# Patient Record
Sex: FEMALE | Race: WHITE | NOT HISPANIC OR LATINO | Employment: OTHER | URBAN - METROPOLITAN AREA
[De-identification: names, ages, dates, MRNs, and addresses within clinical notes are randomized per-mention and may not be internally consistent; named-entity substitution may affect disease eponyms.]

---

## 2017-04-04 ENCOUNTER — ALLSCRIPTS OFFICE VISIT (OUTPATIENT)
Dept: OTHER | Facility: OTHER | Age: 82
End: 2017-04-04

## 2017-04-04 ENCOUNTER — GENERIC CONVERSION - ENCOUNTER (OUTPATIENT)
Dept: FAMILY MEDICINE CLINIC | Facility: CLINIC | Age: 82
End: 2017-04-04

## 2017-05-09 ENCOUNTER — GENERIC CONVERSION - ENCOUNTER (OUTPATIENT)
Dept: OTHER | Facility: OTHER | Age: 82
End: 2017-05-09

## 2017-07-13 ENCOUNTER — ALLSCRIPTS OFFICE VISIT (OUTPATIENT)
Dept: OTHER | Facility: OTHER | Age: 82
End: 2017-07-13

## 2017-07-13 DIAGNOSIS — D69.6 THROMBOCYTOPENIA (HCC): ICD-10-CM

## 2017-07-13 DIAGNOSIS — E78.5 HYPERLIPIDEMIA: ICD-10-CM

## 2017-07-13 DIAGNOSIS — E11.65 TYPE 2 DIABETES MELLITUS WITH HYPERGLYCEMIA (HCC): ICD-10-CM

## 2017-08-07 ENCOUNTER — GENERIC CONVERSION - ENCOUNTER (OUTPATIENT)
Dept: OTHER | Facility: OTHER | Age: 82
End: 2017-08-07

## 2017-10-08 ENCOUNTER — GENERIC CONVERSION - ENCOUNTER (OUTPATIENT)
Dept: OTHER | Facility: OTHER | Age: 82
End: 2017-10-08

## 2017-10-10 ENCOUNTER — ALLSCRIPTS OFFICE VISIT (OUTPATIENT)
Dept: OTHER | Facility: OTHER | Age: 82
End: 2017-10-10

## 2017-10-13 ENCOUNTER — DOCTOR'S OFFICE (OUTPATIENT)
Dept: URBAN - NONMETROPOLITAN AREA CLINIC 1 | Facility: CLINIC | Age: 82
Setting detail: OPHTHALMOLOGY
End: 2017-10-13
Payer: COMMERCIAL

## 2017-10-13 ENCOUNTER — RX ONLY (RX ONLY)
Age: 82
End: 2017-10-13

## 2017-10-13 DIAGNOSIS — H04.123: ICD-10-CM

## 2017-10-13 DIAGNOSIS — H01.005: ICD-10-CM

## 2017-10-13 DIAGNOSIS — H01.002: ICD-10-CM

## 2017-10-13 DIAGNOSIS — H01.004: ICD-10-CM

## 2017-10-13 DIAGNOSIS — H01.001: ICD-10-CM

## 2017-10-13 PROCEDURE — 92012 INTRM OPH EXAM EST PATIENT: CPT | Performed by: OPTOMETRIST

## 2017-10-13 ASSESSMENT — REFRACTION_MANIFEST
OS_VA3: 20/
OS_VA1: 20/
OU_VA: 20/
OS_VA2: 20/60-1
OD_VA2: 20/
OS_VA1: 20/60-1
OS_AXIS: 177
OD_SPHERE: +1.00
OS_ADD: +2.75
OS_VA1: 20/
OS_SPHERE: +0.75
OD_VA1: 20/
OS_VA2: 20/
OU_VA: 20/
OD_VA3: 20/
OS_VA3: 20/
OD_VA1: 20/
OS_CYLINDER: -2.75
OD_VA1: 20/50-2
OD_VA3: 20/
OD_AXIS: 180
OS_VA3: 20/
OD_VA2: 20/
OD_ADD: +2.75
OU_VA: 20/
OD_VA2: 20/50-2
OS_VA2: 20/
OD_CYLINDER: -3.00
OD_VA3: 20/

## 2017-10-13 ASSESSMENT — REFRACTION_CURRENTRX
OD_OVR_VA: 20/
OD_SPHERE: +1.00
OS_SPHERE: +2.00
OS_CYLINDER: -2.75
OS_OVR_VA: 20/
OD_ADD: +2.75
OD_CYLINDER: -3.00
OS_OVR_VA: 20/
OS_OVR_VA: 20/
OD_VPRISM_DIRECTION: BF
OD_OVR_VA: 20/
OD_OVR_VA: 20/
OD_AXIS: 176
OS_AXIS: 30
OS_ADD: +2.75
OS_VPRISM_DIRECTION: BF

## 2017-10-13 ASSESSMENT — LID EXAM ASSESSMENTS
OS_TRICHIASIS: ABSENT
OD_BLEPHARITIS: RLL RUL 1+
OD_TRICHIASIS: ABSENT
OS_BLEPHARITIS: LLL LUL 2+

## 2017-10-13 ASSESSMENT — SPHEQUIV_DERIVED
OS_SPHEQUIV: -0.75
OD_SPHEQUIV: -0.5
OS_SPHEQUIV: -0.625
OD_SPHEQUIV: -0.375

## 2017-10-13 ASSESSMENT — REFRACTION_AUTOREFRACTION
OS_AXIS: 173
OS_CYLINDER: -1.50
OD_SPHERE: +1.00
OD_AXIS: 175
OS_SPHERE: 0.00
OD_CYLINDER: -2.75

## 2017-10-13 ASSESSMENT — VISUAL ACUITY
OD_BCVA: 20/40-2
OS_BCVA: 20/60-2

## 2017-10-13 ASSESSMENT — SUPERFICIAL PUNCTATE KERATITIS (SPK)
OS_SPK: ABSENT
OD_SPK: ABSENT

## 2017-10-28 NOTE — PROGRESS NOTES
Assessment    1  Hyperlipidemia (272 4) (E78 5)   2  Type 2 diabetes mellitus with hyperglycemia (250 00) (E11 65)   3  Benign essential hypertension (401 1) (I10)   4  Low back pain (724 2) (M54 5)    Plan  Benign essential hypertension    · We encourage you to begin to make lifestyle changes to help control your bloodpressure  These may include losing weight, increasing your activity level, limiting salt inyour diet, decreasing alcohol intake, and eating a diet low in fat and rich in fruitsand vegetables ; Status:Complete;   Done: 59ZHG3171 11:16AM   · We recommend you modify your diet to achieve and maintain a healthy weight  Tre [place] may increase your risk for developing health problems such as diabetes,heart disease, and cancer  Avoid high fat foods and eat a balanced diet richin fruits and vegetables  The combination of a reduced-calorie diet and increasedphysical activity is recommended  Please let us know if you would like tolearn more about your nutrition and calorie needs, and additional options includingweight loss programs that can help you achieve your goals ; Status:Complete;   Done:16Ooy8407 11:16AM  Hyperlipidemia    · Avoid alcoholic beverages ; Status:Complete;   Done: 60UMO6340 11:15AM   · Begin a limited exercise program ; Status:Complete;   Done: 58RJP5892 11:15AM   · Begin or continue regular aerobic exercise   Gradually work up to at least 3 sessions of 30minutes of exercise a week ; Status:Complete;   Done: 18OUK1451 11:15AM   · Continue with our present treatment plan ; Status:Complete;   Done: 95QLR1984 11:15AM   · Eat a low fat and low cholesterol diet ; Status:Complete;   Done: 84OFD3784 11:15AM   · Eat no more than 30 grams of fat per day ; Status:Complete;   Done: 97CKI0676 11:15AM   · Keep a diary of when and what you eat ; Status:Complete;   Done: 83RQV2043 11:15AM   · Some eating tips that can help you lose weight ; Status:Complete;   Done: 51Lkd072998:15AM   · There are many exercise options for seniors ; Status:Complete;   Done: 51Vhs756828:15AM   · We recommend that you bring your body mass index down to 26 ; Status:Complete;  Done: 22WIV9186 11:15AM   · We recommend that you follow the Mediterranean diet ; Status:Complete;   Done:10Oct2017 11:15AM   · Call [phone] if: You have muscle cramps ; Status:Complete;   Done: 10Oct201711:15AM   · Call [phone] if: You have pain in the stomach area ; Status:Complete;   Done:10Oct2017 11:15AM   · Call [phone] if: You start vomiting ; Status:Complete;   Done: 28QVP8496 11:15AM   · Call 911 if: You experience a new kind of chest pain (angina) or pressure  ;Status:Complete;   Done: 89NMI9610 11:15AM   · Call 911 if: You have any symptoms of a stroke ; Status:Complete;   Done: 39Zqb898116:15AM  Low back pain    · Acetaminophen-Codeine #3 300-30 MG Oral Tablet; TAKE 1 TABLET 3 TIMESDAILY AS NEEDED FOR PAIN  Type 2 diabetes mellitus with hyperglycemia    · Begin a limited exercise program ; Status:Complete;   Done: 58FRE4982 11:16AM   · Begin or continue regular aerobic exercise   Gradually work up to at least 3 sessions of 30minutes of exercise a week ; Status:Complete;   Done: 54CLG4180 11:16AM   · Eat a normal well-balanced diet ; Status:Complete;   Done: 87DMR4251 11:16AM   · Inspect your feet daily ; Status:Complete;   Done: 56RQJ8065 11:16AM   · Restrict your sodium (salt) intake to 2 grams per day ; Status:Complete;   Done:10Oct2017 11:16AM   · Some eating tips that can help you lose weight ; Status:Complete;   Done: 46Lkl342930:16AM   · There are many exercise options for seniors ; Status:Complete;   Done: 40Bdx339430:16AM   · Call [phone] if: You are having trouble following our instructions or treatment forany reason ; Status:Complete;   Done: 64KBC0632 11:16AM   · Call [phone] if: Your blood sugar is higher than 250 ; Status:Complete;   Done:10Oct2017 11:16AM   · Call [phone] if: Your blood sugar is steadily becoming higher ; Status:Complete;  Done: 18ANZ6014 11:16AM   · Call [phone] if: Your blood sugar is still over 300 after taking insulin  ;Status:Complete;   Done: 39YVW0532 11:16AM    Discussion/Summary  Possible side effects of new medications were reviewed with the patient/guardian today  The treatment plan was reviewed with the patient/guardian  The patient/guardian understands and agrees with the treatment plan      Chief Complaint  PT C/O RIGHT SIDED HIP AND LEG PAIN  STATES SHE WAS IN THE ER SUNDAY FOR UTI SYMPTOMS  SHE WAS STARTED ON CIPRO AND TYLENOL 3      History of Present Illness  HPI: She has a h/o sciatica  She was in the ER over the weekend for similar complaints  The pain goes down the right leg  She denies trauma  She no numbness or weakness  She has no bowel or bladder dysfunction, other than being diagnosed with a UTI in the ER  She has PVD  It hurts more when she walks  She saw interventional radiology in May and had a normal arterial Doppler  She is taking Tylenol #3 for back pain  It is helpful  She is taking Cipro for UTI and is doing well  She has no F/C  DM is well controlled on her current regimen  Her last A1c was <6%  She has no hypoglycemic events  LDL is at goal on her current regimen  She has no myalgia or weakness other than that discussed above  BP is at goal  She has no HA or vision changes  She has no recent CP or SOB  Review of Systems   Constitutional: No fever, no chills, feels well, no tiredness, no recent weight gain or loss  ENT: no ear ache, no loss of hearing, no nosebleeds or nasal discharge, no sore throat or hoarseness  Cardiovascular: no complaints of slow or fast heart rate, no chest pain, no palpitations, no leg claudication or lower extremity edema  Respiratory: no complaints of shortness of breath, no wheezing, no dyspnea on exertion, no orthopnea or PND  Breasts: no complaints of breast pain, breast lump or nipple discharge  Gastrointestinal: no complaints of abdominal pain, no constipation, no nausea or diarrhea, no vomiting, no bloody stools  Genitourinary: no complaints of dysuria, no incontinence, no pelvic pain, no dysmenorrhea, no vaginal discharge or abnormal vaginal bleeding  Musculoskeletal: no complaints of arthralgia, no myalgia, no joint swelling or stiffness, no limb pain or swelling  Integumentary: no complaints of skin rash or lesion, no itching or dry skin, no skin wounds  Neurological: no complaints of headache, no confusion, no numbness or tingling, no dizziness or fainting  Active Problems    1  2-vessel coronary artery disease (414 00) (I25 10)   2  Allergic rhinitis (477 9) (J30 9)   3  Anemia (285 9) (D64 9)   4  Anxiety (300 00) (F41 9)   5  Benign essential hypertension (401 1) (I10)   6  Benign paroxysmal vertigo, unspecified laterality (386 11) (H81 10)   7  Biceps tendinitis of right shoulder (726 12) (M75 21)   8  Carotid bruit (785 9) (R09 89)   9  Chronic obstructive pulmonary disease (496) (J44 9)   10  Congestive heart failure (428 0) (I50 9)   11  Constipation (564 00) (K59 00)   12  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)   13  CRD (chronic renal disease) (585 9) (N18 9)   14  Depression (311) (F32 9)   15  Diarrhea (787 91) (R19 7)   16  Esophageal reflux (530 81) (K21 9)   17  Gastric ulcer (531 90) (K25 9)   18  Hiatal hernia (553 3) (K44 9)   19  Hyperlipidemia (272 4) (E78 5)   20  Intermittent claudication (443 9) (I73 9)   21  Iron deficiency anemia due to chronic blood loss (280 0) (D50 0)   22  Low back pain (724 2) (M54 5)   23  Lower back pain (724 2) (M54 5)   24  Thrombocytopenia (287 5) (D69 6)   25  Trigger finger (727 03) (M65 30)   26  Type 2 diabetes mellitus with hyperglycemia (250 00) (E11 65)   27  Urge incontinence of urine (788 31) (N39 41)   28  Welcome to Medicare preventive visit (V70 0) (Z00 00)    Past Medical History    1  History of Acute lower UTI (599 0) (N39 0)   2  History of Encounter for screening mammogram for malignant neoplasm of breast (V76 12) (Z12 31)   3  History of acute sinusitis (V12 69) (Z87 09)   4  History of Myalgia And Myositis (729 1)   5  History of Skin rash (782 1) (R21)   6  History of Trigger Finger Of The Thumb (727 03)    Family History  Mother    1  Family history of Black lung disease   2  Family history of malignant neoplasm of breast (V16 3) (Z80 3)   3  Family history unobtainable (V49 89) (Z78 9)   4  Family history of Family history unobtainable due to orphan status (V49 89) (Z78 9)  Father    5  Family history of Black lung disease   6  Family history unobtainable (V49 89) (Z78 9)   7  Family history of Family history unobtainable due to orphan status (V49 89) (Z78 9)  Son    6  Family history of Living and Healthy  Maternal Grandmother    5  Family history unobtainable (V49 89) (Z78 9)   10  Family history of Family history unobtainable due to orphan status (V49 89) (Z78 9)  Paternal Grandmother    6  Family history unobtainable (V49 89) (Z78 9)   12  Family history of Family history unobtainable due to orphan status (V49 89) (Z78 9)  Maternal Grandfather    15  Family history unobtainable (V49 89) (Z78 9)   14  Family history of Family history unobtainable due to orphan status (V49 89) (Z78 9)  Paternal Grandfather    13  Family history of Family history unobtainable due to orphan status (V49 89) (Z78 9)    Social History   · Former smoker (V15 82) (I39 054)   · Never Drank Alcohol   · Tobacco use (305 1) (Z72 0)    Surgical History    1  History of Appendectomy   2  History of CABG   3  History of Cholecystectomy   4  History of Complete Colonoscopy   5  History of Diagnostic Esophagogastroduodenoscopy   6  History of Hernia Repair   7  History of Pacemaker Permanent Placement   8  History of Total Abdominal Hysterectomy With Removal Of Both Ovaries    Current Meds   1   Acetaminophen-Codeine #3 300-30 MG Oral Tablet; TAKE 1 TABLET 3 TIMES DAILY AS NEEDED FOR PAIN; Therapy: 01QXR2797 to (Evaluate:97Icj4979) Recorded   2  Advair Diskus 250-50 MCG/DOSE Inhalation Aerosol Powder Breath Activated; USE 1 INHALATION EVERY 12 HOURS; Therapy: 29WFN9259 to (Last Rx:13Apr2017)  Requested for: 13Apr2017 Ordered   3  Aspirin EC 81 MG Oral Tablet Delayed Release; Therapy: (AHSHQEEL:88FGT2702) to Recorded   4  BD Insulin Syr Ultrafine II 31G X 5/16 0 5 ML Miscellaneous; use as directed; Therapy: 59UIR8625 to (Evaluate:31Oct2017)  Requested for: 00LJM4005; Last LF:65NQZ0681 Ordered   5  BD Pen Needle Heide U/F 32G X 4 MM Miscellaneous; use daily as directed; Therapy: 80EQX0864 to (Last AX:41DGS3993)  Requested for: 07DFW8414 Ordered   6  Carvedilol 25 MG Oral Tablet; TAKE 1 TABLET THREE TIMES A DAY WITH MORNING AND EVENING MEAL; Therapy: 40OWO3080 to (Last Rx:09Jun2017)  Requested for: 05OHI6861 Ordered   7  Clarinex 5 MG Oral Tablet; Therapy: (SXWMZVSH:13RWP0338) to Recorded   8  Clopidogrel Bisulfate 75 MG Oral Tablet; TAKE 1 TABLET DAILY; Therapy: 11DTV1792 to (Larry Corona)  Requested for: 13RRA3557; Last Rx:26Fjd0129 Ordered   9  Ferrous Fumarate 324 MG TABS; TAKE 1 TABLET DAILY AS DIRECTED; Therapy: 71WST7481 to (Evaluate:27Mar2017)  Requested for: 01Apr2016; Last Rx:01Apr2016 Ordered   10  Fluticasone Propionate 50 MCG/ACT Nasal Suspension; USE 2 SPRAYS IN EACH  NOSTRIL ONCE DAILY; Therapy: 41YAI3580 to (Last Rx:01Apr2016)  Requested for: 01Apr2016 Ordered   11  Furosemide 20 MG Oral Tablet; TAKE 1 TABLET DAILY AS DIRECTED; Therapy: 90ODQ5381 to (Damon Bullock)  Requested for: 89XMR2579; Last  Rx:05Jun2017 Ordered   12  Furosemide 40 MG Oral Tablet; TAKE 1 TABLET DAILY; Therapy: 49PRB6077 to (Evaluate:09Jun2016)  Requested for: 88VIY2467 Recorded   13  Ipratropium-Albuterol 0 5-2 5 (3) MG/3ML Inhalation Solution; USE 1 UNIT DOSE IN  NEBULIZER EVERY 4 HOURS AS NEEDED;   Therapy: 15Apr2013 to (Last Rx:05Jan2015)  Requested for: 27IUY9682 Ordered 14  Lantus SoloStar 100 UNIT/ML Subcutaneous Solution Pen-injector; INJECT UNDER  THE SKIN AS DIRECTED; Therapy: 52MXZ9729 to (Last Mukund Free)  Requested for: 59CBE0092 Ordered   15  Lisinopril 10 MG Oral Tablet; TAKE 1 TABLET DAILY AS DIRECTED; Therapy: 81NLB8076 to (Chet Rinne)  Requested for: 01Apr2016; Last  Rx:44Oaq2466 Ordered   16  Metoclopramide HCl - 10 MG Oral Tablet; TAKE 1 TABLET 3 times a day; Therapy: 66UKT5988 to (Evaluate:14Oct2017)  Requested for: 49TBK7328; Last  Rx:20Khf2540 Ordered   17  Minitran 0 2 MG/HR Transdermal Patch 24 Hour; APPLY PATCH FOR 12 TO 14 HOURS  DAILY, THEN REMOVE;  Therapy: 60KXG3162 to (Yadira Murphy)  Requested for: 64IAX2188; Last  Rx:13Mar2017 Ordered   18  Nitroglycerin 0 2 MG/HR Transdermal Patch 24 Hour; APPLY PATCH DAILY FOR 12 TO  14 HOURS THEN REMOVE;  Therapy: 28DMJ5356 to (Last Rx:13Mar2017)  Requested for: 17BIY1449 Ordered   19  Nitrostat 0 4 MG Sublingual Tablet Sublingual; PLACE 1 TABLET UNDER THE TONGUE  EVERY 5 MINUTES FOR UP TO 3 DOSES AS NEEDED FOR CHEST PAIN  CALL  911 IF PAIN PERSISTS; Therapy: 90IDN4693 to (Yadira Murphy)  Requested for: 04CBP5121; Last  Rx:52Usx1909 Ordered   20  NovoLIN R 100 UNIT/ML Injection Solution; FOLLOW SLIDING SCALE:  BLOOD SUGAR 200-250, USE 2 UNITS  BLOOD SUGAR 251-300, USE 4 UNITS  BLOOD SUGAR 301-350, USE 6 UNITS; Therapy: 72Nbq8675 to (Last Rx:08Iye4010)  Requested for: 35Arb8970 Ordered   21  Pantoprazole Sodium 40 MG Oral Tablet Delayed Release; TAKE 1 TABLET DAILY; Therapy: 07ZGY7813 to (Concepción Riddle)  Requested for: 94DQX5508; Last  Rx:54Zav0716 Ordered   22  Polyethylene Glycol 3350 Oral Powder; MIX 1 CAPFUL (17GM) IN 8 OUNCES OF  WATER, JUICE, OR TEA AND DRINK DAILY; Therapy: 72OKC6069 to (Evaluate:67Ggg5223)  Requested for: 80GVA6955; Last  Rx:14Mar2016 Ordered   23  Rosuvastatin Calcium 10 MG Oral Tablet; TAKE 1 TABLET AT BEDTIME;   Therapy: 07SBJ9542 to (Evaluate:32Gmr0322) Requested for: 36TQR9126; Last  Rx:81Utx9669 Ordered   24  Sertraline HCl - 25 MG Oral Tablet; TAKE 1 TABLET DAILY AS DIRECTED; Therapy: 18QKV4283 to (Evaluate:73Bsq4819)  Requested for: 31MXY5812; Last  Rx:48Xsg7419 Ordered   25  Triamcinolone Acetonide 0 025 % External Cream; APPLY 2-3 TIMES DAILY TO  AFFECTED AREA(S); Therapy: 42UQQ9215 to (Last Rx:93Upb7463)  Requested for: 04OBF9560 Ordered   26  Valium 5 MG Oral Tablet; Therapy: (XSSLNEYW:00UNN2080) to Recorded    Allergies  1  No Known Drug Allergies    Vitals   Recorded: 80QES9926 10:52AM   Heart Rate 54   Respiration 14   Systolic 083   Diastolic 60   Height 5 ft    Weight 148 lb    BMI Calculated 28 9   BSA Calculated 1 64   O2 Saturation 92       Physical Exam   Constitutional  General appearance: No acute distress, well appearing and well nourished  Pulmonary  Respiratory effort: No increased work of breathing or signs of respiratory distress  Auscultation of lungs: Clear to auscultation  Cardiovascular  Auscultation of heart: Normal rate and rhythm, normal S1 and S2, without murmurs  Examination of extremities for edema and/or varicosities: Normal    Abdomen  Abdomen: Non-tender, no masses  Liver and spleen: No hepatomegaly or splenomegaly           Future Appointments    Date/Time Provider Specialty Site   12/18/2017 08:30 AM Thaddeus Osler, MD [facility] 70[address]       Signatures   Electronically signed by : Hussain Cruz MD; Oct 10 2017 11:17AM EST                       (Author)

## 2017-10-30 ENCOUNTER — DOCTOR'S OFFICE (OUTPATIENT)
Dept: URBAN - NONMETROPOLITAN AREA CLINIC 1 | Facility: CLINIC | Age: 82
Setting detail: OPHTHALMOLOGY
End: 2017-10-30
Payer: COMMERCIAL

## 2017-10-30 DIAGNOSIS — H01.004: ICD-10-CM

## 2017-10-30 DIAGNOSIS — H01.002: ICD-10-CM

## 2017-10-30 DIAGNOSIS — H02.052: ICD-10-CM

## 2017-10-30 DIAGNOSIS — D21.0: ICD-10-CM

## 2017-10-30 DIAGNOSIS — H01.001: ICD-10-CM

## 2017-10-30 DIAGNOSIS — H04.123: ICD-10-CM

## 2017-10-30 PROCEDURE — 83861 MICROFLUID ANALY TEARS: CPT | Performed by: OPHTHALMOLOGY

## 2017-10-30 PROCEDURE — 67820 REVISE EYELASHES: CPT | Performed by: OPHTHALMOLOGY

## 2017-10-30 PROCEDURE — 92285 EXTERNAL OCULAR PHOTOGRAPHY: CPT | Performed by: OPHTHALMOLOGY

## 2017-10-30 PROCEDURE — 92012 INTRM OPH EXAM EST PATIENT: CPT | Performed by: OPHTHALMOLOGY

## 2017-10-30 ASSESSMENT — REFRACTION_MANIFEST
OS_VA2: 20/
OS_VA2: 20/
OD_VA1: 20/50-2
OD_AXIS: 180
OU_VA: 20/
OD_VA1: 20/
OD_VA2: 20/50-2
OS_AXIS: 177
OS_VA3: 20/
OD_VA1: 20/
OS_ADD: +2.75
OD_VA2: 20/
OD_VA2: 20/
OU_VA: 20/
OD_CYLINDER: -3.00
OD_VA3: 20/
OS_VA2: 20/60-1
OS_CYLINDER: -2.75
OU_VA: 20/
OD_VA3: 20/
OD_ADD: +2.75
OD_SPHERE: +1.00
OS_VA3: 20/
OD_VA3: 20/
OS_VA3: 20/
OS_VA1: 20/
OS_VA1: 20/
OS_SPHERE: +0.75
OS_VA1: 20/60-1

## 2017-10-30 ASSESSMENT — SUPERFICIAL PUNCTATE KERATITIS (SPK)
OS_SPK: ABSENT
OD_SPK: ABSENT

## 2017-10-30 ASSESSMENT — REFRACTION_CURRENTRX
OS_OVR_VA: 20/
OD_CYLINDER: -3.00
OS_ADD: +2.75
OD_AXIS: 176
OS_SPHERE: +2.00
OD_OVR_VA: 20/
OD_VPRISM_DIRECTION: BF
OD_SPHERE: +1.00
OS_OVR_VA: 20/
OS_AXIS: 30
OS_OVR_VA: 20/
OS_VPRISM_DIRECTION: BF
OD_OVR_VA: 20/
OS_CYLINDER: -2.75
OD_OVR_VA: 20/
OD_ADD: +2.75

## 2017-10-30 ASSESSMENT — VISUAL ACUITY
OD_BCVA: 20/30-2
OS_BCVA: 20/70-2

## 2017-10-30 ASSESSMENT — LID EXAM ASSESSMENTS
OS_BLEPHARITIS: LLL LUL 2+
OD_BLEPHARITIS: RLL RUL 1+
OD_TRICHIASIS: RLL 1+
OS_TRICHIASIS: ABSENT

## 2017-10-30 ASSESSMENT — REFRACTION_AUTOREFRACTION
OS_SPHERE: 0.00
OS_CYLINDER: -1.50
OS_AXIS: 173
OD_AXIS: 175
OD_CYLINDER: -2.75
OD_SPHERE: +1.00

## 2017-10-30 ASSESSMENT — CONFRONTATIONAL VISUAL FIELD TEST (CVF)
OD_FINDINGS: FULL
OS_FINDINGS: FULL

## 2017-10-30 ASSESSMENT — SPHEQUIV_DERIVED
OS_SPHEQUIV: -0.75
OS_SPHEQUIV: -0.625
OD_SPHEQUIV: -0.375
OD_SPHEQUIV: -0.5

## 2017-11-01 ENCOUNTER — DOCTOR'S OFFICE (OUTPATIENT)
Dept: URBAN - NONMETROPOLITAN AREA CLINIC 1 | Facility: CLINIC | Age: 82
Setting detail: OPHTHALMOLOGY
End: 2017-11-01
Payer: COMMERCIAL

## 2017-11-01 DIAGNOSIS — H35.373: ICD-10-CM

## 2017-11-01 DIAGNOSIS — H01.002: ICD-10-CM

## 2017-11-01 DIAGNOSIS — H40.003: ICD-10-CM

## 2017-11-01 DIAGNOSIS — H01.005: ICD-10-CM

## 2017-11-01 DIAGNOSIS — H35.3132: ICD-10-CM

## 2017-11-01 DIAGNOSIS — H43.813: ICD-10-CM

## 2017-11-01 DIAGNOSIS — E11.3293: ICD-10-CM

## 2017-11-01 DIAGNOSIS — H26.493: ICD-10-CM

## 2017-11-01 DIAGNOSIS — H02.052: ICD-10-CM

## 2017-11-01 DIAGNOSIS — H01.004: ICD-10-CM

## 2017-11-01 DIAGNOSIS — H04.123: ICD-10-CM

## 2017-11-01 DIAGNOSIS — H01.001: ICD-10-CM

## 2017-11-01 LAB
LEFT EYE DIABETIC RETINOPATHY: NORMAL
RIGHT EYE DIABETIC RETINOPATHY: NORMAL

## 2017-11-01 PROCEDURE — 92133 CPTRZD OPH DX IMG PST SGM ON: CPT | Performed by: OPHTHALMOLOGY

## 2017-11-01 PROCEDURE — 92014 COMPRE OPH EXAM EST PT 1/>: CPT | Performed by: OPHTHALMOLOGY

## 2017-11-01 ASSESSMENT — SPHEQUIV_DERIVED
OD_SPHEQUIV: -0.5
OD_SPHEQUIV: -0.375
OS_SPHEQUIV: -0.625
OS_SPHEQUIV: -0.75

## 2017-11-01 ASSESSMENT — REFRACTION_MANIFEST
OS_VA1: 20/60-1
OD_VA3: 20/
OS_VA3: 20/
OD_VA2: 20/
OD_VA2: 20/
OU_VA: 20/
OU_VA: 20/
OS_VA2: 20/
OS_AXIS: 177
OD_CYLINDER: -3.00
OS_VA3: 20/
OD_AXIS: 180
OS_VA2: 20/
OD_SPHERE: +1.00
OS_VA3: 20/
OD_VA1: 20/50-2
OD_ADD: +2.75
OD_VA3: 20/
OU_VA: 20/
OS_ADD: +2.75
OD_VA2: 20/50-2
OS_CYLINDER: -2.75
OS_VA2: 20/60-1
OD_VA3: 20/
OS_VA1: 20/
OD_VA1: 20/
OS_VA1: 20/
OS_SPHERE: +0.75
OD_VA1: 20/

## 2017-11-01 ASSESSMENT — LID EXAM ASSESSMENTS
OD_BLEPHARITIS: RLL RUL 1+
OD_TRICHIASIS: ABSENT
OS_BLEPHARITIS: LLL LUL 2+
OS_TRICHIASIS: ABSENT

## 2017-11-01 ASSESSMENT — REFRACTION_CURRENTRX
OS_ADD: +2.75
OD_AXIS: 176
OS_SPHERE: +2.00
OS_OVR_VA: 20/
OD_OVR_VA: 20/
OD_SPHERE: +1.00
OS_CYLINDER: -2.75
OD_CYLINDER: -3.00
OD_ADD: +2.75
OS_OVR_VA: 20/
OS_AXIS: 30
OS_OVR_VA: 20/
OD_OVR_VA: 20/
OS_VPRISM_DIRECTION: BF
OD_VPRISM_DIRECTION: BF
OD_OVR_VA: 20/

## 2017-11-01 ASSESSMENT — SUPERFICIAL PUNCTATE KERATITIS (SPK)
OS_SPK: ABSENT
OD_SPK: ABSENT

## 2017-11-01 ASSESSMENT — REFRACTION_AUTOREFRACTION
OS_CYLINDER: -1.50
OD_SPHERE: +1.00
OS_SPHERE: 0.00
OD_CYLINDER: -2.75
OD_AXIS: 175
OS_AXIS: 173

## 2017-11-01 ASSESSMENT — LID POSITION - DERMATOCHALASIS
OD_DERMATOCHALASIS: RUL
OS_DERMATOCHALASIS: LUL

## 2017-11-01 ASSESSMENT — CONFRONTATIONAL VISUAL FIELD TEST (CVF)
OS_FINDINGS: FULL
OD_FINDINGS: FULL

## 2017-11-01 ASSESSMENT — VISUAL ACUITY
OD_BCVA: 20/40
OS_BCVA: 20/80

## 2017-11-20 ENCOUNTER — ALLSCRIPTS OFFICE VISIT (OUTPATIENT)
Dept: OTHER | Facility: OTHER | Age: 82
End: 2017-11-20

## 2017-11-21 ENCOUNTER — AMBUL SURGICAL CARE (OUTPATIENT)
Dept: URBAN - NONMETROPOLITAN AREA SURGERY 1 | Facility: SURGERY | Age: 82
Setting detail: OPHTHALMOLOGY
End: 2017-11-21
Payer: COMMERCIAL

## 2017-11-21 DIAGNOSIS — H26.491: ICD-10-CM

## 2017-11-21 PROCEDURE — G8918 PT W/O PREOP ORDER IV AB PRO: HCPCS | Performed by: OPHTHALMOLOGY

## 2017-11-21 PROCEDURE — G8907 PT DOC NO EVENTS ON DISCHARG: HCPCS | Performed by: OPHTHALMOLOGY

## 2017-11-21 PROCEDURE — 66821 AFTER CATARACT LASER SURGERY: CPT | Performed by: OPHTHALMOLOGY

## 2017-11-21 NOTE — PROGRESS NOTES
Assessment    1  Visit for pre-operative examination (V72 44) ([id number] [phone])    Discussion/Summary  Surgical Clearance: She is at a LOW risk from a cardiovascular standpoint at this time without any additional cardiac testing  Reevaluation needed, if she should present with symptoms prior to surgery/procedure  Patient will take her BP medications on the day of surgery  She will stop ASA and Plavix 5 days prior to surgery  She was take 1/2 of her usual dose of Lantus the night prior to surgery  Chief Complaint  PT HERE FOR SURGICAL CLEARANCE  HAVING EYE SURGERY DONE TUESDAY 11/28/17 WITH DR Brendan Helm  PAPER TO BE FILLED OUT      History of Present Illness  Pre-Op Visit (Brief): The patient is being seen for a preoperative visit  The procedure is a(n) excision of lesion scheduled for 11/28/17 with Dr Alessandro Sykes  The indication for surgery is cyst of left lower lid  Surgical Risk Assessment:  Prior Anesthesia: She had prior anesthesia,-- no prior adverse reaction to edidural anesthesia,-- no prior adverse reaction to spinal anesthesia-- and-- no prior adverse reaction to general anesthesia  Pertinent Past Medical History: angina,-- CAD with prior MI,-- diabetes-- and-- insulin use, but-- no arrhythmia,-- no CAD,-- CAD without recent PCI,-- no CHF,-- no chronic liver disease,-- no acute hepatitis,-- no coagulation delay,-- no primary hypercoagulable state,-- no secondary hypercoagulable state,-- no pulmonary embolism,-- no DVT,-- does not use anticoagulants,-- no thyroid disease,-- no neck osteoarthrosis,-- no TMJ osteoarthrosis,-- does not wear dentures,-- no seizure disorder,-- no CVA,-- no asthma,-- no COPD,-- not JIMBO,-- no renal disease,-- no low serum albumin-- and-- no obesity  Exercise Capacity: able to walk four blocks without symptoms-- and-- able to walk two flights of stairs without symptoms  Lifestyle Factors: denies alcohol use, denies tobacco use and denies illegal drug use  Symptoms: no symptoms  Pertinent Family History: no pertinent family history  Living Situation: home is secure and supportive  HPI: She has a symptomatic cyst on her left lower lid  has an extensive cardiac history  She has not had any chest pain or SOB in months  She is doing well on her current regimen  DM is under good control on her current regimen  Review of Systems   Constitutional: No fever, no chills, feels well, no tiredness, no recent weight gain or weight loss  Eyes: No complaints of eye pain, no red eyes, no eyesight problems, no discharge, no dry eyes, no itching of eyes  ENT: no complaints of earache, no loss of hearing, no nose bleeds, no nasal discharge, no sore throat, no hoarseness  Cardiovascular: No complaints of slow heart rate, no fast heart rate, no chest pain, no palpitations, no leg claudication, no lower extremity edema  Respiratory: No complaints of shortness of breath, no wheezing, no cough, no SOB on exertion, no orthopnea, no PND  Gastrointestinal: No complaints of abdominal pain, no constipation, no nausea or vomiting, no diarrhea, no bloody stools  Genitourinary: No complaints of dysuria, no incontinence, no pelvic pain, no dysmenorrhea, no vaginal discharge or bleeding  Musculoskeletal: No complaints of arthralgias, no myalgias, no joint swelling or stiffness, no limb pain or swelling  Integumentary: No complaints of skin rash or lesions, no itching, no skin wounds, no breast pain or lump  Neurological: No complaints of headache, no confusion, no convulsions, no numbness, no dizziness or fainting, no tingling, no limb weakness, no difficulty walking  Psychiatric: Not suicidal, no sleep disturbance, no anxiety or depression, no change in personality, no emotional problems  Endocrine: No complaints of proptosis, no hot flashes, no muscle weakness, no deepening of the voice, no feelings of weakness    Hematologic/Lymphatic: No complaints of swollen glands, no swollen glands in the neck, does not bleed easily, does not bruise easily  Active Problems    1  2-vessel coronary artery disease (414 00) (I25 10)   2  Allergic rhinitis (477 9) (J30 9)   3  Anemia (285 9) (D64 9)   4  Anxiety (300 00) (F41 9)   5  Benign essential hypertension (401 1) (I10)   6  Benign paroxysmal vertigo, unspecified laterality (386 11) (H81 10)   7  Biceps tendinitis of right shoulder (726 12) (M75 21)   8  Carotid bruit (785 9) (R09 89)   9  Chronic obstructive pulmonary disease (496) (J44 9)   10  Congestive heart failure (428 0) (I50 9)   11  Constipation (564 00) (K59 00)   12  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)   13  CRD (chronic renal disease) (585 9) (N18 9)   14  Depression (311) (F32 9)   15  Diarrhea (787 91) (R19 7)   16  Esophageal reflux (530 81) (K21 9)   17  Gastric ulcer (531 90) (K25 9)   18  Hiatal hernia (553 3) (K44 9)   19  Hyperlipidemia (272 4) (E78 5)   20  Intermittent claudication (443 9) (I73 9)   21  Iron deficiency anemia due to chronic blood loss (280 0) (D50 0)   22  Low back pain (724 2) (M54 5)   23  Lower back pain (724 2) (M54 5)   24  Thrombocytopenia (287 5) (D69 6)   25  Trigger finger (727 03) (M65 30)   26  Type 2 diabetes mellitus with hyperglycemia (250 00) (E11 65)   27  Urge incontinence of urine (788 31) (N39 41)   28  Welcome to Medicare preventive visit (V70 0) (Z00 00)    Past Medical History     · History of Acute lower UTI (599 0) (N39 0)   · History of Encounter for screening mammogram for malignant neoplasm of breast(V76 12) (Z12 31)   · History of acute sinusitis (V12 69) (Z87 09)   · History of Myalgia And Myositis (729 1)   · History of Skin rash (782 1) (R21)   · History of Trigger Finger Of The Thumb (727 03)    The active problems and past medical history were reviewed and updated today        Surgical History   · History of Appendectomy   · History of CABG   · History of Cholecystectomy   · History of Complete Colonoscopy   · History of Diagnostic Esophagogastroduodenoscopy   · History of Hernia Repair   · History of Pacemaker Permanent Placement   · History of Total Abdominal Hysterectomy With Removal Of Both Ovaries    The surgical history was reviewed and updated today  Family History  Mother    · Family history of Black lung disease   · Family history of malignant neoplasm of breast (V16 3) (Z80 3)   · Family history unobtainable (V49 89) (Z78 9)   · Family history of Family history unobtainable due to orphan status (V49 89) (Z78 9)  Father    · Family history of Black lung disease   · Family history unobtainable (V49 89) (Z78 9)   · Family history of Family history unobtainable due to orphan status (V49 89) (Z73 8)  Son    · Family history of Living and Healthy  Maternal Grandmother    · Family history unobtainable (V49 89) (Z78 9)   · Family history of Family history unobtainable due to orphan status (V49 89) (Z78 9)  Paternal Grandmother    · Family history unobtainable (V49 89) (Z78 9)   · Family history of Family history unobtainable due to orphan status (V49 89) (Z78 9)  Maternal Grandfather    · Family history unobtainable (V49 89) (Z78 9)   · Family history of Family history unobtainable due to orphan status (V49 89) (Z78 9)  Paternal Grandfather    · Family history of Family history unobtainable due to orphan status (V49 89) (Z78 9)    The family history was reviewed and updated today  Social History     · Former smoker (P05 65) (N38 312)   · Never Drank Alcohol   · Tobacco use (305 1) (Z72 0)  The social history was reviewed and updated today  The social history was reviewed and is unchanged  Current Meds   1  Acetaminophen-Codeine #3 300-30 MG Oral Tablet; TAKE 1 TABLET 3 TIMES DAILY AS NEEDED FOR PAIN; Therapy: 42XEL7552 to (Evaluate:08Apr2018); Last Rx:10Oct2017 Ordered   2  Advair Diskus 250-50 MCG/DOSE Inhalation Aerosol Powder Breath Activated; USE 1 INHALATION EVERY 12 HOURS;  Therapy: 15JUN3919 to (Last Rx:13Apr2017) Requested for: 13Apr2017 Ordered   3  Aspirin EC 81 MG Oral Tablet Delayed Release; Therapy: (KVCGSQZY:80UYY1480) to Recorded   4  BD Insulin Syr Ultrafine II 31G X 5/16 0 5 ML Miscellaneous; use as directed; Therapy: 87GNO0817 to (Evaluate:31Oct2017)  Requested for: 97ZWX1950; Last [place]:84MCF8961 Ordered   5  BD Pen Needle Heide U/F 32G X 4 MM Miscellaneous; use daily as directed; Therapy: 23IOL5131 to (Last IN:96WAU7828)  Requested for: 71ZBH6511 Ordered   6  Carvedilol 25 MG Oral Tablet; TAKE 1 TABLET THREE TIMES A DAY WITH MORNING AND EVENING MEAL; Therapy: 16TTS8533 to (Last Rx:09Jun2017)  Requested for: 51DXQ9138 Ordered   7  Clarinex 5 MG Oral Tablet; Therapy: (VCIGWEMR:08LRZ4507) to Recorded   8  Clopidogrel Bisulfate 75 MG Oral Tablet; TAKE 1 TABLET DAILY; Therapy: 78WEJ6415 to ([phone])  Requested for: 23FZV8358; Last Rx:27Fxh2229 Ordered   9  Ferrous Fumarate 324 MG TABS; TAKE 1 TABLET DAILY AS DIRECTED; Therapy: 93PQN4931 to (Evaluate:27Mar2017)  Requested for: 01Apr2016; Last Rx:01Apr2016 Ordered   10  Fluticasone Propionate 50 MCG/ACT Nasal Suspension; USE 2 SPRAYS IN EACH  NOSTRIL ONCE DAILY; Therapy: 82EUA4815 to (Last Rx:01Apr2016)  Requested for: 01Apr2016 Ordered   11  Furosemide 20 MG Oral Tablet; TAKE 1 TABLET DAILY AS DIRECTED; Therapy: 11WDU8552 to (Maggie Loyola)  Requested for: 10OFH4397; Last  Rx:05Jun2017 Ordered   12  Furosemide 40 MG Oral Tablet; TAKE 1 TABLET DAILY; Therapy: 60GVE1198 to (Evaluate:09Jun2016)  Requested for: 97KJJ2263 Recorded   13  Ipratropium-Albuterol 0 5-2 5 (3) MG/3ML Inhalation Solution; USE 1 UNIT DOSE IN  NEBULIZER EVERY 4 HOURS AS NEEDED; Therapy: 76Dxt6450 to (Last Rx:05Jan2015)  Requested for: 14BNX1679 Ordered   14  Lantus SoloStar 100 UNIT/ML Subcutaneous Solution Pen-injector; INJECT UNDER  THE SKIN AS DIRECTED; Therapy: 95BBX5450 to (Last Martha Dad)  Requested for: 66XZF4833 Ordered   15   Lisinopril 10 MG Oral Tablet; TAKE 1 TABLET DAILY AS DIRECTED; Therapy: 42AEP1043 to [phone])  Requested for: 88Evm8235; Last  Rx:01Apr2016 Ordered   16  Metoclopramide HCl - 10 MG Oral Tablet; TAKE 1 TABLET 3 times a day; Therapy: 05UOK0702 to (Evaluate:14Oct2017)  Requested for: 70WFE5689; Last  Rx:97Nml7700 Ordered   17  Minitran 0 2 MG/HR Transdermal Patch 24 Hour; APPLY PATCH FOR 12 TO 14 HOURS  DAILY, THEN REMOVE;  Therapy: 47JGB3825 to (Cassie Dia)  Requested for: 14NKZ5599; Last  Rx:13Mar2017 Ordered   18  Nitroglycerin 0 2 MG/HR Transdermal Patch 24 Hour; APPLY PATCH DAILY FOR 12 TO  14 HOURS THEN REMOVE;  Therapy: 29CWJ6314 to (Last Rx:13Mar2017)  Requested for: 42RHZ4555 Ordered   19  Nitrostat 0 4 MG Sublingual Tablet Sublingual; PLACE 1 TABLET UNDER THE TONGUE  EVERY 5 MINUTES FOR UP TO 3 DOSES AS NEEDED FOR CHEST PAIN  CALL  911 IF PAIN PERSISTS; Therapy: 93VPJ9511 to (Cassie Dia)  Requested for: 67BPF4478; Last  Rx:37Sia8171 Ordered   20  NovoLIN R 100 UNIT/ML Injection Solution; FOLLOW SLIDING SCALE:  BLOOD SUGAR 200-250, USE 2 UNITS  BLOOD SUGAR 251-300, USE 4 UNITS  BLOOD SUGAR 301-350, USE 6 UNITS; Therapy: 85Xet4952 to (Last Rx:14Abd4234)  Requested for: 00Brt7956 Ordered   21  Pantoprazole Sodium 40 MG Oral Tablet Delayed Release; TAKE 1 TABLET DAILY; Therapy: 27EQU3242 to ([facility])  Requested for: 68JZL6354; Last  Rx:57Vwh1008 Ordered   22  Polyethylene Glycol 3350 Oral Powder; MIX 1 CAPFUL (17GM) IN 8 OUNCES OF  WATER, JUICE, OR TEA AND DRINK DAILY; Therapy: 71UWW0961 to (Evaluate:57Srg2251)  Requested for: 99XAI0535; Last  Rx:14Mar2016 Ordered   23  Rosuvastatin Calcium 10 MG Oral Tablet; TAKE 1 TABLET AT BEDTIME; Therapy: 13HPY8421 to ([phone])  Requested for: 62BFC8573; Last  Rx:05Jun2017 Ordered   24  Sertraline HCl - 25 MG Oral Tablet; TAKE 1 TABLET DAILY AS DIRECTED; Therapy: 97RHC3785 to (Evaluate:61Syv1575)  Requested for: 19BAW9302; Last  Rx:66Ray5257 Ordered   25  Triamcinolone Acetonide 0 025 % External Cream; APPLY 2-3 TIMES DAILY TO  AFFECTED AREA(S); Therapy: 79LFV3983 to (Last Rx:94Bzg7405)  Requested for: 20HOU9391 Ordered   26  Valium 5 MG Oral Tablet; Therapy: (LHNLFECV:33KJV1894) to Recorded    The medication list was reviewed and updated today  Allergies  1  No Known Drug Allergies    Vitals   Recorded: 35DPH5008 10:05AM   Heart Rate 85   Respiration 15   Systolic 558   Diastolic 68   Height 5 ft    Weight 147 lb 8 oz   BMI Calculated 28 81   BSA Calculated 1 64       Physical Exam   Constitutional  General appearance: No acute distress, well appearing and well nourished  Neck  Neck: Supple, symmetric, trachea midline, no masses  Thyroid: Normal, no thyromegaly  Pulmonary  Percussion of chest: Normal    Palpation of chest: Normal    Cardiovascular  Auscultation of heart: Normal rate and rhythm, normal S1 and S2, no murmurs  Carotid pulses: 2+ bilaterally  Abdomen  Abdomen: Non-tender, no masses  Liver and spleen: No hepatomegaly or splenomegaly  End of Encounter Meds    1  Carvedilol 25 MG Oral Tablet (Coreg); TAKE 1 TABLET THREE TIMES A DAY WITH MORNING AND EVENING MEAL; Therapy: 54URI7833 to (Last Rx:09Jun2017)  Requested for: 59UVA5615 Ordered   2  Clopidogrel Bisulfate 75 MG Oral Tablet (Plavix); TAKE 1 TABLET DAILY; Therapy: 39JQL9861 to (Reida Nissen)  Requested for: 57ZPD0242; Last Rx:29Kzz7209 Ordered   3  Furosemide 40 MG Oral Tablet; TAKE 1 TABLET DAILY; Therapy: 95TEB3252 to (Evaluate:09Jun2016)  Requested for: 80LXN0654 Recorded   4  Minitran 0 2 MG/HR Transdermal Patch 24 Hour; APPLY PATCH FOR 12 TO 14 HOURS DAILY, THEN REMOVE; Therapy: 34LEJ8691 to ([place])  Requested for: 97ZRH2046; Last Rx:13Mar2017 Ordered   5  Rosuvastatin Calcium 10 MG Oral Tablet (Crestor); TAKE 1 TABLET AT BEDTIME; Therapy: 43AFM9504 to (Catherine Anne)  Requested for: 18EOA6980; Last Rx:05Jun2017 Ordered    6   Fluticasone Propionate 50 MCG/ACT Nasal Suspension; USE 2 SPRAYS IN EACH NOSTRIL ONCE DAILY; Therapy: 35ZFH4983 to (Last Rx:01Apr2016)  Requested for: 01Apr2016 Ordered    7  Ferrous Fumarate 324 MG TABS; TAKE 1 TABLET DAILY AS DIRECTED; Therapy: 86XEX6550 to (Evaluate:27Mar2017)  Requested for: 01Apr2016; Last Rx:01Apr2016 Ordered    8  Advair Diskus 250-50 MCG/DOSE Inhalation Aerosol Powder Breath Activated; USE 1 INHALATION EVERY 12 HOURS; Therapy: 65RHB1519 to (Last Rx:13Apr2017)  Requested for: 13Apr2017 Ordered    9  Furosemide 20 MG Oral Tablet; TAKE 1 TABLET DAILY AS DIRECTED; Therapy: 45OXE5344 to (Eloy Socks)  Requested for: 88ORU8811; Last Rx:05Jun2017 Ordered   10  Nitroglycerin 0 2 MG/HR Transdermal Patch 24 Hour; APPLY PATCH DAILY FOR 12 TO  14 HOURS THEN REMOVE;  Therapy: 52VCV6012 to (Last Rx:13Mar2017)  Requested for: 96RUH6175 Ordered   11  Nitrostat 0 4 MG Sublingual Tablet Sublingual (Nitroglycerin); PLACE 1 TABLET UNDER  THE TONGUE EVERY 5 MINUTES FOR UP TO 3 DOSES AS NEEDED  FOR CHEST PAIN  CALL 911 IF PAIN PERSISTS; Therapy: 36UCR4912 to (August Mis)  Requested for: 88DQR8321; Last  Rx:11Wmk5520 Ordered    12  Lisinopril 10 MG Oral Tablet; TAKE 1 TABLET DAILY AS DIRECTED; Therapy: 88WKP1017 to (Evaluate:27Mar2017)  Requested for: 01Apr2016; Last  Rx:01Apr2016 Ordered    13  Polyethylene Glycol 3350 Oral Powder; MIX 1 CAPFUL (17GM) IN 8 OUNCES OF  WATER, JUICE, OR TEA AND DRINK DAILY; Therapy: 77UFG3486 to (Evaluate:17Qzs2680)  Requested for: 94NGS3929; Last  Rx:14Mar2016 Ordered    14  Ipratropium-Albuterol 0 5-2 5 (3) MG/3ML Inhalation Solution; USE 1 UNIT DOSE IN  NEBULIZER EVERY 4 HOURS AS NEEDED; Therapy: 20Tbs1826 to (Last Rx:05Jan2015)  Requested for: 96QVG4217 Ordered    15  Sertraline HCl - 25 MG Oral Tablet; TAKE 1 TABLET DAILY AS DIRECTED; Therapy: 56YAE9424 to (Evaluate:36Qaj9437)  Requested for: 16OGK5500; Last  Rx:09Lrx7413 Ordered    16   Metoclopramide HCl - 10 MG Oral Tablet (Reglan); TAKE 1 TABLET 3 times a day; Therapy: 58XNW7570 to (Evaluate:2017)  Requested for: 59RQH5117; Last  Rx:04Dir7625 Ordered   17  Pantoprazole Sodium 40 MG Oral Tablet Delayed Release (Protonix); TAKE 1 TABLET  DAILY; Therapy: 07CVQ5962 to ([phone])  Requested for: 44JPZ1363; Last  Rx:80Yww0095 Ordered    18  Acetaminophen-Codeine #3 300-30 MG Oral Tablet; TAKE 1 TABLET 3 TIMES DAILY AS  NEEDED FOR PAIN;  Therapy: 67TZS1834 to (Evaluate:70Hzm4147); Last Rx:41Czs8979 Ordered    19  Triamcinolone Acetonide 0 025 % External Cream; APPLY 2-3 TIMES DAILY TO  AFFECTED AREA(S); Therapy: 24MEJ1666 to (Last Rx:63Ohh6057)  Requested for: 04ZEV3941 Ordered    20  BD Insulin Syr Ultrafine II 31G X 5/16 0 5 ML Miscellaneous; use as directed; Therapy: 84JWH1283 to (Evaluate:2017)  Requested for: 47KUF9634; Last  JU:74CYX9795 Ordered   21  BD Pen Needle Heide U/F 32G X 4 MM Miscellaneous; use daily as directed; Therapy: 08YIV0555 to (Last IV:89XEP3287)  Requested for: 81RTP8015 Ordered   22  Lantus SoloStar 100 UNIT/ML Subcutaneous Solution Pen-injector; INJECT UNDER  THE SKIN AS DIRECTED; Therapy: 50QVG4396 to (Last Marilyn Melissa)  Requested for: 84UOJ2429 Ordered   23  NovoLIN R 100 UNIT/ML Injection Solution; FOLLOW SLIDING SCALE:  BLOOD SUGAR 200-250, USE 2 UNITS  BLOOD SUGAR 251-300, USE 4 UNITS  BLOOD SUGAR 301-350, USE 6 UNITS; Therapy: 51Xvf2350 to (Last Rx:55Gva7671)  Requested for: 88Mmm6210 Ordered    24  Aspirin EC 81 MG Oral Tablet Delayed Release; Therapy: (QWIQVRZ50ICX4794) to Recorded   25  Clarinex 5 MG Oral Tablet (Desloratadine); Therapy: (VHTVVAPR:05MMT7446) to Recorded   26  Valium 5 MG Oral Tablet (DiazePAM);   Therapy: (CFXXGHNN:93FVA3437) to Recorded    Signatures   Electronically signed by : Anuj Ayala MD; 2017 10:45AM EST                       (Author)

## 2017-11-28 ENCOUNTER — AMBUL SURGICAL CARE (OUTPATIENT)
Dept: URBAN - NONMETROPOLITAN AREA SURGERY 1 | Facility: SURGERY | Age: 82
Setting detail: OPHTHALMOLOGY
End: 2017-11-28
Payer: COMMERCIAL

## 2017-11-28 DIAGNOSIS — D21.0: ICD-10-CM

## 2017-11-28 DIAGNOSIS — D23.12: ICD-10-CM

## 2017-11-28 PROCEDURE — 67810 INCAL BX EYELID SKN LID MRGN: CPT | Performed by: OPHTHALMOLOGY

## 2017-11-28 PROCEDURE — G8907 PT DOC NO EVENTS ON DISCHARG: HCPCS | Performed by: OPHTHALMOLOGY

## 2017-11-28 PROCEDURE — G8918 PT W/O PREOP ORDER IV AB PRO: HCPCS | Performed by: OPHTHALMOLOGY

## 2017-11-28 PROCEDURE — 67961 REVISION OF EYELID: CPT | Performed by: OPHTHALMOLOGY

## 2017-12-18 ENCOUNTER — GENERIC CONVERSION - ENCOUNTER (OUTPATIENT)
Dept: FAMILY MEDICINE CLINIC | Facility: CLINIC | Age: 82
End: 2017-12-18

## 2017-12-18 ENCOUNTER — RX ONLY (RX ONLY)
Age: 82
End: 2017-12-18

## 2017-12-18 ENCOUNTER — DOCTOR'S OFFICE (OUTPATIENT)
Dept: URBAN - NONMETROPOLITAN AREA CLINIC 1 | Facility: CLINIC | Age: 82
Setting detail: OPHTHALMOLOGY
End: 2017-12-18
Payer: COMMERCIAL

## 2017-12-18 DIAGNOSIS — D21.0: ICD-10-CM

## 2017-12-18 DIAGNOSIS — H04.123: ICD-10-CM

## 2017-12-18 DIAGNOSIS — H02.052: ICD-10-CM

## 2017-12-18 DIAGNOSIS — H26.493: ICD-10-CM

## 2017-12-18 PROCEDURE — 99024 POSTOP FOLLOW-UP VISIT: CPT | Performed by: OPHTHALMOLOGY

## 2017-12-18 ASSESSMENT — REFRACTION_CURRENTRX
OS_ADD: +2.75
OS_VPRISM_DIRECTION: BF
OD_CYLINDER: -3.00
OD_SPHERE: +1.00
OS_OVR_VA: 20/
OS_AXIS: 30
OD_OVR_VA: 20/
OS_OVR_VA: 20/
OS_OVR_VA: 20/
OS_CYLINDER: -2.75
OD_OVR_VA: 20/
OD_AXIS: 176
OD_VPRISM_DIRECTION: BF
OD_ADD: +2.75
OD_OVR_VA: 20/
OS_SPHERE: +2.00

## 2017-12-18 ASSESSMENT — REFRACTION_MANIFEST
OD_VA2: 20/50-2
OS_SPHERE: +0.75
OD_AXIS: 180
OS_VA2: 20/
OS_VA2: 20/60-1
OS_VA1: 20/
OD_VA3: 20/
OS_AXIS: 177
OU_VA: 20/
OD_VA3: 20/
OS_VA3: 20/
OS_VA3: 20/
OS_VA2: 20/
OS_VA3: 20/
OU_VA: 20/
OD_VA2: 20/
OS_ADD: +2.75
OS_VA1: 20/
OD_VA1: 20/
OD_ADD: +2.75
OD_VA1: 20/
OD_VA1: 20/50-2
OD_VA2: 20/
OD_CYLINDER: -3.00
OU_VA: 20/
OS_CYLINDER: -2.75
OS_VA1: 20/60-1
OD_VA3: 20/
OD_SPHERE: +1.00

## 2017-12-18 ASSESSMENT — LID POSITION - DERMATOCHALASIS
OD_DERMATOCHALASIS: RUL
OS_DERMATOCHALASIS: LUL

## 2017-12-18 ASSESSMENT — SUPERFICIAL PUNCTATE KERATITIS (SPK)
OS_SPK: ABSENT
OD_SPK: ABSENT

## 2017-12-18 ASSESSMENT — REFRACTION_AUTOREFRACTION
OD_SPHERE: +1.25
OS_SPHERE: +2.25
OD_CYLINDER: -3.00
OD_AXIS: 002
OS_AXIS: 006
OS_CYLINDER: -2.25

## 2017-12-18 ASSESSMENT — VISUAL ACUITY
OD_BCVA: 20/50+2
OS_BCVA: 20/60

## 2017-12-18 ASSESSMENT — LID EXAM ASSESSMENTS
OD_TRICHIASIS: ABSENT
OS_TRICHIASIS: ABSENT
OD_BLEPHARITIS: RLL RUL 1+
OS_BLEPHARITIS: LLL LUL 2+

## 2017-12-18 ASSESSMENT — SPHEQUIV_DERIVED
OD_SPHEQUIV: -0.25
OS_SPHEQUIV: 1.125
OS_SPHEQUIV: -0.625
OD_SPHEQUIV: -0.5

## 2017-12-18 ASSESSMENT — CONFRONTATIONAL VISUAL FIELD TEST (CVF)
OS_FINDINGS: FULL
OD_FINDINGS: FULL

## 2018-01-09 NOTE — MISCELLANEOUS
Assessment    1  Type 2 diabetes mellitus with hyperglycemia (250 00) (E11 65)   2  Chronic obstructive pulmonary disease (496) (J44 9)   3  2-vessel coronary artery disease (414 00) (I25 10)   4  Hyperlipidemia (272 4) (E78 5)   5  Benign essential hypertension (401 1) (I10)    Plan  2-vessel coronary artery disease    · Begin a limited exercise program ; Status:Complete;   Done: 40CMU8837   Ordered; For:2-vessel coronary artery disease; Ordered By:Bob Hawkins;   · Brush your teeth 2 times a day and floss at least once a day ; Status:Complete;   Done:  42DHO9425   Ordered; For:2-vessel coronary artery disease; Ordered By:Ming Hawkins;   · Continue with our present treatment plan ; Status:Complete;   Done: 15FXM5902   Ordered; For:2-vessel coronary artery disease; Ordered By:Ming Hawkins;   · Eat a low fat and low cholesterol diet ; Status:Complete;   Done: 18OVN8421   Ordered; For:2-vessel coronary artery disease; Ordered By:Ming Hawkins;   · There are many exercise options for seniors ; Status:Complete;   Done: 26HFD6314   Ordered; For:2-vessel coronary artery disease; Ordered By:Ming Hawkins;   · We recommend routine visits to a dentist ; Status:Complete;   Done: 19RNC1663   Ordered; For:2-vessel coronary artery disease; Ordered By:Ming Hawkins;   · We recommend that you bring your body mass index down to 25 ; Status:Complete;    Done: 85CIH4197   Ordered; For:2-vessel coronary artery disease; Ordered By:Ming Hawkins;   · You need to quit smoking ; Status:Complete;   Done: 30MBW5264   Ordered; For:2-vessel coronary artery disease; Ordered By:Ming Hawkins;   · Call [phone] if: You are having chest pain with exercise ; Status:Complete;    Done: 98TXQ1215   Ordered;   For:2-vessel coronary artery disease; Ordered By:Ming Hawkins;   · Call [phone] if: You are having more frequent or more severe chest pain with  exercise ; Status:Complete;   Done: 88KFV0148   Ordered; For:2-vessel coronary artery disease; Ordered By:Cece Hawkins Rm;   · Call [phone] if: You are still having difficulty breathing while lying down in 2 days ;  Status:Complete;   Done: 63OQX0016   Ordered; For:2-vessel coronary artery disease; Ordered By:Viktoriyash Alvbenoit Rm;   · Call [phone] if: You become dizzy or lightheaded, especially when you stand up  after sitting for awhile ; Status:Complete;   Done: 10XEF3230   Ordered; For:2-vessel coronary artery disease; Ordered By:Cece Hawkins Rm;   · Call [phone] if: You feel your heart is beating very fast or skipping beats ;  Status:Complete;   Done: 09MFJ6223   Ordered; For:2-vessel coronary artery disease; Ordered By:ViktoriyashCece Rm;   · Call [phone] if: You have difficulty breathing while lying down and you are  comfortable only when sitting up ; Status:Complete;   Done: 33HNC0857   Ordered; For:2-vessel coronary artery disease; Ordered By:Cece Hawkins Rm;   · Call [phone] if: You start getting short of breath with your normal exercise or  physical labor ; Status:Complete;   Done: 22OFC1387   Ordered; For:2-vessel coronary artery disease; Ordered By:Cece Hawkins Rm;   · Call [phone] if: Your chest pain is happening more often ; Status:Complete;    Done: 09RFM2582   Ordered; For:2-vessel coronary artery disease; Ordered By:Cece Hawkins Rm;   · Call [phone] if: Your chest pain with exercise is not better in 3 days ;  Status:Complete;   Done: 58NEH6930   Ordered; For:2-vessel coronary artery disease; Ordered By:Viktoriyash Alvbenoit Rm;   · Call [phone] if: Your dizziness is getting worse ; Status:Complete;   Done:  73IAF5674   Ordered; For:2-vessel coronary artery disease; Ordered By:ViktoriyashCece Rm;   · Call [phone] if: Your dizziness is not getting better in 2 days ; Status:Complete;    Done: 07FIZ6686   Ordered;   For:2-vessel coronary artery disease; Ordered By:Cece Hawkins;   · Call [phone] if: Your palpitations are not getting better after treatment at home ;  Status:Complete;   Done: 40JXD2794   Ordered; For:2-vessel coronary artery disease; Ordered By:Vamshi Hawkins;   · Call 911 if: You are too short of breath to talk, you can speak only one or two words  between breaths, or your lips or nails look blue ; Status:Complete;   Done: 20OKD8037   Ordered; For:2-vessel coronary artery disease; Ordered By:Vamshi Hawkins;   · Call 911 if: You experience a new kind of chest pain (angina) or pressure ;  Status:Complete;   Done: 37ZPY5419   Ordered; For:2-vessel coronary artery disease; Ordered By:Bob Hawkins;   · Call 911 if: You have any symptoms of a stroke ; Status:Complete;   Done: 30VGG7524   Ordered; For:2-vessel coronary artery disease; Ordered By:Bob Hawkins;   · Call 911 if: You have fainted or passed out ; Status:Complete;   Done: 72GLS8109   Ordered; For:2-vessel coronary artery disease; Ordered By:Bob Hawkins;   · Call 911 if: Your chest pain (angina) does not go away after 10 minutes of your usual  treatment ; Status:Complete;   Done: 60WGT3210   Ordered; For:2-vessel coronary artery disease; Ordered By:Vamshi Hawkins;   · Seek Immediate Medical Attention if: You have difficulty breathing, or you are short of  breath more often ; Status:Complete;   Done: 36XKL1818   Ordered; For:2-vessel coronary artery disease; Ordered By:Vamshi Hawkins;   · Seek Immediate Medical Attention if: Your chest pain (angina) happens at rest ;  Status:Complete;   Done: 45IBT7629   Ordered; For:2-vessel coronary artery disease; Ordered By:Vamshi Hawkins;   · Seek Immediate Medical Attention if: Your chest pain feels different to you ;  Status:Complete;   Done: 09TAE5610   Ordered; For:2-vessel coronary artery disease; Ordered By:Vamshi Hawkins; Acute lower UTI    · Home Health Referral Other Physician Referral  Consult  Status: Active  Requested for:  47HWA1961   Ordered;  For: Acute lower UTI; Ordered By: Yousif Sauceda Performed:  Due: 81AGG0840; Last Updated By: Lisa Bray; 1/22/2016 11:11:07 AM  are Referring to a non- Preferred Provider : Established Patient  Care Summary provided  : Yes  Benign essential hypertension    · Begin a limited exercise program ; Status:Complete;   Done: 13ARI8118   Ordered; For:Benign essential hypertension; Ordered By:Raad Hawkins;   · Continue with our present treatment plan ; Status:Complete;   Done: 26HEG4534   Ordered; For:Benign essential hypertension; Ordered By:Raad Hawkins;   · Eat a low fat and low cholesterol diet ; Status:Complete;   Done: 74UHC6973   Ordered; For:Benign essential hypertension; Ordered By:Raad Hawkins;   · Eat no more than 30 grams of fat per day ; Status:Complete;   Done: 81GSE4317   Ordered; For:Benign essential hypertension; Ordered By:Raad Hawkins;   · Keep a diary of when and what you eat ; Status:Complete;   Done: 91ZJU8414   Ordered; For:Benign essential hypertension; Ordered By:Raad Hawkins;   · Some eating tips that can help you lose weight ; Status:Complete;   Done: 31RVY3980   Ordered; For:Benign essential hypertension; Ordered By:Raad Hawkins;   · Take your blood pressure twice a day, varying the time of day you check it  Record the  numbers, and bring them with you to your appointment ; Status:Complete;   Done:  54WOV4539   Ordered; For:Benign essential hypertension; Ordered By:Raad Hawkins;   · There are many exercise options for seniors ; Status:Complete;   Done: 86QBF9649   Ordered; For:Benign essential hypertension; Ordered By:Raad Hawkins;   · We encourage you to begin to make lifestyle changes to help control your blood  pressure  These may include losing weight, increasing your activity level, limiting salt in  your diet, decreasing alcohol intake, and eating a diet low in fat and rich in fruits  and vegetables ; Status:Complete;   Done: 08OSI7887   Ordered;   For:Benign essential hypertension; Ordered By:Tiffani Hawkins;   · We recommend that you bring your body mass index down to 26 ; Status:Complete;    Done: 28ZPX2949   Ordered; For:Benign essential hypertension; Ordered By:Tiffani Hawkins;   · We recommend you modify your diet to achieve and maintain a healthy weight  Being  overweight may increase your risk for developing health problems such as diabetes,  heart disease, and cancer  Avoid high fat foods and eat a balanced diet rich  in fruits and vegetables  The combination of a reduced-calorie diet and increased  physical activity is recommended  Please let us know if you would like to  learn more about your nutrition and calorie needs, and additional options including  weight loss programs that can help you achieve your goals ; Status:Complete;   Done:  24VSF0630   Ordered; For:Benign essential hypertension; Ordered By:Tiffani Hawkins;   · Call [phone] if: You become dizzy or lightheaded, especially when you stand up  after sitting for a while ; Status:Complete;   Done: 87JYW3380   Ordered; For:Benign essential hypertension; Ordered By:Tiffani Hawkins;   · Call [phone] if: You develop double vision (see two of everything) ;  Status:Complete;   Done: 11OPZ7111   Ordered; For:Benign essential hypertension; Ordered By:Tiffani Hawkins;   · Call [phone] if: Your blood pressure is frequently higher than 140/90 ;  Status:Complete;   Done: 22CYS2790   Ordered; For:Benign essential hypertension; Ordered By:Tiffani Hawkins;   · Call 911 if: You experience a new kind of chest pain (angina) or pressure ;  Status:Complete;   Done: 79CSU9471   Ordered; For:Benign essential hypertension; Ordered By:Bob Hawkins;   · Call 911 if: You have any symptoms of a stroke ; Status:Complete;   Done: 20UDH9228   Ordered;   For:Benign essential hypertension; Ordered By:Tiffani Hawkins;   · Seek Immediate Medical Attention if: You have a severe headache that will not go away ;  Status:Complete;   Done: 43XZP7510   Ordered; For:Benign essential hypertension; Ordered By:Leona Hawkins;   · Seek Immediate Medical Attention if: Your blood pressure is greater than 250/120 for 2  consecutive readings ; Status:Complete;   Done: 83GHB1956   Ordered; For:Benign essential hypertension; Ordered By:Leona Hawkins; Chronic obstructive pulmonary disease    · 3 times a day practice pursed lip and abdominal breathing ; Status:Complete;   Done:  17ALE2466   Ordered; For:Chronic obstructive pulmonary disease; Ordered By:Leona Hawkins;   · Avoid  exposure to cigarette smoke ; Status:Complete;   Done: 72FLS0611   Ordered; For:Chronic obstructive pulmonary disease; Ordered By:Leona Hawkins;   · Avoid exposure to household dust, animal dander, and molds ; Status:Complete;   Done:  90OVC5066   Ordered; For:Chronic obstructive pulmonary disease; Ordered By:Leona Hawkins;   · Avoid exposure to infections ; Status:Complete;   Done: 27ZBU0880   Ordered; For:Chronic obstructive pulmonary disease; Ordered By:Leona Hawkins;   · Avoid exposure to things that make your problem worse ; Status:Complete;   Done:  63MTM4201   Ordered; For:Chronic obstructive pulmonary disease; Ordered By:Bob Hawkins;   · Decreasing the stress in your life may help your condition improve ; Status:Complete;    Done: 55DPS9614   Ordered; For:Chronic obstructive pulmonary disease; Ordered By:Bob Hawkins;   · Eat a normal well-balanced diet  Follow the food pyramid for healthy eating ;  Status:Complete;   Done: 72ZTU3187   Ordered; For:Chronic obstructive pulmonary disease; Ordered By:Leona Hawkins;   · To use an inhaler:; Status:Complete;   Done: 46QDC3502   Ordered; For:Chronic obstructive pulmonary disease; Ordered By:Bob Hawkins;   · Use your oxygen at 2 liters per minute 12 hours a day ; Status:Complete;   Done:  28GUI8048   Ordered;   For:Chronic obstructive pulmonary disease; Ordered By:Ross Pradip Hussein;   · Call [phone] if: Peak flow is still less than 200 after 2 days ; Status:Complete;    Done: 50BQY2425   Ordered; For:Chronic obstructive pulmonary disease; Ordered By:Pradip Hawkins;   · Call [phone] if: The cough is worse or secretions become darker or colored ;  Status:Complete;   Done: 80EJE1337   Ordered; For:Chronic obstructive pulmonary disease; Ordered By:Pradip Hawkins;   · Call [phone] if: You have difficulty breathing while lying down and you are  comfortable only when sitting up ; Status:Complete;   Done: 55IJF4928   Ordered; For:Chronic obstructive pulmonary disease; Ordered By:Pradip Hawkins;   · Call [phone] if: Your cough is not better in 1 week ; Status:Complete;   Done:  92VQF2162   Ordered; For:Chronic obstructive pulmonary disease; Ordered By:Pradip Hawkins;   · Call [phone] if: Your temperature is higher than 102F ; Status:Complete;   Done:  81IIM3396   Ordered; For:Chronic obstructive pulmonary disease; Ordered By:Pradip Hawkins;   · Call 731 if: You are too short of breath to talk, you can speak only one or two words  between breaths, or your lips or nails look blue ; Status:Complete;   Done: 94GNY8421   Ordered; For:Chronic obstructive pulmonary disease; Ordered By:Pradip Hawkins;   · Seek Immediate Medical Attention if: You are having trouble staying awake ;  Status:Complete;   Done: 61CWY6138   Ordered; For:Chronic obstructive pulmonary disease; Ordered By:Pradip Hawkins;   · Seek Immediate Medical Attention if: You feel short of breath even while resting ;  Status:Complete;   Done: 04QMU8020   Ordered; For:Chronic obstructive pulmonary disease; Ordered By:Pradip Hawkins;   · Seek Immediate Medical Attention if: You get a headache that does not go away with your  usual treatment ; Status:Complete;   Done: 24AAM0346   Ordered;   For:Chronic obstructive pulmonary disease; Ordered By:Pradip Hawkins;   · Seek Immediate Medical Attention if: You have pain in the chest that gets worse with  deep breathing or coughing ; Status:Complete;   Done: 35GMG2130   Ordered; For:Chronic obstructive pulmonary disease; Ordered By:Lynn Hawkins;   · Seek Immediate Medical Attention if: You or your family members notice any confusion or  difficulty with memory ; Status:Complete;   Done: 75WEZ4738   Ordered; For:Chronic obstructive pulmonary disease; Ordered By:Lynn Hawkins;   · Seek Immediate Medical Attention if: Your shortness of breath is getting worse ;  Status:Complete;   Done: 42MDZ6319   Ordered; For:Chronic obstructive pulmonary disease; Ordered By:Lynn Hawkins; Hyperlipidemia    · Begin a limited exercise program ; Status:Complete;   Done: 78RPC4015   Ordered;  For:Hyperlipidemia; Ordered By:Lynn Hawkins;   · Continue with our present treatment plan ; Status:Complete;   Done: 56TTB4585   Ordered;  Kimo Arizmendi; Ordered By:Lynn Hawkins;   · Eat a low fat and low cholesterol diet ; Status:Complete;   Done: 30HIW1292   Ordered;  For:Hyperlipidemia; Ordered By:Lynn Hawkins;   · Eat no more than 30 grams of fat per day ; Status:Complete;   Done: 70FSX4466   Ordered;  For:Hyperlipidemia; Ordered By:Lynn Hawkins;   · Keep a diary of when and what you eat ; Status:Complete;   Done: 91UOH4689   Ordered;  For:Hyperlipidemia; Ordered By:Lynn Hawkins;   · Some eating tips that can help you lose weight ; Status:Complete;   Done: 28HQB4220   Ordered;  For:Hyperlipidemia; Ordered By:Lynn Hawkins;   · There are many exercise options for seniors ; Status:Complete;   Done: 51YJL4883   Ordered;  For:Hyperlipidemia; Ordered By:Lynn Hawkins;   · We recommend that you bring your body mass index down to 26 ; Status:Complete;    Done: 82LQB7244   Ordered;  For:Hyperlipidemia; Ordered By:Lynn Hawkins;   · Call [phone] if: You have muscle cramps ; Status:Complete;   Done: 03GCB3096   Ordered;  For:Hyperlipidemia;  Ordered Yuko Wolff;   · Call [phone] if: You have pain in the stomach area ; Status:Complete;   Done:  28JBI6925   Ordered;  Junie Danielle; Ordered By:Juan Carlos Hawkins;   · Call [phone] if: You start vomiting ; Status:Complete;   Done: 28QPK2405   Ordered;  For:Hyperlipidemia; Ordered By:Juan Carlos Hawkins;   · Call 911 if: You experience a new kind of chest pain (angina) or pressure ;  Status:Complete;   Done: 79ITJ0908   Ordered;  For:Hyperlipidemia; Ordered By:Juan Carlos Hawkins;   · Call 911 if: You have any symptoms of a stroke ; Status:Complete;   Done: 04CXT5019   Ordered;  For:Hyperlipidemia; Ordered By:Juan Carlos Hawkins;  Type 2 diabetes mellitus with hyperglycemia    · BD Insulin Syr Ultrafine II 31G X 5/16" 0 5 ML Miscellaneous; use as directed   Rx By: Ted Fair; Dispense: 30 Days ; #:1 X 100 Miscellaneous Box; Refill: 5; For: Type 2 diabetes mellitus with hyperglycemia; ANDRES = N; Verified Transmission to [address]; Last Updated By: [employer]; 2016 10:54:19 AM   · Hemoglobin A1c- POC; Status:Active; Requested MMD:[id number];    Perform: In Office; [id number];FAZBMPC; For:Type 2 diabetes mellitus with hyperglycemia; Ordered By:Juan Carlos Hawkins; Chief Complaint  Chief Complaint Free Text Note Form: DARA  PT WAS ADMITTED TO [facility] ON 16 AND D/C 16 FOR SOB      History of Present Illness  TCM Communication [facility]: The patient is being contacted for follow-up after hospitalization and PT CONTACTED OFFICE  Hospital course was discussed with the inpatient physician and records were reviewed  She was hospitalized at [facility]  The date of admission: 16, date of discharge: 16  Diagnosis: SOB  She was discharged to home  Communication performed and completed by   [facility]: She has coronary artery disease  She underwent recent cardiac catheterization that showed occlusion of her previous bypass grafts   Her interventional cardiologist did not feel that he could safely stent these grafts  She is a poor candidate for repeat surgery  She develops shortness of breath without chest pain  She was recently started on spironolactone  She has significant ischemic cardiomyopathy with an ejection fraction of only 25%  Her blood sugars are well-controlled on her current regimen  She has no hypoglycemic events  Her A1c is at goal     She has a history of COPD  She quit smoking about a year ago  She has no cough  She has no sputum production  She's no fevers or chills  Her blood pressure is well-controlled on her current regimen  She has no headache or vision changes  She has no peripheral edema  Review of Systems  Complete-Female:   Constitutional: No fever, no chills, feels well, no tiredness, no recent weight gain or weight loss  Eyes: No complaints of eye pain, no red eyes, no eyesight problems, no discharge, no dry eyes, no itching of eyes  ENT: no complaints of earache, no loss of hearing, no nose bleeds, no nasal discharge, no sore throat, no hoarseness  Cardiovascular: as noted in HPI  Respiratory: No complaints of shortness of breath, no wheezing, no cough, no SOB on exertion, no orthopnea, no PND  Gastrointestinal: No complaints of abdominal pain, no constipation, no nausea or vomiting, no diarrhea, no bloody stools  Genitourinary: No complaints of dysuria, no incontinence, no pelvic pain, no dysmenorrhea, no vaginal discharge or bleeding  Musculoskeletal: No complaints of arthralgias, no myalgias, no joint swelling or stiffness, no limb pain or swelling  Integumentary: No complaints of skin rash or lesions, no itching, no skin wounds, no breast pain or lump  Neurological: No complaints of headache, no confusion, no convulsions, no numbness, no dizziness or fainting, no tingling, no limb weakness, no difficulty walking     Psychiatric: Not suicidal, no sleep disturbance, no anxiety or depression, no change in personality, no emotional problems  Endocrine: No complaints of proptosis, no hot flashes, no muscle weakness, no deepening of the voice, no feelings of weakness  Hematologic/Lymphatic: No complaints of swollen glands, no swollen glands in the neck, does not bleed easily, does not bruise easily  Active Problems    1  2-vessel coronary artery disease (414 00) (I25 10)   2  Acute lower UTI (599 0) (N39 0)   3  Allergic rhinitis (477 9) (J30 9)   4  Anemia (285 9) (D64 9)   5  Anxiety (300 00) (F41 9)   6  Benign essential hypertension (401 1) (I10)   7  Benign paroxysmal vertigo, unspecified laterality (386 11) (H81 10)   8  Carotid bruit (785 9) (R09 89)   9  Chronic obstructive pulmonary disease (496) (J44 9)   10  Congestive heart failure (428 0) (I50 9)   11  Constipation (564 00) (K59 00)   12  CRD (chronic renal disease) (585 9) (N18 9)   13  Depression (311) (F32 9)   14  Diarrhea (787 91) (R19 7)   15  Emphysema (492 8) (J43 9)   16  Encounter for screening for other nervous system disorder (V80 09) (Z13 858)   17  Encounter for screening mammogram for malignant neoplasm of breast (V76 12)    (Z12 31)   18  Encounter for special screening examination for genitourinary disorder (V81 6) (Z13 89)   19  Esophageal reflux (530 81) (K21 9)   20  Gastric ulcer (531 90) (K25 9)   21  Hiatal hernia (553 3) (K44 9)   22  Hyperlipidemia (272 4) (E78 5)   23  Iron deficiency anemia due to chronic blood loss (280 0) (D50 0)   24  Low back pain (724 2) (M54 5)   25  Lower back pain (724 2) (M54 5)   26  Screening for malignant neoplasm of cervix (V76 2) (Z12 4)   27  Screening for osteoporosis (V82 81) (Z13 820)   28  Skin rash (782 1) (R21)   29  Trigger finger (727 03) (M65 30)   30  Type 2 diabetes mellitus with hyperglycemia (250 00) (E11 65)   31  Visit for pre-operative examination (V72 84) (U05 099)    Past Medical History    1  History of Encounter for screening mammogram for malignant neoplasm of breast   (V76 12) (Z12 31)   2  History of acute sinusitis (V12 69) (Z87 09)   3  History of Myalgia And Myositis (729 1)   4  History of Trigger Finger Of The Thumb (727 03)    Surgical History    1  History of Appendectomy   2  History of CABG   3  History of Cholecystectomy   4  History of Complete Colonoscopy   5  History of Diagnostic Esophagogastroduodenoscopy   6  History of Hernia Repair   7  History of Pacemaker Permanent Placement   8  History of Total Abdominal Hysterectomy With Removal Of Both Ovaries    Family History    1  Family history of Black lung disease   2  Family history of malignant neoplasm of breast (V16 3) (Z80 3)   3  Family history unobtainable (V49 89) (Z78 9)   4  Family history of Family history unobtainable due to orphan status (V49 89) (Z78 9)    5  Family history of Black lung disease   6  Family history unobtainable (V49 89) (Z78 9)   7  Family history of Family history unobtainable due to orphan status (V49 89) (Z78 9)    8  Family history of Living and Healthy    9  Family history unobtainable (V49 89) (Z78 9)   10  Family history of Family history unobtainable due to orphan status (V49 89) (Z78 9)    11  Family history unobtainable (V49 89) (Z78 9)   12  Family history of Family history unobtainable due to orphan status (V49 89) (Z78 9)    13  Family history unobtainable (V49 89) (Z78 9)   14  Family history of Family history unobtainable due to orphan status (V49 89) (Z78 9)    15  Family history of Family history unobtainable due to orphan status (V49 89) (Z78 9)    Social History    · Former smoker (V15 82) (U07 135)   · Never Drank Alcohol   · Tobacco use (305 1) (Z72 0)    Current Meds   1  Acetaminophen-Codeine #3 300-30 MG Oral Tablet; TAKE 1 TABLET 3 TIMES DAILY AS   NEEDED FOR PAIN;   Therapy: 66RMY9608 to (Evaluate:17Urk3365) Recorded   2  Advair Diskus 250-50 MCG/DOSE Inhalation Aerosol Powder Breath Activated; INHALE 1   PUFF EVERY 12 HOURS;    Therapy: 04FJI9414 to (Last Rx:89Tpo1642)  Requested for: 96HRU9529 Ordered   3  Aspirin EC 81 MG Oral Tablet Delayed Release; Therapy: (XKQRHYGT:25OKS7149) to Recorded   4  BD Insulin Syr Ultrafine II 31G X 5/16" 0 5 ML Miscellaneous; use as directed; Therapy: 00YSZ8844 to (Evaluate:15May2015)  Requested for: 89DYR8777; Last   Rx:15Jan2015 Ordered   5  BD Pen Needle Heide U/F 32G X 4 MM Miscellaneous; INJECT DAILY AS DIRECTED; Therapy: 02YOJ2417 to (Evaluate:11Aug2015)  Requested for: 24ZQU5951; Last   Rx:12Feb2015 Ordered   6  Carvedilol 25 MG Oral Tablet; take 1 tablet by mouth twice a day; Therapy: 08RTV3292 to (Shanika Ruth)  Requested for: 84QGB2628; Last   Rx:30Oct2015 Ordered   7  Clarinex 5 MG Oral Tablet; Therapy: (JCECFDUF:76FUG4664) to Recorded   8  Clopidogrel Bisulfate 75 MG Oral Tablet; TAKE 1 TABLET DAILY; Therapy: 96OUC7167 to ([place] [facility])  Requested for: 05LWI7440; Last   Rx:30Nov2015 Ordered   9  Crestor 10 MG Oral Tablet; TAKE 1 TABLET AT BEDTIME; Therapy: 04MFW8092 to ([facility])  Requested for: 69LCS1255; Last   Rx:30Oct2015 Ordered   10  Ferrous Fumarate 324 MG Oral Tablet; TAKE 1 TABLET DAILY AS DIRECTED; Therapy: 25JJY3856 to (Evaluate:11Aug2015)  Requested for: 51OFT5428; Last    Rx:77Wji7330 Ordered   11  Fexofenadine HCl - 180 MG Oral Tablet; TAKE 1 TABLET DAILY; Therapy: 05GBA7313 to ([phone])  Requested for: 79LVO3629; Last    Rx:05Jan2015 Ordered   12  Fluticasone Propionate 50 MCG/ACT Nasal Suspension; USE 2 SPRAYS IN EACH    NOSTRIL ONCE DAILY; Therapy: 07UXD4698 to (Last Rx:26May2015)  Requested for: 72MKU2299 Ordered   13  Furosemide 20 MG Oral Tablet; TAKE 1 TABLET DAILY AS DIRECTED; Therapy: 64FVS3018 to ([facility])  Requested for: 51JGR4777; Last    Rx:30Nov2015 Ordered   14  Ipratropium-Albuterol 0 5-2 5 (3) MG/3ML Inhalation Solution; USE 1 UNIT DOSE IN    NEBULIZER EVERY 4 HOURS AS NEEDED;     Therapy: 15Apr2013 to (Last Rx:05Jan2015)  Requested for: 08YFQ7900 Ordered   15  Lantus SoloStar 100 UNIT/ML Subcutaneous Solution Pen-injector; INJECT    SUBCUTANEOUSLY AS DIRECTED; Therapy: 78LYU0758 to (Evaluate:00Vep0961)  Requested for: 66SJG7523; Last    Rx:60Yfo4916 Ordered   16  Lisinopril 10 MG Oral Tablet; TAKE 1 TABLET DAILY AS DIRECTED; Therapy: 76JFG9529 to (Jessieeris Cardona)  Requested for: 34AZA7239; Last    Rx:34Nny6800 Ordered   17  Metoclopramide HCl - 10 MG Oral Tablet; TAKE 1 TABLET 3 times a day; Therapy: 52LBP2190 to (Vanessa Moncada)  Requested for: 23Mfw2940; Last    Rx:90Olm5310 Ordered   18  Pantoprazole Sodium 40 MG Oral Tablet Delayed Release; TAKE 1 TABLET DAILY; Therapy: 27ZFH6059 to (Vanessa Moncada)  Requested for: 20Efz4236; Last    Rx:36Jsg6200 Ordered   19  Sertraline HCl - 25 MG Oral Tablet; TAKE 1 TABLET DAILY AS DIRECTED; Therapy: 83IRG2019 to (Evaluate:96Fkf0036)  Requested for: 68XNJ3662; Last    Rx:30Zea4794 Ordered   20  Triamcinolone Acetonide 0 025 % External Cream; APPLY 2-3 TIMES DAILY TO    AFFECTED AREA(S); Therapy: 22CCR5137 to (Last Rx:33Has0105)  Requested for: 08WXF0653 Ordered   21  Valium 5 MG Oral Tablet; Therapy: (IFUEAKAI:82CSW2832) to Recorded    Allergies    1  No Known Drug Allergies    Vitals  Signs [Data Includes: Current Encounter]   Recorded: 94MUX4552 10:28AM   Heart Rate: 78  Systolic: 426  Diastolic: 76  Weight: 316 lb   BMI Calculated: 26 95  BSA Calculated: 1 59  O2 Saturation: 93    Physical Exam    Constitutional   General appearance: No acute distress, well appearing and well nourished  Pulmonary   Respiratory effort: No increased work of breathing or signs of respiratory distress  Auscultation of lungs: Clear to auscultation  Cardiovascular   Auscultation of heart: Normal rate and rhythm, normal S1 and S2, without murmurs  Examination of extremities for edema and/or varicosities: Normal     Abdomen   Abdomen: Non-tender, no masses      Liver and spleen: No hepatomegaly or splenomegaly           Future Appointments    Date/Time Provider Specialty Site   02/19/2016 10:30 AM Nicky Carter MD [facility] [address] [place] [address]     Signatures   Electronically signed by : Liliya Leon MD; Jan 24 2016  9:13PM EST                       (Author)

## 2018-01-10 NOTE — PROGRESS NOTES
History of Present Illness  Care Coordination Encounter Information:   Type of Encounter: Telephonic    Spoke to Patient  Care Coordination  Nurse Herminia Lion:   The reason for call is to discuss outreach for follow up/needed services  Care Coordination Chronic Condition HPI:       Patient is experiencing the following symptoms: shortness of breath with usual activity, but no fatigue, no cough, no chest pain and no edema   Oxygent therapy at 2 LPM PRN shortness of breath  Knowledge Assessment/Teachback Questions: is following diet, foods to limit/avoid, is obtaining daily weights, knows the name of her medications/water pill, understands the activity/exercise plan, knows when to report symptoms and has a plan in place for who to call for worsening symptoms   weight today was 142 lbs and yesterday 143lbs  Counseling was provided to the patient  Topics counseled included diet, need for daily weights and medications  Care Coordinator Additional Notes: Patient feeling good today  Has appointment with cardiologist on Tuesday  Her blood glucose this morning was 98mg/dl  Patient agreeable to outreach again in two weeks  Active Problems    1  2-vessel coronary artery disease (414 00) (I25 10)   2  Acute lower UTI (599 0) (N39 0)   3  Allergic rhinitis (477 9) (J30 9)   4  Anemia (285 9) (D64 9)   5  Anxiety (300 00) (F41 9)   6  Benign essential hypertension (401 1) (I10)   7  Benign paroxysmal vertigo, unspecified laterality (386 11) (H81 10)   8  Carotid bruit (785 9) (R09 89)   9  Chronic obstructive pulmonary disease (496) (J44 9)   10  Congestive heart failure (428 0) (I50 9)   11  Constipation (564 00) (K59 00)   12  CRD (chronic renal disease) (585 9) (N18 9)   13  Depression (311) (F32 9)   14  Diarrhea (787 91) (R19 7)   15  Emphysema (492 8) (J43 9)   16  Encounter for screening for other nervous system disorder (V80 09) (Z13 858)   17   Encounter for screening mammogram for malignant neoplasm of breast (V76 12)    (Z12 31)   18  Encounter for special screening examination for genitourinary disorder (V81 6) (Z13 89)   19  Esophageal reflux (530 81) (K21 9)   20  Gastric ulcer (531 90) (K25 9)   21  Hiatal hernia (553 3) (K44 9)   22  Hyperlipidemia (272 4) (E78 5)   23  Iron deficiency anemia due to chronic blood loss (280 0) (D50 0)   24  Low back pain (724 2) (M54 5)   25  Lower back pain (724 2) (M54 5)   26  Screening for malignant neoplasm of cervix (V76 2) (Z12 4)   27  Screening for osteoporosis (V82 81) (Z13 820)   28  Skin rash (782 1) (R21)   29  Trigger finger (727 03) (M65 30)   30  Type 2 diabetes mellitus with hyperglycemia (250 00) (E11 65)   31  Visit for pre-operative examination (V72 84) (V24 715)    Past Medical History    1  History of Encounter for screening mammogram for malignant neoplasm of breast   (V76 12) (Z12 31)   2  History of acute sinusitis (V12 69) (Z87 09)   3  History of Myalgia And Myositis (729 1)   4  History of Trigger Finger Of The Thumb (727 03)    Surgical History    1  History of Appendectomy   2  History of CABG   3  History of Cholecystectomy   4  History of Complete Colonoscopy   5  History of Diagnostic Esophagogastroduodenoscopy   6  History of Hernia Repair   7  History of Pacemaker Permanent Placement   8  History of Total Abdominal Hysterectomy With Removal Of Both Ovaries    Family History  Mother    1  Family history of Black lung disease   2  Family history of malignant neoplasm of breast (V16 3) (Z80 3)   3  Family history unobtainable (V49 89) (Z78 9)   4  Family history of Family history unobtainable due to orphan status (V49 89) (Z78 9)  Father    5  Family history of Black lung disease   6  Family history unobtainable (V49 89) (Z78 9)   7  Family history of Family history unobtainable due to orphan status (V49 89) (Z78 9)  Son    6  Family history of Living and Healthy  Maternal Grandmother    5   Family history unobtainable (V49 89) (Z78 9)   10  Family history of Family history unobtainable due to orphan status (V49 89) (Z78 9)  Paternal Grandmother    6  Family history unobtainable (V49 89) (Z78 9)   12  Family history of Family history unobtainable due to orphan status (V49 89) (Z78 9)  Maternal Grandfather    15  Family history unobtainable (V49 89) (Z78 9)   14  Family history of Family history unobtainable due to orphan status (V49 89) (Z78 9)  Paternal Grandfather    13  Family history of Family history unobtainable due to orphan status (V49 89) (Z78 9)    Social History    · Former smoker (V15 82) (I93 543)   · Never Drank Alcohol   · Tobacco use (305 1) (Z72 0)    Current Meds    1  Carvedilol 25 MG Oral Tablet (Coreg); TAKE 1 TABLET THREE TIMES A DAY WITH   MORNING AND EVENING MEAL; Therapy: 59GHJ7296 to (Evaluate:07Sep2016)  Requested for: 51BBV2080; Last   Rx:10May2016 Ordered   2  Clopidogrel Bisulfate 75 MG Oral Tablet (Plavix); TAKE 1 TABLET DAILY; Therapy: 18JER1045 to (Shahrzad Sneddon)  Requested for: 01Apr2016; Last   Rx:01Apr2016 Ordered   3  Crestor 10 MG Oral Tablet (Rosuvastatin Calcium); TAKE 1 TABLET AT BEDTIME; Therapy: 27KQR2180 to (Evaluate:27Mar2017)  Requested for: 01Apr2016; Last   Rx:01Apr2016 Ordered   4  Furosemide 40 MG Oral Tablet; TAKE 1 TABLET DAILY; Therapy: 65BZX4888 to (Evaluate:09Jun2016)  Requested for: 72FPK3544 Recorded   5  Minitran 0 2 MG/HR Transdermal Patch 24 Hour; APPLY PATCH FOR 12 TO 14 HOURS   DAILY, THEN REMOVE;   Therapy: 83YQL2066 to (Evaluate:27Mar2017)  Requested for: 01Apr2016; Last   Rx:01Apr2016 Ordered    6  Triamcinolone Acetonide 0 025 % External Cream; APPLY 2-3 TIMES DAILY TO   AFFECTED AREA(S); Therapy: 71AQD1346 to (Last Rx:26May2015)  Requested for: 53RES9380 Ordered    7  Fluticasone Propionate 50 MCG/ACT Nasal Suspension; USE 2 SPRAYS IN EACH   NOSTRIL ONCE DAILY; Therapy: 13EUF1647 to (Last Rx:01Apr2016)  Requested for: 01Apr2016 Ordered    8   Ferrous Fumarate 324 MG TABS; TAKE 1 TABLET DAILY AS DIRECTED; Therapy: 69EXN7888 to (Evaluate:27Mar2017)  Requested for: 01Apr2016; Last   Rx:01Apr2016 Ordered    9  Advair Diskus 250-50 MCG/DOSE Inhalation Aerosol Powder Breath Activated; INHALE 1   PUFF EVERY 12 HOURS; Therapy: 37CDT9495 to (Last Rx:01Apr2016)  Requested for: 01Apr2016 Ordered    10  Furosemide 20 MG Oral Tablet; TAKE 1 TABLET DAILY AS DIRECTED; Therapy: 07YNV7236 to ([phone])  Requested for: 01Apr2016; Last    Rx:01Apr2016 Ordered   11  Nitrostat 0 4 MG Sublingual Tablet Sublingual; PLACE 1 TABLET UNDER THE TONGUE    EVERY 5 MINUTES FOR UP TO 3 DOSES AS NEEDED FOR CHEST PAIN  CALL    911 IF PAIN PERSISTS; Therapy: 05DTC3303 to (Marly Palmer)  Requested for: 62SRU4262; Last    Rx:62Ecs2056 Ordered    12  Lisinopril 10 MG Oral Tablet; TAKE 1 TABLET DAILY AS DIRECTED; Therapy: 25DTE6510 to (Evaluate:27Mar2017)  Requested for: 01Apr2016; Last    Rx:01Apr2016 Ordered    13  Polyethylene Glycol 3350 Oral Powder; MIX 1 CAPFUL (17GM) IN 8 OUNCES OF WATER,    JUICE, OR TEA AND DRINK DAILY; Therapy: 55OGM6610 to (Evaluate:72Jmi5092)  Requested for: 51VWU2401; Last    Rx:14Mar2016 Ordered    14  Sertraline HCl - 25 MG Oral Tablet; TAKE 1 TABLET DAILY AS DIRECTED; Therapy: 38NWG3245 to (Evaluate:27Mar2017)  Requested for: 01Apr2016; Last    Rx:01Apr2016 Ordered    15  Ipratropium-Albuterol 0 5-2 5 (3) MG/3ML Inhalation Solution; USE 1 UNIT DOSE IN    NEBULIZER EVERY 4 HOURS AS NEEDED; Therapy: 72Fjj7703 to (Last Rx:05Jan2015)  Requested for: 76TEK5917 Ordered    16  Metoclopramide HCl - 10 MG Oral Tablet (Reglan); TAKE 1 TABLET 3 times a day; Therapy: 85NXR1024 to (Evaluate:28Knn2615)  Requested for: 01Apr2016; Last    Rx:01Apr2016 Ordered   17  Pantoprazole Sodium 40 MG Oral Tablet Delayed Release (Protonix); TAKE 1 TABLET    DAILY;     Therapy: 01IDE0143 to ([phone])  Requested for: 01Apr2016; Last Rx:01Apr2016 Ordered    18  Acetaminophen-Codeine #3 300-30 MG Oral Tablet; TAKE 1 TABLET 3 TIMES DAILY AS    NEEDED FOR PAIN;    Therapy: 60PDS3197 to (Evaluate:19Sep2015) Recorded    19  BD Insulin Syr Ultrafine II 31G X 5/16" 0 5 ML Miscellaneous; use as directed; Therapy: 35MZF5878 to (Evaluate:35Xij8664)  Requested for: 85WOK1715; Last    Rx:22Jan2016 Ordered   20  BD Pen Needle Heide U/F 32G X 4 MM Miscellaneous; INJECT DAILY AS DIRECTED; Therapy: 12WME5793 to (Evaluate:59Rka9996)  Requested for: 77JXF5526; Last    Rx:11Mar2016 Ordered   21  Lantus SoloStar 100 UNIT/ML Subcutaneous Solution Pen-injector; INJECT    SUBCUTANEOUSLY AS DIRECTED; Therapy: 42FTJ7202 to (Evaluate:97Hqx7935)  Requested for: 01Apr2016; Last    Rx:01Apr2016 Ordered    22  Aspirin EC 81 MG Oral Tablet Delayed Release; Therapy: (YKRUNEJD:27HNY0220) to Recorded   23  Clarinex 5 MG Oral Tablet (Desloratadine); Therapy: (AYWHBYVK:17ZZM7539) to Recorded   24  Valium 5 MG Oral Tablet (Diazepam); Therapy: (KROLNTAP:40DJH3130) to Recorded    Allergies    1  No Known Drug Allergies    End of Encounter Meds    1  Carvedilol 25 MG Oral Tablet (Coreg); TAKE 1 TABLET THREE TIMES A DAY WITH   MORNING AND EVENING MEAL; Therapy: 74JGY3164 to (Evaluate:07Sep2016)  Requested for: 37QLH0672; Last   Rx:10May2016 Ordered   2  Clopidogrel Bisulfate 75 MG Oral Tablet (Plavix); TAKE 1 TABLET DAILY; Therapy: 09QIJ7925 to (Miki Leonel)  Requested for: 01Apr2016; Last   Rx:01Apr2016 Ordered   3  Crestor 10 MG Oral Tablet (Rosuvastatin Calcium); TAKE 1 TABLET AT BEDTIME; Therapy: 95TRG4226 to (Evaluate:27Mar2017)  Requested for: 01Apr2016; Last   Rx:01Apr2016 Ordered   4  Furosemide 40 MG Oral Tablet; TAKE 1 TABLET DAILY; Therapy: 31KYL1643 to (Evaluate:09Jun2016)  Requested for: 57TFX8073 Recorded   5   Minitran 0 2 MG/HR Transdermal Patch 24 Hour; APPLY PATCH FOR 12 TO 14 HOURS   DAILY, THEN REMOVE;   Therapy: 26PDW1529 to (PKGDPIQU:51ZVL0042)  Requested for: 01Apr2016; Last   Rx:01Apr2016 Ordered    6  Triamcinolone Acetonide 0 025 % External Cream; APPLY 2-3 TIMES DAILY TO   AFFECTED AREA(S); Therapy: 74BIL2992 to (Last Rx:26May2015)  Requested for: 44WHB4458 Ordered    7  Fluticasone Propionate 50 MCG/ACT Nasal Suspension; USE 2 SPRAYS IN EACH   NOSTRIL ONCE DAILY; Therapy: 29VUI3429 to (Last Rx:01Apr2016)  Requested for: 01Apr2016 Ordered    8  Ferrous Fumarate 324 MG TABS; TAKE 1 TABLET DAILY AS DIRECTED; Therapy: 21DKS1088 to (Evaluate:27Mar2017)  Requested for: 01Apr2016; Last   Rx:01Apr2016 Ordered    9  Advair Diskus 250-50 MCG/DOSE Inhalation Aerosol Powder Breath Activated; INHALE 1   PUFF EVERY 12 HOURS; Therapy: 36NCM3302 to (Last Rx:01Apr2016)  Requested for: 01Apr2016 Ordered    10  Furosemide 20 MG Oral Tablet; TAKE 1 TABLET DAILY AS DIRECTED; Therapy: 89LLE5780 to [phone])  Requested for: 01Apr2016; Last    Rx:01Apr2016 Ordered   11  Nitrostat 0 4 MG Sublingual Tablet Sublingual; PLACE 1 TABLET UNDER THE TONGUE    EVERY 5 MINUTES FOR UP TO 3 DOSES AS NEEDED FOR CHEST PAIN  CALL    911 IF PAIN PERSISTS; Therapy: 18XID4700 to (Octavio Espana)  Requested for: 82KQX2707; Last    Rx:10May2016 Ordered    12  Lisinopril 10 MG Oral Tablet; TAKE 1 TABLET DAILY AS DIRECTED; Therapy: 91NWH8926 to (Evaluate:27Mar2017)  Requested for: 01Apr2016; Last    Rx:01Apr2016 Ordered    13  Polyethylene Glycol 3350 Oral Powder; MIX 1 CAPFUL (17GM) IN 8 OUNCES OF WATER,    JUICE, OR TEA AND DRINK DAILY; Therapy: 55ZVZ5235 to (Evaluate:10Sep2016)  Requested for: 40IBE2145; Last    Rx:14Mar2016 Ordered    14  Sertraline HCl - 25 MG Oral Tablet; TAKE 1 TABLET DAILY AS DIRECTED;     Therapy: 41ATD7252 to (Evaluate:27Mar2017)  Requested for: 01Apr2016; Last    Rx:01Apr2016 Ordered    15  Ipratropium-Albuterol 0 5-2 5 (3) MG/3ML Inhalation Solution; USE 1 UNIT DOSE IN    NEBULIZER EVERY 4 HOURS AS NEEDED; Therapy: 99Pwl7845 to (Last Rx:05Jan2015)  Requested for: 48NKG4314 Ordered    16  Metoclopramide HCl - 10 MG Oral Tablet (Reglan); TAKE 1 TABLET 3 times a day; Therapy: 98MFQ8319 to (Evaluate:97Lrj0775)  Requested for: 01Apr2016; Last    Rx:01Apr2016 Ordered   17  Pantoprazole Sodium 40 MG Oral Tablet Delayed Release (Protonix); TAKE 1 TABLET    DAILY; Therapy: 66KHZ9097 to (Evaluate:27Mar2017)  Requested for: 01Apr2016; Last    Rx:01Apr2016 Ordered    18  Acetaminophen-Codeine #3 300-30 MG Oral Tablet; TAKE 1 TABLET 3 TIMES DAILY AS    NEEDED FOR PAIN;    Therapy: 40QYM7300 to (Evaluate:01Dqp6353) Recorded    19  BD Insulin Syr Ultrafine II 31G X 5/16" 0 5 ML Miscellaneous; use as directed; Therapy: 13HBX8256 to (Evaluate:93Com0074)  Requested for: 40NJW1405; Last    Rx:22Jan2016 Ordered   20  BD Pen Needle Heide U/F 32G X 4 MM Miscellaneous; INJECT DAILY AS DIRECTED; Therapy: 55BZZ9758 to (Evaluate:80Kja9779)  Requested for: 45ZRS9140; Last    Rx:11Mar2016 Ordered   21  Lantus SoloStar 100 UNIT/ML Subcutaneous Solution Pen-injector; INJECT    SUBCUTANEOUSLY AS DIRECTED; Therapy: 21YZP8078 to (Evaluate:67Zwx1162)  Requested for: 01Apr2016; Last    Rx:01Apr2016 Ordered    22  Aspirin EC 81 MG Oral Tablet Delayed Release; Therapy: (LBPTRQME:49FPZ9052) to Recorded   23  Clarinex 5 MG Oral Tablet (Desloratadine); Therapy: (KHOYDGMZ:30DWF9699) to Recorded   24  Valium 5 MG Oral Tablet (Diazepam);     Therapy: (SMRWNANI:72KOT2222) to Recorded    Future Appointments    Date/Time Provider Specialty Site   08/09/2016 10:00 AM Moises De La Rosa MD [facility]     Patient Care Team    Care Team Member Role Specialty Office Number   [id number] [facility], [address] [place] [address]        Signatures   Electronically signed by : Brendan Youngblood RN; May 26 2016  2:44PM EST                       (Author)

## 2018-01-11 NOTE — MISCELLANEOUS
Assessment    1  2-vessel coronary artery disease (414 00) (I25 10)   2  Hyperlipidemia (272 4) (E78 5)   3  Type 2 diabetes mellitus with hyperglycemia (250 00) (E11 65)    Plan  2-vessel coronary artery disease    · Begin a limited exercise program ; Status:Complete;   Done: 94MZG5027   Ordered; For:2-vessel coronary artery disease; Ordered By:Florinda Hawkins;   · Brush your teeth 2 times a day and floss at least once a day ; Status:Complete;   Done:  19OXY8418   Ordered; For:2-vessel coronary artery disease; Ordered By:Florinda Hawkins;   · Continue with our present treatment plan ; Status:Complete;   Done: 60GUA3122   Ordered; For:2-vessel coronary artery disease; Ordered By:Florinda Hawkins;   · Eat a low fat and low cholesterol diet ; Status:Complete;   Done: 05LLJ7726   Ordered; For:2-vessel coronary artery disease; Ordered By:Florinda Hawkins;   · There are many exercise options for seniors ; Status:Complete;   Done: 26RCB2758   Ordered; For:2-vessel coronary artery disease; Ordered By:Florinda Hawkins;   · We recommend routine visits to a dentist ; Status:Complete;   Done: 98ILV3013   Ordered; For:2-vessel coronary artery disease; Ordered By:Florinda Hawkins;   · We recommend that you bring your body mass index down to 25 ; Status:Complete;    Done: 70NAO6575   Ordered; For:2-vessel coronary artery disease; Ordered By:Florinda Hawkins;   · Call [phone] if: You are having chest pain with exercise ; Status:Complete;    Done: 17WGM9929   Ordered; For:2-vessel coronary artery disease; Ordered By:Florinda Hawkins;   · Call [phone] if: You are having more frequent or more severe chest pain with  exercise ; Status:Complete;   Done: 05LXA4259   Ordered; For:2-vessel coronary artery disease; Ordered By:Florinda Hawkins;   · Call [phone] if: You are still having difficulty breathing while lying down in 2 days ;  Status:Complete;   Done: 49AYO5178   Ordered;   For:2-vessel coronary artery disease; Ordered By:Zander Hawkins;   · Call [phone] if: You become dizzy or lightheaded, especially when you stand up  after sitting for awhile ; Status:Complete;   Done: 16YNQ4254   Ordered; For:2-vessel coronary artery disease; Ordered By:Zander Hawkins;   · Call [phone] if: You feel your heart is beating very fast or skipping beats ;  Status:Complete;   Done: 79AZZ4966   Ordered; For:2-vessel coronary artery disease; Ordered By:Zander Hawkins;   · Call [phone] if: You have difficulty breathing while lying down and you are  comfortable only when sitting up ; Status:Complete;   Done: 76STV6298   Ordered; For:2-vessel coronary artery disease; Ordered By:Zander Hawkins;   · Call [phone] if: You start getting short of breath with your normal exercise or  physical labor ; Status:Complete;   Done: 55NND5498   Ordered; For:2-vessel coronary artery disease; Ordered By:Zander Hawkins;   · Call [phone] if: Your chest pain is happening more often ; Status:Complete;    Done: 59FED6978   Ordered; For:2-vessel coronary artery disease; Ordered By:Zander Hawkins;   · Call [phone] if: Your chest pain with exercise is not better in 3 days ;  Status:Complete;   Done: 31HYV6235   Ordered; For:2-vessel coronary artery disease; Ordered By:Zander Hawkins;   · Call [phone] if: Your dizziness is getting worse ; Status:Complete;   Done:  90YDE0015   Ordered; For:2-vessel coronary artery disease; Ordered By:Zander Hawkins;   · Call [phone] if: Your dizziness is not getting better in 2 days ; Status:Complete;    Done: 05FKL8827   Ordered; For:2-vessel coronary artery disease; Ordered By:Zander Hawkins;   · Call [phone] if: Your palpitations are not getting better after treatment at home ;  Status:Complete;   Done: 62LWV7836   Ordered;   For:2-vessel coronary artery disease; Ordered By:Bob Hawkins;   · Call 911 if: You are too short of breath to talk, you can speak only one or two words  between breaths, or your lips or nails look blue ; Status:Complete;   Done: 82ZMQ3397   Ordered; For:2-vessel coronary artery disease; Ordered By:Bret Hawkins;   · Call 911 if: You experience a new kind of chest pain (angina) or pressure ;  Status:Complete;   Done: 86LDU2431   Ordered; For:2-vessel coronary artery disease; Ordered By:Bob Hawkins;   · Call 911 if: You have any symptoms of a stroke ; Status:Complete;   Done: 03KAL6344   Ordered; For:2-vessel coronary artery disease; Ordered By:Bob Hawkins;   · Call 911 if: You have fainted or passed out ; Status:Complete;   Done: 15NYU2569   Ordered; For:2-vessel coronary artery disease; Ordered By:Bob Hawkins;   · Call 911 if: Your chest pain (angina) does not go away after 10 minutes of your usual  treatment ; Status:Complete;   Done: 65WPS6411   Ordered; For:2-vessel coronary artery disease; Ordered By:Bret Hawkins;   · Seek Immediate Medical Attention if: You have difficulty breathing, or you are short of  breath more often ; Status:Complete;   Done: 34EQT8345   Ordered; For:2-vessel coronary artery disease; Ordered By:Bret Hawkins;   · Seek Immediate Medical Attention if: Your chest pain (angina) happens at rest ;  Status:Complete;   Done: 94WKX8041   Ordered; For:2-vessel coronary artery disease; Ordered By:Bret Hawkins;   · Seek Immediate Medical Attention if: Your chest pain feels different to you ;  Status:Complete;   Done: 64HVC8992   Ordered; For:2-vessel coronary artery disease; Ordered By:Bret Hawkins; Constipation    · Polyethylene Glycol 3350 Oral Powder; MIX 1 CAPFUL (17GM) IN 8 OUNCES OF  WATER, JUICE, OR TEA AND DRINK DAILY   Rx By: Joshua Vela; Dispense: 30 Days ; #:3 X 238 GM Bottle;  Refill: 5; For: Constipation; ANDRES = N; Verified Transmission to [facility]; Last Updated By: System, SureScript[employer]; 3/14/2016 1:09:57 PM  Hyperlipidemia    · Begin a limited exercise program ; Status:Complete;   Done: 05GFI7839   Ordered;  For:Hyperlipidemia; Ordered By:Florinda Hawkins;   · Continue with our present treatment plan ; Status:Complete;   Done: 06NBI4593   Ordered;  Amarilis Constantino; Ordered By:Florinda Hawkins;   · Eat a low fat and low cholesterol diet ; Status:Complete;   Done: 69SIM9686   Ordered;  For:Hyperlipidemia; Ordered By:Florinda Hawkins;   · Eat no more than 30 grams of fat per day ; Status:Complete;   Done: 52VCZ7986   Ordered;  For:Hyperlipidemia; Ordered By:Florinda Hawkins;   · Keep a diary of when and what you eat ; Status:Complete;   Done: 56RFE0182   Ordered;  For:Hyperlipidemia; Ordered By:Florinda Hawkins;   · Some eating tips that can help you lose weight ; Status:Complete;   Done: 95WJY0859   Ordered;  For:Hyperlipidemia; Ordered By:Florinda Hawkins;   · There are many exercise options for seniors ; Status:Complete;   Done: 72RQK3918   Ordered;  For:Hyperlipidemia; Ordered By:Florinda Hawkins;   · We recommend that you bring your body mass index down to 26 ; Status:Complete;    Done: 53FKG6908   Ordered;  For:Hyperlipidemia; Ordered By:Florinda Hawkins;   · Call [phone] if: You have muscle cramps ; Status:Complete;   Done: 60XIW8208   Ordered;  For:Hyperlipidemia; Ordered By:Florinda Hawkins;   · Call [phone] if: You have pain in the stomach area ; Status:Complete;   Done:  38SCM5881   Ordered;  [place]; Ordered By:Florinda Hawkins;   · Call [phone] if: You start vomiting ; Status:Complete;   Done: 36EGC9449   Ordered;  For:Hyperlipidemia; Ordered By:Florinda Hawkins;   · Call 911 if: You experience a new kind of chest pain (angina) or pressure ;  Status:Complete;   Done: 57QWA0223   Ordered;  For:Hyperlipidemia; Ordered By:Folrinda Hawkins;   · Call 911 if: You have any symptoms of a stroke ; Status:Complete;   Done: 27AAZ8432   Ordered;  Amarilis Richt;  Ordered By:Florinda Hawkins;  Type 2 diabetes mellitus with hyperglycemia    · BD Pen Needle Heide U/F 32G X 4 MM Miscellaneous; INJECT DAILY AS  DIRECTED   Rx By: Reji Burger; Dispense: 30 Days ; #:30 Miscellaneous; Refill: 5; For: Type 2 diabetes mellitus with hyperglycemia; ANDRES = N; Verified Transmission to [address]; Last Updated By: System, SureScripts; 3/11/2016 10:39:48 AM   · *VB-Foot Exam; Status:Complete;   Done: 75FIP1667 10:42AM   Performed: In Office; [id number];TCNZTIG; For:Type 2 diabetes mellitus with hyperglycemia; Ordered By:Jose David Hawkins;   · Begin a limited exercise program ; Status:Complete;   Done: 51VPX4672   Ordered; For:Type 2 diabetes mellitus with hyperglycemia; Ordered By:Jose David Hawkins;   · Begin or continue regular aerobic exercise  Gradually work up to at least 3 sessions of  30 minutes of exercise a week ; Status:Complete;   Done: 30SEB5856   Ordered; For:Type 2 diabetes mellitus with hyperglycemia; Ordered By:Jose David Hawkins;   · Brush your teeth freq1 and floss at least once a day ; Status:Complete;   Done:  70ZWF7344   Ordered; For:Type 2 diabetes mellitus with hyperglycemia; Ordered By:Jose David Hawkins;   · Continue with our present treatment plan ; Status:Complete;   Done: 80EMQ1282   Ordered; For:Type 2 diabetes mellitus with hyperglycemia; Ordered By:Jose David Hawkins;   · Cut your nails straight across ; Status:Complete;   Done: 10KVL0251   Ordered; For:Type 2 diabetes mellitus with hyperglycemia; Ordered By:Jose David Hawkins;   · Have your eyes examined by an eye doctor every year ; Status:Complete;   Done:  94DLZ3276   Ordered; For:Type 2 diabetes mellitus with hyperglycemia; Ordered By:Jose David Hawkins;   · If you have symptoms of being hypoglycemic or your blood sugar is less than 60, you  need to eat or drink a source of sugar ; Status:Complete;   Done: 48QSG8067   Ordered; For:Type 2 diabetes mellitus with hyperglycemia; Ordered By:Jose David Hawkins;   · Inspect your feet and legs daily if you have vascular disease  ; Status:Complete;   Done:  24RUN9646   Ordered; For:Type 2 diabetes mellitus with hyperglycemia; Ordered By:Dobaheidi, Mabeline Buttner;   · Inspect your feet daily ; Status:Complete;   Done: 47RVR5040   Ordered; For:Type 2 diabetes mellitus with hyperglycemia; Ordered By:Dobaheidi, Mabeline Buttner;   · It is important to take good care of your feet if you have diabetes ; Status:Complete;    Done: 85EAJ3608   Ordered; For:Type 2 diabetes mellitus with hyperglycemia; Ordered By:Dobaheidi, Mabeline Buttner;   · It is important to take good care of your feet ; Status:Complete;   Done: 35TSL3752   Ordered; For:Type 2 diabetes mellitus with hyperglycemia; Ordered By:Kory Hawkins Buttner;   · Some eating tips that can help you lose weight ; Status:Complete;   Done: 41CJA0934   Ordered; For:Type 2 diabetes mellitus with hyperglycemia; Ordered By:Ross, Mabeline Buttner;   · There are many exercise options for seniors ; Status:Complete;   Done: 21EKB2978   Ordered; For:Type 2 diabetes mellitus with hyperglycemia; Ordered By:Domarco antonio, Mabeline Buttner;   · We recommend that you bring your body mass index down to 26 ; Status:Complete;    Done: 30LUH6191   Ordered; For:Type 2 diabetes mellitus with hyperglycemia; Ordered By:Ross Mablizette Buttner;   · Wear a medical alert bracelet or tag ; Status:Complete;   Done: 68UYA7211   Ordered; For:Type 2 diabetes mellitus with hyperglycemia; Ordered By:Ross Mablizette Buttner;   · Wear shoes that give your toes plenty of room ; Status:Complete;   Done: 71FCG1826   Ordered; For:Type 2 diabetes mellitus with hyperglycemia; Ordered By:Domarco antonio, Mablizette Buttner;   · Call [phone] if: You are having trouble following our instructions or treatment for  any reason ; Status:Complete;   Done: 15HXV2360   Ordered; For:Type 2 diabetes mellitus with hyperglycemia; Ordered By:Domarco antonio Mablizette Buttner;   · Call [phone] if: You start vomiting ; Status:Complete;   Done: 60JVY2969   Ordered; For:Type 2 diabetes mellitus with hyperglycemia;  Ordered By:Kory Hawkins;   · Call [phone] if: Your blood sugar is higher than 250 ; Status:Complete;   Done:  44PFY3610   Ordered; For:Type 2 diabetes mellitus with hyperglycemia; Ordered By:Dobaheidi Maxyady Osgood;   · Call [phone] if: Your blood sugar is steadily becoming higher ; Status:Complete;    Done: 09CVU3441   Ordered; For:Type 2 diabetes mellitus with hyperglycemia; Ordered By:Dobaheidi Maxcine Osgood;   · Call [phone] if: Your blood sugar is still over 300 after taking insulin ;  Status:Complete;   Done: 65PQW5077   Ordered; For:Type 2 diabetes mellitus with hyperglycemia; Ordered By:Dobaheidi Maxcine Osgood;   · Call 911 if: There are symptoms of ketoacidosis  ; Status:Complete;   Done: 73QIH2801   Ordered; For:Type 2 diabetes mellitus with hyperglycemia; Ordered By:Dobaheidi Maxcine Osgood;   · Call 911 if: You have a seizure ; Status:Complete;   Done: 37RAY6191   Ordered; For:Type 2 diabetes mellitus with hyperglycemia; Ordered By:Dobash, Maxcine Osgood;   · Call 911 if: You have any symptoms of a stroke ; Status:Complete;   Done: 33MQA1563   Ordered; For:Type 2 diabetes mellitus with hyperglycemia; Ordered By:DoYanet bernard Osgood;   · Call 911 if: You have fainted or passed out ; Status:Complete;   Done: 39FXP4556   Ordered; For:Type 2 diabetes mellitus with hyperglycemia; Ordered By:Dobash, Maxcine Osgood;   · Seek Immediate Medical Attention if: There are signs that the blood sugar is too high  (hyperglycemia) ; Status:Complete;   Done: 88UFO3286   Ordered; For:Type 2 diabetes mellitus with hyperglycemia; Ordered By:Dobash, Maxcine Osgood;   · Seek Immediate Medical Attention if: There are signs that the blood sugar is too low  (hypoglycemia) ; Status:Complete;   Done: 42DCD7203   Ordered; For:Type 2 diabetes mellitus with hyperglycemia; Ordered By:Dobash, Maxcine Osgood;   · Seek Immediate Medical Attention if: You become dehydrated ; Status:Complete;   Done:  74SQE0572   Ordered; For:Type 2 diabetes mellitus with hyperglycemia;  Ordered By:Dobash, Maxcine Osgood;   · Seek Immediate Medical Attention if: You experience a new kind of chest pain (angina) or  pressure ; Status:Complete;   Done: 09WCW6732   Ordered; For:Type 2 diabetes mellitus with hyperglycemia; Ordered By:Ko Hawkins;   · Seek Immediate Medical Attention if: You notice that breathing is rapid, more than 40  times a minute ; Status:Complete;   Done: 63WGW2139   Ordered; For:Type 2 diabetes mellitus with hyperglycemia; Ordered By:Ko Hawkins;   · Seek Immediate Medical Attention if: Your blood sugar is higher than 400 ;  Status:Complete;   Done: 78LQP3389   Ordered; For:Type 2 diabetes mellitus with hyperglycemia; Ordered By:Ko Hawkins;   · Seek Immediate Medical Attention if: Your eyesight becomes blurry or you have difficulty  seeing ; Status:Complete;   Done: 53CRC6291   Ordered; For:Type 2 diabetes mellitus with hyperglycemia; Ordered By:Ko Hawkins; Chief Complaint  Chief Complaint Free Text Note Form: DARA  PT WAS ADMITTED TO [address] [place] [address] ON 2/25/16 D/C 2/26/16      History of Present Illness  TCM Communication [facility]: The patient is being contacted for follow-up after hospitalization and PT CONTACTED OFFICE FOR APPT  She was hospitalized at [address] [place] [address]  The date of admission: 2/25/16, date of discharge: 2/26/16  Diagnosis: CHEST PAIN  She was discharged to home  Medications reviewed and updated today  The patient is currently asymptomatic  Communication performed and completed by   HPI: She has known heart disease  She recently underwent PCI/stent  She's had recurrent CP and was recently admitted to [facility] for CP  Her SOB is improving  Her BS are under good control  Her A1c is at goal  She has no hypoglycemia  Her lipids are at goal on her current regimen  She has no myalgia      Review of Systems  Complete-Female:   Constitutional: No fever, no chills, feels well, no tiredness, no recent weight gain or weight loss     Eyes: No complaints of eye pain, no red eyes, no eyesight problems, no discharge, no dry eyes, no itching of eyes  ENT: no complaints of earache, no loss of hearing, no nose bleeds, no nasal discharge, no sore throat, no hoarseness  Cardiovascular: as noted in HPI  Respiratory: as noted in HPI  Gastrointestinal: No complaints of abdominal pain, no constipation, no nausea or vomiting, no diarrhea, no bloody stools  Genitourinary: No complaints of dysuria, no incontinence, no pelvic pain, no dysmenorrhea, no vaginal discharge or bleeding  Musculoskeletal: No complaints of arthralgias, no myalgias, no joint swelling or stiffness, no limb pain or swelling  Integumentary: No complaints of skin rash or lesions, no itching, no skin wounds, no breast pain or lump  Neurological: No complaints of headache, no confusion, no convulsions, no numbness, no dizziness or fainting, no tingling, no limb weakness, no difficulty walking  Psychiatric: Not suicidal, no sleep disturbance, no anxiety or depression, no change in personality, no emotional problems  Endocrine: No complaints of proptosis, no hot flashes, no muscle weakness, no deepening of the voice, no feelings of weakness  Hematologic/Lymphatic: No complaints of swollen glands, no swollen glands in the neck, does not bleed easily, does not bruise easily  Active Problems    1  2-vessel coronary artery disease (414 00) (I25 10)   2  Acute lower UTI (599 0) (N39 0)   3  Allergic rhinitis (477 9) (J30 9)   4  Anemia (285 9) (D64 9)   5  Anxiety (300 00) (F41 9)   6  Benign essential hypertension (401 1) (I10)   7  Benign paroxysmal vertigo, unspecified laterality (386 11) (H81 10)   8  Carotid bruit (785 9) (R09 89)   9  Chronic obstructive pulmonary disease (496) (J44 9)   10  Congestive heart failure (428 0) (I50 9)   11  Constipation (564 00) (K59 00)   12  CRD (chronic renal disease) (585 9) (N18 9)   13  Depression (311) (F32 9)   14  Diarrhea (787 91) (R19 7)   15  Emphysema (492 8) (J43 9)   16   Encounter for screening for other nervous system disorder (V80 09) (Z13 858)   17  Encounter for screening mammogram for malignant neoplasm of breast (V76 12)    (Z12 31)   18  Encounter for special screening examination for genitourinary disorder (V81 6) (Z13 89)   19  Esophageal reflux (530 81) (K21 9)   20  Gastric ulcer (531 90) (K25 9)   21  Hiatal hernia (553 3) (K44 9)   22  Hyperlipidemia (272 4) (E78 5)   23  Iron deficiency anemia due to chronic blood loss (280 0) (D50 0)   24  Low back pain (724 2) (M54 5)   25  Lower back pain (724 2) (M54 5)   26  Screening for malignant neoplasm of cervix (V76 2) (Z12 4)   27  Screening for osteoporosis (V82 81) (Z13 820)   28  Skin rash (782 1) (R21)   29  Trigger finger (727 03) (M65 30)   30  Type 2 diabetes mellitus with hyperglycemia (250 00) (E11 65)   31  Visit for pre-operative examination (V72 84) (Z34 293)    Past Medical History    1  History of Encounter for screening mammogram for malignant neoplasm of breast   (V76 12) (Z12 31)   2  History of acute sinusitis (V12 69) (Z87 09)   3  History of Myalgia And Myositis (729 1)   4  History of Trigger Finger Of The Thumb (727 03)    Surgical History    1  History of Appendectomy   2  History of CABG   3  History of Cholecystectomy   4  History of Complete Colonoscopy   5  History of Diagnostic Esophagogastroduodenoscopy   6  History of Hernia Repair   7  History of Pacemaker Permanent Placement   8  History of Total Abdominal Hysterectomy With Removal Of Both Ovaries    Family History    1  Family history of Black lung disease   2  Family history of malignant neoplasm of breast (V16 3) (Z80 3)   3  Family history unobtainable (V49 89) (Z78 9)   4  Family history of Family history unobtainable due to orphan status (V49 89) (Z78 9)    5  Family history of Black lung disease   6  Family history unobtainable (V49 89) (Z78 9)   7  Family history of Family history unobtainable due to orphan status (V49 89) (Z78 9)    8   Family history of Living and Healthy    9  Family history unobtainable (V49 89) (Z78 9)   10  Family history of Family history unobtainable due to orphan status (V49 89) (Z78 9)    11  Family history unobtainable (V49 89) (Z78 9)   12  Family history of Family history unobtainable due to orphan status (V49 89) (Z78 9)    13  Family history unobtainable (V49 89) (Z78 9)   14  Family history of Family history unobtainable due to orphan status (V49 89) (Z78 9)    15  Family history of Family history unobtainable due to orphan status (V49 89) (Z78 9)    Social History    · Former smoker (V15 82) (Z08 074)   · Never Drank Alcohol   · Tobacco use (305 1) (Z72 0)    Current Meds   1  Acetaminophen-Codeine #3 300-30 MG Oral Tablet; TAKE 1 TABLET 3 TIMES DAILY AS   NEEDED FOR PAIN;   Therapy: 31KYU7867 to (Evaluate:67Tbi5367) Recorded   2  Advair Diskus 250-50 MCG/DOSE Inhalation Aerosol Powder Breath Activated; INHALE 1   PUFF EVERY 12 HOURS; Therapy: 55YYE4711 to (Last Rx:28Ozc1180)  Requested for: 58LVM2487 Ordered   3  Aspirin EC 81 MG Oral Tablet Delayed Release; Therapy: (GADMQHPN:70LBH1128) to Recorded   4  BD Insulin Syr Ultrafine II 31G X 5/16" 0 5 ML Miscellaneous; use as directed; Therapy: 76FDJ6392 to (Evaluate:75Lqe5072)  Requested for: 08XDG5597; Last   Rx:22Jan2016 Ordered   5  BD Pen Needle Heide U/F 32G X 4 MM Miscellaneous; INJECT DAILY AS DIRECTED; Therapy: 45HOJ4256 to (Evaluate:11Aug2015)  Requested for: 92PLO1826; Last   Rx:97Lyt3454 Ordered   6  Carvedilol 25 MG Oral Tablet; take 1 tablet by mouth twice a day; Therapy: 70XHA9076 to ([phone])  Requested for: 39EEI4832; Last   Rx:81Nzq1009 Ordered   7  Clarinex 5 MG Oral Tablet; Therapy: (GRJTKLJE:84UBY5596) to Recorded   8  Clopidogrel Bisulfate 75 MG Oral Tablet; TAKE 1 TABLET DAILY; Therapy: 63TFJ0542 to (Julianne [place])  Requested for: 07LHR9941; Last   Rx:30Nov2015 Ordered   9   Crestor 10 MG Oral Tablet; TAKE 1 TABLET AT BEDTIME; Therapy: 36XPW6174 to (Ginny Jackson)  Requested for: 40YEY4417; Last   Rx:30Oct2015 Ordered   10  Ferrous Fumarate 324 MG Oral Tablet; TAKE 1 TABLET DAILY AS DIRECTED; Therapy: 16POP3677 to (Evaluate:11Aug2015)  Requested for: 76WGX9753; Last    Rx:43Nao0143 Ordered   11  Fexofenadine HCl - 180 MG Oral Tablet; TAKE 1 TABLET DAILY; Therapy: 48ICA1872 to ([phone])  Requested for: 47FED4132; Last    Rx:05Jan2015 Ordered   12  Fluticasone Propionate 50 MCG/ACT Nasal Suspension; USE 2 SPRAYS IN EACH    NOSTRIL ONCE DAILY; Therapy: 11KIE6554 to (Last Rx:26May2015)  Requested for: 92LKH3986 Ordered   13  Furosemide 20 MG Oral Tablet; TAKE 1 TABLET DAILY AS DIRECTED; Therapy: 26FQV5242 to (Margie Beal)  Requested for: 31JVK0231; Last    Rx:30Nov2015 Ordered   14  Ipratropium-Albuterol 0 5-2 5 (3) MG/3ML Inhalation Solution; USE 1 UNIT DOSE IN    NEBULIZER EVERY 4 HOURS AS NEEDED; Therapy: 58Bju5894 to (Last Rx:05Jan2015)  Requested for: 40KTW8723 Ordered   15  Lantus SoloStar 100 UNIT/ML Subcutaneous Solution Pen-injector; INJECT    SUBCUTANEOUSLY AS DIRECTED; Therapy: 40GJZ3503 to (Evaluate:90Pyt4007)  Requested for: 80JMK8117; Last    Rx:23Nov2015 Ordered   16  Lisinopril 10 MG Oral Tablet; TAKE 1 TABLET DAILY AS DIRECTED; Therapy: 82BCT4248 to (Ginny Jackson)  Requested for: 50WYA8531; Last    Rx:30Oct2015 Ordered   17  Metoclopramide HCl - 10 MG Oral Tablet; TAKE 1 TABLET 3 times a day; Therapy: 28MWZ0372 to (Meg Daniels)  Requested for: 69Sld0757; Last    Rx:10Sep2015 Ordered   18  Minitran 0 2 MG/HR Transdermal Patch 24 Hour; APPLY PATCH FOR 12 TO 14 HOURS    DAILY, THEN REMOVE;    Therapy: 82LFT5895 to Recorded   19  Pantoprazole Sodium 40 MG Oral Tablet Delayed Release; TAKE 1 TABLET DAILY; Therapy: 40KPY0838 to (Meg Daniels)  Requested for: 12Cuq3485; Last    Rx:26Mnu2724 Ordered   20   Ranexa 500 MG Oral Tablet Extended Release 12 Hour; Take 1 tablet twice daily; Therapy: 60XWU3008 to (Evaluate:07Oct2016) Recorded   21  Sertraline HCl - 25 MG Oral Tablet; TAKE 1 TABLET DAILY AS DIRECTED; Therapy: 32HYR5002 to (Evaluate:82Wgk7949)  Requested for: 37BYO3091; Last    Rx:07Mar2016 Ordered   22  Triamcinolone Acetonide 0 025 % External Cream; APPLY 2-3 TIMES DAILY TO    AFFECTED AREA(S); Therapy: 93ZJK1911 to (Last Rx:44Kty8473)  Requested for: 48XVU3884 Ordered   23  Valium 5 MG Oral Tablet; Therapy: (GRUKAXZY:[id number]) to Recorded    Allergies    1  No Known Drug Allergies    Vitals  Signs [Data Includes: Current Encounter]   Recorded: 19TEM8471 10:30AM   Heart Rate: 81  Systolic: 560  Diastolic: 74  O2 Saturation: 95    Physical Exam    Constitutional   General appearance: No acute distress, well appearing and well nourished  Pulmonary   Respiratory effort: No increased work of breathing or signs of respiratory distress  Auscultation of lungs: Clear to auscultation  Cardiovascular   Auscultation of heart: Normal rate and rhythm, normal S1 and S2, without murmurs  Examination of extremities for edema and/or varicosities: Normal     Abdomen   Abdomen: Non-tender, no masses  Liver and spleen: No hepatomegaly or splenomegaly  Socks and shoes removed, the Right Foot: the foot was normal, no swelling, no erythema  The toes on the right were normal    The sensory exam showed  normal vibratory sensation at the level of the toes on the right  Normal tactile sensation with monofilament testing throughout the right foot  Socks and shoes removed, Left Foot: the foot was normal, no swelling, no erythema  The toes on the left were normal    The sensory exam showed  normal vibratory sensation at the level of the toes on the left  Normal tactile sensation with monofilament testing throughout the left foot  Capillary refills findings on the right were normal in the toes     Pulses:   1+ in the posterior tibialis on the right 1+ in the dorsalis pedis on the right  Capillary refills findings on the left were normal in the toes  Pulses:   1+ in the posterior tibialis on the left   1+ in the dorsalis pedis on the left  Assign Risk Category: 0: No loss of protective sensation, no deformity  No present risk        Results/Data  Encounter Results   *VB-Foot Exam [id number] 10:42AM Taylor Tipton     Test Name Result Flag Reference   FOOT [place] [place] [id number]         Future Appointments    Date/Time Provider Specialty Site   04/01/2016 10:30 AM Taylor Tipton MD Family Medicine [address]     Signatures   Electronically signed by : Seng Lopez MD; Mar 22 2016  9:34PM EST                       (Author)

## 2018-01-12 VITALS
HEIGHT: 60 IN | SYSTOLIC BLOOD PRESSURE: 118 MMHG | WEIGHT: 148 LBS | BODY MASS INDEX: 29.06 KG/M2 | HEART RATE: 54 BPM | DIASTOLIC BLOOD PRESSURE: 60 MMHG | OXYGEN SATURATION: 92 % | RESPIRATION RATE: 14 BRPM

## 2018-01-12 NOTE — MISCELLANEOUS
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)   2  Type 2 diabetes mellitus with hyperglycemia (250 00) (E11 65)   3  CRD (chronic renal disease) (585 9) (N18 9)   4  Thrombocytopenia (287 5) (D69 6)    Plan  COPD with emphysema    · Doxycycline Hyclate 100 MG Oral Tablet; TAKE 1 TABLET TWICE DAILY   Rx By: Momo Lucio; Dispense: 10 Days ; #:20 Tablet; Refill: 0; For: COPD with emphysema; ANDRES = N; Sent To: [facility]   · PredniSONE 10 MG Oral Tablet; 3 tablets daily for 3 days, then  2 tablets daily for 3 days, then  1 tablet daily for 3 days   Rx By: Momo Lucio; Dispense: 0 Days ; #:20 Tablet; Refill: 0; For: COPD with emphysema; ANDRES = N; Sent To: [facility]  Thrombocytopenia    · (1) CBC/PLT/DIFF; Status:Active; Requested for:35Por3866;    Perform:[facility] [place], [place]; DAY:84SIJ9581;CKQMLXN; For:Thrombocytopenia; Ordered By:Rishabh Hawkins;  Type 2 diabetes mellitus with hyperglycemia    · Hemoglobin A1c- POC; Status:Complete;   Done: 52TGR9767 09:50AM   Performed: In Office; LDV:22OZJ1287;LLPTUPO; For:Type 2 diabetes mellitus with hyperglycemia; Ordered By:Rishabh Hawkins; Discussion/Summary  Discussion Summary:   She was recently admitted with a COPD exacerbation, but she was not discharged on steroids or antibiotics  She is using Advair regularly  She has a nebulizer and medication  She is going to use her nebulizer 4 x daily for the next several days  She will start a steroid taper and doxycycline today  She has Novulin R that she will use for sliding scale  She has visiting nursing  Chief Complaint  Chief Complaint Free Text Note Form: DARA  WAS ADMITTED TO [facility] 7/7/16 AND D/C 7/8/16 FOR RESPIRATORY ISSUES      History of Present Illness  TCM Communication [facility]: The patient is being contacted for follow-up after hospitalization and contacted for dara  She was hospitalized at [facility]  The date of admission: 7/7/2016, date of discharge: 07/08/2016  Diagnosis: respiratory issues  She was discharged to home  Medications reviewed and updated today  The patient is currently experiencing symptoms  Communication performed and completed by   HPI: She's had two hospitalizations  She was admitted to [address] [place], [place] [address] to have her pacemaker battery changed  After being discharged from the hospital, she had sudden onset of SOB  She had some N/V  She had no F/C  She was given a high dose of IV steroid  She had hyperglycemia over the weekend  She has no new medications  Her blood sugar is normalizing  Her breathing is better  She was diagnosed with "bronchitis" and a COPD exacerbation  She quit smoking 5 years ago  She was not discharged with antibiotics or steroids  She is concerned about increased bruising  She is on ASA and Plavix  She has a mild thrombocytopenia  She denies bleeding  Review of Systems  Complete-Female:   Constitutional: No fever, no chills, feels well, no tiredness, no recent weight gain or weight loss  Eyes: No complaints of eye pain, no red eyes, no eyesight problems, no discharge, no dry eyes, no itching of eyes  ENT: no complaints of earache, no loss of hearing, no nose bleeds, no nasal discharge, no sore throat, no hoarseness  Cardiovascular: No complaints of slow heart rate, no fast heart rate, no chest pain, no palpitations, no leg claudication, no lower extremity edema  Respiratory: as noted in HPI  Gastrointestinal: No complaints of abdominal pain, no constipation, no nausea or vomiting, no diarrhea, no bloody stools  Genitourinary: No complaints of dysuria, no incontinence, no pelvic pain, no dysmenorrhea, no vaginal discharge or bleeding  Musculoskeletal: No complaints of arthralgias, no myalgias, no joint swelling or stiffness, no limb pain or swelling  Integumentary: No complaints of skin rash or lesions, no itching, no skin wounds, no breast pain or lump     Neurological: No complaints of headache, no confusion, no convulsions, no numbness, no dizziness or fainting, no tingling, no limb weakness, no difficulty walking  Psychiatric: Not suicidal, no sleep disturbance, no anxiety or depression, no change in personality, no emotional problems  Endocrine: No complaints of proptosis, no hot flashes, no muscle weakness, no deepening of the voice, no feelings of weakness  Hematologic/Lymphatic: No complaints of swollen glands, no swollen glands in the neck, does not bleed easily, does not bruise easily  Active Problems    1  2-vessel coronary artery disease (414 00) (I25 10)   2  Acute lower UTI (599 0) (N39 0)   3  Allergic rhinitis (477 9) (J30 9)   4  Anemia (285 9) (D64 9)   5  Anxiety (300 00) (F41 9)   6  Benign essential hypertension (401 1) (I10)   7  Benign paroxysmal vertigo, unspecified laterality (386 11) (H81 10)   8  Carotid bruit (785 9) (R09 89)   9  Chronic obstructive pulmonary disease (496) (J44 9)   10  Congestive heart failure (428 0) (I50 9)   11  Constipation (564 00) (K59 00)   12  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)   13  CRD (chronic renal disease) (585 9) (N18 9)   14  Depression (311) (F32 9)   15  Diarrhea (787 91) (R19 7)   16  Encounter for screening for other nervous system disorder (V80 09) (Z13 858)   17  Encounter for screening mammogram for malignant neoplasm of breast (V76 12)    (Z12 31)   18  Encounter for special screening examination for genitourinary disorder (V81 6) (Z13 89)   19  Esophageal reflux (530 81) (K21 9)   20  Gastric ulcer (531 90) (K25 9)   21  Hiatal hernia (553 3) (K44 9)   22  Hyperlipidemia (272 4) (E78 5)   23  Iron deficiency anemia due to chronic blood loss (280 0) (D50 0)   24  Low back pain (724 2) (M54 5)   25  Lower back pain (724 2) (M54 5)   26  Screening for malignant neoplasm of cervix (V76 2) (Z12 4)   27  Screening for osteoporosis (V82 81) (Z13 820)   28  Skin rash (782 1) (R21)   29  Trigger finger (727 03) (M65 30)   30   Type 2 diabetes mellitus with hyperglycemia (250 00) (E11 65)   31  Visit for pre-operative examination (V72 84) (A04 423)    Past Medical History    1  History of Encounter for screening mammogram for malignant neoplasm of breast   (V76 12) (Z12 31)   2  History of acute sinusitis (V12 69) (Z87 09)   3  History of Myalgia And Myositis (729 1)   4  History of Trigger Finger Of The Thumb (727 03)    Surgical History    1  History of Appendectomy   2  History of CABG   3  History of Cholecystectomy   4  History of Complete Colonoscopy   5  History of Diagnostic Esophagogastroduodenoscopy   6  History of Hernia Repair   7  History of Pacemaker Permanent Placement   8  History of Total Abdominal Hysterectomy With Removal Of Both Ovaries    Family History  Mother    1  Family history of Black lung disease   2  Family history of malignant neoplasm of breast (V16 3) (Z80 3)   3  Family history unobtainable (V49 89) (Z78 9)   4  Family history of Family history unobtainable due to orphan status (V49 89) (Z78 9)  Father    5  Family history of Black lung disease   6  Family history unobtainable (V49 89) (Z78 9)   7  Family history of Family history unobtainable due to orphan status (V49 89) (Z78 9)  Son    6  Family history of Living and Healthy  Maternal Grandmother    5  Family history unobtainable (V49 89) (Z78 9)   10  Family history of Family history unobtainable due to orphan status (V49 89) (Z78 9)  Paternal Grandmother    6  Family history unobtainable (V49 89) (Z78 9)   12  Family history of Family history unobtainable due to orphan status (V49 89) (Z78 9)  Maternal Grandfather    15  Family history unobtainable (V49 89) (Z78 9)   14  Family history of Family history unobtainable due to orphan status (V49 89) (Z78 9)  Paternal Grandfather    13   Family history of Family history unobtainable due to orphan status (V49 89) (Z78 9)    Social History    · Former smoker (B40 49) (C56 044)   · Never Drank Alcohol   · Tobacco use (305 1) (Z72 0)    Current Meds   1  Acetaminophen-Codeine #3 300-30 MG Oral Tablet; TAKE 1 TABLET 3 TIMES DAILY AS   NEEDED FOR PAIN;   Therapy: 45QCF6954 to (Evaluate:19Sep2015) Recorded   2  Advair Diskus 250-50 MCG/DOSE Inhalation Aerosol Powder Breath Activated; INHALE 1   PUFF EVERY 12 HOURS; Therapy: 04GNG1700 to (Last Rx:01Apr2016)  Requested for: 01Apr2016 Ordered   3  Aspirin EC 81 MG Oral Tablet Delayed Release; Therapy: (XIWAYNAP:31VKZ7628) to Recorded   4  BD Insulin Syr Ultrafine II 31G X 5/16" 0 5 ML Miscellaneous; use as directed; Therapy: 98ECV0950 to (Evaluate:09Xcp1956)  Requested for: 31RMV7114; Last   Rx:22Jan2016 Ordered   5  BD Pen Needle Heide U/F 32G X 4 MM Miscellaneous; INJECT DAILY AS DIRECTED; Therapy: 27CVE5913 to (Evaluate:07Sep2016)  Requested for: 11ZGH0062; Last   Rx:11Mar2016 Ordered   6  Carvedilol 25 MG Oral Tablet; TAKE 1 TABLET THREE TIMES A DAY WITH MORNING AND   EVENING MEAL; Therapy: 66AAQ2910 to (Evaluate:07Sep2016)  Requested for: 94JIB1064; Last   Rx:94Bty5428 Ordered   7  Clarinex 5 MG Oral Tablet; Therapy: ([facility]:38BCH6415) to Recorded   8  Clopidogrel Bisulfate 75 MG Oral Tablet; TAKE 1 TABLET DAILY; Therapy: 80SND3293 to (Evaluate:27Mar2017)  Requested for: 01Apr2016; Last   Rx:01Apr2016 Ordered   9  Crestor 10 MG Oral Tablet; TAKE 1 TABLET AT BEDTIME; Therapy: 15MJC8191 to ([phone])  Requested for: 01Apr2016; Last   Rx:01Apr2016 Ordered   10  Ferrous Fumarate 324 MG TABS; TAKE 1 TABLET DAILY AS DIRECTED; Therapy: 59PMG0764 to (Evaluate:27Mar2017)  Requested for: 01Apr2016; Last    Rx:01Apr2016 Ordered   11  Fluticasone Propionate 50 MCG/ACT Nasal Suspension; USE 2 SPRAYS IN EACH    NOSTRIL ONCE DAILY; Therapy: 40NBA4734 to (Last Rx:01Apr2016)  Requested for: 01Apr2016 Ordered   12  Furosemide 20 MG Oral Tablet; TAKE 1 TABLET DAILY AS DIRECTED;     Therapy: 93AEN8126 to [phone])  Requested for: 01Apr2016; Last    Rx:01Apr2016 Ordered   13  Furosemide 40 MG Oral Tablet; TAKE 1 TABLET DAILY; Therapy: 26DML5095 to (Evaluate:09Jun2016)  Requested for: 12JEA6379 Recorded   14  Ipratropium-Albuterol 0 5-2 5 (3) MG/3ML Inhalation Solution; USE 1 UNIT DOSE IN    NEBULIZER EVERY 4 HOURS AS NEEDED; Therapy: 10Bde5320 to (Last Rx:05Jan2015)  Requested for: 94LKM5011 Ordered   15  Lantus SoloStar 100 UNIT/ML Subcutaneous Solution Pen-injector; INJECT    SUBCUTANEOUSLY AS DIRECTED; Therapy: 80BZQ7567 to (Evaluate:49Oqv9826)  Requested for: 01Apr2016; Last    Rx:01Apr2016 Ordered   16  Lisinopril 10 MG Oral Tablet; TAKE 1 TABLET DAILY AS DIRECTED; Therapy: 42QTG3208 to (Anny Walker)  Requested for: 01Apr2016; Last    Rx:01Apr2016 Ordered   17  Metoclopramide HCl - 10 MG Oral Tablet; TAKE 1 TABLET 3 times a day; Therapy: 30KEE4911 to (Evaluate:56Agt1056)  Requested for: 01Apr2016; Last    Rx:01Apr2016 Ordered   18  Minitran 0 2 MG/HR Transdermal Patch 24 Hour; APPLY PATCH FOR 12 TO 14 HOURS    DAILY, THEN REMOVE;    Therapy: 42OZB9664 to (Evaluate:27Mar2017)  Requested for: 01Apr2016; Last    Rx:01Apr2016 Ordered   19  Nitrostat 0 4 MG Sublingual Tablet Sublingual; PLACE 1 TABLET UNDER THE TONGUE    EVERY 5 MINUTES FOR UP TO 3 DOSES AS NEEDED FOR CHEST PAIN  CALL    911 IF PAIN PERSISTS; Therapy: 30NYL5694 to ([phone])  Requested for: 74ZSO0700; Last    Rx:99Vzj3673 Ordered   20  Pantoprazole Sodium 40 MG Oral Tablet Delayed Release; TAKE 1 TABLET DAILY; Therapy: 25EJA3272 to (Anny Walker)  Requested for: 01Apr2016; Last    Rx:01Apr2016 Ordered   21  Polyethylene Glycol 3350 Oral Powder; MIX 1 CAPFUL (17GM) IN 8 OUNCES OF WATER,    JUICE, OR TEA AND DRINK DAILY; Therapy: 68NNX6228 to (Evaluate:32Wqc6533)  Requested for: 85VBY9622; Last    Rx:14Mar2016 Ordered   22  Sertraline HCl - 25 MG Oral Tablet; TAKE 1 TABLET DAILY AS DIRECTED;     Therapy: 57UOW5363 to (Anny Walker)  Requested for: 01Apr2016; Last    Rx:15Piq2599 Ordered   23  Triamcinolone Acetonide 0 025 % External Cream; APPLY 2-3 TIMES DAILY TO    AFFECTED AREA(S); Therapy: 61NLU7759 to (Last Rx:96Jlq5665)  Requested for: 45HSS7385 Ordered   24  Valium 5 MG Oral Tablet; Therapy: (YJAVJKGU:83JYJ9626) to Recorded    Allergies    1  No Known Drug Allergies    Vitals  Signs [Data Includes: Current Encounter]   Recorded: 65Bon1596 09:24AM   Heart Rate: 80  Respiration: 12  Systolic: 180  Diastolic: 72  O2 Saturation: 93    Physical Exam    Constitutional   General appearance: No acute distress, well appearing and well nourished  Pulmonary   Respiratory effort: No increased work of breathing or signs of respiratory distress  Auscultation of lungs: Clear to auscultation  Cardiovascular   Auscultation of heart: Normal rate and rhythm, normal S1 and S2, without murmurs  Examination of extremities for edema and/or varicosities: Normal     Abdomen   Abdomen: Non-tender, no masses  Liver and spleen: No hepatomegaly or splenomegaly           Future Appointments    Date/Time Provider Specialty Site   08/09/2016 10:00 AM Tony Dunbar MD Family Medicine [address] [place] [address]     Signatures   Electronically signed by : Rex Campbell MD; Jul 11 2016  9:57AM EST                       (Author)

## 2018-01-13 VITALS
WEIGHT: 151.13 LBS | DIASTOLIC BLOOD PRESSURE: 62 MMHG | BODY MASS INDEX: 29.52 KG/M2 | HEART RATE: 88 BPM | SYSTOLIC BLOOD PRESSURE: 118 MMHG | RESPIRATION RATE: 15 BRPM | OXYGEN SATURATION: 92 %

## 2018-01-13 VITALS
RESPIRATION RATE: 15 BRPM | DIASTOLIC BLOOD PRESSURE: 60 MMHG | OXYGEN SATURATION: 92 % | SYSTOLIC BLOOD PRESSURE: 118 MMHG | HEART RATE: 68 BPM | HEIGHT: 60 IN

## 2018-01-13 NOTE — CONSULTS
Chief Complaint  PT HERE FOR SURGICAL CLEARANCE  HAVING EYE SURGERY DONE TUESDAY 11/28/17 WITH DR Frank Salt  PAPER TO BE FILLED OUT      History of Present Illness  Pre-Op Visit (Brief): The patient is being seen for a preoperative visit  The procedure is a(n) excision of lesion scheduled for 11/28/17 with Dr Adrian Rojas  The indication for surgery is cyst of left lower lid  Surgical Risk Assessment:   Prior Anesthesia: She had prior anesthesia, no prior adverse reaction to edidural anesthesia, no prior adverse reaction to spinal anesthesia and no prior adverse reaction to general anesthesia  Pertinent Past Medical History: angina, CAD with prior MI, diabetes and insulin use, but no arrhythmia, no CAD, CAD without recent PCI, no CHF, no chronic liver disease, no acute hepatitis, no coagulation delay, no primary hypercoagulable state, no secondary hypercoagulable state, no pulmonary embolism, no DVT, does not use anticoagulants, no thyroid disease, no neck osteoarthrosis, no TMJ osteoarthrosis, does not wear dentures, no seizure disorder, no CVA, no asthma, no COPD, not JIMBO, no renal disease, no low serum albumin and no obesity  Exercise Capacity: able to walk four blocks without symptoms and able to walk two flights of stairs without symptoms  Lifestyle Factors: denies alcohol use, denies tobacco use and denies illegal drug use  Symptoms: no symptoms  Pertinent Family History: no pertinent family history  Living Situation: home is secure and supportive  HPI: She has a symptomatic cyst on her left lower lid  She has an extensive cardiac history  She has not had any chest pain or SOB in months  She is doing well on her current regimen  Her DM is under good control on her current regimen  Review of Systems    Constitutional: No fever, no chills, feels well, no tiredness, no recent weight gain or weight loss     Eyes: No complaints of eye pain, no red eyes, no eyesight problems, no discharge, no dry eyes, no itching of eyes  ENT: no complaints of earache, no loss of hearing, no nose bleeds, no nasal discharge, no sore throat, no hoarseness  Cardiovascular: No complaints of slow heart rate, no fast heart rate, no chest pain, no palpitations, no leg claudication, no lower extremity edema  Respiratory: No complaints of shortness of breath, no wheezing, no cough, no SOB on exertion, no orthopnea, no PND  Gastrointestinal: No complaints of abdominal pain, no constipation, no nausea or vomiting, no diarrhea, no bloody stools  Genitourinary: No complaints of dysuria, no incontinence, no pelvic pain, no dysmenorrhea, no vaginal discharge or bleeding  Musculoskeletal: No complaints of arthralgias, no myalgias, no joint swelling or stiffness, no limb pain or swelling  Integumentary: No complaints of skin rash or lesions, no itching, no skin wounds, no breast pain or lump  Neurological: No complaints of headache, no confusion, no convulsions, no numbness, no dizziness or fainting, no tingling, no limb weakness, no difficulty walking  Psychiatric: Not suicidal, no sleep disturbance, no anxiety or depression, no change in personality, no emotional problems  Endocrine: No complaints of proptosis, no hot flashes, no muscle weakness, no deepening of the voice, no feelings of weakness  Hematologic/Lymphatic: No complaints of swollen glands, no swollen glands in the neck, does not bleed easily, does not bruise easily  Active Problems    1  2-vessel coronary artery disease (414 00) (I25 10)   2  Allergic rhinitis (477 9) (J30 9)   3  Anemia (285 9) (D64 9)   4  Anxiety (300 00) (F41 9)   5  Benign essential hypertension (401 1) (I10)   6  Benign paroxysmal vertigo, unspecified laterality (386 11) (H81 10)   7  Biceps tendinitis of right shoulder (726 12) (M75 21)   8  Carotid bruit (785 9) (R09 89)   9  Chronic obstructive pulmonary disease (496) (J44 9)   10  Congestive heart failure (428 0) (I50 9)   11  Constipation (564 00) (K59 00)   12  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)   13  CRD (chronic renal disease) (585 9) (N18 9)   14  Depression (311) (F32 9)   15  Diarrhea (787 91) (R19 7)   16  Esophageal reflux (530 81) (K21 9)   17  Gastric ulcer (531 90) (K25 9)   18  Hiatal hernia (553 3) (K44 9)   19  Hyperlipidemia (272 4) (E78 5)   20  Intermittent claudication (443 9) (I73 9)   21  Iron deficiency anemia due to chronic blood loss (280 0) (D50 0)   22  Low back pain (724 2) (M54 5)   23  Lower back pain (724 2) (M54 5)   24  Thrombocytopenia (287 5) (D69 6)   25  Trigger finger (727 03) (M65 30)   26  Type 2 diabetes mellitus with hyperglycemia (250 00) (E11 65)   27  Urge incontinence of urine (788 31) (N39 41)   28  Welcome to Medicare preventive visit (V70 0) (Z00 00)    Past Medical History    · History of Acute lower UTI (599 0) (N39 0)   · History of Encounter for screening mammogram for malignant neoplasm of breast  (V76 12) (Z12 31)   · History of acute sinusitis (V12 69) (Z87 09)   · History of Myalgia And Myositis (729 1)   · History of Skin rash (782 1) (R21)   · History of Trigger Finger Of The Thumb (727 03)    The active problems and past medical history were reviewed and updated today  Surgical History    · History of Appendectomy   · History of CABG   · History of Cholecystectomy   · History of Complete Colonoscopy   · History of Diagnostic Esophagogastroduodenoscopy   · History of Hernia Repair   · History of Pacemaker Permanent Placement   · History of Total Abdominal Hysterectomy With Removal Of Both Ovaries    The surgical history was reviewed and updated today         Family History    · Family history of Black lung disease   · Family history of malignant neoplasm of breast (V16 3) (Z80 3)   · Family history unobtainable (V49 89) (Z78 9)   · Family history of Family history unobtainable due to orphan status (V49 89) (Z78 9)    · Family history of Black lung disease   · Family history unobtainable (V49 89) (Z78 9)   · Family history of Family history unobtainable due to orphan status (V49 89) (Z78 9)    · Family history of Living and Healthy    · Family history unobtainable (V49 89) (Z78 9)   · Family history of Family history unobtainable due to orphan status (V49 89) (Z78 9)    · Family history unobtainable (V49 89) (Z78 9)   · Family history of Family history unobtainable due to orphan status (V49 89) (Z78 9)    · Family history unobtainable (V49 89) (Z78 9)   · Family history of Family history unobtainable due to orphan status (V49 89) (Z78 9)    · Family history of Family history unobtainable due to orphan status (V49 89) (Z78 9)    The family history was reviewed and updated today  Social History    · Former smoker (N24 74) (C71 614)   · Never Drank Alcohol   · Tobacco use (305 1) (Z72 0)  The social history was reviewed and updated today  The social history was reviewed and is unchanged  Current Meds   1  Acetaminophen-Codeine #3 300-30 MG Oral Tablet; TAKE 1 TABLET 3 TIMES DAILY AS   NEEDED FOR PAIN;   Therapy: 35BPR0265 to (Evaluate:08Apr2018); Last Rx:10Oct2017 Ordered   2  Advair Diskus 250-50 MCG/DOSE Inhalation Aerosol Powder Breath Activated; USE 1   INHALATION EVERY 12 HOURS; Therapy: 08QME5900 to (Last Rx:13Apr2017)  Requested for: 13Apr2017 Ordered   3  Aspirin EC 81 MG Oral Tablet Delayed Release; Therapy: (JLAHNKUN:64FXJ3619) to Recorded   4  BD Insulin Syr Ultrafine II 31G X 5/16" 0 5 ML Miscellaneous; use as directed; Therapy: 68DAD4878 to (Evaluate:31Oct2017)  Requested for: 95CRW1234; Last   GW:28KWP7542 Ordered   5  BD Pen Needle Heide U/F 32G X 4 MM Miscellaneous; use daily as directed; Therapy: 78CWX5461 to (Last HE:79ZUV3829)  Requested for: 45CCJ6844 Ordered   6  Carvedilol 25 MG Oral Tablet; TAKE 1 TABLET THREE TIMES A DAY WITH MORNING AND   EVENING MEAL; Therapy: 63IYL2235 to (Last Rx:09Jun2017)  Requested for: 12JCL8401 Ordered   7   Clarinex 5 MG Oral Tablet; Therapy: (XENSRXVR:03WON4517) to Recorded   8  Clopidogrel Bisulfate 75 MG Oral Tablet; TAKE 1 TABLET DAILY; Therapy: 35EJU3047 to ([id number])  Requested for: 60VII7156; Last   Rx:40Rdp1960 Ordered   9  Ferrous Fumarate 324 MG TABS; TAKE 1 TABLET DAILY AS DIRECTED; Therapy: 85KFI3227 to (Evaluate:27Mar2017)  Requested for: 01Apr2016; Last   Rx:01Apr2016 Ordered   10  Fluticasone Propionate 50 MCG/ACT Nasal Suspension; USE 2 SPRAYS IN EACH    NOSTRIL ONCE DAILY; Therapy: 11RGP8782 to (Last Rx:01Apr2016)  Requested for: 01Apr2016 Ordered   11  Furosemide 20 MG Oral Tablet; TAKE 1 TABLET DAILY AS DIRECTED; Therapy: 43LCN0287 to (Rafia Steve)  Requested for: 43WIU2118; Last    Rx:05Jun2017 Ordered   12  Furosemide 40 MG Oral Tablet; TAKE 1 TABLET DAILY; Therapy: 58JFT9359 to (Evaluate:09Jun2016)  Requested for: 94ERL5070 Recorded   13  Ipratropium-Albuterol 0 5-2 5 (3) MG/3ML Inhalation Solution; USE 1 UNIT DOSE IN    NEBULIZER EVERY 4 HOURS AS NEEDED; Therapy: 03Bqs9913 to (Last Rx:05Jan2015)  Requested for: 09XHL9397 Ordered   14  Lantus SoloStar 100 UNIT/ML Subcutaneous Solution Pen-injector; INJECT UNDER    THE SKIN AS DIRECTED; Therapy: 86RVZ0560 to (Last Fredirick Moritz)  Requested for: 11VNL1928 Ordered   15  Lisinopril 10 MG Oral Tablet; TAKE 1 TABLET DAILY AS DIRECTED; Therapy: 86HNZ7725 to (Misha Lomax)  Requested for: 01Apr2016; Last    Rx:01Apr2016 Ordered   16  Metoclopramide HCl - 10 MG Oral Tablet; TAKE 1 TABLET 3 times a day; Therapy: 85XNL8328 to (Evaluate:14Oct2017)  Requested for: 45SMB5785; Last    Rx:16Jun2017 Ordered   17  Minitran 0 2 MG/HR Transdermal Patch 24 Hour; APPLY PATCH FOR 12 TO 14 HOURS    DAILY, THEN REMOVE;    Therapy: 45JME9854 to (Rafia Steve)  Requested for: 56LSY1492; Last    Rx:13Mar2017 Ordered   18   Nitroglycerin 0 2 MG/HR Transdermal Patch 24 Hour; APPLY PATCH DAILY FOR 12 TO 14    HOURS THEN REMOVE; Therapy: 81JSA6620 to (Last Rx:13Mar2017)  Requested for: 99LPR2502 Ordered   19  Nitrostat 0 4 MG Sublingual Tablet Sublingual; PLACE 1 TABLET UNDER THE TONGUE    EVERY 5 MINUTES FOR UP TO 3 DOSES AS NEEDED FOR CHEST PAIN  CALL    911 IF PAIN PERSISTS; Therapy: 09MNG0865 to (Cy Half)  Requested for: 00JMC8492; Last    Rx:02Com1578 Ordered   20  NovoLIN R 100 UNIT/ML Injection Solution; FOLLOW SLIDING SCALE:    BLOOD SUGAR 200-250, USE 2 UNITS    BLOOD SUGAR 251-300, USE 4 UNITS    BLOOD SUGAR 301-350, USE 6 UNITS; Therapy: 97Tkh2618 to (Last Rx:76Mse8003)  Requested for: 76Flp4542 Ordered   21  Pantoprazole Sodium 40 MG Oral Tablet Delayed Release; TAKE 1 TABLET DAILY; Therapy: 67HJC2748 to (Carlos Fatima)  Requested for: 46ACZ5182; Last    Rx:04Lfa1978 Ordered   22  Polyethylene Glycol 3350 Oral Powder; MIX 1 CAPFUL (17GM) IN 8 OUNCES OF WATER,    JUICE, OR TEA AND DRINK DAILY; Therapy: 98ARE9265 to (Evaluate:74Gak0435)  Requested for: 49CVP7488; Last    Rx:14Mar2016 Ordered   23  Rosuvastatin Calcium 10 MG Oral Tablet; TAKE 1 TABLET AT BEDTIME; Therapy: 31LPA8474 to (Bk Jo)  Requested for: 10OND4806; Last    Rx:05Jun2017 Ordered   24  Sertraline HCl - 25 MG Oral Tablet; TAKE 1 TABLET DAILY AS DIRECTED; Therapy: 12MNL8131 to (Evaluate:37Gwm1108)  Requested for: 64ECS5859; Last    Rx:62Xci3686 Ordered   25  Triamcinolone Acetonide 0 025 % External Cream; APPLY 2-3 TIMES DAILY TO    AFFECTED AREA(S); Therapy: 32BUJ3924 to (Last Rx:76Kig0167)  Requested for: 97RNY2737 Ordered   26  Valium 5 MG Oral Tablet; Therapy: (GBOAQXSY:39RNO3310) to Recorded    The medication list was reviewed and updated today  Allergies    1   No Known Drug Allergies    Vitals  Signs    Heart Rate: 85  Respiration: 15  Systolic: 725  Diastolic: 68  Height: 5 ft   Weight: 147 lb 8 oz  BMI Calculated: 28 81  BSA Calculated: 1 64    Physical Exam    Constitutional   General appearance: No acute distress, well appearing and well nourished  Neck   Neck: Supple, symmetric, trachea midline, no masses  Thyroid: Normal, no thyromegaly  Pulmonary   Percussion of chest: Normal     Palpation of chest: Normal     Cardiovascular   Auscultation of heart: Normal rate and rhythm, normal S1 and S2, no murmurs  Carotid pulses: 2+ bilaterally  Abdomen   Abdomen: Non-tender, no masses  Liver and spleen: No hepatomegaly or splenomegaly  Assessment    1  Visit for pre-operative examination (V72 84) (C18 650)    Discussion/Summary  Surgical Clearance: She is at a LOW risk from a cardiovascular standpoint at this time without any additional cardiac testing  Reevaluation needed, if she should present with symptoms prior to surgery/procedure  Patient will take her BP medications on the day of surgery  She will stop ASA and Plavix 5 days prior to surgery  She was take 1/2 of her usual dose of Lantus the night prior to surgery  End of Encounter Meds    1  Carvedilol 25 MG Oral Tablet (Coreg); TAKE 1 TABLET THREE TIMES A DAY WITH   MORNING AND EVENING MEAL; Therapy: 63BGF7456 to (Last Rx:09Jun2017)  Requested for: 57KHZ9960 Ordered   2  Clopidogrel Bisulfate 75 MG Oral Tablet (Plavix); TAKE 1 TABLET DAILY; Therapy: 84PQV3700 to ([place] Mention)  Requested for: 26CON3308; Last   Rx:73Erl3957 Ordered   3  Furosemide 40 MG Oral Tablet; TAKE 1 TABLET DAILY; Therapy: 51QMA7804 to (Evaluate:09Jun2016)  Requested for: 76ZDC1233 Recorded   4  Minitran 0 2 MG/HR Transdermal Patch 24 Hour; APPLY PATCH FOR 12 TO 14 HOURS   DAILY, THEN REMOVE;   Therapy: 53IAT6541 to (Clint Derrick)  Requested for: 81RHT0687; Last   Rx:13Mar2017 Ordered   5  Rosuvastatin Calcium 10 MG Oral Tablet (Crestor); TAKE 1 TABLET AT BEDTIME; Therapy: 04ARD1984 to (Josi Aakash)  Requested for: 50HRL8327; Last   Rx:05Jun2017 Ordered    6   Fluticasone Propionate 50 MCG/ACT Nasal Suspension; USE 2 SPRAYS IN EACH   NOSTRIL ONCE DAILY; Therapy: 67IJM1332 to (Last Rx:01Apr2016)  Requested for: 01Apr2016 Ordered    7  Ferrous Fumarate 324 MG TABS; TAKE 1 TABLET DAILY AS DIRECTED; Therapy: 18SAD0333 to (Evaluate:27Mar2017)  Requested for: 01Apr2016; Last   Rx:01Apr2016 Ordered    8  Advair Diskus 250-50 MCG/DOSE Inhalation Aerosol Powder Breath Activated; USE 1   INHALATION EVERY 12 HOURS; Therapy: 27WOP6845 to (Last Rx:13Apr2017)  Requested for: 13Apr2017 Ordered    9  Furosemide 20 MG Oral Tablet; TAKE 1 TABLET DAILY AS DIRECTED; Therapy: 48XSK9346 to (Jairo Yeung)  Requested for: 91FQN0446; Last   Rx:05Jun2017 Ordered   10  Nitroglycerin 0 2 MG/HR Transdermal Patch 24 Hour; APPLY PATCH DAILY FOR 12 TO 14    HOURS THEN REMOVE;    Therapy: 53QLZ4394 to (Last Rx:13Mar2017)  Requested for: 35LJJ3514 Ordered   11  Nitrostat 0 4 MG Sublingual Tablet Sublingual (Nitroglycerin); PLACE 1 TABLET UNDER    THE TONGUE EVERY 5 MINUTES FOR UP TO 3 DOSES AS NEEDED    FOR CHEST PAIN  CALL 911 IF PAIN PERSISTS; Therapy: 49DAG2427 to (Walker Pier)  Requested for: 32EZP0199; Last    Rx:76Jdw3753 Ordered    12  Lisinopril 10 MG Oral Tablet; TAKE 1 TABLET DAILY AS DIRECTED; Therapy: 61VOB6124 to (Evaluate:27Mar2017)  Requested for: 01Apr2016; Last    Rx:01Apr2016 Ordered    13  Polyethylene Glycol 3350 Oral Powder; MIX 1 CAPFUL (17GM) IN 8 OUNCES OF WATER,    JUICE, OR TEA AND DRINK DAILY; Therapy: 15CUS7519 to (Evaluate:05Kad7710)  Requested for: 16LKS7570; Last    Rx:14Mar2016 Ordered    14  Ipratropium-Albuterol 0 5-2 5 (3) MG/3ML Inhalation Solution; USE 1 UNIT DOSE IN    NEBULIZER EVERY 4 HOURS AS NEEDED; Therapy: 22How6443 to (Last Rx:05Jan2015)  Requested for: 51LUB3748 Ordered    15  Sertraline HCl - 25 MG Oral Tablet; TAKE 1 TABLET DAILY AS DIRECTED; Therapy: 09IXK8927 to (Evaluate:04Pij6691)  Requested for: 12QUO3494; Last    Rx:35Wjz7676 Ordered    16   Metoclopramide HCl - 10 MG Oral Tablet (Reglan); TAKE 1 TABLET 3 times a day; Therapy: 43LTB5846 to (Evaluate:2017)  Requested for: 58HOW0092; Last    Rx:67Qcd2668 Ordered   17  Pantoprazole Sodium 40 MG Oral Tablet Delayed Release (Protonix); TAKE 1 TABLET    DAILY; Therapy: 68DQP6998 to ([id number])  Requested for: 43RRP5467; Last    Rx:39Byb1181 Ordered    18  Acetaminophen-Codeine #3 300-30 MG Oral Tablet; TAKE 1 TABLET 3 TIMES DAILY AS    NEEDED FOR PAIN;    Therapy: 45UIE0702 to (Evaluate:2018); Last Rx:31Hrn2525 Ordered    19  Triamcinolone Acetonide 0 025 % External Cream; APPLY 2-3 TIMES DAILY TO    AFFECTED AREA(S); Therapy: 19FPX7532 to (Last Rx:95Lxe6052)  Requested for: 79KLY8689 Ordered    20  BD Insulin Syr Ultrafine II 31G X 5/16" 0 5 ML Miscellaneous; use as directed; Therapy: 79NJQ1642 to (Evaluate:2017)  Requested for: 63KBG8399; Last    PW:70SWX2655 Ordered   21  BD Pen Needle Heide U/F 32G X 4 MM Miscellaneous; use daily as directed; Therapy: 78HNW5250 to (Last V03FUJ3411)  Requested for: 57XFP3521 Ordered   22  Lantus SoloStar 100 UNIT/ML Subcutaneous Solution Pen-injector; INJECT UNDER    THE SKIN AS DIRECTED; Therapy: 10VSD7216 to ([facility])  Requested for: 17GGI5106 Ordered   23  NovoLIN R 100 UNIT/ML Injection Solution; FOLLOW SLIDING SCALE:    BLOOD SUGAR 200-250, USE 2 UNITS    BLOOD SUGAR 251-300, USE 4 UNITS    BLOOD SUGAR 301-350, USE 6 UNITS; Therapy: 02Wob5997 to (Last Rx:99Ela4365)  Requested for: 21Lih3962 Ordered    24  Aspirin EC 81 MG Oral Tablet Delayed Release; Therapy: (OZVIYCQX:29SIF4604) to Recorded   25  Clarinex 5 MG Oral Tablet (Desloratadine); Therapy: (WIHAJUQR:81RBH8964) to Recorded   26  Valium 5 MG Oral Tablet (DiazePAM);     Therapy: (JAZHYSDF:42MEQ6149) to Recorded    Signatures   Electronically signed by : Lidia Perez MD; 2017 10:46AM EST                       (Author)

## 2018-01-14 VITALS
HEIGHT: 60 IN | DIASTOLIC BLOOD PRESSURE: 68 MMHG | HEART RATE: 85 BPM | RESPIRATION RATE: 15 BRPM | WEIGHT: 147.5 LBS | SYSTOLIC BLOOD PRESSURE: 118 MMHG | BODY MASS INDEX: 28.96 KG/M2

## 2018-01-14 NOTE — MISCELLANEOUS
Assessment    1  Chronic obstructive pulmonary disease (496) (J44 9)   2  Congestive heart failure (428 0) (I50 9)   3  Type 2 diabetes mellitus with hyperglycemia (250 00) (E11 65)    Plan  Chronic obstructive pulmonary disease    · 3 times a day practice pursed lip and abdominal breathing ; Status:Complete;   Done:  70YPI2400   Ordered; For:Chronic obstructive pulmonary disease; Ordered By:Karo Hawkins;   · Avoid exposure to infections ; Status:Complete;   Done: 54BMO1822   Ordered; For:Chronic obstructive pulmonary disease; Ordered By:Karo Hawkins;   · Avoid exposure to things that make your problem worse ; Status:Complete;   Done:  57VEV9954   Ordered; For:Chronic obstructive pulmonary disease; Ordered By:Bob Hawkins;   · Decreasing the stress in your life may help your condition improve ; Status:Complete;    Done: 16QKY4747   Ordered; For:Chronic obstructive pulmonary disease; Ordered By:Bob Hawkins;   · Eat a normal well-balanced diet  Follow the food pyramid for healthy eating ;  Status:Complete;   Done: 89ZUE8920   Ordered; For:Chronic obstructive pulmonary disease; Ordered By:Karo Hawkins;   · To use an inhaler:; Status:Complete;   Done: 38WDG7340   Ordered; For:Chronic obstructive pulmonary disease; Ordered By:Karo Hawkins;   · Call [phone] if: The cough is worse or secretions become darker or colored ;  Status:Complete;   Done: 34ADC7484   Ordered; For:Chronic obstructive pulmonary disease; Ordered By:Karo Hawkins;   · Call [phone] if: You have difficulty breathing while lying down and you are  comfortable only when sitting up ; Status:Complete;   Done: 96YYG9731   Ordered; For:Chronic obstructive pulmonary disease; Ordered By:Karo Hawkins;   · Call [phone] if: Your cough is not better in 1 week ; Status:Complete;   Done:  27SKI5477   Ordered;   For:Chronic obstructive pulmonary disease; Ordered By:Karo Hawkins;   · Call [phone] if: Your temperature is higher than 102F ; Status:Complete;   Done:  62COO2801   Ordered; For:Chronic obstructive pulmonary disease; Ordered By:Bob Hawkins;   · Call 911 if: You are too short of breath to talk, you can speak only one or two words  between breaths, or your lips or nails look blue ; Status:Complete;   Done: 44XWO9696   Ordered; For:Chronic obstructive pulmonary disease; Ordered By:Ewelina Hawkins;   · Seek Immediate Medical Attention if: You are having trouble staying awake ;  Status:Complete;   Done: 81QAT9310   Ordered; For:Chronic obstructive pulmonary disease; Ordered By:Ewelina Hawkins;   · Seek Immediate Medical Attention if: You feel short of breath even while resting ;  Status:Complete;   Done: 78UYD2188   Ordered; For:Chronic obstructive pulmonary disease; Ordered By:Ewelina Hawkins;   · Seek Immediate Medical Attention if: You get a headache that does not go away with your  usual treatment ; Status:Complete;   Done: 57NBZ2205   Ordered; For:Chronic obstructive pulmonary disease; Ordered By:Ewelina Hawkins;   · Seek Immediate Medical Attention if: You have pain in the chest that gets worse with  deep breathing or coughing ; Status:Complete;   Done: 60YCD8680   Ordered; For:Chronic obstructive pulmonary disease; Ordered By:Ewelina Hawkins;   · Seek Immediate Medical Attention if: You or your family members notice any confusion or  difficulty with memory ; Status:Complete;   Done: 00OHP8436   Ordered; For:Chronic obstructive pulmonary disease; Ordered By:Ewelina Hawkins;   · Seek Immediate Medical Attention if: Your shortness of breath is getting worse ;  Status:Complete;   Done: 89IFV8887   Ordered; For:Chronic obstructive pulmonary disease; Ordered By:Ewelina Hawkins; Congestive heart failure    · Avoid temperature extremes ; Status:Complete;   Done: 18LRU1595   Ordered;   For:Congestive heart failure; Ordered By:Ewelina Hawkins;   · Begin a limited exercise program ; Status:Complete; Done: 11EJR1262   Ordered; For:Congestive heart failure; Ordered By:Leona Hawkins;   · Continue with our present treatment plan ; Status:Complete;   Done: 39QGD5678   Ordered; For:Congestive heart failure; Ordered By:Leona Hawkins;   · Eat a low fat and low cholesterol diet ; Status:Complete;   Done: 43SAU8199   Ordered; For:Congestive heart failure; Ordered By:Leona Hawkins;   · Keep a diary of when and what you eat ; Status:Complete;   Done: 94URU9271   Ordered; For:Congestive heart failure; Ordered By:Leona Hawkins;   · NSAIDs, avoid; Status:Complete;   Done: 18QHQ1028   Ordered; For:Congestive heart failure; Ordered By:Leona Hawkins;   · There are many exercise options for seniors ; Status:Complete;   Done: 97TZW3516   Ordered; For:Congestive heart failure; Ordered By:Leona Hawkins;   · We recommend that you bring your body mass index down to 26 ; Status:Complete;    Done: 37EMH8237   Ordered; For:Congestive heart failure; Ordered By:Leona Hawkins;   · Call [phone] if: The swelling and puffiness in your ankles and feet is not better in  1 week ; Status:Complete;   Done: 99ZHB5326   Ordered; For:Congestive heart failure; Ordered By:Leona Hawkins Pen;   · Call [phone] if: The swelling and puffiness is getting worse ; Status:Complete;    Done: 60AMI8020   Ordered; For:Congestive heart failure; Ordered By:Leona Hawkins Pen;   · Call [phone] if: You are still having difficulty breathing while lying down in 2 days ;  Status:Complete;   Done: 77OCK9537   Ordered; For:Congestive heart failure; Ordered By:Leona Hawkins Pen;   · Call [phone] if: You become dizzy or lightheaded, especially when you stand up  after sitting for a while ; Status:Complete;   Done: 38DWO3817   Ordered; For:Congestive heart failure; Ordered By:Leona Hawkins Pen;   · Call [phone] if: You have a dry, hacking cough ; Status:Complete;   Done:  07VPJ4032   Ordered;   For:Congestive heart failure; Ordered By:Kali Hawkins;   · Call [phone] if: You have swelling and puffiness of your lower leg or ankles ;  Status:Complete;   Done: 77LVP1841   Ordered; For:Congestive heart failure; Ordered By:Kali Hawkins;   · Call [phone] if: Your breathing is not better in 2 days ; Status:Complete;   Done:  78CWD1578   Ordered; For:Congestive heart failure; Ordered By:Bob Hawkins;   · Call 911 if: You are coughing up blood or pink colored phlegm ; Status:Complete;   Done:  77CEZ6331   Ordered; For:Congestive heart failure; Ordered By:Kali Hawkins;   · Call 911 if: You experience a new kind of chest pain (angina) or pressure ;  Status:Complete;   Done: 29DWF3001   Ordered; For:Congestive heart failure; Ordered By:Kali Hawkins;   · Call 911 if: You faint or lose consciousness ; Status:Complete;   Done: 39TTV7722   Ordered; For:Congestive heart failure; Ordered By:Kali Hawkins;   · Call 911 if: You feel short of breath even while resting ; Status:Complete;   Done:  31KRC0630   Ordered; For:Congestive heart failure; Ordered By:Bob Hawkins;   · Call 911 if: You have any symptoms of a stroke ; Status:Complete;   Done: 94UMC0664   Ordered; For:Congestive heart failure; Ordered By:Bob Hawkins;   · Call 911 if: You have sudden or severe chest pain with shortness of breath, rapid  breathing, or cough ; Status:Complete;   Done: 37OUU7536   Ordered; For:Congestive heart failure; Ordered By:Kali Hawkins;   · Seek Immediate Medical Attention if: You gain more than 2 pounds in 1 day ;  Status:Complete;   Done: 94ZXQ8836   Ordered; For:Congestive heart failure; Ordered By:Kali Hawkins;   · Seek Immediate Medical Attention if: You have difficulty breathing while lying down and  you are comfortable only when sitting up ; Status:Complete;   Done: 42ITZ7510   Ordered;   For:Congestive heart failure; Ordered By:Kali Hawkins;   · Seek Immediate Medical Attention if: You or your family members notice any confusion or  difficulty with memory ; Status:Complete;   Done: 73ENJ9714   Ordered; For:Congestive heart failure; Ordered By:Fabien Hawkins;   · Seek Immediate Medical Attention if: Your shortness of breath is getting worse ;  Status:Complete;   Done: 67ZMY2607   Ordered; For:Congestive heart failure; Ordered By:Bob Hawkins;  Type 2 diabetes mellitus with hyperglycemia    · Begin a limited exercise program ; Status:Complete;   Done: 65QSD9044   Ordered; For:Type 2 diabetes mellitus with hyperglycemia; Ordered By:Fabien Hawkins;   · Brush your teeth freq1 and floss at least once a day ; Status:Complete;   Done:  87PBV8133   Ordered; For:Type 2 diabetes mellitus with hyperglycemia; Ordered By:Fabien Hawkins;   · Continue with our present treatment plan ; Status:Complete;   Done: 80MVT5602   Ordered; For:Type 2 diabetes mellitus with hyperglycemia; Ordered By:Fabien Hawkins;   · Cut your nails straight across ; Status:Complete;   Done: 16IDD7817   Ordered; For:Type 2 diabetes mellitus with hyperglycemia; Ordered By:Fabien Hawkins;   · Have your eyes examined by an eye doctor every year ; Status:Complete;   Done:  95QIZ2318   Ordered; For:Type 2 diabetes mellitus with hyperglycemia; Ordered By:Fabien Hawkins;   · If you have symptoms of being hypoglycemic or your blood sugar is less than 60, you  need to eat or drink a source of sugar ; Status:Complete;   Done: 38CBC3249   Ordered; For:Type 2 diabetes mellitus with hyperglycemia; Ordered By:Fabien Hawkins;   · Inspect your feet and legs daily if you have vascular disease ; Status:Complete;   Done:  03PAK2223   Ordered; For:Type 2 diabetes mellitus with hyperglycemia; Ordered By:Fabien Hawkins;   · Inspect your feet daily ; Status:Complete;   Done: 22NFU7128   Ordered; For:Type 2 diabetes mellitus with hyperglycemia; Ordered By:Fabien Hawkins;   · It is important to take good care of your feet if you have diabetes  ; Status:Complete;    Done: 97LRO2902   Ordered; For:Type 2 diabetes mellitus with hyperglycemia; Ordered By:Pete Hawkins;   · It is important to take good care of your feet ; Status:Complete;   Done: 30HAA2483   Ordered; For:Type 2 diabetes mellitus with hyperglycemia; Ordered By:Pete Hawkins;   · Some eating tips that can help you lose weight ; Status:Complete;   Done: 35HTV5534   Ordered; For:Type 2 diabetes mellitus with hyperglycemia; Ordered By:Pete Hawkins;   · There are many exercise options for seniors ; Status:Complete;   Done: 51MSM5834   Ordered; For:Type 2 diabetes mellitus with hyperglycemia; Ordered By:Pete Hawkins;   · We recommend that you bring your body mass index down to 26 ; Status:Complete;    Done: 27JKL0878   Ordered; For:Type 2 diabetes mellitus with hyperglycemia; Ordered By:Pete Hawkins;   · Wear shoes that give your toes plenty of room ; Status:Complete;   Done: 10TVX7112   Ordered; For:Type 2 diabetes mellitus with hyperglycemia; Ordered By:Pete Hawkins;   · Call [phone] if: Ketones are present in the urine ; Status:Complete;   Done:  96OCT6988   Ordered; For:Type 2 diabetes mellitus with hyperglycemia; Ordered By:Pete Hawkins;   · Call [phone] if: The ketones in the urine persist despite treatment ;  Status:Complete;   Done: 77ICH6157   Ordered; For:Type 2 diabetes mellitus with hyperglycemia; Ordered By:Pete Hawkins;   · Call [phone] if: You are having trouble following our instructions or treatment for  any reason ; Status:Complete;   Done: 00IGK7129   Ordered; For:Type 2 diabetes mellitus with hyperglycemia; Ordered By:Pete Hawkins;   · Call [phone] if: You start vomiting ; Status:Complete;   Done: 51CBA5269   Ordered; For:Type 2 diabetes mellitus with hyperglycemia; Ordered By:Pete Hawkins;   · Call [phone] if: Your blood sugar is higher than 250 ; Status:Complete;   Done:  96OEC4418   Ordered;   For:Type 2 diabetes mellitus with hyperglycemia; Ordered By:Pete Hawkins;   · Call [phone] if: Your blood sugar is steadily becoming higher ; Status:Complete;    Done: 61PCQ5363   Ordered; For:Type 2 diabetes mellitus with hyperglycemia; Ordered By:Pete Hawkins;   · Call [phone] if: Your blood sugar is still over 300 after taking insulin ;  Status:Complete;   Done: 40UVZ2129   Ordered; For:Type 2 diabetes mellitus with hyperglycemia; Ordered By:Pete Hawkins;   · Call 911 if: There are symptoms of ketoacidosis  ; Status:Complete;   Done: 92VVX5529   Ordered; For:Type 2 diabetes mellitus with hyperglycemia; Ordered By:Pete Hawkins;   · Call 911 if: You have a seizure ; Status:Complete;   Done: 76XJA4560   Ordered; For:Type 2 diabetes mellitus with hyperglycemia; Ordered By:Pete Hawkins;   · Call 911 if: You have any symptoms of a stroke ; Status:Complete;   Done: 04OUA8262   Ordered; For:Type 2 diabetes mellitus with hyperglycemia; Ordered By:Pete Hawkins;   · Call 911 if: You have fainted or passed out ; Status:Complete;   Done: 89WZF0149   Ordered; For:Type 2 diabetes mellitus with hyperglycemia; Ordered By:Pete Hawkins;   · Seek Immediate Medical Attention if: There are signs that the blood sugar is too high  (hyperglycemia) ; Status:Complete;   Done: 41KBF6164   Ordered; For:Type 2 diabetes mellitus with hyperglycemia; Ordered By:Pete Hawkins;   · Seek Immediate Medical Attention if: There are signs that the blood sugar is too low  (hypoglycemia) ; Status:Complete;   Done: 54OXI4052   Ordered; For:Type 2 diabetes mellitus with hyperglycemia; Ordered By:Pete Hawkins;   · Seek Immediate Medical Attention if: You become dehydrated ; Status:Complete;   Done:  56PRL6100   Ordered; For:Type 2 diabetes mellitus with hyperglycemia;  Ordered By:Pete Hawkins;   · Seek Immediate Medical Attention if: You experience a new kind of chest pain (angina) or  pressure ; Status:Complete; Done: 50MZN6062   Ordered; For:Type 2 diabetes mellitus with hyperglycemia; Ordered By:Laureano Hawkins;   · Seek Immediate Medical Attention if: You notice that breathing is rapid, more than 40  times a minute ; Status:Complete;   Done: 68RCZ2370   Ordered; For:Type 2 diabetes mellitus with hyperglycemia; Ordered By:Laureano Hawkins;   · Seek Immediate Medical Attention if: Your blood sugar is higher than 400 ;  Status:Complete;   Done: 14VLO0455   Ordered; For:Type 2 diabetes mellitus with hyperglycemia; Ordered By:Laureano Hawkins;   · Seek Immediate Medical Attention if: Your eyesight becomes blurry or you have difficulty  seeing ; Status:Complete;   Done: 87EXY2023   Ordered; For:Type 2 diabetes mellitus with hyperglycemia; Ordered By:Laureano Hawkins; Chief Complaint  Chief Complaint Free Text Note Form: DARA  PT WAS ADMITTED TO [facility] 7/15/16 AND D/C 7/18/16 FOR UNCONTROLLED DM      History of Present Illness  [facility] Communication [facility]: The patient is being contacted for follow-up after hospitalization and PT CONTACTED FOR DARA  She was hospitalized at [facility]  The date of admission: 7/15/16, date of discharge: 7/18/16  Diagnosis: UNCONTROLLED DM  She was discharged to home  Communication performed and completed by   HPI: Please see previous note  She was recently admitted to the hospital with a COPD exacerbation  She was started on a Prednisone taper  Her BS went into the 700's, despite being on insulin  She was thirsty but otherwise asymptomatic  She was sent to the ER for blood sugar control  Her COPD is quiet  She has no cough, SOB, or wheezing  Her lipids are at goal on her current regimen  She has no RUQ pain and no nausea  She denies myalgia or weakness  She has CHF  Her weight is stable and she has no PND or orthopnia  She denies MONTILLA  Review of Systems  Complete-Female:   Constitutional: No fever, no chills, feels well, no tiredness, no recent weight gain or weight loss     Eyes: No complaints of eye pain, no red eyes, no eyesight problems, no discharge, no dry eyes, no itching of eyes  ENT: no complaints of earache, no loss of hearing, no nose bleeds, no nasal discharge, no sore throat, no hoarseness  Cardiovascular: No complaints of slow heart rate, no fast heart rate, no chest pain, no palpitations, no leg claudication, no lower extremity edema  Respiratory: No complaints of shortness of breath, no wheezing, no cough, no SOB on exertion, no orthopnea, no PND  Gastrointestinal: No complaints of abdominal pain, no constipation, no nausea or vomiting, no diarrhea, no bloody stools  Genitourinary: No complaints of dysuria, no incontinence, no pelvic pain, no dysmenorrhea, no vaginal discharge or bleeding  Musculoskeletal: No complaints of arthralgias, no myalgias, no joint swelling or stiffness, no limb pain or swelling  Integumentary: No complaints of skin rash or lesions, no itching, no skin wounds, no breast pain or lump  Neurological: No complaints of headache, no confusion, no convulsions, no numbness, no dizziness or fainting, no tingling, no limb weakness, no difficulty walking  Psychiatric: Not suicidal, no sleep disturbance, no anxiety or depression, no change in personality, no emotional problems  Endocrine: No complaints of proptosis, no hot flashes, no muscle weakness, no deepening of the voice, no feelings of weakness  Hematologic/Lymphatic: No complaints of swollen glands, no swollen glands in the neck, does not bleed easily, does not bruise easily  Active Problems    1  2-vessel coronary artery disease (414 00) (I25 10)   2  Acute lower UTI (599 0) (N39 0)   3  Allergic rhinitis (477 9) (J30 9)   4  Anemia (285 9) (D64 9)   5  Anxiety (300 00) (F41 9)   6  Benign essential hypertension (401 1) (I10)   7  Benign paroxysmal vertigo, unspecified laterality (386 11) (H81 10)   8  Carotid bruit (785 9) (R09 89)   9   Chronic obstructive pulmonary disease (496) (J44 9)   10  Congestive heart failure (428 0) (I50 9)   11  Constipation (564 00) (K59 00)   12  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)   13  CRD (chronic renal disease) (585 9) (N18 9)   14  Depression (311) (F32 9)   15  Diarrhea (787 91) (R19 7)   16  Encounter for screening for other nervous system disorder (V80 09) (Z13 858)   17  Encounter for screening mammogram for malignant neoplasm of breast (V76 12)    (Z12 31)   18  Encounter for special screening examination for genitourinary disorder (V81 6) (Z13 89)   19  Esophageal reflux (530 81) (K21 9)   20  Gastric ulcer (531 90) (K25 9)   21  Hiatal hernia (553 3) (K44 9)   22  Hyperlipidemia (272 4) (E78 5)   23  Iron deficiency anemia due to chronic blood loss (280 0) (D50 0)   24  Low back pain (724 2) (M54 5)   25  Lower back pain (724 2) (M54 5)   26  Screening for malignant neoplasm of cervix (V76 2) (Z12 4)   27  Screening for osteoporosis (V82 81) (Z13 820)   28  Skin rash (782 1) (R21)   29  Thrombocytopenia (287 5) (D69 6)   30  Trigger finger (727 03) (M65 30)   31  Type 2 diabetes mellitus with hyperglycemia (250 00) (E11 65)   32  Visit for pre-operative examination (V72 84) (O21 533)    Past Medical History    1  History of Encounter for screening mammogram for malignant neoplasm of breast   (V76 12) (Z12 31)   2  History of acute sinusitis (V12 69) (Z87 09)   3  History of Myalgia And Myositis (729 1)   4  History of Trigger Finger Of The Thumb (727 03)    Surgical History    1  History of Appendectomy   2  History of CABG   3  History of Cholecystectomy   4  History of Complete Colonoscopy   5  History of Diagnostic Esophagogastroduodenoscopy   6  History of Hernia Repair   7  History of Pacemaker Permanent Placement   8  History of Total Abdominal Hysterectomy With Removal Of Both Ovaries    Family History  Mother    1  Family history of Black lung disease   2  Family history of malignant neoplasm of breast (V16 3) (Z80 3)   3   Family history unobtainable (V49 89) (Z78 9)   4  Family history of Family history unobtainable due to orphan status (V49 89) (Z78 9)  Father    5  Family history of Black lung disease   6  Family history unobtainable (V49 89) (Z78 9)   7  Family history of Family history unobtainable due to orphan status (V49 89) (Z78 9)  Son    6  Family history of Living and Healthy  Maternal Grandmother    5  Family history unobtainable (V49 89) (Z78 9)   10  Family history of Family history unobtainable due to orphan status (V49 89) (Z78 9)  Paternal Grandmother    6  Family history unobtainable (V49 89) (Z78 9)   12  Family history of Family history unobtainable due to orphan status (V49 89) (Z78 9)  Maternal Grandfather    15  Family history unobtainable (V49 89) (Z78 9)   14  Family history of Family history unobtainable due to orphan status (V49 89) (Z78 9)  Paternal Grandfather    13  Family history of Family history unobtainable due to orphan status (V49 89) (Z78 9)    Social History    · Former smoker (V15 82) (H05 362)   · Never Drank Alcohol   · Tobacco use (305 1) (Z72 0)    Current Meds   1  Acetaminophen-Codeine #3 300-30 MG Oral Tablet; TAKE 1 TABLET 3 TIMES DAILY AS   NEEDED FOR PAIN;   Therapy: 67BDS2221 to (Evaluate:54Mqm8250) Recorded   2  Advair Diskus 250-50 MCG/DOSE Inhalation Aerosol Powder Breath Activated; INHALE 1   PUFF EVERY 12 HOURS; Therapy: 12SIB8588 to (Last Rx:01Apr2016)  Requested for: 01Apr2016 Ordered   3  Aspirin EC 81 MG Oral Tablet Delayed Release; Therapy: (IIRAJEBB:12DTJ8668) to Recorded   4  BD Insulin Syr Ultrafine II 31G X 5/16" 0 5 ML Miscellaneous; use as directed; Therapy: 41PNQ2509 to (Evaluate:15Nzs0731)  Requested for: 77FJN5022; Last   Rx:22Jan2016 Ordered   5  BD Pen Needle Heide U/F 32G X 4 MM Miscellaneous; INJECT DAILY AS DIRECTED; Therapy: 74CSR2431 to (Evaluate:20Rvv0687)  Requested for: 57EIB1519; Last   Rx:11Mar2016 Ordered   6   Carvedilol 25 MG Oral Tablet; TAKE 1 TABLET THREE TIMES A DAY WITH MORNING AND   EVENING MEAL; Therapy: 38FAE2746 to (Evaluate:07Sep2016)  Requested for: 86JWM8386; Last   Rx:39Chk1804 Ordered   7  Clarinex 5 MG Oral Tablet; Therapy: (LVCXGVWR:39HVC0122) to Recorded   8  Clopidogrel Bisulfate 75 MG Oral Tablet; TAKE 1 TABLET DAILY; Therapy: 33NPI8211 to (Evaluate:27Mar2017)  Requested for: 01Apr2016; Last   Rx:01Apr2016 Ordered   9  Crestor 10 MG Oral Tablet; TAKE 1 TABLET AT BEDTIME; Therapy: 31IZA3622 to ([facility])  Requested for: 01Apr2016; Last   Rx:01Apr2016 Ordered   10  Doxycycline Hyclate 100 MG Oral Tablet; TAKE 1 TABLET TWICE DAILY; Therapy: 41CXQ5346 to (Evaluate:35Vbk6688)  Requested for: 97RLD5290; Last    Rx:29Yeg2113 Ordered   11  Ferrous Fumarate 324 MG TABS; TAKE 1 TABLET DAILY AS DIRECTED; Therapy: 12XIO6658 to (Evaluate:27Mar2017)  Requested for: 01Apr2016; Last    Rx:01Apr2016 Ordered   12  Fluticasone Propionate 50 MCG/ACT Nasal Suspension; USE 2 SPRAYS IN EACH    NOSTRIL ONCE DAILY; Therapy: 08TCR6926 to (Last Rx:01Apr2016)  Requested for: 01Apr2016 Ordered   13  Furosemide 20 MG Oral Tablet; TAKE 1 TABLET DAILY AS DIRECTED; Therapy: 18NVW8953 to (Bishop Manzano)  Requested for: 01Apr2016; Last    Rx:01Apr2016 Ordered   14  Furosemide 40 MG Oral Tablet; TAKE 1 TABLET DAILY; Therapy: 69GXM1605 to (Evaluate:09Jun2016)  Requested for: 81ZOL5643 Recorded   15  Ipratropium-Albuterol 0 5-2 5 (3) MG/3ML Inhalation Solution; USE 1 UNIT DOSE IN    NEBULIZER EVERY 4 HOURS AS NEEDED; Therapy: 56Mmc9001 to (Last Rx:05Jan2015)  Requested for: 65QTK4961 Ordered   16  Lantus SoloStar 100 UNIT/ML Subcutaneous Solution Pen-injector; INJECT    SUBCUTANEOUSLY AS DIRECTED; Therapy: 10XSM3454 to (Evaluate:39Boo4786)  Requested for: 01Apr2016; Last    Rx:01Apr2016 Ordered   17  Lisinopril 10 MG Oral Tablet; TAKE 1 TABLET DAILY AS DIRECTED;     Therapy: 68GOP1647 to (B[facility] Manzano)  Requested for: 01Apr2016; Last Rx:01Apr2016 Ordered   18  Metoclopramide HCl - 10 MG Oral Tablet; TAKE 1 TABLET 3 times a day; Therapy: 93ZED0400 to (Evaluate:56Jlo0148)  Requested for: 01Apr2016; Last    Rx:01Apr2016 Ordered   19  Minitran 0 2 MG/HR Transdermal Patch 24 Hour; APPLY PATCH FOR 12 TO 14 HOURS    DAILY, THEN REMOVE;    Therapy: 47YES5495 to (Evaluate:27Mar2017)  Requested for: 01Apr2016; Last    Rx:01Apr2016 Ordered   20  Nitrostat 0 4 MG Sublingual Tablet Sublingual; PLACE 1 TABLET UNDER THE TONGUE    EVERY 5 MINUTES FOR UP TO 3 DOSES AS NEEDED FOR CHEST PAIN  CALL    911 IF PAIN PERSISTS; Therapy: 68QNU0666 to ([place])  Requested for: 42TYB6898; Last    Rx:10May2016 Ordered   21  NovoLIN R 100 UNIT/ML Injection Solution; FOLLOW SLIDING SCALE:    BLOOD SUGAR 200-250, USE 2 UNITS    BLOOD SUGAR 251-300, USE 4 UNITS    BLOOD SUGAR 301-350, USE 6 UNITS; Therapy: 29Yuz2102 to (Last Rx:22Jul2016)  Requested for: 25Dou7076 Ordered   22  Pantoprazole Sodium 40 MG Oral Tablet Delayed Release; TAKE 1 TABLET DAILY; Therapy: 75PPS5820 to (Cheri King)  Requested for: 01Apr2016; Last    Rx:01Apr2016 Ordered   23  Polyethylene Glycol 3350 Oral Powder; MIX 1 CAPFUL (17GM) IN 8 OUNCES OF WATER,    JUICE, OR TEA AND DRINK DAILY; Therapy: 11EYX5128 to (Evaluate:10Sep2016)  Requested for: 08OGJ9433; Last    Rx:14Mar2016 Ordered   24  Sertraline HCl - 25 MG Oral Tablet; TAKE 1 TABLET DAILY AS DIRECTED; Therapy: 77OSV8020 to (Evaluate:27Mar2017)  Requested for: 01Apr2016; Last    Rx:01Apr2016 Ordered   25  Triamcinolone Acetonide 0 025 % External Cream; APPLY 2-3 TIMES DAILY TO    AFFECTED AREA(S); Therapy: 91NNU4653 to (Last Rx:44Zvg5207)  Requested for: 83BZL3286 Ordered   26  Valium 5 MG Oral Tablet; Therapy: (QEFHIVZB:23QGO5096) to Recorded    Allergies    1   No Known Drug Allergies    Vitals  Signs   Recorded: 17WHM4797 84:76IQ   Systolic: 544  Diastolic: 72  Heart Rate: 81  Respiration: 12  O2 Saturation: 93  Weight Unobtainable: Yes    Physical Exam    Constitutional   General appearance: No acute distress, well appearing and well nourished  Pulmonary   Respiratory effort: No increased work of breathing or signs of respiratory distress  Auscultation of lungs: Clear to auscultation  Cardiovascular   Auscultation of heart: Normal rate and rhythm, normal S1 and S2, without murmurs  Examination of extremities for edema and/or varicosities: Normal     Abdomen   Abdomen: Non-tender, no masses  Liver and spleen: No hepatomegaly or splenomegaly           Future Appointments    Date/Time Provider Specialty Site   08/09/2016 10:00 AM Sherlyn Flores MD [address]   Electronically signed by : Silvano Melgar MD; Jul 31 2016 10:02PM EST                       (Author)

## 2018-01-14 NOTE — MISCELLANEOUS
History of Present Illness  TCM Communication [facility]: The patient is being contacted for follow-up after hospitalization and PATIENT WAS HOSPITALIZED AT [address] [place] [address] ON FEB 2, 2016  NELLY, THE CARE COORDINATOR CALLED TO SET UP A FOLLOW UP APPOINTMENT  She was hospitalized [facility]  The dates of hospitalization: 2-2-16, date of discharge: 2-4-16  Diagnosis: UNSTABLE ANGINA  Communication performed and completed by      Active Problems    1  2-vessel coronary artery disease (414 00) (I25 10)   2  Acute lower UTI (599 0) (N39 0)   3  Allergic rhinitis (477 9) (J30 9)   4  Anemia (285 9) (D64 9)   5  Anxiety (300 00) (F41 9)   6  Benign essential hypertension (401 1) (I10)   7  Benign paroxysmal vertigo, unspecified laterality (386 11) (H81 10)   8  Carotid bruit (785 9) (R09 89)   9  Chronic obstructive pulmonary disease (496) (J44 9)   10  Congestive heart failure (428 0) (I50 9)   11  Constipation (564 00) (K59 00)   12  CRD (chronic renal disease) (585 9) (N18 9)   13  Depression (311) (F32 9)   14  Diarrhea (787 91) (R19 7)   15  Emphysema (492 8) (J43 9)   16  Encounter for screening for other nervous system disorder (V80 09) (Z13 858)   17  Encounter for screening mammogram for malignant neoplasm of breast (V76 12)    (Z12 31)   18  Encounter for special screening examination for genitourinary disorder (V81 6) (Z13 89)   19  Esophageal reflux (530 81) (K21 9)   20  Gastric ulcer (531 90) (K25 9)   21  Hiatal hernia (553 3) (K44 9)   22  Hyperlipidemia (272 4) (E78 5)   23  Iron deficiency anemia due to chronic blood loss (280 0) (D50 0)   24  Low back pain (724 2) (M54 5)   25  Lower back pain (724 2) (M54 5)   26  Screening for malignant neoplasm of cervix (V76 2) (Z12 4)   27  Screening for osteoporosis (V82 81) (Z13 820)   28  Skin rash (782 1) (R21)   29  Trigger finger (727 03) (M65 30)   30  Type 2 diabetes mellitus with hyperglycemia (250 00) (E11 65)   31   Visit for pre-operative examination (V72 84) (J59 976)    Past Medical History    1  History of Encounter for screening mammogram for malignant neoplasm of breast   (V76 12) (Z12 31)   2  History of acute sinusitis (V12 69) (Z87 09)   3  History of Myalgia And Myositis (729 1)   4  History of Trigger Finger Of The Thumb (727 03)    Surgical History    1  History of Appendectomy   2  History of CABG   3  History of Cholecystectomy   4  History of Complete Colonoscopy   5  History of Diagnostic Esophagogastroduodenoscopy   6  History of Hernia Repair   7  History of Pacemaker Permanent Placement   8  History of Total Abdominal Hysterectomy With Removal Of Both Ovaries    Family History    1  Family history of Black lung disease   2  Family history of malignant neoplasm of breast (V16 3) (Z80 3)   3  Family history unobtainable (V49 89) (Z78 9)   4  Family history of Family history unobtainable due to orphan status (V49 89) (Z78 9)    5  Family history of Black lung disease   6  Family history unobtainable (V49 89) (Z78 9)   7  Family history of Family history unobtainable due to orphan status (V49 89) (Z78 9)    8  Family history of Living and Healthy    9  Family history unobtainable (V49 89) (Z78 9)   10  Family history of Family history unobtainable due to orphan status (V49 89) (Z78 9)    11  Family history unobtainable (V49 89) (Z78 9)   12  Family history of Family history unobtainable due to orphan status (V49 89) (Z78 9)    13  Family history unobtainable (V49 89) (Z78 9)   14  Family history of Family history unobtainable due to orphan status (V49 89) (Z78 9)    15  Family history of Family history unobtainable due to orphan status (V49 89) (Z78 9)    Social History    · Former smoker (V15 82) (U75 283)   · Never Drank Alcohol   · Tobacco use (305 1) (Z72 0)    Current Meds   1  Acetaminophen-Codeine #3 300-30 MG Oral Tablet; TAKE 1 TABLET 3 TIMES DAILY AS   NEEDED FOR PAIN;   Therapy: 29OBT3215 to (Evaluate:98Zxc9687) Recorded   2  Advair Diskus 250-50 MCG/DOSE Inhalation Aerosol Powder Breath Activated; INHALE 1   PUFF EVERY 12 HOURS; Therapy: 25ODC4259 to (Last Rx:17Nei6724)  Requested for: 18ZQQ3094 Ordered   3  Aspirin EC 81 MG Oral Tablet Delayed Release; Therapy: (XSNMZAXL:51OIX1859) to Recorded   4  BD Insulin Syr Ultrafine II 31G X 5/16" 0 5 ML Miscellaneous; use as directed; Therapy: 74PSN5256 to (Evaluate:76Hmk9636)  Requested for: 71HNV7195; Last   Rx:22Jan2016 Ordered   5  BD Pen Needle Heide U/F 32G X 4 MM Miscellaneous; INJECT DAILY AS DIRECTED; Therapy: 74QNU8503 to (Evaluate:11Aug2015)  Requested for: 94SQB2108; Last   Rx:12Feb2015 Ordered   6  Carvedilol 25 MG Oral Tablet; take 1 tablet by mouth twice a day; Therapy: 60NDA9044 to (0481 38 27 75)  Requested for: 93MCH8774; Last   Rx:30Oct2015 Ordered   7  Clarinex 5 MG Oral Tablet; Therapy: (PMVKUHLA:33NFT6491) to Recorded   8  Clopidogrel Bisulfate 75 MG Oral Tablet; TAKE 1 TABLET DAILY; Therapy: 17CUY0027 to (Jimbo Stephens)  Requested for: 14ANX1044; Last   Rx:30Nov2015 Ordered   9  Crestor 10 MG Oral Tablet; TAKE 1 TABLET AT BEDTIME; Therapy: 52BRZ5473 to (0481 38 27 75)  Requested for: 05CQC9640; Last   Rx:30Oct2015 Ordered   10  Ferrous Fumarate 324 MG Oral Tablet; TAKE 1 TABLET DAILY AS DIRECTED; Therapy: 44GWU7813 to (Evaluate:11Aug2015)  Requested for: 77YZT2801; Last    Rx:17Mdc7300 Ordered   11  Fexofenadine HCl - 180 MG Oral Tablet; TAKE 1 TABLET DAILY; Therapy: 37PAM7556 to (Elena Arias)  Requested for: 76WCF5402; Last    Rx:05Jan2015 Ordered   12  Fluticasone Propionate 50 MCG/ACT Nasal Suspension; USE 2 SPRAYS IN EACH    NOSTRIL ONCE DAILY; Therapy: 24AMT2076 to (Last Rx:26May2015)  Requested for: 62EEP6449 Ordered   13  Furosemide 20 MG Oral Tablet; TAKE 1 TABLET DAILY AS DIRECTED; Therapy: 00RCS6541 to (Jimbo Stephens)  Requested for: 56TFQ3281;  Last    Rx:30Nov2015 Ordered   14  Ipratropium-Albuterol 0 5-2 5 (3) MG/3ML Inhalation Solution; USE 1 UNIT DOSE IN    NEBULIZER EVERY 4 HOURS AS NEEDED; Therapy: 02Xzs2399 to (Last Rx:05Jan2015)  Requested for: 90CXD8543 Ordered   15  Lantus SoloStar 100 UNIT/ML Subcutaneous Solution Pen-injector; INJECT    SUBCUTANEOUSLY AS DIRECTED; Therapy: 27GCZ3907 to (Evaluate:10Qtg8544)  Requested for: 68YVC5964; Last    Rx:98Lkj6094 Ordered   16  Lisinopril 10 MG Oral Tablet; TAKE 1 TABLET DAILY AS DIRECTED; Therapy: 62NZB7109 to (Eliodoro Osler)  Requested for: 12IMJ2821; Last    Rx:58Mnk7486 Ordered   17  Metoclopramide HCl - 10 MG Oral Tablet; TAKE 1 TABLET 3 times a day; Therapy: 50MSZ5894 to (Keisha Garcia)  Requested for: 55Rtz5129; Last    Rx:12Nho9731 Ordered   18  Pantoprazole Sodium 40 MG Oral Tablet Delayed Release; TAKE 1 TABLET DAILY; Therapy: 71REE9934 to (Keisha Garcia)  Requested for: 24Ciu8896; Last    Rx:26Dui7718 Ordered   19  Sertraline HCl - 25 MG Oral Tablet; TAKE 1 TABLET DAILY AS DIRECTED; Therapy: 25IJQ7948 to (Evaluate:45Gaa1069)  Requested for: 71LJS5635; Last    Rx:06Pcg9223 Ordered   20  Triamcinolone Acetonide 0 025 % External Cream; APPLY 2-3 TIMES DAILY TO    AFFECTED AREA(S); Therapy: 43MPS0444 to (Last Rx:28Epf4520)  Requested for: 83DBL3897 Ordered   21  Valium 5 MG Oral Tablet; Therapy: (XBGPIAIZ:62ARP1809) to Recorded    Allergies    1   No Known Drug Allergies    Future Appointments    Date/Time Provider Specialty Site   02/11/2016 10:00 AM Jamila Ballesteros MD [facility]   02/19/2016 10:30 AM Jamila Ballesteros MD [address]     Signatures   Electronically signed by : Orlando Hudson MD; Feb 7 2016 10:11PM EST                       (Author)

## 2018-01-16 ENCOUNTER — AMBUL SURGICAL CARE (OUTPATIENT)
Dept: URBAN - NONMETROPOLITAN AREA SURGERY 1 | Facility: SURGERY | Age: 83
Setting detail: OPHTHALMOLOGY
End: 2018-01-16
Payer: COMMERCIAL

## 2018-01-16 DIAGNOSIS — H26.492: ICD-10-CM

## 2018-01-16 PROCEDURE — G8918 PT W/O PREOP ORDER IV AB PRO: HCPCS | Performed by: OPHTHALMOLOGY

## 2018-01-16 PROCEDURE — G8907 PT DOC NO EVENTS ON DISCHARG: HCPCS | Performed by: OPHTHALMOLOGY

## 2018-01-16 PROCEDURE — 66821 AFTER CATARACT LASER SURGERY: CPT | Performed by: OPHTHALMOLOGY

## 2018-01-16 NOTE — PROGRESS NOTES
History of Present Illness  Care Coordination Encounter Information:   Type of Encounter: Telephonic    Spoke to Patient  Care Coordination  Nurse [address] [place] [address] [address]   The reason for call is to discuss outreach for follow up/needed services  Care Coordination Chronic Condition HPI:       The patient is currently asymptomatic  Knowledge Assessment/Teachback Questions: is following diet, foods to limit/avoid, is obtaining daily weights, knows the name of her medications/water pill, understands the activity/exercise plan, knows when to report symptoms and has a plan in place for who to call for worsening symptoms  Counseling was provided to the patient  Topics counseled included diet, activity/exercise and symptoms to report  Care Coordinator Additional Notes: Patient denies pain, sob, or edema  Weigh today is 144 pounds  Blood glucose was 120 fasting  Patient receives Meals on Wheels, Monday - Friday  Active Problems    1  2-vessel coronary artery disease (414 00) (I25 10)   2  Acute lower UTI (599 0) (N39 0)   3  Allergic rhinitis (477 9) (J30 9)   4  Anemia (285 9) (D64 9)   5  Anxiety (300 00) (F41 9)   6  Benign essential hypertension (401 1) (I10)   7  Benign paroxysmal vertigo, unspecified laterality (386 11) (H81 10)   8  Carotid bruit (785 9) (R09 89)   9  Chronic obstructive pulmonary disease (496) (J44 9)   10  Congestive heart failure (428 0) (I50 9)   11  Constipation (564 00) (K59 00)   12  CRD (chronic renal disease) (585 9) (N18 9)   13  Depression (311) (F32 9)   14  Diarrhea (787 91) (R19 7)   15  Emphysema (492 8) (J43 9)   16  Encounter for screening for other nervous system disorder (V80 09) (Z13 858)   17  Encounter for screening mammogram for malignant neoplasm of breast (V76 12)    (Z12 31)   18  Encounter for special screening examination for genitourinary disorder (V81 6) (Z13 89)   19  Esophageal reflux (530 81) (K21 9)   20  Gastric ulcer (531 90) (K25 9)   21   Hiatal hernia (553  3) (K44 9)   22  Hyperlipidemia (272 4) (E78 5)   23  Iron deficiency anemia due to chronic blood loss (280 0) (D50 0)   24  Low back pain (724 2) (M54 5)   25  Lower back pain (724 2) (M54 5)   26  Screening for malignant neoplasm of cervix (V76 2) (Z12 4)   27  Screening for osteoporosis (V82 81) (Z13 820)   28  Skin rash (782 1) (R21)   29  Trigger finger (727 03) (M65 30)   30  Type 2 diabetes mellitus with hyperglycemia (250 00) (E11 65)   31  Visit for pre-operative examination (V72 84) (C71 272)    Past Medical History    1  History of Encounter for screening mammogram for malignant neoplasm of breast   (V76 12) (Z12 31)   2  History of acute sinusitis (V12 69) (Z87 09)   3  History of Myalgia And Myositis (729 1)   4  History of Trigger Finger Of The Thumb (727 03)    Surgical History    1  History of Appendectomy   2  History of CABG   3  History of Cholecystectomy   4  History of Complete Colonoscopy   5  History of Diagnostic Esophagogastroduodenoscopy   6  History of Hernia Repair   7  History of Pacemaker Permanent Placement   8  History of Total Abdominal Hysterectomy With Removal Of Both Ovaries    Family History  Mother    1  Family history of Black lung disease   2  Family history of malignant neoplasm of breast (V16 3) (Z80 3)   3  Family history unobtainable (V49 89) (Z78 9)   4  Family history of Family history unobtainable due to orphan status (V49 89) (Z78 9)  Father    5  Family history of Black lung disease   6  Family history unobtainable (V49 89) (Z78 9)   7  Family history of Family history unobtainable due to orphan status (V49 89) (Z78 9)  Son    6  Family history of Living and Healthy  Maternal Grandmother    5  Family history unobtainable (V49 89) (Z78 9)   10  Family history of Family history unobtainable due to orphan status (V49 89) (Z78 9)  Paternal Grandmother    6  Family history unobtainable (V49 89) (Z78 9)   12   Family history of Family history unobtainable due to orphan status (V49 89) (Z78 9)  Maternal Grandfather    13  Family history unobtainable (V49 89) (Z78 9)   14  Family history of Family history unobtainable due to orphan status (V49 89) (Z78 9)  Paternal Grandfather    13  Family history of Family history unobtainable due to orphan status (V49 89) (Z78 9)    Social History    · Former smoker (V15 82) (N72 827)   · Never Drank Alcohol   · Tobacco use (305 1) (Z72 0)    Current Meds    1  Carvedilol 25 MG Oral Tablet (Coreg); take 1 tablet by mouth twice a day; Therapy: 99HJP0664 to (Evaluate:49Ust6374)  Requested for: 01Apr2016; Last   Rx:01Apr2016 Ordered   2  Clopidogrel Bisulfate 75 MG Oral Tablet (Plavix); TAKE 1 TABLET DAILY; Therapy: 92IJE3004 to (Cydne Sabins)  Requested for: 01Apr2016; Last   Rx:01Apr2016 Ordered   3  Crestor 10 MG Oral Tablet; TAKE 1 TABLET AT BEDTIME; Therapy: 01NFQ2423 to (Evaluate:27Mar2017)  Requested for: 01Apr2016; Last   Rx:01Apr2016 Ordered   4  Furosemide 20 MG Oral Tablet; TAKE 1 TABLET DAILY AS DIRECTED; Therapy: 80AWQ2572 to (Evaluate:17Mar2017)  Requested for: 63LIA3220; Last   Rx:22Mar2016 Ordered   5  Minitran 0 2 MG/HR Transdermal Patch 24 Hour; APPLY PATCH FOR 12 TO 14 HOURS   DAILY, THEN REMOVE;   Therapy: 88VFI2399 to (Evaluate:27Mar2017)  Requested for: 01Apr2016; Last   Rx:01Apr2016 Ordered    6  Triamcinolone Acetonide 0 025 % External Cream; APPLY 2-3 TIMES DAILY TO   AFFECTED AREA(S); Therapy: 23EFZ6301 to (Last Rx:78Uzf2278)  Requested for: 61OTJ2139 Ordered    7  Fluticasone Propionate 50 MCG/ACT Nasal Suspension; USE 2 SPRAYS IN EACH   NOSTRIL ONCE DAILY; Therapy: 07TGN2122 to (Last Rx:01Apr2016)  Requested for: 01Apr2016 Ordered    8  Ferrous Fumarate 324 MG TABS; TAKE 1 TABLET DAILY AS DIRECTED; Therapy: 38RTV8581 to (Evaluate:27Mar2017)  Requested for: 01Apr2016; Last   Rx:01Apr2016 Ordered    9   Advair Diskus 250-50 MCG/DOSE Inhalation Aerosol Powder Breath Activated; INHALE 1   PUFF EVERY 12 HOURS; Therapy: 49WSY7962 to (Last Rx:01Apr2016)  Requested for: 01Apr2016 Ordered    10  Furosemide 20 MG Oral Tablet; TAKE 1 TABLET DAILY AS DIRECTED; Therapy: 50IUJ8119 to (Altamease Lion)  Requested for: 01Apr2016; Last    Rx:01Apr2016 Ordered    11  Lisinopril 10 MG Oral Tablet; TAKE 1 TABLET DAILY AS DIRECTED; Therapy: 83JTE1730 to (Altamease Lion)  Requested for: 01Apr2016; Last    Rx:01Apr2016 Ordered    12  Polyethylene Glycol 3350 Oral Powder; MIX 1 CAPFUL (17GM) IN 8 OUNCES OF WATER,    JUICE, OR TEA AND DRINK DAILY; Therapy: 84HOS9451 to (Evaluate:41Ubs7708)  Requested for: 13PBM2767; Last    Rx:14Mar2016 Ordered    13  Sertraline HCl - 25 MG Oral Tablet; TAKE 1 TABLET DAILY AS DIRECTED; Therapy: 83QML6606 to (Evaluate:27Mar2017)  Requested for: 01Apr2016; Last    Rx:01Apr2016 Ordered    14  Ipratropium-Albuterol 0 5-2 5 (3) MG/3ML Inhalation Solution; USE 1 UNIT DOSE IN    NEBULIZER EVERY 4 HOURS AS NEEDED; Therapy: 31Keg6253 to (Last Rx:05Jan2015)  Requested for: 59NXJ2289 Ordered    15  Metoclopramide HCl - 10 MG Oral Tablet (Reglan); TAKE 1 TABLET 3 times a day; Therapy: 60TKS5474 to (Evaluate:61Rve6915)  Requested for: 01Apr2016; Last    Rx:01Apr2016 Ordered   16  Pantoprazole Sodium 40 MG Oral Tablet Delayed Release (Protonix); TAKE 1 TABLET    DAILY; Therapy: 22PHW4950 to (Altamease Lion)  Requested for: 01Apr2016; Last    Rx:01Apr2016 Ordered    17  Acetaminophen-Codeine #3 300-30 MG Oral Tablet; TAKE 1 TABLET 3 TIMES DAILY AS    NEEDED FOR PAIN;    Therapy: 66JRQ1532 to (Evaluate:42Rcp4654) Recorded    18  BD Insulin Syr Ultrafine II 31G X 5/16" 0 5 ML Miscellaneous; use as directed; Therapy: 14TPW1066 to (Evaluate:45Zpx1204)  Requested for: 59ZXD4888; Last    Rx:22Jan2016 Ordered   19  BD Pen Needle Heide U/F 32G X 4 MM Miscellaneous; INJECT DAILY AS DIRECTED;     Therapy: 04ZXY9028 to (Evaluate:02Pfn5625)  Requested for: 85AQN1398; Last    Rx:11Mar2016 Ordered   20  Lantus SoloStar 100 UNIT/ML Subcutaneous Solution Pen-injector; INJECT    SUBCUTANEOUSLY AS DIRECTED; Therapy: 91ISB5460 to (Evaluate:95Xlf8405)  Requested for: 01Apr2016; Last    Rx:01Apr2016 Ordered    21  Aspirin EC 81 MG Oral Tablet Delayed Release; Therapy: (KWZXKBMV:51ALL6528) to Recorded   22  Clarinex 5 MG Oral Tablet (Desloratadine); Therapy: (WWPYZECJ:78WUY9715) to Recorded   23  Valium 5 MG Oral Tablet (Diazepam); Therapy: (FZRJGAPT:55EQV1485) to Recorded    Allergies    1  No Known Drug Allergies    End of Encounter Meds    1  Carvedilol 25 MG Oral Tablet (Coreg); take 1 tablet by mouth twice a day; Therapy: 30EAO7173 to (Evaluate:96Bqb5475)  Requested for: 01Apr2016; Last   Rx:01Apr2016 Ordered   2  Clopidogrel Bisulfate 75 MG Oral Tablet (Plavix); TAKE 1 TABLET DAILY; Therapy: 19MMJ1037 to (Blanquita Lozano)  Requested for: 01Apr2016; Last   Rx:01Apr2016 Ordered   3  Crestor 10 MG Oral Tablet; TAKE 1 TABLET AT BEDTIME; Therapy: 01JML0807 to (Evaluate:27Mar2017)  Requested for: 01Apr2016; Last   Rx:01Apr2016 Ordered   4  Furosemide 20 MG Oral Tablet; TAKE 1 TABLET DAILY AS DIRECTED; Therapy: 94SAS2591 to (Evaluate:17Mar2017)  Requested for: 41GQB3438; Last   Rx:22Mar2016 Ordered   5  Minitran 0 2 MG/HR Transdermal Patch 24 Hour; APPLY PATCH FOR 12 TO 14 HOURS   DAILY, THEN REMOVE;   Therapy: 22MTF1181 to (Evaluate:27Mar2017)  Requested for: 01Apr2016; Last   Rx:01Apr2016 Ordered    6  Triamcinolone Acetonide 0 025 % External Cream; APPLY 2-3 TIMES DAILY TO   AFFECTED AREA(S); Therapy: 04SVB6007 to (Last Rx:42Oze1242)  Requested for: 63GMQ0140 Ordered    7  Fluticasone Propionate 50 MCG/ACT Nasal Suspension; USE 2 SPRAYS IN EACH   NOSTRIL ONCE DAILY; Therapy: 55IVZ7189 to (Last Rx:01Apr2016)  Requested for: 01Apr2016 Ordered    8  Ferrous Fumarate 324 MG TABS; TAKE 1 TABLET DAILY AS DIRECTED;    Therapy: 97QGK3235 to (Blanquita Lozano)  Requested for: 01Apr2016; Last   Rx:01Apr2016 Ordered    9  Advair Diskus 250-50 MCG/DOSE Inhalation Aerosol Powder Breath Activated; INHALE 1   PUFF EVERY 12 HOURS; Therapy: 25FDF1414 to (Last Rx:01Apr2016)  Requested for: 01Apr2016 Ordered    10  Furosemide 20 MG Oral Tablet; TAKE 1 TABLET DAILY AS DIRECTED; Therapy: 71RKY1323 to (Kortney Sep)  Requested for: 01Apr2016; Last    Rx:01Apr2016 Ordered    11  Lisinopril 10 MG Oral Tablet; TAKE 1 TABLET DAILY AS DIRECTED; Therapy: 66NAB9198 to (Kortney Sep)  Requested for: 01Apr2016; Last    Rx:01Apr2016 Ordered    12  Polyethylene Glycol 3350 Oral Powder; MIX 1 CAPFUL (17GM) IN 8 OUNCES OF WATER,    JUICE, OR TEA AND DRINK DAILY; Therapy: 34FWJ8194 to (Evaluate:87Tmk9771)  Requested for: 40PLA9424; Last    Rx:14Mar2016 Ordered    13  Sertraline HCl - 25 MG Oral Tablet; TAKE 1 TABLET DAILY AS DIRECTED; Therapy: 17RPG4235 to (Evaluate:27Mar2017)  Requested for: 01Apr2016; Last    Rx:01Apr2016 Ordered    14  Ipratropium-Albuterol 0 5-2 5 (3) MG/3ML Inhalation Solution; USE 1 UNIT DOSE IN    NEBULIZER EVERY 4 HOURS AS NEEDED; Therapy: 73Csr2531 to (Last Rx:05Jan2015)  Requested for: 08ZLK0319 Ordered    15  Metoclopramide HCl - 10 MG Oral Tablet (Reglan); TAKE 1 TABLET 3 times a day; Therapy: 88VUE6415 to (Evaluate:83Puo7466)  Requested for: 01Apr2016; Last    Rx:01Apr2016 Ordered   16  Pantoprazole Sodium 40 MG Oral Tablet Delayed Release (Protonix); TAKE 1 TABLET    DAILY; Therapy: 65IQY7874 to (Kortney Sep)  Requested for: 01Apr2016; Last    Rx:01Apr2016 Ordered    17  Acetaminophen-Codeine #3 300-30 MG Oral Tablet; TAKE 1 TABLET 3 TIMES DAILY AS    NEEDED FOR PAIN;    Therapy: 51CGK4568 to (Evaluate:17Fpy2738) Recorded    18  BD Insulin Syr Ultrafine II 31G X 5/16" 0 5 ML Miscellaneous; use as directed; Therapy: 55JYD5040 to (Evaluate:70Yiw3406)  Requested for: 76MGE8165; Last    Rx:22Jan2016 Ordered   19   BD Pen Needle Heide U/F 32G X 4 MM Miscellaneous; INJECT DAILY AS DIRECTED; Therapy: 88BSS9723 to (Evaluate:83Cir7773)  Requested for: 07FAT1706; Last    Rx:11Mar2016 Ordered   20  Lantus SoloStar 100 UNIT/ML Subcutaneous Solution Pen-injector; INJECT    SUBCUTANEOUSLY AS DIRECTED; Therapy: 67VXG7498 to (Evaluate:60Jas2494)  Requested for: 01Apr2016; Last    Rx:01Apr2016 Ordered    21  Aspirin EC 81 MG Oral Tablet Delayed Release; Therapy: (IIYMWUKQ:10GPM7114) to Recorded   22  Clarinex 5 MG Oral Tablet (Desloratadine); Therapy: (MREXLPZA:77UOI5671) to Recorded   23  Valium 5 MG Oral Tablet (Diazepam);     Therapy: (HAYTPTOK:99MYE6174) to Recorded    Future Appointments    Date/Time Provider Specialty Site   [facility]2016 [facility]     Patient Care Team    Care Team Member Role Specialty Office Number   [id number] [facility], [address] [place] [address]        Signatures   Electronically signed by : Robin Flanagan RN; Apr 28 2016  1:19PM EST                       (Author)

## 2018-01-16 NOTE — PROGRESS NOTES
History of Present Illness  Care Coordination Encounter Information:   Type of Encounter: Telephonic    Spoke to Patient  Care Coordination [facility] Nurse Yoana Logan: Patient called me to say she was doing well  I had met her at her pcp visit on 4/1/16, to discuss CCM  She declined at that time but I gave her my contact information  She is weighing herself daily Today she weight 145lbs  Denies any chest pain, sob, or edema  Fasting glucose was 82 mg/dl today  Denies any s/s of hypoglycemia  Educated her to call me anytime but I would follow-up with her next week  She expressed understanding  No changes to medications  Active Problems    1  2-vessel coronary artery disease (414 00) (I25 10)   2  Acute lower UTI (599 0) (N39 0)   3  Allergic rhinitis (477 9) (J30 9)   4  Anemia (285 9) (D64 9)   5  Anxiety (300 00) (F41 9)   6  Benign essential hypertension (401 1) (I10)   7  Benign paroxysmal vertigo, unspecified laterality (386 11) (H81 10)   8  Carotid bruit (785 9) (R09 89)   9  Chronic obstructive pulmonary disease (496) (J44 9)   10  Congestive heart failure (428 0) (I50 9)   11  Constipation (564 00) (K59 00)   12  CRD (chronic renal disease) (585 9) (N18 9)   13  Depression (311) (F32 9)   14  Diarrhea (787 91) (R19 7)   15  Emphysema (492 8) (J43 9)   16  Encounter for screening for other nervous system disorder (V80 09) (Z13 858)   17  Encounter for screening mammogram for malignant neoplasm of breast (V76 12)    (Z12 31)   18  Encounter for special screening examination for genitourinary disorder (V81 6) (Z13 89)   19  Esophageal reflux (530 81) (K21 9)   20  Gastric ulcer (531 90) (K25 9)   21  Hiatal hernia (553 3) (K44 9)   22  Hyperlipidemia (272 4) (E78 5)   23  Iron deficiency anemia due to chronic blood loss (280 0) (D50 0)   24  Low back pain (724 2) (M54 5)   25  Lower back pain (724 2) (M54 5)   26  Screening for malignant neoplasm of cervix (V76 2) (Z12 4)   27   Screening for osteoporosis (V82 81) (Z13 820)   28  Skin rash (782 1) (R21)   29  Trigger finger (727 03) (M65 30)   30  Type 2 diabetes mellitus with hyperglycemia (250 00) (E11 65)   31  Visit for pre-operative examination (V72 84) (F11 679)    Past Medical History    1  History of Encounter for screening mammogram for malignant neoplasm of breast   (V76 12) (Z12 31)   2  History of acute sinusitis (V12 69) (Z87 09)   3  History of Myalgia And Myositis (729 1)   4  History of Trigger Finger Of The Thumb (727 03)    Surgical History    1  History of Appendectomy   2  History of CABG   3  History of Cholecystectomy   4  History of Complete Colonoscopy   5  History of Diagnostic Esophagogastroduodenoscopy   6  History of Hernia Repair   7  History of Pacemaker Permanent Placement   8  History of Total Abdominal Hysterectomy With Removal Of Both Ovaries    Family History  Mother    1  Family history of Black lung disease   2  Family history of malignant neoplasm of breast (V16 3) (Z80 3)   3  Family history unobtainable (V49 89) (Z78 9)   4  Family history of Family history unobtainable due to orphan status (V49 89) (Z78 9)  Father    5  Family history of Black lung disease   6  Family history unobtainable (V49 89) (Z78 9)   7  Family history of Family history unobtainable due to orphan status (V49 89) (Z78 9)  Son    6  Family history of Living and Healthy  Maternal Grandmother    5  Family history unobtainable (V49 89) (Z78 9)   10  Family history of Family history unobtainable due to orphan status (V49 89) (Z78 9)  Paternal Grandmother    6  Family history unobtainable (V49 89) (Z78 9)   12  Family history of Family history unobtainable due to orphan status (V49 89) (Z78 9)  Maternal Grandfather    15  Family history unobtainable (V49 89) (Z78 9)   14  Family history of Family history unobtainable due to orphan status (V49 89) (Z78 9)  Paternal Grandfather    13   Family history of Family history unobtainable due to orphan status (V49 89) (Z78 9)    Social History    · Former smoker (V15 82) (A85 678)   · Never Drank Alcohol   · Tobacco use (305 1) (Z72 0)    Current Meds    1  Carvedilol 25 MG Oral Tablet (Coreg); take 1 tablet by mouth twice a day; Therapy: 37RNX4523 to (Evaluate:21Xqw0634)  Requested for: 01Apr2016; Last   Rx:01Apr2016 Ordered   2  Clopidogrel Bisulfate 75 MG Oral Tablet (Plavix); TAKE 1 TABLET DAILY; Therapy: 58WJV5263 to (Ana [place])  Requested for: 01Apr2016; Last   Rx:01Apr2016 Ordered   3  Crestor 10 MG Oral Tablet; TAKE 1 TABLET AT BEDTIME; Therapy: 58YDC7711 to (Evaluate:27Mar2017)  Requested for: 01Apr2016; Last   Rx:01Apr2016 Ordered   4  Furosemide 20 MG Oral Tablet; TAKE 1 TABLET DAILY AS DIRECTED; Therapy: 46MCY3866 to (Evaluate:17Mar2017)  Requested for: 70YNY8976; Last   Rx:22Mar2016 Ordered   5  Minitran 0 2 MG/HR Transdermal Patch 24 Hour; APPLY PATCH FOR 12 TO 14 HOURS   DAILY, THEN REMOVE;   Therapy: 18HHL6815 to (Evaluate:27Mar2017)  Requested for: 01Apr2016; Last   Rx:01Apr2016 Ordered    6  Triamcinolone Acetonide 0 025 % External Cream; APPLY 2-3 TIMES DAILY TO   AFFECTED AREA(S); Therapy: 73IRP7389 to (Last Rx:39Ynn1928)  Requested for: 84CLE7961 Ordered    7  Fluticasone Propionate 50 MCG/ACT Nasal Suspension; USE 2 SPRAYS IN EACH   NOSTRIL ONCE DAILY; Therapy: 96OYE9344 to (Last Rx:01Apr2016)  Requested for: 01Apr2016 Ordered    8  Ferrous Fumarate 324 MG TABS; TAKE 1 TABLET DAILY AS DIRECTED; Therapy: 03BIZ4656 to (Evaluate:27Mar2017)  Requested for: 01Apr2016; Last   Rx:01Apr2016 Ordered    9  Advair Diskus 250-50 MCG/DOSE Inhalation Aerosol Powder Breath Activated; INHALE 1   PUFF EVERY 12 HOURS; Therapy: 78QYX6246 to (Last Rx:01Apr2016)  Requested for: 01Apr2016 Ordered    10  Furosemide 20 MG Oral Tablet; TAKE 1 TABLET DAILY AS DIRECTED; Therapy: 14TOL5685 to (Ana Guerra)  Requested for: 01Apr2016; Last    Rx:01Apr2016 Ordered    11   Lisinopril 10 MG Oral Tablet; TAKE 1 TABLET DAILY AS DIRECTED; Therapy: 03EYW7722 to (Cydne Sabins)  Requested for: 01Apr2016; Last    Rx:01Apr2016 Ordered    12  Polyethylene Glycol 3350 Oral Powder; MIX 1 CAPFUL (17GM) IN 8 OUNCES OF WATER,    JUICE, OR TEA AND DRINK DAILY; Therapy: 25MHN4480 to (Evaluate:90Qeh0597)  Requested for: 72RCJ7934; Last    Rx:14Mar2016 Ordered    13  Sertraline HCl - 25 MG Oral Tablet; TAKE 1 TABLET DAILY AS DIRECTED; Therapy: 50PVM2526 to (Evaluate:27Mar2017)  Requested for: 01Apr2016; Last    Rx:01Apr2016 Ordered    14  Ipratropium-Albuterol 0 5-2 5 (3) MG/3ML Inhalation Solution; USE 1 UNIT DOSE IN    NEBULIZER EVERY 4 HOURS AS NEEDED; Therapy: 16Wua1343 to (Last Rx:05Jan2015)  Requested for: 42RTY5484 Ordered    15  Metoclopramide HCl - 10 MG Oral Tablet (Reglan); TAKE 1 TABLET 3 times a day; Therapy: 62KAZ5445 to (Evaluate:98Uoz9074)  Requested for: 01Apr2016; Last    Rx:01Apr2016 Ordered   16  Pantoprazole Sodium 40 MG Oral Tablet Delayed Release (Protonix); TAKE 1 TABLET    DAILY; Therapy: 21LGL4127 to (Cydne Sabins)  Requested for: 01Apr2016; Last    Rx:01Apr2016 Ordered    17  Acetaminophen-Codeine #3 300-30 MG Oral Tablet; TAKE 1 TABLET 3 TIMES DAILY AS    NEEDED FOR PAIN;    Therapy: 58QRR8985 to (Evaluate:92Ydk3329) Recorded    18  BD Insulin Syr Ultrafine II 31G X 5/16" 0 5 ML Miscellaneous; use as directed; Therapy: 58WYX8637 to (Evaluate:78Ogg0958)  Requested for: 05HNN5149; Last    Rx:22Jan2016 Ordered   19  BD Pen Needle Heide U/F 32G X 4 MM Miscellaneous; INJECT DAILY AS DIRECTED; Therapy: 82KFQ3512 to (Evaluate:62Soz0355)  Requested for: 03FCZ5099; Last    Rx:11Mar2016 Ordered   20  Lantus SoloStar 100 UNIT/ML Subcutaneous Solution Pen-injector; INJECT    SUBCUTANEOUSLY AS DIRECTED; Therapy: 97PKE1240 to (Evaluate:62Czz5034)  Requested for: 01Apr2016; Last    Rx:01Apr2016 Ordered    21  Aspirin EC 81 MG Oral Tablet Delayed Release;     Therapy: (UGYUPMOJ:27JFL0576) to Recorded   22  Clarinex 5 MG Oral Tablet (Desloratadine); Therapy: (UGGLUATS:89COF0049) to Recorded   23  Valium 5 MG Oral Tablet (Diazepam); Therapy: (IZEYRMEA:96XYS1709) to Recorded    Allergies    1  No Known Drug Allergies    End of Encounter Meds    1  Carvedilol 25 MG Oral Tablet (Coreg); take 1 tablet by mouth twice a day; Therapy: 13BFT8192 to (Evaluate:60Kcb2351)  Requested for: 01Apr2016; Last   Rx:01Apr2016 Ordered   2  Clopidogrel Bisulfate 75 MG Oral Tablet (Plavix); TAKE 1 TABLET DAILY; Therapy: 75NVO0024 to (Rosevelt Spindle)  Requested for: 01Apr2016; Last   Rx:01Apr2016 Ordered   3  Crestor 10 MG Oral Tablet; TAKE 1 TABLET AT BEDTIME; Therapy: 87RUS5147 to (Evaluate:27Mar2017)  Requested for: 01Apr2016; Last   Rx:01Apr2016 Ordered   4  Furosemide 20 MG Oral Tablet; TAKE 1 TABLET DAILY AS DIRECTED; Therapy: 01WRH7003 to (Evaluate:17Mar2017)  Requested for: 39WIU5903; Last   Rx:22Mar2016 Ordered   5  Minitran 0 2 MG/HR Transdermal Patch 24 Hour; APPLY PATCH FOR 12 TO 14 HOURS   DAILY, THEN REMOVE;   Therapy: 28IGU2820 to (Evaluate:27Mar2017)  Requested for: 01Apr2016; Last   Rx:01Apr2016 Ordered    6  Triamcinolone Acetonide 0 025 % External Cream; APPLY 2-3 TIMES DAILY TO   AFFECTED AREA(S); Therapy: 17DAC8022 to (Last Rx:21Iph8914)  Requested for: 75RMV7203 Ordered    7  Fluticasone Propionate 50 MCG/ACT Nasal Suspension; USE 2 SPRAYS IN EACH   NOSTRIL ONCE DAILY; Therapy: 32ZSC2701 to (Last Rx:01Apr2016)  Requested for: 01Apr2016 Ordered    8  Ferrous Fumarate 324 MG TABS; TAKE 1 TABLET DAILY AS DIRECTED; Therapy: 81DBW6234 to (Evaluate:27Mar2017)  Requested for: 01Apr2016; Last   Rx:01Apr2016 Ordered    9  Advair Diskus 250-50 MCG/DOSE Inhalation Aerosol Powder Breath Activated; INHALE 1   PUFF EVERY 12 HOURS; Therapy: 24KGJ1175 to (Last Rx:01Apr2016)  Requested for: 01Apr2016 Ordered    10   Furosemide 20 MG Oral Tablet; TAKE 1 TABLET DAILY AS DIRECTED; Therapy: 39ZOH7158 to (Arthea Angelucci)  Requested for: 01Apr2016; Last    Rx:01Apr2016 Ordered    11  Lisinopril 10 MG Oral Tablet; TAKE 1 TABLET DAILY AS DIRECTED; Therapy: 43QEN3487 to (Arthea Angelucci)  Requested for: 01Apr2016; Last    Rx:01Apr2016 Ordered    12  Polyethylene Glycol 3350 Oral Powder; MIX 1 CAPFUL (17GM) IN 8 OUNCES OF WATER,    JUICE, OR TEA AND DRINK DAILY; Therapy: 18LSH5129 to (Evaluate:98Iyc4938)  Requested for: 56KBX6349; Last    Rx:14Mar2016 Ordered    13  Sertraline HCl - 25 MG Oral Tablet; TAKE 1 TABLET DAILY AS DIRECTED; Therapy: 57GGQ7086 to (Evaluate:27Mar2017)  Requested for: 01Apr2016; Last    Rx:01Apr2016 Ordered    14  Ipratropium-Albuterol 0 5-2 5 (3) MG/3ML Inhalation Solution; USE 1 UNIT DOSE IN    NEBULIZER EVERY 4 HOURS AS NEEDED; Therapy: 20Gsq9110 to (Last Rx:05Jan2015)  Requested for: 43USJ1402 Ordered    15  Metoclopramide HCl - 10 MG Oral Tablet (Reglan); TAKE 1 TABLET 3 times a day; Therapy: 65RPW2405 to (Evaluate:85Zxw0614)  Requested for: 01Apr2016; Last    Rx:01Apr2016 Ordered   16  Pantoprazole Sodium 40 MG Oral Tablet Delayed Release (Protonix); TAKE 1 TABLET    DAILY; Therapy: 59WTR8326 to (Arthea Angelucci)  Requested for: 01Apr2016; Last    Rx:01Apr2016 Ordered    17  Acetaminophen-Codeine #3 300-30 MG Oral Tablet; TAKE 1 TABLET 3 TIMES DAILY AS    NEEDED FOR PAIN;    Therapy: 91ZVJ5058 to (Evaluate:06Nnc2338) Recorded    18  BD Insulin Syr Ultrafine II 31G X 5/16" 0 5 ML Miscellaneous; use as directed; Therapy: 41ISL2297 to (Evaluate:47Swo8147)  Requested for: 64VKG8467; Last    Rx:22Jan2016 Ordered   19  BD Pen Needle Heide U/F 32G X 4 MM Miscellaneous; INJECT DAILY AS DIRECTED; Therapy: 64IID2301 to (Evaluate:74Idt5830)  Requested for: 46ELM7888; Last    Rx:11Mar2016 Ordered   20  Lantus SoloStar 100 UNIT/ML Subcutaneous Solution Pen-injector; INJECT    SUBCUTANEOUSLY AS DIRECTED;     Therapy: 99WDG2857 to

## 2018-01-17 NOTE — PROGRESS NOTES
History of Present Illness  Care Coordination Encounter Information:   Type of Encounter: Telephonic    Spoke to Patient  Care Coordination SL Nurse Melani Mesa:   The reason for call is to discuss outreach for follow up/needed services  Called patient s/p recent hospitalization for CHF  Will outreach next week  Care Coordination Chronic Condition HPI:     with oxygen at 2 LPM    Patient is experiencing the following symptoms: fatigue, cough and shortness of breath with usual activity, but no chest pain and no edema  Knowledge Assessment/Teachback Questions: is following diet, foods to limit/avoid, is obtaining daily weights, knows the name of her medications/water pill, understands the activity/exercise plan, knows when to report symptoms and has a plan in place for who to call for worsening symptoms  Counseling was provided to the patient  Topics counseled included diet, need for daily weights, medications, activity/exercise and symptoms to report  Care Coordinator Additional Notes: Educated patient on importance of daily weights and to call physician immediately for a weight gain of 3 pounds or more over night  Patient wearing oxygen 2 liters at HS  Active Problems    1  2-vessel coronary artery disease (414 00) (I25 10)   2  Acute lower UTI (599 0) (N39 0)   3  Allergic rhinitis (477 9) (J30 9)   4  Anemia (285 9) (D64 9)   5  Anxiety (300 00) (F41 9)   6  Benign essential hypertension (401 1) (I10)   7  Benign paroxysmal vertigo, unspecified laterality (386 11) (H81 10)   8  Carotid bruit (785 9) (R09 89)   9  Chronic obstructive pulmonary disease (496) (J44 9)   10  Congestive heart failure (428 0) (I50 9)   11  Constipation (564 00) (K59 00)   12  CRD (chronic renal disease) (585 9) (N18 9)   13  Depression (311) (F32 9)   14  Diarrhea (787 91) (R19 7)   15  Emphysema (492 8) (J43 9)   16  Encounter for screening for other nervous system disorder (V80 09) (Z13 198)   17   Encounter for screening mammogram for malignant neoplasm of breast (V76 12)    (Z12 31)   18  Encounter for special screening examination for genitourinary disorder (V81 6) (Z13 89)   19  Esophageal reflux (530 81) (K21 9)   20  Gastric ulcer (531 90) (K25 9)   21  Hiatal hernia (553 3) (K44 9)   22  Hyperlipidemia (272 4) (E78 5)   23  Iron deficiency anemia due to chronic blood loss (280 0) (D50 0)   24  Low back pain (724 2) (M54 5)   25  Lower back pain (724 2) (M54 5)   26  Screening for malignant neoplasm of cervix (V76 2) (Z12 4)   27  Screening for osteoporosis (V82 81) (Z13 820)   28  Skin rash (782 1) (R21)   29  Trigger finger (727 03) (M65 30)   30  Type 2 diabetes mellitus with hyperglycemia (250 00) (E11 65)   31  Visit for pre-operative examination (V72 84) (O01 495)    Past Medical History    1  History of Encounter for screening mammogram for malignant neoplasm of breast   (V76 12) (Z12 31)   2  History of acute sinusitis (V12 69) (Z87 09)   3  History of Myalgia And Myositis (729 1)   4  History of Trigger Finger Of The Thumb (727 03)    Surgical History    1  History of Appendectomy   2  History of CABG   3  History of Cholecystectomy   4  History of Complete Colonoscopy   5  History of Diagnostic Esophagogastroduodenoscopy   6  History of Hernia Repair   7  History of Pacemaker Permanent Placement   8  History of Total Abdominal Hysterectomy With Removal Of Both Ovaries    Family History  Mother    1  Family history of Black lung disease   2  Family history of malignant neoplasm of breast (V16 3) (Z80 3)   3  Family history unobtainable (V49 89) (Z78 9)   4  Family history of Family history unobtainable due to orphan status (V49 89) (Z78 9)  Father    5  Family history of Black lung disease   6  Family history unobtainable (V49 89) (Z78 9)   7  Family history of Family history unobtainable due to orphan status (V49 89) (Z78 9)  Son    6  Family history of Living and Healthy  Maternal Grandmother    5   Family history unobtainable (V49 89) (Z78 9)   10  Family history of Family history unobtainable due to orphan status (V49 89) (Z78 9)  Paternal Grandmother    6  Family history unobtainable (V49 89) (Z78 9)   12  Family history of Family history unobtainable due to orphan status (V49 89) (Z78 9)  Maternal Grandfather    15  Family history unobtainable (V49 89) (Z78 9)   14  Family history of Family history unobtainable due to orphan status (V49 89) (Z78 9)  Paternal Grandfather    13  Family history of Family history unobtainable due to orphan status (V49 89) (Z78 9)    Social History    · Former smoker (V15 82) (F75 907)   · Never Drank Alcohol   · Tobacco use (305 1) (Z72 0)    Current Meds    1  Carvedilol 25 MG Oral Tablet; TAKE 1 TABLET THREE TIMES A DAY WITH MORNING AND   EVENING MEAL; Therapy: 10UPI8839 to (Evaluate:07Sep2016)  Requested for: 73PIM3984; Last   Rx:10May2016 Ordered   2  Clopidogrel Bisulfate 75 MG Oral Tablet; TAKE 1 TABLET DAILY; Therapy: 95IMF7080 to (Mis Sanchez)  Requested for: 01Apr2016; Last   Rx:01Apr2016 Ordered   3  Crestor 10 MG Oral Tablet; TAKE 1 TABLET AT BEDTIME; Therapy: 07EMW0869 to (Evaluate:27Mar2017)  Requested for: 01Apr2016; Last   Rx:01Apr2016 Ordered   4  Furosemide 40 MG Oral Tablet; TAKE 1 TABLET DAILY; Therapy: 75TVR6664 to (Evaluate:09Jun2016)  Requested for: 59BRI9613 Recorded   5  Minitran 0 2 MG/HR Transdermal Patch 24 Hour; APPLY PATCH FOR 12 TO 14 HOURS   DAILY, THEN REMOVE;   Therapy: 67LZK5433 to (Evaluate:27Mar2017)  Requested for: 01Apr2016; Last   Rx:01Apr2016 Ordered    6  Triamcinolone Acetonide 0 025 % External Cream; APPLY 2-3 TIMES DAILY TO   AFFECTED AREA(S); Therapy: 43AZZ9352 to (Last Rx:11Far4132)  Requested for: 48PML9092 Ordered    7  Fluticasone Propionate 50 MCG/ACT Nasal Suspension; USE 2 SPRAYS IN EACH   NOSTRIL ONCE DAILY; Therapy: 54KJA1417 to (Last Rx:01Apr2016)  Requested for: 01Apr2016 Ordered    8   Ferrous Fumarate 324 MG TABS; TAKE 1 TABLET DAILY AS DIRECTED; Therapy: 57PIC0884 to (Evaluate:27Mar2017)  Requested for: 01Apr2016; Last   Rx:01Apr2016 Ordered    9  Advair Diskus 250-50 MCG/DOSE Inhalation Aerosol Powder Breath Activated; INHALE 1   PUFF EVERY 12 HOURS; Therapy: 53BRB3798 to (Last Rx:01Apr2016)  Requested for: 01Apr2016 Ordered    10  Furosemide 20 MG Oral Tablet; TAKE 1 TABLET DAILY AS DIRECTED; Therapy: 90RBE8375 to (Filomena Kaufman)  Requested for: 01Apr2016; Last    Rx:01Apr2016 Ordered   11  Nitrostat 0 4 MG Sublingual Tablet Sublingual; PLACE 1 TABLET UNDER THE TONGUE    EVERY 5 MINUTES FOR UP TO 3 DOSES AS NEEDED FOR CHEST PAIN  CALL    911 IF PAIN PERSISTS; Therapy: 45KEM4508 to (Jackeline Ng)  Requested for: 80BFY0862; Last    Rx:76Kdi7305 Ordered    12  Lisinopril 10 MG Oral Tablet; TAKE 1 TABLET DAILY AS DIRECTED; Therapy: 62BKO3143 to (Evaluate:27Mar2017)  Requested for: 01Apr2016; Last    Rx:01Apr2016 Ordered    13  Polyethylene Glycol 3350 Oral Powder; MIX 1 CAPFUL (17GM) IN 8 OUNCES OF WATER,    JUICE, OR TEA AND DRINK DAILY; Therapy: 44UHJ4137 to (Evaluate:11Rum0320)  Requested for: 45CSK0563; Last    Rx:14Mar2016 Ordered    14  Sertraline HCl - 25 MG Oral Tablet; TAKE 1 TABLET DAILY AS DIRECTED; Therapy: 63UTO1545 to (Evaluate:27Mar2017)  Requested for: 01Apr2016; Last    Rx:01Apr2016 Ordered    15  Ipratropium-Albuterol 0 5-2 5 (3) MG/3ML Inhalation Solution; USE 1 UNIT DOSE IN    NEBULIZER EVERY 4 HOURS AS NEEDED; Therapy: 03Zkx6447 to (Last Rx:05Jan2015)  Requested for: 78IBB5634 Ordered    16  Metoclopramide HCl - 10 MG Oral Tablet; TAKE 1 TABLET 3 times a day; Therapy: 23YNA1873 to (Evaluate:91Emo8323)  Requested for: 01Apr2016; Last    Rx:01Apr2016 Ordered   17  Pantoprazole Sodium 40 MG Oral Tablet Delayed Release; TAKE 1 TABLET DAILY; Therapy: 24PYX1136 to (Evaluate:27Mar2017)  Requested for: 01Apr2016; Last    Rx:01Apr2016 Ordered    18   Acetaminophen-Codeine #3 300-30 MG Oral Tablet; TAKE 1 TABLET 3 TIMES DAILY AS    NEEDED FOR PAIN;    Therapy: 49AXO6033 to (Evaluate:44Dpj9943) Recorded    19  BD Insulin Syr Ultrafine II 31G X 5/16" 0 5 ML Miscellaneous; use as directed; Therapy: 30AZQ5152 to (Evaluate:09Bnn7900)  Requested for: 03NJP3058; Last    Rx:22Jan2016 Ordered   20  BD Pen Needle Heide U/F 32G X 4 MM Miscellaneous; INJECT DAILY AS DIRECTED; Therapy: 57BFD4576 to (Evaluate:47Hxp8974)  Requested for: 74FCW3326; Last    Rx:11Mar2016 Ordered   21  Lantus SoloStar 100 UNIT/ML Subcutaneous Solution Pen-injector; INJECT    SUBCUTANEOUSLY AS DIRECTED; Therapy: 44NEL0764 to (Evaluate:45Cus6522)  Requested for: 01Apr2016; Last    Rx:01Apr2016 Ordered    22  Aspirin EC 81 MG Oral Tablet Delayed Release; Therapy: (AHPQCRBU:62CNG7222) to Recorded   23  Clarinex 5 MG Oral Tablet; Therapy: (PPNXQZDF:60KXU3498) to Recorded   24  Valium 5 MG Oral Tablet; Therapy: (WJRDJHLF:72HYX9360) to Recorded    Allergies    1  No Known Drug Allergies    End of Encounter Meds    1  Carvedilol 25 MG Oral Tablet (Coreg); TAKE 1 TABLET THREE TIMES A DAY WITH   MORNING AND EVENING MEAL; Therapy: 39ZRT4945 to (Evaluate:07Sep2016)  Requested for: 13QRZ5391; Last   Rx:79Oel1956 Ordered   2  Clopidogrel Bisulfate 75 MG Oral Tablet (Plavix); TAKE 1 TABLET DAILY; Therapy: 15BSK7953 to (Love Tessa)  Requested for: 01Apr2016; Last   Rx:01Apr2016 Ordered   3  Crestor 10 MG Oral Tablet; TAKE 1 TABLET AT BEDTIME; Therapy: 63MYX7634 to (Evaluate:27Mar2017)  Requested for: 01Apr2016; Last   Rx:01Apr2016 Ordered   4  Furosemide 40 MG Oral Tablet; TAKE 1 TABLET DAILY; Therapy: 13RBB2021 to (Evaluate:09Jun2016)  Requested for: 84RVS1986 Recorded   5  Minitran 0 2 MG/HR Transdermal Patch 24 Hour; APPLY PATCH FOR 12 TO 14 HOURS   DAILY, THEN REMOVE;   Therapy: 51AKM1739 to (Evaluate:27Mar2017)  Requested for: 01Apr2016; Last   Rx:01Apr2016 Ordered    6   Triamcinolone Acetonide 0  025 % External Cream; APPLY 2-3 TIMES DAILY TO   AFFECTED AREA(S); Therapy: 15ZBO9463 to (Last Rx:26May2015)  Requested for: 81LYB1906 Ordered    7  Fluticasone Propionate 50 MCG/ACT Nasal Suspension; USE 2 SPRAYS IN EACH   NOSTRIL ONCE DAILY; Therapy: 50PDP8376 to (Last Rx:01Apr2016)  Requested for: 01Apr2016 Ordered    8  Ferrous Fumarate 324 MG TABS; TAKE 1 TABLET DAILY AS DIRECTED; Therapy: 49FQE7814 to (Evaluate:27Mar2017)  Requested for: 01Apr2016; Last   Rx:01Apr2016 Ordered    9  Advair Diskus 250-50 MCG/DOSE Inhalation Aerosol Powder Breath Activated; INHALE 1   PUFF EVERY 12 HOURS; Therapy: 76UKV6058 to (Last Rx:01Apr2016)  Requested for: 01Apr2016 Ordered    10  Furosemide 20 MG Oral Tablet; TAKE 1 TABLET DAILY AS DIRECTED; Therapy: 36TAB7085 to (72 [phone])  Requested for: 01Apr2016; Last    Rx:01Apr2016 Ordered   11  Nitrostat 0 4 MG Sublingual Tablet Sublingual; PLACE 1 TABLET UNDER THE TONGUE    EVERY 5 MINUTES FOR UP TO 3 DOSES AS NEEDED FOR CHEST PAIN  CALL    911 IF PAIN PERSISTS; Therapy: 20WMD4818 to (Bianka Madrigal)  Requested for: 06DDJ0942; Last    Rx:10May2016 Ordered    12  Lisinopril 10 MG Oral Tablet; TAKE 1 TABLET DAILY AS DIRECTED; Therapy: 26QGJ1704 to (Evaluate:27Mar2017)  Requested for: 01Apr2016; Last    Rx:01Apr2016 Ordered    13  Polyethylene Glycol 3350 Oral Powder; MIX 1 CAPFUL (17GM) IN 8 OUNCES OF WATER,    JUICE, OR TEA AND DRINK DAILY; Therapy: 22AEO4213 to (Evaluate:10Sep2016)  Requested for: 12RKQ3191; Last    Rx:14Mar2016 Ordered    14  Sertraline HCl - 25 MG Oral Tablet; TAKE 1 TABLET DAILY AS DIRECTED; Therapy: 51ILA8698 to (Evaluate:27Mar2017)  Requested for: 01Apr2016; Last    Rx:01Apr2016 Ordered    15  Ipratropium-Albuterol 0 5-2 5 (3) MG/3ML Inhalation Solution; USE 1 UNIT DOSE IN    NEBULIZER EVERY 4 HOURS AS NEEDED; Therapy: 15Apr2013 to (Last Rx:05Jan2015)  Requested for: 86SNG2064 Ordered    16   Metoclopramide HCl - 10 MG Oral Tablet (Reglan); TAKE 1 TABLET 3 times a day; Therapy: 27QYX3715 to (Evaluate:79Fbs2076)  Requested for: 01Apr2016; Last    Rx:01Apr2016 Ordered   17  Pantoprazole Sodium 40 MG Oral Tablet Delayed Release (Protonix); TAKE 1 TABLET    DAILY; Therapy: 55FAD9069 to (Evaluate:27Mar2017)  Requested for: 01Apr2016; Last    Rx:01Apr2016 Ordered    18  Acetaminophen-Codeine #3 300-30 MG Oral Tablet; TAKE 1 TABLET 3 TIMES DAILY AS    NEEDED FOR PAIN;    Therapy: 67DFE0588 to (Evaluate:69Mfc9592) Recorded    19  BD Insulin Syr Ultrafine II 31G X 5/16" 0 5 ML Miscellaneous; use as directed; Therapy: 00YHP6701 to (Evaluate:24Goe7875)  Requested for: 72ROW9829; Last    Rx:22Jan2016 Ordered   20  BD Pen Needle Heide U/F 32G X 4 MM Miscellaneous; INJECT DAILY AS DIRECTED; Therapy: 92EZZ8786 to (Evaluate:32Fau9916)  Requested for: 08QLB1092; Last    Rx:11Mar2016 Ordered   21  Lantus SoloStar 100 UNIT/ML Subcutaneous Solution Pen-injector; INJECT    SUBCUTANEOUSLY AS DIRECTED; Therapy: 04JGD9336 to (Evaluate:35Byj7564)  Requested for: 01Apr2016; Last    Rx:01Apr2016 Ordered    22  Aspirin EC 81 MG Oral Tablet Delayed Release; Therapy: (BLLEDRJX:86GDF4932) to Recorded   23  Clarinex 5 MG Oral Tablet (Desloratadine); Therapy: (KUDGBYPY:71GOU1602) to Recorded   24  Valium 5 MG Oral Tablet (Diazepam);     Therapy: (NWXSXKCE:91CCX4824) to Recorded    Future Appointments    Date/Time Provider Specialty Site   08/09/2016 10:00 AM Karyn Pinto MD [facility]     Patient Care Team    Care Team Member Role Specialty Office Number   [address], [address] [place] [address]        Signatures   Electronically signed by : Jose Lord RN; May 17 2016  3:12PM EST                       (Author)

## 2018-01-18 NOTE — MISCELLANEOUS
Assessment    1  2-vessel coronary artery disease (414 00) (I25 10)   2  Congestive heart failure (428 0) (I50 9)   3  Hyperlipidemia (272 4) (E78 5)   4  Type 2 diabetes mellitus with hyperglycemia (250 00) (E11 65)    Plan  2-vessel coronary artery disease    · Carvedilol 25 MG Oral Tablet (Coreg); TAKE 1 TABLET THREE TIMES A DAY  WITH MORNING AND EVENING MEAL   Rx By: Ted Fair; Dispense: 0 Days ; #:180 Tablet; Refill: 3; For: 2-vessel coronary artery disease; ANDRES = N; Verified Transmission to [facility]; Last Updated By: System, [employer]; 5/10/2016 11:00:28 AM  Congestive heart failure    · Nitrostat 0 4 MG Sublingual Tablet Sublingual; PLACE 1 TABLET UNDER THE  TONGUE EVERY 5 MINUTES FOR UP TO 3 DOSES AS NEEDED FOR CHEST  PAIN  CALL 911 IF PAIN PERSISTS   Rx By: Ted Fair; Dispense: 300 Days ; #:3 X 100 Tablet Sublingual Bottle; Refill: 0; For: Congestive heart failure; ANDRES = N; Verified Transmission to [facility]; Last Updated By: System, [employer]; 5/10/2016 11:15:19 AM   · Begin a limited exercise program ; Status:Complete;   Done: 14AMC9588   Ordered; For:Congestive heart failure; Ordered By:Juan Carlos Hawkins;   · Continue with our present treatment plan ; Status:Complete;   Done: 34KOP8464   Ordered; For:Congestive heart failure; Ordered By:Juan Carlos Hawkins;   · Eat a low fat and low cholesterol diet ; Status:Complete;   Done: 90FBX9340   Ordered; For:Congestive heart failure; Ordered By:Juan Carlos Hawkins;   · Keep a diary of when and what you eat ; Status:Complete;   Done: 39KLD9587   Ordered; For:Congestive heart failure; Ordered By:Juan Carlos Hawkins;   · NSAIDs, avoid; Status:Complete;   Done: 62ZJB6928   Ordered; For:Congestive heart failure; Ordered By:Juan Carlos Hawkins;   · There are many exercise options for seniors ; Status:Complete;   Done: 65EDW6250   Ordered;   For:Congestive heart failure; Ordered By:Juan Carlos Hawkins;   · We recommend that you bring your body mass index down to 26 ; Status:Complete;    Done: 39QGC3421   Ordered; For:Congestive heart failure; Ordered By:Dayanara Hawkins;   · Call [phone] if: The swelling and puffiness in your ankles and feet is not better in  1 week ; Status:Complete;   Done: 71HMA8736   Ordered; For:Congestive heart failure; Ordered By:Dayanara Hawkins;   · Call [phone] if: The swelling and puffiness is getting worse ; Status:Complete;    Done: 31CQA6146   Ordered; For:Congestive heart failure; Ordered By:Dayanara Hawkins;   · Call [phone] if: You are still having difficulty breathing while lying down in 2 days ;  Status:Complete;   Done: 54KCN7693   Ordered; For:Congestive heart failure; Ordered By:Dayanara Hawkins;   · Call [phone] if: You become dizzy or lightheaded, especially when you stand up  after sitting for a while ; Status:Complete;   Done: 39BGJ0911   Ordered; For:Congestive heart failure; Ordered By:Dayanara Hawkins;   · Call [phone] if: You have a dry, hacking cough ; Status:Complete;   Done:  17IIA5621   Ordered; For:Congestive heart failure; Ordered By:Dayanara Hawkins;   · Call [phone] if: You have swelling and puffiness of your lower leg or ankles ;  Status:Complete;   Done: 16QKA8899   Ordered; For:Congestive heart failure; Ordered By:Dayanara Hawkins;   · Call [phone] if: Your breathing is not better in 2 days ; Status:Complete;   Done:  21ICR6479   Ordered; For:Congestive heart failure; Ordered By:Bob Hawkins;   · Call 911 if: You are coughing up blood or pink colored phlegm ; Status:Complete;   Done:  50JQI4869   Ordered; For:Congestive heart failure; Ordered By:Dayanara Hawkins;   · Call 911 if: You experience a new kind of chest pain (angina) or pressure ;  Status:Complete;   Done: 89RBA1707   Ordered; For:Congestive heart failure; Ordered By:Dayanara Hawkins;   · Call 911 if:  You faint or lose consciousness ; Status:Complete;   Done: 93PRB9711   Ordered; For:Congestive heart failure; Ordered By:Fiordaliza Hawkins;   · Call 911 if: You feel short of breath even while resting ; Status:Complete;   Done:  90XZF2167   Ordered; For:Congestive heart failure; Ordered By:Bob Hawkins;   · Call 911 if: You have any symptoms of a stroke ; Status:Complete;   Done: 81RHP5073   Ordered; For:Congestive heart failure; Ordered By:Bbo Hawkins;   · Call 911 if: You have sudden or severe chest pain with shortness of breath, rapid  breathing, or cough ; Status:Complete;   Done: 25DUV7599   Ordered; For:Congestive heart failure; Ordered By:Fiordaliza Hawkins;   · Seek Immediate Medical Attention if: You gain more than 2 pounds in 1 day ;  Status:Complete;   Done: 23FWM1161   Ordered; For:Congestive heart failure; Ordered By:Fiordaliza Hawkins;   · Seek Immediate Medical Attention if: You have difficulty breathing while lying down and  you are comfortable only when sitting up ; Status:Complete;   Done: 89BOU6354   Ordered; For:Congestive heart failure; Ordered By:Fiordaliza Hawkins;   · Seek Immediate Medical Attention if: You or your family members notice any confusion or  difficulty with memory ; Status:Complete;   Done: 57CSG2930   Ordered; For:Congestive heart failure; Ordered By:Fiordaliza Hawkins;   · Seek Immediate Medical Attention if: Your shortness of breath is getting worse ;  Status:Complete;   Done: 69GHJ5175   Ordered; For:Congestive heart failure; Ordered By:Fiordaliza Hawkins; Hyperlipidemia    · Begin a limited exercise program ; Status:Complete;   Done: 88RDP8210   Ordered;  Dilma Mazariegos; Ordered By:Fiordaliza Hawkins;   · Continue with our present treatment plan ; Status:Complete;   Done: 65WZX1991   Ordered;  Dilma Mazariegos; Ordered By:Fiordaliza Hawkins;   · Eat a low fat and low cholesterol diet ; Status:Complete;   Done: 62HGN2962   Ordered;  Dilma Mazariegos; Ordered By:Fiordaliza Hawkins;   · Eat no more than 30 grams of fat per day  ; Status:Complete;   Done: 12NMX8246   Ordered;  Orvan Getting; Ordered By:Vamshi Hawkins;   · Keep a diary of when and what you eat ; Status:Complete;   Done: 75SUM9639   Ordered;  Orvan Getting; Ordered By:Vamshi Hawkins;   · Some eating tips that can help you lose weight ; Status:Complete;   Done: 60CUA9068   Ordered;  Orvan Getting; Ordered By:Vamshi Hawkins;   · There are many exercise options for seniors ; Status:Complete;   Done: 03GIQ9200   Ordered;  Orvan Getting; Ordered By:Vamshi Hawkins;   · We recommend that you bring your body mass index down to 26 ; Status:Complete;    Done: 70QCR3662   Ordered;  Orvan Getting; Ordered By:Vamshi Hawkins;   · Call [phone] if: You have muscle cramps ; Status:Complete;   Done: 38LTT2881   Ordered;  Orvan Getting; Ordered By:Vamshi Hawkins;   · Call [phone] if: You have pain in the stomach area ; Status:Complete;   Done:  09HBJ2773   Ordered;  Orvan Getting; Ordered By:Vamshi Hawkins;   · Call [phone] if: You start vomiting ; Status:Complete;   Done: 90ZAL3650   Ordered;  Orvan Getting; Ordered By:Vamshi Hawkins;   · Call 911 if: You experience a new kind of chest pain (angina) or pressure ;  Status:Complete;   Done: 65SXH7183   Ordered;  Orvan Getting; Ordered By:Vamshi Hawkins;   · Call 911 if: You have any symptoms of a stroke ; Status:Complete;   Done: 21CHI3374   Ordered;  Orvan Getting; Ordered By:Vamshi Hawkins;  Type 2 diabetes mellitus with hyperglycemia    · Begin a limited exercise program ; Status:Complete;   Done: 37OKM9830   Ordered; For:Type 2 diabetes mellitus with hyperglycemia; Ordered By:Vamshi Hawkins;   · Brush your teeth freq1 and floss at least once a day ; Status:Complete;   Done:  79AYE3788   Ordered; For:Type 2 diabetes mellitus with hyperglycemia;  Ordered By:Vamshi Hawkins;   · Continue with our present treatment plan ; Status:Complete;   Done: 89BNF3493 Ordered; For:Type 2 diabetes mellitus with hyperglycemia; Ordered By:Dayanara Hawkins;   · Cut your nails straight across ; Status:Complete;   Done: 68YQP5463   Ordered; For:Type 2 diabetes mellitus with hyperglycemia; Ordered By:Dayanara Hawkins;   · Have your eyes examined by an eye doctor every year ; Status:Complete;   Done:  46KLT7141   Ordered; For:Type 2 diabetes mellitus with hyperglycemia; Ordered By:Dayanara Hawkins;   · If you have symptoms of being hypoglycemic or your blood sugar is less than 60, you  need to eat or drink a source of sugar ; Status:Complete;   Done: 27GKE5717   Ordered; For:Type 2 diabetes mellitus with hyperglycemia; Ordered By:Dayanara Hawkins;   · Inspect your feet and legs daily if you have vascular disease ; Status:Complete;   Done:  56FCH3802   Ordered; For:Type 2 diabetes mellitus with hyperglycemia; Ordered By:Dayanara Hawkins;   · Inspect your feet daily ; Status:Complete;   Done: 94AIV6870   Ordered; For:Type 2 diabetes mellitus with hyperglycemia; Ordered By:Dayanara Hawkins;   · It is important to take good care of your feet if you have diabetes ; Status:Complete;    Done: 68RYJ5546   Ordered; For:Type 2 diabetes mellitus with hyperglycemia; Ordered By:Dayanara Hawkins;   · It is important to take good care of your feet ; Status:Complete;   Done: 88VIK7596   Ordered; For:Type 2 diabetes mellitus with hyperglycemia; Ordered By:Dayanara Hawkins;   · Some eating tips that can help you lose weight ; Status:Complete;   Done: 53ZOJ6577   Ordered; For:Type 2 diabetes mellitus with hyperglycemia; Ordered By:Dayanara Hawkins;   · There are many exercise options for seniors ; Status:Complete;   Done: 15LGW3190   Ordered; For:Type 2 diabetes mellitus with hyperglycemia; Ordered By:Dayanara Hawkins;   · We recommend that you bring your body mass index down to 26 ; Status:Complete;    Done: 87ZTM3917   Ordered; For:Type 2 diabetes mellitus with hyperglycemia;  Ordered By:Ross Misbah Ornelas;   · Wear shoes that give your toes plenty of room ; Status:Complete;   Done: 32TNI0582   Ordered; For:Type 2 diabetes mellitus with hyperglycemia; Ordered By:Misbah Hawkins;   · Call [phone] if: You are having trouble following our instructions or treatment for  any reason ; Status:Complete;   Done: 72SSJ4219   Ordered; For:Type 2 diabetes mellitus with hyperglycemia; Ordered By:Misbah Hawkins;   · Call [phone] if: You start vomiting ; Status:Complete;   Done: 83RTL7387   Ordered; For:Type 2 diabetes mellitus with hyperglycemia; Ordered By:Misbah Hawkins;   · Call [phone] if: Your blood sugar is higher than 250 ; Status:Complete;   Done:  65UHI6570   Ordered; For:Type 2 diabetes mellitus with hyperglycemia; Ordered By:Misbah Hawkins;   · Call [phone] if: Your blood sugar is steadily becoming higher ; Status:Complete;    Done: 38WFO7020   Ordered; For:Type 2 diabetes mellitus with hyperglycemia; Ordered By:Misbah Hawkins;   · Call [phone] if: Your blood sugar is still over 300 after taking insulin ;  Status:Complete;   Done: 48BQX9657   Ordered; For:Type 2 diabetes mellitus with hyperglycemia; Ordered By:Misbah Hawkins;   · Call 911 if: There are symptoms of ketoacidosis  ; Status:Complete;   Done: 54VSB2132   Ordered; For:Type 2 diabetes mellitus with hyperglycemia; Ordered By:Misbah Hawkins;   · Call 911 if: You have a seizure ; Status:Complete;   Done: 60NYK4249   Ordered; For:Type 2 diabetes mellitus with hyperglycemia; Ordered By:Misbah Hawkins;   · Call 911 if: You have any symptoms of a stroke ; Status:Complete;   Done: 70NBG7316   Ordered; For:Type 2 diabetes mellitus with hyperglycemia; Ordered By:Misbah Hawkins;   · Call 911 if: You have fainted or passed out ; Status:Complete;   Done: 49GKL4212   Ordered; For:Type 2 diabetes mellitus with hyperglycemia;  Ordered By:Misbah Hawkins;   · Seek Immediate Medical Attention if: There are signs that the blood sugar is too high  (hyperglycemia) ; Status:Complete;   Done: 67ITM7123   Ordered; For:Type 2 diabetes mellitus with hyperglycemia; Ordered By:Freya Hawkins;   · Seek Immediate Medical Attention if: There are signs that the blood sugar is too low  (hypoglycemia) ; Status:Complete;   Done: 88WJU9168   Ordered; For:Type 2 diabetes mellitus with hyperglycemia; Ordered By:Freya Hawkins;   · Seek Immediate Medical Attention if: You become dehydrated ; Status:Complete;   Done:  56HNI1898   Ordered; For:Type 2 diabetes mellitus with hyperglycemia; Ordered By:Freya Hawkins;   · Seek Immediate Medical Attention if: You experience a new kind of chest pain (angina) or  pressure ; Status:Complete;   Done: 28BUO7087   Ordered; For:Type 2 diabetes mellitus with hyperglycemia; Ordered By:Freya Hawkins;   · Seek Immediate Medical Attention if: You notice that breathing is rapid, more than 40  times a minute ; Status:Complete;   Done: 32TBA8070   Ordered; For:Type 2 diabetes mellitus with hyperglycemia; Ordered By:Freya Hawkins;   · Seek Immediate Medical Attention if: Your blood sugar is higher than 400 ;  Status:Complete;   Done: 96AAR3785   Ordered; For:Type 2 diabetes mellitus with hyperglycemia; Ordered By:Freya Hawkins;   · Seek Immediate Medical Attention if: Your eyesight becomes blurry or you have difficulty  seeing ; Status:Complete;   Done: 28GKY6955   Ordered; For:Type 2 diabetes mellitus with hyperglycemia; Ordered By:Freya Hawkins; Chief Complaint  Chief Complaint Free Text Note Form: PT WAS ADMITTED TO [facility] ON 5/3/16 FOR SOB AND WAS D/C ON 5/5/16  REQUEST FOR RECORDS SENT, PENDING FAX ISSUE      History of Present Illness  TCM Communication [facility]: The patient is being contacted for follow-up after hospitalization and PT WAS CONTACTED FOR DARA  Hospital records were not available  She was hospitalized [facility]  The date of admission: 5/3/2016, date of discharge: 5/5/2016  Diagnosis: SOB   She was discharged to home  Medications reviewed and updated today  Communication performed and completed by   HPI: She has known ischemic cardiomyopathy with an EF of about 25% which improved to 45-50% after medical management and stent  She developed acute SOB last Tuesday morning  She went to the ER  She was admitted with acute CHF and a BNP of 1807  Her Coreg was increased to 25 mg TID (?) and her Lasix increased to 40 mg daily  She was discharged 2 days later  She denies CP or SOB since discharge  She weighs herself daily and has not gained more than pounds  She is watching her sodium intake  Her A1c is at goal at 6 6%  She denies hypoglycemia  She has no side effects from her medications  She is not on metformin due to mild renal insufficiency and CHF  Her lipids are at goal on her current regimen  She has no RUQ pain or nausea  She denies myalgia or muscle weakness  Review of Systems  Complete-Female:   Constitutional: No fever, no chills, feels well, no tiredness, no recent weight gain or weight loss  Eyes: No complaints of eye pain, no red eyes, no eyesight problems, no discharge, no dry eyes, no itching of eyes  ENT: no complaints of earache, no loss of hearing, no nose bleeds, no nasal discharge, no sore throat, no hoarseness  Cardiovascular: as noted in HPI  Respiratory: as noted in HPI  Gastrointestinal: No complaints of abdominal pain, no constipation, no nausea or vomiting, no diarrhea, no bloody stools  Genitourinary: No complaints of dysuria, no incontinence, no pelvic pain, no dysmenorrhea, no vaginal discharge or bleeding  Musculoskeletal: No complaints of arthralgias, no myalgias, no joint swelling or stiffness, no limb pain or swelling  Integumentary: No complaints of skin rash or lesions, no itching, no skin wounds, no breast pain or lump     Neurological: No complaints of headache, no confusion, no convulsions, no numbness, no dizziness or fainting, no tingling, no limb weakness, no difficulty walking  Psychiatric: Not suicidal, no sleep disturbance, no anxiety or depression, no change in personality, no emotional problems  Endocrine: No complaints of proptosis, no hot flashes, no muscle weakness, no deepening of the voice, no feelings of weakness  Hematologic/Lymphatic: No complaints of swollen glands, no swollen glands in the neck, does not bleed easily, does not bruise easily  Active Problems    1  2-vessel coronary artery disease (414 00) (I25 10)   2  Acute lower UTI (599 0) (N39 0)   3  Allergic rhinitis (477 9) (J30 9)   4  Anemia (285 9) (D64 9)   5  Anxiety (300 00) (F41 9)   6  Benign essential hypertension (401 1) (I10)   7  Benign paroxysmal vertigo, unspecified laterality (386 11) (H81 10)   8  Carotid bruit (785 9) (R09 89)   9  Chronic obstructive pulmonary disease (496) (J44 9)   10  Congestive heart failure (428 0) (I50 9)   11  Constipation (564 00) (K59 00)   12  CRD (chronic renal disease) (585 9) (N18 9)   13  Depression (311) (F32 9)   14  Diarrhea (787 91) (R19 7)   15  Emphysema (492 8) (J43 9)   16  Encounter for screening for other nervous system disorder (V80 09) (Z13 858)   17  Encounter for screening mammogram for malignant neoplasm of breast (V76 12)    (Z12 31)   18  Encounter for special screening examination for genitourinary disorder (V81 6) (Z13 89)   19  Esophageal reflux (530 81) (K21 9)   20  Gastric ulcer (531 90) (K25 9)   21  Hiatal hernia (553 3) (K44 9)   22  Hyperlipidemia (272 4) (E78 5)   23  Iron deficiency anemia due to chronic blood loss (280 0) (D50 0)   24  Low back pain (724 2) (M54 5)   25  Lower back pain (724 2) (M54 5)   26  Screening for malignant neoplasm of cervix (V76 2) (Z12 4)   27  Screening for osteoporosis (V82 81) (Z13 820)   28  Skin rash (782 1) (R21)   29  Trigger finger (727 03) (M65 30)   30  Type 2 diabetes mellitus with hyperglycemia (250 00) (E11 65)   31   Visit for pre-operative examination (V72 84) (I10 908)    Past Medical History    1  History of Encounter for screening mammogram for malignant neoplasm of breast   (V76 12) (Z12 31)   2  History of acute sinusitis (V12 69) (Z87 09)   3  History of Myalgia And Myositis (729 1)   4  History of Trigger Finger Of The Thumb (727 03)    Surgical History    1  History of Appendectomy   2  History of CABG   3  History of Cholecystectomy   4  History of Complete Colonoscopy   5  History of Diagnostic Esophagogastroduodenoscopy   6  History of Hernia Repair   7  History of Pacemaker Permanent Placement   8  History of Total Abdominal Hysterectomy With Removal Of Both Ovaries    Family History  Mother    1  Family history of Black lung disease   2  Family history of malignant neoplasm of breast (V16 3) (Z80 3)   3  Family history unobtainable (V49 89) (Z78 9)   4  Family history of Family history unobtainable due to orphan status (V49 89) (Z78 9)  Father    5  Family history of Black lung disease   6  Family history unobtainable (V49 89) (Z78 9)   7  Family history of Family history unobtainable due to orphan status (V49 89) (Z78 9)  Son    6  Family history of Living and Healthy  Maternal Grandmother    5  Family history unobtainable (V49 89) (Z78 9)   10  Family history of Family history unobtainable due to orphan status (V49 89) (Z78 9)  Paternal Grandmother    6  Family history unobtainable (V49 89) (Z78 9)   12  Family history of Family history unobtainable due to orphan status (V49 89) (Z78 9)  Maternal Grandfather    15  Family history unobtainable (V49 89) (Z78 9)   14  Family history of Family history unobtainable due to orphan status (V49 89) (Z78 9)  Paternal Grandfather    13  Family history of Family history unobtainable due to orphan status (V49 89) (Z78 9)    Social History    · Former smoker (V15 82) (H17 186)   · Never Drank Alcohol   · Tobacco use (305 1) (Z72 0)    Current Meds   1   Acetaminophen-Codeine #3 300-30 MG Oral Tablet; TAKE 1 TABLET 3 TIMES DAILY AS   NEEDED FOR PAIN;   Therapy: 90DHC7787 to (Evaluate:63Lff1903) Recorded   2  Advair Diskus 250-50 MCG/DOSE Inhalation Aerosol Powder Breath Activated; INHALE 1   PUFF EVERY 12 HOURS; Therapy: 88NOV9697 to (Last Rx:01Apr2016)  Requested for: 01Apr2016 Ordered   3  Aspirin EC 81 MG Oral Tablet Delayed Release; Therapy: (OMVAVSQH:99HMH1774) to Recorded   4  BD Insulin Syr Ultrafine II 31G X 5/16" 0 5 ML Miscellaneous; use as directed; Therapy: 49OAU3937 to (Evaluate:89Bil8006)  Requested for: 89ACN8688; Last   Rx:22Jan2016 Ordered   5  BD Pen Needle Heide U/F 32G X 4 MM Miscellaneous; INJECT DAILY AS DIRECTED; Therapy: 87YNY5582 to (Evaluate:39Evy5595)  Requested for: 76RXH8201; Last   Rx:11Mar2016 Ordered   6  Carvedilol 25 MG Oral Tablet; take 1 tablet by mouth twice a day; Therapy: 88OKW2207 to (Evaluate:39Cvl9717)  Requested for: 01Apr2016; Last   Rx:01Apr2016 Ordered   7  Clarinex 5 MG Oral Tablet; Therapy: (HBMDMNKN:33EVX9122) to Recorded   8  Clopidogrel Bisulfate 75 MG Oral Tablet; TAKE 1 TABLET DAILY; Therapy: 12FNE9251 to (Evaluate:27Mar2017)  Requested for: 01Apr2016; Last   Rx:01Apr2016 Ordered   9  Crestor 10 MG Oral Tablet; TAKE 1 TABLET AT BEDTIME; Therapy: 29SBX4292 to ([id number])  Requested for: 01Apr2016; Last   Rx:01Apr2016 Ordered   10  Ferrous Fumarate 324 MG TABS; TAKE 1 TABLET DAILY AS DIRECTED; Therapy: 48CHW5640 to (Evaluate:27Mar2017)  Requested for: 01Apr2016; Last    Rx:01Apr2016 Ordered   11  Fluticasone Propionate 50 MCG/ACT Nasal Suspension; USE 2 SPRAYS IN EACH    NOSTRIL ONCE DAILY; Therapy: 40YVO8424 to (Last Rx:01Apr2016)  Requested for: 01Apr2016 Ordered   12  Furosemide 20 MG Oral Tablet; TAKE 1 TABLET DAILY AS DIRECTED; Therapy: 48DRO3747 to (0318 [id number])  Requested for: 01Apr2016; Last    Rx:01Apr2016 Ordered   13  Furosemide 40 MG Oral Tablet; TAKE 1 TABLET DAILY;     Therapy: 76RLQ4274 to (Evaluate:09Jun2016) Requested for: 52RXH5423 Recorded   14  Ipratropium-Albuterol 0 5-2 5 (3) MG/3ML Inhalation Solution; USE 1 UNIT DOSE IN    NEBULIZER EVERY 4 HOURS AS NEEDED; Therapy: 44Anq3731 to (Last Rx:05Jan2015)  Requested for: 76NOW2471 Ordered   15  Lantus SoloStar 100 UNIT/ML Subcutaneous Solution Pen-injector; INJECT    SUBCUTANEOUSLY AS DIRECTED; Therapy: 47IFQ8034 to (Evaluate:19Zxn5369)  Requested for: 01Apr2016; Last    Rx:01Apr2016 Ordered   16  Lisinopril 10 MG Oral Tablet; TAKE 1 TABLET DAILY AS DIRECTED; Therapy: 75QVI5174 to ([place])  Requested for: 01Apr2016; Last    Rx:01Apr2016 Ordered   17  Metoclopramide HCl - 10 MG Oral Tablet; TAKE 1 TABLET 3 times a day; Therapy: 40XHK1000 to (Evaluate:89Xec2361)  Requested for: 01Apr2016; Last    Rx:01Apr2016 Ordered   18  Minitran 0 2 MG/HR Transdermal Patch 24 Hour; APPLY PATCH FOR 12 TO 14 HOURS    DAILY, THEN REMOVE;    Therapy: 05VRO7867 to (Evaluate:27Mar2017)  Requested for: 01Apr2016; Last    Rx:01Apr2016 Ordered   19  Pantoprazole Sodium 40 MG Oral Tablet Delayed Release; TAKE 1 TABLET DAILY; Therapy: 58RTA9167 to ([place])  Requested for: 01Apr2016; Last    Rx:01Apr2016 Ordered   20  Polyethylene Glycol 3350 Oral Powder; MIX 1 CAPFUL (17GM) IN 8 OUNCES OF WATER,    JUICE, OR TEA AND DRINK DAILY; Therapy: 67YVX2293 to (Evaluate:26Yeu0229)  Requested for: 68TUS3563; Last    Rx:14Mar2016 Ordered   21  Sertraline HCl - 25 MG Oral Tablet; TAKE 1 TABLET DAILY AS DIRECTED; Therapy: 47HQW2701 to ([place])  Requested for: 01Apr2016; Last    Rx:01Apr2016 Ordered   22  Triamcinolone Acetonide 0 025 % External Cream; APPLY 2-3 TIMES DAILY TO    AFFECTED AREA(S); Therapy: 30TPA2862 to (Last Rx:06Bhx7853)  Requested for: 36FVP6292 Ordered   23  Valium 5 MG Oral Tablet; Therapy: (LRYEBOWY:69OPU7421) to Recorded    Allergies    1   No Known Drug Allergies    Vitals  Signs [Data Includes: Current Encounter]   Recorded: 83CHI7247 10:30AM   Heart Rate: 77  Systolic: 724  Diastolic: 70  Weight Unobtainable: Yes  O2 Saturation: 93    Physical Exam    Constitutional   General appearance: No acute distress, well appearing and well nourished  Pulmonary   Respiratory effort: No increased work of breathing or signs of respiratory distress  Auscultation of lungs: Clear to auscultation  Cardiovascular   Auscultation of heart: Normal rate and rhythm, normal S1 and S2, without murmurs  Examination of extremities for edema and/or varicosities: Normal     Abdomen   Abdomen: Non-tender, no masses  Liver and spleen: No hepatomegaly or splenomegaly           Future Appointments    Date/Time Provider Specialty Site   08/09/2016 10:00 AM Sherlyn Flores MD [place] [id number] [place] [address]     Signatures   Electronically signed by : Silvano Melgar MD; May 10 2016  4:01PM EST                       (Author)

## 2018-01-23 NOTE — PROGRESS NOTES
Assessment    1  Osteoporosis screening (V82 81) (Z13 820)   2  Welcome to Medicare preventive visit (V70 0) (Z00 00)    Plan  Osteoporosis screening    · * DXA BONE DENSITY SPINE HIP AND PELVIS; Status:Hold For - Scheduling;  Requested for:59Dnx8009; Welcome to Medicare preventive visit    · Begin a limited exercise program ; Status:Complete;   Done: 29LJW5036   · Eat a low fat and low cholesterol diet ; Status:Complete;   Done: 52EHK5221   · Eat no more than 30 grams of fat per day ; Status:Complete;   Done: 90JVH2668   · Some eating tips that can help you lose weight ; Status:Complete;   Done: 80ZNE6698   · There are many exercise options for seniors ; Status:Complete;   Done: 66SLQ1887   · There are ways to decrease your stress and improve your sense of well-being  We  encourage you to keep active and exercise regularly  Make time to take care of yourself  and participate in activities that you enjoy  Stay connected to friends and family that can  support and comfort you  If at any time you have thoughts of harming yourself or  someone else, contact us immediately ; Status:Active; Requested VBT:73WQQ0381;    · There ways to avoid falling ; Status:Complete;   Done: 42BOG6376   · These are things you can do to prevent falls in and around the home ; Status:Complete;    Done: 91VOT6710   · We encourage all of our patients to exercise regularly  30 minutes of exercise or physical  activity five or more days a week is recommended for children and adults ;  Status:Complete;   Done: 18VGF9023   · We encourage you to begin to make lifestyle changes to help control your blood  pressure    These may include losing weight, increasing your activity level, limiting salt in  your diet, decreasing alcohol intake, and eating a diet low in fat and rich in fruits  and vegetables ; Status:Complete;   Done: 24ODI4536   · We recommend that you create an advance directive ; Status:Complete;   Done:  90ZVL5701   · We recommend you modify your diet to achieve and maintain a healthy weight  Being  overweight may increase your risk for developing health problems such as diabetes,  heart disease, and cancer  Avoid high fat foods and eat a balanced diet rich  in fruits and vegetables  The combination of a reduced-calorie diet and increased  physical activity is recommended  Please let us know if you would like to  learn more about your nutrition and calorie needs, and additional options including  weight loss programs that can help you achieve your goals ; Status:Complete;   Done:  73KHV9641   · We recommend you modify your diet to achieve and maintain a healthy weight  Being  underweight may increase your risk of developing health problems from vitamin and  mineral deficiencies  We recommend a balanced diet rich in fruits and vegetables  You  may also consider increasing your calorie intake by eating more frequently or adding  nuts, avocados, and low-fat cheese or milk to your meals  Please let us know  if you would like to learn more about your nutrition and calorie needs, and additional  options to help you achieve your weight goals ; Status:Complete;   Done: 99XUZ0141   · Call [phone] if: You find a new or different kind of lump in your breast ;  Status:Complete;   Done: 64DKI7221   · Call [phone] if: You have any bleeding from the vagina ; Status:Complete;    Done: 63PQT0814   · Call [phone] if: You have any warning signs of skin cancer ; Status:Complete;    Done: 93VVN4967   · Call [phone] if: You experience a new kind of chest pain (angina) or pressure ;  Status:Complete;   Done: 85UVM2838    Discussion/Summary  Impression: Initial Annual Wellness Visit  Cardiovascular screening and counseling: screening is current  Diabetes screening and counseling: screening is current  Colorectal cancer screening and counseling: screening is current  Cervical cancer screening and counseling: screening is current  Osteoporosis screening and counseling: screening is current  Abdominal aortic aneurysm screening and counseling: screening not indicated  Glaucoma screening and counseling: screening is current  HIV screening and counseling: screening not indicated  Immunizations: influenza vaccine is up to date this year, the lifetime pneumococcal vaccine has been completed, hepatitis B vaccination series is not indicated at this time due to the patient's low risk of morgan the disease, Zostavax vaccination status is unknown, Td vaccination up to date and Tdap vaccination status is unknown  Chief Complaint  medicare wellness      History of Present Illness  Welcome to Medicare and Wellness Visits: The patient is being seen for the initial annual wellness visit  Medicare Screening and Risk Factors   Hospitalizations: she has been hospitalized >10 times and CAD, CHF  Once per lifetime medicare screening tests: ECG and AAA screening US has not yet been done  Medicare Screening Tests Risk Questions   Abdominal aortic aneurysm risk assessment: none indicated  Osteoporosis risk assessment: , female gender and over 48years of age  HIV risk assessment: none indicated  Drug and Alcohol Use: The patient is a former cigarette smoker  The patient reports rare alcohol use  Alcohol concern:   The patient has no concerns about alcohol abuse  She has never used illicit drugs  Diet and Physical Activity: Current diet includes well balanced meals, 2 servings of fruit per day, 2 servings of vegetables per day, 2 servings of meat per day, 2 servings of whole grains per day, 2 servings of simple carbohydrates per day, 2 servings of dairy products per day, 1 cups of coffee per day and 1 cups of tea per day  She exercises daily  Exercise: walking 10 minutes per day     Mood Disorder and Cognitive Impairment Screening: PHQ-9 Depression Scale   Functional Ability/Level of Safety: Hearing is normal bilaterally and a hearing aid is not used  The patient is currently able to do activities of daily living without limitations, able to do instrumental activities of daily living without limitations, able to participate in social activities without limitations and able to drive without limitations  Activities of daily living details: transportation help needed, but does not need help using the phone, does not need help shopping, no meal preparation help needed, does not need help doing housework, does not need help doing laundry, does not need help managing medications and does not need help managing money  Fall risk factors:  polypharmacy, mobility impairment and antihypertensive use, but no alcohol use, no antidepressant use, no deconditioning, no postural hypotension, no sedative use, no visual impairment, no urinary incontinence, no cognitive impairment, up and go test was normal and no previous fall  Home safety risk factors:  no unfamiliar surroundings, no loose rugs, no poor household lighting, no uneven floors, no household clutter, grab bars in the bathroom and handrails on the stairs  Advance Directives: Advance directives: living will, durable power of  for health care directives and advance directives  end of life decisions were reviewed with the patient and I agree with the patient's decisions  Co-Managers and Medical Equipment/Suppliers: See Patient Care Team      Patient Care Team    Care Team Member Role Specialty Office Number   [email or web address], [address], [place]        Review of Systems    Constitutional: negative  Eyes: negative  ENT: negative  Cardiovascular: negative  Respiratory: negative  Gastrointestinal: negative  Genitourinary: negative  Musculoskeletal: negative  Integumentary and Breasts: negative  Neurological: negative  Psychiatric: negative  Endocrine: negative  Hematologic and Lymphatic: negative        Active Problems     1  2-vessel coronary artery disease (414 00) (I25 10)   2  Acute lower UTI (599 0) (N39 0)   3  Allergic rhinitis (477 9) (J30 9)   4  Anemia (285 9) (D64 9)   5  Anxiety (300 00) (F41 9)   6  Benign paroxysmal vertigo, unspecified laterality (386 11) (H81 10)   7  Carotid bruit (785 9) (R09 89)   8  Chronic obstructive pulmonary disease (496) (J44 9)   9  Congestive heart failure (428 0) (I50 9)   10  Constipation (564 00) (K59 00)   11  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)   12  CRD (chronic renal disease) (585 9) (N18 9)   13  Depression (311) (F32 9)   14  Diarrhea (787 91) (R19 7)   15  Encounter for screening for other nervous system disorder (V80 09) (Z13 858)   16  Encounter for screening mammogram for malignant neoplasm of breast (V76 12)    (Z12 31)   17  Encounter for special screening examination for genitourinary disorder (V81 6) (Z13 89)   18  Esophageal reflux (530 81) (K21 9)   19  Gastric ulcer (531 90) (K25 9)   20  Hiatal hernia (553 3) (K44 9)   21  Intermittent claudication (443 9) (I73 9)   22  Iron deficiency anemia due to chronic blood loss (280 0) (D50 0)   23  Low back pain (724 2) (M54 5)   24  Lower back pain (724 2) (M54 5)   25  Osteoporosis screening (V82 81) (Z13 820)   26  Screening for malignant neoplasm of cervix (V76 2) (Z12 4)   27  Skin rash (782 1) (R21)   28  Thrombocytopenia (287 5) (D69 6)   29  Trigger finger (727 03) (M65 30)   30   Visit for pre-operative examination (V72 84) (Z01 818)    Type 2 diabetes mellitus with hyperglycemia (250 00) (E11 65)       Benign essential hypertension (401 1) (I10)       Hyperlipidemia (272 4) (E78 5)          Past Medical History    · History of Encounter for screening mammogram for malignant neoplasm of breast  (V76 12) (Z12 31)   · History of acute sinusitis (V12 69) (Z87 09)   · History of Myalgia And Myositis (729 1)   · History of Trigger Finger Of The Thumb (727 03)    Surgical History    · History of Appendectomy   · History of CABG   · History of Cholecystectomy   · History of Complete Colonoscopy   · History of Diagnostic Esophagogastroduodenoscopy   · History of Hernia Repair   · History of Pacemaker Permanent Placement   · History of Total Abdominal Hysterectomy With Removal Of Both Ovaries    Family History  Mother    · Family history of Black lung disease   · Family history of malignant neoplasm of breast (V16 3) (Z80 3)   · Family history unobtainable (V49 89) (Z78 9)   · Family history of Family history unobtainable due to orphan status (V49 89) (Z78 9)  Father    · Family history of Black lung disease   · Family history unobtainable (V49 89) (Z78 9)   · Family history of Family history unobtainable due to orphan status (V49 89) (Z78 9)  Son    · Family history of Living and Healthy  Maternal Grandmother    · Family history unobtainable (V49 89) (Z78 9)   · Family history of Family history unobtainable due to orphan status (V49 89) (Z78 9)  Paternal Grandmother    · Family history unobtainable (V49 89) (Z78 9)   · Family history of Family history unobtainable due to orphan status (V49 89) (Z78 9)  Maternal Grandfather    · Family history unobtainable (V49 89) (Z78 9)   · Family history of Family history unobtainable due to orphan status (V49 89) (Z78 9)  Paternal Grandfather    · Family history of Family history unobtainable due to orphan status (V49 89) (Z78 9)    Social History    · Former smoker (V15 82) (U98 714)   · Never Drank Alcohol   · Tobacco use (305 1) (Z72 0)    Current Meds   1  Acetaminophen-Codeine #3 300-30 MG Oral Tablet; TAKE 1 TABLET 3 TIMES DAILY AS   NEEDED FOR PAIN;   Therapy: 46TEX3911 to (Evaluate:74Zot8976) Recorded   2  Advair Diskus 250-50 MCG/DOSE Inhalation Aerosol Powder Breath Activated; INHALE   1 PUFF EVERY 12 HOURS; Therapy: 22RGU2681 to (Last Rx:01Apr2016)  Requested for: 01Apr2016 Ordered   3  Aspirin EC 81 MG Oral Tablet Delayed Release; Therapy: (XWNTUHIS:09AJW6989) to Recorded   4   BD Insulin Syr Ultrafine II 31G X 5/16" 0 5 ML Miscellaneous; use as directed; Therapy: 58HIC3355 to (Evaluate:45Wfo0135)  Requested for: 65QUD3492; Last   Rx:22Jan2016 Ordered   5  BD Pen Needle Heide U/F 32G X 4 MM Miscellaneous; INJECT DAILY AS DIRECTED; Therapy: 70KDV0449 to (Evaluate:07Sep2016)  Requested for: 80EVO4712; Last   Rx:11Mar2016 Ordered   6  Carvedilol 25 MG Oral Tablet; TAKE 1 TABLET THREE TIMES A DAY WITH MORNING   AND EVENING MEAL; Therapy: 95LPC3417 to (Evaluate:07Sep2016)  Requested for: 46PUP4365; Last   Rx:80Knn7373 Ordered   7  Clarinex 5 MG Oral Tablet; Therapy: (IGOBIVW:41NGN2655) to Recorded   8  Clopidogrel Bisulfate 75 MG Oral Tablet; TAKE 1 TABLET DAILY; Therapy: 65JRZ8263 to (Evaluate:27Mar2017)  Requested for: 01Apr2016; Last   Rx:01Apr2016 Ordered   9  Crestor 10 MG Oral Tablet; TAKE 1 TABLET AT BEDTIME; Therapy: 04IZR1058 to (Carvin Sos)  Requested for: 01Apr2016; Last   Rx:01Apr2016 Ordered   10  Doxycycline Hyclate 100 MG Oral Tablet; TAKE 1 TABLET TWICE DAILY; Therapy: 62TVH2096 to (Evaluate:21Jul2016)  Requested for: 63QKZ1428; Last    Rx:11Jul2016 Ordered   11  Ferrous Fumarate 324 MG TABS; TAKE 1 TABLET DAILY AS DIRECTED; Therapy: 25KSP6575 to (Evaluate:27Mar2017)  Requested for: 01Apr2016; Last    Rx:01Apr2016 Ordered   12  Fluticasone Propionate 50 MCG/ACT Nasal Suspension; USE 2 SPRAYS IN EACH    NOSTRIL ONCE DAILY; Therapy: 05QGL2040 to (Last Rx:01Apr2016)  Requested for: 01Apr2016 Ordered   13  Furosemide 20 MG Oral Tablet; TAKE 1 TABLET DAILY AS DIRECTED; Therapy: 62ZET7428 to (Carvin Sos)  Requested for: 01Apr2016; Last    Rx:01Apr2016 Ordered   14  Furosemide 40 MG Oral Tablet; TAKE 1 TABLET DAILY; Therapy: 68KGT8837 to (Evaluate:09Jun2016)  Requested for: 77TPJ7284 Recorded   15  Ipratropium-Albuterol 0 5-2 5 (3) MG/3ML Inhalation Solution; USE 1 UNIT DOSE IN    NEBULIZER EVERY 4 HOURS AS NEEDED;     Therapy: 15Apr2013 to (Last Rx:05Jan2015)  Requested for: 20KUK2770 Ordered   16  Lantus SoloStar 100 UNIT/ML Subcutaneous Solution Pen-injector; INJECT    SUBCUTANEOUSLY AS DIRECTED; Therapy: 61IHV8128 to (Evaluate:89Cnq7050)  Requested for: 01Apr2016; Last    Rx:01Apr2016 Ordered   17  Lisinopril 10 MG Oral Tablet; TAKE 1 TABLET DAILY AS DIRECTED; Therapy: 41PEN5991 to (Evaluate:27Mar2017)  Requested for: 01Apr2016; Last    Rx:01Apr2016 Ordered   18  Metoclopramide HCl - 10 MG Oral Tablet; TAKE 1 TABLET 3 times a day; Therapy: 21HZF0125 to (Evaluate:80Vkd9899)  Requested for: 01Apr2016; Last    Rx:01Apr2016 Ordered   19  Minitran 0 2 MG/HR Transdermal Patch 24 Hour; APPLY PATCH FOR 12 TO 14 HOURS    DAILY, THEN REMOVE;    Therapy: 15UDH1469 to (Evaluate:27Mar2017)  Requested for: 01Apr2016; Last    Rx:01Apr2016 Ordered   20  Nitrostat 0 4 MG Sublingual Tablet Sublingual; PLACE 1 TABLET UNDER THE TONGUE    EVERY 5 MINUTES FOR UP TO 3 DOSES AS NEEDED FOR CHEST PAIN  CALL    911 IF PAIN PERSISTS; Therapy: 54CPK7745 to (Donovan Morales)  Requested for: 59IDW6906; Last    Rx:10May2016 Ordered   21  NovoLIN R 100 UNIT/ML Injection Solution; FOLLOW SLIDING SCALE:    BLOOD SUGAR 200-250, USE 2 UNITS    BLOOD SUGAR 251-300, USE 4 UNITS    BLOOD SUGAR 301-350, USE 6 UNITS; Therapy: 15Uwx0392 to (Last Rx:00Uax2583)  Requested for: 75Hgb4268 Ordered   22  Pantoprazole Sodium 40 MG Oral Tablet Delayed Release; TAKE 1 TABLET DAILY; Therapy: 68PZJ9957 to (Donovan Morales)  Requested for: 01Apr2016; Last    Rx:01Apr2016 Ordered   23  Polyethylene Glycol 3350 Oral Powder; MIX 1 CAPFUL (17GM) IN 8 OUNCES OF    WATER, JUICE, OR TEA AND DRINK DAILY; Therapy: 83XZK4751 to (Evaluate:40Tpi6767)  Requested for: 41WIG4924; Last    Rx:14Mar2016 Ordered   24  Sertraline HCl - 25 MG Oral Tablet; TAKE 1 TABLET DAILY AS DIRECTED; Therapy: 91HIF7373 to (Evaluate:27Mar2017)  Requested for: 01Apr2016; Last    Rx:01Apr2016 Ordered   25   Triamcinolone Acetonide 0 025 % External Cream; APPLY 2-3 TIMES DAILY TO    AFFECTED AREA(S); Therapy: 34TMC6274 to (Last Rx:92Bda0997)  Requested for: 18MVU1862 Ordered   26  Valium 5 MG Oral Tablet; Therapy: (VALDEMARPPEB:88DWR8230) to Recorded    Allergies    1  No Known Drug Allergies    Immunizations   1 2 3    Influenza  05-Oct-2012 2013 Approx Sept2014    PCV  Approx [id number]      [facility]  hospital      Varicella  Approx 2011       Physical Exam    Constitutional   General appearance: No acute distress, well appearing and well nourished  Neck   Neck: Supple, symmetric, trachea midline, no masses  Thyroid: Normal, no thyromegaly  Pulmonary   Percussion of chest: Normal     Palpation of chest: Normal     Cardiovascular   Auscultation of heart: Normal rate and rhythm, normal S1 and S2, no murmurs  Carotid pulses: 2+ bilaterally  Abdomen   Abdomen: Non-tender, no masses  Liver and spleen: No hepatomegaly or splenomegaly        Future Appointments    Date/Time Provider Specialty Site   03/14/2017 10:30 AM Joshua Darling MD [address]   Electronically signed by : Bjorn Favre, MD; Jan 9 2017  3:57PM EST                       (Author)

## 2018-02-03 DIAGNOSIS — I25.118 CORONARY ARTERY DISEASE OF NATIVE ARTERY OF NATIVE HEART WITH STABLE ANGINA PECTORIS (HCC): Primary | ICD-10-CM

## 2018-02-04 RX ORDER — CLOPIDOGREL BISULFATE 75 MG/1
TABLET ORAL
Qty: 90 TABLET | Refills: 3 | Status: SHIPPED | OUTPATIENT
Start: 2018-02-04 | End: 2018-12-26 | Stop reason: SDUPTHER

## 2018-02-04 RX ORDER — NITROGLYCERIN 40 MG/1
PATCH TRANSDERMAL
Qty: 30 PATCH | Refills: 3 | Status: SHIPPED | OUTPATIENT
Start: 2018-02-04 | End: 2018-06-21 | Stop reason: SDUPTHER

## 2018-03-05 ENCOUNTER — TRANSITIONAL CARE MANAGEMENT (OUTPATIENT)
Dept: FAMILY MEDICINE CLINIC | Facility: CLINIC | Age: 83
End: 2018-03-05

## 2018-03-08 ENCOUNTER — TRANSITIONAL CARE MANAGEMENT (OUTPATIENT)
Dept: FAMILY MEDICINE CLINIC | Facility: CLINIC | Age: 83
End: 2018-03-08

## 2018-03-09 ENCOUNTER — OFFICE VISIT (OUTPATIENT)
Dept: FAMILY MEDICINE CLINIC | Facility: CLINIC | Age: 83
End: 2018-03-09
Payer: MEDICARE

## 2018-03-09 VITALS
SYSTOLIC BLOOD PRESSURE: 118 MMHG | OXYGEN SATURATION: 94 % | RESPIRATION RATE: 16 BRPM | DIASTOLIC BLOOD PRESSURE: 64 MMHG | HEART RATE: 76 BPM

## 2018-03-09 DIAGNOSIS — I50.42 CHRONIC COMBINED SYSTOLIC AND DIASTOLIC CONGESTIVE HEART FAILURE (HCC): ICD-10-CM

## 2018-03-09 DIAGNOSIS — S32.9XXD CLOSED NONDISPLACED FRACTURE OF PELVIS WITH ROUTINE HEALING, UNSPECIFIED PART OF PELVIS, SUBSEQUENT ENCOUNTER: Primary | ICD-10-CM

## 2018-03-09 DIAGNOSIS — Z79.4 TYPE 2 DIABETES MELLITUS WITHOUT COMPLICATION, WITH LONG-TERM CURRENT USE OF INSULIN (HCC): ICD-10-CM

## 2018-03-09 DIAGNOSIS — E11.9 TYPE 2 DIABETES MELLITUS WITHOUT COMPLICATION, WITH LONG-TERM CURRENT USE OF INSULIN (HCC): ICD-10-CM

## 2018-03-09 DIAGNOSIS — I10 ESSENTIAL HYPERTENSION: ICD-10-CM

## 2018-03-09 DIAGNOSIS — E78.49 OTHER HYPERLIPIDEMIA: ICD-10-CM

## 2018-03-09 PROCEDURE — 99495 TRANSJ CARE MGMT MOD F2F 14D: CPT | Performed by: FAMILY MEDICINE

## 2018-03-09 RX ORDER — SENNA PLUS 8.6 MG/1
1 TABLET ORAL DAILY
COMMUNITY
End: 2019-12-12 | Stop reason: ALTCHOICE

## 2018-03-09 RX ORDER — DOCUSATE SODIUM 100 MG/1
100 CAPSULE, LIQUID FILLED ORAL 2 TIMES DAILY
COMMUNITY
End: 2020-08-05 | Stop reason: HOSPADM

## 2018-03-09 NOTE — PROGRESS NOTES
Assessment/Plan:     No problem-specific Assessment & Plan notes found for this encounter  Diagnoses and all orders for this visit:    Closed nondisplaced fracture of pelvis with routine healing, unspecified part of pelvis, subsequent encounter  -     Comprehensive metabolic panel; Future  -     DXA bone density spine hip and pelvis; Future    Type 2 diabetes mellitus without complication, with long-term current use of insulin (HCC)  -     Hemoglobin A1c; Future  -     Comprehensive metabolic panel; Future  -     Lipid panel; Future  -     Microalbumin / creatinine urine ratio    Essential hypertension  -     Comprehensive metabolic panel; Future  -     Lipid panel; Future  -     Microalbumin / creatinine urine ratio    Chronic combined systolic and diastolic congestive heart failure (HCC)  -     Comprehensive metabolic panel; Future  -     Lipid panel; Future  -     NT-BNP PRO; Future    Other hyperlipidemia  -     Comprehensive metabolic panel; Future  -     Lipid panel; Future    Other orders  -     docusate sodium (COLACE) 100 mg capsule; Take 100 mg by mouth 2 (two) times a day  -     senna (SENOKOT) 8 6 MG tablet; Take 1 tablet by mouth daily         Subjective:     Patient ID: Daniel  is a [de-identified] y o  female  She was in her usual state of health until about 18 days ago  She was in the shower and she slipped getting out of the tub  She fell on her left leg, and luckily did not strike her head  She was taken to the hospital by ambulance and was found to have a fractured pelvis  No surgery was required  She then spent 11 days in a rehabilitation facility  She was discharged home with her rolling walker, visiting nurses, and home physical therapy  She states that she currently has no pain  She is ambulatory at this point  She has a long cardiac history  She has no chest pain or shortness of breath  She has no symptoms of heart failure  She has type 2 diabetes    Her A1c has been at goal on her current regimen  She has had no episodes of low blood sugar recently  Review of Systems   All other systems reviewed and are negative  Objective:     Physical Exam   Constitutional: She is oriented to person, place, and time  Neck: Normal range of motion  Neck supple  Cardiovascular: Normal rate, regular rhythm, normal heart sounds and intact distal pulses  Pulmonary/Chest: Effort normal and breath sounds normal    Abdominal: Soft  Bowel sounds are normal    Musculoskeletal: Normal range of motion  Neurological: She is alert and oriented to person, place, and time  She has normal reflexes  Skin: Skin is warm and dry  Psychiatric: She has a normal mood and affect  Her behavior is normal  Judgment and thought content normal    Nursing note and vitals reviewed  Vitals:    03/09/18 1022   BP: 118/64   BP Location: Right arm   Patient Position: Sitting   Cuff Size: Standard   Pulse: 76   Resp: 16   SpO2: 94%       Transitional Care Management Review:  Amisha Gonzalez is a [de-identified] y o  female here for TCM follow up  During the TCM phone call patient stated:    Date and time hospital follow up call was made:  3/5/2018  2:16 PM  Patient was hopsitalized at:   Other (comment)  Date of admission:  2/19/18  Date of discharge:  3/3/18  Current symptoms:  None  Post hospital issues:  None  Should patient be enrolled in anticoag monitoring?:  No  Scheduled for follow up?:  Yes  Did you obtain your prescribed medications:  Yes  Do you need help managing your perscriptions or medications:  Yes  Is transportation to your appointments needed:  Yes  Living Arrangements:  Alone  Support System:  Family  The type of support provided:  None  Do you have social support:  Yes, as much as I need  Are you recieving outpatient services:  No  Are you recieving home care services:  Yes  Types of home care services:  Home health aid  Are you using any community resources:  No  Have you fallen in the last 12 months:  Yes  Interperter language line required?:  No             Kelly Harman MD

## 2018-04-11 DIAGNOSIS — K21.9 GASTROESOPHAGEAL REFLUX DISEASE, ESOPHAGITIS PRESENCE NOT SPECIFIED: Primary | ICD-10-CM

## 2018-04-12 RX ORDER — PANTOPRAZOLE SODIUM 40 MG/1
TABLET, DELAYED RELEASE ORAL
Qty: 90 TABLET | Refills: 3 | Status: SHIPPED | OUTPATIENT
Start: 2018-04-12 | End: 2019-03-11 | Stop reason: SDUPTHER

## 2018-04-20 LAB — HBA1C MFR BLD HPLC: 6.7 %

## 2018-04-25 DIAGNOSIS — E78.5 HYPERLIPIDEMIA, UNSPECIFIED HYPERLIPIDEMIA TYPE: Primary | ICD-10-CM

## 2018-04-25 DIAGNOSIS — I50.9 CONGESTIVE HEART FAILURE, UNSPECIFIED HF CHRONICITY, UNSPECIFIED HEART FAILURE TYPE (HCC): ICD-10-CM

## 2018-04-26 RX ORDER — FUROSEMIDE 20 MG/1
TABLET ORAL
Qty: 90 TABLET | Refills: 3 | Status: SHIPPED | OUTPATIENT
Start: 2018-04-26 | End: 2019-04-19 | Stop reason: SDUPTHER

## 2018-04-26 RX ORDER — ROSUVASTATIN CALCIUM 10 MG/1
TABLET, COATED ORAL
Qty: 90 TABLET | Refills: 3 | Status: SHIPPED | OUTPATIENT
Start: 2018-04-26 | End: 2018-06-21 | Stop reason: SDUPTHER

## 2018-05-12 DIAGNOSIS — Z79.4 TYPE 2 DIABETES MELLITUS WITH COMPLICATION, WITH LONG-TERM CURRENT USE OF INSULIN (HCC): Primary | ICD-10-CM

## 2018-05-12 DIAGNOSIS — E11.8 TYPE 2 DIABETES MELLITUS WITH COMPLICATION, WITH LONG-TERM CURRENT USE OF INSULIN (HCC): Primary | ICD-10-CM

## 2018-05-13 RX ORDER — PEN NEEDLE, DIABETIC 32GX 5/32"
NEEDLE, DISPOSABLE MISCELLANEOUS
Qty: 200 EACH | Refills: 5 | Status: SHIPPED | OUTPATIENT
Start: 2018-05-13 | End: 2019-10-16 | Stop reason: SDUPTHER

## 2018-05-16 ENCOUNTER — TELEPHONE (OUTPATIENT)
Dept: FAMILY MEDICINE CLINIC | Facility: CLINIC | Age: 83
End: 2018-05-16

## 2018-05-16 DIAGNOSIS — Z79.4 TYPE 2 DIABETES MELLITUS WITHOUT COMPLICATION, WITH LONG-TERM CURRENT USE OF INSULIN (HCC): Primary | ICD-10-CM

## 2018-05-16 DIAGNOSIS — E11.9 TYPE 2 DIABETES MELLITUS WITHOUT COMPLICATION, WITH LONG-TERM CURRENT USE OF INSULIN (HCC): Primary | ICD-10-CM

## 2018-05-22 ENCOUNTER — TELEPHONE (OUTPATIENT)
Dept: FAMILY MEDICINE CLINIC | Facility: CLINIC | Age: 83
End: 2018-05-22

## 2018-05-22 DIAGNOSIS — T14.8XXA BRUISING: Primary | ICD-10-CM

## 2018-05-22 NOTE — TELEPHONE ENCOUNTER
Phone call from [facility], stating that patient is c/o increased bruising  Nursing noted softball sized ecchymotic area on right thigh  Patient denies pain or injury  Home health questioning if plavix 75mg can be decreased   If labs needed they can draw them

## 2018-06-05 ENCOUNTER — TELEPHONE (OUTPATIENT)
Dept: FAMILY MEDICINE CLINIC | Facility: CLINIC | Age: 83
End: 2018-06-05

## 2018-06-05 DIAGNOSIS — R21 RASH: Primary | ICD-10-CM

## 2018-06-05 RX ORDER — TRIAMCINOLONE ACETONIDE 1 MG/G
CREAM TOPICAL 2 TIMES DAILY
Qty: 30 G | Refills: 0 | Status: SHIPPED | OUTPATIENT
Start: 2018-06-05 | End: 2019-12-12 | Stop reason: ALTCHOICE

## 2018-06-08 ENCOUNTER — OFFICE VISIT (OUTPATIENT)
Dept: FAMILY MEDICINE CLINIC | Facility: CLINIC | Age: 83
End: 2018-06-08
Payer: MEDICARE

## 2018-06-08 VITALS
WEIGHT: 146.4 LBS | SYSTOLIC BLOOD PRESSURE: 120 MMHG | HEART RATE: 86 BPM | HEIGHT: 60 IN | BODY MASS INDEX: 28.74 KG/M2 | RESPIRATION RATE: 14 BRPM | DIASTOLIC BLOOD PRESSURE: 68 MMHG | OXYGEN SATURATION: 94 %

## 2018-06-08 DIAGNOSIS — E11.9 TYPE 2 DIABETES MELLITUS WITHOUT COMPLICATION, WITH LONG-TERM CURRENT USE OF INSULIN (HCC): ICD-10-CM

## 2018-06-08 DIAGNOSIS — I50.42 CHRONIC COMBINED SYSTOLIC AND DIASTOLIC CONGESTIVE HEART FAILURE (HCC): ICD-10-CM

## 2018-06-08 DIAGNOSIS — D69.6 THROMBOCYTOPENIA (HCC): ICD-10-CM

## 2018-06-08 DIAGNOSIS — L21.9 SEBORRHEIC DERMATITIS: ICD-10-CM

## 2018-06-08 DIAGNOSIS — Z79.4 TYPE 2 DIABETES MELLITUS WITHOUT COMPLICATION, WITH LONG-TERM CURRENT USE OF INSULIN (HCC): ICD-10-CM

## 2018-06-08 DIAGNOSIS — E78.49 OTHER HYPERLIPIDEMIA: Primary | ICD-10-CM

## 2018-06-08 DIAGNOSIS — T14.8XXA BRUISING: ICD-10-CM

## 2018-06-08 DIAGNOSIS — E11.8 TYPE 2 DIABETES MELLITUS WITH COMPLICATION, UNSPECIFIED WHETHER LONG TERM INSULIN USE: Primary | ICD-10-CM

## 2018-06-08 DIAGNOSIS — I10 ESSENTIAL HYPERTENSION: ICD-10-CM

## 2018-06-08 LAB — SL AMB POCT HEMOGLOBIN AIC: 7.1

## 2018-06-08 PROCEDURE — 83036 HEMOGLOBIN GLYCOSYLATED A1C: CPT

## 2018-06-08 PROCEDURE — 99214 OFFICE O/P EST MOD 30 MIN: CPT | Performed by: FAMILY MEDICINE

## 2018-06-08 RX ORDER — SELENIUM SULFIDE 2.5 MG/100ML
LOTION TOPICAL DAILY PRN
Qty: 118 ML | Refills: 0 | Status: SHIPPED | OUTPATIENT
Start: 2018-06-08 | End: 2020-08-05 | Stop reason: HOSPADM

## 2018-06-08 NOTE — PATIENT INSTRUCTIONS
Thrombocytopenia   AMBULATORY CARE:   Thrombocytopenia  occurs when your body does not have enough platelets  Platelets are cells that help your blood clot  Your body may not be making enough platelets, or it may be destroying too many platelets  When platelets become low, your risk for bleeding increases  Severe bleeding may become life-threatening  Common signs and symptoms depend on your platelet count:  A lower platelet count will cause more severe symptoms  You may not have any symptoms  You may bleed or bruise more easily or have tiny red or purple spots on your skin  You may feel tired or bleed from your gums or nose  You may have heavy menstrual bleeding or blood in your urine or bowel movement  Call 911 for any of the following:   · You have chest pain, tightness, or heaviness that spreads to your shoulders, arms, jaw, neck, or back  · You have weakness on one side of your body, a severe headache, difficulty speaking, or a change in vision  Seek care immediately if:   · You have bleeding that does not stop after you elevate and place pressure on the area  · You vomit blood or material that looks like coffee grounds  · Your arm or leg feels warm, tender, and painful  It may look swollen and red  · You suddenly feel lightheaded, dizzy, or weak  Contact your healthcare provider if:   · You have bleeding from your gums, mouth, or nose  · You have irregular or heavy menstrual bleeding  · You have blood in your urine or bowel movement  · You have more bruises or small red or purple spots on your skin  · You have questions or concerns about your condition or care  Treatment for thrombocytopenia:   · Medicines  can help increase platelet production and prevent bleeding  · Platelet transfusions  may be used to decrease the risk for bleeding or to stop severe bleeding  How to care for myself when my platelets are low:  Examine your skin for minor bumps, scrapes, and cuts  These injuries can increase your risk for bleeding that can become life-threatening  · Use caution with skin and mouth care  Use a soft washcloth and a soft toothbrush  This can keep your skin and gums from bleeding  Keep your nails trimmed  If you shave, use an electric shaver  · Do not strain when you have a bowel movement  This can increase pressure in your brain and could cause bleeding  Ask your healthcare provider about a stool softener or laxative if you are constipated  Do not use enemas or suppositories  · Use a cool mist humidifier  to increase moisture in your home  This may help prevent coughing or nosebleeds  Coughing can increase pressure in your brain and could cause bleeding  · Avoid activities that may cause scratches or bruises  Wear shoes or slippers to protect your feet from injury  Ask your healthcare provider which activities are safe for you  · Do not take aspirin or NSAIDs  These medicines can cause you to bleed and bruise more easily  · Wear medical alert jewelry  or carry a card that says you have thrombocytopenia  Ask your healthcare provider where to get these items  Wear medical alert identification:  Wear a medical alert bracelet or carry a card that says you have thrombocytopenia  Ask where to get these items  Follow up with your healthcare provider as directed: You will need to return for more blood tests  Write down your questions so you remember to ask them during your visits  © 2017 [address] [employer] Information is for End User's use only and may not be sold, redistributed or otherwise used for commercial purposes  All illustrations and images included in CareNotes® are the copyrighted property of [employer]  or Abdirashid [employer]  The above information is an  only  It is not intended as medical advice for individual conditions or treatments   Talk to your doctor, nurse or pharmacist before following any medical regimen to see if it is safe and effective for you  Chronic Hypertension   AMBULATORY CARE:   Hypertension  is high blood pressure (BP)  Your BP is the force of your blood moving against the walls of your arteries  Normal BP is less than 120/80  Prehypertension is between 120/80 and 139/89  Hypertension is 140/90 or higher  Hypertension causes your BP to get so high that your heart has to work much harder than normal  This can damage your heart  Chronic hypertension is a long-term condition that you can control with a healthy lifestyle or medicines  A controlled blood pressure helps protect your organs, such as your heart, lungs, brain, and kidneys  Common symptoms include the following:   · Headache     · Blurred vision    · Chest pain     · Dizziness or weakness     · Trouble breathing     · Nosebleeds  Call 911 for any of the following:   · You have discomfort in your chest that feels like squeezing, pressure, fullness, or pain  · You become confused or have difficulty speaking  · You suddenly feel lightheaded or have trouble breathing  · You have pain or discomfort in your back, neck, jaw, stomach, or arm  Seek care immediately if:   · You have a severe headache or vision loss  · You have weakness in an arm or leg  Contact your healthcare provider if:   · You feel faint, dizzy, confused, or drowsy  · You have been taking your BP medicine and your BP is still higher than your healthcare provider says it should be  · You have questions or concerns about your condition or care  Treatment for chronic hypertension  may include medicine to lower your BP and lower your cholesterol level  A low cholesterol level helps prevent heart disease and makes it easier to control your blood pressure  Heart disease can make your blood pressure harder to control  You may also need to make lifestyle changes  Take your medicine exactly as directed    Manage chronic hypertension:  Talk with your healthcare provider about these and other ways to manage hypertension:  · Take your BP at home  Sit and rest for 5 minutes before you take your BP  Extend your arm and support it on a flat surface  Your arm should be at the same level as your heart  Follow the directions that came with your BP monitor  If possible, take at least 2 BP readings each time  Take your BP at least twice a day at the same times each day, such as morning and evening  Keep a record of your BP readings and bring it to your follow-up visits  Ask your healthcare provider what your blood pressure should be  · Limit sodium (salt) as directed  Too much sodium can affect your fluid balance  Check labels to find low-sodium or no-salt-added foods  Some low-sodium foods use potassium salts for flavor  Too much potassium can also cause health problems  Your healthcare provider will tell you how much sodium and potassium are safe for you to have in a day  He or she may recommend that you limit sodium to 2,300 mg a day  · Follow the meal plan recommended by your healthcare provider  A dietitian or your provider can give you more information on low-sodium plans or the DASH (Dietary Approaches to Stop Hypertension) eating plan  The DASH plan is low in sodium, unhealthy fats, and total fat  It is high in potassium, calcium, and fiber  · Exercise to maintain a healthy weight  Exercise at least 30 minutes per day, on most days of the week  This will help decrease your blood pressure  Ask about the best exercise plan for you  · Decrease stress  This may help lower your BP  Learn ways to relax, such as deep breathing or listening to music  · Limit alcohol  Women should limit alcohol to 1 drink a day  Men should limit alcohol to 2 drinks a day  A drink of alcohol is 12 ounces of beer, 5 ounces of wine, or 1½ ounces of liquor  · Do not smoke    Nicotine and other chemicals in cigarettes and cigars can increase your BP and also cause lung damage  Ask your healthcare provider for information if you currently smoke and need help to quit  E-cigarettes or smokeless tobacco still contain nicotine  Talk to your healthcare provider before you use these products  Follow up with your healthcare provider as directed: You will need to return to have your BP checked and to have other lab tests done  Write down your questions so you remember to ask them during your visits  © 2017 [address] [employer] Information is for End User's use only and may not be sold, redistributed or otherwise used for commercial purposes  All illustrations and images included in CareNotes® are the copyrighted property of [employer]  or Abdirashid De La [employer]  The above information is an  only  It is not intended as medical advice for individual conditions or treatments  Talk to your doctor, nurse or pharmacist before following any medical regimen to see if it is safe and effective for you  Diabetes in the Older Adult   WHAT YOU NEED TO KNOW:   Older adults with diabetes are at risk for heart disease, stroke, kidney disease, blindness, and nerve damage   You may also be at risk for any of the following:  · Poor nutrition or low blood sugar levels    · Confusion or problems with memory, attention, or learning new things    · Trouble controlling urination or frequent urinary tract infections    · Trouble with coordination or balance    · Falls and injuries    · Pain    · Depression    · Open sores on your legs or feet  DISCHARGE INSTRUCTIONS:   Call 911 for any of the following:   · You have any of the following signs of a stroke:      ¨ Numbness or drooping on one side of your face     ¨ Weakness in an arm or leg    ¨ Confusion or difficulty speaking    ¨ Dizziness, a severe headache, or vision loss    · You have any of the following signs of a heart attack:      ¨ Squeezing, pressure, or pain in your chest that lasts longer than 5 minutes or returns    ¨ Discomfort or pain in your back, neck, jaw, stomach, or arm     ¨ Trouble breathing    ¨ Nausea or vomiting    ¨ Lightheadedness or a sudden cold sweat, especially with chest pain or trouble breathing  Return to the emergency department if:   · You have severe abdominal pain, or the pain spreads to your back  You may also be vomiting  · You have trouble staying awake or focusing  · You are shaking or sweating  · You have blurred or double vision  · Your breath has a fruity, sweet smell  · Your breathing is deep and labored, or rapid and shallow  · Your heartbeat is fast and weak  · You fall and get hurt  Contact your healthcare provider if:   · You are vomiting or have diarrhea  · You have an upset stomach and cannot eat the foods on your meal plan  · You feel weak or more tired than usual      · You feel dizzy, have headaches, or are easily irritated  · Your skin is red, warm, dry, or swollen  · You have a wound that does not heal      · You have numbness in your arms or legs  · You have trouble coping with your illness, or you feel anxious or depressed  · You have problems with your memory  · You have changes in your vision  · You have questions or concerns about your condition or care  Medicines  may be given to decrease the amount of sugar in your blood  You may also need medicine to lower your blood pressure or cholesterol, or medicine to prevent blood clots  Manage the ABCs and prevent problems caused by diabetes:   · Check your blood sugar levels as directed  Your healthcare provider will tell you when and how often to check during the day  Your healthcare provider will also tell you what your blood sugar levels should be before and after a meal  You may need to check for ketones in your urine or blood if your level is higher than directed  Write down your results and show them to your healthcare provider   Your provider may use the results to make changes to your medicine, food, or exercise schedules  Ask your healthcare provider for more information about how to treat a high or low blood sugar level  · Follow your meal plan as directed  A dietitian will help you make a meal plan to keep your blood sugar level steady and make sure you get enough nutrition  Do not skip meals  Your blood sugar level may drop too low if you have taken diabetes medicine and do not eat  Ask your healthcare provider about programs in your community that can deliver the meals to your home  · Try to be active for 30 to 60 minutes most days of the week  Exercise can help keep your blood sugar level steady, decrease your risk of heart disease, and help you lose weight  It can also help improve your balance and decrease your risk for falls  Work with your healthcare provider to create an exercise plan  Ask a family member or friend to exercise with you  Start slow and exercise for 5 to 10 minutes at a time  Examples of activities include walking or swimming  Include muscle strengthening activities 2 to 3 days each week  Include balance training 2 to 3 times each week  Activities that help increase balance include yoga and maikol chi      · Maintain a healthy weight  Ask your healthcare provider how much you should weigh  A healthy weight can help you control your diabetes and prevent heart disease  Ask your provider to help you create a weight loss plan if you are overweight  Together you can set manageable weight loss goals  · Do not smoke  Ask your healthcare provider for information if you currently smoke and need help to quit  Do not use e-cigarettes or smokeless tobacco in place of cigarettes or to help you quit  They still contain nicotine  · Manage stress  Stress may increase your blood sugar level  Deep breathing, muscle relaxation, and music may help you relax  Ask your healthcare provider for more information about these practices    Other ways to manage your diabetes:   · Check your feet every day for sores  Look at your whole foot, including the bottom, and between and under your toes  Check for wounds, corns, and calluses  Use a mirror to see the bottom of your feet  The skin on your feet may be shiny, tight, dry, or darker than normal  Your feet may also be cold and pale  Feel your feet by running your hands along the tops, bottoms, sides, and between your toes  Redness, swelling, and warmth are signs of blood flow problems that can lead to a foot ulcer  Do not try to remove corns or calluses yourself  · Wear medical alert identification  Wear medical alert jewelry or carry a card that says you have diabetes  Ask your healthcare provider where to get these items  · Ask about vaccines  You have a higher risk for serious illness if you get the flu, pneumonia, or hepatitis  Ask your healthcare provider if you should get a flu, pneumonia, shingles, or hepatitis B vaccine, and when to get the vaccine  · Keep all appointments  You may need to return to have your A1c checked every 3 months  You will need to return at least once each year to have your feet checked  You will need an eye exam once a year to check for retinopathy  You will also need urine tests every year to check for kidney problems  You may need tests to monitor for heart disease  Write down your questions so you remember to ask them during your visits  · Get help from family and friends  You may need help checking your blood sugar level, giving insulin injections, or preparing your meals  Ask your family and friends to help you with these tasks  Talk to your healthcare provider if you do not have someone at home to help you  A healthcare provider can come to your home to help you with these tasks  Follow up with your healthcare provider as directed: You may need to return to have your A1c checked every 3 months   You will need to return at least once each year to have your feet checked  You will need an eye exam once a year to check for retinopathy  You will also need urine tests every year to check for kidney problems  You may need tests to monitor for heart disease  Write down your questions so you remember to ask them during your visits  © 2017 [address] [employer] Information is for End User's use only and may not be sold, redistributed or otherwise used for commercial purposes  All illustrations and images included in CareNotes® are the copyrighted property of [employer]  or [employer]  The above information is an  only  It is not intended as medical advice for individual conditions or treatments  Talk to your doctor, nurse or pharmacist before following any medical regimen to see if it is safe and effective for you

## 2018-06-08 NOTE — PROGRESS NOTES
Assessment/Plan:    No problem-specific Assessment & Plan notes found for this encounter  Diagnoses and all orders for this visit:    Other hyperlipidemia    Bruising    Thrombocytopenia ([facility] [address] )  -     Ambulatory referral to Hematology / Oncology; Future    Essential hypertension    Chronic combined systolic and diastolic congestive heart failure (HCC)    Type 2 diabetes mellitus without complication, with long-term current use of insulin (HCC)    Seborrheic dermatitis  -     selenium sulfide (SELSUN) 2 5 % shampoo; Apply topically daily as needed for dandruff          Hyperlipidemia: Continue current treatment  LDL at goal     Chronic combined systolic and diastolic congestive heart failure:  Euvolemic in the office today  Continue current treatment  Follow up with Cardiology as scheduled  Hypertension:  Well controlled in the office today  Continue current regimen  Subjective:      Patient ID: Laci Cotton is a [de-identified] y o  female  She has easy bruising  She has a mild thrombocytopenia with a platelet count of 32,384  She has no spontaneous bleeding  Her A1c has been at goal  It is 7 1% in the office today  She has no hypoglycemic events  She brings a list of her blood sugars today which are largely at goal     Her blood pressure is well controlled on her current regimen  She has no recent chest pain or shortness of breath  She has no headache or vision changes  Her LDL is at goal   She is on high-intensity statin therapy  She has no myalgia or muscle weakness  She has seborrheic dermatitis  She has chronic CHF  She is euvolemic in the office today  She has no PND or orthopnea  She has no lower extremity edema  The following portions of the patient's history were reviewed and updated as appropriate:   She  has a past medical history of Trigger finger of thumb    She   Patient Active Problem List    Diagnosis Date Noted    Thrombocytopenia ([facility] [address] ) 06/08/2018    Bruising 05/22/2018    Closed fracture of pelvis with routine healing 03/09/2018    Type 2 diabetes mellitus without complication, with long-term current use of insulin ([facility] [address] ) 03/09/2018    Essential hypertension 03/09/2018    Chronic combined systolic and diastolic congestive heart failure ([facility] [address] ) 03/09/2018    Other hyperlipidemia 03/09/2018     She  has a past surgical history that includes Appendectomy; Coronary artery bypass graft; Cholecystectomy; Colonoscopy; Esophagogastroduodenoscopy; Hernia repair; Cardiac pacemaker placement; and Total abdominal hysterectomy  Her family history includes Breast cancer in her mother; Lung disease in her father and mother  She  reports that she has never smoked  She has never used smokeless tobacco  She reports that she does not drink alcohol or use drugs    Current Outpatient Prescriptions   Medication Sig Dispense Refill    acetaminophen-codeine (TYLENOL/CODEINE #3) 300-30 MG per tablet Take 1 tablet by mouth 3 (three) times a day as needed      aspirin 81 MG tablet Take by mouth      BD PEN NEEDLE MARINO U/F 32G X 4 MM MISC USE DAILY AS DIRECTED 200 each 5    carvedilol (COREG) 25 mg tablet Take 12 5 mg by mouth 2 (two) times a day with meals        clopidogrel (PLAVIX) 75 mg tablet TAKE 1 TABLET DAILY 90 tablet 3    desloratadine (CLARINEX) 5 MG tablet Take by mouth      diazepam (VALIUM) 5 mg tablet Take by mouth      docusate sodium (COLACE) 100 mg capsule Take 100 mg by mouth 2 (two) times a day      Ferrous Fumarate 324 (106 Fe) MG TABS Take 1 tablet by mouth daily      fluticasone (FLONASE) 50 mcg/act nasal spray 2 sprays into each nostril daily      fluticasone-salmeterol (ADVAIR DISKUS) 250-50 mcg/dose inhaler Inhale every 12 (twelve) hours      furosemide (LASIX) 20 mg tablet TAKE 1 TABLET DAILY AS DIRECTED 90 tablet 3    glucose blood test strip Use as instructed 100 each 5    insulin glargine (LANTUS SOLOSTAR) injection pen 100 units/mL Inject under the skin      insulin regular (NOVOLIN R) 100 units/mL injection Inject as directed      lisinopril (ZESTRIL) 10 mg tablet Take 1 tablet by mouth daily      metoclopramide (REGLAN) 10 mg tablet Take 1 tablet by mouth 3 (three) times a day      nitroglycerin (NITRODUR) 0 2 mg/hr APPLY 1 PATCH FOR 12 TO 14 HOURS DAILY, THEN REMOVE 30 patch 3    pantoprazole (PROTONIX) 40 mg tablet TAKE 1 TABLET DAILY 90 tablet 3    polyethylene glycol (GLYCOLAX) powder Take by mouth daily      rosuvastatin (CRESTOR) 10 MG tablet TAKE 1 TABLET AT BEDTIME 90 tablet 3    senna (SENOKOT) 8 6 MG tablet Take 1 tablet by mouth daily      sertraline (ZOLOFT) 25 mg tablet Take 1 tablet by mouth daily      triamcinolone (KENALOG) 0 025 % cream Apply topically 3 (three) times a day      triamcinolone (KENALOG) 0 1 % cream Apply topically 2 (two) times a day 30 g 0    selenium sulfide (SELSUN) 2 5 % shampoo Apply topically daily as needed for dandruff 118 mL 0     No current facility-administered medications for this visit        Current Outpatient Prescriptions on File Prior to Visit   Medication Sig    acetaminophen-codeine (TYLENOL/CODEINE #3) 300-30 MG per tablet Take 1 tablet by mouth 3 (three) times a day as needed    aspirin 81 MG tablet Take by mouth    BD PEN NEEDLE MARINO U/F 32G X 4 MM MISC USE DAILY AS DIRECTED    carvedilol (COREG) 25 mg tablet Take 12 5 mg by mouth 2 (two) times a day with meals      clopidogrel (PLAVIX) 75 mg tablet TAKE 1 TABLET DAILY    desloratadine (CLARINEX) 5 MG tablet Take by mouth    diazepam (VALIUM) 5 mg tablet Take by mouth    docusate sodium (COLACE) 100 mg capsule Take 100 mg by mouth 2 (two) times a day    Ferrous Fumarate 324 (106 Fe) MG TABS Take 1 tablet by mouth daily    fluticasone (FLONASE) 50 mcg/act nasal spray 2 sprays into each nostril daily    fluticasone-salmeterol (ADVAIR DISKUS) 250-50 mcg/dose inhaler Inhale every 12 (twelve) hours    furosemide (LASIX) 20 mg tablet TAKE 1 TABLET DAILY AS DIRECTED    glucose blood test strip Use as instructed    insulin glargine (LANTUS SOLOSTAR) injection pen 100 units/mL Inject under the skin    insulin regular (NOVOLIN R) 100 units/mL injection Inject as directed    lisinopril (ZESTRIL) 10 mg tablet Take 1 tablet by mouth daily    metoclopramide (REGLAN) 10 mg tablet Take 1 tablet by mouth 3 (three) times a day    nitroglycerin (NITRODUR) 0 2 mg/hr APPLY 1 PATCH FOR 12 TO 14 HOURS DAILY, THEN REMOVE    pantoprazole (PROTONIX) 40 mg tablet TAKE 1 TABLET DAILY    polyethylene glycol (GLYCOLAX) powder Take by mouth daily    rosuvastatin (CRESTOR) 10 MG tablet TAKE 1 TABLET AT BEDTIME    senna (SENOKOT) 8 6 MG tablet Take 1 tablet by mouth daily    sertraline (ZOLOFT) 25 mg tablet Take 1 tablet by mouth daily    triamcinolone (KENALOG) 0 025 % cream Apply topically 3 (three) times a day    triamcinolone (KENALOG) 0 1 % cream Apply topically 2 (two) times a day     No current facility-administered medications on file prior to visit  She is allergic to other       Review of Systems   All other systems reviewed and are negative  Objective:      /68 (BP Location: Right arm, Patient Position: Sitting, Cuff Size: Standard)   Pulse 86   Resp 14   Ht 5' (1 524 m)   Wt 66 4 kg (146 lb 6 4 oz)   SpO2 94%   BMI 28 59 kg/m²          Physical Exam   Constitutional: She is oriented to person, place, and time  She appears well-developed and well-nourished  Neck: Normal range of motion  Neck supple  Cardiovascular: Normal rate, regular rhythm, normal heart sounds and intact distal pulses  Pulmonary/Chest: Effort normal and breath sounds normal    Abdominal: Soft  Bowel sounds are normal    Musculoskeletal: Normal range of motion  Neurological: She is alert and oriented to person, place, and time  She has normal reflexes  Skin: Skin is warm and dry  Psychiatric: She has a normal mood and affect   Her behavior is normal  Judgment and thought content normal    Nursing note and vitals reviewed

## 2018-06-18 ENCOUNTER — DOCTOR'S OFFICE (OUTPATIENT)
Dept: URBAN - NONMETROPOLITAN AREA CLINIC 1 | Facility: CLINIC | Age: 83
Setting detail: OPHTHALMOLOGY
End: 2018-06-18
Payer: COMMERCIAL

## 2018-06-18 DIAGNOSIS — H04.121: ICD-10-CM

## 2018-06-18 DIAGNOSIS — H02.834: ICD-10-CM

## 2018-06-18 DIAGNOSIS — H04.122: ICD-10-CM

## 2018-06-18 DIAGNOSIS — H04.123: ICD-10-CM

## 2018-06-18 DIAGNOSIS — H02.831: ICD-10-CM

## 2018-06-18 DIAGNOSIS — D21.0: ICD-10-CM

## 2018-06-18 DIAGNOSIS — H26.493: ICD-10-CM

## 2018-06-18 PROCEDURE — 83861 MICROFLUID ANALY TEARS: CPT | Performed by: OPHTHALMOLOGY

## 2018-06-18 PROCEDURE — 99214 OFFICE O/P EST MOD 30 MIN: CPT | Performed by: OPHTHALMOLOGY

## 2018-06-18 ASSESSMENT — SUPERFICIAL PUNCTATE KERATITIS (SPK)
OS_SPK: ABSENT
OD_SPK: ABSENT

## 2018-06-18 ASSESSMENT — CONFRONTATIONAL VISUAL FIELD TEST (CVF)
OD_FINDINGS: FULL
OS_FINDINGS: FULL

## 2018-06-18 ASSESSMENT — LID EXAM ASSESSMENTS
OS_TRICHIASIS: ABSENT
OD_TRICHIASIS: ABSENT

## 2018-06-18 ASSESSMENT — LID POSITION - DERMATOCHALASIS
OD_DERMATOCHALASIS: RUL 2+
OS_DERMATOCHALASIS: LUL 1+

## 2018-06-21 DIAGNOSIS — E78.5 HYPERLIPIDEMIA, UNSPECIFIED HYPERLIPIDEMIA TYPE: ICD-10-CM

## 2018-06-21 DIAGNOSIS — K21.9 GASTROESOPHAGEAL REFLUX DISEASE, ESOPHAGITIS PRESENCE NOT SPECIFIED: Primary | ICD-10-CM

## 2018-06-21 DIAGNOSIS — I25.118 CORONARY ARTERY DISEASE OF NATIVE ARTERY OF NATIVE HEART WITH STABLE ANGINA PECTORIS (HCC): ICD-10-CM

## 2018-06-21 RX ORDER — ROSUVASTATIN CALCIUM 10 MG/1
10 TABLET, COATED ORAL
Qty: 90 TABLET | Refills: 3 | Status: SHIPPED | OUTPATIENT
Start: 2018-06-21 | End: 2019-10-29 | Stop reason: SDUPTHER

## 2018-06-21 RX ORDER — METOCLOPRAMIDE 10 MG/1
10 TABLET ORAL 3 TIMES DAILY
Qty: 270 TABLET | Refills: 3 | Status: SHIPPED | OUTPATIENT
Start: 2018-06-21 | End: 2018-12-13 | Stop reason: SDUPTHER

## 2018-06-21 RX ORDER — NITROGLYCERIN 40 MG/1
1 PATCH TRANSDERMAL DAILY
Qty: 90 PATCH | Refills: 3 | Status: SHIPPED | OUTPATIENT
Start: 2018-06-21 | End: 2019-10-22 | Stop reason: SDUPTHER

## 2018-07-05 ASSESSMENT — SPHEQUIV_DERIVED
OD_SPHEQUIV: -0.5
OD_SPHEQUIV: -0.25
OS_SPHEQUIV: -0.625
OS_SPHEQUIV: 1.125

## 2018-07-05 ASSESSMENT — REFRACTION_CURRENTRX
OD_OVR_VA: 20/
OS_ADD: +2.75
OD_OVR_VA: 20/
OS_OVR_VA: 20/
OD_SPHERE: +1.00
OS_OVR_VA: 20/
OS_SPHERE: +2.00
OS_VPRISM_DIRECTION: BF
OS_AXIS: 30
OD_CYLINDER: -3.00
OD_AXIS: 176
OD_ADD: +2.75
OS_CYLINDER: -2.75
OS_OVR_VA: 20/
OD_OVR_VA: 20/
OD_VPRISM_DIRECTION: BF

## 2018-07-05 ASSESSMENT — REFRACTION_MANIFEST
OS_VA2: 20/60-1
OD_SPHERE: +1.00
OD_CYLINDER: -3.00
OU_VA: 20/
OD_VA2: 20/
OD_VA3: 20/
OU_VA: 20/
OS_VA1: 20/
OS_VA1: 20/60-1
OS_VA3: 20/
OD_ADD: +2.75
OS_CYLINDER: -2.75
OD_VA2: 20/50-2
OD_VA3: 20/
OS_AXIS: 177
OD_VA1: 20/
OS_VA3: 20/
OD_VA1: 20/50-2
OS_SPHERE: +0.75
OS_VA2: 20/
OD_AXIS: 180
OS_VA2: 20/
OD_VA1: 20/
OD_VA2: 20/
OS_VA1: 20/
OU_VA: 20/
OS_ADD: +2.75
OS_VA3: 20/
OD_VA3: 20/

## 2018-07-05 ASSESSMENT — VISUAL ACUITY
OS_BCVA: 20/60
OD_BCVA: 20/50+2

## 2018-07-05 ASSESSMENT — REFRACTION_AUTOREFRACTION
OD_SPHERE: +1.25
OS_CYLINDER: -2.25
OS_AXIS: 006
OD_CYLINDER: -3.00
OS_SPHERE: +2.25
OD_AXIS: 002

## 2018-07-09 ENCOUNTER — OFFICE VISIT (OUTPATIENT)
Dept: HEMATOLOGY ONCOLOGY | Facility: HOSPITAL | Age: 83
End: 2018-07-09
Payer: MEDICARE

## 2018-07-09 ENCOUNTER — TELEPHONE (OUTPATIENT)
Dept: HEMATOLOGY ONCOLOGY | Facility: HOSPITAL | Age: 83
End: 2018-07-09

## 2018-07-09 VITALS
BODY MASS INDEX: 27.96 KG/M2 | HEART RATE: 95 BPM | SYSTOLIC BLOOD PRESSURE: 122 MMHG | HEIGHT: 60 IN | WEIGHT: 142.4 LBS | DIASTOLIC BLOOD PRESSURE: 86 MMHG | OXYGEN SATURATION: 98 % | RESPIRATION RATE: 19 BRPM | TEMPERATURE: 96.3 F

## 2018-07-09 DIAGNOSIS — D69.6 THROMBOCYTOPENIA (HCC): Primary | ICD-10-CM

## 2018-07-09 PROCEDURE — 99203 OFFICE O/P NEW LOW 30 MIN: CPT | Performed by: INTERNAL MEDICINE

## 2018-07-09 RX ORDER — IPRATROPIUM BROMIDE AND ALBUTEROL SULFATE 2.5; .5 MG/3ML; MG/3ML
1 SOLUTION RESPIRATORY (INHALATION) EVERY 4 HOURS PRN
COMMUNITY
Start: 2013-04-15 | End: 2020-08-05 | Stop reason: HOSPADM

## 2018-07-09 RX ORDER — BLOOD SUGAR DIAGNOSTIC
STRIP MISCELLANEOUS
COMMUNITY
Start: 2013-12-30 | End: 2018-10-24 | Stop reason: SDUPTHER

## 2018-07-09 NOTE — PROGRESS NOTES
7/9/2018    Debbie Díaz was seen in consultation today in regards to thrombocytopenia  She is [de-identified]years old and notes easy bruising on long-term aspirin 81 mg daily and clopidogrel 75 mg daily  Review of Systems:     General:  She has noted easy fatigue in the past 7 years and has stayed primarily at home  She has mild night sweats ever since reaching menopause  Head and Neck: No nosebleeds or gingival bleeding, no oral cavity or throat soreness  Cardiovascular: No chest pain, no lower extremity edema  She notes lightheadedness when arising from a stooping position  Respriatory:  She has a dry cough a few times per day aggravated by talking  She notes chronic dyspnea on exertion  GI: Appetite is good, no abdominal pain, b she has loose bowel movements a few times per month particularly when eating foods with higher fat content  :  She notes nocturia x1  Musculoskeletal:  She has chronic arthritic discomfort of the lower back, hips and knees  Skin: No skin rash  Neurological: No headache, no numbness, no focal weakness    Hematologic: No easy bruising  Psychiatric: No emotional problems    Medications:    Current Outpatient Prescriptions   Medication Sig Dispense Refill    aspirin 81 MG tablet Take by mouth      BD PEN NEEDLE MARINO U/F 32G X 4 MM MISC USE DAILY AS DIRECTED 200 each 5    carvedilol (COREG) 25 mg tablet Take 12 5 mg by mouth 2 (two) times a day with meals        clopidogrel (PLAVIX) 75 mg tablet TAKE 1 TABLET DAILY 90 tablet 3    desloratadine (CLARINEX) 5 MG tablet Take by mouth      docusate sodium (COLACE) 100 mg capsule Take 100 mg by mouth 2 (two) times a day      Ferrous Fumarate 324 (106 Fe) MG TABS Take 1 tablet by mouth daily      fluticasone (FLONASE) 50 mcg/act nasal spray 2 sprays into each nostril daily      fluticasone-salmeterol (ADVAIR DISKUS) 250-50 mcg/dose inhaler Inhale every 12 (twelve) hours      furosemide (LASIX) 20 mg tablet TAKE 1 TABLET DAILY AS DIRECTED 90 tablet 3    glucose blood test strip Use as instructed 100 each 5    insulin glargine (LANTUS SOLOSTAR) injection pen 100 units/mL Inject under the skin      Insulin Pen Needle (BD PEN NEEDLE MARINO U/F) 32G X 4 MM MISC by Does not apply route daily      insulin regular (NOVOLIN R) 100 units/mL injection Inject as directed      Insulin Syringe-Needle U-100 (B-D INS SYRINGE 0 5CC/31GX5/16) 31G X 5/16" 0 5 ML MISC by Does not apply route      ipratropium-albuterol (DUO-NEB) 0 5-2 5 mg/3 mL nebulizer solution Inhale 1 each every 4 (four) hours as needed      lisinopril (ZESTRIL) 10 mg tablet Take 1 tablet by mouth daily      metoclopramide (REGLAN) 10 mg tablet Take 1 tablet (10 mg total) by mouth 3 (three) times a day 270 tablet 3    nitroglycerin (NITRODUR) 0 2 mg/hr Place 1 patch on the skin daily For 12 to 14 hours then remove 90 patch 3    pantoprazole (PROTONIX) 40 mg tablet TAKE 1 TABLET DAILY 90 tablet 3    rosuvastatin (CRESTOR) 10 MG tablet Take 1 tablet (10 mg total) by mouth daily at bedtime 90 tablet 3    selenium sulfide (SELSUN) 2 5 % shampoo Apply topically daily as needed for dandruff 118 mL 0    senna (SENOKOT) 8 6 MG tablet Take 1 tablet by mouth daily      sertraline (ZOLOFT) 25 mg tablet Take 1 tablet by mouth daily      triamcinolone (KENALOG) 0 1 % cream Apply topically 2 (two) times a day 30 g 0     No current facility-administered medications for this visit  Past Medical History:    Essential hypertension  Chronic combined systolic and diastolic congestive heart failure  Type 2 diabetes mellitus with long-term use of insulin  Hyperlipidemia    Past Surgical History:    Coronary bypass graft surgery  at [facility] in [place]    Family History:    Her mother  of lung cancer  Her father  of [employer] lung disease    She had 6 siblings, 1  in infancy, a brother  of pancreatic cancer, a sister  of unknown cause, a sister  of complications of long-term mental retardation, 2 other sisters ages 80 and 80 are in good health  She has 5 children, a daughter with diabetes mellitus and CVA in her 45s, a daughter in good health, twin sons 1 of whom has diabetes mellitus and another son who is in remission of throat cancer  Social History:    She has been  for 31 years  She lives in a high rise apartment  She smoked 1 pack per day of cigarettes for 50 years and quit cigarette smoking in   She has not had any alcoholic beverages since 5424  Physical Examination:    General appearance: Appears well  Head: Normocephalic  Eyes: Extraocular movements intact  Ears: No gross hearing deficit  Oropharynx: Clear  Neck: Supple, No lymphadenopathy  Chest: No axillary adenopathy  Lungs: Clear to auscultation bilaterally  Heart: Regular rate and rhythm  Abdomen: No hepatic or splenic enlargement; No inguinal  lymphadenopathy  Extremities: No lower extremity edema bilaterally  Skin: No rashes  She has a few faded upper extremity bruises  Neurologic: Grossly intact, no focal neurological deficit, she uses a cane to assist in balance, she was unable to pull herself up onto the examination table (and therefore with examine sitting in chair )  Psychiatric: Oriented to person, place and time, normal mood and affect    ECOG 2    Laboratory:    From 2017:  Platelets are 90  From 2018:  Platelets are 083, PT INR is 1 15 and APTT is 27 7 seconds    From May 23, 2018:  Creatinine 1 13, alk-phos 178 (), bili 0 4, AST 11, ALT 14, WBC 5 2, hemoglobin 11 9, platelets 96, on WBC differential neutrophils 57 percent, lymphs 30 percent, monos 10 percent, eos 2 percent, basos 1 percent, TSH 1 35    From 2018:  Heparin associated platelet antibody is 0 097 (<0 400)    Assessment: Thrombocytopenia, mild, chronic dating back to at least 2017    There is no obvious medication toxicity to explain the thrombocytopenia  There could be immune type thrombocytopenia  Thrombocytopenia may result from right-sided congestive heart failure with associated chronic passive congestion of liver and portal hypertension  A primary bone marrow disorder can not be ruled out but is less likely  Recommendations:    Further evaluation is to begin with vitamin M44 and folic acid levels and deficiencies corrected accordingly  Ultrasound of the abdomen is recommended to assess for hepatic and splenic enlargement and evidence of portal hypertension  CBC/differential is to be monitored every 3-6 months and then less often if the platelet count remains stable  In the case of progressive thrombocytopenia or other blood count abnormalities without explanation then bone marrow examination may be helpful to evaluate for an underlying primary bone marrow disorder  The patient and her daughter Nona Freire who was with her in the office today aware seek medical attention for nose bleeds, gingival bleeding, or other bleeding, excessive bruising, excessive fatigue, or if other new problems arise  The patient indicated that she prefers hematology follow-up in the [place], [place] area which is closer to her residence  Accordingly, arrangements are to be made for follow-up with Dr Lalit Loja  Please not hesitate to let us know if we can be of further assistance in the care of Fouzia Levine

## 2018-07-09 NOTE — PATIENT INSTRUCTIONS
The patient and her daughter Lidya Austin who was with her in the office today aware seek medical attention for nose bleeds, gingival bleeding, or other bleeding, excessive bruising, excessive fatigue, or if other new problems arise

## 2018-07-09 NOTE — TELEPHONE ENCOUNTER
Did not set up appointment in 3 months , wants to speak to Dr Kalee Rodriguez PCP to clarify has another appointment set up with Bernie Nichols on Friday 6/13/18  Daughter will call to schedule if needed

## 2018-07-12 ENCOUNTER — HOSPITAL ENCOUNTER (OUTPATIENT)
Dept: ULTRASOUND IMAGING | Facility: HOSPITAL | Age: 83
Discharge: HOME | End: 2018-07-12
Attending: INTERNAL MEDICINE
Payer: MEDICARE

## 2018-07-12 DIAGNOSIS — D69.6 THROMBOCYTOPENIA (HCC): ICD-10-CM

## 2018-07-12 PROCEDURE — 76700 US EXAM ABDOM COMPLETE: CPT

## 2018-07-19 ENCOUNTER — TELEPHONE (OUTPATIENT)
Dept: HEMATOLOGY ONCOLOGY | Facility: CLINIC | Age: 83
End: 2018-07-19

## 2018-07-19 NOTE — TELEPHONE ENCOUNTER
Patient is calling regarding her US that she had done on 7/12/18  Please call the patient back with results

## 2018-07-23 ENCOUNTER — TELEPHONE (OUTPATIENT)
Dept: HEMATOLOGY ONCOLOGY | Facility: HOSPITAL | Age: 83
End: 2018-07-23

## 2018-07-23 NOTE — TELEPHONE ENCOUNTER
Pt called to check on her ultrasound results  Left message for Dr MEDINA to call her back on his desk

## 2018-07-24 ENCOUNTER — TELEPHONE (OUTPATIENT)
Dept: HEMATOLOGY ONCOLOGY | Facility: CLINIC | Age: 83
End: 2018-07-24

## 2018-07-24 ENCOUNTER — TELEPHONE (OUTPATIENT)
Dept: HEMATOLOGY ONCOLOGY | Facility: HOSPITAL | Age: 83
End: 2018-07-24

## 2018-07-24 DIAGNOSIS — N20.0 RENAL CALCULI: ICD-10-CM

## 2018-07-24 DIAGNOSIS — N26.1 ATROPHY OF LEFT KIDNEY: Primary | ICD-10-CM

## 2018-07-24 NOTE — TELEPHONE ENCOUNTER
Per Dr Radha Bills pt needs to be seen by Urologist  Pt has seen Dr Pili Gomez in the past who recently relocated to [address] [place] [address] [address] [place] [address] their phone number is   I called their office and was able to get pt in to be seen, the soonest they are able to get pt in is 8/13/18 @ 1:30pm   I called pt and explained to her that she needed to be seen by Dr Pili Gomez and gave her the appointment date and time along with the change of address and phone number

## 2018-07-24 NOTE — PROGRESS NOTES
I spoke to patient by telephone and reviewed ultrasound of the abdomen from July 12, 2018  Liver and spleen are of normal size and there is no evidence of chronic passive congestion of liver or portal hypertension to explain the thrombocytopenia  The patient has not yet had the vitamin J93 and folic acid levels done as recommended  On the ultrasound of the abdomen there is incidentally noted moderate left renal atrophy, age indeterminate with mild fullness/hydronephrosis of the collecting system and 2 nonobstructing intrarenal calculi of the right kidney  The patient is in favor of referral to a urologist in regards to the kidney findings, she prefers to see a urologist in the [facility] for convenience of transportation  The patient is aware seek medical attention for nose bleeds, gingival bleeding, or other bleeding, excessive bruising, excessive fatigue, or if other new problems arise

## 2018-07-24 NOTE — TELEPHONE ENCOUNTER
Pt's daughter is calling because she wants Dr MEDINA to review her mothers ultrasound with her   She can be reached at #[phone] anytime after 4 pm

## 2018-07-24 NOTE — TELEPHONE ENCOUNTER
Left message on VM to call w/any questions  Mother is set up to see urologist Dr Bailey Course 52 @3542 post Dr MEDINA's review of recent ultrasound  We will forward the most recent ultrasound and office note to the urologist post our call w/ the uro office today

## 2018-08-27 DIAGNOSIS — E11.9 TYPE 2 DIABETES MELLITUS WITHOUT COMPLICATION, WITH LONG-TERM CURRENT USE OF INSULIN (HCC): Primary | ICD-10-CM

## 2018-08-27 DIAGNOSIS — Z79.4 TYPE 2 DIABETES MELLITUS WITHOUT COMPLICATION, WITH LONG-TERM CURRENT USE OF INSULIN (HCC): Primary | ICD-10-CM

## 2018-09-06 ENCOUNTER — OFFICE VISIT (OUTPATIENT)
Dept: FAMILY MEDICINE CLINIC | Facility: CLINIC | Age: 83
End: 2018-09-06
Payer: MEDICARE

## 2018-09-06 DIAGNOSIS — I10 ESSENTIAL HYPERTENSION: ICD-10-CM

## 2018-09-06 DIAGNOSIS — E11.8 TYPE 2 DIABETES MELLITUS WITH COMPLICATION, WITH LONG-TERM CURRENT USE OF INSULIN (HCC): Primary | ICD-10-CM

## 2018-09-06 DIAGNOSIS — Z23 NEED FOR INFLUENZA VACCINATION: Primary | ICD-10-CM

## 2018-09-06 DIAGNOSIS — Z79.4 TYPE 2 DIABETES MELLITUS WITH COMPLICATION, WITH LONG-TERM CURRENT USE OF INSULIN (HCC): Primary | ICD-10-CM

## 2018-09-06 DIAGNOSIS — D69.6 THROMBOCYTOPENIA (HCC): ICD-10-CM

## 2018-09-06 DIAGNOSIS — Z79.4 TYPE 2 DIABETES MELLITUS WITHOUT COMPLICATION, WITH LONG-TERM CURRENT USE OF INSULIN (HCC): ICD-10-CM

## 2018-09-06 DIAGNOSIS — E78.49 OTHER HYPERLIPIDEMIA: ICD-10-CM

## 2018-09-06 DIAGNOSIS — E11.9 TYPE 2 DIABETES MELLITUS WITHOUT COMPLICATION, WITH LONG-TERM CURRENT USE OF INSULIN (HCC): ICD-10-CM

## 2018-09-06 LAB — SL AMB POCT HEMOGLOBIN AIC: 7

## 2018-09-06 PROCEDURE — 99214 OFFICE O/P EST MOD 30 MIN: CPT | Performed by: FAMILY MEDICINE

## 2018-09-06 PROCEDURE — G0008 ADMIN INFLUENZA VIRUS VAC: HCPCS

## 2018-09-06 PROCEDURE — 83036 HEMOGLOBIN GLYCOSYLATED A1C: CPT

## 2018-09-06 PROCEDURE — 90662 IIV NO PRSV INCREASED AG IM: CPT

## 2018-09-06 NOTE — PROGRESS NOTES
Assessment/Plan:    No problem-specific Assessment & Plan notes found for this encounter  Diagnoses and all orders for this visit:    Need for influenza vaccination  -     influenza vaccine, [id number], high-dose, PF 0 5 mL, for patients 65 yr+ (FLUZONE HIGH-DOSE)    Thrombocytopenia (HCC)  -     Comprehensive metabolic panel; Future  -     TSH, 3rd generation; Future  -     CBC and differential; Future    Other hyperlipidemia  -     Lipid panel; Future  -     Comprehensive metabolic panel; Future  -     TSH, 3rd generation; Future    Essential hypertension  -     Lipid panel; Future  -     Comprehensive metabolic panel; Future  -     Microalbumin / creatinine urine ratio  -     TSH, 3rd generation; Future    Type 2 diabetes mellitus without complication, with long-term current use of insulin (HCC)  -     Lipid panel; Future  -     Comprehensive metabolic panel; Future  -     Microalbumin / creatinine urine ratio  -     TSH, 3rd generation; Future    Other orders  -     Cancel: influenza vaccine, [id number], high-dose, PF 0 5 mL, for patients 65 yr+ (FLUZONE HIGH-DOSE)          Subjective:      Patient ID: Melanie Ya is a 82 y o  female  Her DM is fairly well controlled on her current regimen  Her A1c is 7 0% in the office today  She has no hypoglycemia  Her BP is at goal   She has no CP or SOB  She has no HA or vision changes  Her lipids are at goal   She is on high intensity statin therapy  She has thrombocytopenia  She has no easy bruising or bleeding  She sees Dr Linwood Bailey from podiatry next month  The following portions of the patient's history were reviewed and updated as appropriate:   She  has a past medical history of Trigger finger of thumb    She   Patient Active Problem List    Diagnosis Date Noted    Atrophy of left kidney 07/24/2018    Renal calculi 07/24/2018    Thrombocytopenia ([facility] ) 06/08/2018    Bruising 05/22/2018    Closed fracture of pelvis with routine healing 03/09/2018    Type 2 diabetes mellitus without complication, with long-term current use of insulin ([facility] ) 03/09/2018    Essential hypertension 03/09/2018    Chronic combined systolic and diastolic congestive heart failure ([facility] ) 03/09/2018    Other hyperlipidemia 03/09/2018     She  has a past surgical history that includes Appendectomy; Coronary artery bypass graft; Cholecystectomy; Colonoscopy; Esophagogastroduodenoscopy; Hernia repair; Cardiac pacemaker placement; and Total abdominal hysterectomy  Her family history includes Breast cancer in her mother; Lung disease in her father and mother  She  reports that she has never smoked  She has never used smokeless tobacco  She reports that she does not drink alcohol or use drugs    Current Outpatient Prescriptions   Medication Sig Dispense Refill    aspirin 81 MG tablet Take by mouth      BD PEN NEEDLE MARINO U/F 32G X 4 MM MISC USE DAILY AS DIRECTED 200 each 5    carvedilol (COREG) 25 mg tablet Take 12 5 mg by mouth 2 (two) times a day with meals        clopidogrel (PLAVIX) 75 mg tablet TAKE 1 TABLET DAILY 90 tablet 3    desloratadine (CLARINEX) 5 MG tablet Take by mouth      docusate sodium (COLACE) 100 mg capsule Take 100 mg by mouth 2 (two) times a day      Ferrous Fumarate 324 (106 Fe) MG TABS Take 1 tablet by mouth daily      fluticasone (FLONASE) 50 mcg/act nasal spray 2 sprays into each nostril daily      fluticasone-salmeterol (ADVAIR DISKUS) 250-50 mcg/dose inhaler Inhale every 12 (twelve) hours      furosemide (LASIX) 20 mg tablet TAKE 1 TABLET DAILY AS DIRECTED 90 tablet 3    glucose blood test strip Use as instructed 100 each 5    insulin glargine (LANTUS SOLOSTAR) injection pen 100 units/mL Inject 10 Units under the skin        Insulin Syringe-Needle U-100 (B-D INS SYRINGE 0 5CC/31GX5/16) 31G X 5/16" 0 5 ML MISC by Does not apply route      ipratropium-albuterol (DUO-NEB) 0 5-2 5 mg/3 mL nebulizer solution Inhale 1 each every 4 (four) hours as needed      lisinopril (ZESTRIL) 10 mg tablet Take 1 tablet by mouth daily      metoclopramide (REGLAN) 10 mg tablet Take 1 tablet (10 mg total) by mouth 3 (three) times a day 270 tablet 3    nitroglycerin (NITRODUR) 0 2 mg/hr Place 1 patch on the skin daily For 12 to 14 hours then remove 90 patch 3    pantoprazole (PROTONIX) 40 mg tablet TAKE 1 TABLET DAILY 90 tablet 3    rosuvastatin (CRESTOR) 10 MG tablet Take 1 tablet (10 mg total) by mouth daily at bedtime 90 tablet 3    selenium sulfide (SELSUN) 2 5 % shampoo Apply topically daily as needed for dandruff 118 mL 0    senna (SENOKOT) 8 6 MG tablet Take 1 tablet by mouth daily      sertraline (ZOLOFT) 25 mg tablet Take 1 tablet by mouth daily      triamcinolone (KENALOG) 0 1 % cream Apply topically 2 (two) times a day 30 g 0    Insulin Pen Needle (BD PEN NEEDLE MARINO U/F) 32G X 4 MM MISC by Does not apply route daily      insulin regular (NOVOLIN R) 100 units/mL injection Inject as directed       No current facility-administered medications for this visit        Current Outpatient Prescriptions on File Prior to Visit   Medication Sig    aspirin 81 MG tablet Take by mouth    BD PEN NEEDLE MARINO U/F 32G X 4 MM MISC USE DAILY AS DIRECTED    carvedilol (COREG) 25 mg tablet Take 12 5 mg by mouth 2 (two) times a day with meals      clopidogrel (PLAVIX) 75 mg tablet TAKE 1 TABLET DAILY    desloratadine (CLARINEX) 5 MG tablet Take by mouth    docusate sodium (COLACE) 100 mg capsule Take 100 mg by mouth 2 (two) times a day    Ferrous Fumarate 324 (106 Fe) MG TABS Take 1 tablet by mouth daily    fluticasone (FLONASE) 50 mcg/act nasal spray 2 sprays into each nostril daily    fluticasone-salmeterol (ADVAIR DISKUS) 250-50 mcg/dose inhaler Inhale every 12 (twelve) hours    furosemide (LASIX) 20 mg tablet TAKE 1 TABLET DAILY AS DIRECTED    glucose blood test strip Use as instructed    insulin glargine (LANTUS SOLOSTAR) injection pen 100 units/mL Inject 10 Units under the skin      Insulin Syringe-Needle U-100 (B-D INS SYRINGE 0 5CC/31GX5/16) 31G X 5/16" 0 5 ML MISC by Does not apply route    ipratropium-albuterol (DUO-NEB) 0 5-2 5 mg/3 mL nebulizer solution Inhale 1 each every 4 (four) hours as needed    lisinopril (ZESTRIL) 10 mg tablet Take 1 tablet by mouth daily    metoclopramide (REGLAN) 10 mg tablet Take 1 tablet (10 mg total) by mouth 3 (three) times a day    nitroglycerin (NITRODUR) 0 2 mg/hr Place 1 patch on the skin daily For 12 to 14 hours then remove    pantoprazole (PROTONIX) 40 mg tablet TAKE 1 TABLET DAILY    rosuvastatin (CRESTOR) 10 MG tablet Take 1 tablet (10 mg total) by mouth daily at bedtime    selenium sulfide (SELSUN) 2 5 % shampoo Apply topically daily as needed for dandruff    senna (SENOKOT) 8 6 MG tablet Take 1 tablet by mouth daily    sertraline (ZOLOFT) 25 mg tablet Take 1 tablet by mouth daily    triamcinolone (KENALOG) 0 1 % cream Apply topically 2 (two) times a day    Insulin Pen Needle (BD PEN NEEDLE MARINO U/F) 32G X 4 MM MISC by Does not apply route daily    insulin regular (NOVOLIN R) 100 units/mL injection Inject as directed     No current facility-administered medications on file prior to visit  She is allergic to other       Review of Systems   All other systems reviewed and are negative  Objective: There were no vitals taken for this visit  Physical Exam   Constitutional: She is oriented to person, place, and time  She appears well-developed and well-nourished  Neck: Normal range of motion  Neck supple  Cardiovascular: Normal rate, regular rhythm and intact distal pulses  Pulmonary/Chest: Effort normal and breath sounds normal    Abdominal: Soft  Bowel sounds are normal    Musculoskeletal: Normal range of motion  Neurological: She is alert and oriented to person, place, and time  She has normal reflexes  Skin: Skin is warm and dry     Psychiatric: She has a normal mood and affect  Her behavior is normal  Judgment and thought content normal    Nursing note and vitals reviewed

## 2018-09-07 ENCOUNTER — TELEPHONE (OUTPATIENT)
Dept: FAMILY MEDICINE CLINIC | Facility: CLINIC | Age: 83
End: 2018-09-07

## 2018-09-07 DIAGNOSIS — T78.40XA ALLERGIC REACTION, INITIAL ENCOUNTER: Primary | ICD-10-CM

## 2018-09-07 RX ORDER — METHYLPREDNISOLONE 4 MG/1
TABLET ORAL
Qty: 21 TABLET | Refills: 0 | Status: SHIPPED | OUTPATIENT
Start: 2018-09-07 | End: 2019-03-08 | Stop reason: ALTCHOICE

## 2018-09-09 DIAGNOSIS — J44.9 CHRONIC OBSTRUCTIVE PULMONARY DISEASE, UNSPECIFIED COPD TYPE (HCC): Primary | ICD-10-CM

## 2018-09-17 ENCOUNTER — TRANSITIONAL CARE MANAGEMENT (OUTPATIENT)
Dept: FAMILY MEDICINE CLINIC | Facility: CLINIC | Age: 83
End: 2018-09-17

## 2018-09-17 RX ORDER — FUROSEMIDE 40 MG/1
40 TABLET ORAL EVERY MORNING
Refills: 1 | COMMUNITY
Start: 2018-07-17 | End: 2019-12-12 | Stop reason: ALTCHOICE

## 2018-09-19 ENCOUNTER — OFFICE VISIT (OUTPATIENT)
Dept: FAMILY MEDICINE CLINIC | Facility: CLINIC | Age: 83
End: 2018-09-19
Payer: MEDICARE

## 2018-09-19 VITALS
BODY MASS INDEX: 27.72 KG/M2 | RESPIRATION RATE: 14 BRPM | HEART RATE: 72 BPM | OXYGEN SATURATION: 95 % | HEIGHT: 60 IN | SYSTOLIC BLOOD PRESSURE: 100 MMHG | DIASTOLIC BLOOD PRESSURE: 64 MMHG | WEIGHT: 141.2 LBS

## 2018-09-19 DIAGNOSIS — I10 ESSENTIAL HYPERTENSION: Primary | ICD-10-CM

## 2018-09-19 DIAGNOSIS — E11.10 DIABETIC KETOACIDOSIS WITHOUT COMA ASSOCIATED WITH TYPE 2 DIABETES MELLITUS (HCC): Primary | ICD-10-CM

## 2018-09-19 DIAGNOSIS — I10 ESSENTIAL HYPERTENSION: ICD-10-CM

## 2018-09-19 DIAGNOSIS — I50.42 CHRONIC COMBINED SYSTOLIC AND DIASTOLIC CONGESTIVE HEART FAILURE (HCC): ICD-10-CM

## 2018-09-19 PROCEDURE — 99495 TRANSJ CARE MGMT MOD F2F 14D: CPT | Performed by: FAMILY MEDICINE

## 2018-09-19 NOTE — PROGRESS NOTES
Assessment/Plan:     No problem-specific Assessment & Plan notes found for this encounter  There are no diagnoses linked to this encounter  Subjective:     Patient ID: Ramona Kilgore is a 82 y o  female  At her last visit, she was given the flu shot  Her daughter called and said she had swelling of the lips and I called in a Medrol dose pack  Her sugars climbed significantly  Her discharge summary lists DKA as her admission diagnosis  She was admitted to the ICU and given IV insulin  She is being treated with Ceftin, by I am unsure what the source of the infection is  Unfortunately, the full discharge summary is unavailable to me at this time, although it was requested  Reglan was stopped and her Lasix dose was increased  Otherwise, he medications have stayed the same  I am not sure if the infection caused the issues with the blood sugar or if it was the infection, or both  She has combined systolic and diastolic heart failure  She is on Lasix and her weight is stable  She feels well currently  She has no CP or SOB  She has no F/C  She has no dysuria or frequency  I reviewed her BS log  Her BS are running 144-285  She has no hypoglycemia  Her BP is at goal          Review of Systems   All other systems reviewed and are negative  Objective:     Physical Exam   Constitutional: She is oriented to person, place, and time  She appears well-developed and well-nourished  Neck: Normal range of motion  Neck supple  Cardiovascular: Normal rate, regular rhythm, normal heart sounds and intact distal pulses  Pulmonary/Chest: Effort normal and breath sounds normal    Abdominal: Soft  Bowel sounds are normal    Musculoskeletal: Normal range of motion  Neurological: She is alert and oriented to person, place, and time  She has normal reflexes  Skin: Skin is warm and dry  Psychiatric: She has a normal mood and affect   Her behavior is normal  Judgment and thought content normal    Nursing note and vitals reviewed  Vitals:    09/19/18 1118   BP: 100/64   BP Location: Left arm   Patient Position: Sitting   Cuff Size: Standard   Pulse: 72   Resp: 14   SpO2: 95%   Weight: 64 kg (141 lb 3 2 oz)   Height: 5' (1 524 m)       Transitional Care Management Review:  Peggy Burns is a 82 y o  female here for TCM follow up  During the TCM phone call patient stated:    Date and time hospital follow up call was made:  9/17/2018  2:01 PM  Hospital care reviewed:  Records not available  Patient was hopsitalized at:   Other (comment) (Comment: [address])  Date of admission:  9/8/18  Date of discharge:  9/14/18  Diagnosis:  high blood sugar  Disposition:  Home  Were the patients medicaitons reviewed and updated:  Yes  Current symptoms:  None  Post hospital issues:  None  Should patient be enrolled in anticoag monitoring?:  No  Scheduled for follow up?:  Yes  Did you obtain your prescribed medications:  Yes  Do you need help managing your perscriptions or medications:  Yes  Is transportation to your appointments needed:  Yes  I have advised the patient to call PCP with any new or worsening symptoms (please type in name along with any credentials):  Maria Del Carmen Bailey MA  Living Arrangements:  [address]:  Family  The type of support provided:  None  Do you have social support:  Yes, as much as I need  Are you recieving outpatient services:  No  Are you recieving home care services:  Yes  Types of home care services:  Home health aid  Are you using any community resources:  No  Have you fallen in the last 12 months:  Yes  Interperter language line required?:  No             Fernando Draper MD

## 2018-09-20 RX ORDER — CARVEDILOL 25 MG/1
TABLET ORAL
Qty: 180 TABLET | Refills: 3 | Status: SHIPPED | OUTPATIENT
Start: 2018-09-20 | End: 2019-03-08 | Stop reason: ALTCHOICE

## 2018-10-16 ENCOUNTER — OFFICE VISIT (OUTPATIENT)
Dept: FAMILY MEDICINE CLINIC | Facility: CLINIC | Age: 83
End: 2018-10-16
Payer: MEDICARE

## 2018-10-16 VITALS
HEIGHT: 61 IN | DIASTOLIC BLOOD PRESSURE: 66 MMHG | OXYGEN SATURATION: 94 % | BODY MASS INDEX: 26.62 KG/M2 | SYSTOLIC BLOOD PRESSURE: 110 MMHG | WEIGHT: 141 LBS | HEART RATE: 72 BPM | RESPIRATION RATE: 14 BRPM

## 2018-10-16 DIAGNOSIS — I50.42 CHRONIC COMBINED SYSTOLIC AND DIASTOLIC CONGESTIVE HEART FAILURE (HCC): ICD-10-CM

## 2018-10-16 DIAGNOSIS — Z79.4 TYPE 2 DIABETES MELLITUS WITHOUT COMPLICATION, WITH LONG-TERM CURRENT USE OF INSULIN (HCC): Primary | ICD-10-CM

## 2018-10-16 DIAGNOSIS — D69.6 THROMBOCYTOPENIA (HCC): ICD-10-CM

## 2018-10-16 DIAGNOSIS — E78.49 OTHER HYPERLIPIDEMIA: ICD-10-CM

## 2018-10-16 DIAGNOSIS — E11.9 TYPE 2 DIABETES MELLITUS WITHOUT COMPLICATION, WITH LONG-TERM CURRENT USE OF INSULIN (HCC): Primary | ICD-10-CM

## 2018-10-16 PROCEDURE — 99214 OFFICE O/P EST MOD 30 MIN: CPT | Performed by: FAMILY MEDICINE

## 2018-10-16 NOTE — PATIENT INSTRUCTIONS
Chronic Hypertension   AMBULATORY CARE:   Hypertension  is high blood pressure (BP)  Your BP is the force of your blood moving against the walls of your arteries  Normal BP is less than 120/80  Prehypertension is between 120/80 and 139/89  Hypertension is 140/90 or higher  Hypertension causes your BP to get so high that your heart has to work much harder than normal  This can damage your heart  Chronic hypertension is a long-term condition that you can control with a healthy lifestyle or medicines  A controlled blood pressure helps protect your organs, such as your heart, lungs, brain, and kidneys  Common symptoms include the following:   · Headache     · Blurred vision    · Chest pain     · Dizziness or weakness     · Trouble breathing     · Nosebleeds  Call 911 for any of the following:   · You have discomfort in your chest that feels like squeezing, pressure, fullness, or pain  · You become confused or have difficulty speaking  · You suddenly feel lightheaded or have trouble breathing  · You have pain or discomfort in your back, neck, jaw, stomach, or arm  Seek care immediately if:   · You have a severe headache or vision loss  · You have weakness in an arm or leg  Contact your healthcare provider if:   · You feel faint, dizzy, confused, or drowsy  · You have been taking your BP medicine and your BP is still higher than your healthcare provider says it should be  · You have questions or concerns about your condition or care  Treatment for chronic hypertension  may include medicine to lower your BP and lower your cholesterol level  A low cholesterol level helps prevent heart disease and makes it easier to control your blood pressure  Heart disease can make your blood pressure harder to control  You may also need to make lifestyle changes  Take your medicine exactly as directed    Manage chronic hypertension:  Talk with your healthcare provider about these and other ways to manage hypertension:  · Take your BP at home  Sit and rest for 5 minutes before you take your BP  Extend your arm and support it on a flat surface  Your arm should be at the same level as your heart  Follow the directions that came with your BP monitor  If possible, take at least 2 BP readings each time  Take your BP at least twice a day at the same times each day, such as morning and evening  Keep a record of your BP readings and bring it to your follow-up visits  Ask your healthcare provider what your blood pressure should be  · Limit sodium (salt) as directed  Too much sodium can affect your fluid balance  Check labels to find low-sodium or no-salt-added foods  Some low-sodium foods use potassium salts for flavor  Too much potassium can also cause health problems  Your healthcare provider will tell you how much sodium and potassium are safe for you to have in a day  He or she may recommend that you limit sodium to 2,300 mg a day  · Follow the meal plan recommended by your healthcare provider  A dietitian or your provider can give you more information on low-sodium plans or the DASH (Dietary Approaches to Stop Hypertension) eating plan  The DASH plan is low in sodium, unhealthy fats, and total fat  It is high in potassium, calcium, and fiber  · Exercise to maintain a healthy weight  Exercise at least 30 minutes per day, on most days of the week  This will help decrease your blood pressure  Ask about the best exercise plan for you  · Decrease stress  This may help lower your BP  Learn ways to relax, such as deep breathing or listening to music  · Limit alcohol  Women should limit alcohol to 1 drink a day  Men should limit alcohol to 2 drinks a day  A drink of alcohol is 12 ounces of beer, 5 ounces of wine, or 1½ ounces of liquor  · Do not smoke  Nicotine and other chemicals in cigarettes and cigars can increase your BP and also cause lung damage   Ask your healthcare provider for information if you currently smoke and need help to quit  E-cigarettes or smokeless tobacco still contain nicotine  Talk to your healthcare provider before you use these products  Follow up with your healthcare provider as directed: You will need to return to have your BP checked and to have other lab tests done  Write down your questions so you remember to ask them during your visits  © 2017 [address] [employer] Information is for End User's use only and may not be sold, redistributed or otherwise used for commercial purposes  All illustrations and images included in CareNotes® are the copyrighted property of [employer]  or [employer]dir[employer]  The above information is an  only  It is not intended as medical advice for individual conditions or treatments  Talk to your doctor, nurse or pharmacist before following any medical regimen to see if it is safe and effective for you  Diabetes in the Older Adult   WHAT YOU NEED TO KNOW:   Older adults with diabetes are at risk for heart disease, stroke, kidney disease, blindness, and nerve damage   You may also be at risk for any of the following:  · Poor nutrition or low blood sugar levels    · Confusion or problems with memory, attention, or learning new things    · Trouble controlling urination or frequent urinary tract infections    · Trouble with coordination or balance    · Falls and injuries    · Pain    · Depression    · Open sores on your legs or feet  DISCHARGE INSTRUCTIONS:   Call 911 for any of the following:   · You have any of the following signs of a stroke:      ¨ Numbness or drooping on one side of your face     ¨ Weakness in an arm or leg    ¨ Confusion or difficulty speaking    ¨ Dizziness, a severe headache, or vision loss    · You have any of the following signs of a heart attack:      ¨ Squeezing, pressure, or pain in your chest that lasts longer than 5 minutes or returns    ¨ Discomfort or pain in your back, neck, jaw, stomach, or arm     ¨ Trouble breathing    ¨ Nausea or vomiting    ¨ Lightheadedness or a sudden cold sweat, especially with chest pain or trouble breathing  Return to the emergency department if:   · You have severe abdominal pain, or the pain spreads to your back  You may also be vomiting  · You have trouble staying awake or focusing  · You are shaking or sweating  · You have blurred or double vision  · Your breath has a fruity, sweet smell  · Your breathing is deep and labored, or rapid and shallow  · Your heartbeat is fast and weak  · You fall and get hurt  Contact your healthcare provider if:   · You are vomiting or have diarrhea  · You have an upset stomach and cannot eat the foods on your meal plan  · You feel weak or more tired than usual      · You feel dizzy, have headaches, or are easily irritated  · Your skin is red, warm, dry, or swollen  · You have a wound that does not heal      · You have numbness in your arms or legs  · You have trouble coping with your illness, or you feel anxious or depressed  · You have problems with your memory  · You have changes in your vision  · You have questions or concerns about your condition or care  Medicines  may be given to decrease the amount of sugar in your blood  You may also need medicine to lower your blood pressure or cholesterol, or medicine to prevent blood clots  Manage the ABCs and prevent problems caused by diabetes:   · Check your blood sugar levels as directed  Your healthcare provider will tell you when and how often to check during the day  Your healthcare provider will also tell you what your blood sugar levels should be before and after a meal  You may need to check for ketones in your urine or blood if your level is higher than directed  Write down your results and show them to your healthcare provider   Your provider may use the results to make changes to your medicine, food, or exercise schedules  Ask your healthcare provider for more information about how to treat a high or low blood sugar level  · Follow your meal plan as directed  A dietitian will help you make a meal plan to keep your blood sugar level steady and make sure you get enough nutrition  Do not skip meals  Your blood sugar level may drop too low if you have taken diabetes medicine and do not eat  Ask your healthcare provider about programs in your community that can deliver the meals to your home  · Try to be active for 30 to 60 minutes most days of the week  Exercise can help keep your blood sugar level steady, decrease your risk of heart disease, and help you lose weight  It can also help improve your balance and decrease your risk for falls  Work with your healthcare provider to create an exercise plan  Ask a family member or friend to exercise with you  Start slow and exercise for 5 to 10 minutes at a time  Examples of activities include walking or swimming  Include muscle strengthening activities 2 to 3 days each week  Include balance training 2 to 3 times each week  Activities that help increase balance include yoga and maikol chi      · Maintain a healthy weight  Ask your healthcare provider how much you should weigh  A healthy weight can help you control your diabetes and prevent heart disease  Ask your provider to help you create a weight loss plan if you are overweight  Together you can set manageable weight loss goals  · Do not smoke  Ask your healthcare provider for information if you currently smoke and need help to quit  Do not use e-cigarettes or smokeless tobacco in place of cigarettes or to help you quit  They still contain nicotine  · Manage stress  Stress may increase your blood sugar level  Deep breathing, muscle relaxation, and music may help you relax  Ask your healthcare provider for more information about these practices    Other ways to manage your diabetes:   · Check your feet every day for sores  Look at your whole foot, including the bottom, and between and under your toes  Check for wounds, corns, and calluses  Use a mirror to see the bottom of your feet  The skin on your feet may be shiny, tight, dry, or darker than normal  Your feet may also be cold and pale  Feel your feet by running your hands along the tops, bottoms, sides, and between your toes  Redness, swelling, and warmth are signs of blood flow problems that can lead to a foot ulcer  Do not try to remove corns or calluses yourself  · Wear medical alert identification  Wear medical alert jewelry or carry a card that says you have diabetes  Ask your healthcare provider where to get these items  · Ask about vaccines  You have a higher risk for serious illness if you get the flu, pneumonia, or hepatitis  Ask your healthcare provider if you should get a flu, pneumonia, shingles, or hepatitis B vaccine, and when to get the vaccine  · Keep all appointments  You may need to return to have your A1c checked every 3 months  You will need to return at least once each year to have your feet checked  You will need an eye exam once a year to check for retinopathy  You will also need urine tests every year to check for kidney problems  You may need tests to monitor for heart disease  Write down your questions so you remember to ask them during your visits  · Get help from family and friends  You may need help checking your blood sugar level, giving insulin injections, or preparing your meals  Ask your family and friends to help you with these tasks  Talk to your healthcare provider if you do not have someone at home to help you  A healthcare provider can come to your home to help you with these tasks  Follow up with your healthcare provider as directed: You may need to return to have your A1c checked every 3 months  You will need to return at least once each year to have your feet checked   You will need an eye exam once a year to check for retinopathy  You will also need urine tests every year to check for kidney problems  You may need tests to monitor for heart disease  Write down your questions so you remember to ask them during your visits  © 2017 [address] [employer] Information is for End User's use only and may not be sold, redistributed or otherwise used for commercial purposes  All illustrations and images included in CareNotes® are the copyrighted property of [employer]  or [employer]ash[employer]  The above information is an  only  It is not intended as medical advice for individual conditions or treatments  Talk to your doctor, nurse or pharmacist before following any medical regimen to see if it is safe and effective for you

## 2018-10-16 NOTE — PROGRESS NOTES
Assessment/Plan:    No problem-specific Assessment & Plan notes found for this encounter  Diagnoses and all orders for this visit:    Type 2 diabetes mellitus without complication, with long-term current use of insulin (HCC)    Chronic combined systolic and diastolic congestive heart failure (HCC)    Thrombocytopenia (HCC)    Other hyperlipidemia        Her DM has been under good control  Cont current  Her BP is under good control  Cont current  Her lipids are at goal   Continue current  Her thrombocytopenia is stable  Cont current  Her CHF is stable  She is euvolemic  Subjective:      Patient ID: Coleen Barrientos is a 82 y o  female  She had flu shot at her last visit  Her daughter called and report that she had swelling of the lips  I gave her prednisone and her blood sugars spiked  She was admitted to the ICU for IV insulin  She states that her BS are largely at goal on her current regimen  Her BP is at goal on her current regimen  She has no CP or SOB  She has no HA or vision changes  She is on high-intensity statin therapy  She has no myalgia or muscle weakness  She has no RUQ pain  Her last A1c was 7 0%    She has chronic CHF  She has no edema or weight gain  She has no PND or orthopnea  The following portions of the patient's history were reviewed and updated as appropriate:   She  has a past medical history of Trigger finger of thumb    She   Patient Active Problem List    Diagnosis Date Noted    Diabetic ketoacidosis without coma associated with type 2 diabetes mellitus ([facility] [address] ) 09/19/2018    Atrophy of left kidney 07/24/2018    Renal calculi 07/24/2018    Thrombocytopenia ([facility] [address] ) 06/08/2018    Bruising 05/22/2018    Closed fracture of pelvis with routine healing 03/09/2018    Type 2 diabetes mellitus without complication, with long-term current use of insulin ([facility] [address] ) 03/09/2018    Essential hypertension 03/09/2018    Chronic combined systolic and diastolic congestive heart failure ([facility] [address] ) 03/09/2018    Other hyperlipidemia 03/09/2018     She  has a past surgical history that includes Appendectomy; Coronary artery bypass graft; Cholecystectomy; Colonoscopy; Esophagogastroduodenoscopy; Hernia repair; Cardiac pacemaker placement; and Total abdominal hysterectomy  Her family history includes Breast cancer in her mother; Lung disease in her father and mother  She  reports that she has never smoked  She has never used smokeless tobacco  She reports that she does not drink alcohol or use drugs    Current Outpatient Prescriptions   Medication Sig Dispense Refill    ADVAIR DISKUS 250-50 MCG/DOSE inhaler USE 1 INHALATION EVERY 12 HOURS 180 each 2    aspirin 81 MG tablet Take by mouth      BD PEN NEEDLE MARINO U/F 32G X 4 MM MISC USE DAILY AS DIRECTED 200 each 5    carvedilol (COREG) 25 mg tablet TAKE 1 TABLET THREE TIMES A DAY WITH MEALS AS DIRECTED 180 tablet 3    clopidogrel (PLAVIX) 75 mg tablet TAKE 1 TABLET DAILY 90 tablet 3    desloratadine (CLARINEX) 5 MG tablet Take by mouth      docusate sodium (COLACE) 100 mg capsule Take 100 mg by mouth 2 (two) times a day      Ferrous Fumarate 324 (106 Fe) MG TABS Take 1 tablet by mouth daily      fluticasone (FLONASE) 50 mcg/act nasal spray 2 sprays into each nostril daily      furosemide (LASIX) 20 mg tablet TAKE 1 TABLET DAILY AS DIRECTED 90 tablet 3    furosemide (LASIX) 40 mg tablet Take 40 mg by mouth daily  1    glucose blood test strip Use as instructed 100 each 5    insulin glargine (LANTUS SOLOSTAR) injection pen 100 units/mL Inject 10 Units under the skin        Insulin Pen Needle (BD PEN NEEDLE MARINO U/F) 32G X 4 MM MISC by Does not apply route daily      insulin regular (NOVOLIN R) 100 units/mL injection Inject as directed      Insulin Syringe-Needle U-100 (B-D INS SYRINGE 0 5CC/31GX5/16) 31G X 5/16" 0 5 ML MISC by Does not apply route      ipratropium-albuterol (DUO-NEB) 0 5-2 5 mg/3 mL nebulizer solution Inhale 1 each every 4 (four) hours as needed      lisinopril (ZESTRIL) 10 mg tablet Take 1 tablet by mouth daily      metoclopramide (REGLAN) 10 mg tablet Take 1 tablet (10 mg total) by mouth 3 (three) times a day 270 tablet 3    nitroglycerin (NITRODUR) 0 2 mg/hr Place 1 patch on the skin daily For 12 to 14 hours then remove 90 patch 3    pantoprazole (PROTONIX) 40 mg tablet TAKE 1 TABLET DAILY 90 tablet 3    rosuvastatin (CRESTOR) 10 MG tablet Take 1 tablet (10 mg total) by mouth daily at bedtime 90 tablet 3    selenium sulfide (SELSUN) 2 5 % shampoo Apply topically daily as needed for dandruff 118 mL 0    senna (SENOKOT) 8 6 MG tablet Take 1 tablet by mouth daily      sertraline (ZOLOFT) 25 mg tablet Take 1 tablet by mouth daily      triamcinolone (KENALOG) 0 1 % cream Apply topically 2 (two) times a day 30 g 0    Methylprednisolone 4 MG TBPK Use as directed on package (Patient not taking: Reported on 10/16/2018 ) 21 tablet 0     No current facility-administered medications for this visit        Current Outpatient Prescriptions on File Prior to Visit   Medication Sig    ADVAIR DISKUS 250-50 MCG/DOSE inhaler USE 1 INHALATION EVERY 12 HOURS    aspirin 81 MG tablet Take by mouth    BD PEN NEEDLE MARINO U/F 32G X 4 MM MISC USE DAILY AS DIRECTED    carvedilol (COREG) 25 mg tablet TAKE 1 TABLET THREE TIMES A DAY WITH MEALS AS DIRECTED    clopidogrel (PLAVIX) 75 mg tablet TAKE 1 TABLET DAILY    desloratadine (CLARINEX) 5 MG tablet Take by mouth    docusate sodium (COLACE) 100 mg capsule Take 100 mg by mouth 2 (two) times a day    Ferrous Fumarate 324 (106 Fe) MG TABS Take 1 tablet by mouth daily    fluticasone (FLONASE) 50 mcg/act nasal spray 2 sprays into each nostril daily    furosemide (LASIX) 20 mg tablet TAKE 1 TABLET DAILY AS DIRECTED    furosemide (LASIX) 40 mg tablet Take 40 mg by mouth daily    glucose blood test strip Use as instructed    insulin glargine (LANTUS SOLOSTAR) injection pen 100 units/mL Inject 10 Units under the skin      Insulin Pen Needle (BD PEN NEEDLE MARINO U/F) 32G X 4 MM MISC by Does not apply route daily    insulin regular (NOVOLIN R) 100 units/mL injection Inject as directed    Insulin Syringe-Needle U-100 (B-D INS SYRINGE 0 5CC/31GX5/16) 31G X 5/16" 0 5 ML MISC by Does not apply route    ipratropium-albuterol (DUO-NEB) 0 5-2 5 mg/3 mL nebulizer solution Inhale 1 each every 4 (four) hours as needed    lisinopril (ZESTRIL) 10 mg tablet Take 1 tablet by mouth daily    metoclopramide (REGLAN) 10 mg tablet Take 1 tablet (10 mg total) by mouth 3 (three) times a day    nitroglycerin (NITRODUR) 0 2 mg/hr Place 1 patch on the skin daily For 12 to 14 hours then remove    pantoprazole (PROTONIX) 40 mg tablet TAKE 1 TABLET DAILY    rosuvastatin (CRESTOR) 10 MG tablet Take 1 tablet (10 mg total) by mouth daily at bedtime    selenium sulfide (SELSUN) 2 5 % shampoo Apply topically daily as needed for dandruff    senna (SENOKOT) 8 6 MG tablet Take 1 tablet by mouth daily    sertraline (ZOLOFT) 25 mg tablet Take 1 tablet by mouth daily    triamcinolone (KENALOG) 0 1 % cream Apply topically 2 (two) times a day    Methylprednisolone 4 MG TBPK Use as directed on package (Patient not taking: Reported on 10/16/2018 )     No current facility-administered medications on file prior to visit  She is allergic to other       Review of Systems   All other systems reviewed and are negative  Objective:      /66 (BP Location: Left arm, Patient Position: Sitting)   Pulse 72   Resp 14   Ht 5' 1" (1 549 m)   Wt 64 kg (141 lb)   SpO2 94%   BMI 26 64 kg/m²          Physical Exam   Constitutional: She is oriented to person, place, and time  She appears well-developed and well-nourished  Neck: Normal range of motion  Neck supple  Cardiovascular: Normal rate, regular rhythm, normal heart sounds and intact distal pulses      Pulmonary/Chest: Effort normal and breath sounds normal    Abdominal: Soft  Bowel sounds are normal    Musculoskeletal: Normal range of motion  Neurological: She is alert and oriented to person, place, and time  She has normal reflexes  Skin: Skin is warm and dry  Psychiatric: She has a normal mood and affect  Her behavior is normal  Judgment and thought content normal    Nursing note and vitals reviewed

## 2018-10-24 DIAGNOSIS — E11.9 TYPE 2 DIABETES MELLITUS WITHOUT COMPLICATION, WITH LONG-TERM CURRENT USE OF INSULIN (HCC): Primary | ICD-10-CM

## 2018-10-24 DIAGNOSIS — Z79.4 TYPE 2 DIABETES MELLITUS WITHOUT COMPLICATION, WITH LONG-TERM CURRENT USE OF INSULIN (HCC): Primary | ICD-10-CM

## 2018-10-24 RX ORDER — BLOOD SUGAR DIAGNOSTIC
STRIP MISCELLANEOUS 3 TIMES DAILY
Qty: 100 EACH | Refills: 5 | Status: SHIPPED | OUTPATIENT
Start: 2018-10-24 | End: 2019-10-21 | Stop reason: ALTCHOICE

## 2018-10-29 DIAGNOSIS — E11.9 TYPE 2 DIABETES MELLITUS WITHOUT COMPLICATION, WITH LONG-TERM CURRENT USE OF INSULIN (HCC): Primary | ICD-10-CM

## 2018-10-29 DIAGNOSIS — Z79.4 TYPE 2 DIABETES MELLITUS WITHOUT COMPLICATION, WITH LONG-TERM CURRENT USE OF INSULIN (HCC): Primary | ICD-10-CM

## 2018-10-29 RX ORDER — INSULIN GLARGINE 100 [IU]/ML
INJECTION, SOLUTION SUBCUTANEOUS
Qty: 45 ML | Refills: 2 | Status: SHIPPED | OUTPATIENT
Start: 2018-10-29 | End: 2019-03-08 | Stop reason: ALTCHOICE

## 2018-11-06 DIAGNOSIS — E11.9 TYPE 2 DIABETES MELLITUS WITHOUT COMPLICATION, WITH LONG-TERM CURRENT USE OF INSULIN (HCC): ICD-10-CM

## 2018-11-06 DIAGNOSIS — Z79.4 TYPE 2 DIABETES MELLITUS WITHOUT COMPLICATION, WITH LONG-TERM CURRENT USE OF INSULIN (HCC): ICD-10-CM

## 2018-12-04 DIAGNOSIS — F32.A DEPRESSION, UNSPECIFIED DEPRESSION TYPE: Primary | ICD-10-CM

## 2018-12-04 RX ORDER — SERTRALINE HYDROCHLORIDE 25 MG/1
TABLET, FILM COATED ORAL
Qty: 90 TABLET | Refills: 3 | Status: SHIPPED | OUTPATIENT
Start: 2018-12-04 | End: 2019-12-17 | Stop reason: SDUPTHER

## 2018-12-13 DIAGNOSIS — K21.9 GASTROESOPHAGEAL REFLUX DISEASE, ESOPHAGITIS PRESENCE NOT SPECIFIED: ICD-10-CM

## 2018-12-13 RX ORDER — METOCLOPRAMIDE 10 MG/1
10 TABLET ORAL 3 TIMES DAILY
Qty: 270 TABLET | Refills: 0 | Status: SHIPPED | OUTPATIENT
Start: 2018-12-13 | End: 2018-12-20 | Stop reason: SDUPTHER

## 2018-12-20 DIAGNOSIS — K21.9 GASTROESOPHAGEAL REFLUX DISEASE, ESOPHAGITIS PRESENCE NOT SPECIFIED: ICD-10-CM

## 2018-12-20 RX ORDER — METOCLOPRAMIDE 10 MG/1
10 TABLET ORAL 3 TIMES DAILY
Qty: 270 TABLET | Refills: 3 | Status: SHIPPED | OUTPATIENT
Start: 2018-12-20 | End: 2020-02-21 | Stop reason: SDUPTHER

## 2018-12-26 DIAGNOSIS — I25.118 CORONARY ARTERY DISEASE OF NATIVE ARTERY OF NATIVE HEART WITH STABLE ANGINA PECTORIS (HCC): ICD-10-CM

## 2018-12-27 RX ORDER — CLOPIDOGREL BISULFATE 75 MG/1
TABLET ORAL
Qty: 90 TABLET | Refills: 3 | Status: SHIPPED | OUTPATIENT
Start: 2018-12-27 | End: 2019-12-17 | Stop reason: SDUPTHER

## 2019-02-04 ENCOUNTER — TELEPHONE (OUTPATIENT)
Dept: FAMILY MEDICINE CLINIC | Facility: CLINIC | Age: 84
End: 2019-02-04

## 2019-02-04 DIAGNOSIS — J32.9 SINUSITIS, UNSPECIFIED CHRONICITY, UNSPECIFIED LOCATION: Primary | ICD-10-CM

## 2019-02-04 RX ORDER — AMOXICILLIN 500 MG/1
500 CAPSULE ORAL EVERY 8 HOURS SCHEDULED
Qty: 21 CAPSULE | Refills: 0 | Status: SHIPPED | OUTPATIENT
Start: 2019-02-04 | End: 2019-02-11

## 2019-02-26 DIAGNOSIS — E11.8 TYPE 2 DIABETES MELLITUS WITH COMPLICATION, UNSPECIFIED WHETHER LONG TERM INSULIN USE: Primary | ICD-10-CM

## 2019-02-27 ENCOUNTER — TELEPHONE (OUTPATIENT)
Dept: FAMILY MEDICINE CLINIC | Facility: CLINIC | Age: 84
End: 2019-02-27

## 2019-02-27 DIAGNOSIS — E11.8 TYPE 2 DIABETES MELLITUS WITH COMPLICATION, UNSPECIFIED WHETHER LONG TERM INSULIN USE: Primary | ICD-10-CM

## 2019-03-08 ENCOUNTER — OFFICE VISIT (OUTPATIENT)
Dept: FAMILY MEDICINE CLINIC | Facility: CLINIC | Age: 84
End: 2019-03-08
Payer: MEDICARE

## 2019-03-08 ENCOUNTER — TRANSITIONAL CARE MANAGEMENT (OUTPATIENT)
Dept: FAMILY MEDICINE CLINIC | Facility: CLINIC | Age: 84
End: 2019-03-08

## 2019-03-08 VITALS
TEMPERATURE: 100.1 F | HEIGHT: 61 IN | WEIGHT: 138 LBS | BODY MASS INDEX: 26.06 KG/M2 | SYSTOLIC BLOOD PRESSURE: 114 MMHG | DIASTOLIC BLOOD PRESSURE: 78 MMHG | OXYGEN SATURATION: 93 % | HEART RATE: 64 BPM

## 2019-03-08 DIAGNOSIS — Z79.4 TYPE 2 DIABETES MELLITUS WITH HYPERGLYCEMIA, WITH LONG-TERM CURRENT USE OF INSULIN (HCC): ICD-10-CM

## 2019-03-08 DIAGNOSIS — R26.2 AMBULATORY DYSFUNCTION: ICD-10-CM

## 2019-03-08 DIAGNOSIS — R06.02 SHORTNESS OF BREATH: ICD-10-CM

## 2019-03-08 DIAGNOSIS — I50.42 CHRONIC COMBINED SYSTOLIC AND DIASTOLIC CONGESTIVE HEART FAILURE (HCC): ICD-10-CM

## 2019-03-08 DIAGNOSIS — Z09 HOSPITAL DISCHARGE FOLLOW-UP: Primary | ICD-10-CM

## 2019-03-08 DIAGNOSIS — I10 ESSENTIAL HYPERTENSION: ICD-10-CM

## 2019-03-08 DIAGNOSIS — E11.65 TYPE 2 DIABETES MELLITUS WITH HYPERGLYCEMIA, WITH LONG-TERM CURRENT USE OF INSULIN (HCC): ICD-10-CM

## 2019-03-08 DIAGNOSIS — J44.9 CHRONIC OBSTRUCTIVE PULMONARY DISEASE, UNSPECIFIED COPD TYPE (HCC): ICD-10-CM

## 2019-03-08 PROCEDURE — 99495 TRANSJ CARE MGMT MOD F2F 14D: CPT | Performed by: NURSE PRACTITIONER

## 2019-03-08 RX ORDER — INSULIN HUMAN 100 [IU]/ML
8 INJECTION, SUSPENSION SUBCUTANEOUS DAILY
Refills: 0 | COMMUNITY
Start: 2019-02-26 | End: 2019-04-08

## 2019-03-08 RX ORDER — CARVEDILOL 3.12 MG/1
1.56 TABLET ORAL 2 TIMES DAILY
Qty: 45 TABLET | Refills: 1 | Status: SHIPPED | OUTPATIENT
Start: 2019-03-08 | End: 2019-03-08 | Stop reason: SDUPTHER

## 2019-03-08 RX ORDER — CARVEDILOL 3.12 MG/1
1.56 TABLET ORAL 2 TIMES DAILY
Qty: 45 TABLET | Refills: 1 | Status: SHIPPED | OUTPATIENT
Start: 2019-03-08 | End: 2019-09-27 | Stop reason: SDUPTHER

## 2019-03-08 RX ORDER — CARVEDILOL 3.12 MG/1
0.5 TABLET ORAL 2 TIMES DAILY
Refills: 0 | COMMUNITY
Start: 2019-02-25 | End: 2019-03-08 | Stop reason: SDUPTHER

## 2019-03-08 NOTE — PATIENT INSTRUCTIONS
Weakness   WHAT YOU NEED TO KNOW:   What is weakness? Weakness is a loss of muscle strength  You may have weakness in a single muscle or in a group of muscles  Weakness can come and go or be constant  Weakness can get worse over time  You may have weakness for a short time, or it may be permanent  What causes or increases my risk for weakness? · Older age    · A problem in your brain, nerves, or muscles    · A condition such as dehydration, a heart problem, infection, or pregnancy    · Anxiety or depression    · Steroid or heart medicine, or muscle relaxers    · Alcohol or illegal drugs    · Lack of movement, such as from wearing a cast or splint, or being on bed rest  How is the cause of weakness diagnosed? Your healthcare provider will ask when your signs and symptoms started and what makes your weakness worse  Tell your provider about any medical conditions you have  He or she will test your muscle strength, reflexes, and sense of touch  He or she will also check how far you can move or lift your weakened area  You may also need any of the following:  · Blood tests  may be used to check for infection or another condition that can cause weakness  · A muscle biopsy  is a procedure used to take a muscle sample  This may happen if your blood tests do not show the cause of your weakness  · X-ray pictures  may show what is causing your weakness  How can I manage weakness? · Use assistive devices as directed  These help protect you from injury  Examples include a walker or cane  Have someone install handrails in your home  These will help you get out of a bathtub or stand up from a toilet  Use a shower chair so you can sit while you shower  Sit down on the toilet or another chair to dry off and put on your clothes  Get help going up and down stairs if your legs are weak  · Go to physical or occupational therapy if directed  A physical therapist can teach you exercises to help strengthen weak muscles  An occupational therapist can show you ways to do your daily activities more easily  For example, light forks and spoons can be easier to use if you have hand weakness  You may also learn ways to organize your household items so you are not moving heavy items  · Balance rest with exercise  Exercise can help increase your muscle strength and energy  Do not exercise for long periods at a time  Take breaks often to rest  Too much exercise can cause muscle strain or make you more tired  Ask your healthcare provider how much exercise is right for you  · Eat a variety of healthy foods  Too much or too little food may cause weakness or tiredness  Ask your healthcare provider what a healthy amount of food is for you  Healthy foods include fruits, vegetables, whole-grain breads, low-fat dairy products, lean meats and fish, nuts, and cooked beans  · Do not smoke  Nicotine and other chemicals in cigarettes and cigars can make your symptoms worse, and can cause lung damage  Ask your healthcare provider for information if you currently smoke and need help to quit  E-cigarettes or smokeless tobacco still contain nicotine  Talk to your healthcare provider before you use these products  · Do not use caffeine, alcohol, or illegal drugs  These may cause muscle twitching, which could lead to worsened weakness  Call 911 for any of the following:   · You have any of the following signs of a stroke:      ¨ Numbness or drooping on one side of your face     ¨ Weakness in an arm or leg    ¨ Confusion or difficulty speaking    ¨ Dizziness, a severe headache, or vision loss    · You lose feeling in your weakened body area  · You have electric shock-like feelings down your arms and legs when you flex or move your neck  · You have sudden or increased trouble speaking, swallowing, or breathing  When should I seek immediate care? · You have severe pain in your back, arms, or legs that worsens      · You have sudden or worsened muscle weakness or loss of movement  · You are not able to control when you urinate or have a bowel movement  When should I contact my healthcare provider? · You feel depressed or anxious  · You have questions or concerns about your condition or care  CARE AGREEMENT:   You have the right to help plan your care  Learn about your health condition and how it may be treated  Discuss treatment options with your caregivers to decide what care you want to receive  You always have the right to refuse treatment  The above information is an  only  It is not intended as medical advice for individual conditions or treatments  Talk to your doctor, nurse or pharmacist before following any medical regimen to see if it is safe and effective for you  © 2017 [address] Information is for End User's use only and may not be sold, redistributed or otherwise used for commercial purposes  All illustrations and images included in CareNotes® are the copyrighted property of A D A M , Inc  or Abdirashid De La Cruz

## 2019-03-08 NOTE — PROGRESS NOTES
Assessment/Plan:     Diagnoses and all orders for this visit:    Hospital discharge follow-up    Chronic combined systolic and diastolic congestive heart failure ([place] [address] )  -     Ambulatory referral to complex care management program; Future  -     Ambulatory Referral to [address] [place] [address] Leo Anthony Juarez; Future  -     Bed    Chronic obstructive pulmonary disease, unspecified COPD type ([place] [address] )  -     Ambulatory referral to complex care management program; Future  -     Ambulatory Referral to [address] [place] [address] Leo Joshuanicolas; Future  -     Bed    Type 2 diabetes mellitus with hyperglycemia, with long-term current use of insulin ([place] [address] )  -     Ambulatory referral to complex care management program; Future  -     Ambulatory Referral to [address] [place] [address] Leo Anthony Juarez; Future  -     Bed    Ambulatory dysfunction  -     Ambulatory referral to complex care management program; Future  -     Ambulatory Referral to [address] [place] [address] Leo De Ganicolas; Future  -     Bed    Shortness of breath  -     Ambulatory referral to complex care management program; Future  -     Ambulatory Referral to [address] [place] [address] Leo Joshuanicolas; Future  -     Bed        Subjective:      Patient ID: Kayleen Pinto is a 82 y o  female  Patient presents to [address] [place] [address] as follow up after hospitalization  Allergies, medical history and current medications reviewed with patient; patient reports taking all medications as prescribed without issues  Patient was treated at [place] [address] for severe ambulatory dysfunction, COPD and CHF  Patient was ultimately transferred to acute rehabilitation for physical therapy before being discharged home  Today, patient states she feels good but continues to feel short of breath  Patient has home health PT twice weekly for 5 weeks; patient states she also has a home health nurse that will be coming to help her bathe and has a cleaning lady to help with housework  Patient unsure when next appointment with Cardiology is, but was last seen last year       TCM Call (since 2/5/2019)    Date and time call was made  3/1/2019  8:37 AM    Hospital care reviewed  Records reviewed    Patient was hospitialized at  Other (comment)    Comment  VANI Pearce    Date of Admission  02/04/19    Date of discharge  03/01/19    Diagnosis  copd    Disposition  Home    Were the patients medications reviewed and updated  Yes      TCM Call (since 2/5/2019)     Post hospital issues  None    Should patient be enrolled in anticoag monitoring? No    Did you obtain your prescribed medications  Yes    Do you need help managing your prescriptions or medications  No    Is transportation to your appointment needed  No    I have advised the patient to call PCP with any new or worsening symptoms    minna sousa    Living Arrangements  Spouse or Significiant other    Support System  Family      Patient Care Team:  Keila Fuchs MD as PCP - Cesar Hess MD (Vascular)  Andrew Pizano MD (Cardiology)  Prasanna Martinez DPM (Podiatry)    Review of Systems   Constitutional: Positive for activity change  Respiratory: Positive for shortness of breath  Cardiovascular: Negative for chest pain  All other systems reviewed and are negative  Objective:    /78 (BP Location: Right arm, Patient Position: Sitting, Cuff Size: Standard)   Pulse 64   Temp 100 1 °F (37 8 °C) (Temporal)   Ht 5' 1" (1 549 m)   Wt 62 6 kg (138 lb)   SpO2 93%   BMI 26 07 kg/m²      Physical Exam   Constitutional: She is oriented to person, place, and time  She appears well-developed and well-nourished  No distress  HENT:   Head: Normocephalic and atraumatic  Right Ear: Tympanic membrane, external ear and ear canal normal    Left Ear: Tympanic membrane, external ear and ear canal normal    Nose: Nose normal  No mucosal edema  Mouth/Throat: Uvula is midline, oropharynx is clear and moist and mucous membranes are normal  No oropharyngeal exudate, posterior oropharyngeal edema or posterior oropharyngeal erythema     Nasal turbinates pink, moist and without exudate  Eyes: Conjunctivae and lids are normal  Right eye exhibits no discharge  Left eye exhibits no discharge  Right conjunctiva is not injected  Left conjunctiva is not injected  Neck: Normal range of motion  No tracheal deviation present  Cardiovascular: Normal rate, regular rhythm, S1 normal and S2 normal  Exam reveals distant heart sounds  Pulmonary/Chest: Effort normal and breath sounds normal  No respiratory distress  Abdominal: Normal appearance and bowel sounds are normal  She exhibits no distension  There is no guarding  Musculoskeletal: Normal range of motion  She exhibits no edema, tenderness or deformity  Neurological: She is alert and oriented to person, place, and time  Skin: Skin is warm, dry and intact  Psychiatric: She has a normal mood and affect  Her speech is normal    Nursing note and vitals reviewed

## 2019-03-11 DIAGNOSIS — K21.9 GASTROESOPHAGEAL REFLUX DISEASE, ESOPHAGITIS PRESENCE NOT SPECIFIED: ICD-10-CM

## 2019-03-12 ENCOUNTER — PATIENT OUTREACH (OUTPATIENT)
Dept: FAMILY MEDICINE CLINIC | Facility: CLINIC | Age: 84
End: 2019-03-12

## 2019-03-12 DIAGNOSIS — Z78.9 NEED FOR FOLLOW-UP BY SOCIAL WORKER: Primary | ICD-10-CM

## 2019-03-12 RX ORDER — PANTOPRAZOLE SODIUM 40 MG/1
40 TABLET, DELAYED RELEASE ORAL DAILY
Qty: 90 TABLET | Refills: 3 | Status: SHIPPED | OUTPATIENT
Start: 2019-03-12 | End: 2019-05-21 | Stop reason: SDUPTHER

## 2019-03-12 NOTE — PROGRESS NOTES
Cm called  patient to introduce self and Outpatient Care Management  assessed services in place  Pt lives alone in [place] apartment building  Uses [employer] for transportation  or daughter drives her  She has 2 hrs every Friday and Monday thru [facility] of [place] [address] office  (Carleen Macario) Debbie describes the service as cleaning, picking up her prescriptions and grocery shopping  She wears oxygen at 2L thru [address] in [place]  She has [address] thru [facility]   Nursing,PT and OT  Per Jaden Ross at [facility] they do not have a  on staff  Pt does own medication management  She likes to watch soap operas on TV and socialize in her apartment building  She has  cane and walker but no fall alert system  CM discussed case with Angie FOSTER -  Patient is of advanced age and lives alone  Patient was recently admitted to hospital for severe ambulatory dysfunction    referral to determine if patient eligible for any in home services   Possibly increase hours of current help  Fall alert system ? CM will place referral to [facility] outpatient case management

## 2019-03-19 DIAGNOSIS — Z79.4 TYPE 2 DIABETES MELLITUS WITH HYPERGLYCEMIA, WITH LONG-TERM CURRENT USE OF INSULIN (HCC): ICD-10-CM

## 2019-03-19 DIAGNOSIS — R26.2 AMBULATORY DYSFUNCTION: ICD-10-CM

## 2019-03-19 DIAGNOSIS — J43.9 PULMONARY EMPHYSEMA, UNSPECIFIED EMPHYSEMA TYPE (HCC): Primary | ICD-10-CM

## 2019-03-19 DIAGNOSIS — E11.65 TYPE 2 DIABETES MELLITUS WITH HYPERGLYCEMIA, WITH LONG-TERM CURRENT USE OF INSULIN (HCC): ICD-10-CM

## 2019-03-19 DIAGNOSIS — I50.42 CHRONIC COMBINED SYSTOLIC AND DIASTOLIC CONGESTIVE HEART FAILURE (HCC): ICD-10-CM

## 2019-03-19 DIAGNOSIS — R06.02 SHORTNESS OF BREATH: ICD-10-CM

## 2019-03-27 ENCOUNTER — PATIENT OUTREACH (OUTPATIENT)
Dept: CASE MANAGEMENT | Facility: OTHER | Age: 84
End: 2019-03-27

## 2019-03-27 ENCOUNTER — PATIENT OUTREACH (OUTPATIENT)
Dept: FAMILY MEDICINE CLINIC | Facility: CLINIC | Age: 84
End: 2019-03-27

## 2019-03-27 NOTE — PROGRESS NOTES
Made referral for the waiver program spoke to [facility],  for patient through [employer] independent enrollment  [phone]  So patient can receive more services in the home if patient qualifies based on financial and medical need  Will send the patient Medical Assistance application

## 2019-03-27 NOTE — PROGRESS NOTES
Reached out to the [facility] of [place]  Patient is currently receiving 4 hours of services a week through [facility], and senior services  Spoke to Tori Beck stated patient can get up to 9 hours with this program   I asked Tori Beck how the patient could get the 9 hours that she could qualify for  Tori Beck said she did not know but she would call senior services  Patient is currently receiving services Monday, and Friday  [facility]  Patient states she is doing okay at this time, however RN CM reports that she is very unsteady on her feet  Might qualify for the waiver program, which would allow her to get more services in her home   Will Call [employer] and make referral for the waiver program

## 2019-03-27 NOTE — PROGRESS NOTES
Phone call to patient RE:  Advise her of [employer] outreach and need to complete MA application   No answer,unable to leave message

## 2019-03-27 NOTE — PROGRESS NOTES
Called  for follow up   Spoke with  patient who reports she was discharged from [facility] yesterday  She has help that medicare pays  for thru [facility] ([address] [facility])  Alycia Rodriguez is her name [phone]  Active with meals on wheels  Luis Fritz reports her breathing is at baseline Weight 138 pounds  Denies falls  Ambulatory with walker  Will continue to follow  Again Provided pt contact number

## 2019-04-01 ENCOUNTER — PATIENT OUTREACH (OUTPATIENT)
Dept: FAMILY MEDICINE CLINIC | Facility: CLINIC | Age: 84
End: 2019-04-01

## 2019-04-05 DIAGNOSIS — Z79.4 TYPE 2 DIABETES MELLITUS WITHOUT COMPLICATION, WITH LONG-TERM CURRENT USE OF INSULIN (HCC): Primary | ICD-10-CM

## 2019-04-05 DIAGNOSIS — E11.9 TYPE 2 DIABETES MELLITUS WITHOUT COMPLICATION, WITH LONG-TERM CURRENT USE OF INSULIN (HCC): Primary | ICD-10-CM

## 2019-04-05 PROCEDURE — 83036 HEMOGLOBIN GLYCOSYLATED A1C: CPT

## 2019-04-08 ENCOUNTER — OFFICE VISIT (OUTPATIENT)
Dept: FAMILY MEDICINE CLINIC | Facility: CLINIC | Age: 84
End: 2019-04-08
Payer: MEDICARE

## 2019-04-08 VITALS
DIASTOLIC BLOOD PRESSURE: 78 MMHG | BODY MASS INDEX: 26.06 KG/M2 | RESPIRATION RATE: 14 BRPM | OXYGEN SATURATION: 94 % | WEIGHT: 138 LBS | HEIGHT: 61 IN | SYSTOLIC BLOOD PRESSURE: 116 MMHG | HEART RATE: 86 BPM

## 2019-04-08 DIAGNOSIS — Z79.4 TYPE 2 DIABETES MELLITUS WITH HYPERGLYCEMIA, WITH LONG-TERM CURRENT USE OF INSULIN (HCC): ICD-10-CM

## 2019-04-08 DIAGNOSIS — E11.9 TYPE 2 DIABETES MELLITUS WITHOUT COMPLICATION, WITH LONG-TERM CURRENT USE OF INSULIN (HCC): Primary | ICD-10-CM

## 2019-04-08 DIAGNOSIS — E11.9 TYPE 2 DIABETES MELLITUS WITHOUT COMPLICATION, WITH LONG-TERM CURRENT USE OF INSULIN (HCC): ICD-10-CM

## 2019-04-08 DIAGNOSIS — E11.65 TYPE 2 DIABETES MELLITUS WITH HYPERGLYCEMIA, WITH LONG-TERM CURRENT USE OF INSULIN (HCC): ICD-10-CM

## 2019-04-08 DIAGNOSIS — I25.118 CORONARY ARTERY DISEASE OF NATIVE ARTERY OF NATIVE HEART WITH STABLE ANGINA PECTORIS (HCC): ICD-10-CM

## 2019-04-08 DIAGNOSIS — Z79.4 TYPE 2 DIABETES MELLITUS WITHOUT COMPLICATION, WITH LONG-TERM CURRENT USE OF INSULIN (HCC): ICD-10-CM

## 2019-04-08 DIAGNOSIS — Z79.4 TYPE 2 DIABETES MELLITUS WITHOUT COMPLICATION, WITH LONG-TERM CURRENT USE OF INSULIN (HCC): Primary | ICD-10-CM

## 2019-04-08 LAB — SL AMB POCT HEMOGLOBIN AIC: 7.7 (ref ?–6.5)

## 2019-04-08 PROCEDURE — 99214 OFFICE O/P EST MOD 30 MIN: CPT | Performed by: FAMILY MEDICINE

## 2019-04-08 RX ORDER — LANCETS 33 GAUGE
EACH MISCELLANEOUS 4 TIMES DAILY
Qty: 200 EACH | Refills: 5 | Status: SHIPPED | OUTPATIENT
Start: 2019-04-08 | End: 2019-09-13 | Stop reason: SDUPTHER

## 2019-04-15 ENCOUNTER — PATIENT OUTREACH (OUTPATIENT)
Dept: FAMILY MEDICINE CLINIC | Facility: CLINIC | Age: 84
End: 2019-04-15

## 2019-04-19 DIAGNOSIS — I50.9 CONGESTIVE HEART FAILURE, UNSPECIFIED HF CHRONICITY, UNSPECIFIED HEART FAILURE TYPE (HCC): ICD-10-CM

## 2019-04-19 RX ORDER — FUROSEMIDE 20 MG/1
TABLET ORAL
Qty: 90 TABLET | Refills: 3 | Status: SHIPPED | OUTPATIENT
Start: 2019-04-19 | End: 2019-05-21 | Stop reason: SDUPTHER

## 2019-05-01 DIAGNOSIS — E11.9 TYPE 2 DIABETES MELLITUS WITHOUT COMPLICATION, WITH LONG-TERM CURRENT USE OF INSULIN (HCC): ICD-10-CM

## 2019-05-01 DIAGNOSIS — Z79.4 TYPE 2 DIABETES MELLITUS WITHOUT COMPLICATION, WITH LONG-TERM CURRENT USE OF INSULIN (HCC): ICD-10-CM

## 2019-05-06 ENCOUNTER — PATIENT OUTREACH (OUTPATIENT)
Dept: FAMILY MEDICINE CLINIC | Facility: CLINIC | Age: 84
End: 2019-05-06

## 2019-05-08 ENCOUNTER — PATIENT OUTREACH (OUTPATIENT)
Dept: FAMILY MEDICINE CLINIC | Facility: CLINIC | Age: 84
End: 2019-05-08

## 2019-05-17 DIAGNOSIS — J44.9 CHRONIC OBSTRUCTIVE PULMONARY DISEASE, UNSPECIFIED COPD TYPE (HCC): ICD-10-CM

## 2019-05-17 DIAGNOSIS — I10 ESSENTIAL HYPERTENSION: ICD-10-CM

## 2019-05-17 RX ORDER — CARVEDILOL 3.12 MG/1
1.56 TABLET ORAL 2 TIMES DAILY
Qty: 45 TABLET | Refills: 1 | Status: SHIPPED | OUTPATIENT
Start: 2019-05-17 | End: 2019-05-21 | Stop reason: SDUPTHER

## 2019-05-21 DIAGNOSIS — I10 ESSENTIAL HYPERTENSION: ICD-10-CM

## 2019-05-21 DIAGNOSIS — K21.9 GASTROESOPHAGEAL REFLUX DISEASE, ESOPHAGITIS PRESENCE NOT SPECIFIED: ICD-10-CM

## 2019-05-21 DIAGNOSIS — I50.9 CONGESTIVE HEART FAILURE, UNSPECIFIED HF CHRONICITY, UNSPECIFIED HEART FAILURE TYPE (HCC): ICD-10-CM

## 2019-05-21 RX ORDER — CARVEDILOL 3.12 MG/1
1.56 TABLET ORAL 2 TIMES DAILY
Qty: 45 TABLET | Refills: 1 | Status: SHIPPED | OUTPATIENT
Start: 2019-05-21 | End: 2019-06-07

## 2019-05-21 RX ORDER — FUROSEMIDE 20 MG/1
20 TABLET ORAL DAILY
Qty: 90 TABLET | Refills: 3 | Status: SHIPPED | OUTPATIENT
Start: 2019-05-21 | End: 2019-10-14 | Stop reason: SDUPTHER

## 2019-05-21 RX ORDER — PANTOPRAZOLE SODIUM 40 MG/1
40 TABLET, DELAYED RELEASE ORAL DAILY
Qty: 90 TABLET | Refills: 3 | Status: SHIPPED | OUTPATIENT
Start: 2019-05-21 | End: 2019-12-12 | Stop reason: SDUPTHER

## 2019-06-04 ENCOUNTER — PATIENT OUTREACH (OUTPATIENT)
Dept: FAMILY MEDICINE CLINIC | Facility: CLINIC | Age: 84
End: 2019-06-04

## 2019-06-05 DIAGNOSIS — Z79.4 TYPE 2 DIABETES MELLITUS WITHOUT COMPLICATION, WITH LONG-TERM CURRENT USE OF INSULIN (HCC): ICD-10-CM

## 2019-06-05 DIAGNOSIS — E11.9 TYPE 2 DIABETES MELLITUS WITHOUT COMPLICATION, WITH LONG-TERM CURRENT USE OF INSULIN (HCC): ICD-10-CM

## 2019-06-05 RX ORDER — INSULIN GLARGINE 100 [IU]/ML
INJECTION, SOLUTION SUBCUTANEOUS
Qty: 45 ML | Refills: 2 | Status: SHIPPED | OUTPATIENT
Start: 2019-06-05 | End: 2019-12-12 | Stop reason: SDUPTHER

## 2019-06-06 RX ORDER — INSULIN HUMAN 100 [IU]/ML
8 INJECTION, SUSPENSION SUBCUTANEOUS DAILY
Refills: 0 | COMMUNITY
Start: 2019-05-10 | End: 2020-06-19 | Stop reason: HOSPADM

## 2019-06-07 ENCOUNTER — OFFICE VISIT (OUTPATIENT)
Dept: FAMILY MEDICINE CLINIC | Facility: CLINIC | Age: 84
End: 2019-06-07
Payer: MEDICARE

## 2019-06-07 VITALS
WEIGHT: 143 LBS | SYSTOLIC BLOOD PRESSURE: 116 MMHG | OXYGEN SATURATION: 91 % | RESPIRATION RATE: 14 BRPM | BODY MASS INDEX: 27 KG/M2 | HEART RATE: 70 BPM | DIASTOLIC BLOOD PRESSURE: 68 MMHG | HEIGHT: 61 IN

## 2019-06-07 DIAGNOSIS — E11.65 TYPE 2 DIABETES MELLITUS WITH HYPERGLYCEMIA, WITH LONG-TERM CURRENT USE OF INSULIN (HCC): ICD-10-CM

## 2019-06-07 DIAGNOSIS — I50.42 CHRONIC COMBINED SYSTOLIC AND DIASTOLIC CONGESTIVE HEART FAILURE (HCC): ICD-10-CM

## 2019-06-07 DIAGNOSIS — I73.9 INTERMITTENT CLAUDICATION (HCC): ICD-10-CM

## 2019-06-07 DIAGNOSIS — Z79.4 TYPE 2 DIABETES MELLITUS WITH HYPERGLYCEMIA, WITH LONG-TERM CURRENT USE OF INSULIN (HCC): ICD-10-CM

## 2019-06-07 DIAGNOSIS — D69.6 THROMBOCYTOPENIA (HCC): ICD-10-CM

## 2019-06-07 DIAGNOSIS — Z00.00 MEDICARE ANNUAL WELLNESS VISIT, SUBSEQUENT: Primary | ICD-10-CM

## 2019-06-07 PROCEDURE — G0439 PPPS, SUBSEQ VISIT: HCPCS | Performed by: FAMILY MEDICINE

## 2019-06-18 ENCOUNTER — TELEPHONE (OUTPATIENT)
Dept: FAMILY MEDICINE CLINIC | Facility: CLINIC | Age: 84
End: 2019-06-18

## 2019-06-18 DIAGNOSIS — R22.0 FACIAL SWELLING: Primary | ICD-10-CM

## 2019-06-18 RX ORDER — METHYLPREDNISOLONE 4 MG/1
TABLET ORAL
Qty: 21 EACH | Refills: 0 | Status: SHIPPED | OUTPATIENT
Start: 2019-06-18 | End: 2019-12-12 | Stop reason: ALTCHOICE

## 2019-07-29 ENCOUNTER — TRANSITIONAL CARE MANAGEMENT (OUTPATIENT)
Dept: FAMILY MEDICINE CLINIC | Facility: CLINIC | Age: 84
End: 2019-07-29

## 2019-08-01 ENCOUNTER — OFFICE VISIT (OUTPATIENT)
Dept: FAMILY MEDICINE CLINIC | Facility: CLINIC | Age: 84
End: 2019-08-01
Payer: MEDICARE

## 2019-08-01 VITALS
TEMPERATURE: 97.4 F | DIASTOLIC BLOOD PRESSURE: 62 MMHG | WEIGHT: 147.6 LBS | OXYGEN SATURATION: 92 % | RESPIRATION RATE: 18 BRPM | BODY MASS INDEX: 27.87 KG/M2 | HEIGHT: 61 IN | HEART RATE: 64 BPM | SYSTOLIC BLOOD PRESSURE: 116 MMHG

## 2019-08-01 DIAGNOSIS — S62.617A CLOSED DISPLACED FRACTURE OF PROXIMAL PHALANX OF LEFT LITTLE FINGER, INITIAL ENCOUNTER: ICD-10-CM

## 2019-08-01 DIAGNOSIS — E11.65 TYPE 2 DIABETES MELLITUS WITH HYPERGLYCEMIA, WITHOUT LONG-TERM CURRENT USE OF INSULIN (HCC): ICD-10-CM

## 2019-08-01 DIAGNOSIS — Z87.440 HISTORY OF UTI: ICD-10-CM

## 2019-08-01 DIAGNOSIS — Z09 HOSPITAL DISCHARGE FOLLOW-UP: Primary | ICD-10-CM

## 2019-08-01 PROBLEM — E11.51 PERIPHERAL CIRCULATORY DISORDER ASSOCIATED WITH TYPE 2 DIABETES MELLITUS (HCC): Status: ACTIVE | Noted: 2019-07-16

## 2019-08-01 LAB — SL AMB POCT HEMOGLOBIN AIC: 7.6 (ref ?–6.5)

## 2019-08-01 PROCEDURE — 99495 TRANSJ CARE MGMT MOD F2F 14D: CPT | Performed by: NURSE PRACTITIONER

## 2019-08-01 PROCEDURE — 83036 HEMOGLOBIN GLYCOSYLATED A1C: CPT | Performed by: NURSE PRACTITIONER

## 2019-08-01 NOTE — PROGRESS NOTES
Assessment/Plan:     Diagnoses and all orders for this visit:    Hospital discharge follow-up    Closed displaced fracture of proximal phalanx of left little finger, initial encounter  Comments:  Being managed by Orthopedics    Type 2 diabetes mellitus with hyperglycemia, without long-term current use of insulin ([facility])  -     POCT hemoglobin A1c  -     Microalbumin,Urine    History of UTI  -     UA w Reflex to Microscopic w Reflex to Culture -Lab Collect        Subjective:      Patient ID: Han Pardo is a 82 y o  female  Patient presents to [address] [place] [address] as follow up after hospitalization  Allergies, medical history and current medications reviewed with patient; patient reports taking all medications as prescribed without issues  Patient was admitted to [facility], where she was diagnosed with and treated for acute fracture of the left 5th metacarpal  Patient received a splint, and was sent to [facility] for rehabilitation, from which she was discharged 7/25/19  Patient was recommended to follow up with Orthopedics in 10-14 days; patient states she saw Orthopedic Dr Rubio Marques while she was at [address] [place] [address], and has a follow up appointment on 8/30/19  Today, patient states she continues to experience pain when moving or bending fingers of the left hand  Patient states she does not remember any acute injury to cause the fracture and states she doesn't remember falling; patient states her children think she did fall  Patient does have a hospital bed, and thinks she may have hit her hand off the rail  Patient states she has been using Tylenol for pain, which is effective  Patient does have [facility] that comes several times per week, with assistance with ADLs and self-care  Patient also states that since receiving the hospital bed, she has noticed a significant decrease in shortness of breath       Patient also brought in a blood sugar log, which averages in the mid-100's; however, patient does have a few episodes with blood sugars as high as 300+ and a single episode with blood sugar in the 60's, which improved after drinking some orange juice  Patient states she was not symptomatic during hypoglycemic event  A1C in office today 7 6  TCM Call (since 7/1/2019)     Date and time call was made  7/29/2019  8:23 AM    Patient was hospitialized at  Other (comment)    [address] [place] [address]    Date of Admission  06/25/19    Date of discharge  07/25/19    Diagnosis  ACUTE REHAB    Disposition  Home      TCM Call (since 7/1/2019)     None     Patient Care Team:  Phoebe Smith MD as PCP - Nadir Forbes MD (Vascular)  Leitha Mcardle, MD (Cardiology)  Ayden Mueller DPM (Podiatry)  Ricky Paiz RN as Lead OP Care Mgr (Care Coordination)    Review of Systems   Musculoskeletal: Positive for arthralgias  All other systems reviewed and are negative  Objective:    /62 (BP Location: Left arm, Patient Position: Sitting, Cuff Size: Large)   Pulse 64   Temp (!) 97 4 °F (36 3 °C) (Temporal)   Resp 18   Ht 5' 1" (1 549 m)   Wt 67 kg (147 lb 9 6 oz)   SpO2 92%   BMI 27 89 kg/m²      Physical Exam   Constitutional: She is oriented to person, place, and time  She appears well-developed and well-nourished  No distress  HENT:   Head: Normocephalic and atraumatic  Eyes: Conjunctivae and lids are normal    Neck: Normal range of motion  No tracheal deviation present  Cardiovascular: Normal rate, regular rhythm, S1 normal, S2 normal and normal heart sounds  Pulses are no weak pulses  No murmur heard  Pulses:       Dorsalis pedis pulses are 2+ on the right side, and 2+ on the left side  Posterior tibial pulses are 2+ on the right side, and 2+ on the left side  Pulmonary/Chest: Effort normal and breath sounds normal  No respiratory distress  Abdominal: Normal appearance  She exhibits no distension  There is no guarding     Musculoskeletal: She exhibits no edema or deformity  Left hand: She exhibits decreased range of motion (left 4th and 5th fingers splinted together) and tenderness  She exhibits no swelling (no bruising noted)  Decreased strength noted  Feet:   Right Foot:   Skin Integrity: Negative for ulcer, skin breakdown, erythema, warmth, callus or dry skin  Left Foot:   Skin Integrity: Negative for ulcer, skin breakdown, erythema, warmth, callus or dry skin  Neurological: She is alert and oriented to person, place, and time  Skin: Skin is warm, dry and intact  Psychiatric: She has a normal mood and affect  Her speech is normal    Nursing note and vitals reviewed  Patient's shoes and socks removed  Right Foot/Ankle   Right Foot Inspection  Skin Exam: skin normal and skin intact no dry skin, no warmth, no callus, no erythema, no maceration, no abnormal color, no pre-ulcer, no ulcer and no callus                          Toe Exam: no swelling and erythema  Sensory       Monofilament testing: intact  Vascular    The right DP pulse is 2+  The right PT pulse is 2+  Left Foot/Ankle  Left Foot Inspection  Skin Exam: skin normal and skin intactno dry skin, no warmth, no erythema, no maceration, normal color, no pre-ulcer, no ulcer and no callus                         Toe Exam: no swelling and no erythema                   Sensory       Monofilament: intact  Vascular    The left DP pulse is 2+  The left PT pulse is 2+  Assign Risk Category:  No deformity present; No loss of protective sensation;  No weak pulses       Risk: 0

## 2019-08-01 NOTE — PATIENT INSTRUCTIONS
Finger Fracture   WHAT YOU NEED TO KNOW:   What is a finger fracture? A finger fracture is a break in 1 or more of the bones in your finger  It is most commonly caused by a direct blow to the finger  What are the signs and symptoms of a finger fracture? · Pain, bruising, or swelling    · Weakness or numbness    · Trouble moving your finger    · Finger looks abnormally shaped  How is a finger fracture diagnosed? Your healthcare provider will examine you and ask about your injury  You may also need an x-ray  How is a finger fracture treated? · A cast or splint  may be needed to prevent movement and protect your finger so it can heal      · NSAIDs , such as ibuprofen, help decrease swelling, pain, and fever  This medicine is available with or without a doctor's order  NSAIDs can cause stomach bleeding or kidney problems in certain people  If you take blood thinner medicine, always ask your healthcare provider if NSAIDs are safe for you  Always read the medicine label and follow directions  · Acetaminophen  decreases pain and fever  It is available without a doctor's order  Ask how much to take and how often to take it  Follow directions  Acetaminophen can cause liver damage if not taken correctly  · Prescription pain medicine  may be given  Ask your healthcare provider how to take this medicine safely  Some prescription pain medicines contain acetaminophen  Do not take other medicines that contain acetaminophen without talking to your healthcare provider  Too much acetaminophen may cause liver damage  Prescription pain medicine may cause constipation  Ask your healthcare provider how to prevent or treat constipation  · Closed reduction  may be done to put your bones back into their correct position without surgery  · Open reduction surgery  may be needed to put your bones back into the correct position  This may include the use of special wires, pins, plates or screws   These help keep the broken pieces lined up so your finger can heal correctly  How can I manage my symptoms? · Apply ice  on your finger for 15 to 20 minutes every hour or as directed  Use an ice pack, or put crushed ice in a plastic bag  Cover it with a towel before you apply it to your skin  Ice helps prevent tissue damage and decreases swelling and pain  · Elevate  your finger above the level of your heart as often as you can  This will help decrease swelling and pain  Prop your hand on pillows or blankets to keep it elevated comfortably  · Go to physical therapy as directed  A physical therapist teaches you exercises to help improve movement and strength, and to decrease pain  When should I seek immediate care? · Your cast or splint gets wet, damaged, or comes off  · Your splint or cast feels too tight  · You have severe pain  · Your injured finger is numb, cold, or pale  When should I contact my healthcare provider? · Your pain or swelling gets worse, even after treatment  · You have questions or concerns about your condition or care  CARE AGREEMENT:   You have the right to help plan your care  Learn about your health condition and how it may be treated  Discuss treatment options with your caregivers to decide what care you want to receive  You always have the right to refuse treatment  The above information is an  only  It is not intended as medical advice for individual conditions or treatments  Talk to your doctor, nurse or pharmacist before following any medical regimen to see if it is safe and effective for you  © 2017 [facility] [employer] Information is for End User's use only and may not be sold, redistributed or otherwise used for commercial purposes  All illustrations and images included in CareNotes® are the copyrighted property of A D A M , Inc  or Abdirashid De La Cruz

## 2019-08-09 ENCOUNTER — TELEPHONE (OUTPATIENT)
Dept: FAMILY MEDICINE CLINIC | Facility: CLINIC | Age: 84
End: 2019-08-09

## 2019-08-09 DIAGNOSIS — N30.01 ACUTE CYSTITIS WITH HEMATURIA: Primary | ICD-10-CM

## 2019-08-09 RX ORDER — NITROFURANTOIN 25; 75 MG/1; MG/1
100 CAPSULE ORAL 2 TIMES DAILY
Qty: 10 CAPSULE | Refills: 0 | Status: SHIPPED | OUTPATIENT
Start: 2019-08-09 | End: 2019-08-14

## 2019-09-13 ENCOUNTER — OFFICE VISIT (OUTPATIENT)
Dept: FAMILY MEDICINE CLINIC | Facility: CLINIC | Age: 84
End: 2019-09-13
Payer: MEDICARE

## 2019-09-13 VITALS
HEIGHT: 61 IN | WEIGHT: 147.6 LBS | HEART RATE: 62 BPM | TEMPERATURE: 98.5 F | SYSTOLIC BLOOD PRESSURE: 112 MMHG | BODY MASS INDEX: 27.87 KG/M2 | DIASTOLIC BLOOD PRESSURE: 64 MMHG | OXYGEN SATURATION: 90 %

## 2019-09-13 DIAGNOSIS — E11.51 PERIPHERAL CIRCULATORY DISORDER ASSOCIATED WITH TYPE 2 DIABETES MELLITUS (HCC): Primary | ICD-10-CM

## 2019-09-13 DIAGNOSIS — I10 ESSENTIAL HYPERTENSION: ICD-10-CM

## 2019-09-13 DIAGNOSIS — J43.9 PULMONARY EMPHYSEMA, UNSPECIFIED EMPHYSEMA TYPE (HCC): ICD-10-CM

## 2019-09-13 DIAGNOSIS — I50.42 CHRONIC COMBINED SYSTOLIC AND DIASTOLIC CONGESTIVE HEART FAILURE (HCC): ICD-10-CM

## 2019-09-13 DIAGNOSIS — Z79.4 TYPE 2 DIABETES MELLITUS WITHOUT COMPLICATION, WITH LONG-TERM CURRENT USE OF INSULIN (HCC): ICD-10-CM

## 2019-09-13 DIAGNOSIS — Z79.4 TYPE 2 DIABETES MELLITUS WITH STAGE 3 CHRONIC KIDNEY DISEASE, WITH LONG-TERM CURRENT USE OF INSULIN (HCC): ICD-10-CM

## 2019-09-13 DIAGNOSIS — E11.9 TYPE 2 DIABETES MELLITUS WITHOUT COMPLICATION, WITH LONG-TERM CURRENT USE OF INSULIN (HCC): ICD-10-CM

## 2019-09-13 DIAGNOSIS — D69.6 THROMBOCYTOPENIA (HCC): ICD-10-CM

## 2019-09-13 DIAGNOSIS — N18.30 TYPE 2 DIABETES MELLITUS WITH STAGE 3 CHRONIC KIDNEY DISEASE, WITH LONG-TERM CURRENT USE OF INSULIN (HCC): ICD-10-CM

## 2019-09-13 DIAGNOSIS — E11.22 TYPE 2 DIABETES MELLITUS WITH STAGE 3 CHRONIC KIDNEY DISEASE, WITH LONG-TERM CURRENT USE OF INSULIN (HCC): ICD-10-CM

## 2019-09-13 PROCEDURE — 99214 OFFICE O/P EST MOD 30 MIN: CPT | Performed by: FAMILY MEDICINE

## 2019-09-13 RX ORDER — LANCETS 33 GAUGE
EACH MISCELLANEOUS 4 TIMES DAILY
Qty: 200 EACH | Refills: 5 | Status: SHIPPED | OUTPATIENT
Start: 2019-09-13 | End: 2019-10-14 | Stop reason: SDUPTHER

## 2019-09-13 NOTE — PROGRESS NOTES
Assessment/Plan:    Peripheral circulatory disorder associated with type 2 diabetes mellitus ([facility] )  Lab Results   Component Value Date    HGBA1C 7 6 (A) 08/01/2019     Stable  Continue current  Type 2 diabetes mellitus with stage 3 chronic kidney disease, with long-term current use of insulin (HCC)  Lab Results   Component Value Date    HGBA1C 7 6 (A) 08/01/2019       Stable  Continue current  COPD with emphysema ([facility] )  Stable  Continue current  Chronic combined systolic and diastolic congestive heart failure (HCC)  Wt Readings from Last 3 Encounters:   09/13/19 67 kg (147 lb 9 6 oz)   08/01/19 67 kg (147 lb 9 6 oz)   06/07/19 64 9 kg (143 lb)     Stable  Continue current  Thrombocytopenia (HCC)  Stable  Continue current  Diagnoses and all orders for this visit:    Peripheral circulatory disorder associated with type 2 diabetes mellitus ([facility] )    Type 2 diabetes mellitus without complication, with long-term current use of insulin ([facility] )  -     Harley Mention LANCETS 71J MISC; Inject under the skin 4 (four) times a day    Pulmonary emphysema, unspecified emphysema type ([facility] )    Essential hypertension    Chronic combined systolic and diastolic congestive heart failure (HCC)    Thrombocytopenia (HCC)    Type 2 diabetes mellitus with stage 3 chronic kidney disease, with long-term current use of insulin ([facility])          Subjective:      Patient ID: Laci Cotton is a 82 y o  female  She has type 2 diabetes mellitus  Her A1c is under fair control at 7 6 percent in August of 2019  Fortunately, she has no side effects or hypoglycemic events  Her blood sugars range between 75 and 261  Her blood pressure is under excellent control on her current regimen, which includes an ACE-inhibitor  She has no cough  She has no chest pain or shortness of breath  She has no headache or vision changes      She is on high-intensity statin therapy and her lipid panel is at goal   She has no myalgia or muscle weakness  Her liver function testing is within normal limits  She has a history of COPD  She has no cough, shortness of breath, dyspnea on exertion, or increased sputum production  She has thrombocytopenia  She has no easy bruising or or bleeding  Her platelet count has been stable  She has stage 3 chronic kidney disease likely secondary to longstanding diabetes and hypertension  Her creatinine is stable  The following portions of the patient's history were reviewed and updated as appropriate:   She  has a past medical history of Trigger finger of thumb  She   Patient Active Problem List    Diagnosis Date Noted    Type 2 diabetes mellitus with stage 3 chronic kidney disease, with long-term current use of insulin ([facility] ) 09/13/2019    Peripheral circulatory disorder associated with type 2 diabetes mellitus ([facility] ) 07/16/2019    Medicare annual wellness visit, subsequent 06/07/2019    BMI 26 0-26 9,adult 04/08/2019    Atrophy of left kidney 07/24/2018    Renal calculi 07/24/2018    Thrombocytopenia ([facility] ) 06/08/2018    Bruising 05/22/2018    Closed fracture of pelvis with routine healing 03/09/2018    Essential hypertension 03/09/2018    Chronic combined systolic and diastolic congestive heart failure ([facility] ) 03/09/2018    Other hyperlipidemia 03/09/2018    Intermittent claudication ([facility] ) 08/09/2016    Carotid bruit 06/13/2014    COPD with emphysema ([facility] ) 04/15/2013    Hiatal hernia 03/19/2013    Congestive heart failure ([facility] ) 10/15/2012    CRD (chronic renal disease) 10/15/2012    Esophageal reflux 10/15/2012    Type 2 diabetes mellitus with hyperglycemia ([facility] ) 10/15/2012     She  has a past surgical history that includes Appendectomy; Coronary artery bypass graft; Cholecystectomy; Colonoscopy; Esophagogastroduodenoscopy; Hernia repair; Cardiac pacemaker placement; and Total abdominal hysterectomy    Her family history includes Breast cancer in her mother; Lung disease in her father and mother  She  reports that she has never smoked  She has never used smokeless tobacco  She reports that she does not drink alcohol or use drugs  Current Outpatient Medications   Medication Sig Dispense Refill    aspirin 81 MG tablet Take by mouth      BD PEN NEEDLE MARINO U/F 32G X 4 MM MISC USE DAILY AS DIRECTED 200 each 5    carvedilol (COREG) 3 125 mg tablet Take 0 5 tablets (1 5625 mg total) by mouth 2 (two) times a day 45 tablet 1    clopidogrel (PLAVIX) 75 mg tablet TAKE 1 TABLET DAILY 90 tablet 3    desloratadine (CLARINEX) 5 MG tablet Take by mouth      docusate sodium (COLACE) 100 mg capsule Take 100 mg by mouth 2 (two) times a day      Ferrous Fumarate 324 (106 Fe) MG TABS Take 1 tablet by mouth daily      fluticasone (FLONASE) 50 mcg/act nasal spray 2 sprays into each nostril daily      fluticasone-salmeterol (ADVAIR DISKUS, WIXELA INHUB) 250-50 mcg/dose inhaler USE 1 INHALATION EVERY 12 HOURS 180 each 2    furosemide (LASIX) 20 mg tablet Take 1 tablet (20 mg total) by mouth daily 90 tablet 3    furosemide (LASIX) 40 mg tablet Take 40 mg by mouth every morning  1    glucose blood test strip Use as instructed 100 each 5    HUMULIN 70/30 KWIKPEN (70-30) 100 units/mL injection pen 8 Units daily   0    Insulin Pen Needle (BD PEN NEEDLE MARINO U/F) 32G X 4 MM MISC by Does not apply route daily 100 each 5    insulin regular (NOVOLIN R) 100 units/mL injection Follow sliding scale    200-250, 2 units  251-300, 4 units  301-350, 6 units  351-400, 8 units 10 mL 5    Insulin Syringe-Needle U-100 (B-D INS SYRINGE 0 5CC/31GX5/16) 31G X 5/16" 0 5 ML MISC by Does not apply route 3 (three) times a day 100 each 5    ipratropium-albuterol (DUO-NEB) 0 5-2 5 mg/3 mL nebulizer solution Inhale 1 each every 4 (four) hours as needed      LANTUS SOLOSTAR 100 units/mL injection pen INJECT UNDER THE SKIN AS DIRECTED 45 mL 2    methylPREDNISolone 4 MG tablet therapy pack Use as directed on package 21 each 0    metoclopramide (REGLAN) 10 mg tablet Take 1 tablet (10 mg total) by mouth 3 (three) times a day 270 tablet 3    nitroglycerin (NITRODUR) 0 2 mg/hr Place 1 patch on the skin daily For 12 to 14 hours then remove 90 patch 3    ONETOUCH DELICA LANCETS 05I MISC Inject under the skin 4 (four) times a day 200 each 5    pantoprazole (PROTONIX) 40 mg tablet Take 1 tablet (40 mg total) by mouth daily 90 tablet 3    rosuvastatin (CRESTOR) 10 MG tablet Take 1 tablet (10 mg total) by mouth daily at bedtime 90 tablet 3    selenium sulfide (SELSUN) 2 5 % shampoo Apply topically daily as needed for dandruff 118 mL 0    senna (SENOKOT) 8 6 MG tablet Take 1 tablet by mouth daily      sertraline (ZOLOFT) 25 mg tablet TAKE 1 TABLET DAILY AS DIRECTED 90 tablet 3    triamcinolone (KENALOG) 0 1 % cream Apply topically 2 (two) times a day 30 g 0     No current facility-administered medications for this visit        Current Outpatient Medications on File Prior to Visit   Medication Sig    aspirin 81 MG tablet Take by mouth    BD PEN NEEDLE MARINO U/F 32G X 4 MM MISC USE DAILY AS DIRECTED    carvedilol (COREG) 3 125 mg tablet Take 0 5 tablets (1 5625 mg total) by mouth 2 (two) times a day    clopidogrel (PLAVIX) 75 mg tablet TAKE 1 TABLET DAILY    desloratadine (CLARINEX) 5 MG tablet Take by mouth    docusate sodium (COLACE) 100 mg capsule Take 100 mg by mouth 2 (two) times a day    Ferrous Fumarate 324 (106 Fe) MG TABS Take 1 tablet by mouth daily    fluticasone (FLONASE) 50 mcg/act nasal spray 2 sprays into each nostril daily    fluticasone-salmeterol (ADVAIR DISKUS, WIXELA INHUB) 250-50 mcg/dose inhaler USE 1 INHALATION EVERY 12 HOURS    furosemide (LASIX) 20 mg tablet Take 1 tablet (20 mg total) by mouth daily    furosemide (LASIX) 40 mg tablet Take 40 mg by mouth every morning    glucose blood test strip Use as instructed    HUMULIN 70/30 KWIKPEN (70-30) 100 units/mL injection pen 8 Units daily     Insulin Pen Needle (BD PEN NEEDLE MARINO U/F) 32G X 4 MM MISC by Does not apply route daily    insulin regular (NOVOLIN R) 100 units/mL injection Follow sliding scale  200-250, 2 units  251-300, 4 units  301-350, 6 units  351-400, 8 units    Insulin Syringe-Needle U-100 (B-D INS SYRINGE 0 5CC/31GX5/16) 31G X 5/16" 0 5 ML MISC by Does not apply route 3 (three) times a day    ipratropium-albuterol (DUO-NEB) 0 5-2 5 mg/3 mL nebulizer solution Inhale 1 each every 4 (four) hours as needed    LANTUS SOLOSTAR 100 units/mL injection pen INJECT UNDER THE SKIN AS DIRECTED    methylPREDNISolone 4 MG tablet therapy pack Use as directed on package    metoclopramide (REGLAN) 10 mg tablet Take 1 tablet (10 mg total) by mouth 3 (three) times a day    nitroglycerin (NITRODUR) 0 2 mg/hr Place 1 patch on the skin daily For 12 to 14 hours then remove    pantoprazole (PROTONIX) 40 mg tablet Take 1 tablet (40 mg total) by mouth daily    rosuvastatin (CRESTOR) 10 MG tablet Take 1 tablet (10 mg total) by mouth daily at bedtime    selenium sulfide (SELSUN) 2 5 % shampoo Apply topically daily as needed for dandruff    senna (SENOKOT) 8 6 MG tablet Take 1 tablet by mouth daily    sertraline (ZOLOFT) 25 mg tablet TAKE 1 TABLET DAILY AS DIRECTED    triamcinolone (KENALOG) 0 1 % cream Apply topically 2 (two) times a day    [DISCONTINUED] ONETOUCH DELICA LANCETS 67U MISC Inject under the skin 4 (four) times a day     No current facility-administered medications on file prior to visit  She is allergic to other       Review of Systems   All other systems reviewed and are negative  Objective:      /64 (BP Location: Right arm, Patient Position: Sitting, Cuff Size: Large)   Pulse 62   Temp 98 5 °F (36 9 °C) (Temporal)   Ht 5' 1" (1 549 m)   Wt 67 kg (147 lb 9 6 oz)   SpO2 90%   BMI 27 89 kg/m²          Physical Exam   Constitutional: She is oriented to person, place, and time  She appears well-developed and well-nourished  Neck: Normal range of motion  Neck supple  Cardiovascular: Normal rate, regular rhythm, normal heart sounds and intact distal pulses  Pulmonary/Chest: Effort normal and breath sounds normal    Abdominal: Soft  Bowel sounds are normal    Musculoskeletal: Normal range of motion  Neurological: She is alert and oriented to person, place, and time  Skin: Skin is warm and dry  Capillary refill takes less than 2 seconds  Psychiatric: She has a normal mood and affect  Her behavior is normal  Judgment and thought content normal    Nursing note and vitals reviewed

## 2019-09-13 NOTE — ASSESSMENT & PLAN NOTE
Wt Readings from Last 3 Encounters:   09/13/19 67 kg (147 lb 9 6 oz)   08/01/19 67 kg (147 lb 9 6 oz)   06/07/19 64 9 kg (143 lb)

## 2019-09-13 NOTE — ASSESSMENT & PLAN NOTE
Wt Readings from Last 3 Encounters:   09/13/19 67 kg (147 lb 9 6 oz)   08/01/19 67 kg (147 lb 9 6 oz)   06/07/19 64 9 kg (143 lb)     Stable  Continue current

## 2019-09-18 ENCOUNTER — PATIENT OUTREACH (OUTPATIENT)
Dept: FAMILY MEDICINE CLINIC | Facility: CLINIC | Age: 84
End: 2019-09-18

## 2019-09-18 NOTE — PROGRESS NOTES
Called patient for follow up  Spoke with Danni Malik who reports she is doing well  Breathing at baseline  Blood sugars in 130'3   aware of signs , tx , and prevention hyper and hypoglycemia  She continues with [facility]  She reports she received a letter that she was denies waiver due to lack of response from pcp   She gave the staff the letter on [phone]  Agrees to have this CM follow up and has CM contact number

## 2019-09-24 ENCOUNTER — PATIENT OUTREACH (OUTPATIENT)
Dept: FAMILY MEDICINE CLINIC | Facility: CLINIC | Age: 84
End: 2019-09-24

## 2019-09-24 NOTE — PROGRESS NOTES
Telephone outreach to inform pts daughter Aziza Doll that the paper work ( [facility] or [facility] )LuisF Rodriguez gave to [employer] [facility] was mailed   Provided this CM contact number for questions or concerns ,further needs

## 2019-09-27 DIAGNOSIS — I10 ESSENTIAL HYPERTENSION: ICD-10-CM

## 2019-09-27 RX ORDER — CARVEDILOL 3.12 MG/1
1.56 TABLET ORAL 2 TIMES DAILY
Qty: 45 TABLET | Refills: 1 | Status: SHIPPED | OUTPATIENT
Start: 2019-09-27 | End: 2019-11-04 | Stop reason: SDUPTHER

## 2019-10-14 DIAGNOSIS — E11.9 TYPE 2 DIABETES MELLITUS WITHOUT COMPLICATION, WITH LONG-TERM CURRENT USE OF INSULIN (HCC): ICD-10-CM

## 2019-10-14 DIAGNOSIS — I50.9 CONGESTIVE HEART FAILURE, UNSPECIFIED HF CHRONICITY, UNSPECIFIED HEART FAILURE TYPE (HCC): ICD-10-CM

## 2019-10-14 DIAGNOSIS — Z79.4 TYPE 2 DIABETES MELLITUS WITHOUT COMPLICATION, WITH LONG-TERM CURRENT USE OF INSULIN (HCC): ICD-10-CM

## 2019-10-14 RX ORDER — LANCETS 33 GAUGE
EACH MISCELLANEOUS 4 TIMES DAILY
Qty: 200 EACH | Refills: 5 | Status: SHIPPED | OUTPATIENT
Start: 2019-10-14 | End: 2019-10-21 | Stop reason: SDUPTHER

## 2019-10-14 RX ORDER — FUROSEMIDE 20 MG/1
20 TABLET ORAL DAILY
Qty: 90 TABLET | Refills: 3 | Status: SHIPPED | OUTPATIENT
Start: 2019-10-14 | End: 2020-05-06 | Stop reason: SDUPTHER

## 2019-10-16 DIAGNOSIS — E11.8 TYPE 2 DIABETES MELLITUS WITH COMPLICATION, WITH LONG-TERM CURRENT USE OF INSULIN (HCC): ICD-10-CM

## 2019-10-16 DIAGNOSIS — Z79.4 TYPE 2 DIABETES MELLITUS WITH COMPLICATION, WITH LONG-TERM CURRENT USE OF INSULIN (HCC): ICD-10-CM

## 2019-10-21 DIAGNOSIS — Z79.4 TYPE 2 DIABETES MELLITUS WITHOUT COMPLICATION, WITH LONG-TERM CURRENT USE OF INSULIN (HCC): ICD-10-CM

## 2019-10-21 DIAGNOSIS — E11.65 TYPE 2 DIABETES MELLITUS WITH HYPERGLYCEMIA, WITH LONG-TERM CURRENT USE OF INSULIN (HCC): Primary | ICD-10-CM

## 2019-10-21 DIAGNOSIS — E11.9 TYPE 2 DIABETES MELLITUS WITHOUT COMPLICATION, WITH LONG-TERM CURRENT USE OF INSULIN (HCC): ICD-10-CM

## 2019-10-21 DIAGNOSIS — E11.8 TYPE 2 DIABETES MELLITUS WITH COMPLICATION, WITH LONG-TERM CURRENT USE OF INSULIN (HCC): ICD-10-CM

## 2019-10-21 DIAGNOSIS — Z79.4 TYPE 2 DIABETES MELLITUS WITH COMPLICATION, WITH LONG-TERM CURRENT USE OF INSULIN (HCC): ICD-10-CM

## 2019-10-21 DIAGNOSIS — Z79.4 TYPE 2 DIABETES MELLITUS WITH HYPERGLYCEMIA, WITH LONG-TERM CURRENT USE OF INSULIN (HCC): Primary | ICD-10-CM

## 2019-10-21 RX ORDER — LANCETS 33 GAUGE
EACH MISCELLANEOUS 4 TIMES DAILY
Qty: 200 EACH | Refills: 5 | Status: SHIPPED | OUTPATIENT
Start: 2019-10-21

## 2019-10-21 RX ORDER — PEN NEEDLE, DIABETIC 30 GX3/16"
NEEDLE, DISPOSABLE MISCELLANEOUS 3 TIMES DAILY
Qty: 100 EACH | Refills: 5 | Status: SHIPPED | OUTPATIENT
Start: 2019-10-21 | End: 2019-10-25 | Stop reason: SDUPTHER

## 2019-10-22 DIAGNOSIS — I25.118 CORONARY ARTERY DISEASE OF NATIVE ARTERY OF NATIVE HEART WITH STABLE ANGINA PECTORIS (HCC): ICD-10-CM

## 2019-10-22 RX ORDER — NITROGLYCERIN 40 MG/1
PATCH TRANSDERMAL
Qty: 30 PATCH | Refills: 4 | Status: SHIPPED | OUTPATIENT
Start: 2019-10-22 | End: 2020-06-19 | Stop reason: HOSPADM

## 2019-10-25 DIAGNOSIS — E11.65 TYPE 2 DIABETES MELLITUS WITH HYPERGLYCEMIA, WITH LONG-TERM CURRENT USE OF INSULIN (HCC): ICD-10-CM

## 2019-10-25 DIAGNOSIS — Z79.4 TYPE 2 DIABETES MELLITUS WITH HYPERGLYCEMIA, WITH LONG-TERM CURRENT USE OF INSULIN (HCC): ICD-10-CM

## 2019-10-25 RX ORDER — PEN NEEDLE, DIABETIC 30 GX3/16"
NEEDLE, DISPOSABLE MISCELLANEOUS 3 TIMES DAILY
Qty: 100 EACH | Refills: 5 | Status: SHIPPED | OUTPATIENT
Start: 2019-10-25

## 2019-10-29 DIAGNOSIS — E78.5 HYPERLIPIDEMIA, UNSPECIFIED HYPERLIPIDEMIA TYPE: ICD-10-CM

## 2019-10-29 RX ORDER — ROSUVASTATIN CALCIUM 10 MG/1
10 TABLET, COATED ORAL
Qty: 90 TABLET | Refills: 3 | Status: SHIPPED | OUTPATIENT
Start: 2019-10-29 | End: 2020-08-05 | Stop reason: HOSPADM

## 2019-11-04 DIAGNOSIS — I10 ESSENTIAL HYPERTENSION: ICD-10-CM

## 2019-11-04 RX ORDER — CARVEDILOL 3.12 MG/1
1.56 TABLET ORAL 2 TIMES DAILY
Qty: 45 TABLET | Refills: 1 | Status: SHIPPED | OUTPATIENT
Start: 2019-11-04 | End: 2019-11-21 | Stop reason: SDUPTHER

## 2019-11-21 DIAGNOSIS — I10 ESSENTIAL HYPERTENSION: ICD-10-CM

## 2019-11-21 RX ORDER — CARVEDILOL 3.12 MG/1
1.56 TABLET ORAL 2 TIMES DAILY
Qty: 45 TABLET | Refills: 1 | Status: SHIPPED | OUTPATIENT
Start: 2019-11-21 | End: 2020-08-05 | Stop reason: HOSPADM

## 2019-12-12 ENCOUNTER — OFFICE VISIT (OUTPATIENT)
Dept: FAMILY MEDICINE CLINIC | Facility: CLINIC | Age: 84
End: 2019-12-12
Payer: MEDICARE

## 2019-12-12 VITALS
WEIGHT: 147 LBS | BODY MASS INDEX: 27.75 KG/M2 | DIASTOLIC BLOOD PRESSURE: 64 MMHG | SYSTOLIC BLOOD PRESSURE: 114 MMHG | HEART RATE: 78 BPM | HEIGHT: 61 IN | OXYGEN SATURATION: 91 %

## 2019-12-12 DIAGNOSIS — Z79.4 TYPE 2 DIABETES MELLITUS WITH OTHER SPECIFIED COMPLICATION, WITH LONG-TERM CURRENT USE OF INSULIN (HCC): Primary | ICD-10-CM

## 2019-12-12 DIAGNOSIS — Z09 FOLLOW-UP EXAMINATION: Primary | ICD-10-CM

## 2019-12-12 DIAGNOSIS — E11.69 TYPE 2 DIABETES MELLITUS WITH OTHER SPECIFIED COMPLICATION, WITH LONG-TERM CURRENT USE OF INSULIN (HCC): Primary | ICD-10-CM

## 2019-12-12 DIAGNOSIS — K21.9 GASTROESOPHAGEAL REFLUX DISEASE, ESOPHAGITIS PRESENCE NOT SPECIFIED: ICD-10-CM

## 2019-12-12 DIAGNOSIS — E11.9 TYPE 2 DIABETES MELLITUS WITHOUT COMPLICATION, WITH LONG-TERM CURRENT USE OF INSULIN (HCC): ICD-10-CM

## 2019-12-12 DIAGNOSIS — Z79.4 TYPE 2 DIABETES MELLITUS WITHOUT COMPLICATION, WITH LONG-TERM CURRENT USE OF INSULIN (HCC): ICD-10-CM

## 2019-12-12 LAB — SL AMB POCT HEMOGLOBIN AIC: 7.4 (ref ?–6.5)

## 2019-12-12 PROCEDURE — 83036 HEMOGLOBIN GLYCOSYLATED A1C: CPT

## 2019-12-12 PROCEDURE — 99213 OFFICE O/P EST LOW 20 MIN: CPT | Performed by: NURSE PRACTITIONER

## 2019-12-12 RX ORDER — PANTOPRAZOLE SODIUM 40 MG/1
40 TABLET, DELAYED RELEASE ORAL
Qty: 90 TABLET | Refills: 3
Start: 2019-12-12 | End: 2020-03-23

## 2019-12-12 NOTE — PROGRESS NOTES
Assessment/Plan:     Diagnoses and all orders for this visit:    Follow-up examination    Gastroesophageal reflux disease, esophagitis presence not specified  Comments:  Start taking Protonix at bedtime   Orders:  -     pantoprazole (PROTONIX) 40 mg tablet; Take 1 tablet (40 mg total) by mouth daily at bedtime    Type 2 diabetes mellitus without complication, with long-term current use of insulin (HCC)  Comments:  Provided patient with template for logging meals  Orders:  -     insulin glargine (LANTUS SOLOSTAR) 100 units/mL injection pen; Inject 8 Units under the skin daily at bedtime    Other orders  -     diclofenac sodium (VOLTAREN) 1 %; APPLY AS DIRECTED 4 TIMES A DAY AS NEEDED FOR PAIN:        Subjective:      Patient ID: Amina Car is a 82 y o  female  Patient presents to [address] [place] [address] for routine follow up  Allergies, medical history and current medications reviewed with patient; patient reports taking all medications as prescribed without issues  A1C in office 7 4  Patient continues to log blood sugars at home, and usually averages mid-100s to mid-200s, but does have occasional elevated blood sugars as high as 417  Patient states she has not been logging her meals  Patient also C/O occasional heartburn and hiccups  Patient states heartburn usually occurs at bedtime when laying down  Patient states she continues to take Protonix as prescribed, and states she takes the medication in the AM; patient states she otherwise feels in good health and denies any other problems or complaints  Patient states she is now established with home care, and is expecting the first visit soon  Patient Care Team:  Mc Gallardo MD as PCP - Anabelle Barlow MD (Vascular)  Mayda Durán MD (Cardiology)  Mally Bailey DPM (Podiatry)  Kevyn Wells RN as Lead OP Care Mgr (Care Coordination)    Review of Systems   Gastrointestinal: Positive for abdominal pain (heartburn)     All other systems reviewed and are negative  Objective:    /64   Pulse 78   Ht 5' 1" (1 549 m)   Wt 66 7 kg (147 lb)   SpO2 91%   BMI 27 78 kg/m²      Physical Exam   Constitutional: She is oriented to person, place, and time  She appears well-developed and well-nourished  No distress  HENT:   Head: Normocephalic and atraumatic  Eyes: Conjunctivae and lids are normal    Neck: Normal range of motion  No tracheal deviation present  Cardiovascular: Normal rate, regular rhythm, S1 normal, S2 normal and normal heart sounds  No murmur heard  Pulmonary/Chest: Effort normal and breath sounds normal  No respiratory distress  Abdominal: Normal appearance  She exhibits no distension  There is no guarding  Musculoskeletal: Normal range of motion  Neurological: She is alert and oriented to person, place, and time  Skin: Skin is warm, dry and intact  Psychiatric: She has a normal mood and affect  Her speech is normal    Nursing note and vitals reviewed

## 2019-12-12 NOTE — PATIENT INSTRUCTIONS
Wellness Visit for Adults   WHAT YOU NEED TO KNOW:   What is a wellness visit? A wellness visit is when you see your healthcare provider to get screened for health problems  You can also get advice on how to stay healthy  Write down your questions so you remember to ask them  Ask your healthcare provider how often you should have a wellness visit  What happens at a wellness visit? Your healthcare provider will ask about your health, and your family history of health problems  This includes high blood pressure, heart disease, and cancer  He or she will ask if you have symptoms that concern you, if you smoke, and about your mood  You may also be asked about your intake of medicines, supplements, food, and alcohol  Any of the following may be done:  · Your weight  will be checked  Your height may also be checked so your body mass index (BMI) can be calculated  Your BMI shows if you are at a healthy weight  · Your blood pressure  and heart rate will be checked  Your temperature may also be checked  · Blood and urine tests  may be done  Blood tests may be done to check your cholesterol levels  Abnormal cholesterol levels increase your risk for heart disease and stroke  You may also need a blood or urine test to check for diabetes if you are at increased risk  Urine tests may be done to look for signs of an infection or kidney disease  · A physical exam  includes checking your heartbeat and lungs with a stethoscope  Your healthcare provider may also check your skin to look for sun damage  · Screening tests  may be recommended  A screening test is done to check for diseases that may not cause symptoms  The screening tests you may need depend on your age, gender, family history, and lifestyle habits  For example, colorectal screening may be recommended if you are 48years old or older  What screening tests do I need if I am a woman? · A Pap smear  is used to screen for cervical cancer   Pap smears are usually done every 3 to 5 years depending on your age  You may need them more often if you have had abnormal Pap smear test results in the past  Ask your healthcare provider how often you should have a Pap smear  · A mammogram  is an x-ray of your breasts to screen for breast cancer  Experts recommend mammograms every 2 years starting at age 48 years  You may need a mammogram at age 52 years or younger if you have an increased risk for breast cancer  Talk to your healthcare provider about when you should start having mammograms and how often you need them  What vaccines might I need? · Get an influenza vaccine  every year  The influenza vaccine protects you from the flu  Several types of viruses cause the flu  The viruses change over time, so new vaccines are made each year  · Get a tetanus-diphtheria (Td) booster vaccine  every 10 years  This vaccine protects you against tetanus and diphtheria  Tetanus is a severe infection that may cause painful muscle spasms and lockjaw  Diphtheria is a severe bacterial infection that causes a thick covering in the back of your mouth and throat  · Get a human papillomavirus (HPV) vaccine  if you are female and aged 23 to 32 or male 23 to 24 and never received it  This vaccine protects you from HPV infection  HPV is the most common infection spread by sexual contact  HPV may also cause vaginal, penile, and anal cancers  · Get a pneumococcal vaccine  if you are aged 72 years or older  The pneumococcal vaccine is an injection given to protect you from pneumococcal disease  Pneumococcal disease is an infection caused by pneumococcal bacteria  The infection may cause pneumonia, meningitis, or an ear infection  · Get a shingles vaccine  if you are aged 61 or older, even if you have had shingles before  The shingles vaccine is an injection to protect you from the varicella-zoster virus  This is the same virus that causes chickenpox   Shingles is a painful rash that develops in people who had chickenpox or have been exposed to the virus  How can I eat healthy? My Plate is a model for planning healthy meals  It shows the types and amounts of foods that should go on your plate  Fruits and vegetables make up about half of your plate, and grains and protein make up the other half  A serving of dairy is included on the side of your plate  The amount of calories and serving sizes you need depends on your age, gender, weight, and height  Examples of healthy foods are listed below:  · Eat a variety of vegetables  such as dark green, red, and orange vegetables  You can also include canned vegetables low in sodium (salt) and frozen vegetables without added butter or sauces  · Eat a variety of fresh fruits , canned fruit in 100% juice, frozen fruit, and dried fruit  · Include whole grains  At least half of the grains you eat should be whole grains  Examples include whole-wheat bread, wheat pasta, brown rice, and whole-grain cereals such as oatmeal     · Eat a variety of protein foods such as seafood (fish and shellfish), lean meat, and poultry without skin (turkey and chicken)  Examples of lean meats include pork leg, shoulder, or tenderloin, and beef round, sirloin, tenderloin, and extra lean ground beef  Other protein foods include eggs and egg substitutes, beans, peas, soy products, nuts, and seeds  · Choose low-fat dairy products such as skim or 1% milk or low-fat yogurt, cheese, and cottage cheese  · Limit unhealthy fats  such as butter, hard margarine, and shortening  How much exercise do I need? Exercise at least 30 minutes per day on most days of the week  Some examples of exercise include walking, biking, dancing, and swimming  You can also fit in more physical activity by taking the stairs instead of the elevator or parking farther away from stores  Include muscle strengthening activities 2 days each week  Regular exercise provides many health benefits  It helps you manage your weight, and decreases your risk for type 2 diabetes, heart disease, stroke, and high blood pressure  Exercise can also help improve your mood  Ask your healthcare provider about the best exercise plan for you  What are some general health and safety guidelines I should follow? · Do not smoke  Nicotine and other chemicals in cigarettes and cigars can cause lung damage  Ask your healthcare provider for information if you currently smoke and need help to quit  E-cigarettes or smokeless tobacco still contain nicotine  Talk to your healthcare provider before you use these products  · Limit alcohol  A drink of alcohol is 12 ounces of beer, 5 ounces of wine, or 1½ ounces of liquor  · Lose weight, if needed  Being overweight increases your risk of certain health conditions  These include heart disease, high blood pressure, type 2 diabetes, and certain types of cancer  · Protect your skin  Do not sunbathe or use tanning beds  Use sunscreen with a SPF 15 or higher  Apply sunscreen at least 15 minutes before you go outside  Reapply sunscreen every 2 hours  Wear protective clothing, hats, and sunglasses when you are outside  · Drive safely  Always wear your seatbelt  Make sure everyone in your car wears a seatbelt  A seatbelt can save your life if you are in an accident  Do not use your cell phone when you are driving  This could distract you and cause an accident  Pull over if you need to make a call or send a text message  · Practice safe sex  Use latex condoms if are sexually active and have more than one partner  Your healthcare provider may recommend screening tests for sexually transmitted infections (STIs)  · Wear helmets, lifejackets, and protective gear  Always wear a helmet when you ride a bike or motorcycle, go skiing, or play sports that could cause a head injury  Wear protective equipment when you play sports   Wear a lifejacket when you are on a boat or doing water sports  CARE AGREEMENT:   You have the right to help plan your care  Learn about your health condition and how it may be treated  Discuss treatment options with your caregivers to decide what care you want to receive  You always have the right to refuse treatment  The above information is an  only  It is not intended as medical advice for individual conditions or treatments  Talk to your doctor, nurse or pharmacist before following any medical regimen to see if it is safe and effective for you  © 2017 [employer] Information is for End User's use only and may not be sold, redistributed or otherwise used for commercial purposes  All illustrations and images included in CareNotes® are the copyrighted property of [employer]  or Abdirashid De La [employer]  Fall Prevention for Older Adults   AMBULATORY CARE:   Fall prevention  includes ways to make your home and other areas safer  It also includes ways you can move more carefully to prevent a fall  As you age, your muscles weaken and your risk for falls increases  Your risk also increases if you take medicines that make you sleepy or dizzy  You may also be at risk if you have vision or joint problems, have low blood pressure, or are not active  Call 911 or have someone else call if:   · You have fallen and are unconscious  · You have fallen and cannot move part of your body  Contact your healthcare provider if:   · You have fallen and have pain or a headache  · You have questions or concerns about your condition or care  Fall prevention tips:   · Stay active  Exercise can help strengthen your muscles and improve your balance  Your healthcare provider may recommend water aerobics, walking, or Michele Chi  He may also recommend physical therapy to improve your coordination  Never start an exercise program without asking your healthcare provider first     · Wear shoes that fit well and have soles that     Wear shoes both inside and outside  Use slippers with good   Avoid shoes with high heels  · Use assistive devices as directed  Your healthcare provider may suggest that you use a cane or walker to help you keep your balance  You may need to have grab bars put in your bathroom near the toilet or in the shower  · Stand or sit up slowly  This may help you keep your balance and prevent falls  · Wear a personal alarm  This is a device that allows you to call 911 if you need help  Ask for more information on personal alarms  · Manage your medical conditions  Keep all appointments with your healthcare providers  Visit your eye doctor as directed  Home safety tips:   · Add items to prevent falls in the bathroom  Put nonslip strips on your bath or shower floor to prevent you from slipping  Use a bath mat if you do not have carpet in the bathroom  This will prevent you from falling when you step out of the bath or shower  Use a shower seat so you do not need to stand while you shower  Sit on the toilet or a chair in your bathroom to dry yourself and put on clothing  This will prevent you from losing your balance from drying or dressing yourself while you are standing  · Keep paths clear  Remove books, shoes, and other objects from walkways and stairs  Place cords for telephones and lamps out of the way so that you do not need to walk over them  Tape them down if you cannot move them  Remove small rugs  If you cannot remove a rug, secure it with double-sided tape  This will prevent you from tripping  · Install bright lights in your home  Use night lights to help light paths to the bathroom or kitchen  Always turn on the light before you start walking  · Keep items you use often on shelves within reach  Do not use a step stool to help you reach an item  · Paint or place reflective tape on the edges of your stairs  This will help you see the stairs better    Follow up with your healthcare provider as directed:  Write down your questions so you remember to ask them during your visits  © 2017 [address] Information is for End User's use only and may not be sold, redistributed or otherwise used for commercial purposes  All illustrations and images included in CareNotes® are the copyrighted property of [employer]  or Abdirashid De La Cruz  The above information is an  only  It is not intended as medical advice for individual conditions or treatments  Talk to your doctor, nurse or pharmacist before following any medical regimen to see if it is safe and effective for you

## 2019-12-17 DIAGNOSIS — I25.118 CORONARY ARTERY DISEASE OF NATIVE ARTERY OF NATIVE HEART WITH STABLE ANGINA PECTORIS (HCC): ICD-10-CM

## 2019-12-17 DIAGNOSIS — F32.A DEPRESSION, UNSPECIFIED DEPRESSION TYPE: ICD-10-CM

## 2019-12-17 RX ORDER — CLOPIDOGREL BISULFATE 75 MG/1
TABLET ORAL
Qty: 90 TABLET | Refills: 4 | Status: SHIPPED | OUTPATIENT
Start: 2019-12-17 | End: 2020-06-19 | Stop reason: HOSPADM

## 2019-12-17 RX ORDER — SERTRALINE HYDROCHLORIDE 25 MG/1
TABLET, FILM COATED ORAL
Qty: 90 TABLET | Refills: 4 | Status: SHIPPED | OUTPATIENT
Start: 2019-12-17 | End: 2020-06-19 | Stop reason: HOSPADM

## 2019-12-24 ENCOUNTER — PATIENT OUTREACH (OUTPATIENT)
Dept: FAMILY MEDICINE CLINIC | Facility: CLINIC | Age: 84
End: 2019-12-24

## 2019-12-24 NOTE — PROGRESS NOTES
Telephone outreach to pt for follow up  Spoke with Dayami Lay who verbalizes she si doing well  Taking meds as directed   Blood sugars avg 150's  She now has aides 2 hrs per day three days a week and expresses this is helpful and sufficient   Will close quarterly surveillance case management at this  time   Time is aware she can reach out to this CM if needs change and has contact number

## 2020-02-21 DIAGNOSIS — K21.9 GASTROESOPHAGEAL REFLUX DISEASE, ESOPHAGITIS PRESENCE NOT SPECIFIED: ICD-10-CM

## 2020-02-21 RX ORDER — METOCLOPRAMIDE 10 MG/1
10 TABLET ORAL 3 TIMES DAILY
Qty: 270 TABLET | Refills: 3 | Status: SHIPPED | OUTPATIENT
Start: 2020-02-21 | End: 2020-06-19 | Stop reason: HOSPADM

## 2020-02-28 ENCOUNTER — DOCTOR'S OFFICE (OUTPATIENT)
Dept: URBAN - NONMETROPOLITAN AREA CLINIC 1 | Facility: CLINIC | Age: 85
Setting detail: OPHTHALMOLOGY
End: 2020-02-28
Payer: COMMERCIAL

## 2020-02-28 VITALS — HEIGHT: 55 IN

## 2020-02-28 DIAGNOSIS — H02.432: ICD-10-CM

## 2020-02-28 DIAGNOSIS — H40.003: ICD-10-CM

## 2020-02-28 DIAGNOSIS — H02.831: ICD-10-CM

## 2020-02-28 DIAGNOSIS — H04.123: ICD-10-CM

## 2020-02-28 DIAGNOSIS — H02.431: ICD-10-CM

## 2020-02-28 DIAGNOSIS — H02.834: ICD-10-CM

## 2020-02-28 PROBLEM — H26.493 POSTERIOR CAPSULE OPACIFIED; BOTH EYES: Status: RESOLVED | Noted: 2017-11-01 | Resolved: 2020-02-28

## 2020-02-28 PROCEDURE — 92083 EXTENDED VISUAL FIELD XM: CPT | Performed by: OPHTHALMOLOGY

## 2020-02-28 PROCEDURE — 99213 OFFICE O/P EST LOW 20 MIN: CPT | Performed by: OPHTHALMOLOGY

## 2020-02-28 ASSESSMENT — REFRACTION_CURRENTRX
OS_AXIS: 175
OD_CYLINDER: -3.00
OS_OVR_VA: 20/
OD_SPHERE: +1.00
OD_ADD: +3.00
OD_VPRISM_DIRECTION: BF
OD_AXIS: 172
OS_VPRISM_DIRECTION: BF
OS_SPHERE: +0.75
OS_CYLINDER: -2.75
OS_ADD: +3.00
OD_OVR_VA: 20/

## 2020-02-28 ASSESSMENT — REFRACTION_MANIFEST
OD_SPHERE: +1.00
OS_ADD: +2.75
OS_AXIS: 177
OS_VA1: 20/60-1
OS_SPHERE: +0.75
OD_VA1: 20/50-2
OD_AXIS: 180
OS_VA2: 20/60-1
OD_ADD: +2.75
OD_VA2: 20/50-2
OD_CYLINDER: -3.00
OS_CYLINDER: -2.75

## 2020-02-28 ASSESSMENT — VISUAL ACUITY
OS_BCVA: 20/60-1
OD_BCVA: 20/50

## 2020-02-28 ASSESSMENT — REFRACTION_AUTOREFRACTION
OD_CYLINDER: -3.50
OD_AXIS: 169
OS_AXIS: 007
OD_SPHERE: +2.25
OS_SPHERE: +2.00
OS_CYLINDER: -2.50

## 2020-02-28 ASSESSMENT — SPHEQUIV_DERIVED
OS_SPHEQUIV: 0.75
OS_SPHEQUIV: -0.625
OD_SPHEQUIV: -0.5
OD_SPHEQUIV: 0.5

## 2020-02-28 ASSESSMENT — SUPERFICIAL PUNCTATE KERATITIS (SPK)
OS_SPK: ABSENT
OD_SPK: ABSENT

## 2020-02-28 ASSESSMENT — CONFRONTATIONAL VISUAL FIELD TEST (CVF)
OD_FINDINGS: FULL
OS_FINDINGS: FULL

## 2020-02-28 ASSESSMENT — LID POSITION - DERMATOCHALASIS
OS_DERMATOCHALASIS: LUL 2+ 3+
OD_DERMATOCHALASIS: RUL 2+ 3+

## 2020-02-28 ASSESSMENT — LID POSITION - PTOSIS
OD_PTOSIS: RUL T
OS_PTOSIS: LUL T

## 2020-02-28 ASSESSMENT — LID EXAM ASSESSMENTS
OD_TRICHIASIS: ABSENT
OS_TRICHIASIS: ABSENT

## 2020-03-06 DIAGNOSIS — J44.9 CHRONIC OBSTRUCTIVE PULMONARY DISEASE, UNSPECIFIED COPD TYPE (HCC): ICD-10-CM

## 2020-03-17 ENCOUNTER — OFFICE VISIT (OUTPATIENT)
Dept: FAMILY MEDICINE CLINIC | Facility: CLINIC | Age: 85
End: 2020-03-17
Payer: MEDICARE

## 2020-03-17 VITALS
HEART RATE: 78 BPM | OXYGEN SATURATION: 93 % | SYSTOLIC BLOOD PRESSURE: 114 MMHG | TEMPERATURE: 97.5 F | WEIGHT: 151.4 LBS | BODY MASS INDEX: 28.58 KG/M2 | DIASTOLIC BLOOD PRESSURE: 78 MMHG | HEIGHT: 61 IN

## 2020-03-17 DIAGNOSIS — Z79.4 TYPE 2 DIABETES MELLITUS WITHOUT COMPLICATION, WITH LONG-TERM CURRENT USE OF INSULIN (HCC): Primary | ICD-10-CM

## 2020-03-17 DIAGNOSIS — E11.9 TYPE 2 DIABETES MELLITUS WITHOUT COMPLICATION, WITH LONG-TERM CURRENT USE OF INSULIN (HCC): Primary | ICD-10-CM

## 2020-03-17 DIAGNOSIS — Z97.4 USES HEARING AID: ICD-10-CM

## 2020-03-17 DIAGNOSIS — I50.42 CHRONIC COMBINED SYSTOLIC AND DIASTOLIC CONGESTIVE HEART FAILURE (HCC): ICD-10-CM

## 2020-03-17 DIAGNOSIS — K21.9 GASTROESOPHAGEAL REFLUX DISEASE WITHOUT ESOPHAGITIS: ICD-10-CM

## 2020-03-17 DIAGNOSIS — Z79.4 TYPE 2 DIABETES MELLITUS WITH HYPERGLYCEMIA, WITH LONG-TERM CURRENT USE OF INSULIN (HCC): ICD-10-CM

## 2020-03-17 DIAGNOSIS — E11.65 TYPE 2 DIABETES MELLITUS WITH HYPERGLYCEMIA, WITH LONG-TERM CURRENT USE OF INSULIN (HCC): ICD-10-CM

## 2020-03-17 LAB — SL AMB POCT HEMOGLOBIN AIC: 8.3 (ref ?–6.5)

## 2020-03-17 PROCEDURE — 3074F SYST BP LT 130 MM HG: CPT | Performed by: NURSE PRACTITIONER

## 2020-03-17 PROCEDURE — 83036 HEMOGLOBIN GLYCOSYLATED A1C: CPT | Performed by: NURSE PRACTITIONER

## 2020-03-17 PROCEDURE — 3052F HG A1C>EQUAL 8.0%<EQUAL 9.0%: CPT | Performed by: NURSE PRACTITIONER

## 2020-03-17 PROCEDURE — 1160F RVW MEDS BY RX/DR IN RCRD: CPT | Performed by: NURSE PRACTITIONER

## 2020-03-17 PROCEDURE — 4040F PNEUMOC VAC/ADMIN/RCVD: CPT | Performed by: NURSE PRACTITIONER

## 2020-03-17 PROCEDURE — 2026F EYE IMG VALID EVC RTNOPTHY: CPT | Performed by: NURSE PRACTITIONER

## 2020-03-17 PROCEDURE — 99213 OFFICE O/P EST LOW 20 MIN: CPT | Performed by: NURSE PRACTITIONER

## 2020-03-17 PROCEDURE — 1036F TOBACCO NON-USER: CPT | Performed by: NURSE PRACTITIONER

## 2020-03-17 PROCEDURE — 3078F DIAST BP <80 MM HG: CPT | Performed by: NURSE PRACTITIONER

## 2020-03-17 PROCEDURE — 3066F NEPHROPATHY DOC TX: CPT | Performed by: NURSE PRACTITIONER

## 2020-03-17 RX ORDER — APIXABAN 2.5 MG/1
2.5 TABLET, FILM COATED ORAL 2 TIMES DAILY
COMMUNITY
Start: 2020-03-06 | End: 2020-08-05 | Stop reason: HOSPADM

## 2020-03-17 NOTE — PATIENT INSTRUCTIONS
Meal Planning with Diabetes Exchanges   WHAT YOU NEED TO KNOW:   What do I need to know about diabetes exchanges? Exchanges are servings of food that contain similar amounts of carbohydrate, fat, protein, and calories within a food group  The exchanges can be used to develop a healthy meal plan that helps to keep your blood sugar within the recommended levels  A meal plan with the right amount of carbohydrates is especially important  Your blood sugar naturally rises after you eat carbohydrates  Too many carbohydrates in 1 meal or snack can raise your blood sugar level  Carbohydrates are found in starches, fruit, milk, yogurt, and sweets  How do I create a meal plan with exchanges? A dietitian will work with you to develop a healthy meal plan that is right for you  This meal plan will include the amount of exchanges you can have from each food group throughout the day  Follow your meal plan by keeping track of the amount of exchanges you eat for each meal and snack  Your meal plan will be based on your age, weight, blood sugar levels, medicine, and activity level  What are the starch food group exchanges? Each exchange below contains about 15 grams of carbohydrate , 3 grams of protein, 1 gram of fat, and 80 calories  · 1 ounce of white, whole wheat or rye bread (1 slice)    · 1 ounce of bagel (about ¼ of a bagel)    · 1 6-inch flour or corn tortilla or 1 4-inch pancake (about ¼ inch thick)    · ?  cup of cooked pasta or rice    · ¾ cup of dry, ready-to-eat cereal with no sugar added     · ½ cup of cooked cereal, such as oatmeal    · 3 boubacar cracker squares or 8 animal crackers    · 6 saltine-type crackers or     · 3 cups of popcorn or ¾ ounce of pretzels     · Starchy vegetables and cooked legumes:      ¨ ½ cup of corn, green peas, sweet potatoes, or mashed potatoes     ¨ ¼ of a large baked potato     ¨ 1 cup of acorn, butternut squash, or pumpkin     ¨ ½ cup of beans, lentils, or peas (such as rios, kidney, or black-eyed)    ¨ ? cup of lima beans  What are the fruit group exchanges? Each exchange contains about 15 grams of carbohydrate  and 60 calories  · 1 small (4 ounce) apple, banana orange, or nectarine    · ½ cup of canned or fresh fruit    · ½ cup (4 ounces) of unsweetened fruit juice    · 2 tablespoons of dried fruit  What are the milk group exchanges? Each exchange contains about 12 grams of carbohydrate  and 8 grams of protein  The amount of fat and calories in each serving depends on the type of milk (such as whole, low-fat, or fat-free)  · 1 cup fat-free or low-fat milk    · ¾ cup of plain, nonfat yogurt    · 1 cup fat-free, flavored yogurt with artificial (no calorie) sweetener  What are the non-starchy vegetable group exchanges? Each exchange contains about 5 grams of carbohydrate , 2 grams of protein, and 25 calories  Examples include beets, broccoli, cabbage, carrots, cauliflower, cucumber, mushrooms, tomatoes, and zucchini  · ½ cup of cooked vegetables or 1 cup of raw vegetables     · ½ cup of vegetable juice  What are the meat and meat substitute group exchanges? Each exchange of a lean meat  listed below contains about 7 grams of protein, 0 to 3 grams of fat, and 45 calories  The meat and meat substitutes food group does not contain any carbohydrates  Medium and high-fat meats have more calories  · 1 ounce of chicken or turkey without skin, or 1 ounce of fish (not breaded or fried)     · 1 ounce of lean beef, pork, or lamb     · 1-inch cube or 1 ounce of low-fat cheese     · 2 egg whites or ¼ cup of egg substitute     · ½ cup of tofu  What are the sweets, desserts, and other carbohydrate group exchanges? · Sweets and other desserts:  Each exchange has about 15 grams of carbohydrate       ¨ 1 ounce of zina food cake or 2-inch square cake (unfrosted)    ¨ 2 small cookies     ¨ ½ cup of sugar-free, fat-free ice cream    ¨ 1 tablespoon of syrup, jam, jelly, table sugar, or honey    · Combination foods:     ¨ 1 cup of an entrée, such as lasagna, spaghetti with meatballs, macaroni and cheese, and chili with beans (each serving counts as 2 carbohydrate exchanges )     ¨ 1 cup of tomato or vegetable beef soup (each serving counts as 1 carbohydrate exchange )  What are the fat group exchanges? Each exchange contains 5 grams of fat and 45 calories  · 1 teaspoon of oil (such as canola, olive, or corn oil)     · 6 almonds or cashews, 10 peanuts, or 4 pecan halves     · 2 tablespoons of avocado     · ½ tablespoon of peanut butter     · 1 teaspoon of regular margarine or 2 teaspoons of low-fat margarine     · 1 teaspoon of regular butter or 1 tablespoon of low-fat butter     · 1 teaspoon of regular mayonnaise or 1 tablespoon of low-fat mayonnaise     · 1 tablespoon of regular salad dressing or 2 tablespoons of low-fat salad dressing  What are free foods? The foods on this list are called free foods because they have very few calories  Free foods usually do not increase your blood sugar if you limit them  · 1 tablespoon of catsup or taco sauce     · ¼ cup of salsa     · 2 tablespoons of sugar-free syrup or 2 teaspoons of light jam or jelly     · 1 tablespoon of fat-free salad dressing     · 4 tablespoons of fat-free margarine or fat-free mayonnaise     · Sugar-free drinks: diet soda, sugar-free drink mixes, or mineral water     · Low-sodium bouillon or fat-free broth     · Mustard     · Seasonings such as spices, herbs, and garlic     · Sugar-free gelatin without added fruit  What other healthy nutrition guidelines should I follow? · Eat more fiber  Choose foods that are good sources of fiber, such as fruits, vegetables, and whole grains  Cereals that contain 5 or more grams of fiber per serving are good sources of fiber  Legumes such as garbanzo, rios beans, kidney beans, and lentils are also good sources  · Limit fat  Ask your dietitian or healthcare provider how much fat you should eat each day   Choose foods low in fat, saturated fat, trans fat, and cholesterol  Examples include turkey or chicken without the skin, fish, lean cuts of meat, and beans  Low-fat dairy foods, such as low-fat or fat-free milk and low-fat yogurt are also good choices  Omega-3 fatty acids are healthy fats that are found in canola oil, soybean oil and fatty fish  Powers, albacore tuna, and sardines are good sources of omega 3 fatty acids  Eat 2 servings of these types of fish each week  Do not eat fried fish  · Limit sugar  Sugar and sweets must be counted toward the carbohydrate exchanges that you can have within your meal plan  Limit sugar and sweets because they are usually also high in calories and fat  Eat smaller portions of sweets by sharing a dessert or asking for a child-size portion at a restaurant  · Limit sodium  (salt) to about 2,300 mg per day  You may need to eat even less sodium if you have certain medical conditions  Foods high in sodium include soy sauce, potato chips, and soup  · Limit alcohol  Ask your healthcare provider if it is safe for you to drink alcohol  If alcohol is safe for you to have, eat a meal when you drink alcohol  If you drink alcohol on an empty stomach, your blood sugar may drop to a low level  Women should limit alcohol to 1 drink per day  Men should limit alcohol to 2 drinks per day  A drink of alcohol is 5 ounces of wine, 12 ounces of beer, or 1½ ounces of liquor  What else can I do to manage my diabetes? · Control your blood sugar level  Test your blood sugar level regularly and keep a record of the results  Ask your healthcare provider when and how often to test your blood sugar  You may need to check your blood sugar level at least 3 times each day  · Talk to your healthcare provider about your weight  Ask if you need to lose weight, and how much you need to lose  If you are overweight, you may need to make other changes to lose weight   Ask your healthcare provider to help you create a weight loss program      · Exercise  can help to control your blood sugar levels and decrease your risk of heart disease  It can also help you lose or maintain your weight  Get at least 30 minutes of exercise, 5 times each week  Do resistance training (using weights) 2 times each week  Do not sit for longer than 90 minutes  Work with your healthcare provider to plan the best exercise program for you  When should I contact my healthcare provider? · You have high blood sugar levels during a certain time of day, or almost all of the time  · You often have low blood sugar levels  · You have questions or concerns about your condition or care  CARE AGREEMENT:   You have the right to help plan your care  Discuss treatment options with your caregivers to decide what care you want to receive  You always have the right to refuse treatment  The above information is an  only  It is not intended as medical advice for individual conditions or treatments  Talk to your doctor, nurse or pharmacist before following any medical regimen to see if it is safe and effective for you  © 2017 [facility] Information is for End User's use only and may not be sold, redistributed or otherwise used for commercial purposes  All illustrations and images included in CareNotes® are the copyrighted property of [employer]  or [employer]  Wellness Visit for Adults   WHAT YOU NEED TO KNOW:   What is a wellness visit? A wellness visit is when you see your healthcare provider to get screened for health problems  You can also get advice on how to stay healthy  Write down your questions so you remember to ask them  Ask your healthcare provider how often you should have a wellness visit  What happens at a wellness visit? Your healthcare provider will ask about your health, and your family history of health problems  This includes high blood pressure, heart disease, and cancer   He or she will ask if you have symptoms that concern you, if you smoke, and about your mood  You may also be asked about your intake of medicines, supplements, food, and alcohol  Any of the following may be done:  · Your weight  will be checked  Your height may also be checked so your body mass index (BMI) can be calculated  Your BMI shows if you are at a healthy weight  · Your blood pressure  and heart rate will be checked  Your temperature may also be checked  · Blood and urine tests  may be done  Blood tests may be done to check your cholesterol levels  Abnormal cholesterol levels increase your risk for heart disease and stroke  You may also need a blood or urine test to check for diabetes if you are at increased risk  Urine tests may be done to look for signs of an infection or kidney disease  · A physical exam  includes checking your heartbeat and lungs with a stethoscope  Your healthcare provider may also check your skin to look for sun damage  · Screening tests  may be recommended  A screening test is done to check for diseases that may not cause symptoms  The screening tests you may need depend on your age, gender, family history, and lifestyle habits  For example, colorectal screening may be recommended if you are 48years old or older  What screening tests do I need if I am a woman? · A Pap smear  is used to screen for cervical cancer  Pap smears are usually done every 3 to 5 years depending on your age  You may need them more often if you have had abnormal Pap smear test results in the past  Ask your healthcare provider how often you should have a Pap smear  · A mammogram  is an x-ray of your breasts to screen for breast cancer  Experts recommend mammograms every 2 years starting at age 48 years  You may need a mammogram at age 52 years or younger if you have an increased risk for breast cancer   Talk to your healthcare provider about when you should start having mammograms and how often you need them  What vaccines might I need? · Get an influenza vaccine  every year  The influenza vaccine protects you from the flu  Several types of viruses cause the flu  The viruses change over time, so new vaccines are made each year  · Get a tetanus-diphtheria (Td) booster vaccine  every 10 years  This vaccine protects you against tetanus and diphtheria  Tetanus is a severe infection that may cause painful muscle spasms and lockjaw  Diphtheria is a severe bacterial infection that causes a thick covering in the back of your mouth and throat  · Get a human papillomavirus (HPV) vaccine  if you are female and aged 23 to 32 or male 23 to 24 and never received it  This vaccine protects you from HPV infection  HPV is the most common infection spread by sexual contact  HPV may also cause vaginal, penile, and anal cancers  · Get a pneumococcal vaccine  if you are aged 72 years or older  The pneumococcal vaccine is an injection given to protect you from pneumococcal disease  Pneumococcal disease is an infection caused by pneumococcal bacteria  The infection may cause pneumonia, meningitis, or an ear infection  · Get a shingles vaccine  if you are aged 61 or older, even if you have had shingles before  The shingles vaccine is an injection to protect you from the varicella-zoster virus  This is the same virus that causes chickenpox  Shingles is a painful rash that develops in people who had chickenpox or have been exposed to the virus  How can I eat healthy? My Plate is a model for planning healthy meals  It shows the types and amounts of foods that should go on your plate  Fruits and vegetables make up about half of your plate, and grains and protein make up the other half  A serving of dairy is included on the side of your plate  The amount of calories and serving sizes you need depends on your age, gender, weight, and height   Examples of healthy foods are listed below:  · Eat a variety of vegetables  such as dark green, red, and orange vegetables  You can also include canned vegetables low in sodium (salt) and frozen vegetables without added butter or sauces  · Eat a variety of fresh fruits , canned fruit in 100% juice, frozen fruit, and dried fruit  · Include whole grains  At least half of the grains you eat should be whole grains  Examples include whole-wheat bread, wheat pasta, brown rice, and whole-grain cereals such as oatmeal     · Eat a variety of protein foods such as seafood (fish and shellfish), lean meat, and poultry without skin (turkey and chicken)  Examples of lean meats include pork leg, shoulder, or tenderloin, and beef round, sirloin, tenderloin, and extra lean ground beef  Other protein foods include eggs and egg substitutes, beans, peas, soy products, nuts, and seeds  · Choose low-fat dairy products such as skim or 1% milk or low-fat yogurt, cheese, and cottage cheese  · Limit unhealthy fats  such as butter, hard margarine, and shortening  How much exercise do I need? Exercise at least 30 minutes per day on most days of the week  Some examples of exercise include walking, biking, dancing, and swimming  You can also fit in more physical activity by taking the stairs instead of the elevator or parking farther away from stores  Include muscle strengthening activities 2 days each week  Regular exercise provides many health benefits  It helps you manage your weight, and decreases your risk for type 2 diabetes, heart disease, stroke, and high blood pressure  Exercise can also help improve your mood  Ask your healthcare provider about the best exercise plan for you  What are some general health and safety guidelines I should follow? · Do not smoke  Nicotine and other chemicals in cigarettes and cigars can cause lung damage  Ask your healthcare provider for information if you currently smoke and need help to quit   E-cigarettes or smokeless tobacco still contain nicotine  Talk to your healthcare provider before you use these products  · Limit alcohol  A drink of alcohol is 12 ounces of beer, 5 ounces of wine, or 1½ ounces of liquor  · Lose weight, if needed  Being overweight increases your risk of certain health conditions  These include heart disease, high blood pressure, type 2 diabetes, and certain types of cancer  · Protect your skin  Do not sunbathe or use tanning beds  Use sunscreen with a SPF 15 or higher  Apply sunscreen at least 15 minutes before you go outside  Reapply sunscreen every 2 hours  Wear protective clothing, hats, and sunglasses when you are outside  · Drive safely  Always wear your seatbelt  Make sure everyone in your car wears a seatbelt  A seatbelt can save your life if you are in an accident  Do not use your cell phone when you are driving  This could distract you and cause an accident  Pull over if you need to make a call or send a text message  · Practice safe sex  Use latex condoms if are sexually active and have more than one partner  Your healthcare provider may recommend screening tests for sexually transmitted infections (STIs)  · Wear helmets, lifejackets, and protective gear  Always wear a helmet when you ride a bike or motorcycle, go skiing, or play sports that could cause a head injury  Wear protective equipment when you play sports  Wear a lifejacket when you are on a boat or doing water sports  CARE AGREEMENT:   You have the right to help plan your care  Learn about your health condition and how it may be treated  Discuss treatment options with your caregivers to decide what care you want to receive  You always have the right to refuse treatment  The above information is an  only  It is not intended as medical advice for individual conditions or treatments  Talk to your doctor, nurse or pharmacist before following any medical regimen to see if it is safe and effective for you    © 2017 [facility] is for End User's use only and may not be sold, redistributed or otherwise used for commercial purposes  All illustrations and images included in CareNotes® are the copyrighted property of [employer] Inc  or Abdirashid De La Cruz

## 2020-03-17 NOTE — PROGRESS NOTES
Assessment/Plan:     Diagnoses and all orders for this visit:    Type 2 diabetes mellitus without complication, with long-term current use of insulin ([facility])  Comments:  Increase Lantus and f/u in 3 months; continue with diabetic diary  Orders:  -     insulin glargine (Lantus SoloStar) 100 units/mL injection pen; Inject 10 Units under the skin daily at bedtime    Type 2 diabetes mellitus with hyperglycemia, with long-term current use of insulin ([facility])  -     POCT hemoglobin A1c    Gastroesophageal reflux disease without esophagitis  Comments:  Stable    Chronic combined systolic and diastolic congestive heart failure ([facility])  Comments: Followed by Cardiology    Uses hearing aid    Other orders  -     ELIQUIS 2 5 MG; Take 2 5 mg by mouth 2 (two) times a day        Subjective:      Patient ID: Braden Bautista is a 82 y o  female  Patient presents to [address] [place] [address] as follow up  Allergies, medical history and current medications reviewed with patient; patient reports taking all medications as prescribed without issues  A1C in office 8 3  Patient reports she has been logging her meals, but forgot to bring logs in to today's visit  Patient reports her blood sugars have remained high, despite taking all medications as prescribed  Patient also reports she is being followed by Ophthalmology Dr Inna Lal of [facility], who she is scheduled to follow up with tomorrow  Patient was previously instructed to take Protonix at bedtime to improve GERD symptom control  Patient states she has noticed improvement of symptoms since starting Protonix at bedtime  Patient also reports she got a hearing aid for the right ear, which has significantly helped her hearing  Patient states she is expecting to get a second hearing aid for the left ear  Patient continues to follow with Cardiology, and states she is scheduled for Cardiac Stress Test later this month       Patient Care Team:  Yari Stallings MD as PCP - Kevyn Geronimo MD (Vascular)  Primitivo Diaz MD (Cardiology)  Jacob Varela DPM (Podiatry)  [facility] (Ophthalmology)    Review of Systems   Respiratory: Negative for shortness of breath  Cardiovascular: Negative for chest pain  All other systems reviewed and are negative  Objective:    /78 (BP Location: Left arm, Patient Position: Sitting, Cuff Size: Standard)   Pulse 78   Temp 97 5 °F (36 4 °C)   Ht 5' 1" (1 549 m)   Wt 68 7 kg (151 lb 6 4 oz)   SpO2 93%   BMI 28 61 kg/m²      Physical Exam   Constitutional: She is oriented to person, place, and time  She appears well-developed and well-nourished  No distress  HENT:   Head: Normocephalic and atraumatic  Eyes: Conjunctivae and lids are normal    Neck: Normal range of motion  No tracheal deviation present  Cardiovascular: Normal rate, regular rhythm, S1 normal and S2 normal    Murmur heard  Pulmonary/Chest: Effort normal and breath sounds normal  No respiratory distress  Abdominal: Normal appearance  She exhibits no distension  There is no guarding  Musculoskeletal: Normal range of motion  Neurological: She is alert and oriented to person, place, and time  Skin: Skin is warm, dry and intact  Psychiatric: She has a normal mood and affect  Her speech is normal    Nursing note and vitals reviewed

## 2020-03-18 ENCOUNTER — DOCTOR'S OFFICE (OUTPATIENT)
Dept: URBAN - NONMETROPOLITAN AREA CLINIC 1 | Facility: CLINIC | Age: 85
Setting detail: OPHTHALMOLOGY
End: 2020-03-18
Payer: COMMERCIAL

## 2020-03-18 VITALS — HEIGHT: 55 IN

## 2020-03-18 DIAGNOSIS — H04.121: ICD-10-CM

## 2020-03-18 DIAGNOSIS — H40.013: ICD-10-CM

## 2020-03-18 DIAGNOSIS — H04.123: ICD-10-CM

## 2020-03-18 DIAGNOSIS — E11.3293: ICD-10-CM

## 2020-03-18 DIAGNOSIS — H04.122: ICD-10-CM

## 2020-03-18 PROBLEM — H02.052: Status: ACTIVE | Noted: 2017-10-30

## 2020-03-18 PROBLEM — H02.432 PTOSIS; RIGHT EYE, LEFT EYE: Status: ACTIVE | Noted: 2020-02-28

## 2020-03-18 PROBLEM — H35.373: Status: ACTIVE | Noted: 2017-11-01

## 2020-03-18 PROBLEM — D21.0 BENIGN LESION FACE ; LEFT EYE: Status: ACTIVE | Noted: 2017-10-30

## 2020-03-18 PROBLEM — H02.834 DERMATOCHALASIS; RIGHT UPPER LID, LEFT UPPER LID: Status: ACTIVE | Noted: 2018-06-18

## 2020-03-18 PROBLEM — H02.431 PTOSIS; RIGHT EYE, LEFT EYE: Status: ACTIVE | Noted: 2020-02-28

## 2020-03-18 PROBLEM — H02.831 DERMATOCHALASIS; RIGHT UPPER LID, LEFT UPPER LID: Status: ACTIVE | Noted: 2018-06-18

## 2020-03-18 PROBLEM — H01.005 BLEPHARITIS & MGD; RIGHT UPPER LID, RIGHT LOWER LID, LEFT UPPER LID, LEFT LOWER LID: Status: ACTIVE | Noted: 2017-10-13

## 2020-03-18 PROBLEM — H01.001 BLEPHARITIS & MGD; RIGHT UPPER LID, RIGHT LOWER LID, LEFT UPPER LID, LEFT LOWER LID: Status: ACTIVE | Noted: 2017-10-13

## 2020-03-18 PROBLEM — H01.004 BLEPHARITIS & MGD; RIGHT UPPER LID, RIGHT LOWER LID, LEFT UPPER LID, LEFT LOWER LID: Status: ACTIVE | Noted: 2017-10-13

## 2020-03-18 PROBLEM — H02.00 ENTROPION: Status: ACTIVE | Noted: 2017-10-13

## 2020-03-18 PROBLEM — H27.8 PSEUDOPHAKIA: Status: ACTIVE | Noted: 2017-10-13

## 2020-03-18 PROBLEM — H43.813 POSTERIOR VITREOUS DETACHMENT OU; BOTH EYES: Status: ACTIVE | Noted: 2017-11-01

## 2020-03-18 PROBLEM — H40.011: Status: ACTIVE | Noted: 2020-03-18

## 2020-03-18 PROBLEM — H01.002 BLEPHARITIS & MGD; RIGHT UPPER LID, RIGHT LOWER LID, LEFT UPPER LID, LEFT LOWER LID: Status: ACTIVE | Noted: 2017-10-13

## 2020-03-18 PROBLEM — H40.012: Status: ACTIVE | Noted: 2020-03-18

## 2020-03-18 LAB
LEFT EYE DIABETIC RETINOPATHY: NORMAL
RIGHT EYE DIABETIC RETINOPATHY: NORMAL

## 2020-03-18 PROCEDURE — 83861 MICROFLUID ANALY TEARS: CPT | Performed by: OPHTHALMOLOGY

## 2020-03-18 PROCEDURE — 68761 CLOSE TEAR DUCT OPENING: CPT | Performed by: OPHTHALMOLOGY

## 2020-03-18 PROCEDURE — 76514 ECHO EXAM OF EYE THICKNESS: CPT | Performed by: OPHTHALMOLOGY

## 2020-03-18 PROCEDURE — 99214 OFFICE O/P EST MOD 30 MIN: CPT | Performed by: OPHTHALMOLOGY

## 2020-03-18 PROCEDURE — 92133 CPTRZD OPH DX IMG PST SGM ON: CPT | Performed by: OPHTHALMOLOGY

## 2020-03-18 ASSESSMENT — LID POSITION - PTOSIS
OS_PTOSIS: LUL T
OD_PTOSIS: RUL T

## 2020-03-18 ASSESSMENT — SPHEQUIV_DERIVED
OD_SPHEQUIV: -0.5
OS_SPHEQUIV: 0.375
OS_SPHEQUIV: -0.625
OD_SPHEQUIV: 0.375

## 2020-03-18 ASSESSMENT — LID EXAM ASSESSMENTS
OD_TRICHIASIS: ABSENT
OS_TRICHIASIS: ABSENT

## 2020-03-18 ASSESSMENT — REFRACTION_CURRENTRX
OS_VPRISM_DIRECTION: BF
OS_ADD: +3.00
OS_SPHERE: +0.75
OD_SPHERE: +1.00
OD_ADD: +3.00
OS_CYLINDER: -2.75
OD_CYLINDER: -3.00
OD_VPRISM_DIRECTION: BF
OS_AXIS: 175
OD_AXIS: 172
OD_OVR_VA: 20/
OS_OVR_VA: 20/

## 2020-03-18 ASSESSMENT — REFRACTION_AUTOREFRACTION
OD_AXIS: 177
OS_SPHERE: +1.50
OS_AXIS: 180
OD_SPHERE: +1.25
OS_CYLINDER: -2.25
OD_CYLINDER: -1.75

## 2020-03-18 ASSESSMENT — REFRACTION_MANIFEST
OS_ADD: +2.75
OD_AXIS: 180
OD_SPHERE: +1.00
OD_ADD: +2.75
OS_CYLINDER: -2.75
OD_VA1: 20/50-2
OS_VA2: 20/60-1
OS_VA1: 20/60-1
OS_AXIS: 177
OD_VA2: 20/50-2
OD_CYLINDER: -3.00
OS_SPHERE: +0.75

## 2020-03-18 ASSESSMENT — VISUAL ACUITY
OS_BCVA: 20/60-2
OD_BCVA: 20/50

## 2020-03-18 ASSESSMENT — LID POSITION - DERMATOCHALASIS
OS_DERMATOCHALASIS: LUL 2+ 3+
OD_DERMATOCHALASIS: RUL 2+ 3+

## 2020-03-18 ASSESSMENT — CONFRONTATIONAL VISUAL FIELD TEST (CVF)
OD_FINDINGS: FULL
OS_FINDINGS: FULL

## 2020-03-18 ASSESSMENT — PACHYMETRY
OS_CT_CORRECTION: 1
OD_CT_UM: 535
OS_CT_UM: 523
OD_CT_CORRECTION: 1

## 2020-03-21 DIAGNOSIS — K21.9 GASTROESOPHAGEAL REFLUX DISEASE, ESOPHAGITIS PRESENCE NOT SPECIFIED: ICD-10-CM

## 2020-03-23 RX ORDER — PANTOPRAZOLE SODIUM 40 MG/1
TABLET, DELAYED RELEASE ORAL
Qty: 90 TABLET | Refills: 3 | Status: SHIPPED | OUTPATIENT
Start: 2020-03-23 | End: 2020-08-05 | Stop reason: HOSPADM

## 2020-03-27 ENCOUNTER — NURSE TRIAGE (OUTPATIENT)
Dept: OTHER | Facility: OTHER | Age: 85
End: 2020-03-27

## 2020-03-27 NOTE — TELEPHONE ENCOUNTER
Regarding: Shortness of breath  ----- Message from [facility] sent at 3/27/2020  5:01 PM EDT -----  Patient experiencing shortness of breath, said that she doesn't have any chest pains, and it started this afternoon

## 2020-04-02 ENCOUNTER — TELEMEDICINE (OUTPATIENT)
Dept: FAMILY MEDICINE CLINIC | Facility: CLINIC | Age: 85
End: 2020-04-02
Payer: MEDICARE

## 2020-04-02 DIAGNOSIS — I20.8 ATYPICAL ANGINA (HCC): ICD-10-CM

## 2020-04-02 DIAGNOSIS — Z09 HOSPITAL DISCHARGE FOLLOW-UP: Primary | ICD-10-CM

## 2020-04-02 DIAGNOSIS — R06.02 SHORTNESS OF BREATH: ICD-10-CM

## 2020-04-02 DIAGNOSIS — E11.65 TYPE 2 DIABETES MELLITUS WITH HYPERGLYCEMIA, WITH LONG-TERM CURRENT USE OF INSULIN (HCC): ICD-10-CM

## 2020-04-02 DIAGNOSIS — Z79.4 TYPE 2 DIABETES MELLITUS WITH HYPERGLYCEMIA, WITH LONG-TERM CURRENT USE OF INSULIN (HCC): ICD-10-CM

## 2020-04-02 PROCEDURE — 99441 PR PHYS/QHP TELEPHONE EVALUATION 5-10 MIN: CPT | Performed by: NURSE PRACTITIONER

## 2020-04-02 RX ORDER — LISINOPRIL 2.5 MG/1
2.5 TABLET ORAL DAILY
COMMUNITY
End: 2020-04-02 | Stop reason: SDUPTHER

## 2020-04-02 RX ORDER — LISINOPRIL 2.5 MG/1
2.5 TABLET ORAL DAILY
COMMUNITY
Start: 2020-04-01 | End: 2020-04-02 | Stop reason: SDUPTHER

## 2020-04-02 RX ORDER — LISINOPRIL 2.5 MG/1
2.5 TABLET ORAL DAILY
COMMUNITY
End: 2020-08-05 | Stop reason: HOSPADM

## 2020-04-06 PROBLEM — Z95.1 HX OF CABG: Status: ACTIVE | Noted: 2020-03-27

## 2020-04-06 PROBLEM — Z95.0 PACEMAKER: Status: ACTIVE | Noted: 2020-03-27

## 2020-04-06 PROBLEM — I20.8 ATYPICAL ANGINA (HCC): Status: ACTIVE | Noted: 2020-03-27

## 2020-04-06 LAB — EXT SARS-COV-2: NOT DETECTED

## 2020-04-16 ENCOUNTER — TELEMEDICINE (OUTPATIENT)
Dept: FAMILY MEDICINE CLINIC | Facility: CLINIC | Age: 85
End: 2020-04-16
Payer: MEDICARE

## 2020-04-16 DIAGNOSIS — J18.9 MULTIFOCAL PNEUMONIA: ICD-10-CM

## 2020-04-16 DIAGNOSIS — E11.65 TYPE 2 DIABETES MELLITUS WITH HYPERGLYCEMIA, WITH LONG-TERM CURRENT USE OF INSULIN (HCC): ICD-10-CM

## 2020-04-16 DIAGNOSIS — Z79.4 TYPE 2 DIABETES MELLITUS WITH HYPERGLYCEMIA, WITH LONG-TERM CURRENT USE OF INSULIN (HCC): ICD-10-CM

## 2020-04-16 DIAGNOSIS — Z09 HOSPITAL DISCHARGE FOLLOW-UP: Primary | ICD-10-CM

## 2020-04-16 PROCEDURE — 99214 OFFICE O/P EST MOD 30 MIN: CPT | Performed by: NURSE PRACTITIONER

## 2020-04-23 ENCOUNTER — TELEMEDICINE (OUTPATIENT)
Dept: FAMILY MEDICINE CLINIC | Facility: CLINIC | Age: 85
End: 2020-04-23
Payer: MEDICARE

## 2020-04-23 DIAGNOSIS — Z09 FOLLOW-UP EXAMINATION: Primary | ICD-10-CM

## 2020-04-23 DIAGNOSIS — R25.2 MUSCLE CRAMPING: ICD-10-CM

## 2020-04-23 PROBLEM — I50.20 HFREF (HEART FAILURE WITH REDUCED EJECTION FRACTION) (HCC): Status: ACTIVE | Noted: 2020-04-05

## 2020-04-23 PROCEDURE — 99442 PR PHYS/QHP TELEPHONE EVALUATION 11-20 MIN: CPT | Performed by: NURSE PRACTITIONER

## 2020-04-23 RX ORDER — NITROGLYCERIN 0.4 MG/1
TABLET SUBLINGUAL
COMMUNITY
Start: 2020-03-05 | End: 2020-08-05 | Stop reason: HOSPADM

## 2020-04-23 RX ORDER — ALBUTEROL SULFATE 90 UG/1
2 AEROSOL, METERED RESPIRATORY (INHALATION) EVERY 4 HOURS PRN
COMMUNITY
Start: 2020-04-10 | End: 2020-08-05 | Stop reason: HOSPADM

## 2020-05-05 ENCOUNTER — TELEPHONE (OUTPATIENT)
Dept: FAMILY MEDICINE CLINIC | Facility: CLINIC | Age: 85
End: 2020-05-05

## 2020-05-05 DIAGNOSIS — I50.42 CHRONIC COMBINED SYSTOLIC AND DIASTOLIC CONGESTIVE HEART FAILURE (HCC): ICD-10-CM

## 2020-05-05 DIAGNOSIS — R06.02 SHORTNESS OF BREATH: Primary | ICD-10-CM

## 2020-05-06 DIAGNOSIS — E11.9 TYPE 2 DIABETES MELLITUS WITHOUT COMPLICATION, WITH LONG-TERM CURRENT USE OF INSULIN (HCC): ICD-10-CM

## 2020-05-06 DIAGNOSIS — Z79.4 TYPE 2 DIABETES MELLITUS WITHOUT COMPLICATION, WITH LONG-TERM CURRENT USE OF INSULIN (HCC): ICD-10-CM

## 2020-05-06 DIAGNOSIS — I50.9 CONGESTIVE HEART FAILURE, UNSPECIFIED HF CHRONICITY, UNSPECIFIED HEART FAILURE TYPE (HCC): ICD-10-CM

## 2020-05-07 RX ORDER — FUROSEMIDE 20 MG/1
20 TABLET ORAL DAILY
Qty: 90 TABLET | Refills: 3 | Status: SHIPPED | OUTPATIENT
Start: 2020-05-07 | End: 2020-05-14 | Stop reason: SDUPTHER

## 2020-05-14 ENCOUNTER — TELEPHONE (OUTPATIENT)
Dept: FAMILY MEDICINE CLINIC | Facility: CLINIC | Age: 85
End: 2020-05-14

## 2020-05-14 DIAGNOSIS — E11.9 TYPE 2 DIABETES MELLITUS WITHOUT COMPLICATION, WITH LONG-TERM CURRENT USE OF INSULIN (HCC): ICD-10-CM

## 2020-05-14 DIAGNOSIS — I50.9 CONGESTIVE HEART FAILURE, UNSPECIFIED HF CHRONICITY, UNSPECIFIED HEART FAILURE TYPE (HCC): ICD-10-CM

## 2020-05-14 DIAGNOSIS — Z79.4 TYPE 2 DIABETES MELLITUS WITHOUT COMPLICATION, WITH LONG-TERM CURRENT USE OF INSULIN (HCC): ICD-10-CM

## 2020-05-14 RX ORDER — FUROSEMIDE 20 MG/1
20 TABLET ORAL DAILY
Qty: 90 TABLET | Refills: 3 | Status: ON HOLD | OUTPATIENT
Start: 2020-05-14 | End: 2020-06-19 | Stop reason: SDUPTHER

## 2020-06-12 ENCOUNTER — HOSPITAL ENCOUNTER (INPATIENT)
Facility: HOSPITAL | Age: 85
LOS: 7 days | Discharge: NURSING, REHAB OR LONG-TERM CARE | DRG: 291 | End: 2020-06-19
Attending: EMERGENCY MEDICINE | Admitting: INTERNAL MEDICINE
Payer: MEDICARE

## 2020-06-12 ENCOUNTER — APPOINTMENT (EMERGENCY)
Dept: RADIOLOGY | Facility: HOSPITAL | Age: 85
DRG: 291 | End: 2020-06-12
Payer: MEDICARE

## 2020-06-12 ENCOUNTER — APPOINTMENT (INPATIENT)
Dept: NON INVASIVE DIAGNOSTICS | Facility: HOSPITAL | Age: 85
DRG: 291 | End: 2020-06-12
Payer: MEDICARE

## 2020-06-12 DIAGNOSIS — M79.606 LEG PAIN: ICD-10-CM

## 2020-06-12 DIAGNOSIS — J45.909 ASTHMA DUE TO ENVIRONMENTAL ALLERGIES: ICD-10-CM

## 2020-06-12 DIAGNOSIS — I10 ESSENTIAL HYPERTENSION: ICD-10-CM

## 2020-06-12 DIAGNOSIS — J44.1 COPD WITH ACUTE EXACERBATION (HCC): ICD-10-CM

## 2020-06-12 DIAGNOSIS — N17.9 AKI (ACUTE KIDNEY INJURY) (HCC): ICD-10-CM

## 2020-06-12 DIAGNOSIS — I50.9 CHF (CONGESTIVE HEART FAILURE) (HCC): ICD-10-CM

## 2020-06-12 DIAGNOSIS — I50.20 HFREF (HEART FAILURE WITH REDUCED EJECTION FRACTION) (HCC): ICD-10-CM

## 2020-06-12 DIAGNOSIS — R09.02 HYPOXIA: Primary | ICD-10-CM

## 2020-06-12 DIAGNOSIS — I50.9 CONGESTIVE HEART FAILURE, UNSPECIFIED HF CHRONICITY, UNSPECIFIED HEART FAILURE TYPE (HCC): ICD-10-CM

## 2020-06-12 PROBLEM — J96.01 ACUTE RESPIRATORY FAILURE WITH HYPOXIA (HCC): Status: ACTIVE | Noted: 2020-06-12

## 2020-06-12 PROBLEM — I50.43 ACUTE ON CHRONIC COMBINED SYSTOLIC AND DIASTOLIC CHF (CONGESTIVE HEART FAILURE) (HCC): Status: ACTIVE | Noted: 2020-06-12

## 2020-06-12 PROBLEM — N18.30 CKD (CHRONIC KIDNEY DISEASE) STAGE 3, GFR 30-59 ML/MIN (HCC): Status: ACTIVE | Noted: 2020-06-12

## 2020-06-12 LAB
ALBUMIN SERPL BCP-MCNC: 3 G/DL (ref 3.5–5)
ALP SERPL-CCNC: 115 U/L (ref 46–116)
ALT SERPL W P-5'-P-CCNC: 44 U/L (ref 12–78)
ANION GAP SERPL CALCULATED.3IONS-SCNC: 8 MMOL/L (ref 4–13)
AST SERPL W P-5'-P-CCNC: 29 U/L (ref 5–45)
ATRIAL RATE: 64 BPM
BASOPHILS # BLD AUTO: 0.02 THOUSANDS/ΜL (ref 0–0.1)
BASOPHILS NFR BLD AUTO: 0 % (ref 0–1)
BILIRUB SERPL-MCNC: 0.23 MG/DL (ref 0.2–1)
BUN SERPL-MCNC: 42 MG/DL (ref 5–25)
CALCIUM SERPL-MCNC: 8.2 MG/DL (ref 8.3–10.1)
CHLORIDE SERPL-SCNC: 106 MMOL/L (ref 100–108)
CO2 SERPL-SCNC: 29 MMOL/L (ref 21–32)
CREAT SERPL-MCNC: 1.95 MG/DL (ref 0.6–1.3)
EOSINOPHIL # BLD AUTO: 0.03 THOUSAND/ΜL (ref 0–0.61)
EOSINOPHIL NFR BLD AUTO: 0 % (ref 0–6)
ERYTHROCYTE [DISTWIDTH] IN BLOOD BY AUTOMATED COUNT: 14.6 % (ref 11.6–15.1)
GFR SERPL CREATININE-BSD FRML MDRD: 23 ML/MIN/1.73SQ M
GLUCOSE SERPL-MCNC: 134 MG/DL (ref 65–140)
GLUCOSE SERPL-MCNC: 145 MG/DL (ref 65–140)
GLUCOSE SERPL-MCNC: 148 MG/DL (ref 65–140)
GLUCOSE SERPL-MCNC: 157 MG/DL (ref 65–140)
HCT VFR BLD AUTO: 29.7 % (ref 34.8–46.1)
HGB BLD-MCNC: 9 G/DL (ref 11.5–15.4)
IMM GRANULOCYTES # BLD AUTO: 0.04 THOUSAND/UL (ref 0–0.2)
IMM GRANULOCYTES NFR BLD AUTO: 0 % (ref 0–2)
LYMPHOCYTES # BLD AUTO: 1.97 THOUSANDS/ΜL (ref 0.6–4.47)
LYMPHOCYTES NFR BLD AUTO: 21 % (ref 14–44)
MCH RBC QN AUTO: 30.1 PG (ref 26.8–34.3)
MCHC RBC AUTO-ENTMCNC: 30.3 G/DL (ref 31.4–37.4)
MCV RBC AUTO: 99 FL (ref 82–98)
MONOCYTES # BLD AUTO: 0.92 THOUSAND/ΜL (ref 0.17–1.22)
MONOCYTES NFR BLD AUTO: 10 % (ref 4–12)
NEUTROPHILS # BLD AUTO: 6.41 THOUSANDS/ΜL (ref 1.85–7.62)
NEUTS SEG NFR BLD AUTO: 69 % (ref 43–75)
NRBC BLD AUTO-RTO: 0 /100 WBCS
NT-PROBNP SERPL-MCNC: ABNORMAL PG/ML
PLATELET # BLD AUTO: 148 THOUSANDS/UL (ref 149–390)
PMV BLD AUTO: 13.3 FL (ref 8.9–12.7)
POTASSIUM SERPL-SCNC: 4.6 MMOL/L (ref 3.5–5.3)
PROT SERPL-MCNC: 6.8 G/DL (ref 6.4–8.2)
QRS AXIS: -75 DEGREES
QRSD INTERVAL: 92 MS
QT INTERVAL: 426 MS
QTC INTERVAL: 439 MS
RBC # BLD AUTO: 2.99 MILLION/UL (ref 3.81–5.12)
SARS-COV-2 RNA RESP QL NAA+PROBE: NEGATIVE
SODIUM SERPL-SCNC: 143 MMOL/L (ref 136–145)
T WAVE AXIS: 153 DEGREES
TROPONIN I SERPL-MCNC: 0.05 NG/ML
TROPONIN I SERPL-MCNC: 0.06 NG/ML
TROPONIN I SERPL-MCNC: 0.08 NG/ML
VENTRICULAR RATE: 64 BPM
WBC # BLD AUTO: 9.39 THOUSAND/UL (ref 4.31–10.16)

## 2020-06-12 PROCEDURE — 99291 CRITICAL CARE FIRST HOUR: CPT | Performed by: EMERGENCY MEDICINE

## 2020-06-12 PROCEDURE — 87081 CULTURE SCREEN ONLY: CPT | Performed by: INTERNAL MEDICINE

## 2020-06-12 PROCEDURE — 82948 REAGENT STRIP/BLOOD GLUCOSE: CPT

## 2020-06-12 PROCEDURE — 93005 ELECTROCARDIOGRAM TRACING: CPT

## 2020-06-12 PROCEDURE — 94760 N-INVAS EAR/PLS OXIMETRY 1: CPT

## 2020-06-12 PROCEDURE — 93321 DOPPLER ECHO F-UP/LMTD STD: CPT | Performed by: INTERNAL MEDICINE

## 2020-06-12 PROCEDURE — 93308 TTE F-UP OR LMTD: CPT

## 2020-06-12 PROCEDURE — 93010 ELECTROCARDIOGRAM REPORT: CPT | Performed by: INTERNAL MEDICINE

## 2020-06-12 PROCEDURE — 96374 THER/PROPH/DIAG INJ IV PUSH: CPT

## 2020-06-12 PROCEDURE — 85025 COMPLETE CBC W/AUTO DIFF WBC: CPT | Performed by: EMERGENCY MEDICINE

## 2020-06-12 PROCEDURE — 99285 EMERGENCY DEPT VISIT HI MDM: CPT

## 2020-06-12 PROCEDURE — 36415 COLL VENOUS BLD VENIPUNCTURE: CPT | Performed by: EMERGENCY MEDICINE

## 2020-06-12 PROCEDURE — 87635 SARS-COV-2 COVID-19 AMP PRB: CPT | Performed by: EMERGENCY MEDICINE

## 2020-06-12 PROCEDURE — 94002 VENT MGMT INPAT INIT DAY: CPT

## 2020-06-12 PROCEDURE — 71045 X-RAY EXAM CHEST 1 VIEW: CPT

## 2020-06-12 PROCEDURE — 93325 DOPPLER ECHO COLOR FLOW MAPG: CPT | Performed by: INTERNAL MEDICINE

## 2020-06-12 PROCEDURE — 84484 ASSAY OF TROPONIN QUANT: CPT | Performed by: INTERNAL MEDICINE

## 2020-06-12 PROCEDURE — 80053 COMPREHEN METABOLIC PANEL: CPT | Performed by: EMERGENCY MEDICINE

## 2020-06-12 PROCEDURE — 83880 ASSAY OF NATRIURETIC PEPTIDE: CPT | Performed by: EMERGENCY MEDICINE

## 2020-06-12 PROCEDURE — 84484 ASSAY OF TROPONIN QUANT: CPT | Performed by: EMERGENCY MEDICINE

## 2020-06-12 PROCEDURE — 93308 TTE F-UP OR LMTD: CPT | Performed by: INTERNAL MEDICINE

## 2020-06-12 PROCEDURE — 99223 1ST HOSP IP/OBS HIGH 75: CPT | Performed by: INTERNAL MEDICINE

## 2020-06-12 PROCEDURE — 94640 AIRWAY INHALATION TREATMENT: CPT

## 2020-06-12 RX ORDER — ATORVASTATIN CALCIUM 20 MG/1
20 TABLET, FILM COATED ORAL
Status: DISCONTINUED | OUTPATIENT
Start: 2020-06-12 | End: 2020-06-19 | Stop reason: HOSPADM

## 2020-06-12 RX ORDER — IPRATROPIUM BROMIDE AND ALBUTEROL SULFATE 2.5; .5 MG/3ML; MG/3ML
3 SOLUTION RESPIRATORY (INHALATION) ONCE
Status: COMPLETED | OUTPATIENT
Start: 2020-06-12 | End: 2020-06-12

## 2020-06-12 RX ORDER — DOCUSATE SODIUM 100 MG/1
100 CAPSULE, LIQUID FILLED ORAL 2 TIMES DAILY
Status: DISCONTINUED | OUTPATIENT
Start: 2020-06-12 | End: 2020-06-19 | Stop reason: HOSPADM

## 2020-06-12 RX ORDER — LISINOPRIL 2.5 MG/1
2.5 TABLET ORAL DAILY
Status: DISCONTINUED | OUTPATIENT
Start: 2020-06-12 | End: 2020-06-14

## 2020-06-12 RX ORDER — ALBUTEROL SULFATE 90 UG/1
2 AEROSOL, METERED RESPIRATORY (INHALATION) EVERY 4 HOURS PRN
Status: DISCONTINUED | OUTPATIENT
Start: 2020-06-12 | End: 2020-06-19 | Stop reason: HOSPADM

## 2020-06-12 RX ORDER — FLUTICASONE FUROATE AND VILANTEROL 200; 25 UG/1; UG/1
1 POWDER RESPIRATORY (INHALATION) DAILY
Status: DISCONTINUED | OUTPATIENT
Start: 2020-06-12 | End: 2020-06-19 | Stop reason: HOSPADM

## 2020-06-12 RX ORDER — IPRATROPIUM BROMIDE AND ALBUTEROL SULFATE 2.5; .5 MG/3ML; MG/3ML
3 SOLUTION RESPIRATORY (INHALATION) EVERY 4 HOURS PRN
Status: DISCONTINUED | OUTPATIENT
Start: 2020-06-12 | End: 2020-06-19 | Stop reason: HOSPADM

## 2020-06-12 RX ORDER — SUCRALFATE ORAL 1 G/10ML
1000 SUSPENSION ORAL
Status: DISCONTINUED | OUTPATIENT
Start: 2020-06-12 | End: 2020-06-19 | Stop reason: HOSPADM

## 2020-06-12 RX ORDER — FERROUS SULFATE 325(65) MG
325 TABLET ORAL
Status: DISCONTINUED | OUTPATIENT
Start: 2020-06-13 | End: 2020-06-19 | Stop reason: HOSPADM

## 2020-06-12 RX ORDER — FUROSEMIDE 10 MG/ML
80 INJECTION INTRAMUSCULAR; INTRAVENOUS
Status: DISCONTINUED | OUTPATIENT
Start: 2020-06-12 | End: 2020-06-14

## 2020-06-12 RX ORDER — INSULIN GLARGINE 100 [IU]/ML
10 INJECTION, SOLUTION SUBCUTANEOUS
Status: DISCONTINUED | OUTPATIENT
Start: 2020-06-12 | End: 2020-06-17

## 2020-06-12 RX ORDER — SUCRALFATE ORAL 1 G/10ML
1 SUSPENSION ORAL
COMMUNITY
End: 2020-08-05 | Stop reason: HOSPADM

## 2020-06-12 RX ORDER — LISINOPRIL 2.5 MG/1
2.5 TABLET ORAL DAILY
Status: DISCONTINUED | OUTPATIENT
Start: 2020-06-13 | End: 2020-06-12

## 2020-06-12 RX ORDER — FUROSEMIDE 10 MG/ML
80 INJECTION INTRAMUSCULAR; INTRAVENOUS
Status: DISCONTINUED | OUTPATIENT
Start: 2020-06-12 | End: 2020-06-12

## 2020-06-12 RX ORDER — CARVEDILOL 3.12 MG/1
1.56 TABLET ORAL 2 TIMES DAILY
Status: DISCONTINUED | OUTPATIENT
Start: 2020-06-12 | End: 2020-06-19 | Stop reason: HOSPADM

## 2020-06-12 RX ORDER — LABETALOL 20 MG/4 ML (5 MG/ML) INTRAVENOUS SYRINGE
10 EVERY 4 HOURS PRN
Status: DISCONTINUED | OUTPATIENT
Start: 2020-06-12 | End: 2020-06-19

## 2020-06-12 RX ORDER — FUROSEMIDE 10 MG/ML
80 INJECTION INTRAMUSCULAR; INTRAVENOUS ONCE
Status: COMPLETED | OUTPATIENT
Start: 2020-06-12 | End: 2020-06-12

## 2020-06-12 RX ADMIN — ATORVASTATIN CALCIUM 20 MG: 20 TABLET, FILM COATED ORAL at 18:45

## 2020-06-12 RX ADMIN — SUCRALFATE 1000 MG: 1 SUSPENSION ORAL at 22:31

## 2020-06-12 RX ADMIN — CARVEDILOL 1.56 MG: 3.12 TABLET, FILM COATED ORAL at 18:46

## 2020-06-12 RX ADMIN — APIXABAN 2.5 MG: 2.5 TABLET, FILM COATED ORAL at 18:46

## 2020-06-12 RX ADMIN — SUCRALFATE 1000 MG: 1 SUSPENSION ORAL at 18:45

## 2020-06-12 RX ADMIN — INSULIN GLARGINE 10 UNITS: 100 INJECTION, SOLUTION SUBCUTANEOUS at 22:31

## 2020-06-12 RX ADMIN — IPRATROPIUM BROMIDE AND ALBUTEROL SULFATE 3 ML: 2.5; .5 SOLUTION RESPIRATORY (INHALATION) at 07:28

## 2020-06-12 RX ADMIN — DOCUSATE SODIUM 100 MG: 100 CAPSULE, LIQUID FILLED ORAL at 18:46

## 2020-06-12 RX ADMIN — FUROSEMIDE 80 MG: 10 INJECTION, SOLUTION INTRAMUSCULAR; INTRAVENOUS at 18:45

## 2020-06-12 RX ADMIN — FUROSEMIDE 80 MG: 10 INJECTION, SOLUTION INTRAMUSCULAR; INTRAVENOUS at 13:00

## 2020-06-12 RX ADMIN — FUROSEMIDE 80 MG: 10 INJECTION, SOLUTION INTRAMUSCULAR; INTRAVENOUS at 07:43

## 2020-06-12 RX ADMIN — LABETALOL 20 MG/4 ML (5 MG/ML) INTRAVENOUS SYRINGE 10 MG: at 12:50

## 2020-06-13 ENCOUNTER — APPOINTMENT (INPATIENT)
Dept: RADIOLOGY | Facility: HOSPITAL | Age: 85
DRG: 291 | End: 2020-06-13
Payer: MEDICARE

## 2020-06-13 LAB
ALBUMIN SERPL BCP-MCNC: 2.7 G/DL (ref 3.5–5)
ALP SERPL-CCNC: 103 U/L (ref 46–116)
ALT SERPL W P-5'-P-CCNC: 36 U/L (ref 12–78)
ANION GAP SERPL CALCULATED.3IONS-SCNC: 7 MMOL/L (ref 4–13)
AST SERPL W P-5'-P-CCNC: 19 U/L (ref 5–45)
BASOPHILS # BLD AUTO: 0.02 THOUSANDS/ΜL (ref 0–0.1)
BASOPHILS NFR BLD AUTO: 0 % (ref 0–1)
BILIRUB SERPL-MCNC: 0.39 MG/DL (ref 0.2–1)
BUN SERPL-MCNC: 45 MG/DL (ref 5–25)
CALCIUM SERPL-MCNC: 8.3 MG/DL (ref 8.3–10.1)
CHLORIDE SERPL-SCNC: 107 MMOL/L (ref 100–108)
CO2 SERPL-SCNC: 32 MMOL/L (ref 21–32)
CREAT SERPL-MCNC: 1.95 MG/DL (ref 0.6–1.3)
EOSINOPHIL # BLD AUTO: 0.02 THOUSAND/ΜL (ref 0–0.61)
EOSINOPHIL NFR BLD AUTO: 0 % (ref 0–6)
ERYTHROCYTE [DISTWIDTH] IN BLOOD BY AUTOMATED COUNT: 14.7 % (ref 11.6–15.1)
GFR SERPL CREATININE-BSD FRML MDRD: 23 ML/MIN/1.73SQ M
GLUCOSE SERPL-MCNC: 115 MG/DL (ref 65–140)
GLUCOSE SERPL-MCNC: 124 MG/DL (ref 65–140)
GLUCOSE SERPL-MCNC: 214 MG/DL (ref 65–140)
GLUCOSE SERPL-MCNC: 234 MG/DL (ref 65–140)
GLUCOSE SERPL-MCNC: 247 MG/DL (ref 65–140)
HCT VFR BLD AUTO: 28.8 % (ref 34.8–46.1)
HGB BLD-MCNC: 9.1 G/DL (ref 11.5–15.4)
IMM GRANULOCYTES # BLD AUTO: 0.01 THOUSAND/UL (ref 0–0.2)
IMM GRANULOCYTES NFR BLD AUTO: 0 % (ref 0–2)
LYMPHOCYTES # BLD AUTO: 1.73 THOUSANDS/ΜL (ref 0.6–4.47)
LYMPHOCYTES NFR BLD AUTO: 19 % (ref 14–44)
MAGNESIUM SERPL-MCNC: 2.1 MG/DL (ref 1.6–2.6)
MCH RBC QN AUTO: 30.4 PG (ref 26.8–34.3)
MCHC RBC AUTO-ENTMCNC: 31.6 G/DL (ref 31.4–37.4)
MCV RBC AUTO: 96 FL (ref 82–98)
MONOCYTES # BLD AUTO: 0.91 THOUSAND/ΜL (ref 0.17–1.22)
MONOCYTES NFR BLD AUTO: 10 % (ref 4–12)
NEUTROPHILS # BLD AUTO: 6.33 THOUSANDS/ΜL (ref 1.85–7.62)
NEUTS SEG NFR BLD AUTO: 71 % (ref 43–75)
NRBC BLD AUTO-RTO: 0 /100 WBCS
PLATELET # BLD AUTO: 157 THOUSANDS/UL (ref 149–390)
PMV BLD AUTO: 13.9 FL (ref 8.9–12.7)
POTASSIUM SERPL-SCNC: 4 MMOL/L (ref 3.5–5.3)
PROT SERPL-MCNC: 6.4 G/DL (ref 6.4–8.2)
RBC # BLD AUTO: 2.99 MILLION/UL (ref 3.81–5.12)
SODIUM SERPL-SCNC: 146 MMOL/L (ref 136–145)
WBC # BLD AUTO: 9.02 THOUSAND/UL (ref 4.31–10.16)

## 2020-06-13 PROCEDURE — 94760 N-INVAS EAR/PLS OXIMETRY 1: CPT

## 2020-06-13 PROCEDURE — 80053 COMPREHEN METABOLIC PANEL: CPT | Performed by: INTERNAL MEDICINE

## 2020-06-13 PROCEDURE — 82948 REAGENT STRIP/BLOOD GLUCOSE: CPT

## 2020-06-13 PROCEDURE — 94003 VENT MGMT INPAT SUBQ DAY: CPT

## 2020-06-13 PROCEDURE — 85025 COMPLETE CBC W/AUTO DIFF WBC: CPT | Performed by: INTERNAL MEDICINE

## 2020-06-13 PROCEDURE — 71045 X-RAY EXAM CHEST 1 VIEW: CPT

## 2020-06-13 PROCEDURE — 83735 ASSAY OF MAGNESIUM: CPT | Performed by: INTERNAL MEDICINE

## 2020-06-13 PROCEDURE — 99233 SBSQ HOSP IP/OBS HIGH 50: CPT | Performed by: INTERNAL MEDICINE

## 2020-06-13 RX ADMIN — ATORVASTATIN CALCIUM 20 MG: 20 TABLET, FILM COATED ORAL at 16:09

## 2020-06-13 RX ADMIN — INSULIN LISPRO 1 UNITS: 100 INJECTION, SOLUTION INTRAVENOUS; SUBCUTANEOUS at 16:11

## 2020-06-13 RX ADMIN — FUROSEMIDE 80 MG: 10 INJECTION, SOLUTION INTRAMUSCULAR; INTRAVENOUS at 06:29

## 2020-06-13 RX ADMIN — SUCRALFATE 1000 MG: 1 SUSPENSION ORAL at 07:39

## 2020-06-13 RX ADMIN — SUCRALFATE 1000 MG: 1 SUSPENSION ORAL at 11:55

## 2020-06-13 RX ADMIN — SUCRALFATE 1000 MG: 1 SUSPENSION ORAL at 21:27

## 2020-06-13 RX ADMIN — SUCRALFATE 1000 MG: 1 SUSPENSION ORAL at 16:09

## 2020-06-13 RX ADMIN — APIXABAN 2.5 MG: 2.5 TABLET, FILM COATED ORAL at 17:20

## 2020-06-13 RX ADMIN — LISINOPRIL 2.5 MG: 2.5 TABLET ORAL at 09:37

## 2020-06-13 RX ADMIN — FLUTICASONE FUROATE AND VILANTEROL TRIFENATATE 1 PUFF: 200; 25 POWDER RESPIRATORY (INHALATION) at 09:39

## 2020-06-13 RX ADMIN — CARVEDILOL 1.56 MG: 3.12 TABLET, FILM COATED ORAL at 17:20

## 2020-06-13 RX ADMIN — INSULIN GLARGINE 10 UNITS: 100 INJECTION, SOLUTION SUBCUTANEOUS at 21:27

## 2020-06-13 RX ADMIN — INSULIN LISPRO 2 UNITS: 100 INJECTION, SOLUTION INTRAVENOUS; SUBCUTANEOUS at 21:27

## 2020-06-13 RX ADMIN — FUROSEMIDE 80 MG: 10 INJECTION, SOLUTION INTRAMUSCULAR; INTRAVENOUS at 17:20

## 2020-06-13 RX ADMIN — INSULIN LISPRO 2 UNITS: 100 INJECTION, SOLUTION INTRAVENOUS; SUBCUTANEOUS at 11:55

## 2020-06-13 RX ADMIN — FUROSEMIDE 80 MG: 10 INJECTION, SOLUTION INTRAMUSCULAR; INTRAVENOUS at 11:55

## 2020-06-13 RX ADMIN — CARVEDILOL 1.56 MG: 3.12 TABLET, FILM COATED ORAL at 09:38

## 2020-06-13 RX ADMIN — APIXABAN 2.5 MG: 2.5 TABLET, FILM COATED ORAL at 09:38

## 2020-06-14 LAB
ANION GAP SERPL CALCULATED.3IONS-SCNC: 5 MMOL/L (ref 4–13)
BUN SERPL-MCNC: 56 MG/DL (ref 5–25)
CALCIUM SERPL-MCNC: 8 MG/DL (ref 8.3–10.1)
CHLORIDE SERPL-SCNC: 106 MMOL/L (ref 100–108)
CO2 SERPL-SCNC: 34 MMOL/L (ref 21–32)
CREAT SERPL-MCNC: 2.44 MG/DL (ref 0.6–1.3)
GFR SERPL CREATININE-BSD FRML MDRD: 18 ML/MIN/1.73SQ M
GLUCOSE SERPL-MCNC: 154 MG/DL (ref 65–140)
GLUCOSE SERPL-MCNC: 177 MG/DL (ref 65–140)
GLUCOSE SERPL-MCNC: 293 MG/DL (ref 65–140)
GLUCOSE SERPL-MCNC: 304 MG/DL (ref 65–140)
GLUCOSE SERPL-MCNC: 353 MG/DL (ref 65–140)
MAGNESIUM SERPL-MCNC: 2.3 MG/DL (ref 1.6–2.6)
MRSA NOSE QL CULT: NORMAL
POTASSIUM SERPL-SCNC: 3.9 MMOL/L (ref 3.5–5.3)
SODIUM SERPL-SCNC: 145 MMOL/L (ref 136–145)

## 2020-06-14 PROCEDURE — 82948 REAGENT STRIP/BLOOD GLUCOSE: CPT

## 2020-06-14 PROCEDURE — 83735 ASSAY OF MAGNESIUM: CPT | Performed by: INTERNAL MEDICINE

## 2020-06-14 PROCEDURE — 99233 SBSQ HOSP IP/OBS HIGH 50: CPT | Performed by: INTERNAL MEDICINE

## 2020-06-14 PROCEDURE — 80048 BASIC METABOLIC PNL TOTAL CA: CPT | Performed by: INTERNAL MEDICINE

## 2020-06-14 RX ORDER — AMLODIPINE BESYLATE 5 MG/1
5 TABLET ORAL DAILY
Status: DISCONTINUED | OUTPATIENT
Start: 2020-06-15 | End: 2020-06-19 | Stop reason: HOSPADM

## 2020-06-14 RX ORDER — FUROSEMIDE 10 MG/ML
60 INJECTION INTRAMUSCULAR; INTRAVENOUS EVERY 12 HOURS
Status: DISCONTINUED | OUTPATIENT
Start: 2020-06-14 | End: 2020-06-14

## 2020-06-14 RX ADMIN — DOCUSATE SODIUM 100 MG: 100 CAPSULE, LIQUID FILLED ORAL at 18:53

## 2020-06-14 RX ADMIN — CARVEDILOL 1.56 MG: 3.12 TABLET, FILM COATED ORAL at 08:43

## 2020-06-14 RX ADMIN — LISINOPRIL 2.5 MG: 2.5 TABLET ORAL at 08:44

## 2020-06-14 RX ADMIN — APIXABAN 2.5 MG: 2.5 TABLET, FILM COATED ORAL at 18:53

## 2020-06-14 RX ADMIN — SUCRALFATE 1000 MG: 1 SUSPENSION ORAL at 21:28

## 2020-06-14 RX ADMIN — FERROUS SULFATE TAB 325 MG (65 MG ELEMENTAL FE) 325 MG: 325 (65 FE) TAB at 08:44

## 2020-06-14 RX ADMIN — INSULIN GLARGINE 10 UNITS: 100 INJECTION, SOLUTION SUBCUTANEOUS at 21:28

## 2020-06-14 RX ADMIN — CARVEDILOL 1.56 MG: 3.12 TABLET, FILM COATED ORAL at 18:53

## 2020-06-14 RX ADMIN — INSULIN LISPRO 2 UNITS: 100 INJECTION, SOLUTION INTRAVENOUS; SUBCUTANEOUS at 12:05

## 2020-06-14 RX ADMIN — INSULIN LISPRO 1 UNITS: 100 INJECTION, SOLUTION INTRAVENOUS; SUBCUTANEOUS at 08:42

## 2020-06-14 RX ADMIN — INSULIN LISPRO 3 UNITS: 100 INJECTION, SOLUTION INTRAVENOUS; SUBCUTANEOUS at 21:27

## 2020-06-14 RX ADMIN — FUROSEMIDE 80 MG: 10 INJECTION, SOLUTION INTRAMUSCULAR; INTRAVENOUS at 05:46

## 2020-06-14 RX ADMIN — DOCUSATE SODIUM 100 MG: 100 CAPSULE, LIQUID FILLED ORAL at 08:44

## 2020-06-14 RX ADMIN — APIXABAN 2.5 MG: 2.5 TABLET, FILM COATED ORAL at 08:44

## 2020-06-14 RX ADMIN — SUCRALFATE 1000 MG: 1 SUSPENSION ORAL at 11:31

## 2020-06-14 RX ADMIN — FLUTICASONE FUROATE AND VILANTEROL TRIFENATATE 1 PUFF: 200; 25 POWDER RESPIRATORY (INHALATION) at 09:01

## 2020-06-15 ENCOUNTER — APPOINTMENT (INPATIENT)
Dept: ULTRASOUND IMAGING | Facility: HOSPITAL | Age: 85
DRG: 291 | End: 2020-06-15
Payer: MEDICARE

## 2020-06-15 PROBLEM — J44.9 COPD (CHRONIC OBSTRUCTIVE PULMONARY DISEASE) (HCC): Status: ACTIVE | Noted: 2020-06-12

## 2020-06-15 LAB
ANION GAP SERPL CALCULATED.3IONS-SCNC: 8 MMOL/L (ref 4–13)
BACTERIA UR QL AUTO: ABNORMAL /HPF
BASOPHILS # BLD AUTO: 0.03 THOUSANDS/ΜL (ref 0–0.1)
BASOPHILS NFR BLD AUTO: 0 % (ref 0–1)
BILIRUB UR QL STRIP: NEGATIVE
BUN SERPL-MCNC: 63 MG/DL (ref 5–25)
CALCIUM SERPL-MCNC: 8 MG/DL (ref 8.3–10.1)
CHLORIDE SERPL-SCNC: 104 MMOL/L (ref 100–108)
CLARITY UR: ABNORMAL
CO2 SERPL-SCNC: 30 MMOL/L (ref 21–32)
COLOR UR: YELLOW
CREAT SERPL-MCNC: 2.52 MG/DL (ref 0.6–1.3)
CREAT UR-MCNC: 119 MG/DL
EOSINOPHIL # BLD AUTO: 0.08 THOUSAND/ΜL (ref 0–0.61)
EOSINOPHIL NFR BLD AUTO: 1 % (ref 0–6)
ERYTHROCYTE [DISTWIDTH] IN BLOOD BY AUTOMATED COUNT: 14.4 % (ref 11.6–15.1)
GFR SERPL CREATININE-BSD FRML MDRD: 17 ML/MIN/1.73SQ M
GLUCOSE SERPL-MCNC: 115 MG/DL (ref 65–140)
GLUCOSE SERPL-MCNC: 117 MG/DL (ref 65–140)
GLUCOSE SERPL-MCNC: 171 MG/DL (ref 65–140)
GLUCOSE SERPL-MCNC: 215 MG/DL (ref 65–140)
GLUCOSE SERPL-MCNC: 293 MG/DL (ref 65–140)
GLUCOSE UR STRIP-MCNC: NEGATIVE MG/DL
HCT VFR BLD AUTO: 27.8 % (ref 34.8–46.1)
HGB BLD-MCNC: 8.7 G/DL (ref 11.5–15.4)
HGB UR QL STRIP.AUTO: ABNORMAL
IMM GRANULOCYTES # BLD AUTO: 0.02 THOUSAND/UL (ref 0–0.2)
IMM GRANULOCYTES NFR BLD AUTO: 0 % (ref 0–2)
IRON SATN MFR SERPL: 26 %
IRON SERPL-MCNC: 74 UG/DL (ref 50–170)
KETONES UR STRIP-MCNC: NEGATIVE MG/DL
LEUKOCYTE ESTERASE UR QL STRIP: ABNORMAL
LYMPHOCYTES # BLD AUTO: 2.02 THOUSANDS/ΜL (ref 0.6–4.47)
LYMPHOCYTES NFR BLD AUTO: 29 % (ref 14–44)
MAGNESIUM SERPL-MCNC: 2.2 MG/DL (ref 1.6–2.6)
MCH RBC QN AUTO: 30.1 PG (ref 26.8–34.3)
MCHC RBC AUTO-ENTMCNC: 31.3 G/DL (ref 31.4–37.4)
MCV RBC AUTO: 96 FL (ref 82–98)
MICROALBUMIN UR-MCNC: 116 MG/L (ref 0–20)
MICROALBUMIN/CREAT 24H UR: 97 MG/G CREATININE (ref 0–30)
MONOCYTES # BLD AUTO: 0.74 THOUSAND/ΜL (ref 0.17–1.22)
MONOCYTES NFR BLD AUTO: 11 % (ref 4–12)
NEUTROPHILS # BLD AUTO: 4.16 THOUSANDS/ΜL (ref 1.85–7.62)
NEUTS SEG NFR BLD AUTO: 59 % (ref 43–75)
NITRITE UR QL STRIP: POSITIVE
NON-SQ EPI CELLS URNS QL MICRO: ABNORMAL /HPF
NRBC BLD AUTO-RTO: 0 /100 WBCS
PH UR STRIP.AUTO: 6 [PH]
PLATELET # BLD AUTO: 149 THOUSANDS/UL (ref 149–390)
PMV BLD AUTO: 12.2 FL (ref 8.9–12.7)
POTASSIUM SERPL-SCNC: 3.9 MMOL/L (ref 3.5–5.3)
PROT UR STRIP-MCNC: ABNORMAL MG/DL
PROT UR-MCNC: 60 MG/DL
PROT/CREAT UR: 0.5 MG/G{CREAT} (ref 0–0.1)
RBC # BLD AUTO: 2.89 MILLION/UL (ref 3.81–5.12)
RBC #/AREA URNS AUTO: ABNORMAL /HPF
SODIUM 24H UR-SCNC: 45 MOL/L
SODIUM SERPL-SCNC: 142 MMOL/L (ref 136–145)
SP GR UR STRIP.AUTO: 1.02 (ref 1–1.03)
TIBC SERPL-MCNC: 285 UG/DL (ref 250–450)
UROBILINOGEN UR QL STRIP.AUTO: 0.2 E.U./DL
UUN 24H UR-MCNC: 492 MG/DL
WBC # BLD AUTO: 7.05 THOUSAND/UL (ref 4.31–10.16)
WBC #/AREA URNS AUTO: ABNORMAL /HPF

## 2020-06-15 PROCEDURE — 99233 SBSQ HOSP IP/OBS HIGH 50: CPT | Performed by: INTERNAL MEDICINE

## 2020-06-15 PROCEDURE — 81001 URINALYSIS AUTO W/SCOPE: CPT | Performed by: INTERNAL MEDICINE

## 2020-06-15 PROCEDURE — 87147 CULTURE TYPE IMMUNOLOGIC: CPT | Performed by: INTERNAL MEDICINE

## 2020-06-15 PROCEDURE — 82043 UR ALBUMIN QUANTITATIVE: CPT | Performed by: INTERNAL MEDICINE

## 2020-06-15 PROCEDURE — 84540 ASSAY OF URINE/UREA-N: CPT | Performed by: INTERNAL MEDICINE

## 2020-06-15 PROCEDURE — 99223 1ST HOSP IP/OBS HIGH 75: CPT | Performed by: INTERNAL MEDICINE

## 2020-06-15 PROCEDURE — 85025 COMPLETE CBC W/AUTO DIFF WBC: CPT | Performed by: INTERNAL MEDICINE

## 2020-06-15 PROCEDURE — 83550 IRON BINDING TEST: CPT | Performed by: INTERNAL MEDICINE

## 2020-06-15 PROCEDURE — 83540 ASSAY OF IRON: CPT | Performed by: INTERNAL MEDICINE

## 2020-06-15 PROCEDURE — 82948 REAGENT STRIP/BLOOD GLUCOSE: CPT

## 2020-06-15 PROCEDURE — 87086 URINE CULTURE/COLONY COUNT: CPT | Performed by: INTERNAL MEDICINE

## 2020-06-15 PROCEDURE — 84300 ASSAY OF URINE SODIUM: CPT | Performed by: INTERNAL MEDICINE

## 2020-06-15 PROCEDURE — 84156 ASSAY OF PROTEIN URINE: CPT | Performed by: INTERNAL MEDICINE

## 2020-06-15 PROCEDURE — 76770 US EXAM ABDO BACK WALL COMP: CPT

## 2020-06-15 PROCEDURE — 82570 ASSAY OF URINE CREATININE: CPT | Performed by: INTERNAL MEDICINE

## 2020-06-15 PROCEDURE — 87186 SC STD MICRODIL/AGAR DIL: CPT | Performed by: INTERNAL MEDICINE

## 2020-06-15 PROCEDURE — 83735 ASSAY OF MAGNESIUM: CPT | Performed by: INTERNAL MEDICINE

## 2020-06-15 PROCEDURE — 80048 BASIC METABOLIC PNL TOTAL CA: CPT | Performed by: INTERNAL MEDICINE

## 2020-06-15 PROCEDURE — 87077 CULTURE AEROBIC IDENTIFY: CPT | Performed by: INTERNAL MEDICINE

## 2020-06-15 RX ADMIN — INSULIN LISPRO 2 UNITS: 100 INJECTION, SOLUTION INTRAVENOUS; SUBCUTANEOUS at 11:49

## 2020-06-15 RX ADMIN — INSULIN LISPRO 1 UNITS: 100 INJECTION, SOLUTION INTRAVENOUS; SUBCUTANEOUS at 16:36

## 2020-06-15 RX ADMIN — SUCRALFATE 1000 MG: 1 SUSPENSION ORAL at 20:58

## 2020-06-15 RX ADMIN — INSULIN GLARGINE 10 UNITS: 100 INJECTION, SOLUTION SUBCUTANEOUS at 20:58

## 2020-06-15 RX ADMIN — DOCUSATE SODIUM 100 MG: 100 CAPSULE, LIQUID FILLED ORAL at 08:19

## 2020-06-15 RX ADMIN — APIXABAN 2.5 MG: 2.5 TABLET, FILM COATED ORAL at 17:27

## 2020-06-15 RX ADMIN — FERROUS SULFATE TAB 325 MG (65 MG ELEMENTAL FE) 325 MG: 325 (65 FE) TAB at 08:20

## 2020-06-15 RX ADMIN — ATORVASTATIN CALCIUM 20 MG: 20 TABLET, FILM COATED ORAL at 16:36

## 2020-06-15 RX ADMIN — APIXABAN 2.5 MG: 2.5 TABLET, FILM COATED ORAL at 08:20

## 2020-06-15 RX ADMIN — AMLODIPINE BESYLATE 5 MG: 5 TABLET ORAL at 08:20

## 2020-06-15 RX ADMIN — FLUTICASONE FUROATE AND VILANTEROL TRIFENATATE 1 PUFF: 200; 25 POWDER RESPIRATORY (INHALATION) at 08:20

## 2020-06-15 RX ADMIN — DOCUSATE SODIUM 100 MG: 100 CAPSULE, LIQUID FILLED ORAL at 17:27

## 2020-06-15 RX ADMIN — SUCRALFATE 1000 MG: 1 SUSPENSION ORAL at 11:59

## 2020-06-15 RX ADMIN — CARVEDILOL 1.56 MG: 3.12 TABLET, FILM COATED ORAL at 17:27

## 2020-06-15 RX ADMIN — SUCRALFATE 1000 MG: 1 SUSPENSION ORAL at 08:19

## 2020-06-15 RX ADMIN — CARVEDILOL 1.56 MG: 3.12 TABLET, FILM COATED ORAL at 08:20

## 2020-06-15 RX ADMIN — SUCRALFATE 1000 MG: 1 SUSPENSION ORAL at 16:36

## 2020-06-15 RX ADMIN — INSULIN LISPRO 1 UNITS: 100 INJECTION, SOLUTION INTRAVENOUS; SUBCUTANEOUS at 20:58

## 2020-06-16 PROBLEM — N39.0 UTI (URINARY TRACT INFECTION): Status: ACTIVE | Noted: 2020-06-16

## 2020-06-16 LAB
ANION GAP SERPL CALCULATED.3IONS-SCNC: 4 MMOL/L (ref 4–13)
BUN SERPL-MCNC: 74 MG/DL (ref 5–25)
CALCIUM SERPL-MCNC: 8 MG/DL (ref 8.3–10.1)
CHLORIDE SERPL-SCNC: 104 MMOL/L (ref 100–108)
CO2 SERPL-SCNC: 34 MMOL/L (ref 21–32)
CREAT SERPL-MCNC: 2.66 MG/DL (ref 0.6–1.3)
CREAT UR-MCNC: 117 MG/DL
GFR SERPL CREATININE-BSD FRML MDRD: 16 ML/MIN/1.73SQ M
GLUCOSE SERPL-MCNC: 128 MG/DL (ref 65–140)
GLUCOSE SERPL-MCNC: 133 MG/DL (ref 65–140)
GLUCOSE SERPL-MCNC: 203 MG/DL (ref 65–140)
GLUCOSE SERPL-MCNC: 207 MG/DL (ref 65–140)
GLUCOSE SERPL-MCNC: 260 MG/DL (ref 65–140)
IRON SATN MFR SERPL: 8 %
IRON SERPL-MCNC: 22 UG/DL (ref 50–170)
MICROALBUMIN UR-MCNC: 52.6 MG/L (ref 0–20)
MICROALBUMIN/CREAT 24H UR: 45 MG/G CREATININE (ref 0–30)
POTASSIUM SERPL-SCNC: 4.1 MMOL/L (ref 3.5–5.3)
PROT UR-MCNC: 33 MG/DL
PROT/CREAT UR: 0.28 MG/G{CREAT} (ref 0–0.1)
SODIUM SERPL-SCNC: 142 MMOL/L (ref 136–145)
TIBC SERPL-MCNC: 265 UG/DL (ref 250–450)
UUN 24H UR-MCNC: 572 MG/DL

## 2020-06-16 PROCEDURE — 82570 ASSAY OF URINE CREATININE: CPT | Performed by: INTERNAL MEDICINE

## 2020-06-16 PROCEDURE — 82948 REAGENT STRIP/BLOOD GLUCOSE: CPT

## 2020-06-16 PROCEDURE — 99233 SBSQ HOSP IP/OBS HIGH 50: CPT | Performed by: NURSE PRACTITIONER

## 2020-06-16 PROCEDURE — 82043 UR ALBUMIN QUANTITATIVE: CPT | Performed by: INTERNAL MEDICINE

## 2020-06-16 PROCEDURE — 84540 ASSAY OF URINE/UREA-N: CPT | Performed by: INTERNAL MEDICINE

## 2020-06-16 PROCEDURE — 84156 ASSAY OF PROTEIN URINE: CPT | Performed by: INTERNAL MEDICINE

## 2020-06-16 PROCEDURE — 80048 BASIC METABOLIC PNL TOTAL CA: CPT | Performed by: INTERNAL MEDICINE

## 2020-06-16 PROCEDURE — 83550 IRON BINDING TEST: CPT | Performed by: INTERNAL MEDICINE

## 2020-06-16 PROCEDURE — 82272 OCCULT BLD FECES 1-3 TESTS: CPT | Performed by: NURSE PRACTITIONER

## 2020-06-16 PROCEDURE — 83540 ASSAY OF IRON: CPT | Performed by: INTERNAL MEDICINE

## 2020-06-16 PROCEDURE — 99232 SBSQ HOSP IP/OBS MODERATE 35: CPT | Performed by: FAMILY MEDICINE

## 2020-06-16 RX ORDER — CEFEPIME HYDROCHLORIDE 1 G/50ML
1000 INJECTION, SOLUTION INTRAVENOUS EVERY 24 HOURS
Status: DISCONTINUED | OUTPATIENT
Start: 2020-06-16 | End: 2020-06-17

## 2020-06-16 RX ORDER — VANCOMYCIN HYDROCHLORIDE 500 MG/100ML
10 INJECTION, SOLUTION INTRAVENOUS ONCE AS NEEDED
Status: DISCONTINUED | OUTPATIENT
Start: 2020-06-17 | End: 2020-06-17

## 2020-06-16 RX ORDER — VANCOMYCIN HYDROCHLORIDE 1 G/200ML
20 INJECTION, SOLUTION INTRAVENOUS ONCE
Status: COMPLETED | OUTPATIENT
Start: 2020-06-16 | End: 2020-06-16

## 2020-06-16 RX ADMIN — APIXABAN 2.5 MG: 2.5 TABLET, FILM COATED ORAL at 08:56

## 2020-06-16 RX ADMIN — APIXABAN 2.5 MG: 2.5 TABLET, FILM COATED ORAL at 17:07

## 2020-06-16 RX ADMIN — INSULIN LISPRO 2 UNITS: 100 INJECTION, SOLUTION INTRAVENOUS; SUBCUTANEOUS at 22:23

## 2020-06-16 RX ADMIN — AMLODIPINE BESYLATE 5 MG: 5 TABLET ORAL at 08:56

## 2020-06-16 RX ADMIN — VANCOMYCIN HYDROCHLORIDE 1000 MG: 1 INJECTION, SOLUTION INTRAVENOUS at 19:19

## 2020-06-16 RX ADMIN — INSULIN LISPRO 1 UNITS: 100 INJECTION, SOLUTION INTRAVENOUS; SUBCUTANEOUS at 11:44

## 2020-06-16 RX ADMIN — ATORVASTATIN CALCIUM 20 MG: 20 TABLET, FILM COATED ORAL at 16:47

## 2020-06-16 RX ADMIN — CARVEDILOL 1.56 MG: 3.12 TABLET, FILM COATED ORAL at 08:57

## 2020-06-16 RX ADMIN — INSULIN LISPRO 1 UNITS: 100 INJECTION, SOLUTION INTRAVENOUS; SUBCUTANEOUS at 16:46

## 2020-06-16 RX ADMIN — FERROUS SULFATE TAB 325 MG (65 MG ELEMENTAL FE) 325 MG: 325 (65 FE) TAB at 08:56

## 2020-06-16 RX ADMIN — CARVEDILOL 1.56 MG: 3.12 TABLET, FILM COATED ORAL at 17:07

## 2020-06-16 RX ADMIN — FLUTICASONE FUROATE AND VILANTEROL TRIFENATATE 1 PUFF: 200; 25 POWDER RESPIRATORY (INHALATION) at 09:03

## 2020-06-16 RX ADMIN — SUCRALFATE 1000 MG: 1 SUSPENSION ORAL at 16:49

## 2020-06-16 RX ADMIN — DOCUSATE SODIUM 100 MG: 100 CAPSULE, LIQUID FILLED ORAL at 17:07

## 2020-06-16 RX ADMIN — SUCRALFATE 1000 MG: 1 SUSPENSION ORAL at 11:47

## 2020-06-16 RX ADMIN — INSULIN GLARGINE 10 UNITS: 100 INJECTION, SOLUTION SUBCUTANEOUS at 22:29

## 2020-06-16 RX ADMIN — CEFEPIME HYDROCHLORIDE 1000 MG: 1 INJECTION, SOLUTION INTRAVENOUS at 18:23

## 2020-06-16 RX ADMIN — SUCRALFATE 1000 MG: 1 SUSPENSION ORAL at 22:29

## 2020-06-16 RX ADMIN — DOCUSATE SODIUM 100 MG: 100 CAPSULE, LIQUID FILLED ORAL at 08:56

## 2020-06-16 RX ADMIN — SUCRALFATE 1000 MG: 1 SUSPENSION ORAL at 08:59

## 2020-06-17 ENCOUNTER — APPOINTMENT (INPATIENT)
Dept: NON INVASIVE DIAGNOSTICS | Facility: HOSPITAL | Age: 85
DRG: 291 | End: 2020-06-17
Payer: MEDICARE

## 2020-06-17 LAB
ALBUMIN SERPL BCP-MCNC: 2.3 G/DL (ref 3.5–5)
ALP SERPL-CCNC: 88 U/L (ref 46–116)
ALT SERPL W P-5'-P-CCNC: 18 U/L (ref 12–78)
ANION GAP SERPL CALCULATED.3IONS-SCNC: 8 MMOL/L (ref 4–13)
AST SERPL W P-5'-P-CCNC: 11 U/L (ref 5–45)
BACTERIA UR CULT: ABNORMAL
BACTERIA UR CULT: ABNORMAL
BILIRUB SERPL-MCNC: 0.18 MG/DL (ref 0.2–1)
BUN SERPL-MCNC: 83 MG/DL (ref 5–25)
CALCIUM SERPL-MCNC: 8.2 MG/DL (ref 8.3–10.1)
CHLORIDE SERPL-SCNC: 103 MMOL/L (ref 100–108)
CO2 SERPL-SCNC: 30 MMOL/L (ref 21–32)
CREAT SERPL-MCNC: 2.4 MG/DL (ref 0.6–1.3)
ERYTHROCYTE [DISTWIDTH] IN BLOOD BY AUTOMATED COUNT: 14 % (ref 11.6–15.1)
GFR SERPL CREATININE-BSD FRML MDRD: 18 ML/MIN/1.73SQ M
GLUCOSE SERPL-MCNC: 143 MG/DL (ref 65–140)
GLUCOSE SERPL-MCNC: 163 MG/DL (ref 65–140)
GLUCOSE SERPL-MCNC: 203 MG/DL (ref 65–140)
GLUCOSE SERPL-MCNC: 243 MG/DL (ref 65–140)
GLUCOSE SERPL-MCNC: 268 MG/DL (ref 65–140)
HCT VFR BLD AUTO: 24.3 % (ref 34.8–46.1)
HGB BLD-MCNC: 8.4 G/DL (ref 11.5–15.4)
MAGNESIUM SERPL-MCNC: 2.5 MG/DL (ref 1.6–2.6)
MCH RBC QN AUTO: 34.4 PG (ref 26.8–34.3)
MCHC RBC AUTO-ENTMCNC: 34.6 G/DL (ref 31.4–37.4)
MCV RBC AUTO: 100 FL (ref 82–98)
PHOSPHATE SERPL-MCNC: 4.5 MG/DL (ref 2.3–4.1)
PLATELET # BLD AUTO: 139 THOUSANDS/UL (ref 149–390)
PMV BLD AUTO: 13.5 FL (ref 8.9–12.7)
POTASSIUM SERPL-SCNC: 4.2 MMOL/L (ref 3.5–5.3)
PROT SERPL-MCNC: 6 G/DL (ref 6.4–8.2)
RBC # BLD AUTO: 2.44 MILLION/UL (ref 3.81–5.12)
SODIUM SERPL-SCNC: 141 MMOL/L (ref 136–145)
VANCOMYCIN SERPL-MCNC: 14 UG/ML
WBC # BLD AUTO: 6.64 THOUSAND/UL (ref 4.31–10.16)

## 2020-06-17 PROCEDURE — 80053 COMPREHEN METABOLIC PANEL: CPT | Performed by: NURSE PRACTITIONER

## 2020-06-17 PROCEDURE — 93970 EXTREMITY STUDY: CPT

## 2020-06-17 PROCEDURE — 93970 EXTREMITY STUDY: CPT | Performed by: SURGERY

## 2020-06-17 PROCEDURE — 85027 COMPLETE CBC AUTOMATED: CPT | Performed by: FAMILY MEDICINE

## 2020-06-17 PROCEDURE — 80202 ASSAY OF VANCOMYCIN: CPT | Performed by: FAMILY MEDICINE

## 2020-06-17 PROCEDURE — 83735 ASSAY OF MAGNESIUM: CPT | Performed by: NURSE PRACTITIONER

## 2020-06-17 PROCEDURE — 82948 REAGENT STRIP/BLOOD GLUCOSE: CPT

## 2020-06-17 PROCEDURE — 84100 ASSAY OF PHOSPHORUS: CPT | Performed by: NURSE PRACTITIONER

## 2020-06-17 PROCEDURE — 99232 SBSQ HOSP IP/OBS MODERATE 35: CPT | Performed by: NURSE PRACTITIONER

## 2020-06-17 PROCEDURE — 99232 SBSQ HOSP IP/OBS MODERATE 35: CPT | Performed by: FAMILY MEDICINE

## 2020-06-17 RX ORDER — INSULIN GLARGINE 100 [IU]/ML
12 INJECTION, SOLUTION SUBCUTANEOUS
Status: DISCONTINUED | OUTPATIENT
Start: 2020-06-17 | End: 2020-06-19 | Stop reason: HOSPADM

## 2020-06-17 RX ORDER — LEVOFLOXACIN 250 MG/1
250 TABLET ORAL EVERY 24 HOURS
Status: DISCONTINUED | OUTPATIENT
Start: 2020-06-17 | End: 2020-06-19 | Stop reason: HOSPADM

## 2020-06-17 RX ADMIN — INSULIN LISPRO 2 UNITS: 100 INJECTION, SOLUTION INTRAVENOUS; SUBCUTANEOUS at 21:55

## 2020-06-17 RX ADMIN — INSULIN LISPRO 2 UNITS: 100 INJECTION, SOLUTION INTRAVENOUS; SUBCUTANEOUS at 16:37

## 2020-06-17 RX ADMIN — SUCRALFATE 1000 MG: 1 SUSPENSION ORAL at 21:54

## 2020-06-17 RX ADMIN — LEVOFLOXACIN 250 MG: 250 TABLET, FILM COATED ORAL at 21:54

## 2020-06-17 RX ADMIN — DOCUSATE SODIUM 100 MG: 100 CAPSULE, LIQUID FILLED ORAL at 08:01

## 2020-06-17 RX ADMIN — CARVEDILOL 1.56 MG: 3.12 TABLET, FILM COATED ORAL at 08:01

## 2020-06-17 RX ADMIN — FERROUS SULFATE TAB 325 MG (65 MG ELEMENTAL FE) 325 MG: 325 (65 FE) TAB at 08:00

## 2020-06-17 RX ADMIN — ATORVASTATIN CALCIUM 20 MG: 20 TABLET, FILM COATED ORAL at 17:30

## 2020-06-17 RX ADMIN — APIXABAN 2.5 MG: 2.5 TABLET, FILM COATED ORAL at 08:01

## 2020-06-17 RX ADMIN — INSULIN LISPRO 1 UNITS: 100 INJECTION, SOLUTION INTRAVENOUS; SUBCUTANEOUS at 07:59

## 2020-06-17 RX ADMIN — FLUTICASONE FUROATE AND VILANTEROL TRIFENATATE 1 PUFF: 200; 25 POWDER RESPIRATORY (INHALATION) at 08:01

## 2020-06-17 RX ADMIN — DOCUSATE SODIUM 100 MG: 100 CAPSULE, LIQUID FILLED ORAL at 17:30

## 2020-06-17 RX ADMIN — AMLODIPINE BESYLATE 5 MG: 5 TABLET ORAL at 08:01

## 2020-06-17 RX ADMIN — SUCRALFATE 1000 MG: 1 SUSPENSION ORAL at 05:39

## 2020-06-17 RX ADMIN — SUCRALFATE 1000 MG: 1 SUSPENSION ORAL at 11:59

## 2020-06-17 RX ADMIN — INSULIN LISPRO 1 UNITS: 100 INJECTION, SOLUTION INTRAVENOUS; SUBCUTANEOUS at 11:59

## 2020-06-17 RX ADMIN — CEFEPIME HYDROCHLORIDE 1000 MG: 1 INJECTION, SOLUTION INTRAVENOUS at 17:42

## 2020-06-17 RX ADMIN — INSULIN GLARGINE 12 UNITS: 100 INJECTION, SOLUTION SUBCUTANEOUS at 21:55

## 2020-06-17 RX ADMIN — APIXABAN 2.5 MG: 2.5 TABLET, FILM COATED ORAL at 17:30

## 2020-06-17 RX ADMIN — SUCRALFATE 1000 MG: 1 SUSPENSION ORAL at 17:30

## 2020-06-17 RX ADMIN — CARVEDILOL 1.56 MG: 3.12 TABLET, FILM COATED ORAL at 17:30

## 2020-06-18 PROBLEM — D64.9 ANEMIA: Status: ACTIVE | Noted: 2020-06-18

## 2020-06-18 LAB
ANION GAP SERPL CALCULATED.3IONS-SCNC: 7 MMOL/L (ref 4–13)
BUN SERPL-MCNC: 78 MG/DL (ref 5–25)
CALCIUM SERPL-MCNC: 8.2 MG/DL (ref 8.3–10.1)
CHLORIDE SERPL-SCNC: 105 MMOL/L (ref 100–108)
CO2 SERPL-SCNC: 29 MMOL/L (ref 21–32)
CREAT SERPL-MCNC: 2.01 MG/DL (ref 0.6–1.3)
ERYTHROCYTE [DISTWIDTH] IN BLOOD BY AUTOMATED COUNT: 14.1 % (ref 11.6–15.1)
GFR SERPL CREATININE-BSD FRML MDRD: 23 ML/MIN/1.73SQ M
GLUCOSE SERPL-MCNC: 118 MG/DL (ref 65–140)
GLUCOSE SERPL-MCNC: 135 MG/DL (ref 65–140)
GLUCOSE SERPL-MCNC: 136 MG/DL (ref 65–140)
GLUCOSE SERPL-MCNC: 166 MG/DL (ref 65–140)
GLUCOSE SERPL-MCNC: 175 MG/DL (ref 65–140)
HCT VFR BLD AUTO: 27.3 % (ref 34.8–46.1)
HGB BLD-MCNC: 9 G/DL (ref 11.5–15.4)
MCH RBC QN AUTO: 32.8 PG (ref 26.8–34.3)
MCHC RBC AUTO-ENTMCNC: 33 G/DL (ref 31.4–37.4)
MCV RBC AUTO: 100 FL (ref 82–98)
PLATELET # BLD AUTO: 143 THOUSANDS/UL (ref 149–390)
PMV BLD AUTO: 13.2 FL (ref 8.9–12.7)
POTASSIUM SERPL-SCNC: 4.8 MMOL/L (ref 3.5–5.3)
RBC # BLD AUTO: 2.74 MILLION/UL (ref 3.81–5.12)
SARS-COV-2 RNA RESP QL NAA+PROBE: NEGATIVE
SODIUM SERPL-SCNC: 141 MMOL/L (ref 136–145)
WBC # BLD AUTO: 5.74 THOUSAND/UL (ref 4.31–10.16)

## 2020-06-18 PROCEDURE — 94760 N-INVAS EAR/PLS OXIMETRY 1: CPT

## 2020-06-18 PROCEDURE — 87635 SARS-COV-2 COVID-19 AMP PRB: CPT | Performed by: FAMILY MEDICINE

## 2020-06-18 PROCEDURE — 85027 COMPLETE CBC AUTOMATED: CPT | Performed by: FAMILY MEDICINE

## 2020-06-18 PROCEDURE — 80048 BASIC METABOLIC PNL TOTAL CA: CPT | Performed by: NURSE PRACTITIONER

## 2020-06-18 PROCEDURE — 82948 REAGENT STRIP/BLOOD GLUCOSE: CPT

## 2020-06-18 PROCEDURE — 99232 SBSQ HOSP IP/OBS MODERATE 35: CPT | Performed by: INTERNAL MEDICINE

## 2020-06-18 PROCEDURE — 99232 SBSQ HOSP IP/OBS MODERATE 35: CPT | Performed by: FAMILY MEDICINE

## 2020-06-18 RX ORDER — DIAPER,BRIEF,INFANT-TODD,DISP
EACH MISCELLANEOUS 4 TIMES DAILY PRN
Status: DISCONTINUED | OUTPATIENT
Start: 2020-06-18 | End: 2020-06-19 | Stop reason: HOSPADM

## 2020-06-18 RX ADMIN — SUCRALFATE 1000 MG: 1 SUSPENSION ORAL at 11:48

## 2020-06-18 RX ADMIN — AMLODIPINE BESYLATE 5 MG: 5 TABLET ORAL at 08:00

## 2020-06-18 RX ADMIN — SUCRALFATE 1000 MG: 1 SUSPENSION ORAL at 07:57

## 2020-06-18 RX ADMIN — SUCRALFATE 1000 MG: 1 SUSPENSION ORAL at 21:52

## 2020-06-18 RX ADMIN — INSULIN GLARGINE 12 UNITS: 100 INJECTION, SOLUTION SUBCUTANEOUS at 21:52

## 2020-06-18 RX ADMIN — APIXABAN 2.5 MG: 2.5 TABLET, FILM COATED ORAL at 08:00

## 2020-06-18 RX ADMIN — SUCRALFATE 1000 MG: 1 SUSPENSION ORAL at 16:20

## 2020-06-18 RX ADMIN — INSULIN LISPRO 1 UNITS: 100 INJECTION, SOLUTION INTRAVENOUS; SUBCUTANEOUS at 11:48

## 2020-06-18 RX ADMIN — DOCUSATE SODIUM 100 MG: 100 CAPSULE, LIQUID FILLED ORAL at 08:00

## 2020-06-18 RX ADMIN — INSULIN LISPRO 3 UNITS: 100 INJECTION, SOLUTION INTRAVENOUS; SUBCUTANEOUS at 16:27

## 2020-06-18 RX ADMIN — CARVEDILOL 1.56 MG: 3.12 TABLET, FILM COATED ORAL at 17:52

## 2020-06-18 RX ADMIN — CARVEDILOL 1.56 MG: 3.12 TABLET, FILM COATED ORAL at 08:00

## 2020-06-18 RX ADMIN — ATORVASTATIN CALCIUM 20 MG: 20 TABLET, FILM COATED ORAL at 16:20

## 2020-06-18 RX ADMIN — INSULIN LISPRO 3 UNITS: 100 INJECTION, SOLUTION INTRAVENOUS; SUBCUTANEOUS at 11:48

## 2020-06-18 RX ADMIN — APIXABAN 2.5 MG: 2.5 TABLET, FILM COATED ORAL at 17:52

## 2020-06-18 RX ADMIN — FLUTICASONE FUROATE AND VILANTEROL TRIFENATATE 1 PUFF: 200; 25 POWDER RESPIRATORY (INHALATION) at 09:44

## 2020-06-18 RX ADMIN — FERROUS SULFATE TAB 325 MG (65 MG ELEMENTAL FE) 325 MG: 325 (65 FE) TAB at 07:57

## 2020-06-18 RX ADMIN — DOCUSATE SODIUM 100 MG: 100 CAPSULE, LIQUID FILLED ORAL at 17:52

## 2020-06-18 RX ADMIN — INSULIN LISPRO 3 UNITS: 100 INJECTION, SOLUTION INTRAVENOUS; SUBCUTANEOUS at 07:57

## 2020-06-18 RX ADMIN — LEVOFLOXACIN 250 MG: 250 TABLET, FILM COATED ORAL at 20:20

## 2020-06-18 RX ADMIN — EPOETIN ALFA 20000 UNITS: 10000 SOLUTION INTRAVENOUS; SUBCUTANEOUS at 11:49

## 2020-06-19 ENCOUNTER — TELEPHONE (OUTPATIENT)
Dept: NEPHROLOGY | Facility: CLINIC | Age: 85
End: 2020-06-19

## 2020-06-19 VITALS
TEMPERATURE: 98.5 F | HEIGHT: 60 IN | DIASTOLIC BLOOD PRESSURE: 55 MMHG | WEIGHT: 146.39 LBS | SYSTOLIC BLOOD PRESSURE: 166 MMHG | BODY MASS INDEX: 28.74 KG/M2 | RESPIRATION RATE: 17 BRPM | HEART RATE: 68 BPM | OXYGEN SATURATION: 96 %

## 2020-06-19 DIAGNOSIS — N17.9 AKI (ACUTE KIDNEY INJURY) (HCC): Primary | ICD-10-CM

## 2020-06-19 LAB
ANION GAP SERPL CALCULATED.3IONS-SCNC: 6 MMOL/L (ref 4–13)
BASOPHILS # BLD AUTO: 0.02 THOUSANDS/ΜL (ref 0–0.1)
BASOPHILS NFR BLD AUTO: 0 % (ref 0–1)
BUN SERPL-MCNC: 61 MG/DL (ref 5–25)
CALCIUM SERPL-MCNC: 8.7 MG/DL (ref 8.3–10.1)
CHLORIDE SERPL-SCNC: 105 MMOL/L (ref 100–108)
CO2 SERPL-SCNC: 31 MMOL/L (ref 21–32)
CREAT SERPL-MCNC: 1.66 MG/DL (ref 0.6–1.3)
EOSINOPHIL # BLD AUTO: 0.06 THOUSAND/ΜL (ref 0–0.61)
EOSINOPHIL NFR BLD AUTO: 1 % (ref 0–6)
ERYTHROCYTE [DISTWIDTH] IN BLOOD BY AUTOMATED COUNT: 13.5 % (ref 11.6–15.1)
GFR SERPL CREATININE-BSD FRML MDRD: 28 ML/MIN/1.73SQ M
GLUCOSE SERPL-MCNC: 158 MG/DL (ref 65–140)
GLUCOSE SERPL-MCNC: 182 MG/DL (ref 65–140)
GLUCOSE SERPL-MCNC: 91 MG/DL (ref 65–140)
HCT VFR BLD AUTO: 30.7 % (ref 34.8–46.1)
HEMOCCULT STL QL: POSITIVE
HEMOCCULT STL QL: POSITIVE
HGB BLD-MCNC: 9.5 G/DL (ref 11.5–15.4)
IMM GRANULOCYTES # BLD AUTO: 0.02 THOUSAND/UL (ref 0–0.2)
IMM GRANULOCYTES NFR BLD AUTO: 0 % (ref 0–2)
LYMPHOCYTES # BLD AUTO: 1.37 THOUSANDS/ΜL (ref 0.6–4.47)
LYMPHOCYTES NFR BLD AUTO: 25 % (ref 14–44)
MCH RBC QN AUTO: 29.5 PG (ref 26.8–34.3)
MCHC RBC AUTO-ENTMCNC: 30.9 G/DL (ref 31.4–37.4)
MCV RBC AUTO: 95 FL (ref 82–98)
MONOCYTES # BLD AUTO: 0.53 THOUSAND/ΜL (ref 0.17–1.22)
MONOCYTES NFR BLD AUTO: 10 % (ref 4–12)
NEUTROPHILS # BLD AUTO: 3.5 THOUSANDS/ΜL (ref 1.85–7.62)
NEUTS SEG NFR BLD AUTO: 64 % (ref 43–75)
NRBC BLD AUTO-RTO: 0 /100 WBCS
PLATELET # BLD AUTO: 146 THOUSANDS/UL (ref 149–390)
PMV BLD AUTO: 12.8 FL (ref 8.9–12.7)
POTASSIUM SERPL-SCNC: 4.7 MMOL/L (ref 3.5–5.3)
RBC # BLD AUTO: 3.22 MILLION/UL (ref 3.81–5.12)
SODIUM SERPL-SCNC: 142 MMOL/L (ref 136–145)
WBC # BLD AUTO: 5.5 THOUSAND/UL (ref 4.31–10.16)

## 2020-06-19 PROCEDURE — 99239 HOSP IP/OBS DSCHRG MGMT >30: CPT | Performed by: FAMILY MEDICINE

## 2020-06-19 PROCEDURE — 80048 BASIC METABOLIC PNL TOTAL CA: CPT | Performed by: NURSE PRACTITIONER

## 2020-06-19 PROCEDURE — 82948 REAGENT STRIP/BLOOD GLUCOSE: CPT

## 2020-06-19 PROCEDURE — 85025 COMPLETE CBC W/AUTO DIFF WBC: CPT | Performed by: NURSE PRACTITIONER

## 2020-06-19 RX ORDER — FUROSEMIDE 20 MG/1
40 TABLET ORAL DAILY
Start: 2020-06-19 | End: 2020-08-05 | Stop reason: HOSPADM

## 2020-06-19 RX ORDER — AMLODIPINE BESYLATE 5 MG/1
5 TABLET ORAL DAILY
Refills: 0
Start: 2020-06-20 | End: 2020-08-05 | Stop reason: HOSPADM

## 2020-06-19 RX ORDER — DESLORATADINE 5 MG/1
5 TABLET ORAL DAILY PRN
Refills: 0
Start: 2020-06-19 | End: 2020-08-05 | Stop reason: HOSPADM

## 2020-06-19 RX ORDER — FLUTICASONE PROPIONATE 50 MCG
2 SPRAY, SUSPENSION (ML) NASAL DAILY
Refills: 0
Start: 2020-06-19 | End: 2020-08-05 | Stop reason: HOSPADM

## 2020-06-19 RX ADMIN — FLUTICASONE FUROATE AND VILANTEROL TRIFENATATE 1 PUFF: 200; 25 POWDER RESPIRATORY (INHALATION) at 08:03

## 2020-06-19 RX ADMIN — CARVEDILOL 1.56 MG: 3.12 TABLET, FILM COATED ORAL at 08:03

## 2020-06-19 RX ADMIN — APIXABAN 2.5 MG: 2.5 TABLET, FILM COATED ORAL at 08:04

## 2020-06-19 RX ADMIN — INSULIN LISPRO 3 UNITS: 100 INJECTION, SOLUTION INTRAVENOUS; SUBCUTANEOUS at 11:44

## 2020-06-19 RX ADMIN — AMLODIPINE BESYLATE 5 MG: 5 TABLET ORAL at 08:04

## 2020-06-19 RX ADMIN — FERROUS SULFATE TAB 325 MG (65 MG ELEMENTAL FE) 325 MG: 325 (65 FE) TAB at 08:03

## 2020-06-19 RX ADMIN — DOCUSATE SODIUM 100 MG: 100 CAPSULE, LIQUID FILLED ORAL at 08:04

## 2020-06-19 RX ADMIN — SUCRALFATE 1000 MG: 1 SUSPENSION ORAL at 11:45

## 2020-06-19 RX ADMIN — SUCRALFATE 1000 MG: 1 SUSPENSION ORAL at 08:03

## 2020-06-19 RX ADMIN — INSULIN LISPRO 1 UNITS: 100 INJECTION, SOLUTION INTRAVENOUS; SUBCUTANEOUS at 11:45

## 2020-06-22 ENCOUNTER — TELEMEDICINE (OUTPATIENT)
Dept: NEPHROLOGY | Facility: CLINIC | Age: 85
End: 2020-06-22
Payer: MEDICARE

## 2020-06-22 VITALS
SYSTOLIC BLOOD PRESSURE: 120 MMHG | BODY MASS INDEX: 29.84 KG/M2 | WEIGHT: 152 LBS | HEIGHT: 60 IN | DIASTOLIC BLOOD PRESSURE: 54 MMHG

## 2020-06-22 DIAGNOSIS — I10 ESSENTIAL HYPERTENSION: ICD-10-CM

## 2020-06-22 DIAGNOSIS — N25.81 SECONDARY HYPERPARATHYROIDISM OF RENAL ORIGIN (HCC): ICD-10-CM

## 2020-06-22 DIAGNOSIS — Z79.4 TYPE 2 DIABETES MELLITUS WITH STAGE 3 CHRONIC KIDNEY DISEASE, WITH LONG-TERM CURRENT USE OF INSULIN (HCC): ICD-10-CM

## 2020-06-22 DIAGNOSIS — N18.30 TYPE 2 DIABETES MELLITUS WITH STAGE 3 CHRONIC KIDNEY DISEASE, WITH LONG-TERM CURRENT USE OF INSULIN (HCC): ICD-10-CM

## 2020-06-22 DIAGNOSIS — E11.65 TYPE 2 DIABETES MELLITUS WITH HYPERGLYCEMIA, WITH LONG-TERM CURRENT USE OF INSULIN (HCC): ICD-10-CM

## 2020-06-22 DIAGNOSIS — I50.43 ACUTE ON CHRONIC COMBINED SYSTOLIC AND DIASTOLIC CHF (CONGESTIVE HEART FAILURE) (HCC): ICD-10-CM

## 2020-06-22 DIAGNOSIS — I50.42 CHRONIC COMBINED SYSTOLIC AND DIASTOLIC CONGESTIVE HEART FAILURE (HCC): ICD-10-CM

## 2020-06-22 DIAGNOSIS — Z79.4 TYPE 2 DIABETES MELLITUS WITH HYPERGLYCEMIA, WITH LONG-TERM CURRENT USE OF INSULIN (HCC): ICD-10-CM

## 2020-06-22 DIAGNOSIS — N26.1 ATROPHY OF LEFT KIDNEY: ICD-10-CM

## 2020-06-22 DIAGNOSIS — E11.22 TYPE 2 DIABETES MELLITUS WITH STAGE 3 CHRONIC KIDNEY DISEASE, WITH LONG-TERM CURRENT USE OF INSULIN (HCC): ICD-10-CM

## 2020-06-22 DIAGNOSIS — N18.4 STAGE 4 CHRONIC KIDNEY DISEASE (HCC): Primary | ICD-10-CM

## 2020-06-22 PROCEDURE — 99214 OFFICE O/P EST MOD 30 MIN: CPT | Performed by: NURSE PRACTITIONER

## 2020-06-26 ENCOUNTER — APPOINTMENT (EMERGENCY)
Dept: CT IMAGING | Facility: HOSPITAL | Age: 85
DRG: 291 | End: 2020-06-26
Payer: MEDICARE

## 2020-06-26 ENCOUNTER — HOSPITAL ENCOUNTER (INPATIENT)
Facility: HOSPITAL | Age: 85
LOS: 7 days | Discharge: NURSING, REHAB OR LONG-TERM CARE | DRG: 291 | End: 2020-07-03
Attending: EMERGENCY MEDICINE | Admitting: ANESTHESIOLOGY
Payer: MEDICARE

## 2020-06-26 ENCOUNTER — APPOINTMENT (EMERGENCY)
Dept: NUCLEAR MEDICINE | Facility: HOSPITAL | Age: 85
DRG: 291 | End: 2020-06-26
Payer: MEDICARE

## 2020-06-26 ENCOUNTER — APPOINTMENT (EMERGENCY)
Dept: RADIOLOGY | Facility: HOSPITAL | Age: 85
DRG: 291 | End: 2020-06-26
Payer: MEDICARE

## 2020-06-26 DIAGNOSIS — J90 PLEURAL EFFUSION, RIGHT: ICD-10-CM

## 2020-06-26 DIAGNOSIS — R79.89 ELEVATED BRAIN NATRIURETIC PEPTIDE (BNP) LEVEL: ICD-10-CM

## 2020-06-26 DIAGNOSIS — I50.23 ACUTE ON CHRONIC SYSTOLIC CHF (CONGESTIVE HEART FAILURE) (HCC): ICD-10-CM

## 2020-06-26 DIAGNOSIS — D64.9 ANEMIA: ICD-10-CM

## 2020-06-26 DIAGNOSIS — R06.02 SOB (SHORTNESS OF BREATH): Primary | ICD-10-CM

## 2020-06-26 DIAGNOSIS — R79.89 ELEVATED D-DIMER: ICD-10-CM

## 2020-06-26 DIAGNOSIS — R05.9 COUGH: ICD-10-CM

## 2020-06-26 DIAGNOSIS — I50.9 ACUTE ON CHRONIC CONGESTIVE HEART FAILURE, UNSPECIFIED HEART FAILURE TYPE (HCC): ICD-10-CM

## 2020-06-26 DIAGNOSIS — N17.9 AKI (ACUTE KIDNEY INJURY) (HCC): ICD-10-CM

## 2020-06-26 LAB
ALBUMIN SERPL BCP-MCNC: 2.9 G/DL (ref 3.5–5)
ALP SERPL-CCNC: 110 U/L (ref 46–116)
ALT SERPL W P-5'-P-CCNC: 26 U/L (ref 12–78)
ANION GAP SERPL CALCULATED.3IONS-SCNC: 6 MMOL/L (ref 4–13)
ANION GAP SERPL CALCULATED.3IONS-SCNC: 8 MMOL/L (ref 4–13)
ARTERIAL PATENCY WRIST A: YES
AST SERPL W P-5'-P-CCNC: 30 U/L (ref 5–45)
BASE EXCESS BLDA CALC-SCNC: -0.2 MMOL/L
BASOPHILS # BLD AUTO: 0.03 THOUSANDS/ΜL (ref 0–0.1)
BASOPHILS NFR BLD AUTO: 0 % (ref 0–1)
BILIRUB SERPL-MCNC: 0.3 MG/DL (ref 0.2–1)
BILIRUB UR QL STRIP: NEGATIVE
BUN SERPL-MCNC: 48 MG/DL (ref 5–25)
BUN SERPL-MCNC: 54 MG/DL (ref 5–25)
CALCIUM SERPL-MCNC: 8.2 MG/DL (ref 8.3–10.1)
CALCIUM SERPL-MCNC: 8.2 MG/DL (ref 8.3–10.1)
CHLORIDE SERPL-SCNC: 107 MMOL/L (ref 100–108)
CHLORIDE SERPL-SCNC: 108 MMOL/L (ref 100–108)
CK SERPL-CCNC: 115 U/L (ref 26–192)
CLARITY UR: CLEAR
CO2 SERPL-SCNC: 29 MMOL/L (ref 21–32)
CO2 SERPL-SCNC: 30 MMOL/L (ref 21–32)
COLOR UR: YELLOW
CREAT SERPL-MCNC: 1.69 MG/DL (ref 0.6–1.3)
CREAT SERPL-MCNC: 1.86 MG/DL (ref 0.6–1.3)
D DIMER PPP FEU-MCNC: 2.17 UG/ML FEU
EOSINOPHIL # BLD AUTO: 0.03 THOUSAND/ΜL (ref 0–0.61)
EOSINOPHIL NFR BLD AUTO: 0 % (ref 0–6)
ERYTHROCYTE [DISTWIDTH] IN BLOOD BY AUTOMATED COUNT: 14.2 % (ref 11.6–15.1)
GFR SERPL CREATININE-BSD FRML MDRD: 25 ML/MIN/1.73SQ M
GFR SERPL CREATININE-BSD FRML MDRD: 28 ML/MIN/1.73SQ M
GLUCOSE SERPL-MCNC: 129 MG/DL (ref 65–140)
GLUCOSE SERPL-MCNC: 169 MG/DL (ref 65–140)
GLUCOSE SERPL-MCNC: 187 MG/DL (ref 65–140)
GLUCOSE SERPL-MCNC: 208 MG/DL (ref 65–140)
GLUCOSE SERPL-MCNC: 316 MG/DL (ref 65–140)
GLUCOSE UR STRIP-MCNC: NEGATIVE MG/DL
HCO3 BLDA-SCNC: 26 MMOL/L (ref 22–28)
HCT VFR BLD AUTO: 31.1 % (ref 34.8–46.1)
HGB BLD-MCNC: 9.3 G/DL (ref 11.5–15.4)
HGB UR QL STRIP.AUTO: NEGATIVE
IMM GRANULOCYTES # BLD AUTO: 0.05 THOUSAND/UL (ref 0–0.2)
IMM GRANULOCYTES NFR BLD AUTO: 1 % (ref 0–2)
INR PPP: 1.14 (ref 0.84–1.19)
KETONES UR STRIP-MCNC: NEGATIVE MG/DL
LACTATE SERPL-SCNC: 0.9 MMOL/L (ref 0.5–2)
LEUKOCYTE ESTERASE UR QL STRIP: NEGATIVE
LIPASE SERPL-CCNC: 122 U/L (ref 73–393)
LYMPHOCYTES # BLD AUTO: 1.79 THOUSANDS/ΜL (ref 0.6–4.47)
LYMPHOCYTES NFR BLD AUTO: 18 % (ref 14–44)
MAGNESIUM SERPL-MCNC: 2.2 MG/DL (ref 1.6–2.6)
MAGNESIUM SERPL-MCNC: 2.4 MG/DL (ref 1.6–2.6)
MCH RBC QN AUTO: 29.2 PG (ref 26.8–34.3)
MCHC RBC AUTO-ENTMCNC: 29.9 G/DL (ref 31.4–37.4)
MCV RBC AUTO: 98 FL (ref 82–98)
MONOCYTES # BLD AUTO: 0.71 THOUSAND/ΜL (ref 0.17–1.22)
MONOCYTES NFR BLD AUTO: 7 % (ref 4–12)
NASAL CANNULA: 6
NEUTROPHILS # BLD AUTO: 7.38 THOUSANDS/ΜL (ref 1.85–7.62)
NEUTS SEG NFR BLD AUTO: 74 % (ref 43–75)
NITRITE UR QL STRIP: NEGATIVE
NRBC BLD AUTO-RTO: 0 /100 WBCS
NT-PROBNP SERPL-MCNC: 6956 PG/ML
O2 CT BLDA-SCNC: 14 ML/DL (ref 16–23)
OXYHGB MFR BLDA: 97.3 % (ref 94–97)
PCO2 BLDA: 50 MM HG (ref 36–44)
PH BLDA: 7.33 [PH] (ref 7.35–7.45)
PH UR STRIP.AUTO: 6 [PH]
PLATELET # BLD AUTO: 211 THOUSANDS/UL (ref 149–390)
PMV BLD AUTO: 12.4 FL (ref 8.9–12.7)
PO2 BLDA: 121.5 MM HG (ref 75–129)
POTASSIUM SERPL-SCNC: 4.7 MMOL/L (ref 3.5–5.3)
POTASSIUM SERPL-SCNC: 5.6 MMOL/L (ref 3.5–5.3)
PROT SERPL-MCNC: 6.9 G/DL (ref 6.4–8.2)
PROT UR STRIP-MCNC: NEGATIVE MG/DL
PROTHROMBIN TIME: 14.7 SECONDS (ref 11.6–14.5)
RBC # BLD AUTO: 3.19 MILLION/UL (ref 3.81–5.12)
SARS-COV-2 RNA RESP QL NAA+PROBE: NEGATIVE
SODIUM SERPL-SCNC: 143 MMOL/L (ref 136–145)
SODIUM SERPL-SCNC: 145 MMOL/L (ref 136–145)
SP GR UR STRIP.AUTO: 1.01 (ref 1–1.03)
SPECIMEN SOURCE: ABNORMAL
T3FREE SERPL-MCNC: 2.73 PG/ML (ref 2.3–4.2)
T4 FREE SERPL-MCNC: 1.02 NG/DL (ref 0.76–1.46)
TROPONIN I SERPL-MCNC: 0.04 NG/ML
TSH SERPL DL<=0.05 MIU/L-ACNC: 3.97 UIU/ML (ref 0.36–3.74)
UROBILINOGEN UR QL STRIP.AUTO: 0.2 E.U./DL
WBC # BLD AUTO: 9.99 THOUSAND/UL (ref 4.31–10.16)

## 2020-06-26 PROCEDURE — 83605 ASSAY OF LACTIC ACID: CPT | Performed by: EMERGENCY MEDICINE

## 2020-06-26 PROCEDURE — 94760 N-INVAS EAR/PLS OXIMETRY 1: CPT

## 2020-06-26 PROCEDURE — 85379 FIBRIN DEGRADATION QUANT: CPT | Performed by: EMERGENCY MEDICINE

## 2020-06-26 PROCEDURE — 36415 COLL VENOUS BLD VENIPUNCTURE: CPT | Performed by: EMERGENCY MEDICINE

## 2020-06-26 PROCEDURE — 96366 THER/PROPH/DIAG IV INF ADDON: CPT

## 2020-06-26 PROCEDURE — 83735 ASSAY OF MAGNESIUM: CPT | Performed by: EMERGENCY MEDICINE

## 2020-06-26 PROCEDURE — 82550 ASSAY OF CK (CPK): CPT | Performed by: EMERGENCY MEDICINE

## 2020-06-26 PROCEDURE — 99291 CRITICAL CARE FIRST HOUR: CPT | Performed by: EMERGENCY MEDICINE

## 2020-06-26 PROCEDURE — 84481 FREE ASSAY (FT-3): CPT | Performed by: NURSE PRACTITIONER

## 2020-06-26 PROCEDURE — 87040 BLOOD CULTURE FOR BACTERIA: CPT | Performed by: EMERGENCY MEDICINE

## 2020-06-26 PROCEDURE — 82948 REAGENT STRIP/BLOOD GLUCOSE: CPT

## 2020-06-26 PROCEDURE — 94640 AIRWAY INHALATION TREATMENT: CPT

## 2020-06-26 PROCEDURE — 83690 ASSAY OF LIPASE: CPT | Performed by: EMERGENCY MEDICINE

## 2020-06-26 PROCEDURE — 36600 WITHDRAWAL OF ARTERIAL BLOOD: CPT

## 2020-06-26 PROCEDURE — 96375 TX/PRO/DX INJ NEW DRUG ADDON: CPT

## 2020-06-26 PROCEDURE — 99285 EMERGENCY DEPT VISIT HI MDM: CPT

## 2020-06-26 PROCEDURE — 99223 1ST HOSP IP/OBS HIGH 75: CPT | Performed by: NURSE PRACTITIONER

## 2020-06-26 PROCEDURE — 84443 ASSAY THYROID STIM HORMONE: CPT | Performed by: EMERGENCY MEDICINE

## 2020-06-26 PROCEDURE — 78580 LUNG PERFUSION IMAGING: CPT

## 2020-06-26 PROCEDURE — 87635 SARS-COV-2 COVID-19 AMP PRB: CPT | Performed by: EMERGENCY MEDICINE

## 2020-06-26 PROCEDURE — 93005 ELECTROCARDIOGRAM TRACING: CPT

## 2020-06-26 PROCEDURE — 96365 THER/PROPH/DIAG IV INF INIT: CPT

## 2020-06-26 PROCEDURE — 81003 URINALYSIS AUTO W/O SCOPE: CPT | Performed by: NURSE PRACTITIONER

## 2020-06-26 PROCEDURE — 82805 BLOOD GASES W/O2 SATURATION: CPT | Performed by: EMERGENCY MEDICINE

## 2020-06-26 PROCEDURE — 85610 PROTHROMBIN TIME: CPT | Performed by: EMERGENCY MEDICINE

## 2020-06-26 PROCEDURE — 71250 CT THORAX DX C-: CPT

## 2020-06-26 PROCEDURE — 80053 COMPREHEN METABOLIC PANEL: CPT | Performed by: EMERGENCY MEDICINE

## 2020-06-26 PROCEDURE — 84484 ASSAY OF TROPONIN QUANT: CPT | Performed by: EMERGENCY MEDICINE

## 2020-06-26 PROCEDURE — 83880 ASSAY OF NATRIURETIC PEPTIDE: CPT | Performed by: EMERGENCY MEDICINE

## 2020-06-26 PROCEDURE — 71045 X-RAY EXAM CHEST 1 VIEW: CPT

## 2020-06-26 PROCEDURE — 83735 ASSAY OF MAGNESIUM: CPT | Performed by: NURSE PRACTITIONER

## 2020-06-26 PROCEDURE — A9540 TC99M MAA: HCPCS

## 2020-06-26 PROCEDURE — 94002 VENT MGMT INPAT INIT DAY: CPT

## 2020-06-26 PROCEDURE — 80048 BASIC METABOLIC PNL TOTAL CA: CPT | Performed by: NURSE PRACTITIONER

## 2020-06-26 PROCEDURE — 84439 ASSAY OF FREE THYROXINE: CPT | Performed by: NURSE PRACTITIONER

## 2020-06-26 PROCEDURE — 85025 COMPLETE CBC W/AUTO DIFF WBC: CPT | Performed by: EMERGENCY MEDICINE

## 2020-06-26 RX ORDER — SUCRALFATE ORAL 1 G/10ML
1000 SUSPENSION ORAL
Status: DISCONTINUED | OUTPATIENT
Start: 2020-06-26 | End: 2020-07-03 | Stop reason: HOSPADM

## 2020-06-26 RX ORDER — LEVALBUTEROL 1.25 MG/.5ML
1.25 SOLUTION, CONCENTRATE RESPIRATORY (INHALATION)
Status: DISCONTINUED | OUTPATIENT
Start: 2020-06-26 | End: 2020-07-03 | Stop reason: HOSPADM

## 2020-06-26 RX ORDER — FERROUS SULFATE 325(65) MG
325 TABLET ORAL
Status: DISCONTINUED | OUTPATIENT
Start: 2020-06-27 | End: 2020-07-03 | Stop reason: HOSPADM

## 2020-06-26 RX ORDER — INSULIN GLARGINE 100 [IU]/ML
8 INJECTION, SOLUTION SUBCUTANEOUS
Status: DISCONTINUED | OUTPATIENT
Start: 2020-06-26 | End: 2020-06-29

## 2020-06-26 RX ORDER — CARVEDILOL 3.12 MG/1
1.56 TABLET ORAL 2 TIMES DAILY
Status: DISCONTINUED | OUTPATIENT
Start: 2020-06-27 | End: 2020-07-01

## 2020-06-26 RX ORDER — PRAVASTATIN SODIUM 80 MG/1
80 TABLET ORAL
Status: DISCONTINUED | OUTPATIENT
Start: 2020-06-27 | End: 2020-07-03 | Stop reason: HOSPADM

## 2020-06-26 RX ORDER — DOCUSATE SODIUM 100 MG/1
100 CAPSULE, LIQUID FILLED ORAL 2 TIMES DAILY
Status: DISCONTINUED | OUTPATIENT
Start: 2020-06-26 | End: 2020-07-03 | Stop reason: HOSPADM

## 2020-06-26 RX ORDER — CARVEDILOL 3.12 MG/1
1.56 TABLET ORAL 2 TIMES DAILY
Status: DISCONTINUED | OUTPATIENT
Start: 2020-06-27 | End: 2020-06-26

## 2020-06-26 RX ORDER — FUROSEMIDE 10 MG/ML
60 INJECTION INTRAMUSCULAR; INTRAVENOUS ONCE
Status: COMPLETED | OUTPATIENT
Start: 2020-06-26 | End: 2020-06-26

## 2020-06-26 RX ORDER — LISINOPRIL 2.5 MG/1
2.5 TABLET ORAL DAILY
Status: DISCONTINUED | OUTPATIENT
Start: 2020-06-27 | End: 2020-06-26

## 2020-06-26 RX ORDER — ONDANSETRON 2 MG/ML
4 INJECTION INTRAMUSCULAR; INTRAVENOUS EVERY 6 HOURS PRN
Status: DISCONTINUED | OUTPATIENT
Start: 2020-06-26 | End: 2020-07-03 | Stop reason: HOSPADM

## 2020-06-26 RX ORDER — IPRATROPIUM BROMIDE AND ALBUTEROL SULFATE 2.5; .5 MG/3ML; MG/3ML
3 SOLUTION RESPIRATORY (INHALATION)
Status: DISCONTINUED | OUTPATIENT
Start: 2020-06-26 | End: 2020-06-26

## 2020-06-26 RX ORDER — SODIUM CHLORIDE 9 MG/ML
3 INJECTION INTRAVENOUS
Status: DISCONTINUED | OUTPATIENT
Start: 2020-06-26 | End: 2020-06-26

## 2020-06-26 RX ORDER — AMLODIPINE BESYLATE 5 MG/1
5 TABLET ORAL DAILY
Status: DISCONTINUED | OUTPATIENT
Start: 2020-06-27 | End: 2020-07-03 | Stop reason: HOSPADM

## 2020-06-26 RX ORDER — METHYLPREDNISOLONE SODIUM SUCCINATE 125 MG/2ML
80 INJECTION, POWDER, LYOPHILIZED, FOR SOLUTION INTRAMUSCULAR; INTRAVENOUS ONCE
Status: COMPLETED | OUTPATIENT
Start: 2020-06-26 | End: 2020-06-26

## 2020-06-26 RX ORDER — CHLORHEXIDINE GLUCONATE 0.12 MG/ML
15 RINSE ORAL EVERY 12 HOURS SCHEDULED
Status: DISCONTINUED | OUTPATIENT
Start: 2020-06-26 | End: 2020-07-03 | Stop reason: HOSPADM

## 2020-06-26 RX ORDER — FUROSEMIDE 10 MG/ML
40 INJECTION INTRAMUSCULAR; INTRAVENOUS ONCE
Status: COMPLETED | OUTPATIENT
Start: 2020-06-26 | End: 2020-06-26

## 2020-06-26 RX ORDER — FLUTICASONE PROPIONATE 50 MCG
2 SPRAY, SUSPENSION (ML) NASAL DAILY
Status: DISCONTINUED | OUTPATIENT
Start: 2020-06-27 | End: 2020-07-03 | Stop reason: HOSPADM

## 2020-06-26 RX ORDER — METHYLPREDNISOLONE SODIUM SUCCINATE 40 MG/ML
40 INJECTION, POWDER, LYOPHILIZED, FOR SOLUTION INTRAMUSCULAR; INTRAVENOUS DAILY
Status: DISCONTINUED | OUTPATIENT
Start: 2020-06-27 | End: 2020-06-28

## 2020-06-26 RX ORDER — INSULIN GLARGINE 100 [IU]/ML
5 INJECTION, SOLUTION SUBCUTANEOUS
Status: DISCONTINUED | OUTPATIENT
Start: 2020-06-26 | End: 2020-06-26

## 2020-06-26 RX ORDER — MAGNESIUM SULFATE 1 G/100ML
1 INJECTION INTRAVENOUS ONCE
Status: COMPLETED | OUTPATIENT
Start: 2020-06-26 | End: 2020-06-26

## 2020-06-26 RX ORDER — BUDESONIDE 0.5 MG/2ML
0.5 INHALANT ORAL
Status: DISCONTINUED | OUTPATIENT
Start: 2020-06-26 | End: 2020-07-03 | Stop reason: HOSPADM

## 2020-06-26 RX ORDER — LORATADINE 10 MG/1
10 TABLET ORAL DAILY
Status: DISCONTINUED | OUTPATIENT
Start: 2020-06-27 | End: 2020-07-03 | Stop reason: HOSPADM

## 2020-06-26 RX ORDER — PANTOPRAZOLE SODIUM 40 MG/1
40 TABLET, DELAYED RELEASE ORAL DAILY
Status: DISCONTINUED | OUTPATIENT
Start: 2020-06-27 | End: 2020-07-03 | Stop reason: HOSPADM

## 2020-06-26 RX ADMIN — LEVALBUTEROL HYDROCHLORIDE 1.25 MG: 1.25 SOLUTION, CONCENTRATE RESPIRATORY (INHALATION) at 13:47

## 2020-06-26 RX ADMIN — CHLORHEXIDINE GLUCONATE 0.12% ORAL RINSE 15 ML: 1.2 LIQUID ORAL at 21:44

## 2020-06-26 RX ADMIN — DOCUSATE SODIUM 100 MG: 100 CAPSULE, LIQUID FILLED ORAL at 17:08

## 2020-06-26 RX ADMIN — METHYLPREDNISOLONE SODIUM SUCCINATE 80 MG: 125 INJECTION, POWDER, FOR SOLUTION INTRAMUSCULAR; INTRAVENOUS at 08:52

## 2020-06-26 RX ADMIN — BUDESONIDE 0.5 MG: 0.5 INHALANT RESPIRATORY (INHALATION) at 21:25

## 2020-06-26 RX ADMIN — SUCRALFATE 1000 MG: 1 SUSPENSION ORAL at 21:44

## 2020-06-26 RX ADMIN — INSULIN LISPRO 1 UNITS: 100 INJECTION, SOLUTION INTRAVENOUS; SUBCUTANEOUS at 17:07

## 2020-06-26 RX ADMIN — FUROSEMIDE 60 MG: 10 INJECTION, SOLUTION INTRAMUSCULAR; INTRAVENOUS at 10:47

## 2020-06-26 RX ADMIN — LEVALBUTEROL HYDROCHLORIDE 1.25 MG: 1.25 SOLUTION, CONCENTRATE RESPIRATORY (INHALATION) at 21:21

## 2020-06-26 RX ADMIN — INSULIN LISPRO 3 UNITS: 100 INJECTION, SOLUTION INTRAVENOUS; SUBCUTANEOUS at 21:45

## 2020-06-26 RX ADMIN — FUROSEMIDE 40 MG: 10 INJECTION, SOLUTION INTRAMUSCULAR; INTRAVENOUS at 17:08

## 2020-06-26 RX ADMIN — IPRATROPIUM BROMIDE 0.5 MG: 0.5 SOLUTION RESPIRATORY (INHALATION) at 13:47

## 2020-06-26 RX ADMIN — IPRATROPIUM BROMIDE 0.5 MG: 0.5 SOLUTION RESPIRATORY (INHALATION) at 21:22

## 2020-06-26 RX ADMIN — INSULIN GLARGINE 8 UNITS: 100 INJECTION, SOLUTION SUBCUTANEOUS at 21:44

## 2020-06-26 RX ADMIN — MAGNESIUM SULFATE HEPTAHYDRATE 1 G: 1 INJECTION, SOLUTION INTRAVENOUS at 08:51

## 2020-06-26 RX ADMIN — INSULIN LISPRO 1 UNITS: 100 INJECTION, SOLUTION INTRAVENOUS; SUBCUTANEOUS at 14:24

## 2020-06-26 RX ADMIN — IPRATROPIUM BROMIDE AND ALBUTEROL SULFATE 3 ML: 2.5; .5 SOLUTION RESPIRATORY (INHALATION) at 08:50

## 2020-06-26 RX ADMIN — APIXABAN 2.5 MG: 2.5 TABLET, FILM COATED ORAL at 17:08

## 2020-06-26 RX ADMIN — SODIUM CHLORIDE 500 ML: 0.9 INJECTION, SOLUTION INTRAVENOUS at 08:50

## 2020-06-26 NOTE — RESPIRATORY THERAPY NOTE
RT Ventilator Management Note  Shannan Standing 82 y o  female MRN: [id number]  Unit/Bed#: ED 04 Encounter: [id number]      Daily Screen     No data found in the last 10 encounters  Physical Exam:   Assessment Type: Assess only  General Appearance: Alert, Awake  Respiratory Pattern: Tachypneic, Shallow, Retractions  Chest Assessment: Chest expansion symmetrical  Bilateral Breath Sounds: Diminished  Cough: None  O2 Device: biapa      Resp Comments: Pt abg done and placed on bipap for WOB and resp  acidosis  Pt resting comfortably on 12/5/30%

## 2020-06-26 NOTE — ASSESSMENT & PLAN NOTE
Wt Readings from Last 3 Encounters:   06/26/20 70 kg (154 lb 5 2 oz)   06/22/20 68 9 kg (152 lb)   06/19/20 66 4 kg (146 lb 6 2 oz)     · Patient had recent admission to [address] [place] [address] (06/12/20 to 06/19/20) at which time she was treated for CHF exacerbation  · NOLAN 06/12/20 - "Akinesis of the basal to mid inferior and the inferolateral walls  The ventricle was dilated  Systolic function was moderately reduced  EF 35 % to 40 %  There was assymetrical hypertrophy of the septum  Normal size and systolic function of right ventricle  Left atrium moderately dilated  Mild mitral valve regurgitation  Mild tricuspid valve regurgitation  · TTE report in care everywhere from 03/30/20 - "Left ventricle is mildly dilated  Severely reduced left ventricular systolic function  Basal to mid inferior and inferolateral akinesis  Mild concentric left ventricular hypertrophy  EF 30%  Normal right ventricular size  Reduced right ventricular systolic function  Severely dilated left atrium  Diastolic dysfunction with elevated filling pressures "  · CXR and CT scan of chest showed pulmonary edema  · Pro NT-BNP 6956  Previously was 12,600 on 06/12 during last admission  · Takes lasix 40 mg daily PO at ECF  · Lasix 60 mg IV given in ED, will evaluate response and given lasix prn  · Had additional 40mg yesterday  · Goal net fluid balance -1 liter  · -325 yesterday   Creatinine elevated, additional lasix held  · Daily weights  · Monitor I&O

## 2020-06-26 NOTE — ED NOTES
Report given to Arsh Ramey RN in ICU and all questions answered        Monalisa Mims RN  06/26/20 0737

## 2020-06-26 NOTE — ASSESSMENT & PLAN NOTE
· Patient presented with shortness of breath which began upon waking up this a m  · Hypoxic per ECF; approximately 70% on 2 L nasal cannula  Upon EMS arrival, they reported patient's oxygen saturations were up to 90% on 2 L nasal cannula  · Patient wears 2 L nasal cannula baseline  · Initial exam by ED - limited air movement, wheezes, crackles, increased work of breathing  · Patient was placed on non-rebreather initially and then transition to BiPAP for work of breathing  · ED course included DuoNeb treatment, magnesium 1 g IV and Solu-Medrol 80 mg IV  · Upon arrival to ICU patient was trialed off of BiPAP  Tolerating 4 L nasal cannula at this time    · Continue respiratory treatments - DuoNebs and budenoside  · Respiratory and airway clearance protocols  · Continue Clarinex, Flonase

## 2020-06-26 NOTE — ASSESSMENT & PLAN NOTE
· History of hypertension  · Patient takes amlodipine, carvedilol, lisinopril - will continue all upon admission with exception of lisinopril  · Resume lisinopril 06/27 if creatinine remains stable  · Continue to hold, creatinine 2 15

## 2020-06-26 NOTE — ASSESSMENT & PLAN NOTE
· Chronic anemia in the setting of CKD  · Hgb appears at baseline  · Continue iron supplement  · Continue to trend

## 2020-06-26 NOTE — ASSESSMENT & PLAN NOTE
· History of atrial fibrillation  · Current rhythm is paced, rate 60  · Home meds include carvedilol and Eliquis - will continue

## 2020-06-26 NOTE — H&P
H&P- Mariia Sullivan 1935, 82 y o  female MRN: [id number]    Unit/Bed#: ABRAHAM Encounter: [id number]    Primary Care Provider: Gavin Vogt MD   Date and time admitted to hospital: 6/26/2020  8:14 AM        * Acute respiratory failure with hypoxia (HCC)  Assessment & Plan  · Likely multifactorial - acute on chronic combined systolic and diastolic congestive heart failure, COPD, bilateral pleural effusions  · Patient states she woke up feeling short of breath  · ECF reported patients oxygen saturations were reading in the 70s on baseline 2 liters nasal cannula  When EMS arrived, they report that patient was 90% on 2 liters nasal cannula and had increased work of breathing  · Patient was tachypneic, increased work of breathing and was wheezing per ER MD  Patient was given solumedrol 80 mg IV, duoneb treatment and magnesium 1 gram IV  Initially was placed on NRB mask however continued to have increased work of breathing and was transitioned to BiPAP which she improved on  · Trial off BiPAP after to ICU - tolerating nasal cannula 4 liters  · Pro NT-BNP 6956, previous was 12,600 on 06/12  · CT chest showed pulmonary edema, moderate right pleural effusion, small left pleural effusion    Chronic combined systolic and diastolic congestive heart failure (HCC)  Assessment & Plan  Wt Readings from Last 3 Encounters:   06/26/20 70 kg (154 lb 5 2 oz)   06/22/20 68 9 kg (152 lb)   06/19/20 66 4 kg (146 lb 6 2 oz)     · Patient had recent admission to [address] [place] [address] (06/12/20 to 06/19/20) at which time she was treated for CHF exacerbation  · NOLAN 06/12/20 - "Akinesis of the basal to mid inferior and the inferolateral walls  The ventricle was dilated  Systolic function was moderately reduced  EF 35 % to 40 %  There was assymetrical hypertrophy of the septum  Normal size and systolic function of right ventricle  Left atrium moderately dilated  Mild mitral valve regurgitation  Mild tricuspid valve regurgitation    · TTE report in care everywhere from 03/30/20 - "Left ventricle is mildly dilated  Severely reduced left ventricular systolic function  Basal to mid inferior and inferolateral akinesis  Mild concentric left ventricular hypertrophy  EF 30%  Normal right ventricular size  Reduced right ventricular systolic function  Severely dilated left atrium  Diastolic dysfunction with elevated filling pressures "  · CXR and CT scan of chest showed pulmonary edema  · Pro NT-BNP 6956  Previously was 12,600 on 06/12 during last admission  · Takes lasix 40 mg daily PO at ECF  · Lasix 60 mg IV given in ED, will evaluate response and given lasix prn  · Goal net fluid balance -1 liter  · Daily weights  · Monitor I&O    COPD with emphysema ([facility] [address] )  Assessment & Plan  · Patient presented with shortness of breath which began upon waking up this a m  · Hypoxic per ECF; approximately 70% on 2 L nasal cannula  Upon EMS arrival, they reported patient's oxygen saturations were up to 90% on 2 L nasal cannula  · Patient wears 2 L nasal cannula baseline  · Initial exam by ED - limited air movement, wheezes, crackles, increased work of breathing  · Patient was placed on non-rebreather initially and then transition to BiPAP for work of breathing  · ED course included DuoNeb treatment, magnesium 1 g IV and Solu-Medrol 80 mg IV  · Upon arrival to ICU patient was trialed off of BiPAP  Tolerating 4 L nasal cannula at this time    · Continue respiratory treatments - DuoNebs and budenoside  · Respiratory and airway clearance protocols  · Continue Clarinex, Flonase  · Continue solumedrol 40 mg daily    A-fib ([facility])  Assessment & Plan  · History of atrial fibrillation  · Current rhythm is paced, rate 60  · Home meds include carvedilol and Eliquis - will continue    Essential hypertension  Assessment & Plan  · History of hypertension  · Patient takes amlodipine, carvedilol, lisinopril - will continue all upon admission with exception of lisinopril    Other hyperlipidemia  Assessment & Plan  · Patient takes revealed statin 10 mg at home  · Will use pharmacy formulary substitute of atorvastatin 80 mg daily    CKD (chronic kidney disease) stage 4, GFR 15-29 ml/min ([facility])  Assessment & Plan  · Baseline creatinine 1 4 - 1 6  · Patient had acute kidney injury on previous admission with peak creatinine 2 6  Discharge creatinine was 1 66 on 06/19/20  · Patient did follow up with [facility] via telemedicine visit on 06/22/20  · Admission creatinine 1 69  · Trend BUN and Creatinine  · Monitor I&O  · Received lasix 60 mg IV in ED  · Hold lisinopril today, plan to restart tomorrow if creatinine remains stable while duiresing    Type 2 diabetes mellitus with stage 3 chronic kidney disease, with long-term current use of insulin ([facility])  Assessment & Plan  Lab Results   Component Value Date    HGBA1C 7 9 (H) 03/28/2020       No results for input(s): POCGLU in the last 72 hours  Blood Sugar Average: Last 72 hrs:     · Hold home sliding scale insulin  · Continue lantus at reduced dose of 5 units HS as patient is currently NPO  · Start Q 6 hour sliding scale insulin coverage  · Once tolerating diet - resume lantus 8 units HS and change sliding scale to ACHS    Anemia  Assessment & Plan  · Chronic anemia in the setting of CKD  · Hgb appears at baseline  · Continue iron supplement  · Continue to trend    Esophageal reflux  Assessment & Plan  · Continue home protonix and carafate      -------------------------------------------------------------------------------------------------------------  Chief Complaint: shortness of breath    History of Present Illness   HX and PE limited by: [facility] and currently on BiPAP  Juliano Emeka is a 82 y o  female with past medical history of combined systolic and diastolic congestive heart failure, COPD, atrial fib, s/p pacemaker, HTN, HLD, diabetes mellitus type 2, GERD, who presents with shortness of breath    Patient states she felt shortness of breath waking this a m     At baseline patient does wear 2 L nasal cannula  Patient is a resident at [facility] and when assessed by staff it was said that her pulse oximetry was reading in the 70s  EMS was called and upon their exam patient had improved pulse ox to 90% on 2 L  Patient was brought into ED for further evaluation  Upon exam she was noted to have diminished lung sounds, wheezes, crackles and increased effort and work of breathing  Patient was initially placed on non rebreathing mask however did not improve  Patient was transitioned to BiPAP and additionally was given Solu-Medrol 80 mg IV, magnesium 1 g IV and DuoNeb treatment  Chest x-ray and CT non chronic chest showed pulmonary edema and bilateral pleural effusions  D-dimer was elevated at 2 17 subsequently patient went for V/Q scan which showed very low probability of pulmonary embolism  Pro NT BNP was elevated at 6956  Patient was given Lasix 60 mg IV x1  Patient denied fevers, chills, diaphoresis, sore throat, difficulty swallowing, chest pain, palpitations, abdominal pain, diarrhea, constipation, difficulty passing urine, dysuria, headache, dizziness  Patient did report that she felt nauseous after breakfast and did vomit x1 today  Critical care was called to evaluate patient for admission  Per patient shortness of breath was improved  Patient was still on BiPAP during this time and appeared comfortable  Patient was able to talk in full sentences despite being on BiPAP  Lung sounds remain diminished and noted to have fine crackles and occasional wheezing  Will admit patient to step-down level 1  History obtained from chart review and the patient   -------------------------------------------------------------------------------------------------------------  Dispo:  Admit to Stepdown Level 1    Code Status: Prior  --------------------------------------------------------------------------------------------------------------  Review of Systems   Constitutional: Negative for activity change, appetite change, chills, diaphoresis and fever  HENT: Positive for hearing loss  Negative for congestion, sinus pain, sneezing, sore throat and trouble swallowing  [place]   Eyes:        Wears glasses   Respiratory: Positive for shortness of breath  Negative for cough, choking, chest tightness and stridor  Cardiovascular: Positive for leg swelling  Negative for chest pain and palpitations  Gastrointestinal: Negative for abdominal distention, abdominal pain, constipation, diarrhea and nausea  Nausea and vomit x1 after breakfast today   Genitourinary: Negative for difficulty urinating, dysuria, flank pain and urgency  Skin: Negative  Neurological: Negative for dizziness, speech difficulty, weakness, light-headedness, numbness and headaches  Psychiatric/Behavioral: The patient is not nervous/anxious  Physical Exam   Constitutional: She is oriented to person, place, and time  She appears well-developed and well-nourished  HENT:   Head: Normocephalic and atraumatic  Nose: Nose normal    Mouth/Throat: Oropharynx is clear and moist  No oropharyngeal exudate  Eyes: Pupils are equal, round, and reactive to light  Conjunctivae are normal  No scleral icterus  Neck: Normal range of motion  Neck supple  Cardiovascular: Normal rate, regular rhythm and intact distal pulses  Paced   Pulmonary/Chest: No stridor  She exhibits no tenderness  Increased effort, diminished breath sounds with fine crackles and occasional wheeze   Abdominal: Soft  Bowel sounds are normal  She exhibits no distension  There is no tenderness  Musculoskeletal: She exhibits no tenderness or deformity  +1 lower extremity edema bilaterally   Lymphadenopathy:     She has no cervical adenopathy     Neurological: She is alert and oriented to person, place, and time  Skin: Skin is warm and dry  Capillary refill takes less than 2 seconds  Psychiatric: She has a normal mood and affect  Vitals reviewed  --------------------------------------------------------------------------------------------------------------  Vitals:   Vitals:    06/26/20 1115 06/26/20 1119 06/26/20 1130 06/26/20 1210   BP: 163/71  163/71    BP Location: Right arm      Pulse: 61  60 62   Resp: 20  20 20   Temp:       TempSrc:       SpO2: 97% 97% 97% 97%   Weight:         Temp  Min: 97 8 °F (36 6 °C)  Max: 97 8 °F (36 6 °C)  IBW: -92 5 kg     Body mass index is 30 14 kg/m²  Laboratory and Diagnostics:  Results from last 7 days   Lab Units 06/26/20  0844   WBC Thousand/uL 9 99   HEMOGLOBIN g/dL 9 3*   HEMATOCRIT % 31 1*   PLATELETS Thousands/uL 211   NEUTROS PCT % 74   MONOS PCT % 7     Results from last 7 days   Lab Units 06/26/20  0844   SODIUM mmol/L 143   POTASSIUM mmol/L 5 6*   CHLORIDE mmol/L 107   CO2 mmol/L 30   ANION GAP mmol/L 6   BUN mg/dL 48*   CREATININE mg/dL 1 69*   CALCIUM mg/dL 8 2*   GLUCOSE RANDOM mg/dL 129   ALT U/L 26   AST U/L 30   ALK PHOS U/L 110   ALBUMIN g/dL 2 9*   TOTAL BILIRUBIN mg/dL 0 30     Results from last 7 days   Lab Units 06/26/20  0844   MAGNESIUM mg/dL 2 2      Results from last 7 days   Lab Units 06/26/20  0844   INR  1 14      Results from last 7 days   Lab Units 06/26/20  0844   TROPONIN I ng/mL 0 04     Results from last 7 days   Lab Units 06/26/20  0844   LACTIC ACID mmol/L 0 9     ABG:  Results from last 7 days   Lab Units 06/26/20  0913   PH ART  7 334*   PCO2 ART mm Hg 50 0*   PO2 ART mm Hg 121 5   HCO3 ART mmol/L 26 0   BASE EXC ART mmol/L -0 2   ABG SOURCE  Radial, Left     VBG:  Results from last 7 days   Lab Units 06/26/20 0913   ABG SOURCE  Radial, Left           Micro:        EKG: Paced 1:1  Imaging: I have personally reviewed pertinent reports     and I have personally reviewed pertinent films in PACS  CT chest without contrast   Final Result by Sona Steve MD (06/26 1124)      1  Moderate right and small left pleural effusions   2  Scattered groundglass opacities, favor pulmonary edema  May also be infectious/inflammatory      The study was marked in EPIC for immediate notification  Workstation performed: [id number]         NM lung perfusion imaging (particulate)   Final Result by Amina Roberts MD (06/26 1042)      The probability for pulmonary embolus is likely very low  Workstation performed: [id number]         X-ray chest 1 view portable   Final Result by Mihcaela Willis MD (06/26 7992)      Mild pulmonary vascular congestion with increase pleural-parenchymal density at the left lung base as well as stable small effusion on the right  Findings may be related to edema although pneumonia is possible  In the setting of clinically suspected/proven COVID-19, this plain film appearance does not contain findings that raise concern for viral pneumonia such as COVID-19, but does not rule out this diagnosis                    Workstation performed: [id number]             Historical Information   Past Medical History:   Diagnosis Date    NANCY [facility]acute kidney injury) ([facility] [address] )     Atrial fibrillation (HCC)     CHF (congestive heart failure) (HCC)     COPD (chronic obstructive pulmonary disease) ([facility] [address] )     Diabetes mellitus ([facility] [address] )     Trigger finger of thumb     last assessed: 07/18/13     Past Surgical History:   Procedure Laterality Date    APPENDECTOMY      CARDIAC PACEMAKER PLACEMENT      CARDIAC PACEMAKER PLACEMENT      CHOLECYSTECTOMY      COLONOSCOPY      Complete    CORONARY ANGIOPLASTY WITH STENT PLACEMENT      CORONARY ARTERY BYPASS GRAFT      CORONARY ARTERY BYPASS GRAFT      ESOPHAGOGASTRODUODENOSCOPY      Diagnostic    HERNIA REPAIR      TOTAL ABDOMINAL HYSTERECTOMY      with removal of both ovaries     Social History   Social History     Substance and Sexual Activity   Alcohol Use Not Currently     Social History     Substance and Sexual Activity   Drug Use No     Social History     Tobacco Use   Smoking Status Former Smoker    Last attempt to quit: 2011    Years since quittin 0   Smokeless Tobacco Never Used   Tobacco Comment    Former smoker per allscripts     Exercise History: minimal  Family History:   Family History   Problem Relation Age of Onset    Lung disease Mother         black     Breast cancer Mother     Lung disease Father         black      I have reviewed this patient's family history and commented on sigificant items within the HPI    Medications:  Current Facility-Administered Medications   Medication Dose Route Frequency    ipratropium (ATROVENT) 0 02 % inhalation solution 0 5 mg  0 5 mg Nebulization Q6H    levalbuterol (XOPENEX) inhalation solution 1 25 mg  1 25 mg Nebulization Q6H    sodium chloride (PF) 0 9 % injection 3 mL  3 mL Intravenous Q1H PRN     Home medications:  Prior to Admission Medications   Prescriptions Last Dose Informant Patient Reported? Taking?    ELIQUIS 2 5 MG 2020 at Unknown time  Yes Yes   Sig: Take 2 5 mg by mouth 2 (two) times a day   Ferrous Fumarate 324 (106 Fe) MG TABS 2020 at Unknown time Self Yes Yes   Sig: Take 1 tablet by mouth daily   Insulin Pen Needle (PEN NEEDLES) 32G X 4 MM MISC   No No   Sig: by Does not apply route 3 (three) times a day   ONETOUCH DELICA LANCETS 84Q MISC   No No   Sig: Inject under the skin 4 (four) times a day   albuterol (PROVENTIL HFA,VENTOLIN HFA) 90 mcg/act inhaler   Yes No   Sig: Inhale 2 puffs every 4 (four) hours as needed   amLODIPine (NORVASC) 5 mg tablet 2020 at Unknown time  No Yes   Sig: Take 1 tablet (5 mg total) by mouth daily   carvedilol (COREG) 3 125 mg tablet 2020 at Unknown time  No Yes   Sig: Take 0 5 tablets (1 5625 mg total) by mouth 2 (two) times a day   desloratadine (Clarinex) 5 MG tablet 2020 at Unknown time  No Yes   Sig: Take 1 tablet (5 mg total) by mouth daily as needed (Seasonal allergy)   diclofenac sodium (VOLTAREN) 1 %   Yes No   Sig: APPLY AS DIRECTED 4 TIMES A DAY AS NEEDED FOR PAIN:   docusate sodium (COLACE) 100 mg capsule 2020 at Unknown time Self Yes Yes   Sig: Take 100 mg by mouth 2 (two) times a day   fluticasone (FLONASE) 50 mcg/act nasal spray 2020 at Unknown time  No Yes   Si sprays into each nostril daily   fluticasone-salmeterol (ADVAIR, WIXELA) 250-50 mcg/dose inhaler Not Taking at Unknown time  No No   Sig: Inhale 1 puff every 12 (twelve) hours Rinse mouth after use  Patient not taking: Reported on 2020   furosemide (LASIX) 20 mg tablet 2020 at Unknown time  No Yes   Sig: Take 2 tablets (40 mg total) by mouth daily   glucose blood test strip   No No   Sig: Use as instructed   insulin glargine (Lantus SoloStar) 100 units/mL injection pen 2020 at Unknown time  No Yes   Sig: Inject 10 Units under the skin daily at bedtime   Patient taking differently: Inject 8 Units under the skin daily at bedtime    insulin regular (NOVOLIN R) 100 units/mL injection   No No   Sig: Follow sliding scale  200-250, 2 units  251-300, 4 units  301-350, 6 units  351-400, 8 units   ipratropium-albuterol (DUO-NEB) 0 5-2 5 mg/3 mL nebulizer solution  Self Yes No   Sig: Inhale 1 each every 4 (four) hours as needed   lisinopril (ZESTRIL) 2 5 mg tablet 2020 at Unknown time  Yes Yes   Sig: Take 2 5 mg by mouth daily   nitroglycerin (NITROSTAT) 0 4 mg SL tablet   Yes No   Sig: PLACE 1 TABLET UNDER THE TONGUE AS NEEDED FOR CHEST PAIN MAY REPE     (REFER TO PRESCRIPTION NOTES)     pantoprazole (PROTONIX) 40 mg tablet 2020 at Unknown time  No Yes   Sig: TAKE 1 TABLET DAILY   rosuvastatin (CRESTOR) 10 MG tablet 2020 at Unknown time  No Yes   Sig: Take 1 tablet (10 mg total) by mouth daily at bedtime   selenium sulfide (SELSUN) 2 5 % shampoo  Self No No   Sig: Apply topically daily as needed for dandruff   sucralfate (CARAFATE) 1 g/10 mL suspension 6/25/2020 at Unknown time  Yes Yes   Sig: Take 1 g by mouth daily at bedtime      Facility-Administered Medications: None     Allergies: Allergies   Allergen Reactions    Other      PEANUTS-unknown reaction    Nuts Rash       ------------------------------------------------------------------------------------------------------------  Advance Directive and Living Will: Yes    Power of :    POLST: Yes  ------------------------------------------------------------------------------------------------------------  Anticipated Length of Stay is > 2 midnights    Care Time Delivered:   No Critical Care time spent       [facility]        Portions of the record may have been created with voice recognition software  Occasional wrong word or "sound a like" substitutions may have occurred due to the inherent limitations of voice recognition software    Read the chart carefully and recognize, using context, where substitutions have occurred

## 2020-06-26 NOTE — ASSESSMENT & PLAN NOTE
Lab Results   Component Value Date    HGBA1C 7 9 (H) 03/28/2020       No results for input(s): POCGLU in the last 72 hours  Blood Sugar Average: Last 72 hrs:     · Hold home sliding scale insulin  · Continue lantus at reduced dose of 5 units HS as patient is currently NPO  · Start Q 6 hour sliding scale insulin coverage     · Once tolerating diet - resume lantus 10 units HS and change sliding scale to ACHS

## 2020-06-26 NOTE — ASSESSMENT & PLAN NOTE
Lab Results   Component Value Date    HGBA1C 7 9 (H) 03/28/2020       Recent Labs     06/26/20  1317   POCGLU 169*       Blood Sugar Average: Last 72 hrs:  (P) 169   · Hold home sliding scale insulin  · Diabetic diet  · Resume lantus 8 units HS and change sliding scale to ACHS

## 2020-06-26 NOTE — ASSESSMENT & PLAN NOTE
· Baseline creatinine 1 4 - 1 6  · Patient had acute kidney injury on previous admission with peak creatinine 2 6  Discharge creatinine was 1 66 on 06/19/20  · Patient did follow up with [facility] via telemedicine visit on 06/22/20    · Admission creatinine 1 69  · Trend BUN and Creatinine  · Monitor I&O  · Received lasix 60 mg IV in ED  · Hold lisinopril today, plan to restart tomorrow if creatinine remains stable while duiresing

## 2020-06-26 NOTE — ASSESSMENT & PLAN NOTE
· Patient takes revealed statin 10 mg at home  · Will use pharmacy formulary substitute of atorvastatin 80 mg daily

## 2020-06-26 NOTE — PLAN OF CARE
Problem: Potential for Falls  Goal: Patient will remain free of falls  Description  INTERVENTIONS:  - Assess patient frequently for physical needs  -  Identify cognitive and physical deficits and behaviors that affect risk of falls  -  [place] fall precautions as indicated by assessment   - Educate patient/family on patient safety including physical limitations  - Instruct patient to call for assistance with activity based on assessment  - Modify environment to reduce risk of injury  - Consider OT/PT consult to assist with strengthening/mobility  Outcome: Progressing     Problem: NEUROSENSORY - ADULT  Goal: Achieves stable or improved neurological status  Description  INTERVENTIONS  - Monitor and report changes in neurological status  - Monitor vital signs such as temperature, blood pressure, glucose, and any other labs ordered   - Initiate measures to prevent increased intracranial pressure  - Monitor for seizure activity and implement precautions if appropriate      Outcome: Progressing  Goal: Achieves maximal functionality and self care  Description  INTERVENTIONS  - Monitor swallowing and airway patency with patient fatigue and changes in neurological status  - Encourage and assist patient to increase activity and self care     - Encourage visually impaired, hearing impaired and aphasic patients to use assistive/communication devices  Outcome: Progressing     Problem: CARDIOVASCULAR - ADULT  Goal: Maintains optimal cardiac output and hemodynamic stability  Description  INTERVENTIONS:  - Monitor I/O, vital signs and rhythm  - Monitor for S/S and trends of decreased cardiac output  - Administer and titrate ordered vasoactive medications to optimize hemodynamic stability  - Assess quality of pulses, skin color and temperature  - Assess for signs of decreased coronary artery perfusion  - Instruct patient to report change in severity of symptoms  Outcome: Progressing  Goal: Absence of cardiac dysrhythmias or at baseline rhythm  Description  INTERVENTIONS:  - Continuous cardiac monitoring, vital signs, obtain 12 lead EKG if ordered  - Administer antiarrhythmic and heart rate control medications as ordered  - Monitor electrolytes and administer replacement therapy as ordered  Outcome: Progressing     Problem: RESPIRATORY - ADULT  Goal: Achieves optimal ventilation and oxygenation  Description  INTERVENTIONS:  - Assess for changes in respiratory status  - Assess for changes in mentation and behavior  - Position to facilitate oxygenation and minimize respiratory effort  - Oxygen administered by appropriate delivery if ordered  - Initiate smoking cessation education as indicated  - Encourage broncho-pulmonary hygiene including cough, deep breathe, Incentive Spirometry  - Assess the need for suctioning and aspirate as needed  - Assess and instruct to report SOB or any respiratory difficulty  - Respiratory Therapy support as indicated  Outcome: Progressing     Problem: GASTROINTESTINAL - ADULT  Goal: Minimal or absence of nausea and/or vomiting  Description  INTERVENTIONS:  - Administer IV fluids if ordered to ensure adequate hydration  - Maintain NPO status until nausea and vomiting are resolved  - Nasogastric tube if ordered  - Administer ordered antiemetic medications as needed  - Provide nonpharmacologic comfort measures as appropriate  - Advance diet as tolerated, if ordered  - Consider nutrition services referral to assist patient with adequate nutrition and appropriate food choices  Outcome: Progressing  Goal: Maintains or returns to baseline bowel function  Description  INTERVENTIONS:  - Assess bowel function  - Encourage oral fluids to ensure adequate hydration  - Administer IV fluids if ordered to ensure adequate hydration  - Administer ordered medications as needed  - Encourage mobilization and activity  - Consider nutritional services referral to assist patient with adequate nutrition and appropriate food choices  Outcome: Progressing  Goal: Maintains adequate nutritional intake  Description  INTERVENTIONS:  - Monitor percentage of each meal consumed  - Identify factors contributing to decreased intake, treat as appropriate  - Assist with meals as needed  - Monitor I&O, weight, and lab values if indicated  - Obtain nutrition services referral as needed  Outcome: Progressing     Problem: GENITOURINARY - ADULT  Goal: Maintains or returns to baseline urinary function  Description  INTERVENTIONS:  - Assess urinary function  - Encourage oral fluids to ensure adequate hydration if ordered  - Administer IV fluids as ordered to ensure adequate hydration  - Administer ordered medications as needed  - Offer frequent toileting  - Follow urinary retention protocol if ordered  Outcome: Progressing  Goal: Absence of urinary retention  Description  INTERVENTIONS:  - Assess patients ability to void and empty bladder  - Monitor I/O  - Bladder scan as needed  - Discuss with physician/AP medications to alleviate retention as needed  - Discuss catheterization for long term situations as appropriate  Outcome: Progressing     Problem: METABOLIC, FLUID AND ELECTROLYTES - ADULT  Goal: Electrolytes maintained within normal limits  Description  INTERVENTIONS:  - Monitor labs and assess patient for signs and symptoms of electrolyte imbalances  - Administer electrolyte replacement as ordered  - Monitor response to electrolyte replacements, including repeat lab results as appropriate  - Instruct patient on fluid and nutrition as appropriate  Outcome: Progressing  Goal: Fluid balance maintained  Description  INTERVENTIONS:  - Monitor labs   - Monitor I/O and WT  - Instruct patient on fluid and nutrition as appropriate  - Assess for signs & symptoms of volume excess or deficit  Outcome: Progressing  Goal: Glucose maintained within target range  Description  INTERVENTIONS:  - Monitor Blood Glucose as ordered  - Assess for signs and symptoms of hyperglycemia and hypoglycemia  - Administer ordered medications to maintain glucose within target range  - Assess nutritional intake and initiate nutrition service referral as needed  Outcome: Progressing     Problem: SKIN/TISSUE INTEGRITY - ADULT  Goal: Skin integrity remains intact  Description  INTERVENTIONS  - Identify patients at risk for skin breakdown  - Assess and monitor skin integrity  - Assess and monitor nutrition and hydration status  - Monitor labs (i e  albumin)  - Assess for incontinence   - Turn and reposition patient  - Assist with mobility/ambulation  - Relieve pressure over bony prominences  - Avoid friction and shearing  - Provide appropriate hygiene as needed including keeping skin clean and dry  - Evaluate need for skin moisturizer/barrier cream  - Collaborate with interdisciplinary team (i e  Nutrition, Rehabilitation, etc )   - Patient/family teaching  Outcome: Progressing  Goal: Oral mucous membranes remain intact  Description  INTERVENTIONS  - Assess oral mucosa and hygiene practices  - Implement preventative oral hygiene regimen  - Implement oral medicated treatments as ordered  - Initiate Nutrition services referral as needed  Outcome: Progressing     Problem: HEMATOLOGIC - ADULT  Goal: Maintains hematologic stability  Description  INTERVENTIONS  - Assess for signs and symptoms of bleeding or hemorrhage  - Monitor labs  - Administer supportive blood products/factors as ordered and appropriate  Outcome: Progressing     Problem: MUSCULOSKELETAL - ADULT  Goal: Maintain or return mobility to safest level of function  Description  INTERVENTIONS:  - Assess patient's ability to carry out ADLs; assess patient's baseline for ADL function and identify physical deficits which impact ability to perform ADLs (bathing, care of mouth/teeth, toileting, grooming, dressing, etc )  - Assess/evaluate cause of self-care deficits   - Assess range of motion  - Assess patient's mobility  - Assess patient's need for assistive devices and provide as appropriate  - Encourage maximum independence but intervene and supervise when necessary  - Involve family in performance of ADLs  - Assess for home care needs following discharge   - Consider OT consult to assist with ADL evaluation and planning for discharge  - Provide patient education as appropriate  Outcome: Progressing     Problem: Prexisting or High Potential for Compromised Skin Integrity  Goal: Skin integrity is maintained or improved  Description  INTERVENTIONS:  - Identify patients at risk for skin breakdown  - Assess and monitor skin integrity  - Assess and monitor nutrition and hydration status  - Monitor labs   - Assess for incontinence   - Turn and reposition patient  - Assist with mobility/ambulation  - Relieve pressure over bony prominences  - Avoid friction and shearing  - Provide appropriate hygiene as needed including keeping skin clean and dry  - Evaluate need for skin moisturizer/barrier cream  - Collaborate with interdisciplinary team   - Patient/family teaching  - Consider wound care consult   Outcome: Progressing

## 2020-06-26 NOTE — ASSESSMENT & PLAN NOTE
· Patient presented with shortness of breath which began upon waking up this a m  · Hypoxic per ECF; approximately 70% on 2 L nasal cannula  Upon EMS arrival, they reported patient's oxygen saturations were up to 90% on 2 L nasal cannula  · Patient wears 2 L nasal cannula baseline  · Initial exam by ED - limited air movement, wheezes, crackles, increased work of breathing  · Patient was placed on non-rebreather initially and then transition to BiPAP for work of breathing  · ED course included DuoNeb treatment, magnesium 1 g IV and Solu-Medrol 80 mg IV  · Upon arrival to ICU patient was trialed off of BiPAP  Tolerating 4 L nasal cannula at this time    · Continue respiratory treatments - DuoNebs and budenoside  · Respiratory and airway clearance protocols  · Incentive spirometry - 250 ml  · Continue Clarinex, Flonase  · Continue solumedrol 40 mg IV (5 days total of steroids)  · No indication for antibiotics at this time

## 2020-06-26 NOTE — ASSESSMENT & PLAN NOTE
Wt Readings from Last 3 Encounters:   06/26/20 70 kg (154 lb 5 2 oz)   06/22/20 68 9 kg (152 lb)   06/19/20 66 4 kg (146 lb 6 2 oz)     · Patient had recent admission to [address] [place] [address] (06/12/20 to 06/19/20) at which time she was treated for CHF exacerbation  · NOLAN 06/12/20 - "Akinesis of the basal to mid inferior and the inferolateral walls  The ventricle was dilated  Systolic function was moderately reduced  EF 35 % to 40 %  There was assymetrical hypertrophy of the septum  Normal size and systolic function of right ventricle  Left atrium moderately dilated  Mild mitral valve regurgitation  Mild tricuspid valve regurgitation  · TTE report in care everywhere from 03/30/20 - "Left ventricle is mildly dilated  Severely reduced left ventricular systolic function  Basal to mid inferior and inferolateral akinesis  Mild concentric left ventricular hypertrophy  EF 30%  Normal right ventricular size  Reduced right ventricular systolic function  Severely dilated left atrium  Diastolic dysfunction with elevated filling pressures "  · CXR and CT scan of chest showed pulmonary edema  · Pro NT-BNP 6956  Previously was 12,600 on 06/12 during last admission    · Takes lasix 40 mg daily PO at ECF  · Lasix 60 mg IV given in ED, will evaluate response and given lasix prn  · Goal net fluid balance -1 liter  · Daily weights  · Monitor I&O

## 2020-06-26 NOTE — ED NOTES
Patient asked ot provide urine specimen and placed on bedpan  Patient voided a very small amount (a few drops) not enough to send for a UA  Patient tolerated the bedpan well        Corey Steve RN  06/26/20 1337

## 2020-06-26 NOTE — RESPIRATORY THERAPY NOTE
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[facility])    CKD (chronic kidney disease) stage 4, GFR 15-29 ml/min ([facility])    Type 2 diabetes mellitus with stage 3 chronic kidney disease, with long-term current use of insulin ([facility])    A-fib ([facility])    Anemia      Home Pulmonary Medications:  Advair, Duoneb  Home Devices/Therapy: Home O2(2LPM NC)    Past Medical History:   Diagnosis Date    NANCY (acute kidney injury) ([facility]     Atrial fibrillation ([facility]     CHF (congestive heart failure) ([facility])     COPD (chronic obstructive pulmonary disease) ([facility] [facility]     Diabetes mellitus ([facility]     Trigger finger of thumb     last assessed: 13     Social History     Socioeconomic History    Marital status:       Spouse name: None    Number of children: None    Years of education: None    Highest education level: None   Occupational History    None   Social Needs    Financial resource strain: None    Food insecurity:     Worry: Never true     Inability: Never true    Transportation needs:     Medical: No     Non-medical: No   Tobacco Use    Smoking status: Former Smoker     Last attempt to quit: 2011     Years since quittin 0    Smokeless tobacco: Never Used    Tobacco comment: Former smoker per allscripts   Substance and Sexual Activity    Alcohol use: Not Currently    Drug use: No    Sexual activity: None   Lifestyle    Physical activity:     Days per week: None     Minutes per session: None    Stress: None   Relationships    Social connections:     Talks on phone: None     Gets together: None     Attends Hoahaoism service: None     Active member of club or organization: None     Attends meetings of clubs or organizations: None     Relationship status: None    Intimate partner violence:     Fear of current or ex partner: None     Emotionally abused: None     Physically abused: None     Forced sexual activity: None   Other Topics Concern    None   Social History Narrative    None       Subjective    Subjective Data: Pt states she feels much better now  Objective    Physical Exam:   Assessment Type: Assess only  General Appearance: Alert, Awake  Respiratory Pattern: Shallow  Chest Assessment: Chest expansion symmetrical  Bilateral Breath Sounds: Diminished  Cough: Non-productive, Dry  O2 Device: 4LPM NC    Vitals:  Blood pressure 163/71, pulse 62, temperature 97 8 °F (36 6 °C), temperature source Temporal, resp  rate 20, weight 70 kg (154 lb 5 2 oz), SpO2 97 %  Results from last 7 days   Lab Units 06/26/20  0913   PH ART  7 334*   PCO2 ART mm Hg 50 0*   PO2 ART mm Hg 121 5   HCO3 ART mmol/L 26 0   BASE EXC ART mmol/L -0 2   O2 CONTENT ART mL/dL 14 0*   O2 HGB, ARTERIAL % 97 3*   ABG SOURCE  Radial, Left   GARDENIA TEST  Yes       Imaging and other studies: I have personally reviewed pertinent reports  O2 Device: 4LPM NC     Plan    Respiratory Plan: Moderate/Severe Distress pathway  Airway Clearance Plan: Incentive Spirometer     Resp Comments: Pt from ER to ICU 3 via bipap  Once in room pt taken off bipap and placed on 4LPM NC and resting comfortably  Pt takes Chestine Hones and wears 2LPM NC at NH  Pt ordered Q6 UDN tx and instructed on IS  Will continue to monitor

## 2020-06-26 NOTE — ASSESSMENT & PLAN NOTE
· Likely multifactorial - acute on chronic combined systolic and diastolic congestive heart failure, COPD, bilateral pleural effusions  · Patient states she woke up feeling short of breath  · ECF reported patients oxygen saturations were reading in the 70s on baseline 2 liters nasal cannula  When EMS arrived, they report that patient was 90% on 2 liters nasal cannula and had increased work of breathing  · Patient was tachypneic, increased work of breathing and was wheezing per ER MD  Patient was given solumedrol 80 mg IV, duoneb treatment and magnesium 1 gram IV  Initially was placed on NRB mask however continued to have increased work of breathing and was transitioned to BiPAP which she improved on    · Trial off BiPAP after to ICU - tolerating nasal cannula 4 liters  · Pro NT-BNP 6956, previous was 12,600 on 06/12  · CT chest showed pulmonary edema, moderate right pleural effusion, small left pleural effusion

## 2020-06-26 NOTE — ASSESSMENT & PLAN NOTE
· History of hypertension  · Patient takes amlodipine, carvedilol, lisinopril - will continue all upon admission with exception of lisinopril

## 2020-06-26 NOTE — ED PROVIDER NOTES
History  Chief Complaint   Patient presents with    Shortness of Breath     found by staff from nursing facility with low oxygen level (in the 70's) called EMS on 2 lpm patient sat increased  to low 90's, patient dry heaving during breathing treatment  pt 96% on 2 L upon arrival  +nausea     82-year-old female with a past medical history of congestive heart failure on Lasix, atrial fibrillation on Eliquis status post pacemaker, COPD, coronary artery disease status post CABG, diabetes, NANCY, urinary tract infection, anemia, pancytopenia presents with one hour of sudden-onset shortness of breath  Patient was brought in via EMS from [facility]  She was recently discharged from [address] [facility] on June 19th for congestive heart failure exacerbation  Patient initially presented on June 12th with hypoxia and was on BiPAP and admitted to the ICU  She was also found to have a urinary tract infection and was placed on vancomycin and cefepime  Patient wears 2 L nasal cannula at baseline  Recent echo on March 28, 2020 showed EF of 30%  Patient also complaining of a dry cough today but denies fever, chills, headache, myalgias, nausea, vomiting, chest pain, sputum production, back pain, rash, abdominal pain, urinary symptoms or diarrhea  Per EMS, they were informed by nursing facility staff that patient was satting in the 76s  When they arrived, she was 90% on 2 L nasal cannula  Patient received half of a DuoNeb enroute but was not tolerating it, however, her pulse ox improved to 94% prior to ED arrival   Patient is not complaining of lower extremity edema and is typically on Lasix  Patient is alert and oriented upon arrival and appears to be in moderate respiratory distress  Dr Carlos Matta wore full PPE during clinical evaluation including Bonnet, face mask, eye goggles, gown and gloves        Shortness of Breath   Severity:  Moderate  Onset quality:  Sudden  Duration:  1 hour  Timing: Constant  Progression:  Worsening  Chronicity:  Recurrent  Context: not activity, not animal exposure, not emotional upset, not fumes, not known allergens, not occupational exposure, not pollens, not smoke exposure, not strong odors, not URI and not weather changes    Relieved by:  Oxygen  Worsened by: Activity, coughing, deep breathing and stress  Ineffective treatments:  Inhaler  Associated symptoms: cough and wheezing    Associated symptoms: no abdominal pain, no chest pain, no claudication, no diaphoresis, no ear pain, no fever, no headaches, no hemoptysis, no neck pain, no PND, no rash, no sore throat, no sputum production, no syncope, no swollen glands and no vomiting    Cough:     Cough characteristics:  Non-productive    Sputum characteristics:  Nondescript    Severity:  Mild    Onset quality:  Unable to specify    Chronicity:  Recurrent  Wheezing:     Severity:  Moderate    Onset quality:  Unable to specify    Timing:  Unable to specify    Progression:  Worsening    Chronicity:  Recurrent  Risk factors: prolonged immobilization    Risk factors: no recent alcohol use, no family hx of DVT, no hx of cancer, no hx of PE/DVT, no obesity, no oral contraceptive use, no recent surgery and no tobacco use        Prior to Admission Medications   Prescriptions Last Dose Informant Patient Reported? Taking?    ELIQUIS 2 5 MG   Yes No   Sig: Take 2 5 mg by mouth 2 (two) times a day   Ferrous Fumarate 324 (106 Fe) MG TABS  Self Yes No   Sig: Take 1 tablet by mouth daily   Insulin Pen Needle (PEN NEEDLES) 32G X 4 MM MISC   No No   Sig: by Does not apply route 3 (three) times a day   ONETOUCH DELICA LANCETS 35Q MISC   No No   Sig: Inject under the skin 4 (four) times a day   albuterol (PROVENTIL HFA,VENTOLIN HFA) 90 mcg/act inhaler   Yes No   Sig: Inhale 2 puffs every 4 (four) hours as needed   amLODIPine (NORVASC) 5 mg tablet   No No   Sig: Take 1 tablet (5 mg total) by mouth daily   carvedilol (COREG) 3 125 mg tablet   No No   Sig: Take 0 5 tablets (1 5625 mg total) by mouth 2 (two) times a day   desloratadine (Clarinex) 5 MG tablet   No No   Sig: Take 1 tablet (5 mg total) by mouth daily as needed (Seasonal allergy)   diclofenac sodium (VOLTAREN) 1 %   Yes No   Sig: APPLY AS DIRECTED 4 TIMES A DAY AS NEEDED FOR PAIN:   docusate sodium (COLACE) 100 mg capsule  Self Yes No   Sig: Take 100 mg by mouth 2 (two) times a day   fluticasone (FLONASE) 50 mcg/act nasal spray   No No   Si sprays into each nostril daily   fluticasone-salmeterol (ADVAIR, WIXELA) 250-50 mcg/dose inhaler   No No   Sig: Inhale 1 puff every 12 (twelve) hours Rinse mouth after use  furosemide (LASIX) 20 mg tablet   No No   Sig: Take 2 tablets (40 mg total) by mouth daily   glucose blood test strip   No No   Sig: Use as instructed   insulin glargine (Lantus SoloStar) 100 units/mL injection pen   No No   Sig: Inject 10 Units under the skin daily at bedtime   insulin regular (NOVOLIN R) 100 units/mL injection   No No   Sig: Follow sliding scale  200-250, 2 units  251-300, 4 units  301-350, 6 units  351-400, 8 units   ipratropium-albuterol (DUO-NEB) 0 5-2 5 mg/3 mL nebulizer solution  Self Yes No   Sig: Inhale 1 each every 4 (four) hours as needed   lisinopril (ZESTRIL) 2 5 mg tablet   Yes No   Sig: Take 2 5 mg by mouth daily   nitroglycerin (NITROSTAT) 0 4 mg SL tablet   Yes No   Sig: PLACE 1 TABLET UNDER THE TONGUE AS NEEDED FOR CHEST PAIN MAY      (REFER TO PRESCRIPTION NOTES)     pantoprazole (PROTONIX) 40 mg tablet   No No   Sig: TAKE 1 TABLET DAILY   rosuvastatin (CRESTOR) 10 MG tablet   No No   Sig: Take 1 tablet (10 mg total) by mouth daily at bedtime   selenium sulfide (SELSUN) 2 5 % shampoo  Self No No   Sig: Apply topically daily as needed for dandruff   sucralfate (CARAFATE) 1 g/10 mL suspension   Yes No   Sig: Take 1 g by mouth daily at bedtime      Facility-Administered Medications: None       Past Medical History:   Diagnosis Date    NANCY (acute kidney injury) ([facility] )     Atrial fibrillation ([facility] )     [facility]     COPD (chronic obstructive pulmonary disease) ([facility])     Diabetes mellitus ([facility] )     Trigger finger of thumb     last assessed: 13       Past Surgical History:   Procedure Laterality Date    APPENDECTOMY      CARDIAC PACEMAKER PLACEMENT      CARDIAC PACEMAKER PLACEMENT      CHOLECYSTECTOMY      COLONOSCOPY      Complete    CORONARY ANGIOPLASTY WITH STENT PLACEMENT      CORONARY ARTERY BYPASS GRAFT      CORONARY ARTERY BYPASS GRAFT      ESOPHAGOGASTRODUODENOSCOPY      Diagnostic    HERNIA REPAIR      TOTAL ABDOMINAL HYSTERECTOMY      with removal of both ovaries       Family History   Problem Relation Age of Onset    Lung disease Mother         black     Breast cancer Mother     Lung disease Father         black      I have reviewed and agree with the history as documented  E-Cigarette/Vaping    E-Cigarette Use Former User     Quit Date 11      E-Cigarette/Vaping Substances     Social History     Tobacco Use    Smoking status: Former Smoker     Last attempt to quit: 2011     Years since quittin 0    Smokeless tobacco: Never Used    Tobacco comment: Former smoker per [employer]scripts   Substance Use Topics    Alcohol use: Not Currently    Drug use: No       Review of Systems   Constitutional: Negative for chills, diaphoresis, fatigue and fever  HENT: Negative for congestion, drooling, ear pain, facial swelling, nosebleeds, sneezing, sore throat, trouble swallowing and voice change  Eyes: Negative for photophobia, pain and visual disturbance  Respiratory: Positive for cough, shortness of breath and wheezing  Negative for hemoptysis, sputum production and chest tightness  Cardiovascular: Negative for chest pain, palpitations, claudication, leg swelling, syncope and PND  Gastrointestinal: Negative for abdominal pain, constipation, diarrhea, nausea and vomiting  Genitourinary: Negative for decreased urine volume, difficulty urinating, dysuria, flank pain, frequency, hematuria and urgency  Musculoskeletal: Negative for arthralgias, back pain, myalgias, neck pain and neck stiffness  Skin: Negative for color change, pallor, rash and wound  Allergic/Immunologic: Negative for immunocompromised state  Neurological: Negative for dizziness, tremors, seizures, syncope, facial asymmetry, speech difficulty, weakness, light-headedness, numbness and headaches  Hematological: Negative for adenopathy  Psychiatric/Behavioral: Negative for agitation, confusion, hallucinations and suicidal ideas  The patient is not nervous/anxious  Physical Exam  Physical Exam   Constitutional: She is oriented to person, place, and time  She appears well-developed and well-nourished  She is cooperative  Non-toxic appearance  She does not have a sickly appearance  She does not appear ill  She appears distressed  HENT:   Head: Normocephalic and atraumatic  Right Ear: Hearing, tympanic membrane, external ear and ear canal normal    Left Ear: Hearing, tympanic membrane, external ear and ear canal normal    Nose: Nose normal  Right sinus exhibits no maxillary sinus tenderness and no frontal sinus tenderness  Left sinus exhibits no maxillary sinus tenderness and no frontal sinus tenderness  Mouth/Throat: Uvula is midline, oropharynx is clear and moist and mucous membranes are normal  No oropharyngeal exudate, posterior oropharyngeal edema, posterior oropharyngeal erythema or tonsillar abscesses  Eyes: Pupils are equal, round, and reactive to light  Conjunctivae, EOM and lids are normal    Neck: Trachea normal, normal range of motion, full passive range of motion without pain and phonation normal  Neck supple  JVD present  No thyroid mass and no thyromegaly present     Cardiovascular: Normal rate, regular rhythm, S1 normal, S2 normal, normal heart sounds, intact distal pulses and normal pulses  Pulses:       Radial pulses are 2+ on the right side, and 2+ on the left side  Dorsalis pedis pulses are 2+ on the right side, and 2+ on the left side  Pulmonary/Chest: Effort normal  No accessory muscle usage or stridor  Tachypnea noted  No respiratory distress  She has decreased breath sounds in the right upper field, the right middle field, the right lower field, the left upper field, the left middle field and the left lower field  She has wheezes in the right upper field, the right middle field, the left upper field and the left middle field  She has no rhonchi  She has no rales  She exhibits no mass, no tenderness, no deformity and no retraction  Moderate respiratory distress, increased work of breathing, mild retractions   Abdominal: Soft  Bowel sounds are normal  She exhibits no distension and no mass  There is no hepatosplenomegaly or splenomegaly  There is no tenderness  There is no rigidity, no rebound, no guarding, no CVA tenderness, no tenderness at McBurney's point and negative Padilla's sign  No hernia  Musculoskeletal: Normal range of motion  She exhibits no edema, tenderness or deformity  Right lower leg: Normal  She exhibits no tenderness and no edema  Left lower leg: Normal  She exhibits no tenderness and no edema  Lymphadenopathy:     She has no cervical adenopathy  Neurological: She is alert and oriented to person, place, and time  She has normal strength  She is not disoriented  She displays no atrophy and no tremor  No cranial nerve deficit or sensory deficit  She exhibits normal muscle tone  She displays a negative Romberg sign  She displays no seizure activity  Coordination and gait normal  GCS eye subscore is 4  GCS verbal subscore is 5  GCS motor subscore is 6     Patient is AAOx4, GCS 15; speaking clearly and appropriately; motor and sensation intact; visual fields intact; cranial nerves II-XII grossly intact; no facial droop, slurred speech or arm drift   Skin: Skin is warm, dry and intact  Capillary refill takes less than 2 seconds  No abrasion, no bruising, no burn, no ecchymosis, no laceration, no lesion, no petechiae and no rash noted  Rash is not maculopapular  She is not diaphoretic  No cyanosis or erythema  There is pallor  Nails show no clubbing  Psychiatric: Her speech is normal and behavior is normal  Judgment and thought content normal  Her mood appears anxious  She is not actively hallucinating  Cognition and memory are normal  She is attentive  Nursing note and vitals reviewed        Vital Signs  ED Triage Vitals   Temperature Pulse Respirations Blood Pressure SpO2   06/26/20 0819 06/26/20 0819 06/26/20 0819 06/26/20 0819 06/26/20 0815   97 8 °F (36 6 °C) 81 (!) 28 137/71 92 %      Temp Source Heart Rate Source Patient Position - Orthostatic VS BP Location FiO2 (%)   06/26/20 0819 06/26/20 0819 06/26/20 0819 06/26/20 0819 --   Temporal Monitor Lying Right arm       Pain Score       06/26/20 1116       No Pain           Vitals:    06/26/20 0900 06/26/20 0930 06/26/20 1051 06/26/20 1100   BP: 152/83 152/83 158/68 148/63   Pulse: 61 65 61 60   Patient Position - Orthostatic VS:   Lying          Visual Acuity      ED Medications  Medications   sodium chloride (PF) 0 9 % injection 3 mL (has no administration in time range)   ipratropium-albuterol (DUO-NEB) 0 5-2 5 mg/3 mL inhalation solution 3 mL (3 mL Nebulization Given 6/26/20 0850)   methylPREDNISolone sodium succinate (Solu-MEDROL) injection 80 mg (80 mg Intravenous Given 6/26/20 0852)   magnesium sulfate IVPB (premix) SOLN 1 g (0 g Intravenous Stopped 6/26/20 1048)   sodium chloride 0 9 % bolus 500 mL (0 mL Intravenous Stopped 6/26/20 1051)   furosemide (LASIX) injection 60 mg (60 mg Intravenous Given 6/26/20 1047)       Diagnostic Studies  Results Reviewed     Procedure Component Value Units Date/Time    Novel Coronavirus [facility] [[id number]]  (Normal) Collected:  06/26/20 0825 Lab Status:  Final result Specimen:  Nares from Nose Updated:  06/26/20 0926     SARS-CoV-2 Negative    Narrative: The specimen collection materials, transport medium, and/or testing methodology utilized in the production of these test results have been proven to be reliable in a limited validation with an abbreviated program under the Emergency Utilization Authorization provided by the FDA  Testing reported as "Presumptive positive" will be confirmed with secondary testing with a reference laboratory to ensure result accuracy  Clinical caution and judgement should be used with the interpretation of these results with consideration of the clinical impression and other laboratory testing  Testing reported as "Positive" or "Negative" has been proven to be accurate according to standard laboratory validation requirements  All testing is performed with control materials showing appropriate reactivity at standard intervals        NT-BNP PRO [[id number]]  (Abnormal) Collected:  06/26/20 0844    Lab Status:  Final result Specimen:  Blood from Arm, Right Updated:  06/26/20 0924     NT-proBNP 6,956 pg/mL     Comprehensive metabolic panel [[id number]]  (Abnormal) Collected:  06/26/20 0844    Lab Status:  Final result Specimen:  Blood from Arm, Right Updated:  06/26/20 0924     Sodium 143 mmol/L      Potassium 5 6 mmol/L      Chloride 107 mmol/L      CO2 30 mmol/L      ANION GAP 6 mmol/L      BUN 48 mg/dL      Creatinine 1 69 mg/dL      Glucose 129 mg/dL      Calcium 8 2 mg/dL      AST 30 U/L      ALT 26 U/L      Alkaline Phosphatase 110 U/L      Total Protein 6 9 g/dL      Albumin 2 9 g/dL      Total Bilirubin 0 30 mg/dL      eGFR 28 ml/min/1 73sq m     Narrative:       [place]asha guidelines for Chronic Kidney Disease (CKD):     Stage 1 with normal or high GFR (GFR > 90 mL/min/1 73 square meters)    Stage 2 Mild CKD (GFR = 60-89 mL/min/1 73 square meters)    Stage 3A Moderate CKD (GFR = 45-59 mL/min/1 73 square meters)    Stage 3B Moderate CKD (GFR = 30-44 mL/min/1 73 square meters)    Stage 4 Severe CKD (GFR = 15-29 mL/min/1 73 square meters)    Stage 5 End Stage CKD (GFR <15 mL/min/1 73 square meters)  Note: GFR calculation is accurate only with a steady state creatinine    Lipase [[id number]]  (Normal) Collected:  06/26/20 0844    Lab Status:  Final result Specimen:  Blood from Arm, Right Updated:  06/26/20 0924     Lipase 122 u/L     CK (with reflex to MB) [[id number]]  (Normal) Collected:  06/26/20 0844    Lab Status:  Final result Specimen:  Blood from Arm, Right Updated:  06/26/20 0924     Total  U/L     Magnesium [[id number]]  (Normal) Collected:  06/26/20 0844    Lab Status:  Final result Specimen:  Blood from Arm, Right Updated:  06/26/20 0924     Magnesium 2 2 mg/dL     TSH, 3rd generation [[id number]]  (Abnormal) Collected:  06/26/20 0844    Lab Status:  Final result Specimen:  Blood from Arm, Right Updated:  06/26/20 0924     TSH 3RD GENERATON 3 971 uIU/mL     Narrative:       Patients undergoing fluorescein dye angiography may retain small amounts of fluorescein in the body for 48-72 hours post procedure  Samples containing fluorescein can produce falsely depressed TSH values  If the patient had this procedure,a specimen should be resubmitted post fluorescein clearance  Lactic acid, plasma [[id number]]  (Normal) Collected:  06/26/20 0844    Lab Status:  Final result Specimen:  Blood from Arm, Right Updated:  06/26/20 0919     LACTIC ACID 0 9 mmol/L     Narrative:       Result may be elevated if tourniquet was used during collection      Blood gas, arterial [[id number]]  (Abnormal) Collected:  06/26/20 0913    Lab Status:  Final result Specimen:  Blood, Arterial from Radial, Left Updated:  06/26/20 0919     pH, Arterial 7 334     pCO2, Arterial 50 0 mm Hg      pO2, Arterial 121 5 mm Hg      HCO3, Arterial 26 0 mmol/L      Base Excess, Arterial -0 2 mmol/L      O2 Content, Arterial 14 0 mL/dL      O2 HGB,Arterial  97 3 %      SOURCE Radial, Left     GARDENIA TEST Yes     Nasal Cannula 6    Troponin I repeat in 3 hrs [[id number]]  (Normal) Collected:  06/26/20 0844    Lab Status:  Final result Specimen:  Blood from Arm, Right Updated:  06/26/20 0918     Troponin I 0 04 ng/mL     D-dimer, quantitative [[id number]]  (Abnormal) Collected:  06/26/20 0844    Lab Status:  Final result Specimen:  Blood from Arm, Right Updated:  06/26/20 0914     D-Dimer, Quant 2 17 ug/ml FEU     Protime-INR [[id number]]  (Abnormal) Collected:  06/26/20 0844    Lab Status:  Final result Specimen:  Blood from Arm, Right Updated:  06/26/20 0910     Protime 14 7 seconds      INR 1 14    CBC and differential [[id number]]  (Abnormal) Collected:  06/26/20 0844    Lab Status:  Final result Specimen:  Blood from Arm, Right Updated:  06/26/20 0852     WBC 9 99 Thousand/uL      RBC 3 19 Million/uL      Hemoglobin 9 3 g/dL      Hematocrit 31 1 %      MCV 98 fL      MCH 29 2 pg      MCHC 29 9 g/dL      RDW 14 2 %      MPV 12 4 fL      Platelets 100 Thousands/uL      nRBC 0 /100 WBCs      Neutrophils Relative 74 %      Immat GRANS % 1 %      Lymphocytes Relative 18 %      Monocytes Relative 7 %      Eosinophils Relative 0 %      Basophils Relative 0 %      Neutrophils Absolute 7 38 Thousands/µL      Immature Grans Absolute 0 05 Thousand/uL      Lymphocytes Absolute 1 79 Thousands/µL      Monocytes Absolute 0 71 Thousand/µL      Eosinophils Absolute 0 03 Thousand/µL      Basophils Absolute 0 03 Thousands/µL     Blood culture #1 [[id number]] Collected:  06/26/20 0844    Lab Status: In process Specimen:  Blood from Arm, Left Updated:  06/26/20 0849    Blood culture #2 [[id number]] Collected:  06/26/20 0844    Lab Status:   In process Specimen:  Blood from Arm, Right Updated:  06/26/20 0848    UA w Reflex to Microscopic w Reflex to Culture [[id number]]     Lab Status:  No result Specimen:  Urine, Clean Catch                  NM lung perfusion imaging (particulate)   Final Result by Damon Hood MD (06/26 4493)      The probability for pulmonary embolus is likely very low  Workstation performed: [id number]         X-ray chest 1 view portable   Final Result by Hernan La MD (06/26 2413)      Mild pulmonary vascular congestion with increase pleural-parenchymal density at the left lung base as well as stable small effusion on the right  Findings may be related to edema although pneumonia is possible  In the setting of clinically suspected/proven COVID-19, this plain film appearance does not contain findings that raise concern for viral pneumonia such as COVID-19, but does not rule out this diagnosis  Workstation performed: [id number]         CT chest without contrast    (Results Pending)              Procedures  ECG 12 Lead Documentation Only  Date/Time: 6/26/2020 8:23 AM  Performed by: Guadalupe Hoyos MD  Authorized by: Guadalupe Hoyos MD     Indications / Diagnosis:  SOB  ECG reviewed by me, the ED Provider: yes    Patient location:  ED  Previous ECG:     Comparison to cardiac monitor: Yes    Interpretation:     Interpretation: abnormal    Rate:     ECG rate:  65bpm    ECG rate assessment: normal    Rhythm:     Rhythm: paced    Pacing:     Capture:  Complete  Ectopy:     Ectopy: none    QRS:     QRS axis:  Right  Conduction:     Conduction: normal    ST segments:     ST segments:  Normal  T waves:     T waves: flattening      Flattening:  I and aVL  Comments:      No STEMI  MT 256ms  QRS 86ms  QT 532ms    CriticalCare Time  Performed by: Guadalupe Hoyos MD  Authorized by:  Guadalupe Hoyos MD     Critical care provider statement:     Critical care time (minutes):  30    Critical care was necessary to treat or prevent imminent or life-threatening deterioration of the following conditions:  Respiratory failure    Critical care was time spent personally by me on the following activities:  Development of treatment plan with patient or surrogate, discussions with consultants, evaluation of patient's response to treatment, examination of patient, ordering and review of laboratory studies, ordering and review of radiographic studies and re-evaluation of patient's condition  Comments:      BiPAP             ED Course  ED Course as of Jun 26 1117   Fri Jun 26, 2020   0924 Reassessed patient  She is still in moderate respiratory distress, respiratory rate 32  However, pulse ox 100% on non-rebreather  Patient appears anxious  She is still having increased work of breathing  Will put on BiPAP  She is retaining some carbon dioxide based off ABG       0929 CXR:IMPRESSION:     Mild pulmonary vascular congestion with increase pleural-parenchymal density at the left lung base as well as stable small effusion on the right  Findings may be related to edema although pneumonia is possible      In the setting of clinically suspected/proven COVID-19, this plain film appearance does not contain findings that raise concern for viral pneumonia such as COVID-19, but does not rule out this diagnosis            7580 Spoke with Dr Danette Rutledge, Radiology who approves VQ scan to rule out PE       1007 Patient is going to VQ scan now  [address] [facility] [address] Shira Colbert, [address] for admission for hypoxemia secondary to CHF exacerbation  [address]ira Colbert will be down to ED to evaluate patient  [address] COVID-19 negative      [address] Dr Shira Colbert will accept patient to Critical Care Stepdown Level 1       1106 VQ scan:   IMPRESSION:     The probability for pulmonary embolus is likely very low  [id number] is aware that CT chest is pending  Awaiting results before starting antibiotics for HCAP  Hyperkalemia not treated in ED as sample was slightly hemolyzed  US AUDIT      Most Recent Value   Initial Alcohol Screen: US AUDIT-C    1  How often do you have a drink containing alcohol?  0 Filed at: 06/26/2020 0821   2   How many drinks containing alcohol do you have on a typical day you are drinking? 0 Filed at: 06/26/2020 0821   3a  Male UNDER 65: How often do you have five or more drinks on one occasion? 0 Filed at: 06/26/2020 0821   3b  FEMALE Any Age, or MALE 65+: How often do you have 4 or more drinks on one occassion? 0 Filed at: 06/26/2020 6661   Audit-C Score  0 Filed at: 06/26/2020 8020                  CANDELARIA/DAST-10      Most Recent Value   How many times in the past year have you    Used an illegal drug or used a prescription medication for non-medical reasons? Never Filed at: 06/26/2020 6543            MDM  Number of Diagnoses or Management Options  Acute on chronic congestive heart failure, unspecified heart failure type [facility]):    Anemia:   Elevated brain natriuretic peptide (BNP) level:   Elevated d-dimer:   Pleural effusion, right:   SOB (shortness of breath):      Amount and/or Complexity of Data Reviewed  Clinical lab tests: ordered and reviewed  Tests in the radiology section of CPT®: ordered and reviewed  Tests in the medicine section of CPT®: reviewed and ordered  Review and summarize past medical records: yes  Discuss the patient with other providers: yes (Radiology, Dr Wanda Ross, Dr Jassi Helm and JIM Hernández)  Independent visualization of images, tracings, or specimens: yes (Chest x-ray, EKG, VQ scan, CT chest)    Risk of Complications, Morbidity, and/or Mortality  Presenting problems: moderate  Diagnostic procedures: moderate  Management options: moderate    Patient Progress  Patient progress: stable        Disposition  Final diagnoses:   SOB (shortness of breath)   Acute on chronic congestive heart failure, unspecified heart failure type (HCC)   Elevated d-dimer   Elevated brain natriuretic peptide (BNP) level   Anemia   Pleural effusion, right     Time reflects when diagnosis was documented in both MDM as applicable and the Disposition within this note     Time User Action Codes Description Comment    6/26/2020  8:40 AM Laroy Katerin Add [R06 02] SOB (shortness of breath)     6/26/2020 11:06 AM Laroy Katerin Add [I50 9] Acute on chronic congestive heart failure, unspecified heart failure type ([facility] [address] )     6/26/2020 11:07 AM Laroy Katerin Add [R79 89] Elevated d-dimer     6/26/2020 11:14 AM Laroy Katerin Add [R79 89] Elevated brain natriuretic peptide (BNP) level     6/26/2020 11:14 AM Laroy Katerin Add [D64 9] Anemia     6/26/2020 11:15 AM Laroy Katerin Add [J90] Pleural effusion, right       ED Disposition     ED Disposition Condition Date/Time Comment    Admit Stable Fri Jun 26, 2020 11:06 AM Case was discussed with Dr Neri Gomez and the patient's admission status was agreed to be Admission Status: inpatient status to the service of Dr Neri Gomez   Follow-up Information    None         Patient's Medications   Discharge Prescriptions    No medications on file     No discharge procedures on file      PDMP Review     None          ED Provider  Electronically Signed by    MD Katherine Michael MD  06/26/20 317 Marialuisa Butler MD  06/26/20 2812

## 2020-06-27 ENCOUNTER — APPOINTMENT (INPATIENT)
Dept: RADIOLOGY | Facility: HOSPITAL | Age: 85
DRG: 291 | End: 2020-06-27
Payer: MEDICARE

## 2020-06-27 PROBLEM — N17.9 AKI (ACUTE KIDNEY INJURY) (HCC): Status: ACTIVE | Noted: 2020-06-27

## 2020-06-27 LAB
ANION GAP SERPL CALCULATED.3IONS-SCNC: 6 MMOL/L (ref 4–13)
BASOPHILS # BLD AUTO: 0 THOUSANDS/ΜL (ref 0–0.1)
BASOPHILS NFR BLD AUTO: 0 % (ref 0–1)
BUN SERPL-MCNC: 62 MG/DL (ref 5–25)
CA-I BLD-SCNC: 1.07 MMOL/L (ref 1.12–1.32)
CALCIUM SERPL-MCNC: 8 MG/DL (ref 8.3–10.1)
CHLORIDE SERPL-SCNC: 107 MMOL/L (ref 100–108)
CO2 SERPL-SCNC: 30 MMOL/L (ref 21–32)
CREAT SERPL-MCNC: 2.15 MG/DL (ref 0.6–1.3)
EOSINOPHIL # BLD AUTO: 0 THOUSAND/ΜL (ref 0–0.61)
EOSINOPHIL NFR BLD AUTO: 0 % (ref 0–6)
ERYTHROCYTE [DISTWIDTH] IN BLOOD BY AUTOMATED COUNT: 14.6 % (ref 11.6–15.1)
GFR SERPL CREATININE-BSD FRML MDRD: 21 ML/MIN/1.73SQ M
GLUCOSE SERPL-MCNC: 217 MG/DL (ref 65–140)
GLUCOSE SERPL-MCNC: 266 MG/DL (ref 65–140)
GLUCOSE SERPL-MCNC: 277 MG/DL (ref 65–140)
GLUCOSE SERPL-MCNC: 291 MG/DL (ref 65–140)
GLUCOSE SERPL-MCNC: 302 MG/DL (ref 65–140)
HCT VFR BLD AUTO: 26.4 % (ref 34.8–46.1)
HGB BLD-MCNC: 8 G/DL (ref 11.5–15.4)
IMM GRANULOCYTES # BLD AUTO: 0.02 THOUSAND/UL (ref 0–0.2)
IMM GRANULOCYTES NFR BLD AUTO: 0 % (ref 0–2)
LYMPHOCYTES # BLD AUTO: 1.33 THOUSANDS/ΜL (ref 0.6–4.47)
LYMPHOCYTES NFR BLD AUTO: 23 % (ref 14–44)
MAGNESIUM SERPL-MCNC: 2.5 MG/DL (ref 1.6–2.6)
MCH RBC QN AUTO: 29 PG (ref 26.8–34.3)
MCHC RBC AUTO-ENTMCNC: 30.3 G/DL (ref 31.4–37.4)
MCV RBC AUTO: 96 FL (ref 82–98)
MONOCYTES # BLD AUTO: 0.48 THOUSAND/ΜL (ref 0.17–1.22)
MONOCYTES NFR BLD AUTO: 9 % (ref 4–12)
NEUTROPHILS # BLD AUTO: 3.85 THOUSANDS/ΜL (ref 1.85–7.62)
NEUTS SEG NFR BLD AUTO: 68 % (ref 43–75)
NRBC BLD AUTO-RTO: 0 /100 WBCS
PHOSPHATE SERPL-MCNC: 4.9 MG/DL (ref 2.3–4.1)
PLATELET # BLD AUTO: 184 THOUSANDS/UL (ref 149–390)
PMV BLD AUTO: 12.7 FL (ref 8.9–12.7)
POTASSIUM SERPL-SCNC: 4.9 MMOL/L (ref 3.5–5.3)
RBC # BLD AUTO: 2.76 MILLION/UL (ref 3.81–5.12)
SODIUM SERPL-SCNC: 143 MMOL/L (ref 136–145)
WBC # BLD AUTO: 5.68 THOUSAND/UL (ref 4.31–10.16)

## 2020-06-27 PROCEDURE — 71045 X-RAY EXAM CHEST 1 VIEW: CPT

## 2020-06-27 PROCEDURE — 94640 AIRWAY INHALATION TREATMENT: CPT

## 2020-06-27 PROCEDURE — 82948 REAGENT STRIP/BLOOD GLUCOSE: CPT

## 2020-06-27 PROCEDURE — 85025 COMPLETE CBC W/AUTO DIFF WBC: CPT | Performed by: NURSE PRACTITIONER

## 2020-06-27 PROCEDURE — 94760 N-INVAS EAR/PLS OXIMETRY 1: CPT

## 2020-06-27 PROCEDURE — 80048 BASIC METABOLIC PNL TOTAL CA: CPT | Performed by: NURSE PRACTITIONER

## 2020-06-27 PROCEDURE — 84100 ASSAY OF PHOSPHORUS: CPT | Performed by: NURSE PRACTITIONER

## 2020-06-27 PROCEDURE — 83735 ASSAY OF MAGNESIUM: CPT | Performed by: NURSE PRACTITIONER

## 2020-06-27 PROCEDURE — 82330 ASSAY OF CALCIUM: CPT | Performed by: NURSE PRACTITIONER

## 2020-06-27 PROCEDURE — 99233 SBSQ HOSP IP/OBS HIGH 50: CPT | Performed by: NURSE PRACTITIONER

## 2020-06-27 RX ORDER — FUROSEMIDE 10 MG/ML
40 INJECTION INTRAMUSCULAR; INTRAVENOUS ONCE
Status: DISCONTINUED | OUTPATIENT
Start: 2020-06-27 | End: 2020-06-27

## 2020-06-27 RX ORDER — FUROSEMIDE 10 MG/ML
40 INJECTION INTRAMUSCULAR; INTRAVENOUS
Status: DISCONTINUED | OUTPATIENT
Start: 2020-06-27 | End: 2020-06-29

## 2020-06-27 RX ADMIN — LEVALBUTEROL HYDROCHLORIDE 1.25 MG: 1.25 SOLUTION, CONCENTRATE RESPIRATORY (INHALATION) at 20:09

## 2020-06-27 RX ADMIN — IPRATROPIUM BROMIDE 0.5 MG: 0.5 SOLUTION RESPIRATORY (INHALATION) at 02:34

## 2020-06-27 RX ADMIN — LORATADINE 10 MG: 10 TABLET ORAL at 08:40

## 2020-06-27 RX ADMIN — IPRATROPIUM BROMIDE 0.5 MG: 0.5 SOLUTION RESPIRATORY (INHALATION) at 08:07

## 2020-06-27 RX ADMIN — BUDESONIDE 0.5 MG: 0.5 INHALANT RESPIRATORY (INHALATION) at 08:07

## 2020-06-27 RX ADMIN — LEVALBUTEROL HYDROCHLORIDE 1.25 MG: 1.25 SOLUTION, CONCENTRATE RESPIRATORY (INHALATION) at 12:56

## 2020-06-27 RX ADMIN — LEVALBUTEROL HYDROCHLORIDE 1.25 MG: 1.25 SOLUTION, CONCENTRATE RESPIRATORY (INHALATION) at 02:34

## 2020-06-27 RX ADMIN — METHYLPREDNISOLONE SODIUM SUCCINATE 40 MG: 40 INJECTION, POWDER, FOR SOLUTION INTRAMUSCULAR; INTRAVENOUS at 08:39

## 2020-06-27 RX ADMIN — SUCRALFATE 1000 MG: 1 SUSPENSION ORAL at 21:33

## 2020-06-27 RX ADMIN — DOCUSATE SODIUM 100 MG: 100 CAPSULE, LIQUID FILLED ORAL at 17:10

## 2020-06-27 RX ADMIN — INSULIN LISPRO 3 UNITS: 100 INJECTION, SOLUTION INTRAVENOUS; SUBCUTANEOUS at 12:16

## 2020-06-27 RX ADMIN — APIXABAN 2.5 MG: 2.5 TABLET, FILM COATED ORAL at 08:39

## 2020-06-27 RX ADMIN — FUROSEMIDE 40 MG: 10 INJECTION, SOLUTION INTRAMUSCULAR; INTRAVENOUS at 20:54

## 2020-06-27 RX ADMIN — LEVALBUTEROL HYDROCHLORIDE 1.25 MG: 1.25 SOLUTION, CONCENTRATE RESPIRATORY (INHALATION) at 08:07

## 2020-06-27 RX ADMIN — INSULIN GLARGINE 8 UNITS: 100 INJECTION, SOLUTION SUBCUTANEOUS at 21:34

## 2020-06-27 RX ADMIN — IPRATROPIUM BROMIDE 0.5 MG: 0.5 SOLUTION RESPIRATORY (INHALATION) at 20:09

## 2020-06-27 RX ADMIN — PANTOPRAZOLE SODIUM 40 MG: 40 TABLET, DELAYED RELEASE ORAL at 08:39

## 2020-06-27 RX ADMIN — CHLORHEXIDINE GLUCONATE 0.12% ORAL RINSE 15 ML: 1.2 LIQUID ORAL at 08:40

## 2020-06-27 RX ADMIN — INSULIN LISPRO 4 UNITS: 100 INJECTION, SOLUTION INTRAVENOUS; SUBCUTANEOUS at 17:11

## 2020-06-27 RX ADMIN — CHLORHEXIDINE GLUCONATE 0.12% ORAL RINSE 15 ML: 1.2 LIQUID ORAL at 21:33

## 2020-06-27 RX ADMIN — IPRATROPIUM BROMIDE 0.5 MG: 0.5 SOLUTION RESPIRATORY (INHALATION) at 12:56

## 2020-06-27 RX ADMIN — FERROUS SULFATE TAB 325 MG (65 MG ELEMENTAL FE) 325 MG: 325 (65 FE) TAB at 08:47

## 2020-06-27 RX ADMIN — AMLODIPINE BESYLATE 5 MG: 5 TABLET ORAL at 08:39

## 2020-06-27 RX ADMIN — INSULIN LISPRO 2 UNITS: 100 INJECTION, SOLUTION INTRAVENOUS; SUBCUTANEOUS at 08:42

## 2020-06-27 RX ADMIN — CARVEDILOL 1.56 MG: 3.12 TABLET, FILM COATED ORAL at 17:10

## 2020-06-27 RX ADMIN — FLUTICASONE PROPIONATE 2 SPRAY: 50 SPRAY, METERED NASAL at 08:43

## 2020-06-27 RX ADMIN — APIXABAN 2.5 MG: 2.5 TABLET, FILM COATED ORAL at 17:10

## 2020-06-27 RX ADMIN — BUDESONIDE 0.5 MG: 0.5 INHALANT RESPIRATORY (INHALATION) at 20:09

## 2020-06-27 RX ADMIN — INSULIN LISPRO 3 UNITS: 100 INJECTION, SOLUTION INTRAVENOUS; SUBCUTANEOUS at 21:34

## 2020-06-27 RX ADMIN — PRAVASTATIN SODIUM 80 MG: 80 TABLET ORAL at 17:11

## 2020-06-27 NOTE — ASSESSMENT & PLAN NOTE
· Likely multifactorial - acute on chronic combined systolic and diastolic congestive heart failure, COPD, bilateral pleural effusions  · Patient states she woke up feeling short of breath  · ECF reported patients oxygen saturations were reading in the 70s on baseline 2 liters nasal cannula  When EMS arrived, they report that patient was 90% on 2 liters nasal cannula and had increased work of breathing  · Patient was tachypneic, increased work of breathing and was wheezing per ER MD  Patient was given solumedrol 80 mg IV, duoneb treatment and magnesium 1 gram IV  Initially was placed on NRB mask however continued to have increased work of breathing and was transitioned to BiPAP which she improved on    · Trial off BiPAP after to ICU - tolerating nasal cannula 4 liters  · Bipap off overnight  · Pro NT-BNP 6956, previous was 12,600 on 06/12  · CT chest showed pulmonary edema, moderate right pleural effusion, small left pleural effusion

## 2020-06-27 NOTE — ASSESSMENT & PLAN NOTE
· Baseline creatinine 1 4 - 1 6  · Patient had acute kidney injury on previous admission with peak creatinine 2 6  Discharge creatinine was 1 66 on 06/19/20  · Patient did follow up with [facility] via telemedicine visit on 06/22/20    · Admission creatinine 1 69  · Trend BUN and Creatinine  · Monitor I&O  · Received lasix 60 mg IV in ED  · Hold lisinopril today, plan to restart tomorrow if creatinine remains stable while diuresing  · Continue to hold, creatinine 2 15

## 2020-06-27 NOTE — ASSESSMENT & PLAN NOTE
· Baseline creatine 1 4-1 6   · Creatinine increased since admission; now 2  11    · Likely secondary to increased doses of Lasix for diuresis  · Continue to monitor BUN and creatinine  · Monitor I&O  · Continue to hold Lisinopril

## 2020-06-27 NOTE — PROGRESS NOTES
Progress Note - Laurence Blunt 1935, 82 y o  female MRN: [id number]    Unit/Bed#: -01 Encounter: [id number]    Primary Care Provider: Ronda Phoenix, MD   Date and time admitted to hospital: 6/26/2020  8:14 AM        * Acute respiratory failure with hypoxia (HCC)  Assessment & Plan  · Likely multifactorial - acute on chronic combined systolic and diastolic congestive heart failure, COPD, bilateral pleural effusions  · Patient states she woke up feeling short of breath  · ECF reported patients oxygen saturations were reading in the 70s on baseline 2 liters nasal cannula  When EMS arrived, they report that patient was 90% on 2 liters nasal cannula and had increased work of breathing  · Patient was tachypneic, increased work of breathing and was wheezing per ER MD  Patient was given solumedrol 80 mg IV, duoneb treatment and magnesium 1 gram IV  Initially was placed on NRB mask however continued to have increased work of breathing and was transitioned to BiPAP which she improved on  · Trial off BiPAP after to ICU - tolerating nasal cannula 4 liters  · Bipap off overnight  · Pro NT-BNP 6956, previous was 12,600 on 06/12  · CT chest showed pulmonary edema, moderate right pleural effusion, small left pleural effusion    Chronic combined systolic and diastolic congestive heart failure (HCC)  Assessment & Plan  Wt Readings from Last 3 Encounters:   06/26/20 70 kg (154 lb 5 2 oz)   06/22/20 68 9 kg (152 lb)   06/19/20 66 4 kg (146 lb 6 2 oz)     · Patient had recent admission to [address] [place] [address] (06/12/20 to 06/19/20) at which time she was treated for CHF exacerbation  · NOLAN 06/12/20 - "Akinesis of the basal to mid inferior and the inferolateral walls  The ventricle was dilated  Systolic function was moderately reduced  EF 35 % to 40 %  There was assymetrical hypertrophy of the septum  Normal size and systolic function of right ventricle  Left atrium moderately dilated  Mild mitral valve regurgitation    Mild tricuspid valve regurgitation  · TTE report in care everywhere from 03/30/20 - "Left ventricle is mildly dilated  Severely reduced left ventricular systolic function  Basal to mid inferior and inferolateral akinesis  Mild concentric left ventricular hypertrophy  EF 30%  Normal right ventricular size  Reduced right ventricular systolic function  Severely dilated left atrium  Diastolic dysfunction with elevated filling pressures "  · CXR and CT scan of chest showed pulmonary edema  · Pro NT-BNP 6956  Previously was 12,600 on 06/12 during last admission  · Takes lasix 40 mg daily PO at ECF  · Lasix 60 mg IV given in ED, will evaluate response and given lasix prn  · Had additional 40mg yesterday  · Goal net fluid balance -1 liter  · -325 yesterday  Creatinine elevated, additional lasix held  · Daily weights  · Monitor I&O    COPD with emphysema ([facility] [address] [address] )  Assessment & Plan  · Patient presented with shortness of breath which began upon waking up this a m  · Hypoxic per ECF; approximately 70% on 2 L nasal cannula  Upon EMS arrival, they reported patient's oxygen saturations were up to 90% on 2 L nasal cannula  · Patient wears 2 L nasal cannula baseline  · Initial exam by ED - limited air movement, wheezes, crackles, increased work of breathing  · Patient was placed on non-rebreather initially and then transition to BiPAP for work of breathing  · ED course included DuoNeb treatment, magnesium 1 g IV and Solu-Medrol 80 mg IV  · Upon arrival to ICU patient was trialed off of BiPAP  Tolerating 4 L nasal cannula at this time    · Continue respiratory treatments - DuoNebs and budenoside  · Respiratory and airway clearance protocols  · Incentive spirometry - 250 ml  · Continue Clarinex, Flonase  · Continue solumedrol 40 mg IV (5 days total of steroids)  · No indication for antibiotics at this time    A-fib [facility])  Assessment & Plan  · History of atrial fibrillation  · Current rhythm is paced, rate 60  · Home meds include carvedilol and Eliquis - will continue    Essential hypertension  Assessment & Plan  · History of hypertension  · Patient takes amlodipine, carvedilol, lisinopril - will continue all upon admission with exception of lisinopril  · Resume lisinopril 06/27 if creatinine remains stable  · Continue to hold, creatinine 2 15    Other hyperlipidemia  Assessment & Plan  · Patient takes revealed statin 10 mg at home  · Will use pharmacy formulary substitute of atorvastatin 80 mg daily    CKD (chronic kidney disease) stage 4, GFR 15-29 ml/min ([facility])  Assessment & Plan  · Baseline creatinine 1 4 - 1 6  · Patient had acute kidney injury on previous admission with peak creatinine 2 6  Discharge creatinine was 1 66 on 06/19/20  · Patient did follow up with [facility] via telemedicine visit on 06/22/20  · Admission creatinine 1 69  · Trend BUN and Creatinine  · Monitor I&O  · Received lasix 60 mg IV in ED  · Hold lisinopril today, plan to restart tomorrow if creatinine remains stable while diuresing  · Continue to hold, creatinine 2 15    Type 2 diabetes mellitus with stage 3 chronic kidney disease, with long-term current use of insulin ([facility])  Assessment & Plan  Lab Results   Component Value Date    HGBA1C 7 9 (H) 03/28/2020       Recent Labs     06/26/20  1317   POCGLU 169*       Blood Sugar Average: Last 72 hrs:  (P) 169   · Hold home sliding scale insulin  · Diabetic diet  · Resume lantus 8 units HS and change sliding scale to ACHS    Anemia  Assessment & Plan  · Chronic anemia in the setting of CKD  · Hgb appears at baseline  · Continue iron supplement  · Continue to trend    Esophageal reflux  Assessment & Plan  · Continue home protonix and carafate        ----------------------------------------------------------------------------------------  HPI/24hr events: Admitted yesterday for acute resp failure  Initally was on bipap, was able to be weaned back to her NC O2 she wears at home   Received lasix 60 then 40 mg with goal of 1 liter negative  VQ scan done and was low prob for PE  No acute events overnight  Kathe being off bipap  Disposition: Transfer to Med-Surg   Code Status: Level 1 - Full Code  ---------------------------------------------------------------------------------------  SUBJECTIVE    Review of Systems   Constitutional: Negative for chills  HENT: Negative for trouble swallowing  Respiratory: Positive for cough and shortness of breath  Negative for chest tightness  Cardiovascular: Negative for chest pain  Gastrointestinal: Negative for nausea and vomiting  Musculoskeletal: Negative for back pain and neck pain  Neurological: Negative for dizziness, light-headedness and headaches  Psychiatric/Behavioral: Negative for sleep disturbance  ---------------------------------------------------------------------------------------  OBJECTIVE    Vitals   Vitals:    20 0200 20 0234 20 0300 20 0310   BP: 131/58  133/58    Pulse: 63  60    Resp: (!) 25  (!) 25    Temp:    98 3 °F (36 8 °C)   TempSrc:    Oral   SpO2: 95% 98% 96%    Weight:    70 6 kg (155 lb 10 3 oz)   Height:         Temp (24hrs), Av °F (36 7 °C), Min:97 7 °F (36 5 °C), Max:98 4 °F (36 9 °C)  Current: Temperature: 98 3 °F (36 8 °C)        SpO2: SpO2: 95 %, SpO2 Activity: SpO2 Activity: At Rest, SpO2 Device: O2 Device: Nasal cannula       Physical Exam   Constitutional: She is oriented to person, place, and time  No distress  Appears chronically ill   HENT:   Head: Normocephalic and atraumatic  Mouth/Throat: Oropharynx is clear and moist    Eyes: Pupils are equal, round, and reactive to light  Conjunctivae are normal    Neck: Neck supple  No tracheal deviation present  Cardiovascular: Normal heart sounds and intact distal pulses  Exam reveals no friction rub  No murmur heard  Intermittent paced, rate controlled   Pulmonary/Chest: She has no rales  Some mild increased WOB, able to speak in full sentences   Upper airway wheeze from forced expiration  Coarse, decreased in bases   Abdominal: Soft  Bowel sounds are normal  She exhibits no distension  There is no tenderness  There is no guarding  Genitourinary:   Genitourinary Comments: Vasquez, draining clear yellow   Musculoskeletal: She exhibits no edema  Neurological: She is alert and oriented to person, place, and time  Skin: Skin is warm and dry  Capillary refill takes less than 2 seconds  She is not diaphoretic  No pallor  Psychiatric: She has a normal mood and affect  Nursing note and vitals reviewed        Invasive/non-invasive ventilation settings   Respiratory    Lab Data (Last 4 hours)    None         O2/Vent Data (Last 4 hours)    None                Laboratory and Diagnostics:  Results from last 7 days   Lab Units 06/27/20  0423 06/26/20  0844   WBC Thousand/uL 5 68 9 99   HEMOGLOBIN g/dL 8 0* 9 3*   HEMATOCRIT % 26 4* 31 1*   PLATELETS Thousands/uL 184 211   NEUTROS PCT % 68 74   MONOS PCT % 9 7     Results from last 7 days   Lab Units 06/27/20  0423 06/26/20  1755 06/26/20  0844   SODIUM mmol/L 143 145 143   POTASSIUM mmol/L 4 9 4 7 5 6*   CHLORIDE mmol/L 107 108 107   CO2 mmol/L 30 29 30   ANION GAP mmol/L 6 8 6   BUN mg/dL 62* 54* 48*   CREATININE mg/dL 2 15* 1 86* 1 69*   CALCIUM mg/dL 8 0* 8 2* 8 2*   GLUCOSE RANDOM mg/dL 291* 208* 129   ALT U/L  --   --  26   AST U/L  --   --  30   ALK PHOS U/L  --   --  110   ALBUMIN g/dL  --   --  2 9*   TOTAL BILIRUBIN mg/dL  --   --  0 30     Results from last 7 days   Lab Units 06/27/20  0423 06/26/20  1755 06/26/20  0844   MAGNESIUM mg/dL 2 5 2 4 2 2   PHOSPHORUS mg/dL 4 9*  --   --       Results from last 7 days   Lab Units 06/26/20  0844   INR  1 14      Results from last 7 days   Lab Units 06/26/20  0844   TROPONIN I ng/mL 0 04     Results from last 7 days   Lab Units 06/26/20  0844   LACTIC ACID mmol/L 0 9     ABG:  Results from last 7 days   Lab Units 06/26/20  0913   PH ART  7 334*   PCO2 ART mm Hg 50 0* PO2 ART mm Hg 121 5   HCO3 ART mmol/L 26 0   BASE EXC ART mmol/L -0 2   ABG SOURCE  Radial, Left     VBG:  Results from last 7 days   Lab Units 06/26/20  0913   ABG SOURCE  Radial, Left           Micro  Results from last 7 days   Lab Units 06/26/20  0844   BLOOD CULTURE  Received in Microbiology Lab  Culture in Progress  Received in Microbiology Lab  Culture in Progress  EKG: NSR, intermittently paced  Imaging: No new imaging to review    Intake and Output  I/O       06/25 0701 - 06/26 0700 06/26 0701 - 06/27 0700    IV Piggyback  700    Total Intake(mL/kg)  700 (9 9)    Urine (mL/kg/hr)  900    Stool  0    Total Output  900    Net  -200          Unmeasured Stool Occurrence  1 x          Height and Weights   Height: 5' (152 4 cm)  IBW: 45 5 kg  Body mass index is 30 4 kg/m²  Weight (last 2 days)     Date/Time   Weight    06/27/20 0310   70 6 (155 65)    06/26/20 1600   76 2 (167 99)    06/26/20 0819   70 (154 32)                Nutrition       Diet Orders   (From admission, onward)             Start     Ordered    06/26/20 1512  Diet Ronal/CHO Controlled; Consistent Carbohydrate Diet Level 2 (5 carb servings/75 grams CHO/meal); Fluid Restriction 1800 ML  Diet effective now     Question Answer Comment   Diet Type Ronal/CHO Controlled    Ronal/CHO Controlled Consistent Carbohydrate Diet Level 2 (5 carb servings/75 grams CHO/meal)    Other Restriction(s): Fluid Restriction 1800 ML    RD to adjust diet per protocol?  Yes        06/26/20 1512                  Active Medications  Scheduled Meds:  Current Facility-Administered Medications:  amLODIPine 5 mg Oral Daily KRISTIN Lincoln   apixaban 2 5 mg Oral BID KRISTIN Lincoln   budesonide 0 5 mg Nebulization Q12H KRISTIN Lincoln   carvedilol 1 5625 mg Oral BID KRISTIN Lincoln   chlorhexidine 15 mL Swish & Spit Q12H [address] KRISTIN Lincoln   docusate sodium 100 mg Oral BID KRISTIN Lincoln   ferrous sulfate 325 mg Oral Daily With Breakfast Suzan Reynolds, CRNP   fluticasone 2 spray Nasal Daily Suzan Reynolds, CRNP   insulin glargine 8 Units Subcutaneous HS Vernalazaro Reynolds, CRNP   insulin lispro 1-5 Units Subcutaneous TID AC Suzan Reynolds, CRNP   insulin lispro 1-5 Units Subcutaneous HS Neen[place]layton Reynolds, CRNP   ipratropium 0 5 mg Nebulization Q6H Suzan Reynolds, CRNP   levalbuterol 1 25 mg Nebulization Q6H Suzan Reynolds, CRNP   loratadine 10 mg Oral Daily Neenaclayton Reynolds, CRNP   methylPREDNISolone sodium succinate 40 mg Intravenous Daily Vernalazaro Reynolds, CRNP   ondansetron 4 mg Intravenous Q6H PRN Suzan Reynolds, CRNP   pantoprazole 40 mg Oral Daily Neenaclayton Reynolds, CRNP   pravastatin 80 mg Oral Daily With  Home	Rosser, CRNP   sucralfate 1,000 mg Oral HS Suzan Reynolds, KRISTIN     Continuous Infusions:     PRN Meds:     ondansetron 4 mg Q6H PRN       Invasive Devices Review  Invasive Devices     Peripheral Intravenous Line            Peripheral IV 06/26/20 Left Antecubital less than 1 day          Drain            Urethral Catheter 16 Fr  less than 1 day                ---------------------------------------------------------------------------------------  Care Time Delivered:   No Critical Care time spent       KRISTIN Meléndez      Portions of the record may have been created with voice recognition software  Occasional wrong word or "sound a like" substitutions may have occurred due to the inherent limitations of voice recognition software    Read the chart carefully and recognize, using context, where substitutions have occurred

## 2020-06-27 NOTE — PLAN OF CARE
Problem: Potential for Falls  Goal: Patient will remain free of falls  Description  INTERVENTIONS:  - Assess patient frequently for physical needs  -  Identify cognitive and physical deficits and behaviors that affect risk of falls  -  [place] fall precautions as indicated by assessment   - Educate patient/family on patient safety including physical limitations  - Instruct patient to call for assistance with activity based on assessment  - Modify environment to reduce risk of injury  - Consider OT/PT consult to assist with strengthening/mobility  Outcome: Progressing     Problem: NEUROSENSORY - ADULT  Goal: Achieves stable or improved neurological status  Description  INTERVENTIONS  - Monitor and report changes in neurological status  - Monitor vital signs such as temperature, blood pressure, glucose, and any other labs ordered   - Initiate measures to prevent increased intracranial pressure  - Monitor for seizure activity and implement precautions if appropriate      Outcome: Progressing  Goal: Achieves maximal functionality and self care  Description  INTERVENTIONS  - Monitor swallowing and airway patency with patient fatigue and changes in neurological status  - Encourage and assist patient to increase activity and self care     - Encourage visually impaired, hearing impaired and aphasic patients to use assistive/communication devices  Outcome: Progressing     Problem: CARDIOVASCULAR - ADULT  Goal: Maintains optimal cardiac output and hemodynamic stability  Description  INTERVENTIONS:  - Monitor I/O, vital signs and rhythm  - Monitor for S/S and trends of decreased cardiac output  - Administer and titrate ordered vasoactive medications to optimize hemodynamic stability  - Assess quality of pulses, skin color and temperature  - Assess for signs of decreased coronary artery perfusion  - Instruct patient to report change in severity of symptoms  Outcome: Progressing  Goal: Absence of cardiac dysrhythmias or at baseline rhythm  Description  INTERVENTIONS:  - Continuous cardiac monitoring, vital signs, obtain 12 lead EKG if ordered  - Administer antiarrhythmic and heart rate control medications as ordered  - Monitor electrolytes and administer replacement therapy as ordered  Outcome: Progressing     Problem: RESPIRATORY - ADULT  Goal: Achieves optimal ventilation and oxygenation  Description  INTERVENTIONS:  - Assess for changes in respiratory status  - Assess for changes in mentation and behavior  - Position to facilitate oxygenation and minimize respiratory effort  - Oxygen administered by appropriate delivery if ordered  - Initiate smoking cessation education as indicated  - Encourage broncho-pulmonary hygiene including cough, deep breathe, Incentive Spirometry  - Assess the need for suctioning and aspirate as needed  - Assess and instruct to report SOB or any respiratory difficulty  - Respiratory Therapy support as indicated  Outcome: Progressing     Problem: GASTROINTESTINAL - ADULT  Goal: Minimal or absence of nausea and/or vomiting  Description  INTERVENTIONS:  - Administer IV fluids if ordered to ensure adequate hydration  - Maintain NPO status until nausea and vomiting are resolved  - Nasogastric tube if ordered  - Administer ordered antiemetic medications as needed  - Provide nonpharmacologic comfort measures as appropriate  - Advance diet as tolerated, if ordered  - Consider nutrition services referral to assist patient with adequate nutrition and appropriate food choices  Outcome: Progressing  Goal: Maintains or returns to baseline bowel function  Description  INTERVENTIONS:  - Assess bowel function  - Encourage oral fluids to ensure adequate hydration  - Administer IV fluids if ordered to ensure adequate hydration  - Administer ordered medications as needed  - Encourage mobilization and activity  - Consider nutritional services referral to assist patient with adequate nutrition and appropriate food choices  Outcome: Progressing  Goal: Maintains adequate nutritional intake  Description  INTERVENTIONS:  - Monitor percentage of each meal consumed  - Identify factors contributing to decreased intake, treat as appropriate  - Assist with meals as needed  - Monitor I&O, weight, and lab values if indicated  - Obtain nutrition services referral as needed  Outcome: Progressing     Problem: GENITOURINARY - ADULT  Goal: Maintains or returns to baseline urinary function  Description  INTERVENTIONS:  - Assess urinary function  - Encourage oral fluids to ensure adequate hydration if ordered  - Administer IV fluids as ordered to ensure adequate hydration  - Administer ordered medications as needed  - Offer frequent toileting  - Follow urinary retention protocol if ordered  Outcome: Progressing  Goal: Absence of urinary retention  Description  INTERVENTIONS:  - Assess patients ability to void and empty bladder  - Monitor I/O  - Bladder scan as needed  - Discuss with physician/AP medications to alleviate retention as needed  - Discuss catheterization for long term situations as appropriate  Outcome: Progressing     Problem: METABOLIC, FLUID AND ELECTROLYTES - ADULT  Goal: Electrolytes maintained within normal limits  Description  INTERVENTIONS:  - Monitor labs and assess patient for signs and symptoms of electrolyte imbalances  - Administer electrolyte replacement as ordered  - Monitor response to electrolyte replacements, including repeat lab results as appropriate  - Instruct patient on fluid and nutrition as appropriate  Outcome: Progressing  Goal: Fluid balance maintained  Description  INTERVENTIONS:  - Monitor labs   - Monitor I/O and WT  - Instruct patient on fluid and nutrition as appropriate  - Assess for signs & symptoms of volume excess or deficit  Outcome: Progressing  Goal: Glucose maintained within target range  Description  INTERVENTIONS:  - Monitor Blood Glucose as ordered  - Assess for signs and symptoms of hyperglycemia and hypoglycemia  - Administer ordered medications to maintain glucose within target range  - Assess nutritional intake and initiate nutrition service referral as needed  Outcome: Progressing     Problem: SKIN/TISSUE INTEGRITY - ADULT  Goal: Skin integrity remains intact  Description  INTERVENTIONS  - Identify patients at risk for skin breakdown  - Assess and monitor skin integrity  - Assess and monitor nutrition and hydration status  - Monitor labs (i e  albumin)  - Assess for incontinence   - Turn and reposition patient  - Assist with mobility/ambulation  - Relieve pressure over bony prominences  - Avoid friction and shearing  - Provide appropriate hygiene as needed including keeping skin clean and dry  - Evaluate need for skin moisturizer/barrier cream  - Collaborate with interdisciplinary team (i e  Nutrition, Rehabilitation, etc )   - Patient/family teaching  Outcome: Progressing  Goal: Oral mucous membranes remain intact  Description  INTERVENTIONS  - Assess oral mucosa and hygiene practices  - Implement preventative oral hygiene regimen  - Implement oral medicated treatments as ordered  - Initiate Nutrition services referral as needed  Outcome: Progressing     Problem: HEMATOLOGIC - ADULT  Goal: Maintains hematologic stability  Description  INTERVENTIONS  - Assess for signs and symptoms of bleeding or hemorrhage  - Monitor labs  - Administer supportive blood products/factors as ordered and appropriate  Outcome: Progressing     Problem: MUSCULOSKELETAL - ADULT  Goal: Maintain or return mobility to safest level of function  Description  INTERVENTIONS:  - Assess patient's ability to carry out ADLs; assess patient's baseline for ADL function and identify physical deficits which impact ability to perform ADLs (bathing, care of mouth/teeth, toileting, grooming, dressing, etc )  - Assess/evaluate cause of self-care deficits   - Assess range of motion  - Assess patient's mobility  - Assess patient's need for assistive devices and provide as appropriate  - Encourage maximum independence but intervene and supervise when necessary  - Involve family in performance of ADLs  - Assess for home care needs following discharge   - Consider OT consult to assist with ADL evaluation and planning for discharge  - Provide patient education as appropriate  Outcome: Progressing     Problem: Prexisting or High Potential for Compromised Skin Integrity  Goal: Skin integrity is maintained or improved  Description  INTERVENTIONS:  - Identify patients at risk for skin breakdown  - Assess and monitor skin integrity  - Assess and monitor nutrition and hydration status  - Monitor labs   - Assess for incontinence   - Turn and reposition patient  - Assist with mobility/ambulation  - Relieve pressure over bony prominences  - Avoid friction and shearing  - Provide appropriate hygiene as needed including keeping skin clean and dry  - Evaluate need for skin moisturizer/barrier cream  - Collaborate with interdisciplinary team   - Patient/family teaching  - Consider wound care consult   Outcome: Progressing

## 2020-06-27 NOTE — RESPIRATORY THERAPY NOTE
RT Protocol Note  Kayleen Pinto 82 y o  female MRN: [id number]  Unit/Bed#: -01 Encounter: [id number]    Assessment    Principal Problem:    Acute respiratory failure with hypoxia [facility])  Active Problems:    Essential hypertension    Chronic combined systolic and diastolic congestive heart failure ([facility])    Other hyperlipidemia    COPD with emphysema ([facility])    CKD (chronic kidney disease) stage 4, GFR 15-29 ml/min ([facility])    Esophageal reflux    Type 2 diabetes mellitus with stage 3 chronic kidney disease, with long-term current use of insulin ([facility])    A-fib ([facility])    Anemia      Home Pulmonary Medications:advair 250/50 BID  Duoneb as needed for shortness of breath, albuterol MDI  Home Devices/Therapy: Home O2(2L Nasal cannula)    Past Medical History:   Diagnosis Date    NANCY [facility]acute kidney injury) ([facility] [address] )     Atrial fibrillation ([facility])     CHF (congestive heart failure) ([facility])     COPD (chronic obstructive pulmonary disease) ([facility])     Diabetes mellitus ([facility] [address] )     Trigger finger of thumb     last assessed: 13     Social History     Socioeconomic History    Marital status:       Spouse name: None    Number of children: None    Years of education: None    Highest education level: None   Occupational History    None   Social Needs    Financial resource strain: None    Food insecurity:     Worry: Never true     Inability: Never true    Transportation needs:     Medical: No     Non-medical: No   Tobacco Use    Smoking status: Former Smoker     Last attempt to quit: 2011     Years since quittin 0    Smokeless tobacco: Never Used    Tobacco comment: Former smoker per allscripts   Substance and Sexual Activity    Alcohol use: Not Currently    Drug use: No    Sexual activity: None   Lifestyle    Physical activity:     Days per week: None     Minutes per session: None    Stress: None   Relationships    Social connections:     Talks on phone: None     Gets together: None     Attends Judaism service: None     Active member of club or organization: None     Attends meetings of clubs or organizations: None     Relationship status: None    Intimate partner violence:     Fear of current or ex partner: None     Emotionally abused: None     Physically abused: None     Forced sexual activity: None   Other Topics Concern    None   Social History Narrative    None       Subjective    Subjective Data: patient reports feeling " alittle short of breath" pre treatment  Denies any additional worlk of breathing    Objective    Physical Exam:   Assessment Type: Pre-treatment  General Appearance: Alert, Awake  Respiratory Pattern: Dyspnea with exertion  Chest Assessment: Chest expansion symmetrical  Bilateral Breath Sounds: Diminished  O2 Device: 4L nasal cannula    Vitals:  Blood pressure 109/55, pulse 66, temperature 98 4 °F (36 9 °C), temperature source Oral, resp  rate (!) 33, height 5' (1 524 m), weight 76 2 kg (167 lb 15 9 oz), SpO2 96 %  Results from last 7 days   Lab Units 06/26/20  0913   PH ART  7 334*   PCO2 ART mm Hg 50 0*   PO2 ART mm Hg 121 5   HCO3 ART mmol/L 26 0   BASE EXC ART mmol/L -0 2   O2 CONTENT ART mL/dL 14 0*   O2 HGB, ARTERIAL % 97 3*   ABG SOURCE  Radial, Left   GARDENIA TEST  Yes       Imaging and other studies: Mild pulmonary vascular congestion with increase pleural-parenchymal density at the left lung base as well as stable small effusion on the right       O2 Device: 4L nasal cannula     Plan    Respiratory Plan: Mild Distress pathway  Airway Clearance Plan: Incentive Spirometer     Resp Comments: moved from 303 to 306

## 2020-06-28 PROBLEM — N17.9 ACUTE RENAL FAILURE (ARF) (HCC): Status: ACTIVE | Noted: 2020-06-28

## 2020-06-28 PROBLEM — I50.23 ACUTE ON CHRONIC SYSTOLIC CHF (CONGESTIVE HEART FAILURE) (HCC): Status: ACTIVE | Noted: 2018-03-09

## 2020-06-28 LAB
ANION GAP SERPL CALCULATED.3IONS-SCNC: 6 MMOL/L (ref 4–13)
BASOPHILS # BLD AUTO: 0.01 THOUSANDS/ΜL (ref 0–0.1)
BASOPHILS NFR BLD AUTO: 0 % (ref 0–1)
BUN SERPL-MCNC: 69 MG/DL (ref 5–25)
CALCIUM SERPL-MCNC: 7.8 MG/DL (ref 8.3–10.1)
CHLORIDE SERPL-SCNC: 106 MMOL/L (ref 100–108)
CO2 SERPL-SCNC: 30 MMOL/L (ref 21–32)
CREAT SERPL-MCNC: 2.04 MG/DL (ref 0.6–1.3)
EOSINOPHIL # BLD AUTO: 0 THOUSAND/ΜL (ref 0–0.61)
EOSINOPHIL NFR BLD AUTO: 0 % (ref 0–6)
ERYTHROCYTE [DISTWIDTH] IN BLOOD BY AUTOMATED COUNT: 15 % (ref 11.6–15.1)
FERRITIN SERPL-MCNC: 20 NG/ML (ref 8–388)
GFR SERPL CREATININE-BSD FRML MDRD: 22 ML/MIN/1.73SQ M
GLUCOSE SERPL-MCNC: 111 MG/DL (ref 65–140)
GLUCOSE SERPL-MCNC: 129 MG/DL (ref 65–140)
GLUCOSE SERPL-MCNC: 213 MG/DL (ref 65–140)
GLUCOSE SERPL-MCNC: 418 MG/DL (ref 65–140)
GLUCOSE SERPL-MCNC: 480 MG/DL (ref 65–140)
GLUCOSE SERPL-MCNC: 525 MG/DL (ref 65–140)
GLUCOSE SERPL-MCNC: >500 MG/DL (ref 65–140)
HCT VFR BLD AUTO: 26.7 % (ref 34.8–46.1)
HGB BLD-MCNC: 8 G/DL (ref 11.5–15.4)
IMM GRANULOCYTES # BLD AUTO: 0.02 THOUSAND/UL (ref 0–0.2)
IMM GRANULOCYTES NFR BLD AUTO: 0 % (ref 0–2)
IRON SATN MFR SERPL: 6 %
IRON SERPL-MCNC: 17 UG/DL (ref 50–170)
LYMPHOCYTES # BLD AUTO: 1.87 THOUSANDS/ΜL (ref 0.6–4.47)
LYMPHOCYTES NFR BLD AUTO: 24 % (ref 14–44)
MAGNESIUM SERPL-MCNC: 2.5 MG/DL (ref 1.6–2.6)
MCH RBC QN AUTO: 28.4 PG (ref 26.8–34.3)
MCHC RBC AUTO-ENTMCNC: 30 G/DL (ref 31.4–37.4)
MCV RBC AUTO: 95 FL (ref 82–98)
MONOCYTES # BLD AUTO: 0.72 THOUSAND/ΜL (ref 0.17–1.22)
MONOCYTES NFR BLD AUTO: 9 % (ref 4–12)
NEUTROPHILS # BLD AUTO: 5.07 THOUSANDS/ΜL (ref 1.85–7.62)
NEUTS SEG NFR BLD AUTO: 67 % (ref 43–75)
NRBC BLD AUTO-RTO: 0 /100 WBCS
PLATELET # BLD AUTO: 187 THOUSANDS/UL (ref 149–390)
PMV BLD AUTO: 12.8 FL (ref 8.9–12.7)
POTASSIUM SERPL-SCNC: 4.5 MMOL/L (ref 3.5–5.3)
RBC # BLD AUTO: 2.82 MILLION/UL (ref 3.81–5.12)
SODIUM SERPL-SCNC: 142 MMOL/L (ref 136–145)
TIBC SERPL-MCNC: 305 UG/DL (ref 250–450)
WBC # BLD AUTO: 7.69 THOUSAND/UL (ref 4.31–10.16)

## 2020-06-28 PROCEDURE — 94640 AIRWAY INHALATION TREATMENT: CPT

## 2020-06-28 PROCEDURE — 99233 SBSQ HOSP IP/OBS HIGH 50: CPT | Performed by: INTERNAL MEDICINE

## 2020-06-28 PROCEDURE — 94760 N-INVAS EAR/PLS OXIMETRY 1: CPT

## 2020-06-28 PROCEDURE — 83550 IRON BINDING TEST: CPT | Performed by: INTERNAL MEDICINE

## 2020-06-28 PROCEDURE — 82947 ASSAY GLUCOSE BLOOD QUANT: CPT | Performed by: NURSE PRACTITIONER

## 2020-06-28 PROCEDURE — 85025 COMPLETE CBC W/AUTO DIFF WBC: CPT | Performed by: INTERNAL MEDICINE

## 2020-06-28 PROCEDURE — 80048 BASIC METABOLIC PNL TOTAL CA: CPT | Performed by: INTERNAL MEDICINE

## 2020-06-28 PROCEDURE — 83735 ASSAY OF MAGNESIUM: CPT | Performed by: INTERNAL MEDICINE

## 2020-06-28 PROCEDURE — 82728 ASSAY OF FERRITIN: CPT | Performed by: INTERNAL MEDICINE

## 2020-06-28 PROCEDURE — 83540 ASSAY OF IRON: CPT | Performed by: INTERNAL MEDICINE

## 2020-06-28 PROCEDURE — 82948 REAGENT STRIP/BLOOD GLUCOSE: CPT

## 2020-06-28 RX ORDER — SODIUM CHLORIDE 9 MG/ML
75 INJECTION, SOLUTION INTRAVENOUS CONTINUOUS
Status: DISCONTINUED | OUTPATIENT
Start: 2020-06-28 | End: 2020-06-28

## 2020-06-28 RX ORDER — PREDNISONE 20 MG/1
40 TABLET ORAL DAILY
Status: DISCONTINUED | OUTPATIENT
Start: 2020-06-29 | End: 2020-06-29

## 2020-06-28 RX ADMIN — METHYLPREDNISOLONE SODIUM SUCCINATE 40 MG: 40 INJECTION, POWDER, FOR SOLUTION INTRAMUSCULAR; INTRAVENOUS at 08:52

## 2020-06-28 RX ADMIN — DOCUSATE SODIUM 100 MG: 100 CAPSULE, LIQUID FILLED ORAL at 08:52

## 2020-06-28 RX ADMIN — AMLODIPINE BESYLATE 5 MG: 5 TABLET ORAL at 08:52

## 2020-06-28 RX ADMIN — IPRATROPIUM BROMIDE 0.5 MG: 0.5 SOLUTION RESPIRATORY (INHALATION) at 02:00

## 2020-06-28 RX ADMIN — PRAVASTATIN SODIUM 80 MG: 80 TABLET ORAL at 16:22

## 2020-06-28 RX ADMIN — PANTOPRAZOLE SODIUM 40 MG: 40 TABLET, DELAYED RELEASE ORAL at 08:52

## 2020-06-28 RX ADMIN — CHLORHEXIDINE GLUCONATE 0.12% ORAL RINSE 15 ML: 1.2 LIQUID ORAL at 08:51

## 2020-06-28 RX ADMIN — IPRATROPIUM BROMIDE 0.5 MG: 0.5 SOLUTION RESPIRATORY (INHALATION) at 20:54

## 2020-06-28 RX ADMIN — IPRATROPIUM BROMIDE 0.5 MG: 0.5 SOLUTION RESPIRATORY (INHALATION) at 13:43

## 2020-06-28 RX ADMIN — LEVALBUTEROL HYDROCHLORIDE 1.25 MG: 1.25 SOLUTION, CONCENTRATE RESPIRATORY (INHALATION) at 07:44

## 2020-06-28 RX ADMIN — LEVALBUTEROL HYDROCHLORIDE 1.25 MG: 1.25 SOLUTION, CONCENTRATE RESPIRATORY (INHALATION) at 02:00

## 2020-06-28 RX ADMIN — APIXABAN 2.5 MG: 2.5 TABLET, FILM COATED ORAL at 08:52

## 2020-06-28 RX ADMIN — FERROUS SULFATE TAB 325 MG (65 MG ELEMENTAL FE) 325 MG: 325 (65 FE) TAB at 09:00

## 2020-06-28 RX ADMIN — CHLORHEXIDINE GLUCONATE 0.12% ORAL RINSE 15 ML: 1.2 LIQUID ORAL at 22:39

## 2020-06-28 RX ADMIN — DOCUSATE SODIUM 100 MG: 100 CAPSULE, LIQUID FILLED ORAL at 17:29

## 2020-06-28 RX ADMIN — CARVEDILOL 1.56 MG: 3.12 TABLET, FILM COATED ORAL at 17:29

## 2020-06-28 RX ADMIN — BUDESONIDE 0.5 MG: 0.5 INHALANT RESPIRATORY (INHALATION) at 20:54

## 2020-06-28 RX ADMIN — LEVALBUTEROL HYDROCHLORIDE 1.25 MG: 1.25 SOLUTION, CONCENTRATE RESPIRATORY (INHALATION) at 13:43

## 2020-06-28 RX ADMIN — FUROSEMIDE 40 MG: 10 INJECTION, SOLUTION INTRAMUSCULAR; INTRAVENOUS at 08:52

## 2020-06-28 RX ADMIN — INSULIN LISPRO 10 UNITS: 100 INJECTION, SOLUTION INTRAVENOUS; SUBCUTANEOUS at 23:17

## 2020-06-28 RX ADMIN — FUROSEMIDE 40 MG: 10 INJECTION, SOLUTION INTRAMUSCULAR; INTRAVENOUS at 16:22

## 2020-06-28 RX ADMIN — IPRATROPIUM BROMIDE 0.5 MG: 0.5 SOLUTION RESPIRATORY (INHALATION) at 07:44

## 2020-06-28 RX ADMIN — INSULIN LISPRO 6 UNITS: 100 INJECTION, SOLUTION INTRAVENOUS; SUBCUTANEOUS at 16:22

## 2020-06-28 RX ADMIN — CARVEDILOL 1.56 MG: 3.12 TABLET, FILM COATED ORAL at 08:52

## 2020-06-28 RX ADMIN — INSULIN GLARGINE 8 UNITS: 100 INJECTION, SOLUTION SUBCUTANEOUS at 22:39

## 2020-06-28 RX ADMIN — LEVALBUTEROL HYDROCHLORIDE 1.25 MG: 1.25 SOLUTION, CONCENTRATE RESPIRATORY (INHALATION) at 20:54

## 2020-06-28 RX ADMIN — FLUTICASONE PROPIONATE 2 SPRAY: 50 SPRAY, METERED NASAL at 09:01

## 2020-06-28 RX ADMIN — BUDESONIDE 0.5 MG: 0.5 INHALANT RESPIRATORY (INHALATION) at 07:44

## 2020-06-28 RX ADMIN — LORATADINE 10 MG: 10 TABLET ORAL at 08:52

## 2020-06-28 RX ADMIN — APIXABAN 2.5 MG: 2.5 TABLET, FILM COATED ORAL at 17:29

## 2020-06-28 RX ADMIN — SUCRALFATE 1000 MG: 1 SUSPENSION ORAL at 22:40

## 2020-06-28 RX ADMIN — INSULIN LISPRO 2 UNITS: 100 INJECTION, SOLUTION INTRAVENOUS; SUBCUTANEOUS at 11:17

## 2020-06-28 NOTE — ASSESSMENT & PLAN NOTE
Lab Results   Component Value Date    HGBA1C 7 9 (H) 03/28/2020       Recent Labs     06/27/20  1623 06/27/20  2107 06/28/20  0744 06/28/20  1109   POCGLU 302* 277* 111 213*       Blood Sugar Average: Last 72 hrs:  (P) 423 [id number]     Continue Lantus 8 units HS, check blood glucose 4 times daily with corrective insulin

## 2020-06-28 NOTE — PROGRESS NOTES
Progress Note - Ashli Porter 1935, 82 y o  female MRN: [id number]    Unit/Bed#: -01 Encounter: [id number]    Primary Care Provider: Elham Red MD   Date and time admitted to hospital: 6/26/2020  8:14 AM    * Acute respiratory failure with hypoxia [facility])  Assessment & Plan  Presents with acute respiratory failure with hypoxia requiring BiPAP to maintain SaO2 greater than 90%  Downgraded to med surg from ICU on 6/27  Patient is a readmission from a recent discharge for acute CHF exacerbation  Acute respiratory failure most likely multifactorial including CHF exacerbation and COPD  Continue IV diuresis and COPD management  Patient showing some improvement however still requiring more oxygen than baseline    Acute on chronic systolic CHF (congestive heart failure) ([facility])  Assessment & Plan  Wt Readings from Last 3 Encounters:   06/28/20 69 8 kg (153 lb 14 1 oz)   06/22/20 68 9 kg (152 lb)   06/19/20 66 4 kg (146 lb 6 2 oz)     EF 35-40%  Continue with Lasix 40 mg twice daily  Check renal function, serum electrolytes, weight and urine output daily    Acute renal failure (ARF) ([facility])  Assessment & Plan  Acute renal failure most likely secondary to over-diuresis  Creatinine stable around 2 0, patient has significant CHF and will continue with Lasix 40 mg twice daily and closely monitor renal function  May stop IV diuresis tomorrow if renal function worsens      CKD (chronic kidney disease) stage 4, GFR 15-29 ml/min ([facility])  Assessment & Plan  CKD 3, baseline creatinine 1 4-1 6    COPD with emphysema ([facility])  Assessment & Plan  Continue with bronchodilator  IV Solu-Medrol changed to oral prednisolone today    A-fib ([facility])  Assessment & Plan  Continue with carvedilol and low-dose Eliquis    Type 2 diabetes mellitus with stage 3 chronic kidney disease, with long-term current use of insulin [facility])  Assessment & Plan  Lab Results   Component Value Date    HGBA1C 7 9 (H) 03/28/2020     Recent Labs 20  1623 20  2107 20  0744 20  1109   POCGLU 302* 277* 111 213*     Blood Sugar Average: Last 72 hrs:  (P) 643 [id number]     Continue Lantus 8 units HS, check blood glucose 4 times daily with corrective insulin    Essential hypertension  Assessment & Plan  BP stable, continue current management  VTE Pharmacologic Prophylaxis:   Pharmacologic: Apixaban (Eliquis)    Patient Centered Rounds: I have performed bedside rounds with nursing staff today    Discussions with Specialists or Other Care Team Provider:     Education and Discussions with Family / Patient:     Current Length of Stay: 2 day(s)    Current Patient Status: Inpatient   Certification Statement: The patient will continue to require additional inpatient hospital stay due to Acute CHF    Discharge Plan:  Probably in 3-4 days    Code Status: Level 1 - Full Code      Subjective:   Patient had episode of chocking yesterday  Diet adjusted to puree and thin liquid, swallow study pending  Shortness of breath has significantly improved after IV Lasix, adequate urine output  Objective:     Vitals:   Temp (24hrs), Av 9 °F (36 6 °C), Min:97 7 °F (36 5 °C), Max:98 1 °F (36 7 °C)    Temp:  [97 7 °F (36 5 °C)-98 1 °F (36 7 °C)] 97 8 °F (36 6 °C)  HR:  [66-72] 69  Resp:  [17-25] 18  BP: (128-147)/(52-61) 129/60  SpO2:  [89 %-95 %] 91 %  Body mass index is 30 05 kg/m²  Input and Output Summary (last 24 hours):        Intake/Output Summary (Last 24 hours) at 2020 1403  Last data filed at 2020 1300  Gross per 24 hour   Intake 180 ml   Output 1300 ml   Net -1120 ml       Physical Exam:     General appearance: alert, appears stated age and cooperative  Head: Normocephalic, without obvious abnormality, atraumatic  Lungs: clear to auscultation bilaterally  Heart: regular rate and rhythm  Abdomen: soft, non-tender, positive bowel sounds   Back: negative  Extremities: extremities atraumatic, no cyanosis or edema  Neurologic: Alert and oriented X 3, normal strength and tone  Normal symmetric reflexes  Normal coordination and gait    Additional Data:     Labs:    Results from last 7 days   Lab Units 06/28/20  0552   WBC Thousand/uL 7 69   HEMOGLOBIN g/dL 8 0*   HEMATOCRIT % 26 7*   PLATELETS Thousands/uL 187   NEUTROS PCT % 67   LYMPHS PCT % 24   MONOS PCT % 9   EOS PCT % 0     Results from last 7 days   Lab Units 06/28/20  0552  06/26/20  0844   SODIUM mmol/L 142   < > 143   POTASSIUM mmol/L 4 5   < > 5 6*   CHLORIDE mmol/L 106   < > 107   CO2 mmol/L 30   < > 30   BUN mg/dL 69*   < > 48*   CREATININE mg/dL 2 04*   < > 1 69*   ANION GAP mmol/L 6   < > 6   CALCIUM mg/dL 7 8*   < > 8 2*   ALBUMIN g/dL  --   --  2 9*   TOTAL BILIRUBIN mg/dL  --   --  0 30   ALK PHOS U/L  --   --  110   ALT U/L  --   --  26   AST U/L  --   --  30   GLUCOSE RANDOM mg/dL 129   < > 129    < > = values in this interval not displayed  Results from last 7 days   Lab Units 06/26/20  0844   INR  1 14     Results from last 7 days   Lab Units 06/28/20  1109 06/28/20  0744 06/27/20  2107 06/27/20  1623 06/27/20  1112 06/27/20  0749 06/26/20  2135 06/26/20  1615 06/26/20  1317   POC GLUCOSE mg/dl 213* 111 277* 302* 266* 217* 316* 187* 169*         Results from last 7 days   Lab Units 06/26/20  0844   LACTIC ACID mmol/L 0 9           * I Have Reviewed All Lab Data Listed Above  * Additional Pertinent Lab Tests Reviewed: Lauren 66 Admission Reviewed    Imaging:    Imaging Reports Reviewed Today Include: images reviewed    Recent Cultures (last 7 days):     Results from last 7 days   Lab Units 06/26/20  0844   BLOOD CULTURE  No Growth at 24 hrs  No Growth at 24 hrs         Last 24 Hours Medication List:     Current Facility-Administered Medications:  amLODIPine 5 mg Oral Daily KRISTIN Lincoln   apixaban 2 5 mg Oral BID KRISTIN Lincoln   budesonide 0 5 mg Nebulization Q12H KRISTIN Lincoln   carvedilol 1 5625 mg Oral BID Karlene Nichole Rodolfo, KRISTIN   chlorhexidine 15 mL Swish & Spit Q12H [address] Suzan Reynolds, KRISTIN   docusate sodium 100 mg Oral BID KRISTIN Lincoln   ferrous sulfate 325 mg Oral Daily With Breakfast Suzan Reynolds, KRISTIN   fluticasone 2 spray Nasal Daily Suzan Reynolds, KRISTIN   furosemide 40 mg Intravenous BID (diuretic) Danae Salinas MD   insulin glargine 8 Units Subcutaneous HS Suzan Reynolds, KRISTIN   insulin lispro 1-5 Units Subcutaneous HS Suzan Reynolds, CRNP   insulin lispro 1-6 Units Subcutaneous TID AC Suzan Reynolds, KRISTIN   ipratropium 0 5 mg Nebulization Q6H Suzan Reynolds, KRISTIN   levalbuterol 1 25 mg Nebulization Q6H Suzan Reynolds, KRISTIN   loratadine 10 mg Oral Daily Suzan Reynolds, KRISTIN   ondansetron 4 mg Intravenous Q6H PRN Suzan Reynolds, KRISTIN   pantoprazole 40 mg Oral Daily Suzan Reynolds, KRISTIN   pravastatin 80 mg Oral Daily With [employer]	KRISTIN Kaye   [START ON 6/29/2020] predniSONE 40 mg Oral Daily Danae Salinas MD   sucralfate 1,000 mg Oral HS KRISTIN Garcia        Today, Patient Was Seen By: Danae Salinas MD    ** Please Note: Dictation voice to text software may have been used in the creation of this document   **

## 2020-06-28 NOTE — NURSING NOTE
Patient had some difficulty eating her mechanical soft tray earlier  She said some food got stuck in her throat, which caused her to gag and vomit a small amount  Dr Medina Beverage made aware  Will change diet to pureed for now  Speech therapy to see her tomorrow

## 2020-06-28 NOTE — ASSESSMENT & PLAN NOTE
Presents with acute respiratory failure with hypoxia requiring BiPAP to maintain SaO2 greater than 90%  Acute respiratory failure most likely multifactorial including CHF exacerbation and COPD  Continue IV diuresis and COPD management  Patient showing some improvement however still requiring more oxygen than baseline

## 2020-06-28 NOTE — ASSESSMENT & PLAN NOTE
Acute renal failure most likely secondary to over-diuresis  Creatinine stable around 2 0, patient has significant CHF and will continue with Lasix 40 mg twice daily and closely monitor renal function  May stop IV diuresis tomorrow if renal function worsens

## 2020-06-28 NOTE — ASSESSMENT & PLAN NOTE
Wt Readings from Last 3 Encounters:   06/28/20 69 8 kg (153 lb 14 1 oz)   06/22/20 68 9 kg (152 lb)   06/19/20 66 4 kg (146 lb 6 2 oz)     EF 35-40%  Continue with Lasix 40 mg twice daily  Check renal function, serum electrolytes, weight and urine output daily

## 2020-06-28 NOTE — NURSING NOTE
6/26/2020 at 2250, Jeanette Rjoo NP made aware of patient's 7 beat run of v-tach  Patient asymptomatic  NP reviewed and instructed to continue patient

## 2020-06-28 NOTE — RESPIRATORY THERAPY NOTE
RT Protocol Note  Marquita Ocampo 82 y o  female MRN: [id number]  Unit/Bed#: -01 Encounter: [id number]    Assessment    Principal Problem:    Acute respiratory failure with hypoxia ([facility] )  Active Problems:    Essential hypertension    Chronic combined systolic and diastolic congestive heart failure ([facility])    Other hyperlipidemia    COPD with emphysema ([facility])    CKD (chronic kidney disease) stage 4, GFR 15-29 ml/min ([facility])    Esophageal reflux    Type 2 diabetes mellitus with stage 3 chronic kidney disease, with long-term current use of insulin ([facility])    A-fib ([facility])    Anemia    NANCY (acute kidney injury) ([facility] )      Home Pulmonary Medications:Advair 250/50, duoneb as needed for shortness of breath  Home Devices/Therapy: Home O2    Past Medical History:   Diagnosis Date    NANCY (acute kidney injury) ([place] [address] )     Atrial fibrillation ([place] [address] )     CHF (congestive heart failure) ([facility])     COPD (chronic obstructive pulmonary disease) ([place] [address] )     Diabetes mellitus ([place] [address] )     Trigger finger of thumb     last assessed: 13     Social History     Socioeconomic History    Marital status:       Spouse name: None    Number of children: None    Years of education: None    Highest education level: None   Occupational History    None   Social Needs    Financial resource strain: None    Food insecurity:     Worry: Never true     Inability: Never true    Transportation needs:     Medical: No     Non-medical: No   Tobacco Use    Smoking status: Former Smoker     Last attempt to quit: 2011     Years since quittin 0    Smokeless tobacco: Never Used    Tobacco comment: Former smoker per allscripts   Substance and Sexual Activity    Alcohol use: Not Currently    Drug use: No    Sexual activity: None   Lifestyle    Physical activity:     Days per week: None     Minutes per session: None    Stress: None   Relationships    Social connections:     Talks on phone: None     Gets together: None     Attends Catholic service: None     Active member of club or organization: None     Attends meetings of clubs or organizations: None     Relationship status: None    Intimate partner violence:     Fear of current or ex partner: None     Emotionally abused: None     Physically abused: None     Forced sexual activity: None   Other Topics Concern    None   Social History Narrative    None       Subjective    Subjective Data: patient reports feeling " alittle short of breath" pre treatment  Denies any additional worlk of breathing    Objective    Physical Exam:   Assessment Type: Pre-treatment  General Appearance: Awake, Alert  Respiratory Pattern: Dyspnea with exertion  Chest Assessment: Chest expansion symmetrical  Bilateral Breath Sounds: Diminished  Cough: Non-productive    Vitals:  Blood pressure 141/52, pulse 72, temperature 98 °F (36 7 °C), resp  rate 18, height 5' (1 524 m), weight 68 9 kg (151 lb 14 4 oz), SpO2 95 %      Results from last 7 days   Lab Units 06/26/20  0913   PH ART  7 334*   PCO2 ART mm Hg 50 0*   PO2 ART mm Hg 121 5   HCO3 ART mmol/L 26 0   BASE EXC ART mmol/L -0 2   O2 CONTENT ART mL/dL 14 0*   O2 HGB, ARTERIAL % 97 3*   ABG SOURCE  Radial, Left   GARDENIA TEST  Yes       Imaging and other studies: Pending    O2 Device: 2LPM NC     Plan    Respiratory Plan: Mild Distress pathway  Airway Clearance Plan: Incentive Spirometer     Resp Comments: nurse reports increasing O2 to 3L

## 2020-06-28 NOTE — PLAN OF CARE
Problem: Potential for Falls  Goal: Patient will remain free of falls  Description  INTERVENTIONS:  - Assess patient frequently for physical needs  -  Identify cognitive and physical deficits and behaviors that affect risk of falls  -  Robins fall precautions as indicated by assessment   - Educate patient/family on patient safety including physical limitations  - Instruct patient to call for assistance with activity based on assessment  - Modify environment to reduce risk of injury  - Consider OT/PT consult to assist with strengthening/mobility  Outcome: Progressing     Problem: NEUROSENSORY - ADULT  Goal: Achieves stable or improved neurological status  Description  INTERVENTIONS  - Monitor and report changes in neurological status  - Monitor vital signs such as temperature, blood pressure, glucose, and any other labs ordered   - Initiate measures to prevent increased intracranial pressure  - Monitor for seizure activity and implement precautions if appropriate      Outcome: Progressing  Goal: Achieves maximal functionality and self care  Description  INTERVENTIONS  - Monitor swallowing and airway patency with patient fatigue and changes in neurological status  - Encourage and assist patient to increase activity and self care     - Encourage visually impaired, hearing impaired and aphasic patients to use assistive/communication devices  Outcome: Progressing     Problem: CARDIOVASCULAR - ADULT  Goal: Maintains optimal cardiac output and hemodynamic stability  Description  INTERVENTIONS:  - Monitor I/O, vital signs and rhythm  - Monitor for S/S and trends of decreased cardiac output  - Administer and titrate ordered vasoactive medications to optimize hemodynamic stability  - Assess quality of pulses, skin color and temperature  - Assess for signs of decreased coronary artery perfusion  - Instruct patient to report change in severity of symptoms  Outcome: Progressing  Goal: Absence of cardiac dysrhythmias or at baseline rhythm  Description  INTERVENTIONS:  - Continuous cardiac monitoring, vital signs, obtain 12 lead EKG if ordered  - Administer antiarrhythmic and heart rate control medications as ordered  - Monitor electrolytes and administer replacement therapy as ordered  Outcome: Progressing     Problem: RESPIRATORY - ADULT  Goal: Achieves optimal ventilation and oxygenation  Description  INTERVENTIONS:  - Assess for changes in respiratory status  - Assess for changes in mentation and behavior  - Position to facilitate oxygenation and minimize respiratory effort  - Oxygen administered by appropriate delivery if ordered  - Initiate smoking cessation education as indicated  - Encourage broncho-pulmonary hygiene including cough, deep breathe, Incentive Spirometry  - Assess the need for suctioning and aspirate as needed  - Assess and instruct to report SOB or any respiratory difficulty  - Respiratory Therapy support as indicated  Outcome: Progressing     Problem: GASTROINTESTINAL - ADULT  Goal: Minimal or absence of nausea and/or vomiting  Description  INTERVENTIONS:  - Administer IV fluids if ordered to ensure adequate hydration  - Maintain NPO status until nausea and vomiting are resolved  - Nasogastric tube if ordered  - Administer ordered antiemetic medications as needed  - Provide nonpharmacologic comfort measures as appropriate  - Advance diet as tolerated, if ordered  - Consider nutrition services referral to assist patient with adequate nutrition and appropriate food choices  Outcome: Progressing  Goal: Maintains or returns to baseline bowel function  Description  INTERVENTIONS:  - Assess bowel function  - Encourage oral fluids to ensure adequate hydration  - Administer IV fluids if ordered to ensure adequate hydration  - Administer ordered medications as needed  - Encourage mobilization and activity  - Consider nutritional services referral to assist patient with adequate nutrition and appropriate food choices  Outcome: Progressing  Goal: Maintains adequate nutritional intake  Description  INTERVENTIONS:  - Monitor percentage of each meal consumed  - Identify factors contributing to decreased intake, treat as appropriate  - Assist with meals as needed  - Monitor I&O, weight, and lab values if indicated  - Obtain nutrition services referral as needed  Outcome: Progressing     Problem: GENITOURINARY - ADULT  Goal: Maintains or returns to baseline urinary function  Description  INTERVENTIONS:  - Assess urinary function  - Encourage oral fluids to ensure adequate hydration if ordered  - Administer IV fluids as ordered to ensure adequate hydration  - Administer ordered medications as needed  - Offer frequent toileting  - Follow urinary retention protocol if ordered  Outcome: Progressing  Goal: Absence of urinary retention  Description  INTERVENTIONS:  - Assess patients ability to void and empty bladder  - Monitor I/O  - Bladder scan as needed  - Discuss with physician/AP medications to alleviate retention as needed  - Discuss catheterization for long term situations as appropriate  Outcome: Progressing     Problem: METABOLIC, FLUID AND ELECTROLYTES - ADULT  Goal: Electrolytes maintained within normal limits  Description  INTERVENTIONS:  - Monitor labs and assess patient for signs and symptoms of electrolyte imbalances  - Administer electrolyte replacement as ordered  - Monitor response to electrolyte replacements, including repeat lab results as appropriate  - Instruct patient on fluid and nutrition as appropriate  Outcome: Progressing  Goal: Fluid balance maintained  Description  INTERVENTIONS:  - Monitor labs   - Monitor I/O and WT  - Instruct patient on fluid and nutrition as appropriate  - Assess for signs & symptoms of volume excess or deficit  Outcome: Progressing  Goal: Glucose maintained within target range  Description  INTERVENTIONS:  - Monitor Blood Glucose as ordered  - Assess for signs and symptoms of hyperglycemia and hypoglycemia  - Administer ordered medications to maintain glucose within target range  - Assess nutritional intake and initiate nutrition service referral as needed  Outcome: Progressing     Problem: SKIN/TISSUE INTEGRITY - ADULT  Goal: Skin integrity remains intact  Description  INTERVENTIONS  - Identify patients at risk for skin breakdown  - Assess and monitor skin integrity  - Assess and monitor nutrition and hydration status  - Monitor labs (i e  albumin)  - Assess for incontinence   - Turn and reposition patient  - Assist with mobility/ambulation  - Relieve pressure over bony prominences  - Avoid friction and shearing  - Provide appropriate hygiene as needed including keeping skin clean and dry  - Evaluate need for skin moisturizer/barrier cream  - Collaborate with interdisciplinary team (i e  Nutrition, Rehabilitation, etc )   - Patient/family teaching  Outcome: Progressing  Goal: Oral mucous membranes remain intact  Description  INTERVENTIONS  - Assess oral mucosa and hygiene practices  - Implement preventative oral hygiene regimen  - Implement oral medicated treatments as ordered  - Initiate Nutrition services referral as needed  Outcome: Progressing     Problem: HEMATOLOGIC - ADULT  Goal: Maintains hematologic stability  Description  INTERVENTIONS  - Assess for signs and symptoms of bleeding or hemorrhage  - Monitor labs  - Administer supportive blood products/factors as ordered and appropriate  Outcome: Progressing     Problem: MUSCULOSKELETAL - ADULT  Goal: Maintain or return mobility to safest level of function  Description  INTERVENTIONS:  - Assess patient's ability to carry out ADLs; assess patient's baseline for ADL function and identify physical deficits which impact ability to perform ADLs (bathing, care of mouth/teeth, toileting, grooming, dressing, etc )  - Assess/evaluate cause of self-care deficits   - Assess range of motion  - Assess patient's mobility  - Assess patient's need for assistive devices and provide as appropriate  - Encourage maximum independence but intervene and supervise when necessary  - Involve family in performance of ADLs  - Assess for home care needs following discharge   - Consider OT consult to assist with ADL evaluation and planning for discharge  - Provide patient education as appropriate  Outcome: Progressing     Problem: Prexisting or High Potential for Compromised Skin Integrity  Goal: Skin integrity is maintained or improved  Description  INTERVENTIONS:  - Identify patients at risk for skin breakdown  - Assess and monitor skin integrity  - Assess and monitor nutrition and hydration status  - Monitor labs   - Assess for incontinence   - Turn and reposition patient  - Assist with mobility/ambulation  - Relieve pressure over bony prominences  - Avoid friction and shearing  - Provide appropriate hygiene as needed including keeping skin clean and dry  - Evaluate need for skin moisturizer/barrier cream  - Collaborate with interdisciplinary team   - Patient/family teaching  - Consider wound care consult   Outcome: Progressing

## 2020-06-29 ENCOUNTER — APPOINTMENT (INPATIENT)
Dept: RADIOLOGY | Facility: HOSPITAL | Age: 85
DRG: 291 | End: 2020-06-29
Payer: MEDICARE

## 2020-06-29 PROBLEM — N17.9 ACUTE RENAL FAILURE (ARF) (HCC): Status: RESOLVED | Noted: 2020-06-28 | Resolved: 2020-06-29

## 2020-06-29 LAB
ANION GAP SERPL CALCULATED.3IONS-SCNC: 6 MMOL/L (ref 4–13)
ANION GAP SERPL CALCULATED.3IONS-SCNC: 6 MMOL/L (ref 4–13)
ATRIAL RATE: 64 BPM
BASE EXCESS BLDA CALC-SCNC: 1 MMOL/L
BASOPHILS # BLD AUTO: 0.01 THOUSANDS/ΜL (ref 0–0.1)
BASOPHILS NFR BLD AUTO: 0 % (ref 0–1)
BUN SERPL-MCNC: 79 MG/DL (ref 5–25)
BUN SERPL-MCNC: 81 MG/DL (ref 5–25)
CALCIUM SERPL-MCNC: 8.2 MG/DL (ref 8.3–10.1)
CALCIUM SERPL-MCNC: 8.2 MG/DL (ref 8.3–10.1)
CHLORIDE SERPL-SCNC: 107 MMOL/L (ref 100–108)
CHLORIDE SERPL-SCNC: 107 MMOL/L (ref 100–108)
CO2 SERPL-SCNC: 30 MMOL/L (ref 21–32)
CO2 SERPL-SCNC: 32 MMOL/L (ref 21–32)
CREAT SERPL-MCNC: 2.11 MG/DL (ref 0.6–1.3)
CREAT SERPL-MCNC: 2.16 MG/DL (ref 0.6–1.3)
EOSINOPHIL # BLD AUTO: 0.01 THOUSAND/ΜL (ref 0–0.61)
EOSINOPHIL NFR BLD AUTO: 0 % (ref 0–6)
ERYTHROCYTE [DISTWIDTH] IN BLOOD BY AUTOMATED COUNT: 15 % (ref 11.6–15.1)
GFR SERPL CREATININE-BSD FRML MDRD: 21 ML/MIN/1.73SQ M
GFR SERPL CREATININE-BSD FRML MDRD: 21 ML/MIN/1.73SQ M
GLUCOSE SERPL-MCNC: 101 MG/DL (ref 65–140)
GLUCOSE SERPL-MCNC: 105 MG/DL (ref 65–140)
GLUCOSE SERPL-MCNC: 105 MG/DL (ref 65–140)
GLUCOSE SERPL-MCNC: 164 MG/DL (ref 65–140)
GLUCOSE SERPL-MCNC: 178 MG/DL (ref 65–140)
GLUCOSE SERPL-MCNC: 319 MG/DL (ref 65–140)
GLUCOSE SERPL-MCNC: 60 MG/DL (ref 65–140)
GLUCOSE SERPL-MCNC: 74 MG/DL (ref 65–140)
GLUCOSE SERPL-MCNC: 89 MG/DL (ref 65–140)
HCO3 BLDA-SCNC: 26.6 MMOL/L (ref 22–28)
HCT VFR BLD AUTO: 27.4 % (ref 34.8–46.1)
HGB BLD-MCNC: 8.4 G/DL (ref 11.5–15.4)
IMM GRANULOCYTES # BLD AUTO: 0.03 THOUSAND/UL (ref 0–0.2)
IMM GRANULOCYTES NFR BLD AUTO: 0 % (ref 0–2)
LYMPHOCYTES # BLD AUTO: 1.72 THOUSANDS/ΜL (ref 0.6–4.47)
LYMPHOCYTES NFR BLD AUTO: 22 % (ref 14–44)
MAGNESIUM SERPL-MCNC: 2.3 MG/DL (ref 1.6–2.6)
MAGNESIUM SERPL-MCNC: 2.5 MG/DL (ref 1.6–2.6)
MCH RBC QN AUTO: 29 PG (ref 26.8–34.3)
MCHC RBC AUTO-ENTMCNC: 30.7 G/DL (ref 31.4–37.4)
MCV RBC AUTO: 95 FL (ref 82–98)
MONOCYTES # BLD AUTO: 0.95 THOUSAND/ΜL (ref 0.17–1.22)
MONOCYTES NFR BLD AUTO: 12 % (ref 4–12)
NEUTROPHILS # BLD AUTO: 5.22 THOUSANDS/ΜL (ref 1.85–7.62)
NEUTS SEG NFR BLD AUTO: 66 % (ref 43–75)
NRBC BLD AUTO-RTO: 0 /100 WBCS
O2 CT BLDA-SCNC: 11.3 ML/DL (ref 16–23)
OXYHGB MFR BLDA: 80.7 % (ref 94–97)
P AXIS: 52 DEGREES
PCO2 BLDA: 46.7 MM HG (ref 36–44)
PH BLDA: 7.37 [PH] (ref 7.35–7.45)
PHOSPHATE SERPL-MCNC: 4.5 MG/DL (ref 2.3–4.1)
PHOSPHATE SERPL-MCNC: 4.7 MG/DL (ref 2.3–4.1)
PLATELET # BLD AUTO: 197 THOUSANDS/UL (ref 149–390)
PMV BLD AUTO: 12 FL (ref 8.9–12.7)
PO2 BLDA: 46.7 MM HG (ref 75–129)
POTASSIUM SERPL-SCNC: 4.1 MMOL/L (ref 3.5–5.3)
POTASSIUM SERPL-SCNC: 4.5 MMOL/L (ref 3.5–5.3)
PR INTERVAL: 256 MS
PROCALCITONIN SERPL-MCNC: 0.12 NG/ML
QRS AXIS: 104 DEGREES
QRSD INTERVAL: 86 MS
QT INTERVAL: 512 MS
QTC INTERVAL: 532 MS
RBC # BLD AUTO: 2.9 MILLION/UL (ref 3.81–5.12)
SODIUM SERPL-SCNC: 143 MMOL/L (ref 136–145)
SODIUM SERPL-SCNC: 145 MMOL/L (ref 136–145)
SPECIMEN SOURCE: ABNORMAL
T WAVE AXIS: 101 DEGREES
VENTRICULAR RATE: 65 BPM
WBC # BLD AUTO: 7.94 THOUSAND/UL (ref 4.31–10.16)

## 2020-06-29 PROCEDURE — 94640 AIRWAY INHALATION TREATMENT: CPT

## 2020-06-29 PROCEDURE — 94760 N-INVAS EAR/PLS OXIMETRY 1: CPT

## 2020-06-29 PROCEDURE — 83735 ASSAY OF MAGNESIUM: CPT | Performed by: INTERNAL MEDICINE

## 2020-06-29 PROCEDURE — C9113 INJ PANTOPRAZOLE SODIUM, VIA: HCPCS | Performed by: NURSE PRACTITIONER

## 2020-06-29 PROCEDURE — 80048 BASIC METABOLIC PNL TOTAL CA: CPT | Performed by: INTERNAL MEDICINE

## 2020-06-29 PROCEDURE — 83735 ASSAY OF MAGNESIUM: CPT | Performed by: NURSE PRACTITIONER

## 2020-06-29 PROCEDURE — 84145 PROCALCITONIN (PCT): CPT | Performed by: NURSE PRACTITIONER

## 2020-06-29 PROCEDURE — 36600 WITHDRAWAL OF ARTERIAL BLOOD: CPT

## 2020-06-29 PROCEDURE — 84100 ASSAY OF PHOSPHORUS: CPT | Performed by: NURSE PRACTITIONER

## 2020-06-29 PROCEDURE — 80048 BASIC METABOLIC PNL TOTAL CA: CPT | Performed by: NURSE PRACTITIONER

## 2020-06-29 PROCEDURE — 82805 BLOOD GASES W/O2 SATURATION: CPT | Performed by: NURSE PRACTITIONER

## 2020-06-29 PROCEDURE — 85025 COMPLETE CBC W/AUTO DIFF WBC: CPT | Performed by: INTERNAL MEDICINE

## 2020-06-29 PROCEDURE — 82948 REAGENT STRIP/BLOOD GLUCOSE: CPT

## 2020-06-29 PROCEDURE — 93010 ELECTROCARDIOGRAM REPORT: CPT | Performed by: INTERNAL MEDICINE

## 2020-06-29 PROCEDURE — 94002 VENT MGMT INPAT INIT DAY: CPT

## 2020-06-29 PROCEDURE — 71045 X-RAY EXAM CHEST 1 VIEW: CPT

## 2020-06-29 PROCEDURE — 99223 1ST HOSP IP/OBS HIGH 75: CPT | Performed by: ANESTHESIOLOGY

## 2020-06-29 RX ORDER — GUAIFENESIN 600 MG
600 TABLET, EXTENDED RELEASE 12 HR ORAL EVERY 12 HOURS SCHEDULED
Status: DISCONTINUED | OUTPATIENT
Start: 2020-06-29 | End: 2020-07-03 | Stop reason: HOSPADM

## 2020-06-29 RX ORDER — GUAIFENESIN/DEXTROMETHORPHAN 100-10MG/5
10 SYRUP ORAL EVERY 4 HOURS PRN
Status: DISCONTINUED | OUTPATIENT
Start: 2020-06-29 | End: 2020-07-03 | Stop reason: HOSPADM

## 2020-06-29 RX ORDER — FUROSEMIDE 10 MG/ML
40 INJECTION INTRAMUSCULAR; INTRAVENOUS ONCE
Status: COMPLETED | OUTPATIENT
Start: 2020-06-29 | End: 2020-06-29

## 2020-06-29 RX ORDER — FUROSEMIDE 10 MG/ML
80 INJECTION INTRAMUSCULAR; INTRAVENOUS
Status: DISCONTINUED | OUTPATIENT
Start: 2020-06-29 | End: 2020-06-29

## 2020-06-29 RX ORDER — METHYLPREDNISOLONE SODIUM SUCCINATE 125 MG/2ML
125 INJECTION, POWDER, LYOPHILIZED, FOR SOLUTION INTRAMUSCULAR; INTRAVENOUS ONCE
Status: DISCONTINUED | OUTPATIENT
Start: 2020-06-29 | End: 2020-07-03 | Stop reason: HOSPADM

## 2020-06-29 RX ORDER — LABETALOL 20 MG/4 ML (5 MG/ML) INTRAVENOUS SYRINGE
5 EVERY 2 HOUR PRN
Status: DISCONTINUED | OUTPATIENT
Start: 2020-06-29 | End: 2020-07-03 | Stop reason: HOSPADM

## 2020-06-29 RX ORDER — PANTOPRAZOLE SODIUM 40 MG/1
40 INJECTION, POWDER, FOR SOLUTION INTRAVENOUS ONCE
Status: COMPLETED | OUTPATIENT
Start: 2020-06-29 | End: 2020-06-29

## 2020-06-29 RX ORDER — METHYLPREDNISOLONE SODIUM SUCCINATE 40 MG/ML
40 INJECTION, POWDER, LYOPHILIZED, FOR SOLUTION INTRAMUSCULAR; INTRAVENOUS ONCE
Status: DISCONTINUED | OUTPATIENT
Start: 2020-06-30 | End: 2020-06-30

## 2020-06-29 RX ORDER — FUROSEMIDE 10 MG/ML
80 INJECTION INTRAMUSCULAR; INTRAVENOUS
Status: DISCONTINUED | OUTPATIENT
Start: 2020-06-29 | End: 2020-07-02

## 2020-06-29 RX ORDER — FUROSEMIDE 10 MG/ML
80 INJECTION INTRAMUSCULAR; INTRAVENOUS ONCE
Status: COMPLETED | OUTPATIENT
Start: 2020-06-29 | End: 2020-06-29

## 2020-06-29 RX ADMIN — INSULIN LISPRO 1 UNITS: 100 INJECTION, SOLUTION INTRAVENOUS; SUBCUTANEOUS at 18:06

## 2020-06-29 RX ADMIN — FUROSEMIDE 40 MG: 10 INJECTION, SOLUTION INTRAMUSCULAR; INTRAVENOUS at 08:38

## 2020-06-29 RX ADMIN — DOCUSATE SODIUM 100 MG: 100 CAPSULE, LIQUID FILLED ORAL at 18:02

## 2020-06-29 RX ADMIN — SUCRALFATE 1000 MG: 1 SUSPENSION ORAL at 21:49

## 2020-06-29 RX ADMIN — IPRATROPIUM BROMIDE 0.5 MG: 0.5 SOLUTION RESPIRATORY (INHALATION) at 02:14

## 2020-06-29 RX ADMIN — FUROSEMIDE 40 MG: 10 INJECTION, SOLUTION INTRAMUSCULAR; INTRAVENOUS at 07:58

## 2020-06-29 RX ADMIN — LEVALBUTEROL HYDROCHLORIDE 1.25 MG: 1.25 SOLUTION, CONCENTRATE RESPIRATORY (INHALATION) at 07:36

## 2020-06-29 RX ADMIN — FUROSEMIDE 80 MG: 10 INJECTION, SOLUTION INTRAMUSCULAR; INTRAVENOUS at 22:54

## 2020-06-29 RX ADMIN — BUDESONIDE 0.5 MG: 0.5 INHALANT RESPIRATORY (INHALATION) at 20:39

## 2020-06-29 RX ADMIN — INSULIN LISPRO 2 UNITS: 100 INJECTION, SOLUTION INTRAVENOUS; SUBCUTANEOUS at 20:52

## 2020-06-29 RX ADMIN — AMLODIPINE BESYLATE 5 MG: 5 TABLET ORAL at 09:46

## 2020-06-29 RX ADMIN — IPRATROPIUM BROMIDE 0.5 MG: 0.5 SOLUTION RESPIRATORY (INHALATION) at 20:39

## 2020-06-29 RX ADMIN — PANTOPRAZOLE SODIUM 40 MG: 40 INJECTION, POWDER, FOR SOLUTION INTRAVENOUS at 09:45

## 2020-06-29 RX ADMIN — BUDESONIDE 0.5 MG: 0.5 INHALANT RESPIRATORY (INHALATION) at 07:36

## 2020-06-29 RX ADMIN — INSULIN LISPRO 5 UNITS: 100 INJECTION, SOLUTION INTRAVENOUS; SUBCUTANEOUS at 03:13

## 2020-06-29 RX ADMIN — LEVALBUTEROL HYDROCHLORIDE 1.25 MG: 1.25 SOLUTION, CONCENTRATE RESPIRATORY (INHALATION) at 13:26

## 2020-06-29 RX ADMIN — IPRATROPIUM BROMIDE 0.5 MG: 0.5 SOLUTION RESPIRATORY (INHALATION) at 13:26

## 2020-06-29 RX ADMIN — CARVEDILOL 1.56 MG: 3.12 TABLET, FILM COATED ORAL at 18:03

## 2020-06-29 RX ADMIN — APIXABAN 2.5 MG: 2.5 TABLET, FILM COATED ORAL at 18:04

## 2020-06-29 RX ADMIN — GUAIFENESIN 600 MG: 600 TABLET, EXTENDED RELEASE ORAL at 20:34

## 2020-06-29 RX ADMIN — IPRATROPIUM BROMIDE 0.5 MG: 0.5 SOLUTION RESPIRATORY (INHALATION) at 07:36

## 2020-06-29 RX ADMIN — LEVALBUTEROL HYDROCHLORIDE 1.25 MG: 1.25 SOLUTION, CONCENTRATE RESPIRATORY (INHALATION) at 20:39

## 2020-06-29 RX ADMIN — APIXABAN 2.5 MG: 2.5 TABLET, FILM COATED ORAL at 09:46

## 2020-06-29 RX ADMIN — CARVEDILOL 1.56 MG: 3.12 TABLET, FILM COATED ORAL at 09:46

## 2020-06-29 RX ADMIN — CHLORHEXIDINE GLUCONATE 0.12% ORAL RINSE 15 ML: 1.2 LIQUID ORAL at 20:34

## 2020-06-29 RX ADMIN — PRAVASTATIN SODIUM 80 MG: 80 TABLET ORAL at 16:12

## 2020-06-29 RX ADMIN — GUAIFENESIN 600 MG: 600 TABLET, EXTENDED RELEASE ORAL at 12:26

## 2020-06-29 RX ADMIN — FUROSEMIDE 80 MG: 10 INJECTION, SOLUTION INTRAMUSCULAR; INTRAVENOUS at 16:12

## 2020-06-29 RX ADMIN — LEVALBUTEROL HYDROCHLORIDE 1.25 MG: 1.25 SOLUTION, CONCENTRATE RESPIRATORY (INHALATION) at 02:14

## 2020-06-29 NOTE — PROGRESS NOTES
Pt with noted elevated POC BS levels, CCD 3 ordered, will adjust to CCD 1 to better match estimated energy needs  Will monitor intake, supplement if appropriate  ST evaluation not complete at this time, pt reports pureed diet is baseline for her, dysphagia diet per ST recommendations  Presently on BiPAP, plans to be intermittent use  Per cardiology, pt appears euvolemic, recommending increase in lasix use  Will order 4 gm TATYANA, monitor intake and adjust diet appropriately/minimize diet restrictions when possible

## 2020-06-29 NOTE — ASSESSMENT & PLAN NOTE
· Takes PO Protonix 40 mg Daily and Carafate 1000 mg Daily at ECF  · Received 1x dose of IV Protonix 40 mg today d/t NPO status post rapid  · Plan to continue PO medications tomorrow, 6/30 if able to take PO

## 2020-06-29 NOTE — SPEECH THERAPY NOTE
Speech-Language Pathology Bedside Swallow Evaluation    Patient Name: Teresita Ro    UBWRX'I Date: 6/29/2020     ST consult received and appreciated  Pt currently on BiPap, will see for dysphagia evaluation tomorrow AM 6/30/2020  Current Medical Status per KRISTIN Reynolds's H&P 6/26/2020  HX and PE limited by: [facility] and currently on BiPAP  Teresita Ro is a 82 y o  female with past medical history of combined systolic and diastolic congestive heart failure, COPD, atrial fib, s/p pacemaker, HTN, HLD, diabetes mellitus type 2, GERD, who presents with shortness of breath  Patient states she felt shortness of breath waking this a m     At baseline patient does wear 2 L nasal cannula  Patient is a resident at [facility] and when assessed by staff it was said that her pulse oximetry was reading in the 70s  EMS was called and upon their exam patient had improved pulse ox to 90% on 2 L  Patient was brought into ED for further evaluation  Upon exam she was noted to have diminished lung sounds, wheezes, crackles and increased effort and work of breathing  Patient was initially placed on non rebreathing mask however did not improve  Patient was transitioned to BiPAP and additionally was given Solu-Medrol 80 mg IV, magnesium 1 g IV and DuoNeb treatment  Chest x-ray and CT non chronic chest showed pulmonary edema and bilateral pleural effusions  D-dimer was elevated at 2 17 subsequently patient went for V/Q scan which showed very low probability of pulmonary embolism  Pro NT BNP was elevated at 6956  Patient was given Lasix 60 mg IV x1  Patient denied fevers, chills, diaphoresis, sore throat, difficulty swallowing, chest pain, palpitations, abdominal pain, diarrhea, constipation, difficulty passing urine, dysuria, headache, dizziness  Patient did report that she felt nauseous after breakfast and did vomit x1 today  Critical care was called to evaluate patient for admission    Per patient stone of breath was improved  Patient was still on BiPAP during this time and appeared comfortable  Patient was able to talk in full sentences despite being on BiPAP  Lung sounds remain diminished and noted to have fine crackles and occasional wheezing  Will admit patient to step-down level 1  History obtained from chart review and the patient

## 2020-06-29 NOTE — ASSESSMENT & PLAN NOTE
Lab Results   Component Value Date    HGBA1C 7 9 (H) 03/28/2020       Recent Labs     06/29/20  0722 06/29/20  0753 06/29/20  0903 06/29/20  1205   POCGLU 60* 74 89 105       Blood Sugar Average: Last 72 hrs:  (P) 225 1875   · Takes Humulin SSI and Lantus 10 units at HS at [facility]  · BS labile throughout admission  · On floor was taking:   · 3 units standing mealtime Humalog  · SSI Humalog AC/HS  · Lantus 8 units at HS  · BS overnight >500; glucose check was 525  · Received additional 1x dose of Humalog 5 units  · BS 60 this AM during rapid response  · Improved post OJ  · Currently getting SSI AC/HS and Lantus held as pt NPO w/BIPAP  · Consider insulin gtt while on steroids if BS does not stabilize  · Currently NPO, however previously on diabetic diet

## 2020-06-29 NOTE — PHYSICAL THERAPY NOTE
Physical Therapy Cancellation Note    PT order received  Chart review performed  At this time, PT evaluation cancelled as patient is not medically appropriate for skilled therapy interventions  Pt was transferred from [facility] to ICU, currently on BiPAP  PT will follow and evaluate as appropriate      Bertha George PT

## 2020-06-29 NOTE — ASSESSMENT & PLAN NOTE
· History of atrial fibrillation; does have pacemaker  · Currently in NSR  · Continue medications from ECF  · PO Carvedilol 1 56 mg BID  · PO Norvasc 5 mg  · PO Eliquis 2 5 mg BID

## 2020-06-29 NOTE — ASSESSMENT & PLAN NOTE
· Likely multifactorial - acute on chronic combined systolic and diastolic congestive heart failure, COPD, bilateral pleural effusions  · Initially presented w/SOB  · SpO2 at ECF in 70s on baseline 2L NC  · When EMS arrived, SpO2 90% w/increased WOB  · In ED, pt tachypneic w/increased WOB and wheezing  · Initially placed on NRB and then transitioned to BIPAP  · Given IV Solu-Medrol 80 mg, Duoneb treatment, and IV Mag 1g  · NT Pro-BNP 6956, previous was 12,600 on 06/12  · CT chest showed pulmonary edema, moderate right pleural effusion, small left pleural effusion  · Weaned off BIPAP to 4L NC on admission to ICU; transferred to /S on 6/27  · Pt was rapid response on 6/29 for increased WOB and SOB; SpO2 in upper 90's  · Stat CXR showed pulmonary edema and bilateral pleural effusions  · Received IV Lasix 40 mg x2 and IV Solu-Medrol 125 mg  · Transferred back to ICU on BIPAP 16/7/40%  · Will check PCT in setting of cough w/sputum production and SOB/increased WOB  · Scheduled PO Guaifenesin and PRN Robitussin DM

## 2020-06-29 NOTE — CASE MANAGEMENT
Chart reviewed:  Rapid response was called due to ^ SOB this AM and patient is now in the ICU on bipap     Pt was admitted from [facility]: Likely multifactorial - acute on chronic combined systolic and diastolic congestive heart failure, COPD, bilateral pleural effusions  ( 02 sats were 70 %)    Pt is readmission as she was here 6/12- 6/19 with HF, UTI, CKD,  -normally she uses 2 liters of 02  Scheduled IV medications include: solumedrol, labetalol, 80 mg BID lasix  At this time will continue to follow medical management  DC plan is return to [facility]  1:30 Spoke to Curt Velez the daughter, patient was initially admitted to [facility] in May  Prior to that she was living in [facility], using 2 liters of 02 and uses a walker  Pt has 5 children: No formal POA/ Advance Directive as of yet  They have started the paperwork, and it just needs to be finalized  Per Curt Velez she makes the health care decisions for her Mother  Plan is to return to [facility] per Curt Velez

## 2020-06-29 NOTE — PROGRESS NOTES
Patient coughed up small amount of yellow sputum after reporting to daughter feeling like she was going to throw up  Patient feels better after coughing up yellow sputum  Dr Redd and CRISTIN DIEGONP to bedside  Removed bipap, patient tolerating nasal cannula at present

## 2020-06-29 NOTE — RESPIRATORY THERAPY NOTE
RT Protocol Note  Josey Barrera 82 y o  female MRN: [id number]  Unit/Bed#: -01 Encounter: [id number]    Assessment    Principal Problem:    Acute respiratory failure with hypoxia ([place] [address] )  Active Problems:    Essential hypertension    Acute on chronic systolic CHF (congestive heart failure) ([facility])    Other hyperlipidemia    COPD with emphysema ([facility])    CKD (chronic kidney disease) stage 4, GFR 15-29 ml/min ([facility])    Esophageal reflux    Type 2 diabetes mellitus with stage 3 chronic kidney disease, with long-term current use of insulin ([facility])    A-fib ([facility])    Anemia    NANCY (acute kidney injury) ([place] [address] )    Acute renal failure (ARF) ([facility])      Home Pulmonary Medications:  advair 250/50, duoneb as needed for shortness of breath  Home Devices/Therapy: Home O2    Past Medical History:   Diagnosis Date    NANCY (acute kidney injury) ([place] [address] )     Atrial fibrillation ([place] [address] )     CHF (congestive heart failure) ([facility])     COPD (chronic obstructive pulmonary disease) ([place] [address] )     Diabetes mellitus ([place] [address] )     Trigger finger of thumb     last assessed: 13     Social History     Socioeconomic History    Marital status:       Spouse name: None    Number of children: None    Years of education: None    Highest education level: None   Occupational History    None   Social Needs    Financial resource strain: None    Food insecurity:     Worry: Never true     Inability: Never true    Transportation needs:     Medical: No     Non-medical: No   Tobacco Use    Smoking status: Former Smoker     Last attempt to quit: 2011     Years since quittin 0    Smokeless tobacco: Never Used    Tobacco comment: Former smoker per allscripts   Substance and Sexual Activity    Alcohol use: Not Currently    Drug use: No    Sexual activity: None   Lifestyle    Physical activity:     Days per week: None     Minutes per session: None    Stress: None   Relationships    Social connections:     Talks on phone: None     Gets together: None     Attends Jain service: None     Active member of club or organization: None     Attends meetings of clubs or organizations: None     Relationship status: None    Intimate partner violence:     Fear of current or ex partner: None     Emotionally abused: None     Physically abused: None     Forced sexual activity: None   Other Topics Concern    None   Social History Narrative    None       Subjective    Subjective Data: patient reports feeling " alittle short of breath" pre treatment  Denies any additional worlk of breathing    Objective    Physical Exam:   Assessment Type: Pre-treatment  General Appearance: Awake, Alert  Respiratory Pattern: Dyspnea with exertion  Chest Assessment: Chest expansion symmetrical  Bilateral Breath Sounds: Diminished  O2 Device: 2L nasal cannula    Vitals:  Blood pressure 111/67, pulse 68, temperature 97 9 °F (36 6 °C), resp  rate 18, height 5' (1 524 m), weight 69 8 kg (153 lb 14 1 oz), SpO2 93 %      Results from last 7 days   Lab Units 06/26/20  0913   PH ART  7 334*   PCO2 ART mm Hg 50 0*   PO2 ART mm Hg 121 5   HCO3 ART mmol/L 26 0   BASE EXC ART mmol/L -0 2   O2 CONTENT ART mL/dL 14 0*   O2 HGB, ARTERIAL % 97 3*   ABG SOURCE  Radial, Left   GARDENIA TEST  Yes       Imaging and other studies: Persistent small effusions and bibasilar atelectasis    O2 Device: 2L nasal cannula     Plan    Respiratory Plan: Mild Distress pathway  Airway Clearance Plan: Incentive Spirometer     Resp Comments: no dyspnea on room air

## 2020-06-29 NOTE — ASSESSMENT & PLAN NOTE
· Initially presented w/SOB   · SpO2 in 70's on baseline 2L NC per ECF  · Placed on NRB in ED and transitioned to BiPAP   · Received Duoneb treatment, IV Mag 1g, IV Lasix 60 mg, and IV Solu-Medrol 80 mg  · Weaned from BIPAP to 4L NC on admission to ICU  · Transferred out to [facility] on 6/27  · Rapid response for increased WOB/SOB on 6/29 d/t pulmonary edema  · Received IV Lasix 40 mg x2 and IV Solu-Medrol bolus of 125 mg  · IV Lasix dose increased  · Will give additional Solu-Medrol dose of 40 mg tomorrow, 6/30 to complete 5 days of steroids  · Continue respiratory treatments;  Xopenex, Atrovent, Pulmicort  · Respiratory and airway clearance protocols  · Incentive spirometry  · Continue Claritin, Flonase

## 2020-06-29 NOTE — PLAN OF CARE
Problem: Potential for Falls  Goal: Patient will remain free of falls  Description  INTERVENTIONS:  - Assess patient frequently for physical needs  -  Identify cognitive and physical deficits and behaviors that affect risk of falls  -  [place] fall precautions as indicated by assessment   - Educate patient/family on patient safety including physical limitations  - Instruct patient to call for assistance with activity based on assessment  - Modify environment to reduce risk of injury  - Consider OT/PT consult to assist with strengthening/mobility  Outcome: Progressing     Problem: NEUROSENSORY - ADULT  Goal: Achieves stable or improved neurological status  Description  INTERVENTIONS  - Monitor and report changes in neurological status  - Monitor vital signs such as temperature, blood pressure, glucose, and any other labs ordered   - Initiate measures to prevent increased intracranial pressure  - Monitor for seizure activity and implement precautions if appropriate      Outcome: Progressing  Goal: Achieves maximal functionality and self care  Description  INTERVENTIONS  - Monitor swallowing and airway patency with patient fatigue and changes in neurological status  - Encourage and assist patient to increase activity and self care     - Encourage visually impaired, hearing impaired and aphasic patients to use assistive/communication devices  Outcome: Progressing     Problem: CARDIOVASCULAR - ADULT  Goal: Maintains optimal cardiac output and hemodynamic stability  Description  INTERVENTIONS:  - Monitor I/O, vital signs and rhythm  - Monitor for S/S and trends of decreased cardiac output  - Administer and titrate ordered vasoactive medications to optimize hemodynamic stability  - Assess quality of pulses, skin color and temperature  - Assess for signs of decreased coronary artery perfusion  - Instruct patient to report change in severity of symptoms  Outcome: Progressing  Goal: Absence of cardiac dysrhythmias or at baseline rhythm  Description  INTERVENTIONS:  - Continuous cardiac monitoring, vital signs, obtain 12 lead EKG if ordered  - Administer antiarrhythmic and heart rate control medications as ordered  - Monitor electrolytes and administer replacement therapy as ordered  Outcome: Progressing     Problem: RESPIRATORY - ADULT  Goal: Achieves optimal ventilation and oxygenation  Description  INTERVENTIONS:  - Assess for changes in respiratory status  - Assess for changes in mentation and behavior  - Position to facilitate oxygenation and minimize respiratory effort  - Oxygen administered by appropriate delivery if ordered  - Initiate smoking cessation education as indicated  - Encourage broncho-pulmonary hygiene including cough, deep breathe, Incentive Spirometry  - Assess the need for suctioning and aspirate as needed  - Assess and instruct to report SOB or any respiratory difficulty  - Respiratory Therapy support as indicated  Outcome: Progressing     Problem: GASTROINTESTINAL - ADULT  Goal: Minimal or absence of nausea and/or vomiting  Description  INTERVENTIONS:  - Administer IV fluids if ordered to ensure adequate hydration  - Maintain NPO status until nausea and vomiting are resolved  - Nasogastric tube if ordered  - Administer ordered antiemetic medications as needed  - Provide nonpharmacologic comfort measures as appropriate  - Advance diet as tolerated, if ordered  - Consider nutrition services referral to assist patient with adequate nutrition and appropriate food choices  Outcome: Progressing  Goal: Maintains or returns to baseline bowel function  Description  INTERVENTIONS:  - Assess bowel function  - Encourage oral fluids to ensure adequate hydration  - Administer IV fluids if ordered to ensure adequate hydration  - Administer ordered medications as needed  - Encourage mobilization and activity  - Consider nutritional services referral to assist patient with adequate nutrition and appropriate food choices  Outcome: Progressing  Goal: Maintains adequate nutritional intake  Description  INTERVENTIONS:  - Monitor percentage of each meal consumed  - Identify factors contributing to decreased intake, treat as appropriate  - Assist with meals as needed  - Monitor I&O, weight, and lab values if indicated  - Obtain nutrition services referral as needed  Outcome: Progressing     Problem: GENITOURINARY - ADULT  Goal: Maintains or returns to baseline urinary function  Description  INTERVENTIONS:  - Assess urinary function  - Encourage oral fluids to ensure adequate hydration if ordered  - Administer IV fluids as ordered to ensure adequate hydration  - Administer ordered medications as needed  - Offer frequent toileting  - Follow urinary retention protocol if ordered  Outcome: Progressing  Goal: Absence of urinary retention  Description  INTERVENTIONS:  - Assess patients ability to void and empty bladder  - Monitor I/O  - Bladder scan as needed  - Discuss with physician/AP medications to alleviate retention as needed  - Discuss catheterization for long term situations as appropriate  Outcome: Progressing     Problem: METABOLIC, FLUID AND ELECTROLYTES - ADULT  Goal: Electrolytes maintained within normal limits  Description  INTERVENTIONS:  - Monitor labs and assess patient for signs and symptoms of electrolyte imbalances  - Administer electrolyte replacement as ordered  - Monitor response to electrolyte replacements, including repeat lab results as appropriate  - Instruct patient on fluid and nutrition as appropriate  Outcome: Progressing  Goal: Fluid balance maintained  Description  INTERVENTIONS:  - Monitor labs   - Monitor I/O and WT  - Instruct patient on fluid and nutrition as appropriate  - Assess for signs & symptoms of volume excess or deficit  Outcome: Progressing  Goal: Glucose maintained within target range  Description  INTERVENTIONS:  - Monitor Blood Glucose as ordered  - Assess for signs and symptoms of hyperglycemia and hypoglycemia  - Administer ordered medications to maintain glucose within target range  - Assess nutritional intake and initiate nutrition service referral as needed  Outcome: Progressing     Problem: SKIN/TISSUE INTEGRITY - ADULT  Goal: Skin integrity remains intact  Description  INTERVENTIONS  - Identify patients at risk for skin breakdown  - Assess and monitor skin integrity  - Assess and monitor nutrition and hydration status  - Monitor labs (i e  albumin)  - Assess for incontinence   - Turn and reposition patient  - Assist with mobility/ambulation  - Relieve pressure over bony prominences  - Avoid friction and shearing  - Provide appropriate hygiene as needed including keeping skin clean and dry  - Evaluate need for skin moisturizer/barrier cream  - Collaborate with interdisciplinary team (i e  Nutrition, Rehabilitation, etc )   - Patient/family teaching  Outcome: Progressing  Goal: Oral mucous membranes remain intact  Description  INTERVENTIONS  - Assess oral mucosa and hygiene practices  - Implement preventative oral hygiene regimen  - Implement oral medicated treatments as ordered  - Initiate Nutrition services referral as needed  Outcome: Progressing     Problem: HEMATOLOGIC - ADULT  Goal: Maintains hematologic stability  Description  INTERVENTIONS  - Assess for signs and symptoms of bleeding or hemorrhage  - Monitor labs  - Administer supportive blood products/factors as ordered and appropriate  Outcome: Progressing     Problem: MUSCULOSKELETAL - ADULT  Goal: Maintain or return mobility to safest level of function  Description  INTERVENTIONS:  - Assess patient's ability to carry out ADLs; assess patient's baseline for ADL function and identify physical deficits which impact ability to perform ADLs (bathing, care of mouth/teeth, toileting, grooming, dressing, etc )  - Assess/evaluate cause of self-care deficits   - Assess range of motion  - Assess patient's mobility  - Assess patient's need for assistive devices and provide as appropriate  - Encourage maximum independence but intervene and supervise when necessary  - Involve family in performance of ADLs  - Assess for home care needs following discharge   - Consider OT consult to assist with ADL evaluation and planning for discharge  - Provide patient education as appropriate  Outcome: Progressing     Problem: Prexisting or High Potential for Compromised Skin Integrity  Goal: Skin integrity is maintained or improved  Description  INTERVENTIONS:  - Identify patients at risk for skin breakdown  - Assess and monitor skin integrity  - Assess and monitor nutrition and hydration status  - Monitor labs   - Assess for incontinence   - Turn and reposition patient  - Assist with mobility/ambulation  - Relieve pressure over bony prominences  - Avoid friction and shearing  - Provide appropriate hygiene as needed including keeping skin clean and dry  - Evaluate need for skin moisturizer/barrier cream  - Collaborate with interdisciplinary team   - Patient/family teaching  - Consider wound care consult   Outcome: Progressing

## 2020-06-29 NOTE — PLAN OF CARE
Problem: Potential for Falls  Goal: Patient will remain free of falls  Description  INTERVENTIONS:  - Assess patient frequently for physical needs  -  Identify cognitive and physical deficits and behaviors that affect risk of falls  -  [place] fall precautions as indicated by assessment   - Educate patient/family on patient safety including physical limitations  - Instruct patient to call for assistance with activity based on assessment  - Modify environment to reduce risk of injury  - Consider OT/PT consult to assist with strengthening/mobility  Outcome: Progressing     Problem: NEUROSENSORY - ADULT  Goal: Achieves stable or improved neurological status  Description  INTERVENTIONS  - Monitor and report changes in neurological status  - Monitor vital signs such as temperature, blood pressure, glucose, and any other labs ordered   - Initiate measures to prevent increased intracranial pressure  - Monitor for seizure activity and implement precautions if appropriate      Outcome: Progressing  Goal: Achieves maximal functionality and self care  Description  INTERVENTIONS  - Monitor swallowing and airway patency with patient fatigue and changes in neurological status  - Encourage and assist patient to increase activity and self care     - Encourage visually impaired, hearing impaired and aphasic patients to use assistive/communication devices  Outcome: Progressing     Problem: CARDIOVASCULAR - ADULT  Goal: Maintains optimal cardiac output and hemodynamic stability  Description  INTERVENTIONS:  - Monitor I/O, vital signs and rhythm  - Monitor for S/S and trends of decreased cardiac output  - Administer and titrate ordered vasoactive medications to optimize hemodynamic stability  - Assess quality of pulses, skin color and temperature  - Assess for signs of decreased coronary artery perfusion  - Instruct patient to report change in severity of symptoms  Outcome: Progressing  Goal: Absence of cardiac dysrhythmias or at baseline rhythm  Description  INTERVENTIONS:  - Continuous cardiac monitoring, vital signs, obtain 12 lead EKG if ordered  - Administer antiarrhythmic and heart rate control medications as ordered  - Monitor electrolytes and administer replacement therapy as ordered  Outcome: Progressing     Problem: RESPIRATORY - ADULT  Goal: Achieves optimal ventilation and oxygenation  Description  INTERVENTIONS:  - Assess for changes in respiratory status  - Assess for changes in mentation and behavior  - Position to facilitate oxygenation and minimize respiratory effort  - Oxygen administered by appropriate delivery if ordered  - Initiate smoking cessation education as indicated  - Encourage broncho-pulmonary hygiene including cough, deep breathe, Incentive Spirometry  - Assess the need for suctioning and aspirate as needed  - Assess and instruct to report SOB or any respiratory difficulty  - Respiratory Therapy support as indicated  Outcome: Progressing     Problem: GASTROINTESTINAL - ADULT  Goal: Minimal or absence of nausea and/or vomiting  Description  INTERVENTIONS:  - Administer IV fluids if ordered to ensure adequate hydration  - Maintain NPO status until nausea and vomiting are resolved  - Nasogastric tube if ordered  - Administer ordered antiemetic medications as needed  - Provide nonpharmacologic comfort measures as appropriate  - Advance diet as tolerated, if ordered  - Consider nutrition services referral to assist patient with adequate nutrition and appropriate food choices  Outcome: Progressing  Goal: Maintains or returns to baseline bowel function  Description  INTERVENTIONS:  - Assess bowel function  - Encourage oral fluids to ensure adequate hydration  - Administer IV fluids if ordered to ensure adequate hydration  - Administer ordered medications as needed  - Encourage mobilization and activity  - Consider nutritional services referral to assist patient with adequate nutrition and appropriate food choices  Outcome: Progressing  Goal: Maintains adequate nutritional intake  Description  INTERVENTIONS:  - Monitor percentage of each meal consumed  - Identify factors contributing to decreased intake, treat as appropriate  - Assist with meals as needed  - Monitor I&O, weight, and lab values if indicated  - Obtain nutrition services referral as needed  Outcome: Progressing     Problem: GENITOURINARY - ADULT  Goal: Maintains or returns to baseline urinary function  Description  INTERVENTIONS:  - Assess urinary function  - Encourage oral fluids to ensure adequate hydration if ordered  - Administer IV fluids as ordered to ensure adequate hydration  - Administer ordered medications as needed  - Offer frequent toileting  - Follow urinary retention protocol if ordered  Outcome: Progressing  Goal: Absence of urinary retention  Description  INTERVENTIONS:  - Assess patients ability to void and empty bladder  - Monitor I/O  - Bladder scan as needed  - Discuss with physician/AP medications to alleviate retention as needed  - Discuss catheterization for long term situations as appropriate  Outcome: Progressing     Problem: METABOLIC, FLUID AND ELECTROLYTES - ADULT  Goal: Electrolytes maintained within normal limits  Description  INTERVENTIONS:  - Monitor labs and assess patient for signs and symptoms of electrolyte imbalances  - Administer electrolyte replacement as ordered  - Monitor response to electrolyte replacements, including repeat lab results as appropriate  - Instruct patient on fluid and nutrition as appropriate  Outcome: Progressing  Goal: Fluid balance maintained  Description  INTERVENTIONS:  - Monitor labs   - Monitor I/O and WT  - Instruct patient on fluid and nutrition as appropriate  - Assess for signs & symptoms of volume excess or deficit  Outcome: Progressing  Goal: Glucose maintained within target range  Description  INTERVENTIONS:  - Monitor Blood Glucose as ordered  - Assess for signs and symptoms of hyperglycemia and hypoglycemia  - Administer ordered medications to maintain glucose within target range  - Assess nutritional intake and initiate nutrition service referral as needed  Outcome: Progressing     Problem: SKIN/TISSUE INTEGRITY - ADULT  Goal: Skin integrity remains intact  Description  INTERVENTIONS  - Identify patients at risk for skin breakdown  - Assess and monitor skin integrity  - Assess and monitor nutrition and hydration status  - Monitor labs (i e  albumin)  - Assess for incontinence   - Turn and reposition patient  - Assist with mobility/ambulation  - Relieve pressure over bony prominences  - Avoid friction and shearing  - Provide appropriate hygiene as needed including keeping skin clean and dry  - Evaluate need for skin moisturizer/barrier cream  - Collaborate with interdisciplinary team (i e  Nutrition, Rehabilitation, etc )   - Patient/family teaching  Outcome: Progressing  Goal: Oral mucous membranes remain intact  Description  INTERVENTIONS  - Assess oral mucosa and hygiene practices  - Implement preventative oral hygiene regimen  - Implement oral medicated treatments as ordered  - Initiate Nutrition services referral as needed  Outcome: Progressing     Problem: HEMATOLOGIC - ADULT  Goal: Maintains hematologic stability  Description  INTERVENTIONS  - Assess for signs and symptoms of bleeding or hemorrhage  - Monitor labs  - Administer supportive blood products/factors as ordered and appropriate  Outcome: Progressing     Problem: MUSCULOSKELETAL - ADULT  Goal: Maintain or return mobility to safest level of function  Description  INTERVENTIONS:  - Assess patient's ability to carry out ADLs; assess patient's baseline for ADL function and identify physical deficits which impact ability to perform ADLs (bathing, care of mouth/teeth, toileting, grooming, dressing, etc )  - Assess/evaluate cause of self-care deficits   - Assess range of motion  - Assess patient's mobility  - Assess patient's need for assistive devices and provide as appropriate  - Encourage maximum independence but intervene and supervise when necessary  - Assess for home care needs following discharge   - Consider OT consult to assist with ADL evaluation and planning for discharge  - Provide patient education as appropriate  Outcome: Progressing     Problem: Prexisting or High Potential for Compromised Skin Integrity  Goal: Skin integrity is maintained or improved  Description  INTERVENTIONS:  - Identify patients at risk for skin breakdown  - Assess and monitor skin integrity  - Assess and monitor nutrition and hydration status  - Monitor labs   - Assess for incontinence   - Turn and reposition patient  - Assist with mobility/ambulation  - Relieve pressure over bony prominences  - Avoid friction and shearing  - Provide appropriate hygiene as needed including keeping skin clean and dry  - Evaluate need for skin moisturizer/barrier cream  - Collaborate with interdisciplinary team   - Patient/family teaching  Outcome: Progressing

## 2020-06-29 NOTE — PROGRESS NOTES
Patient taken off bipap by respiratory,m tolerating well on 4L NC  Daughter at bedside  Patient given mouth swab to moisten mouth

## 2020-06-29 NOTE — CONSULTS
Accept/Consult Note - Rachel Puri 1935, 82 y o  female MRN: [id number]    Unit/Bed#: -01 Encounter: [id number]    Primary Care Provider: Ike Flanagan MD   Date and time admitted to hospital: 6/26/2020  8:14 AM      * Acute respiratory failure with hypoxia ([facility] )  Assessment & Plan  · Likely multifactorial - acute on chronic combined systolic and diastolic congestive heart failure, COPD, bilateral pleural effusions  · Initially presented w/SOB  · SpO2 at ECF in 70s on baseline 2L NC  · When EMS arrived, SpO2 90% w/increased WOB  · In ED, pt tachypneic w/increased WOB and wheezing  · Initially placed on NRB and then transitioned to BIPAP  · Given IV Solu-Medrol 80 mg, Duoneb treatment, and IV Mag 1g  · NT Pro-BNP 6956, previous was 12,600 on 06/12  · CT chest showed pulmonary edema, moderate right pleural effusion, small left pleural effusion  · Weaned off BIPAP to 4L NC on admission to ICU; transferred to /S on 6/27  · Pt was rapid response on 6/29 for increased WOB and SOB; SpO2 in upper 90's  · Stat CXR showed pulmonary edema and bilateral pleural effusions  · Received IV Lasix 40 mg x2 and IV Solu-Medrol 125 mg  · Transferred back to ICU on BIPAP 16/7/40%  · Will check PCT in setting of cough w/sputum production and SOB/increased WOB  · Scheduled PO Guaifenesin and PRN Robitussin DM     Acute on chronic systolic CHF (congestive heart failure) (HCC)  Assessment & Plan  Wt Readings from Last 3 Encounters:   06/29/20 71 5 kg (157 lb 10 1 oz)   06/22/20 68 9 kg (152 lb)   06/19/20 66 4 kg (146 lb 6 2 oz)     · On admission, CXR/CT chest showed pulmonary edema  · In ED, given IV Lasix 60 mg  · NT Pro-BNP 6956; previously 12,600 on 06/12   · NOLAN 06/12/20 - "Akinesis of the basal to mid inferior and the inferolateral walls  The ventricle was dilated  Systolic function was moderately reduced  EF 35 % to 40 %  There was assymetrical hypertrophy of the septum   Normal size and systolic function of right ventricle  Left atrium moderately dilated  Mild mitral valve regurgitation  Mild tricuspid valve regurgitation "  · TTE report in care everywhere from 03/30/20 - "Left ventricle is mildly dilated  Severely reduced left ventricular systolic function  Basal to mid inferior and inferolateral akinesis  Mild concentric left ventricular hypertrophy  EF 30%  Normal right ventricular size  Reduced right ventricular systolic function  Severely dilated left atrium  Diastolic dysfunction with elevated filling pressures "  · Takes PO Lasix 40 mg daily at [facility], however was taking IV Lasix 40 mg BID this admission  · Pt was rapid response on 6/29 for increased WOB, SOB, pulmonary edema on CXR  · Given IV Lasix 40 mg x2 and dosing increased to IV Lasix 80 mg BID  · May give additional dose PRN depending on response  · Goal net fluid balance -1 to -2 L  · Continue daily weights  · Place Vasquez for strict I/O     COPD with emphysema ([facility] )  Assessment & Plan  · Initially presented w/SOB   · SpO2 in 70's on baseline 2L NC per ECF  · Placed on NRB in ED and transitioned to BiPAP   · Received Duoneb treatment, IV Mag 1g, IV Lasix 60 mg, and IV Solu-Medrol 80 mg  · Weaned from BIPAP to 4L NC on admission to ICU  · Transferred out to M/S on 6/27  · Rapid response for increased WOB/SOB on 6/29 d/t pulmonary edema  · Received IV Lasix 40 mg x2 and IV Solu-Medrol bolus of 125 mg  · IV Lasix dose increased  · Will give additional Solu-Medrol dose of 40 mg tomorrow, 6/30 to complete 5 days of steroids  · Continue respiratory treatments; Xopenex, Atrovent, Pulmicort  · Respiratory and airway clearance protocols  · Incentive spirometry  · Continue Claritin, Flonase    NANCY (acute kidney injury) ([facility] )  Assessment & Plan  · Baseline creatine 1 4-1 6   · Creatinine increased since admission; now 2  11    · Likely secondary to increased doses of Lasix for diuresis  · Continue to monitor BUN and creatinine  · Monitor I&O  · Continue to hold Lisinopril    Type 2 diabetes mellitus with stage 3 chronic kidney disease, with long-term current use of insulin [facility])  Assessment & Plan  Lab Results   Component Value Date    HGBA1C 7 9 (H) 03/28/2020       Recent Labs     06/29/20  0722 06/29/20  0753 06/29/20  0903 06/29/20  1205   POCGLU 60* 74 89 105       Blood Sugar Average: Last 72 hrs:  (P) 225 1875   · Takes Humulin SSI and Lantus 10 units at HS at [facility]  · BS labile throughout admission  · On floor was taking:   · 3 units standing mealtime Humalog  · SSI Humalog AC/HS  · Lantus 8 units at HS  · BS overnight >500; glucose check was 525  · Received additional 1x dose of Humalog 5 units  · BS 60 this AM during rapid response  · Improved post OJ  · Currently getting SSI AC/HS and Lantus held as pt NPO w/BIPAP  · Consider insulin gtt while on steroids if BS does not stabilize  · Currently NPO, however previously on diabetic diet    CKD (chronic kidney disease) stage 4, GFR 15-29 ml/min ([facility])  Assessment & Plan  · Baseline creatinine appears to be 1 4 - 1 6  · Creatinine elevated on admission w/peak 2 6  · Creatinine today 2 11  · Received IV Lasix 60 mg in ED  · IV Lasix 40 mg BID, which was increased to 80 mg BID today  · Continue to trend labs   · Monitor I&O  · ACEI continues to be held in setting of NANCY  · Avoid fluctuations in BP    A-fib ([facility])  Assessment & Plan  · History of atrial fibrillation; does have pacemaker  · Currently in NSR  · Continue medications from ECF  · PO Carvedilol 1 56 mg BID  · PO Norvasc 5 mg  · PO Eliquis 2 5 mg BID    Essential hypertension  Assessment & Plan  · History of hypertension  · PO Lisinopril held d/t elevated Creatinine  · Continue medications from ECF  · PO Norvasc 5 mg  · PO Carvedilol 1 56 mg BID  · PRN Labetalol 5 mg Q2hrs for SBP >160    Anemia  Assessment & Plan  · Chronic anemia in setting of CKD3  · Hgb 8 4 today; appears to be at baseline  · Continue PO iron supplement  · Continue to trend    Other hyperlipidemia  Assessment & Plan  · History of hyperlipidemia  · Takes PO Rosuvastatin 10 mg Daily   · Continue PO Atorvastatin 80 mg Daily as substitution here    Esophageal reflux  Assessment & Plan  · Takes PO Protonix 40 mg Daily and Carafate 1000 mg Daily at ECF  · Received 1x dose of IV Protonix 40 mg today d/t NPO status post rapid  · Plan to continue PO medications tomorrow, 6/30 if able to take PO      -------------------------------------------------------------------------------------------------------------  Chief Complaint: SOB/increased WOB    History of Present Illness     Kayleen Pinto is a 82y o  year old female with past medical history of combined systolic and diastolic congestive heart failure, COPD, atrial fib, s/p pacemaker, HTN, HLD, diabetes mellitus type 2, GERD, who presents with respiratory distress  Patient was admitted to [address] [place] [address] on 6/26 as step down patient under critical care service  Patient had increased work of breathing in ED and was place on BiPAP  Imagining showed pulmonary edema and bilateral pleural effusions  Patient was given diuresis with lasix IV and started on steroids  BiPAP was weaned off at time of admission  Patient improved overnight and was transferred to med-surg under the Hospitalist's service on 6/27  Diuresis was continued with lasix 40 mg IV BID      Today (06/29), patient was OOB to bathroom and upon returning she experienced MONTILLA and respiratory distress  Respiratory treatment was administered without improvement  A rapid response was then called by RN  Patient was found in respiratory distress with increased work of breathing  SPO2 was 98% on 3 liters nasal cannula  Lung sounds were audible for crackles and wheezes  Lasix 40 mg IV was already ordered/scheduled therefore RN was instructed to administer dose now  Solumedrol 125 mg IV was given  BiPAP initiated by RT  CXR showed pulmonary edema and bilateral pleural effusions   Transferred to critical care service as step down 2 patient for continued management of BIPAP  Patient given additional 40 mg IV lasix after transfer  History obtained from chart review  -------------------------------------------------------------------------------------------------------------  Dispo: Transfer to Stepdown Level 2    Code Status: Level 1 - Full Code  --------------------------------------------------------------------------------------------------------------  Review of Systems   Constitutional: Negative for chills and fever  HENT: Negative for congestion and sore throat  Eyes: Negative  Respiratory: Positive for cough, shortness of breath and wheezing  Cardiovascular: Negative for chest pain  Gastrointestinal: Negative  Genitourinary: Negative  Musculoskeletal: Negative  Skin: Negative  Neurological: Negative  Physical Exam   Constitutional: She is oriented to person, place, and time  She appears well-developed and well-nourished  HENT:   Head: Normocephalic and atraumatic  Mouth/Throat: Oropharynx is clear and moist    Eyes: Pupils are equal, round, and reactive to light  EOM are normal  Right eye exhibits no discharge  Left eye exhibits no discharge  No scleral icterus  Neck: Normal range of motion  Neck supple  Pulmonary/Chest: Accessory muscle usage present  Tachypnea noted  She is in respiratory distress  She has wheezes  She has rales  Abdominal: Soft  Bowel sounds are normal  She exhibits distension  There is no tenderness  Musculoskeletal: Normal range of motion  Neurological: She is alert and oriented to person, place, and time  GCS eye subscore is 4  GCS verbal subscore is 5  GCS motor subscore is 6  GCS 15   Skin: Skin is warm and dry  Nursing note and vitals reviewed      --------------------------------------------------------------------------------------------------------------  Vitals:   Vitals:    06/29/20 1300 06/29/20 1328 06/29/20 1415 06/29/20 1416 BP: 143/65      BP Location:       Pulse: 64      Resp: (!) 41      Temp:       TempSrc:       SpO2: 97% 96%     Weight:       Height:   5' (1 524 m) 5' (1 524 m)     Temp  Min: 97 3 °F (36 3 °C)  Max: 98 4 °F (36 9 °C)  IBW: 45 5 kg  Height: 5' (152 4 cm)  Body mass index is 30 78 kg/m²      Laboratory and Diagnostics:  Results from last 7 days   Lab Units 06/29/20 0553 06/28/20  0552 06/27/20 0423 06/26/20  0844   WBC Thousand/uL 7 94 7 69 5 68 9 99   HEMOGLOBIN g/dL 8 4* 8 0* 8 0* 9 3*   HEMATOCRIT % 27 4* 26 7* 26 4* 31 1*   PLATELETS Thousands/uL 197 187 184 211   NEUTROS PCT % 66 67 68 74   MONOS PCT % 12 9 9 7     Results from last 7 days   Lab Units 06/29/20 0553 06/28/20 2243 06/28/20 0552 06/27/20 0423 06/26/20 1755 06/26/20  0844   SODIUM mmol/L 143  --  142 143 145 143   POTASSIUM mmol/L 4 5  --  4 5 4 9 4 7 5 6*   CHLORIDE mmol/L 107  --  106 107 108 107   CO2 mmol/L 30  --  30 30 29 30   ANION GAP mmol/L 6  --  6 6 8 6   BUN mg/dL 81*  --  69* 62* 54* 48*   CREATININE mg/dL 2 11*  --  2 04* 2 15* 1 86* 1 69*   CALCIUM mg/dL 8 2*  --  7 8* 8 0* 8 2* 8 2*   GLUCOSE RANDOM mg/dL 101 525* 129 291* 208* 129   ALT U/L  --   --   --   --   --  26   AST U/L  --   --   --   --   --  30   ALK PHOS U/L  --   --   --   --   --  110   ALBUMIN g/dL  --   --   --   --   --  2 9*   TOTAL BILIRUBIN mg/dL  --   --   --   --   --  0 30     Results from last 7 days   Lab Units 06/29/20 0553 06/28/20 0552 06/27/20 0423 06/26/20 1755 06/26/20  0844   MAGNESIUM mg/dL 2 5 2 5 2 5 2 4 2 2   PHOSPHORUS mg/dL 4 7*  --  4 9*  --   --       Results from last 7 days   Lab Units 06/26/20  0844   INR  1 14      Results from last 7 days   Lab Units 06/26/20  0844   TROPONIN I ng/mL 0 04     Results from last 7 days   Lab Units 06/26/20  0844   LACTIC ACID mmol/L 0 9     ABG:  Results from last 7 days   Lab Units 06/29/20  0828   PH ART  7 373   PCO2 ART mm Hg 46 7*   PO2 ART mm Hg 46 7*   HCO3 ART mmol/L 26 6   BASE EXC ART mmol/L 1 0   ABG SOURCE  Radial, Right     VBG:  Results from last 7 days   Lab Units 06/29/20  0828   ABG SOURCE  Radial, Right           Micro:  Results from last 7 days   Lab Units 06/26/20  0844   BLOOD CULTURE  No Growth at 72 hrs  No Growth at 72 hrs  EKG: Atrial fibrillation  Imaging: I have personally reviewed pertinent films in PACS  XR chest portable   Final Result by Elizabeth Villarreal MD (06/29 7858)      New pulmonary edema  Persistent small effusions and bibasilar atelectasis  Workstation performed: [id number]         XR chest portable   Final Result by Elizabeth Villarreal MD (06/28 1611)      Persistent small effusions and bibasilar atelectasis  Workstation performed: [id number]         CT chest without contrast   Final Result by Maru Logan MD (06/26 1124)      1  Moderate right and small left pleural effusions   2  Scattered groundglass opacities, favor pulmonary edema  May also be infectious/inflammatory      The study was marked in EPIC for immediate notification  Workstation performed: [id number]         NM lung perfusion imaging (particulate)   Final Result by Prasanth Muhammad MD (06/26 1042)      The probability for pulmonary embolus is likely very low  Workstation performed: [id number]         X-ray chest 1 view portable   Final Result by Sid Howard MD (06/26 8728)      Mild pulmonary vascular congestion with increase pleural-parenchymal density at the left lung base as well as stable small effusion on the right  Findings may be related to edema although pneumonia is possible  In the setting of clinically suspected/proven COVID-19, this plain film appearance does not contain findings that raise concern for viral pneumonia such as COVID-19, but does not rule out this diagnosis                    Workstation performed: [id number]             Historical Information   Past Medical History:   Diagnosis Date    NANCY (acute kidney injury) ([facility] )     Atrial fibrillation ([facility] )     [facility]est[facility] ([facility])     COPD (chronic obstructive pulmonary disease) ([facility] )     Diabetes mellitus ([facility] )     Trigger finger of thumb     last assessed: 13     Past Surgical History:   Procedure Laterality Date    APPENDECTOMY      CARDIAC PACEMAKER PLACEMENT      CARDIAC PACEMAKER PLACEMENT      CHOLECYSTECTOMY      COLONOSCOPY      Complete    CORONARY ANGIOPLASTY WITH STENT PLACEMENT      CORONARY ARTERY BYPASS GRAFT      CORONARY ARTERY BYPASS GRAFT      ESOPHAGOGASTRODUODENOSCOPY      Diagnostic    HERNIA REPAIR      TOTAL ABDOMINAL HYSTERECTOMY      with removal of both ovaries     Social History   Social History     Substance and Sexual Activity   Alcohol Use Not Currently     Social History     Substance and Sexual Activity   Drug Use No     Social History     Tobacco Use   Smoking Status Former Smoker    Last attempt to quit: 2011    Years since quittin 0   Smokeless Tobacco Never Used   Tobacco Comment    Former smoker per allscripts     Exercise History: unknown  Family History:   Family History   Problem Relation Age of Onset    Lung disease Mother         black     Breast cancer Mother     Lung disease Father         black      I have reviewed this patient's family history and commented on sigificant items within the HPI      Medications:  Current Facility-Administered Medications   Medication Dose Route Frequency    amLODIPine (NORVASC) tablet 5 mg  5 mg Oral Daily    apixaban (ELIQUIS) tablet 2 5 mg  2 5 mg Oral BID    budesonide (PULMICORT) inhalation solution 0 5 mg  0 5 mg Nebulization Q12H    carvedilol (COREG) tablet 1 5625 mg  1 5625 mg Oral BID    chlorhexidine (PERIDEX) 0 12 % oral rinse 15 mL  15 mL Swish & Spit Q12H [address]    dextromethorphan-guaiFENesin (ROBITUSSIN DM)  mg/5 mL oral syrup 10 mL  10 mL Oral Q4H PRN    docusate sodium (COLACE) capsule 100 mg  100 mg Oral BID    ferrous sulfate tablet 325 mg  325 mg Oral Daily With Breakfast    fluticasone (FLONASE) 50 mcg/act nasal spray 2 spray  2 spray Nasal Daily    furosemide (LASIX) injection 80 mg  80 mg Intravenous BID (diuretic)    guaiFENesin (MUCINEX) 12 hr tablet 600 mg  600 mg Oral Q12H KALYAN    insulin lispro (HumaLOG) 100 units/mL subcutaneous injection 1-6 Units  1-6 Units Subcutaneous Q6H [address]    ipratropium (ATROVENT) 0 02 % inhalation solution 0 5 mg  0 5 mg Nebulization Q6H    Labetalol HCl (NORMODYNE) injection 5 mg  5 mg Intravenous Q2H PRN    levalbuterol (XOPENEX) inhalation solution 1 25 mg  1 25 mg Nebulization Q6H    loratadine (CLARITIN) tablet 10 mg  10 mg Oral Daily    methylPREDNISolone sodium succinate (Solu-MEDROL) injection 125 mg  125 mg Intravenous Once    [START ON 6/30/2020] methylPREDNISolone sodium succinate (Solu-MEDROL) injection 40 mg  40 mg Intravenous Once    ondansetron (ZOFRAN) injection 4 mg  4 mg Intravenous Q6H PRN    pantoprazole (PROTONIX) EC tablet 40 mg  40 mg Oral Daily    pravastatin (PRAVACHOL) tablet 80 mg  80 mg Oral Daily With Dinner    sucralfate (CARAFATE) oral suspension 1,000 mg  1,000 mg Oral HS     Home medications:  Prior to Admission Medications   Prescriptions Last Dose Informant Patient Reported? Taking?    ELIQUIS 2 5 MG 6/25/2020 at Unknown time  Yes Yes   Sig: Take 2 5 mg by mouth 2 (two) times a day   Ferrous Fumarate 324 (106 Fe) MG TABS 6/25/2020 at Unknown time Self Yes Yes   Sig: Take 1 tablet by mouth daily   Insulin Pen Needle (PEN NEEDLES) 32G X 4 MM MISC   No No   Sig: by Does not apply route 3 (three) times a day   ONETOUCH DELICA LANCETS 10A MISC   No No   Sig: Inject under the skin 4 (four) times a day   albuterol (PROVENTIL HFA,VENTOLIN HFA) 90 mcg/act inhaler 6/26/2020 at Unknown time  Yes Yes   Sig: Inhale 2 puffs every 4 (four) hours as needed   amLODIPine (NORVASC) 5 mg tablet 6/25/2020 at Unknown time  No Yes   Sig: Take 1 tablet (5 mg total) by mouth daily carvedilol (COREG) 3 125 mg tablet 2020 at Unknown time  No Yes   Sig: Take 0 5 tablets (1 5625 mg total) by mouth 2 (two) times a day   desloratadine (Clarinex) 5 MG tablet 2020 at Unknown time  No Yes   Sig: Take 1 tablet (5 mg total) by mouth daily as needed (Seasonal allergy)   diclofenac sodium (VOLTAREN) 1 %   Yes No   Sig: APPLY AS DIRECTED 4 TIMES A DAY AS NEEDED FOR PAIN:   docusate sodium (COLACE) 100 mg capsule 2020 at Unknown time Self Yes Yes   Sig: Take 100 mg by mouth 2 (two) times a day   fluticasone (FLONASE) 50 mcg/act nasal spray 2020 at Unknown time  No Yes   Si sprays into each nostril daily   fluticasone-salmeterol (ADVAIR, WIXELA) 250-50 mcg/dose inhaler Not Taking at Unknown time  No No   Sig: Inhale 1 puff every 12 (twelve) hours Rinse mouth after use  Patient not taking: Reported on 2020   furosemide (LASIX) 20 mg tablet 2020 at Unknown time  No Yes   Sig: Take 2 tablets (40 mg total) by mouth daily   glucose blood test strip   No No   Sig: Use as instructed   insulin glargine (Lantus SoloStar) 100 units/mL injection pen 2020 at Unknown time  No Yes   Sig: Inject 10 Units under the skin daily at bedtime   Patient taking differently: Inject 8 Units under the skin daily at bedtime    insulin regular (NOVOLIN R) 100 units/mL injection   No No   Sig: Follow sliding scale  200-250, 2 units  251-300, 4 units  301-350, 6 units  351-400, 8 units   ipratropium-albuterol (DUO-NEB) 0 5-2 5 mg/3 mL nebulizer solution  Self Yes No   Sig: Inhale 1 each every 4 (four) hours as needed   lisinopril (ZESTRIL) 2 5 mg tablet 2020 at Unknown time  Yes Yes   Sig: Take 2 5 mg by mouth daily   nitroglycerin (NITROSTAT) 0 4 mg SL tablet   Yes No   Sig: PLACE 1 TABLET UNDER THE TONGUE AS NEEDED FOR CHEST PAIN MAY REPE     (REFER TO PRESCRIPTION NOTES)     pantoprazole (PROTONIX) 40 mg tablet 2020 at Unknown time  No Yes   Sig: TAKE 1 TABLET DAILY   rosuvastatin (CRESTOR) 10 MG tablet 6/25/2020 at Unknown time  No Yes   Sig: Take 1 tablet (10 mg total) by mouth daily at bedtime   selenium sulfide (SELSUN) 2 5 % shampoo  Self No No   Sig: Apply topically daily as needed for dandruff   sucralfate (CARAFATE) 1 g/10 mL suspension 6/25/2020 at Unknown time  Yes Yes   Sig: Take 1 g by mouth daily at bedtime      Facility-Administered Medications: None     Allergies: Allergies   Allergen Reactions    Other      PEANUTS-unknown reaction    Nuts Rash     ------------------------------------------------------------------------------------------------------------  Advance Directive and Living Will: Yes    Power of :    POLST: Yes  ------------------------------------------------------------------------------------------------------------  Care Time Delivered:   No Critical Care time spent       [facility]        Portions of the record may have been created with voice recognition software  Occasional wrong word or "sound a like" substitutions may have occurred due to the inherent limitations of voice recognition software    Read the chart carefully and recognize, using context, where substitutions have occurred

## 2020-06-29 NOTE — ASSESSMENT & PLAN NOTE
· History of hypertension  · PO Lisinopril held d/t elevated Creatinine  · Continue medications from ECF  · PO Norvasc 5 mg  · PO Carvedilol 1 56 mg BID  · PRN Labetalol 5 mg Q2hrs for SBP >160

## 2020-06-29 NOTE — NURSING NOTE
Jeanette Rojo NP notified of blood sugar above 500 and orders obtained for serum glucose and to administer lantus as ordered  Order received upon result of glucose and insulin administered as ordered  Jeanette Rojo NP notified of blood sugar of 319 at 0231 and awaiting further orders  Will cont  To monitor

## 2020-06-29 NOTE — CODE DOCUMENTATION
Primary RN called Rapid response due to patient worsening shortness of breath  O2 at 3L nasal cannula  40mg IV lasix given as previously scheduled  Patient transferred to ICU

## 2020-06-29 NOTE — ASSESSMENT & PLAN NOTE
Wt Readings from Last 3 Encounters:   06/29/20 71 5 kg (157 lb 10 1 oz)   06/22/20 68 9 kg (152 lb)   06/19/20 66 4 kg (146 lb 6 2 oz)     · On admission, CXR/CT chest showed pulmonary edema  · In ED, given IV Lasix 60 mg  · NT Pro-BNP 6956; previously 12,600 on 06/12   · NOLAN 06/12/20 - "Akinesis of the basal to mid inferior and the inferolateral walls  The ventricle was dilated  Systolic function was moderately reduced  EF 35 % to 40 %  There was assymetrical hypertrophy of the septum  Normal size and systolic function of right ventricle  Left atrium moderately dilated  Mild mitral valve regurgitation  Mild tricuspid valve regurgitation "  · TTE report in care everywhere from 03/30/20 - "Left ventricle is mildly dilated  Severely reduced left ventricular systolic function  Basal to mid inferior and inferolateral akinesis  Mild concentric left ventricular hypertrophy  EF 30%  Normal right ventricular size  Reduced right ventricular systolic function  Severely dilated left atrium   Diastolic dysfunction with elevated filling pressures "  · Takes PO Lasix 40 mg daily at [facility], however was taking IV Lasix 40 mg BID this admission  · Pt was rapid response on 6/29 for increased WOB, SOB, pulmonary edema on CXR  · Given IV Lasix 40 mg x2 and dosing increased to IV Lasix 80 mg BID  · May give additional dose PRN depending on response  · Goal net fluid balance -1 to -2 L  · Continue daily weights  · Place Christina for strict I/O

## 2020-06-29 NOTE — CONSULTS
Consultation - Cardiology   Marcial Sutton 82 y o  female MRN: [id number]  Unit/Bed#: -01 Encounter: [id number]    Assessment/Plan     Assessment:  Acute on Chronic HFrEF- EF 35-40%, appearing hypervolemic today, lasix Is currently on hold  Acute respiratory failure with hypoxia- on bipap  COPD   Pacemaker- SSS sp PPM  CAD sp CABG  Afib- on coreg and anticoagulated on eliquis  DM  HTN    Plan:  - increase lasix to 80 IV BID  - monitor I and O   - daily weights, monitor Cr and electrolytes  - will continue to follow  will need outpatient follow up with [facility] in [place]     History of Present Illness   Physician Requesting Consult: Jimbo Mai MD  Reason for Consult / Principal Problem: acute on chronic heart failure  HPI: Marcial Sutton is a 82y o  year old female who presented to the ED for concerns with SOB  She currently resides in a nursing home  EMS reported pulse ox was 70% but came up to 90 % with oxygen  She is being diuresed with lasix 40 IV BID but this was stopped this morning due to very slight NANCY  She reports at the bedside she is very sob and feels like she cannot catch her breath  She reports no significant edema or chest pain  It is very hard to understand patient given she is on bipap    Patient was admitted to [address] [place] [address] a few weeks ago for similar symptoms  Patient was diuresed and went into NANCY  She was found to have a UTI and was treated with IV antibiotics  She did well as an outpatient until the day of admission  Inpatient consult to Cardiology  Consult performed by: Johan Conde PA-C  Consult ordered by: Dat Snyder MD          Review of Systems   Constitutional: Positive for fatigue  Negative for chills  Respiratory: Positive for cough, shortness of breath and wheezing  Negative for chest tightness  Gastrointestinal: Negative for abdominal distention and nausea  Genitourinary: Negative for difficulty urinating     Musculoskeletal: Negative for arthralgias  Neurological: Negative for dizziness and light-headedness  Historical Information   Past Medical History:   Diagnosis Date    NANCY (acute kidney injury) ([facility] )     Atrial fibrillation (HCC)     CHF (congestive heart failure) (HCC)     COPD (chronic obstructive pulmonary disease) ([facility] )     Diabetes mellitus ([facility] )     Trigger finger of thumb     last assessed: 13     Past Surgical History:   Procedure Laterality Date    APPENDECTOMY      CARDIAC PACEMAKER PLACEMENT      CARDIAC PACEMAKER PLACEMENT      CHOLECYSTECTOMY      COLONOSCOPY      Complete    CORONARY ANGIOPLASTY WITH STENT PLACEMENT      CORONARY ARTERY BYPASS GRAFT      CORONARY ARTERY BYPASS GRAFT      ESOPHAGOGASTRODUODENOSCOPY      Diagnostic    HERNIA REPAIR      TOTAL ABDOMINAL HYSTERECTOMY      with removal of both ovaries     Social History     Substance and Sexual Activity   Alcohol Use Not Currently     Social History     Substance and Sexual Activity   Drug Use No     E-Cigarette/Vaping    E-Cigarette Use Former User     Quit Date 11      E-Cigarette/Vaping Substances     Social History     Tobacco Use   Smoking Status Former Smoker    Last attempt to quit: 2011    Years since quittin 0   Smokeless Tobacco Never Used   Tobacco Comment    Former smoker per allscripts     Family History: non-contributory    Meds/Allergies   all current active meds have been reviewed  Allergies   Allergen Reactions    Other      PEANUTS-unknown reaction    Nuts Rash       Objective   Vitals: Blood pressure 147/67, pulse 70, temperature 97 8 °F (36 6 °C), temperature source Axillary, resp  rate (!) 30, height 5' (1 524 m), weight 71 5 kg (157 lb 10 1 oz), SpO2 98 %    Orthostatic Blood Pressures      Most Recent Value   Blood Pressure  147/67 filed at 2020 1057   Patient Position - Orthostatic VS  Lying filed at 2020 1057            Intake/Output Summary (Last 24 hours) at 2020 1134  Last data filed at 6/29/2020 1000  Gross per 24 hour   Intake 360 ml   Output 1275 ml   Net -915 ml       Invasive Devices     Peripheral Intravenous Line            Peripheral IV 06/26/20 Left Antecubital 3 days          Drain            Urethral Catheter Temperature probe less than 1 day                Physical Exam   Constitutional: She appears well-developed and well-nourished  HENT:   Head: Atraumatic  Neck: Neck supple  Cardiovascular: Normal rate, regular rhythm and normal heart sounds  No murmur heard  Pulmonary/Chest: She is in respiratory distress  She has rales  Abdominal: Soft  Musculoskeletal: She exhibits no edema  Neurological: She is alert  Skin: Skin is warm and dry  Capillary refill takes less than 2 seconds  Lab Results:   I have personally reviewed pertinent lab results  CBC with diff:   Results from last 7 days   Lab Units 06/29/20  0553   WBC Thousand/uL 7 94   RBC Million/uL 2 90*   HEMOGLOBIN g/dL 8 4*   HEMATOCRIT % 27 4*   MCV fL 95   MCH pg 29 0   MCHC g/dL 30 7*   RDW % 15 0   MPV fL 12 0   PLATELETS Thousands/uL 197     CMP:   Results from last 7 days   Lab Units 06/29/20  0553  06/26/20  0844   SODIUM mmol/L 143   < > 143   POTASSIUM mmol/L 4 5   < > 5 6*   CHLORIDE mmol/L 107   < > 107   CO2 mmol/L 30   < > 30   BUN mg/dL 81*   < > 48*   CREATININE mg/dL 2 11*   < > 1 69*   CALCIUM mg/dL 8 2*   < > 8 2*   AST U/L  --   --  30   ALT U/L  --   --  26   ALK PHOS U/L  --   --  110   EGFR ml/min/1 73sq m 21   < > 28    < > = values in this interval not displayed       Troponin:   0   Lab Value Date/Time    TROPONINI 0 04 06/26/2020 0844    TROPONINI 0 08 (H) 06/12/2020 1440    TROPONINI 0 06 (H) 06/12/2020 1224    TROPONINI 0 05 (H) 06/12/2020 0733     BNP:   Results from last 7 days   Lab Units 06/29/20  0553   POTASSIUM mmol/L 4 5   CHLORIDE mmol/L 107   CO2 mmol/L 30   BUN mg/dL 81*   CREATININE mg/dL 2 11*   CALCIUM mg/dL 8 2*   EGFR ml/min/1 73sq m 21     Coags: Results from last 7 days   Lab Units 06/26/20  0844   INR  1 14     TSH:   Results from last 7 days   Lab Units 06/26/20  0844   TSH 3RD GENERATON uIU/mL 3 971*     Imaging: I have personally reviewed pertinent reports  TTE 3/30/20:   Left ventricle is mildly dilated  Severely reduced left ventricular systolic     function  Basal to mid inferior and inferolateral akinesis  Mild concentric     left ventricular hypertrophy  Estimated left ventricular ejection fraction is     30%      Normal right ventricular size  Reduced right ventricular systolic function        Device wire present in right ventricle      Severely dilated left atrium      Diastolic dysfunction with elevated filling pressures (suggestion of L     wave on mitral inflow, supports elevated filling pressures)      Visually Estimated LV Ejection Fraction is: 30%     TTE 6/12/20:  LEFT VENTRICLE:  Akinesis of the basal to mid inferior and the inferolateral walls  The ventricle was dilated  Systolic function was moderately reduced  Ejection fraction was estimated in the range of 35 % to 40 %  There was assymetrical hypertrophy of the septum  RIGHT VENTRICLE:  The size was normal   Systolic function was normal   LEFT ATRIUM:  The atrium was moderately dilated  MITRAL VALVE:  There was moderate thickening  There was mild regurgitation  TRICUSPID VALVE:  There was mild regurgitation  IVC, HEPATIC VEINS:  The inferior vena cava was dilated  Respirophasic changes were blunted (less than 50% variation)  PERICARDIUM:  A trace pericardial effusion was identified  There was a large left pleural effusion    CXR 6/27/20:  Persistent small effusions and bibasilar atelectasis  CXR 6/29/20:  New pulmonary edema  Persistent small effusions and bibasilar atelectasis        CT CHEST 6/26/20:  1  Moderate right and small left pleural effusions  2  Scattered groundglass opacities, favor pulmonary edema    May also be infectious/inflammatory

## 2020-06-29 NOTE — ASSESSMENT & PLAN NOTE
· Baseline creatinine appears to be 1 4 - 1 6  · Creatinine elevated on admission w/peak 2 6  · Creatinine today 2 11  · Received IV Lasix 60 mg in ED  · IV Lasix 40 mg BID, which was increased to 80 mg BID today  · Continue to trend labs   · Monitor I&O  · ACEI continues to be held in setting of NANCY  · Avoid fluctuations in BP

## 2020-06-29 NOTE — ASSESSMENT & PLAN NOTE
· History of hyperlipidemia  · Takes PO Rosuvastatin 10 mg Daily   · Continue PO Atorvastatin 80 mg Daily as substitution here

## 2020-06-29 NOTE — ASSESSMENT & PLAN NOTE
· Chronic anemia in setting of CKD3  · Hgb 8 4 today; appears to be at baseline  · Continue PO iron supplement  · Continue to trend

## 2020-06-30 LAB
ANION GAP SERPL CALCULATED.3IONS-SCNC: 4 MMOL/L (ref 4–13)
ANION GAP SERPL CALCULATED.3IONS-SCNC: 6 MMOL/L (ref 4–13)
BUN SERPL-MCNC: 76 MG/DL (ref 5–25)
BUN SERPL-MCNC: 78 MG/DL (ref 5–25)
CALCIUM SERPL-MCNC: 7.8 MG/DL (ref 8.3–10.1)
CALCIUM SERPL-MCNC: 7.9 MG/DL (ref 8.3–10.1)
CHLORIDE SERPL-SCNC: 105 MMOL/L (ref 100–108)
CHLORIDE SERPL-SCNC: 105 MMOL/L (ref 100–108)
CO2 SERPL-SCNC: 32 MMOL/L (ref 21–32)
CO2 SERPL-SCNC: 35 MMOL/L (ref 21–32)
CREAT SERPL-MCNC: 1.97 MG/DL (ref 0.6–1.3)
CREAT SERPL-MCNC: 2.17 MG/DL (ref 0.6–1.3)
ERYTHROCYTE [DISTWIDTH] IN BLOOD BY AUTOMATED COUNT: 14.8 % (ref 11.6–15.1)
GFR SERPL CREATININE-BSD FRML MDRD: 21 ML/MIN/1.73SQ M
GFR SERPL CREATININE-BSD FRML MDRD: 23 ML/MIN/1.73SQ M
GLUCOSE SERPL-MCNC: 155 MG/DL (ref 65–140)
GLUCOSE SERPL-MCNC: 157 MG/DL (ref 65–140)
GLUCOSE SERPL-MCNC: 159 MG/DL (ref 65–140)
GLUCOSE SERPL-MCNC: 331 MG/DL (ref 65–140)
GLUCOSE SERPL-MCNC: 368 MG/DL (ref 65–140)
GLUCOSE SERPL-MCNC: 377 MG/DL (ref 65–140)
HCT VFR BLD AUTO: 28.9 % (ref 34.8–46.1)
HGB BLD-MCNC: 8.9 G/DL (ref 11.5–15.4)
MAGNESIUM SERPL-MCNC: 2.2 MG/DL (ref 1.6–2.6)
MAGNESIUM SERPL-MCNC: 2.3 MG/DL (ref 1.6–2.6)
MCH RBC QN AUTO: 29.3 PG (ref 26.8–34.3)
MCHC RBC AUTO-ENTMCNC: 30.8 G/DL (ref 31.4–37.4)
MCV RBC AUTO: 95 FL (ref 82–98)
PHOSPHATE SERPL-MCNC: 5.2 MG/DL (ref 2.3–4.1)
PHOSPHATE SERPL-MCNC: 5.5 MG/DL (ref 2.3–4.1)
PLATELET # BLD AUTO: 212 THOUSANDS/UL (ref 149–390)
PMV BLD AUTO: 12.7 FL (ref 8.9–12.7)
POTASSIUM SERPL-SCNC: 4 MMOL/L (ref 3.5–5.3)
POTASSIUM SERPL-SCNC: 7.3 MMOL/L (ref 3.5–5.3)
RBC # BLD AUTO: 3.04 MILLION/UL (ref 3.81–5.12)
SODIUM SERPL-SCNC: 141 MMOL/L (ref 136–145)
SODIUM SERPL-SCNC: 146 MMOL/L (ref 136–145)
WBC # BLD AUTO: 9.62 THOUSAND/UL (ref 4.31–10.16)

## 2020-06-30 PROCEDURE — 94760 N-INVAS EAR/PLS OXIMETRY 1: CPT

## 2020-06-30 PROCEDURE — 84100 ASSAY OF PHOSPHORUS: CPT | Performed by: NURSE PRACTITIONER

## 2020-06-30 PROCEDURE — 97116 GAIT TRAINING THERAPY: CPT

## 2020-06-30 PROCEDURE — 94640 AIRWAY INHALATION TREATMENT: CPT

## 2020-06-30 PROCEDURE — 80048 BASIC METABOLIC PNL TOTAL CA: CPT | Performed by: PHYSICIAN ASSISTANT

## 2020-06-30 PROCEDURE — 83735 ASSAY OF MAGNESIUM: CPT | Performed by: PHYSICIAN ASSISTANT

## 2020-06-30 PROCEDURE — 82948 REAGENT STRIP/BLOOD GLUCOSE: CPT

## 2020-06-30 PROCEDURE — 99233 SBSQ HOSP IP/OBS HIGH 50: CPT | Performed by: PHYSICIAN ASSISTANT

## 2020-06-30 PROCEDURE — 83735 ASSAY OF MAGNESIUM: CPT | Performed by: NURSE PRACTITIONER

## 2020-06-30 PROCEDURE — 80048 BASIC METABOLIC PNL TOTAL CA: CPT | Performed by: NURSE PRACTITIONER

## 2020-06-30 PROCEDURE — 92610 EVALUATE SWALLOWING FUNCTION: CPT

## 2020-06-30 PROCEDURE — 84100 ASSAY OF PHOSPHORUS: CPT | Performed by: PHYSICIAN ASSISTANT

## 2020-06-30 PROCEDURE — 97163 PT EVAL HIGH COMPLEX 45 MIN: CPT

## 2020-06-30 PROCEDURE — 94660 CPAP INITIATION&MGMT: CPT

## 2020-06-30 PROCEDURE — 85027 COMPLETE CBC AUTOMATED: CPT | Performed by: NURSE PRACTITIONER

## 2020-06-30 RX ORDER — CALCIUM GLUCONATE 20 MG/ML
1 INJECTION, SOLUTION INTRAVENOUS ONCE
Status: COMPLETED | OUTPATIENT
Start: 2020-06-30 | End: 2020-06-30

## 2020-06-30 RX ORDER — METHYLPREDNISOLONE SODIUM SUCCINATE 40 MG/ML
40 INJECTION, POWDER, LYOPHILIZED, FOR SOLUTION INTRAMUSCULAR; INTRAVENOUS ONCE
Status: COMPLETED | OUTPATIENT
Start: 2020-06-30 | End: 2020-06-30

## 2020-06-30 RX ADMIN — INSULIN LISPRO 1 UNITS: 100 INJECTION, SOLUTION INTRAVENOUS; SUBCUTANEOUS at 08:09

## 2020-06-30 RX ADMIN — LEVALBUTEROL HYDROCHLORIDE 1.25 MG: 1.25 SOLUTION, CONCENTRATE RESPIRATORY (INHALATION) at 21:19

## 2020-06-30 RX ADMIN — LORATADINE 10 MG: 10 TABLET ORAL at 09:37

## 2020-06-30 RX ADMIN — GUAIFENESIN 600 MG: 600 TABLET, EXTENDED RELEASE ORAL at 09:37

## 2020-06-30 RX ADMIN — IPRATROPIUM BROMIDE 0.5 MG: 0.5 SOLUTION RESPIRATORY (INHALATION) at 07:49

## 2020-06-30 RX ADMIN — CARVEDILOL 1.56 MG: 3.12 TABLET, FILM COATED ORAL at 08:07

## 2020-06-30 RX ADMIN — CARVEDILOL 1.56 MG: 3.12 TABLET, FILM COATED ORAL at 17:27

## 2020-06-30 RX ADMIN — INSULIN LISPRO 5 UNITS: 100 INJECTION, SOLUTION INTRAVENOUS; SUBCUTANEOUS at 16:32

## 2020-06-30 RX ADMIN — APIXABAN 2.5 MG: 2.5 TABLET, FILM COATED ORAL at 08:07

## 2020-06-30 RX ADMIN — LEVALBUTEROL HYDROCHLORIDE 1.25 MG: 1.25 SOLUTION, CONCENTRATE RESPIRATORY (INHALATION) at 07:49

## 2020-06-30 RX ADMIN — SUCRALFATE 1000 MG: 1 SUSPENSION ORAL at 21:05

## 2020-06-30 RX ADMIN — IPRATROPIUM BROMIDE 0.5 MG: 0.5 SOLUTION RESPIRATORY (INHALATION) at 02:28

## 2020-06-30 RX ADMIN — CALCIUM GLUCONATE 1 G: 20 INJECTION, SOLUTION INTRAVENOUS at 06:46

## 2020-06-30 RX ADMIN — FERROUS SULFATE TAB 325 MG (65 MG ELEMENTAL FE) 325 MG: 325 (65 FE) TAB at 08:07

## 2020-06-30 RX ADMIN — FUROSEMIDE 80 MG: 10 INJECTION, SOLUTION INTRAMUSCULAR; INTRAVENOUS at 16:33

## 2020-06-30 RX ADMIN — INSULIN LISPRO 6 UNITS: 100 INJECTION, SOLUTION INTRAVENOUS; SUBCUTANEOUS at 11:18

## 2020-06-30 RX ADMIN — GUAIFENESIN 600 MG: 600 TABLET, EXTENDED RELEASE ORAL at 21:04

## 2020-06-30 RX ADMIN — AMLODIPINE BESYLATE 5 MG: 5 TABLET ORAL at 08:07

## 2020-06-30 RX ADMIN — LEVALBUTEROL HYDROCHLORIDE 1.25 MG: 1.25 SOLUTION, CONCENTRATE RESPIRATORY (INHALATION) at 13:58

## 2020-06-30 RX ADMIN — CHLORHEXIDINE GLUCONATE 0.12% ORAL RINSE 15 ML: 1.2 LIQUID ORAL at 08:07

## 2020-06-30 RX ADMIN — BUDESONIDE 0.5 MG: 0.5 INHALANT RESPIRATORY (INHALATION) at 21:19

## 2020-06-30 RX ADMIN — IPRATROPIUM BROMIDE 0.5 MG: 0.5 SOLUTION RESPIRATORY (INHALATION) at 21:19

## 2020-06-30 RX ADMIN — APIXABAN 2.5 MG: 2.5 TABLET, FILM COATED ORAL at 17:26

## 2020-06-30 RX ADMIN — METHYLPREDNISOLONE SODIUM SUCCINATE 40 MG: 40 INJECTION, POWDER, FOR SOLUTION INTRAMUSCULAR; INTRAVENOUS at 09:37

## 2020-06-30 RX ADMIN — DOCUSATE SODIUM 100 MG: 100 CAPSULE, LIQUID FILLED ORAL at 08:07

## 2020-06-30 RX ADMIN — DOCUSATE SODIUM 100 MG: 100 CAPSULE, LIQUID FILLED ORAL at 17:26

## 2020-06-30 RX ADMIN — FLUTICASONE PROPIONATE 2 SPRAY: 50 SPRAY, METERED NASAL at 09:36

## 2020-06-30 RX ADMIN — PANTOPRAZOLE SODIUM 40 MG: 40 TABLET, DELAYED RELEASE ORAL at 08:07

## 2020-06-30 RX ADMIN — FUROSEMIDE 80 MG: 10 INJECTION, SOLUTION INTRAMUSCULAR; INTRAVENOUS at 08:06

## 2020-06-30 RX ADMIN — LEVALBUTEROL HYDROCHLORIDE 1.25 MG: 1.25 SOLUTION, CONCENTRATE RESPIRATORY (INHALATION) at 02:28

## 2020-06-30 RX ADMIN — BUDESONIDE 0.5 MG: 0.5 INHALANT RESPIRATORY (INHALATION) at 07:49

## 2020-06-30 RX ADMIN — PRAVASTATIN SODIUM 80 MG: 80 TABLET ORAL at 16:33

## 2020-06-30 RX ADMIN — IPRATROPIUM BROMIDE 0.5 MG: 0.5 SOLUTION RESPIRATORY (INHALATION) at 13:58

## 2020-06-30 RX ADMIN — CHLORHEXIDINE GLUCONATE 0.12% ORAL RINSE 15 ML: 1.2 LIQUID ORAL at 21:04

## 2020-06-30 NOTE — ASSESSMENT & PLAN NOTE
· Baseline creatinine appears to be 1 4 - 1 6  · Creatinine elevated on admission w/peak 2 6  · Creatinine today 2 11  · Received IV Lasix 60 mg in ED  · IV Lasix 40 mg BID, which was increased to 80 mg BID on 6/29  · Continue to trend labs   · Monitor I&O  · ACEI continues to be held in setting of NANCY  · Avoid fluctuations in BP

## 2020-06-30 NOTE — ASSESSMENT & PLAN NOTE
· Likely multifactorial - acute on chronic combined systolic and diastolic congestive heart failure, COPD, bilateral pleural effusions  · Initially presented w/SOB  · SpO2 at ECF in 70s on baseline 2L NC  · When EMS arrived, SpO2 90% w/increased WOB  · In ED, pt tachypneic w/increased WOB and wheezing  · Initially placed on NRB and then transitioned to BIPAP  · Given IV Solu-Medrol 80 mg, Duoneb treatment, and IV Mag 1g  · NT Pro-BNP 6956, previous was 12,600 on 06/12  · CT chest showed pulmonary edema, moderate right pleural effusion, small left pleural effusion  · Weaned off BIPAP to 4L NC on admission to ICU; transferred to /S on 6/27  · Pt was rapid response on 6/29 for increased WOB and SOB; SpO2 in upper 90's  · Stat CXR showed pulmonary edema and bilateral pleural effusions  · Received IV Lasix 40 mg x2 and IV Solu-Medrol 125 mg  · Transferred back to ICU on BIPAP 16/7/40%  · Continue to use q hs and prn  · Consider transfer to [facility] today  · PCT 0 12--> no abx indicated  · Scheduled PO Guaifenesin and PRN Robitussin DM

## 2020-06-30 NOTE — ASSESSMENT & PLAN NOTE
Lab Results   Component Value Date    HGBA1C 7 9 (H) 03/28/2020       Recent Labs     06/29/20  0753 06/29/20  0903 06/29/20  1205 06/29/20  1800   POCGLU 74 89 105 164*       Blood Sugar Average: Last 72 hrs:  (P) 221 [id number]   · Takes Humulin SSI and Lantus 8 units at HS at [facility]  · BS labile throughout admission  · On floor was taking:   · 3 units standing mealtime Humalog  · SSI Humalog AC/HS  · Lantus 8 units at HS  · BS 6/28 >500; glucose check was 525  · Received additional 1 x dose of Humalog 5 units  · BS 60 during rapid response on 6/29  · Improved post OJ  · Currently getting SSI AC/HS and Lantus held as pt was NPO w/BIPAP   · Now on Level 1 pureed diet- holding Lantus  · Consider restarting at 5 units tonight given hypoglycemia 6/29 am after 8 units  · Consider insulin gtt while on steroids if BS does not stabilize  · Currently on diabetic diet level 1 pureed

## 2020-06-30 NOTE — PHYSICAL THERAPY NOTE
PHYSICAL THERAPY EVALUATION  NAME:  Bobo Howard  DATE: 06/30/20    AGE:   82 y o  Mrn:   [id number]  ADMIT DX:  SOB (shortness of breath) [R06 02]  Anemia [D64 9]  Pleural effusion, right [J90]  Elevated brain natriuretic peptide (BNP) level [R79 89]  Elevated d-dimer [R79 89]  Acute on chronic congestive heart failure, unspecified heart failure type (HCC) [I50 9]    Past Medical History:   Diagnosis Date    NANCY [facility]acute [facility] injury) ([facility] )     Atrial fibrillation ([facility] )     [facility]     COPD (chronic obstructive pulmonary disease) ([facility] )     Diabetes mellitus ([facility] )     Trigger finger of thumb     last assessed: 07/18/13     Length Of Stay: 4  Performed at least 2 patient identifiers during session: Name and Birthday  PHYSICAL THERAPY EVALUATION :      06/30/20 1304   Note Type   Note type Eval/Treat   Pain Assessment   Pain Assessment Tool 0-10   Pain Score No Pain   Home Living   Type of Home SNF   Home Equipment Walker; Wheelchair-manual   Additional Comments Pt reports she is residing in [facility] as her children don't want her to be alone  Plans to return to [facility] upon D/C  Prior Function   Level of [place] Needs assistance with ADLs and functional mobility; Needs assistance with IADLs   Lives With Facility staff   Receives Help From Personal care attendant  (facility staff)   ADL Assistance Needs assistance   IADLs Needs assistance   Falls in the last 6 months 0   Comments Reports requiring assistance with mobility, transfers and ambulation  Reports she has 1-2 people assist her depending on how she feels that day  Requires assistance for ADLs and IADLs  Restrictions/Precautions   Other Precautions Chair Alarm; Bed Alarm;O2;Fall Risk   Cognition   Orientation Level Oriented X4   Following Commands Follows all commands and directions without difficulty   RLE Assessment   RLE Assessment WFL  (3+/5)   LLE Assessment   LLE Assessment WFL  (3+/5) Coordination   Movements are Fluid and Coordinated 1   Light Touch   RLE Light Touch Grossly intact   LLE Light Touch Grossly intact   Bed Mobility   Additional Comments Pt sitting OOB in recliner chair upon arrival and at enbd of session with all needs within reach  Transfers   Sit to Stand   (steadying assistance)   Additional items Increased time required;Verbal cues   Stand to Sit   (steadying assistance)   Additional items Increased time required;Verbal cues   Stand pivot   (steadying assistance)   Additional items Increased time required;Verbal cues   Additional Comments min cues for hand placement  increased time to achieve standing  min cues for controlled descent  spt with RW with steadying assistance with min cues to turn completely prior to sitting  Ambulation/Elevation   Gait pattern Short stride; Excessively slow;Decreased foot clearance   Gait Assistance   (steadying assistance)   Additional items Verbal cues; Assist x 1   Assistive Device Rolling walker   Distance 10'x1 with RW with steadying assistance with min cues for increased step length, breathing throughout and upright posture   Endurance Deficit   Endurance Deficit Yes   Endurance Deficit Description SpO2 desaturated to 85% on 4 lpm and min MONTILLA  increased immediately to 94% within 1' sitting rest break  Activity Tolerance   Activity Tolerance Patient limited by fatigue   Nurse Made Aware Niki WOLFF   Assessment   Prognosis Good   Problem List Decreased strength;Decreased endurance; Impaired balance;Decreased mobility; Impaired judgement   Goals   Patient Goals "Go back to the nursing home"   STG Expiration Date 07/10/20   PT Treatment Day 1   Plan   Treatment/Interventions Functional transfer training;LE strengthening/ROM; Therapeutic exercise; Endurance training;Patient/family training;Equipment eval/education; Bed mobility;Gait training; Compensatory technique education;Spoke to nursing;OT   PT Frequency 2-3x/wk   Recommendation   PT Discharge Recommendation Return to previous environment with social support; Other (Comment)  (and assistance from staff for mobility  PT services)   Equipment Recommended   (pt has RW and wheelchair)   PT - OK to Discharge Yes  (when medically cleared to SNF)   Additional Comments [facility] 6 clicks 33/88     (Please find full objective findings from PT assessment regarding body systems outlined above)  Assessment: Pt is a 82 y o  female seen for PT evaluation s/p admission to [address] [place] [address] 18 on 6/26/2020 with Acute respiratory failure with hypoxia ([address] [facility] )  Pt recently D/C'd 6/19/2020 with CHF exacerbation  Wears 2 lpm supplemental O2 at baseline  Currently requiring 4 lpm  Echo on 3/28/2020 showing EF 30%  Pt admitted to med/surg unit and transferred to ICU on 6/29/2020 due to rapid response with worsening SOB  Order placed for PT services  Upon evaluation: Pt is presenting with impaired functional mobility due to decreased strength, decreased endurance, impaired balance, gait deviations, impaired judgment and fall risk requiring steadying assistance for transfers and steadying assistance for ambulation with RW  Pt's clinical presentation is currently unstable/unpredictable given the functional mobility deficits above, especially weakness, decreased endurance, gait deviations, decreased activity tolerance, decreased functional mobility tolerance, impaired judgement and SOB upon exertion, coupled with fall risks as indicated by AM-PAC 6-Clicks: 63/59 as well as impaired balance, polypharmacy and impaired judgement and combined with medical complications of low SpO2 values, multiple readmissions, fear/retreat, need for input for mobility technique/safety and abnormal BNP  Pt's PMHx and comorbidities that may affect physical performance and progress include: CHF, COPD, CKD, A fib, DM, HTN, CAD and anemia   Personal factors affecting pt at time of IE include: inability to navigate level surfaces without external assistance and inability to ambulate household distances  Pt will benefit from continued skilled PT services to address deficits as defined above and to maximize level of functional mobility to facilitate return toward PLOF and improved QOL  From PT/mobility standpoint, recommendation at time of d/c would be return to SNF with therapy services  pending progress in order to reduce fall risk and maximize pt's functional independence and consistency with mobility in order to facilitate return to PLOF  Recommend ther ex next 1-2 sessions  Goals: Pt will: Perform bed mobility tasks with modified I to reposition in bed and prepare for transfers  Pt will perform transfers with Supervision to increase Indep in home environment, decrease burden of care, decrease risk for falls and improve ease of transfers and prepare for ambulation  Pt will ambulate with RW for >/= 76' with  supervision  to decrease burden of care, decrease risk for falls, improve activity tolerance and improve gait quality and to access home environment  Pt will increase B LE strength >/= 1/2 MMT grade to facilitate funcitional mobility  Bishop Sun, PT,DPT         PHYSICAL THERAPY TREATMENT NOTE  Time In:1316  Time Out:1327  Total Time: 6'      S:  "I could try to walk a little more "  O:    Sit<>stand with steadying assistance with increased time  Spt with RW and steadying assistance with min cues to turn completely prior to sitting  Ambulated 23' with RW and steadying assistance with decreased step length and min cues for increased step length and pursed lip breathing  Mod MONTILLA with Spo2 82%, but quickly increasing to 95% within 2' sitting rest and pursed lip breathing       Exercises    Side/Reps/sets   Heelslides    Glute Sets    Hip Abduction/ADDuction    Hip Flexion 2x10 AROM   Knee AROM Short Arc Quad    Ankle Pumps 2x10 AROM   Quad sets    Long Arc Quads 2x10 AROM   Hamstring Stretch    Heel Cord Stretch    Education      A: Pt requires minimal verbal instruction or tactile feedback to perform exercises with proper form and technique  She was able to ambulate increased distance, but demonstrated moderate MONTILLA requiring increased cues for pursed lip breathing and safe completion of ambulation as she was impulsive to return to sitting  She is limited by decreased strength, endurance, balance  She will continue to benefit from PT services to maximize LOF     P:  Recommend addition of therapeutic exercises to current functional mobility program      Chelsy Loving, PT, DPT

## 2020-06-30 NOTE — SPEECH THERAPY NOTE
Speech-Language Pathology Bedside Swallow Evaluation    Patient Name: Magalie Wang    WIGDA'Q Date: 6/30/2020     Problem List  Principal Problem:    Acute respiratory failure with hypoxia [facility])  Active Problems:    Essential hypertension    Acute on chronic systolic CHF (congestive heart failure) ([facility])    Other hyperlipidemia    COPD with emphysema ([facility])    CKD (chronic kidney disease) stage 4, GFR 15-29 ml/min ([facility])    Esophageal reflux    Type 2 diabetes mellitus with stage 3 chronic kidney disease, with long-term current use of insulin ([facility])    A-fib ([facility])    Anemia    ANNCY (acute kidney injury) ([facility] )      Past Medical History  Past Medical History:   Diagnosis Date    NANCY (acute kidney injury) ([facility] )     Atrial fibrillation ([facility] )     CHF (congestive heart failure) ([facility])     COPD (chronic obstructive pulmonary disease) ([facility]     Diabetes mellitus ([facility] )     Trigger finger of thumb     last assessed: 07/18/13       Past Surgical History  Past Surgical History:   Procedure Laterality Date    APPENDECTOMY      CARDIAC PACEMAKER PLACEMENT      CARDIAC PACEMAKER PLACEMENT      CHOLECYSTECTOMY      COLONOSCOPY      Complete    CORONARY ANGIOPLASTY WITH STENT PLACEMENT      CORONARY ARTERY BYPASS GRAFT      CORONARY ARTERY BYPASS GRAFT      ESOPHAGOGASTRODUODENOSCOPY      Diagnostic    HERNIA REPAIR      TOTAL ABDOMINAL HYSTERECTOMY      with removal of both ovaries       Summary   Pt presented with s/s suggestive of mild oropharyngeal dysphagia  Symptoms or concerns included lack of dentition for mastication of solid foods (dentures were left at [facility]), and suspected mildly reduced hyolaryngeal elevation  Initially during evaluation pt was found to be coughing/gagging and expectorating phlegm  This resulted in intermittent cough with PO trials and difficulty identifying concern for aspiration   SLP returned later after vomiting of phlegm had resolved, and pt then tolerated multiple trials of thin liquid and puree without s/s aspiration  Risk/s for Aspiration: suspect low, avoid PO intake during episodes of phlegm expectoration/coughing    Recommended Diet: puree/level 1 diet and thin liquids   Recommended Form of Meds: as tolerated   Aspiration precautions and swallowing strategies: upright posture      Current Medical Status per KRISTIN Reynolds's H&P 6/26/2020  HX and PE limited by: [facility] and currently on BiPAP  Marlena Chin is a 82 y o  female with past medical history of combined systolic and diastolic congestive heart failure, COPD, atrial fib, s/p pacemaker, HTN, HLD, diabetes mellitus type 2, GERD, who presents with shortness of breath  Patient states she felt shortness of breath waking this a m     At baseline patient does wear 2 L nasal cannula  Patient is a resident at [facility] and when assessed by staff it was said that her pulse oximetry was reading in the 70s  EMS was called and upon their exam patient had improved pulse ox to 90% on 2 L  Patient was brought into ED for further evaluation  Upon exam she was noted to have diminished lung sounds, wheezes, crackles and increased effort and work of breathing  Patient was initially placed on non rebreathing mask however did not improve  Patient was transitioned to BiPAP and additionally was given Solu-Medrol 80 mg IV, magnesium 1 g IV and DuoNeb treatment  Chest x-ray and CT non chronic chest showed pulmonary edema and bilateral pleural effusions  D-dimer was elevated at 2 17 subsequently patient went for V/Q scan which showed very low probability of pulmonary embolism  Pro NT BNP was elevated at 6956  Patient was given Lasix 60 mg IV x1  Patient denied fevers, chills, diaphoresis, sore throat, difficulty swallowing, chest pain, palpitations, abdominal pain, diarrhea, constipation, difficulty passing urine, dysuria, headache, dizziness   Patient did report that she felt nauseous after breakfast and did vomit x1 today   Critical care was called to evaluate patient for admission  Per patient shortness of breath was improved  Patient was still on BiPAP during this time and appeared comfortable  Patient was able to talk in full sentences despite being on BiPAP  Lung sounds remain diminished and noted to have fine crackles and occasional wheezing  Will admit patient to step-down level 1  History obtained from chart review and the patient  Current Precautions:  Fall, Aspiration     Special Studies:  CXR 6/29/2020 IMPRESSION:  New pulmonary edema  Persistent small effusions and bibasilar atelectasis  CT chest 6/26/2020 IMPRESSION:  1  Moderate right and small left pleural effusions  2  Scattered groundglass opacities, favor pulmonary edema  May also be infectious/inflammatory     Social/Education/Vocational Hx:  Pt lives in SNF/ECF    Swallow Information   Current Risks for Dysphagia & Aspiration: "choking" on lettuce previously during this admission  Current Symptoms/Concerns: change in respiratory status  Current Diet: puree/level 1 diet and thin liquids   Baseline Diet: regular diet and thin liquids      Baseline Assessment   Behavior/Cognition: alert  Speech/Language Status: able to participate in conversation and able to follow commands  Patient Positioning: upright in bed  Pain Status/Interventions/Response to Interventions:  No report of or nonverbal indications of pain  Swallow Mechanism Exam  Facial: symmetrical  Labial: WFL  Lingual: WFL  Velum: symmetrical  Mandible: adequate ROM  Dentition: edentulous  Vocal quality:clear/adequate   Volitional Cough: weak   Respiratory Status: on O2        Consistencies Assessed and Performance   Consistencies Administered: thin liquids, nectar thick and puree    Oral Stage: mild  Bolus formation and transfer were functional with no significant oral residue noted   Suspect inadequate mastication of solids due to dentures being left at SNF, therefore solids not offered today  No overt s/s reduced oral control  Pharyngeal Stage: mild  Swallow Mechanics: Swallowing initiation appeared prompt  Laryngeal rise was palpated and judged to be mildly reduced  Initial inconsistent cough with gagging and expectoration of phlegm  SLP returned when coughing cleared and pt had no coughing, throat clearing, change in vocal quality or respiratory status noted with trials  Esophageal Concerns: none reported    Summary and Recommendations (see above)    Results Reviewed with: patient and RN     Treatment Recommended: Brief    Frequency of treatment: 1-2x f/u      Dysphagia LTG per SLP  -Patient will demonstrate optimum safety and efficacy of oral intake and swallowing function without overt s/sx of penetration or aspiration for the highest appropriate diet level  Short Term Goals:  -Pt will tolerate Dysphagia 1/pureed diet and thin liquid with no significant s/s oral or pharyngeal dysphagia across 1-2 diagnostic sessions

## 2020-06-30 NOTE — PLAN OF CARE
Problem: PHYSICAL THERAPY ADULT  Goal: Performs mobility at highest level of function for planned discharge setting  See evaluation for individualized goals  Description  Treatment/Interventions: Functional transfer training, LE strengthening/ROM, Therapeutic exercise, Endurance training, Patient/family training, Equipment eval/education, Bed mobility, Gait training, Compensatory technique education, Spoke to nursing, OT  Equipment Recommended: (pt has RW and wheelchair)       See flowsheet documentation for full assessment, interventions and recommendations  Note:   Prognosis: Good  Problem List: Decreased strength, Decreased endurance, Impaired balance, Decreased mobility, Impaired judgement  Assessment: Pt is a 82 y o  female seen for PT evaluation s/p admission to [address] [place] [address] 18 on 6/26/2020 with Acute respiratory failure with hypoxia ([facility] )  Pt recently D/C'd 6/19/2020 with CHF exacerbation  Wears 2 lpm supplemental O2 at baseline  Currently requiring 4 lpm  Echo on 3/28/2020 showing EF 30%  Pt admitted to med/surg unit and transferred to ICU on 6/29/2020 due to rapid response with worsening SOB  Order placed for PT services    Upon evaluation: Pt is presenting with impaired functional mobility due to decreased strength, decreased endurance, impaired balance, gait deviations, impaired judgment and fall risk requiring steadying assistance for transfers and steadying assistance for ambulation with RW  Pt's clinical presentation is currently unstable/unpredictable given the functional mobility deficits above, especially weakness, decreased endurance, gait deviations, decreased activity tolerance, decreased functional mobility tolerance, impaired judgement and SOB upon exertion, coupled with fall risks as indicated by AM-PAC 6-Clicks: 13/64 as well as impaired balance, polypharmacy and impaired judgement and combined with medical complications of low SpO2 values, multiple readmissions, fear/retreat, need for input for mobility technique/safety and abnormal BNP  Pt's PMHx and comorbidities that may affect physical performance and progress include: CHF, COPD, CKD, A fib, DM, HTN, CAD and anemia  Personal factors affecting pt at time of IE include: inability to navigate level surfaces without external assistance and inability to ambulate household distances  Pt will benefit from continued skilled PT services to address deficits as defined above and to maximize level of functional mobility to facilitate return toward PLOF and improved QOL  From PT/mobility standpoint, recommendation at time of d/c would be return to SNF with therapy services  pending progress in order to reduce fall risk and maximize pt's functional independence and consistency with mobility in order to facilitate return to PLOF  Recommend ther ex next 1-2 sessions  PT Discharge Recommendation: Return to previous environment with social support, Other (Comment)(and assistance from staff for mobility  PT services)     PT - OK to Discharge: Yes(when medically cleared to SNF)    See flowsheet documentation for full assessment

## 2020-06-30 NOTE — PLAN OF CARE
Problem: PHYSICAL THERAPY ADULT  Goal: Performs mobility at highest level of function for planned discharge setting  See evaluation for individualized goals  Description  Treatment/Interventions: Functional transfer training, LE strengthening/ROM, Therapeutic exercise, Endurance training, Patient/family training, Equipment eval/education, Bed mobility, Gait training, Compensatory technique education, Spoke to nursing, OT  Equipment Recommended: (pt has RW and wheelchair)       See flowsheet documentation for full assessment, interventions and recommendations  8/15/4180 [id number] by Wanda Seo PT  Outcome: Progressing  Note:   Prognosis: Good  Problem List: Decreased strength, Decreased endurance, Impaired balance, Decreased mobility, Impaired judgement   Pt requires minimal verbal instruction or tactile feedback to perform exercises with proper form and technique  She was able to ambulate increased distance, but demonstrated moderate MONTILLA requiring increased cues for pursed lip breathing and safe completion of ambulation as she was impulsive to return to sitting  She is limited by decreased strength, endurance, balance  She will continue to benefit from PT services to maximize LOF  PT Discharge Recommendation: Return to previous environment with social support, Other (Comment)(and assistance from staff for mobility  PT services)     PT - OK to Discharge: Yes(when medically cleared to SNF)    See flowsheet documentation for full assessment

## 2020-06-30 NOTE — PLAN OF CARE
Problem: Potential for Falls  Goal: Patient will remain free of falls  Description  INTERVENTIONS:  - Assess patient frequently for physical needs  -  Identify cognitive and physical deficits and behaviors that affect risk of falls  -  Gilbert fall precautions as indicated by assessment   - Educate patient/family on patient safety including physical limitations  - Instruct patient to call for assistance with activity based on assessment  - Modify environment to reduce risk of injury  - Consider OT/PT consult to assist with strengthening/mobility  Outcome: Progressing     Problem: NEUROSENSORY - ADULT  Goal: Achieves stable or improved neurological status  Description  INTERVENTIONS  - Monitor and report changes in neurological status  - Monitor vital signs such as temperature, blood pressure, glucose, and any other labs ordered   - Initiate measures to prevent increased intracranial pressure  - Monitor for seizure activity and implement precautions if appropriate      Outcome: Progressing  Goal: Achieves maximal functionality and self care  Description  INTERVENTIONS  - Monitor swallowing and airway patency with patient fatigue and changes in neurological status  - Encourage and assist patient to increase activity and self care     - Encourage visually impaired, hearing impaired and aphasic patients to use assistive/communication devices  Outcome: Progressing     Problem: CARDIOVASCULAR - ADULT  Goal: Maintains optimal cardiac output and hemodynamic stability  Description  INTERVENTIONS:  - Monitor I/O, vital signs and rhythm  - Monitor for S/S and trends of decreased cardiac output  - Administer and titrate ordered vasoactive medications to optimize hemodynamic stability  - Assess quality of pulses, skin color and temperature  - Assess for signs of decreased coronary artery perfusion  - Instruct patient to report change in severity of symptoms  Outcome: Progressing  Goal: Absence of cardiac dysrhythmias or at baseline rhythm  Description  INTERVENTIONS:  - Continuous cardiac monitoring, vital signs, obtain 12 lead EKG if ordered  - Administer antiarrhythmic and heart rate control medications as ordered  - Monitor electrolytes and administer replacement therapy as ordered  Outcome: Progressing     Problem: RESPIRATORY - ADULT  Goal: Achieves optimal ventilation and oxygenation  Description  INTERVENTIONS:  - Assess for changes in respiratory status  - Assess for changes in mentation and behavior  - Position to facilitate oxygenation and minimize respiratory effort  - Oxygen administered by appropriate delivery if ordered  - Initiate smoking cessation education as indicated  - Encourage broncho-pulmonary hygiene including cough, deep breathe, Incentive Spirometry  - Assess the need for suctioning and aspirate as needed  - Assess and instruct to report SOB or any respiratory difficulty  - Respiratory Therapy support as indicated  Outcome: Progressing     Problem: GASTROINTESTINAL - ADULT  Goal: Minimal or absence of nausea and/or vomiting  Description  INTERVENTIONS:  - Administer IV fluids if ordered to ensure adequate hydration  - Maintain NPO status until nausea and vomiting are resolved  - Nasogastric tube if ordered  - Administer ordered antiemetic medications as needed  - Provide nonpharmacologic comfort measures as appropriate  - Advance diet as tolerated, if ordered  - Consider nutrition services referral to assist patient with adequate nutrition and appropriate food choices  Outcome: Progressing  Goal: Maintains or returns to baseline bowel function  Description  INTERVENTIONS:  - Assess bowel function  - Encourage oral fluids to ensure adequate hydration  - Administer IV fluids if ordered to ensure adequate hydration  - Administer ordered medications as needed  - Encourage mobilization and activity  - Consider nutritional services referral to assist patient with adequate nutrition and appropriate food choices  Outcome: Progressing  Goal: Maintains adequate nutritional intake  Description  INTERVENTIONS:  - Monitor percentage of each meal consumed  - Identify factors contributing to decreased intake, treat as appropriate  - Assist with meals as needed  - Monitor I&O, weight, and lab values if indicated  - Obtain nutrition services referral as needed  Outcome: Progressing     Problem: GENITOURINARY - ADULT  Goal: Maintains or returns to baseline urinary function  Description  INTERVENTIONS:  - Assess urinary function  - Encourage oral fluids to ensure adequate hydration if ordered  - Administer IV fluids as ordered to ensure adequate hydration  - Administer ordered medications as needed  - Offer frequent toileting  - Follow urinary retention protocol if ordered  Outcome: Progressing  Goal: Absence of urinary retention  Description  INTERVENTIONS:  - Assess patients ability to void and empty bladder  - Monitor I/O  - Bladder scan as needed  - Discuss with physician/AP medications to alleviate retention as needed  - Discuss catheterization for long term situations as appropriate  Outcome: Progressing     Problem: METABOLIC, FLUID AND ELECTROLYTES - ADULT  Goal: Electrolytes maintained within normal limits  Description  INTERVENTIONS:  - Monitor labs and assess patient for signs and symptoms of electrolyte imbalances  - Administer electrolyte replacement as ordered  - Monitor response to electrolyte replacements, including repeat lab results as appropriate  - Instruct patient on fluid and nutrition as appropriate  Outcome: Progressing  Goal: Fluid balance maintained  Description  INTERVENTIONS:  - Monitor labs   - Monitor I/O and WT  - Instruct patient on fluid and nutrition as appropriate  - Assess for signs & symptoms of volume excess or deficit  Outcome: Progressing  Goal: Glucose maintained within target range  Description  INTERVENTIONS:  - Monitor Blood Glucose as ordered  - Assess for signs and symptoms of hyperglycemia and hypoglycemia  - Administer ordered medications to maintain glucose within target range  - Assess nutritional intake and initiate nutrition service referral as needed  Outcome: Progressing     Problem: SKIN/TISSUE INTEGRITY - ADULT  Goal: Skin integrity remains intact  Description  INTERVENTIONS  - Identify patients at risk for skin breakdown  - Assess and monitor skin integrity  - Assess and monitor nutrition and hydration status  - Monitor labs (i e  albumin)  - Assess for incontinence   - Turn and reposition patient  - Assist with mobility/ambulation  - Relieve pressure over bony prominences  - Avoid friction and shearing  - Provide appropriate hygiene as needed including keeping skin clean and dry  - Evaluate need for skin moisturizer/barrier cream  - Collaborate with interdisciplinary team (i e  Nutrition, Rehabilitation, etc )   - Patient/family teaching  Outcome: Progressing  Goal: Oral mucous membranes remain intact  Description  INTERVENTIONS  - Assess oral mucosa and hygiene practices  - Implement preventative oral hygiene regimen  - Implement oral medicated treatments as ordered  - Initiate Nutrition services referral as needed  Outcome: Progressing     Problem: HEMATOLOGIC - ADULT  Goal: Maintains hematologic stability  Description  INTERVENTIONS  - Assess for signs and symptoms of bleeding or hemorrhage  - Monitor labs  - Administer supportive blood products/factors as ordered and appropriate  Outcome: Progressing     Problem: MUSCULOSKELETAL - ADULT  Goal: Maintain or return mobility to safest level of function  Description  INTERVENTIONS:  - Assess patient's ability to carry out ADLs; assess patient's baseline for ADL function and identify physical deficits which impact ability to perform ADLs (bathing, care of mouth/teeth, toileting, grooming, dressing, etc )  - Assess/evaluate cause of self-care deficits   - Assess range of motion  - Assess patient's mobility  - Assess patient's need for assistive devices and provide as appropriate  - Encourage maximum independence but intervene and supervise when necessary  - Involve family in performance of ADLs  - Assess for home care needs following discharge   - Consider OT consult to assist with ADL evaluation and planning for discharge  - Provide patient education as appropriate  Outcome: Progressing     Problem: Prexisting or High Potential for Compromised Skin Integrity  Goal: Skin integrity is maintained or improved  Description  INTERVENTIONS:  - Identify patients at risk for skin breakdown  - Assess and monitor skin integrity  - Assess and monitor nutrition and hydration status  - Monitor labs   - Assess for incontinence   - Turn and reposition patient  - Assist with mobility/ambulation  - Relieve pressure over bony prominences  - Avoid friction and shearing  - Provide appropriate hygiene as needed including keeping skin clean and dry  - Evaluate need for skin moisturizer/barrier cream  - Collaborate with interdisciplinary team   - Patient/family teaching  - Consider wound care consult   Outcome: Progressing     Problem: Nutrition/Hydration-ADULT  Goal: Nutrient/Hydration intake appropriate for improving, restoring or maintaining nutritional needs  Description  Monitor and assess patient's nutrition/hydration status for malnutrition  Collaborate with interdisciplinary team and initiate plan and interventions as ordered  Monitor patient's weight and dietary intake as ordered or per policy  Utilize nutrition screening tool and intervene as necessary  Determine patient's food preferences and provide high-protein, high-caloric foods as appropriate       INTERVENTIONS:  - Monitor oral intake, urinary output, labs, and treatment plans  - Assess nutrition and hydration status and recommend course of action  - Evaluate amount of meals eaten  - Assist patient with eating if necessary   - Allow adequate time for meals  - Recommend/ encourage appropriate diets, oral nutritional supplements, and vitamin/mineral supplements  - Order, calculate, and assess calorie counts as needed  - Recommend, monitor, and adjust tube feedings and TPN/PPN based on assessed needs  - Assess need for intravenous fluids  - Provide specific nutrition/hydration education as appropriate  - Include patient/family/caregiver in decisions related to nutrition  Outcome: Progressing

## 2020-06-30 NOTE — ASSESSMENT & PLAN NOTE
Wt Readings from Last 3 Encounters:   06/29/20 71 5 kg (157 lb 10 1 oz)   06/22/20 68 9 kg (152 lb)   06/19/20 66 4 kg (146 lb 6 2 oz)     · On admission, CXR/CT chest showed pulmonary edema  · In ED, given IV Lasix 60 mg  · NT Pro-BNP 6956; previously 12,600 on 06/12   · NOLAN 06/12/20 - "Akinesis of the basal to mid inferior and the inferolateral walls  The ventricle was dilated  Systolic function was moderately reduced  EF 35 % to 40 %  There was assymetrical hypertrophy of the septum  Normal size and systolic function of right ventricle  Left atrium moderately dilated  Mild mitral valve regurgitation  Mild tricuspid valve regurgitation "  · TTE report in care everywhere from 03/30/20 - "Left ventricle is mildly dilated  Severely reduced left ventricular systolic function  Basal to mid inferior and inferolateral akinesis  Mild concentric left ventricular hypertrophy  EF 30%  Normal right ventricular size  Reduced right ventricular systolic function  Severely dilated left atrium   Diastolic dysfunction with elevated filling pressures "  · Takes PO Lasix 40 mg daily at [facility], however was taking IV Lasix 40 mg BID this admission  · Pt was rapid response on 6/29 for increased WOB, SOB, pulmonary edema on CXR  · Given IV Lasix 40 mg x 2 and dosing increased to IV Lasix 80 mg BID  · May give additional dose PRN depending on response  · Additional dose given 6/29  · Goal net fluid balance -1 to -2 L  · Continue daily weights  · Cardiology continues to follow  · Place [facility] for strict I/O

## 2020-06-30 NOTE — PROGRESS NOTES
Progress Note - Cardiology   Kamila Tipton 82 y o  female MRN: [id number]  Unit/Bed#: -01 Encounter: [id number]    Assessment:  Acute on Chronic HFrEF- EF 35-40%, appearing hypervolemic today but improved  Acute respiratory failure with hypoxia- on oxygen via NC  COPD   Pacemaker- SSS sp PPM  CAD sp CABG  Afib- on coreg and anticoagulated on eliquis  DM  HTN    Plan:  - continue lasix to 80 IV BID  - monitor I and O   - daily weights, monitor Cr and electrolytes  - will continue to follow  will need outpatient follow up with [facility] in [address]      Subjective/Objective     Subjective: Patient reports improved breathing  She states she is starting to feel better  She has no further cardiac complaints  Objective: patient is now on nasal cannula  Vitals: /72   Pulse 70   Temp 98 5 °F (36 9 °C) (Axillary)   Resp (!) 23   Ht 5' (1 524 m)   Wt 71 5 kg (157 lb 10 1 oz)   LMP  (LMP Unknown)   SpO2 92%   BMI 30 78 kg/m²   Vitals:    06/28/20 0600 06/29/20 0600   Weight: 69 8 kg (153 lb 14 1 oz) 71 5 kg (157 lb 10 1 oz)     Orthostatic Blood Pressures      Most Recent Value   Blood Pressure  157/72 filed at 06/30/2020 0700   Patient Position - Orthostatic VS  Lying filed at 06/30/2020 0654            Intake/Output Summary (Last 24 hours) at 6/30/2020 0855  Last data filed at 6/30/2020 0800  Gross per 24 hour   Intake 560 ml   Output 3485 ml   Net -2925 ml       Invasive Devices     Peripheral Intravenous Line            Peripheral IV 06/26/20 Left Antecubital 4 days          Drain            Urethral Catheter Temperature probe 1 day                Physical Exam   Constitutional: She appears well-developed and well-nourished  HENT:   Head: Atraumatic  Neck: Neck supple  Cardiovascular: Normal rate, regular rhythm and normal heart sounds  No murmur heard  Pulmonary/Chest: She is in mild respiratory distress  She has rales  Abdominal: Soft     Musculoskeletal: She exhibits no edema  Neurological: She is alert  Skin: Skin is warm and dry  Capillary refill takes less than 2 seconds         Lab Results:   I have personally reviewed pertinent lab results  CBC with diff:   Results from last 7 days   Lab Units 06/30/20  0436   WBC Thousand/uL 9 62   RBC Million/uL 3 04*   HEMOGLOBIN g/dL 8 9*   HEMATOCRIT % 28 9*   MCV fL 95   MCH pg 29 3   MCHC g/dL 30 8*   RDW % 14 8   MPV fL 12 7   PLATELETS Thousands/uL 212     CMP:   Results from last 7 days   Lab Units 06/30/20  0601  06/26/20  0844   SODIUM mmol/L 146*   < > 143   POTASSIUM mmol/L 4 0   < > 5 6*   CHLORIDE mmol/L 105   < > 107   CO2 mmol/L 35*   < > 30   BUN mg/dL 76*   < > 48*   CREATININE mg/dL 2 17*   < > 1 69*   CALCIUM mg/dL 7 8*   < > 8 2*   AST U/L  --   --  30   ALT U/L  --   --  26   ALK PHOS U/L  --   --  110   EGFR ml/min/1 73sq m 21   < > 28    < > = values in this interval not displayed  Troponin:   0   Lab Value Date/Time    TROPONINI 0 04 06/26/2020 0844    TROPONINI 0 08 (H) 06/12/2020 1440    TROPONINI 0 06 (H) 06/12/2020 1224    TROPONINI 0 05 (H) 06/12/2020 0733     BNP:   Results from last 7 days   Lab Units 06/30/20  0601   POTASSIUM mmol/L 4 0   CHLORIDE mmol/L 105   CO2 mmol/L 35*   BUN mg/dL 76*   CREATININE mg/dL 2 17*   CALCIUM mg/dL 7 8*   EGFR ml/min/1 73sq m 21     Coags:   Results from last 7 days   Lab Units 06/26/20  0844   INR  1 14     TSH:   Results from last 7 days   Lab Units 06/26/20  0844   TSH 3RD GENERATON uIU/mL 3 971*        Imaging: I have personally reviewed pertinent reports         TTE 3/30/20:   Left ventricle is mildly dilated  Severely reduced left ventricular systolic     function  Basal to mid inferior and inferolateral akinesis  Mild concentric     left ventricular hypertrophy  Estimated left ventricular ejection fraction is     30%      Normal right ventricular size  Reduced right ventricular systolic function        Device wire present in right ventricle      Severely dilated left atrium      Diastolic dysfunction with elevated filling pressures (suggestion of L     wave on mitral inflow, supports elevated filling pressures)      Visually Estimated LV Ejection Fraction is: 30%     TTE 6/12/20:  LEFT VENTRICLE:  Akinesis of the basal to mid inferior and the inferolateral walls  The ventricle was dilated  Systolic function was moderately reduced  Ejection fraction was estimated in the range of 35 % to 40 %  There was assymetrical hypertrophy of the septum  RIGHT VENTRICLE:  The size was normal   Systolic function was normal   LEFT ATRIUM:  The atrium was moderately dilated  MITRAL VALVE:  There was moderate thickening  There was mild regurgitation  TRICUSPID VALVE:  There was mild regurgitation  IVC, HEPATIC VEINS:  The inferior vena cava was dilated  Respirophasic changes were blunted (less than 50% variation)  PERICARDIUM:  A trace pericardial effusion was identified  There was a large left pleural effusion     CXR 6/27/20:  Persistent small effusions and bibasilar atelectasis      CXR 6/29/20:  New pulmonary edema    Persistent small effusions and bibasilar atelectasis      CT CHEST 6/26/20:  1   Moderate right and small left pleural effusions  2   Scattered groundglass opacities, favor pulmonary edema   May also be infectious/inflammatory

## 2020-06-30 NOTE — PROGRESS NOTES
Progress Note - Shu Darnell 1935, 82 y o  female MRN: [id number]    Unit/Bed#: -01 Encounter: [id number]    Primary Care Provider: Lynette Alicea MD   Date and time admitted to hospital: 6/26/2020  8:14 AM    * [facility] with [facility] ([facility]  Assessment & Plan  · Likely multifactorial - acute on chronic combined systolic and diastolic congestive heart failure, COPD, bilateral pleural effusions  · Initially presented w/SOB  · SpO2 at ECF in 70s on baseline 2L NC  · When EMS arrived, SpO2 90% w/increased WOB  · In ED, pt tachypneic w/increased WOB and wheezing  · Initially placed on NRB and then transitioned to BIPAP  · Given IV Solu-Medrol 80 mg, Duoneb treatment, and IV Mag 1g  · NT Pro-BNP 6956, previous was 12,600 on 06/12  · CT chest showed pulmonary edema, moderate right pleural effusion, small left pleural effusion  · Weaned off BIPAP to 4L NC on admission to ICU; transferred to /S on 6/27  · Pt was rapid response on 6/29 for increased WOB and SOB; SpO2 in upper 90's  · Stat CXR showed pulmonary edema and bilateral pleural effusions  · Received IV Lasix 40 mg x2 and IV Solu-Medrol 125 mg  · Transferred back to ICU on BIPAP 16/7/40%  · Continue to use q hs and prn  · Consider transfer to [facility] today  · PCT 0 12--> no abx indicated  · Scheduled PO Guaifenesin and PRN Robitussin DM     Acute on chronic systolic CHF (congestive heart failure) ([facility] )  Assessment & Plan  Wt Readings from Last 3 Encounters:   06/29/20 71 5 kg (157 lb 10 1 oz)   06/22/20 68 9 kg (152 lb)   06/19/20 66 4 kg (146 lb 6 2 oz)     · On admission, CXR/CT chest showed pulmonary edema  · In ED, given IV Lasix 60 mg  · NT Pro-BNP 6956; previously 12,600 on 06/12   · NOLAN 06/12/20 - "Akinesis of the basal to mid inferior and the inferolateral walls  The ventricle was dilated  Systolic function was moderately reduced  EF 35 % to 40 %  There was assymetrical hypertrophy of the septum   Normal size and systolic function of right ventricle  Left atrium moderately dilated  Mild mitral valve regurgitation  Mild tricuspid valve regurgitation "  · TTE report in care everywhere from 03/30/20 - "Left ventricle is mildly dilated  Severely reduced left ventricular systolic function  Basal to mid inferior and inferolateral akinesis  Mild concentric left ventricular hypertrophy  EF 30%  Normal right ventricular size  Reduced right ventricular systolic function  Severely dilated left atrium  Diastolic dysfunction with elevated filling pressures "  · Takes PO Lasix 40 mg daily at [facility] [place], however was taking IV Lasix 40 mg BID this admission  · Pt was rapid response on 6/29 for increased WOB, SOB, pulmonary edema on CXR  · Given IV Lasix 40 mg x 2 and dosing increased to IV Lasix 80 mg BID  · May give additional dose PRN depending on response  · Additional dose given 6/29  · Goal net fluid balance -1 to -2 L  · Continue daily weights  · Cardiology continues to follow  · Place Christina for strict I/O     COPD with emphysema ([facility] )  Assessment & Plan  · Initially presented w/SOB   · SpO2 in 70's on baseline 2L NC per ECF  · Placed on NRB in ED and transitioned to BiPAP   · Received Duoneb treatment, IV Mag 1g, IV Lasix 60 mg, and IV Solu-Medrol 80 mg  · Weaned from BIPAP to 4L NC on admission to ICU  · Transferred out to M/S on 6/27  · Rapid response for increased WOB/SOB on 6/29 d/t pulmonary edema  · Received IV Lasix 40 mg x2 and IV Solu-Medrol bolus of 125 mg  · IV Lasix dose increased  · Will give additional Solu-Medrol dose of 40 mg tomorrow, 6/30 to complete 5 days of steroids  · Continue respiratory treatments; Xopenex, Atrovent, Pulmicort  · Respiratory and airway clearance protocols  · Incentive spirometry  · Continue Claritin, Flonase    NANCY (acute kidney [facility]) ([facility]  Assessment & Plan  · Baseline creatine 1 4-1 6   · Creatinine increased since admission; now 2  11    · Likely secondary to increased doses of Lasix for diuresis  · Continue to monitor BUN and creatinine  · Monitor I&O  · Continue to hold Lisinopril    Type 2 diabetes mellitus with stage 3 chronic kidney disease, with long-term current use of insulin [facility])  Assessment & Plan  Lab Results   Component Value Date    HGBA1C 7 9 (H) 03/28/2020       Recent Labs     06/29/20  0753 06/29/20  0903 06/29/20  1205 06/29/20  1800   POCGLU 74 89 105 164*       Blood Sugar Average: Last 72 hrs:  (P) 221 [id number]   · Takes Humulin SSI and Lantus 8 units at HS at [facility]  · BS labile throughout admission  · On floor was taking:   · 3 units standing mealtime Humalog  · SSI Humalog AC/HS  · Lantus 8 units at HS  · BS 6/28 >500; glucose check was 525  · Received additional 1 x dose of Humalog 5 units  · BS 60 during rapid response on 6/29  · Improved post OJ  · Currently getting SSI AC/HS and Lantus held as pt was NPO w/BIPAP   · Now on Level 1 pureed diet- holding Lantus  · Consider restarting at 5 units tonight given hypoglycemia 6/29 am after 8 units  · Consider insulin gtt while on steroids if BS does not stabilize  · Currently on diabetic diet level 1 pureed    CKD (chronic kidney disease) stage 4, GFR 15-29 ml/min ([facility])  Assessment & Plan  · Baseline creatinine appears to be 1 4 - 1 6  · Creatinine elevated on admission w/peak 2 6  · Creatinine today 2 11  · Received IV Lasix 60 mg in ED  · IV Lasix 40 mg BID, which was increased to 80 mg BID on 6/29  · Continue to trend labs   · Monitor I&O  · ACEI continues to be held in setting of NANCY  · Avoid fluctuations in BP    A-fib ([facility])  Assessment & Plan  · History of atrial fibrillation; does have pacemaker  · Currently in NSR  · Continue medications from ECF  · PO Carvedilol 1 56 mg BID  · PO Norvasc 5 mg  · PO Eliquis 2 5 mg BID    Essential hypertension  Assessment & Plan  · History of hypertension  · PO Lisinopril held d/t elevated Creatinine  · Continue medications from ECF  · PO Norvasc 5 mg  · PO Carvedilol 1 56 mg BID  · PRN Labetalol 5 mg Q2hrs for SBP >160      ----------------------------------------------------------------------------------------  HPI/24hr events: Received additional 80 mg IV lasix last night for goal net negative of 2 liters  Continues with cough, which is now productive  Denies CP, SOB, abdominal pain    Disposition: Transfer to Med-Surg   Code Status: Level 1 - Full Code  ---------------------------------------------------------------------------------------  SUBJECTIVE    Review of Systems   Constitutional: Negative for chills, diaphoresis and fever  HENT: Negative for sore throat  Respiratory: Positive for cough  Negative for shortness of breath  Cardiovascular: Negative for chest pain, palpitations and leg swelling  Gastrointestinal: Negative for abdominal pain, constipation, diarrhea, nausea and vomiting  Neurological: Negative for dizziness and light-headedness  Psychiatric/Behavioral: Negative for agitation  Review of systems was reviewed and negative unless stated above in HPI/24-hour events   ---------------------------------------------------------------------------------------  OBJECTIVE    Vitals   Vitals:    20 1625 20 1802 20 1856 20 2040   BP: 143/64 143/64 130/70    BP Location: Left arm  Left arm    Pulse: 76 70 73    Resp: (!) 31  (!) 26    Temp: 98 °F (36 7 °C)  98 5 °F (36 9 °C)    TempSrc: Axillary  Oral    SpO2: 92%  93% 96%   Weight:       Height:         Temp (24hrs), Av 9 °F (36 6 °C), Min:97 3 °F (36 3 °C), Max:98 5 °F (36 9 °C)  Current: Temperature: 98 5 °F (36 9 °C)        SpO2: SpO2: 96 %       Physical Exam   Constitutional: She is oriented to person, place, and time  She appears well-developed and well-nourished  HENT:   Head: Normocephalic and atraumatic  Mouth/Throat: Oropharynx is clear and moist    Eyes: Pupils are equal, round, and reactive to light  Neck: Neck supple     Cardiovascular: Normal rate and regular rhythm  Pulmonary/Chest: Effort normal  No respiratory distress  She has wheezes  Decreased at bases   Abdominal: Soft  Bowel sounds are normal  She exhibits no distension  There is no tenderness  There is no guarding  Musculoskeletal: Normal range of motion  She exhibits no edema  Neurological: She is alert and oriented to person, place, and time  Skin: Skin is warm and dry  Psychiatric: She has a normal mood and affect  Her behavior is normal    Nursing note and vitals reviewed        Invasive/non-invasive ventilation settings   Respiratory    Lab Data (Last 4 hours)    None         O2/Vent Data (Last 4 hours)      06/29 2145 06/30 0013        Non-Invasive Ventilation Mode BiPAP BiPAP                  Laboratory and Diagnostics:  Results from last 7 days   Lab Units 06/29/20  0553 06/28/20  0552 06/27/20  0423 06/26/20  0844   WBC Thousand/uL 7 94 7 69 5 68 9 99   HEMOGLOBIN g/dL 8 4* 8 0* 8 0* 9 3*   HEMATOCRIT % 27 4* 26 7* 26 4* 31 1*   PLATELETS Thousands/uL 197 187 184 211   NEUTROS PCT % 66 67 68 74   MONOS PCT % 12 9 9 7     Results from last 7 days   Lab Units 06/29/20  1523 06/29/20  0553 06/28/20  2243 06/28/20  0552 06/27/20  0423 06/26/20  1755 06/26/20  0844   SODIUM mmol/L 145 143  --  142 143 145 143   POTASSIUM mmol/L 4 1 4 5  --  4 5 4 9 4 7 5 6*   CHLORIDE mmol/L 107 107  --  106 107 108 107   CO2 mmol/L 32 30  --  30 30 29 30   ANION GAP mmol/L 6 6  --  6 6 8 6   BUN mg/dL 79* 81*  --  69* 62* 54* 48*   CREATININE mg/dL 2 16* 2 11*  --  2 04* 2 15* 1 86* 1 69*   CALCIUM mg/dL 8 2* 8 2*  --  7 8* 8 0* 8 2* 8 2*   GLUCOSE RANDOM mg/dL 105 101 525* 129 291* 208* 129   ALT U/L  --   --   --   --   --   --  26   AST U/L  --   --   --   --   --   --  30   ALK PHOS U/L  --   --   --   --   --   --  110   ALBUMIN g/dL  --   --   --   --   --   --  2 9*   TOTAL BILIRUBIN mg/dL  --   --   --   --   --   --  0 30     Results from last 7 days   Lab Units 06/29/20  1523 06/29/20  0553 06/28/20  0552 06/27/20  0423 06/26/20  1755 06/26/20  0844   MAGNESIUM mg/dL 2 3 2 5 2 5 2 5 2 4 2 2   PHOSPHORUS mg/dL 4 5* 4 7*  --  4 9*  --   --       Results from last 7 days   Lab Units 06/26/20  0844   INR  1 14      Results from last 7 days   Lab Units 06/26/20  0844   TROPONIN I ng/mL 0 04     Results from last 7 days   Lab Units 06/26/20  0844   LACTIC ACID mmol/L 0 9     ABG:  Results from last 7 days   Lab Units 06/29/20  0828   PH ART  7 373   PCO2 ART mm Hg 46 7*   PO2 ART mm Hg 46 7*   HCO3 ART mmol/L 26 6   BASE EXC ART mmol/L 1 0   ABG SOURCE  Radial, Right     VBG:  Results from last 7 days   Lab Units 06/29/20  0828   ABG SOURCE  Radial, Right     Results from last 7 days   Lab Units 06/29/20  1523   PROCALCITONIN ng/ml 0 12       Micro  Results from last 7 days   Lab Units 06/26/20  0844   BLOOD CULTURE  No Growth at 72 hrs  No Growth at 72 hrs  Imaging: I have personally reviewed pertinent reports  Intake and Output  I/O       06/28 0701 - 06/29 0700 06/29 0701 - 06/30 0700    P  O  360 510    Total Intake(mL/kg) 360 (5) 510 (7 1)    Urine (mL/kg/hr) 1050 (0 6) 2625 (1 5)    Total Output 1050 2625    Net -598 -1934                Height and Weights   Height: 5' (152 4 cm)  IBW: 45 5 kg  Body mass index is 30 78 kg/m²  Weight (last 2 days)     Date/Time   Weight    06/29/20 0600   71 5 (157 63)    06/28/20 0600   69 8 (153 88)                Nutrition       Diet Orders   (From admission, onward)             Start     Ordered    06/29/20 1429  Diet Dysphagia/Modified Consistency; Dysphagia 1-Pureed;  Thin Liquid; Consistent Carbohydrate Diet Level 1 (4 carb servings/60 grams CHO/meal), Sodium 4 GM (TATYANA)  Diet effective now     Question Answer Comment   Diet Type Dysphagia/Modified Consistency    Dysphagia/Modified Consistency Dysphagia 1-Pureed    Liquid Modifier Thin Liquid    Other Restriction(s): Consistent Carbohydrate Diet Level 1 (4 carb servings/60 grams CHO/meal)    Other Restriction(s): Sodium 4 GM (TATYANA)    RD to adjust diet per protocol?  Yes        06/29/20 1429                  Active Medications  Scheduled Meds:  Current Facility-Administered Medications:  amLODIPine 5 mg Oral Daily Suzan Reynolds, CRNP   apixaban 2 5 mg Oral BID Suzan Reynolds, CRNP   budesonide 0 5 mg Nebulization Q12H Suzan Reynolds, CRNP   carvedilol 1 5625 mg Oral BID Suzan Reynolds, CRNP   chlorhexidine 15 mL Swish & Spit Q12H [facility], CRNP   dextromethorphan-guaiFENesin 10 mL Oral Q4H PRN Suzan Reynolds, JOHNATHONNP   docusate sodium 100 mg Oral BID Suzan Reynolds, CRNP   ferrous sulfate 325 mg Oral Daily With Breakfast Suzan Reynolds, CRNP   fluticasone 2 spray Nasal Daily Suzan Reynolds, CRNP   furosemide 80 mg Intravenous BID (diuretic) Suzan Reynolds, JOHNATHONNP   guaiFENesin 600 mg Oral Q12H [facility], JOHNATHONNP   insulin lispro 1-6 Units Subcutaneous TID AC Abby Tim PA-C   ipratropium 0 5 mg Nebulization Q6H Suzan Reynolds, KRISTIN   Labetalol HCl 5 mg Intravenous Q2H PRN Suzan Reynolds, JOHNATHONNP   levalbuterol 1 25 mg Nebulization Q6H Suzan Reynolds, CRNP   loratadine 10 mg Oral Daily Suzan Reynolds, JOHNATHONNP   methylPREDNISolone sodium succinate 125 mg Intravenous Once Suzan Reynolds, CRNP   methylPREDNISolone sodium succinate 40 mg Intravenous Once Suzan Reynolds, CRNP   ondansetron 4 mg Intravenous Q6H PRN Suzan Reynolds, JOHNATHONNP   pantoprazole 40 mg Oral Daily Suzan Reynolds, JOHNATHONNP   pravastatin 80 mg Oral Daily With [facility], CRNP   sucralfate 1,000 mg Oral HS Suzan Reynolds, KRISTIN     Continuous Infusions:     PRN Meds:     dextromethorphan-guaiFENesin 10 mL Q4H PRN   Labetalol HCl 5 mg Q2H PRN   ondansetron 4 mg Q6H PRN       Invasive Devices Review  Invasive Devices     Peripheral Intravenous Line            Peripheral IV 06/26/20 Left Antecubital 3 days          Drain            Urethral Catheter Temperature probe less than 1 day                Rationale for remaining devices: UOP monitoring given diuresis  ---------------------------------------------------------------------------------------  Advance Directive and Living Will: Yes    Power of :    POLST: Yes  ---------------------------------------------------------------------------------------  Care Time Delivered:   No Critical Care time spent       Leonard Moreno PA-C      Portions of the record may have been created with voice recognition software  Occasional wrong word or "sound a like" substitutions may have occurred due to the inherent limitations of voice recognition software    Read the chart carefully and recognize, using context, where substitutions have occurred

## 2020-07-01 LAB
ANION GAP SERPL CALCULATED.3IONS-SCNC: 10 MMOL/L (ref 4–13)
ANION GAP SERPL CALCULATED.3IONS-SCNC: 9 MMOL/L (ref 4–13)
BACTERIA BLD CULT: NORMAL
BACTERIA BLD CULT: NORMAL
BUN SERPL-MCNC: 85 MG/DL (ref 5–25)
BUN SERPL-MCNC: 89 MG/DL (ref 5–25)
CA-I BLD-SCNC: 1.04 MMOL/L (ref 1.12–1.32)
CALCIUM SERPL-MCNC: 7.9 MG/DL (ref 8.3–10.1)
CALCIUM SERPL-MCNC: 8.2 MG/DL (ref 8.3–10.1)
CHLORIDE SERPL-SCNC: 100 MMOL/L (ref 100–108)
CHLORIDE SERPL-SCNC: 99 MMOL/L (ref 100–108)
CO2 SERPL-SCNC: 32 MMOL/L (ref 21–32)
CO2 SERPL-SCNC: 33 MMOL/L (ref 21–32)
CREAT SERPL-MCNC: 2.32 MG/DL (ref 0.6–1.3)
CREAT SERPL-MCNC: 2.37 MG/DL (ref 0.6–1.3)
ERYTHROCYTE [DISTWIDTH] IN BLOOD BY AUTOMATED COUNT: 14.3 % (ref 11.6–15.1)
GFR SERPL CREATININE-BSD FRML MDRD: 19 ML/MIN/1.73SQ M
GFR SERPL CREATININE-BSD FRML MDRD: 19 ML/MIN/1.73SQ M
GLUCOSE SERPL-MCNC: 229 MG/DL (ref 65–140)
GLUCOSE SERPL-MCNC: 241 MG/DL (ref 65–140)
GLUCOSE SERPL-MCNC: 265 MG/DL (ref 65–140)
GLUCOSE SERPL-MCNC: 306 MG/DL (ref 65–140)
GLUCOSE SERPL-MCNC: 341 MG/DL (ref 65–140)
GLUCOSE SERPL-MCNC: 348 MG/DL (ref 65–140)
HCT VFR BLD AUTO: 28.6 % (ref 34.8–46.1)
HGB BLD-MCNC: 8.8 G/DL (ref 11.5–15.4)
MAGNESIUM SERPL-MCNC: 2.3 MG/DL (ref 1.6–2.6)
MCH RBC QN AUTO: 28.9 PG (ref 26.8–34.3)
MCHC RBC AUTO-ENTMCNC: 30.8 G/DL (ref 31.4–37.4)
MCV RBC AUTO: 94 FL (ref 82–98)
PHOSPHATE SERPL-MCNC: 6 MG/DL (ref 2.3–4.1)
PLATELET # BLD AUTO: 182 THOUSANDS/UL (ref 149–390)
PMV BLD AUTO: 13.1 FL (ref 8.9–12.7)
POTASSIUM SERPL-SCNC: 4.2 MMOL/L (ref 3.5–5.3)
POTASSIUM SERPL-SCNC: 4.2 MMOL/L (ref 3.5–5.3)
RBC # BLD AUTO: 3.05 MILLION/UL (ref 3.81–5.12)
SODIUM SERPL-SCNC: 141 MMOL/L (ref 136–145)
SODIUM SERPL-SCNC: 142 MMOL/L (ref 136–145)
WBC # BLD AUTO: 5.24 THOUSAND/UL (ref 4.31–10.16)

## 2020-07-01 PROCEDURE — 94640 AIRWAY INHALATION TREATMENT: CPT

## 2020-07-01 PROCEDURE — 94760 N-INVAS EAR/PLS OXIMETRY 1: CPT

## 2020-07-01 PROCEDURE — 80048 BASIC METABOLIC PNL TOTAL CA: CPT | Performed by: INTERNAL MEDICINE

## 2020-07-01 PROCEDURE — 99233 SBSQ HOSP IP/OBS HIGH 50: CPT | Performed by: INTERNAL MEDICINE

## 2020-07-01 PROCEDURE — 82948 REAGENT STRIP/BLOOD GLUCOSE: CPT

## 2020-07-01 PROCEDURE — 85027 COMPLETE CBC AUTOMATED: CPT | Performed by: INTERNAL MEDICINE

## 2020-07-01 PROCEDURE — 94660 CPAP INITIATION&MGMT: CPT

## 2020-07-01 PROCEDURE — 82330 ASSAY OF CALCIUM: CPT | Performed by: INTERNAL MEDICINE

## 2020-07-01 PROCEDURE — 84100 ASSAY OF PHOSPHORUS: CPT | Performed by: INTERNAL MEDICINE

## 2020-07-01 PROCEDURE — 83735 ASSAY OF MAGNESIUM: CPT | Performed by: INTERNAL MEDICINE

## 2020-07-01 RX ORDER — CARVEDILOL 3.12 MG/1
3.12 TABLET ORAL 2 TIMES DAILY
Status: DISCONTINUED | OUTPATIENT
Start: 2020-07-01 | End: 2020-07-02

## 2020-07-01 RX ADMIN — FUROSEMIDE 80 MG: 10 INJECTION, SOLUTION INTRAMUSCULAR; INTRAVENOUS at 16:27

## 2020-07-01 RX ADMIN — FERROUS SULFATE TAB 325 MG (65 MG ELEMENTAL FE) 325 MG: 325 (65 FE) TAB at 07:43

## 2020-07-01 RX ADMIN — GUAIFENESIN 600 MG: 600 TABLET, EXTENDED RELEASE ORAL at 20:26

## 2020-07-01 RX ADMIN — IPRATROPIUM BROMIDE 0.5 MG: 0.5 SOLUTION RESPIRATORY (INHALATION) at 20:32

## 2020-07-01 RX ADMIN — LEVALBUTEROL HYDROCHLORIDE 1.25 MG: 1.25 SOLUTION, CONCENTRATE RESPIRATORY (INHALATION) at 20:32

## 2020-07-01 RX ADMIN — PANTOPRAZOLE SODIUM 40 MG: 40 TABLET, DELAYED RELEASE ORAL at 09:06

## 2020-07-01 RX ADMIN — CHLORHEXIDINE GLUCONATE 0.12% ORAL RINSE 15 ML: 1.2 LIQUID ORAL at 09:05

## 2020-07-01 RX ADMIN — FUROSEMIDE 80 MG: 10 INJECTION, SOLUTION INTRAMUSCULAR; INTRAVENOUS at 07:43

## 2020-07-01 RX ADMIN — APIXABAN 2.5 MG: 2.5 TABLET, FILM COATED ORAL at 09:06

## 2020-07-01 RX ADMIN — DOCUSATE SODIUM 100 MG: 100 CAPSULE, LIQUID FILLED ORAL at 09:06

## 2020-07-01 RX ADMIN — LORATADINE 10 MG: 10 TABLET ORAL at 09:06

## 2020-07-01 RX ADMIN — DOCUSATE SODIUM 100 MG: 100 CAPSULE, LIQUID FILLED ORAL at 18:05

## 2020-07-01 RX ADMIN — PRAVASTATIN SODIUM 80 MG: 80 TABLET ORAL at 16:27

## 2020-07-01 RX ADMIN — CARVEDILOL 3.12 MG: 3.12 TABLET, FILM COATED ORAL at 18:05

## 2020-07-01 RX ADMIN — INSULIN LISPRO 3 UNITS: 100 INJECTION, SOLUTION INTRAVENOUS; SUBCUTANEOUS at 16:27

## 2020-07-01 RX ADMIN — LEVALBUTEROL HYDROCHLORIDE 1.25 MG: 1.25 SOLUTION, CONCENTRATE RESPIRATORY (INHALATION) at 13:14

## 2020-07-01 RX ADMIN — CARVEDILOL 1.56 MG: 3.12 TABLET, FILM COATED ORAL at 09:06

## 2020-07-01 RX ADMIN — CHLORHEXIDINE GLUCONATE 0.12% ORAL RINSE 15 ML: 1.2 LIQUID ORAL at 20:26

## 2020-07-01 RX ADMIN — LEVALBUTEROL HYDROCHLORIDE 1.25 MG: 1.25 SOLUTION, CONCENTRATE RESPIRATORY (INHALATION) at 07:31

## 2020-07-01 RX ADMIN — IPRATROPIUM BROMIDE 0.5 MG: 0.5 SOLUTION RESPIRATORY (INHALATION) at 07:31

## 2020-07-01 RX ADMIN — INSULIN LISPRO 4 UNITS: 100 INJECTION, SOLUTION INTRAVENOUS; SUBCUTANEOUS at 11:19

## 2020-07-01 RX ADMIN — AMLODIPINE BESYLATE 5 MG: 5 TABLET ORAL at 09:06

## 2020-07-01 RX ADMIN — LEVALBUTEROL HYDROCHLORIDE 1.25 MG: 1.25 SOLUTION, CONCENTRATE RESPIRATORY (INHALATION) at 03:12

## 2020-07-01 RX ADMIN — SUCRALFATE 1000 MG: 1 SUSPENSION ORAL at 20:26

## 2020-07-01 RX ADMIN — INSULIN LISPRO 5 UNITS: 100 INJECTION, SOLUTION INTRAVENOUS; SUBCUTANEOUS at 07:43

## 2020-07-01 RX ADMIN — GUAIFENESIN 600 MG: 600 TABLET, EXTENDED RELEASE ORAL at 09:06

## 2020-07-01 RX ADMIN — IPRATROPIUM BROMIDE 0.5 MG: 0.5 SOLUTION RESPIRATORY (INHALATION) at 03:11

## 2020-07-01 RX ADMIN — BUDESONIDE 0.5 MG: 0.5 INHALANT RESPIRATORY (INHALATION) at 07:31

## 2020-07-01 RX ADMIN — APIXABAN 2.5 MG: 2.5 TABLET, FILM COATED ORAL at 18:05

## 2020-07-01 RX ADMIN — BUDESONIDE 0.5 MG: 0.5 INHALANT RESPIRATORY (INHALATION) at 20:32

## 2020-07-01 RX ADMIN — IPRATROPIUM BROMIDE 0.5 MG: 0.5 SOLUTION RESPIRATORY (INHALATION) at 13:14

## 2020-07-01 RX ADMIN — FLUTICASONE PROPIONATE 2 SPRAY: 50 SPRAY, METERED NASAL at 09:07

## 2020-07-01 NOTE — ASSESSMENT & PLAN NOTE
Wt Readings from Last 3 Encounters:   07/01/20 59 kg (130 lb)   06/22/20 68 9 kg (152 lb)   06/19/20 66 4 kg (146 lb 6 2 oz)     EF 35-40%  Continue with Lasix 80 mg twice daily as per cardiology recommendation  Check renal function, serum electrolytes, weight and urine output daily

## 2020-07-01 NOTE — ASSESSMENT & PLAN NOTE
Creatinine elevated to 2 3 from baseline around 1 4-1 6  Acute renal failure most likely secondary to over-diuresis  Plan to switch IV Lasix to oral tomorrow

## 2020-07-01 NOTE — ASSESSMENT & PLAN NOTE
Presents with acute respiratory failure with hypoxia requiring BiPAP to maintain SaO2 greater than 90%  Acute respiratory failure most likely multifactorial including CHF exacerbation and COPD  Continue IV diuresis and COPD management  Patient was effectively diuresed and downgraded to our service from ICU    Currently on 4 L of oxygen and maintaining SaO2 greater than 90%

## 2020-07-01 NOTE — ASSESSMENT & PLAN NOTE
Lab Results   Component Value Date    HGBA1C 7 9 (H) 03/28/2020       Recent Labs     06/30/20  1103 06/30/20  1532 06/30/20  2102 07/01/20  0704   POCGLU 368* 331* 377* 341*       Blood Sugar Average: Last 72 hrs:  (P) 880 7964     Continue Lantus 8 units HS, check blood glucose 4 times daily with corrective insulin

## 2020-07-01 NOTE — PROGRESS NOTES
Progress Note - Arsh Rojas 1935, 82 y o  female MRN: [id number]    Unit/Bed#: -01 Encounter: [id number]    Primary Care Provider: Alpesh Sams MD   Date and time admitted to hospital: 6/26/2020  8:14 AM    * Acute respiratory failure with hypoxia [facility])  Assessment & Plan  Presents with acute respiratory failure with hypoxia requiring BiPAP to maintain SaO2 greater than 90%  Acute respiratory failure most likely multifactorial including CHF exacerbation and COPD  Continue IV diuresis and COPD management  Patient was effectively diuresed and downgraded to our service from ICU    Currently on 4 L of oxygen and maintaining SaO2 greater than 90%    Acute on chronic systolic CHF (congestive heart failure) ([facility])  Assessment & Plan  Wt Readings from Last 3 Encounters:   07/01/20 59 kg (130 lb)   06/22/20 68 9 kg (152 lb)   06/19/20 66 4 kg (146 lb 6 2 oz)     EF 35-40%  Continue with Lasix 80 mg twice daily as per cardiology recommendation  Check renal function, serum electrolytes, weight and urine output daily    CKD (chronic kidney disease) stage 4, GFR 15-29 ml/min ([facility])  Assessment & Plan  CKD 3, baseline creatinine 1 4-1 6    COPD with emphysema ([facility])  Assessment & Plan  Continue with bronchodilator      NANCY (acute kidney injury) ([facility] [address] )  Assessment & Plan  Creatinine elevated to 2 3 from baseline around 1 4-1 6  Acute renal failure most likely secondary to over-diuresis  Plan to switch IV Lasix to oral tomorrow    A-fib [facility])  Assessment & Plan  Continue with carvedilol and low-dose Eliquis    Type 2 diabetes mellitus with stage 3 chronic kidney disease, with long-term current use of insulin [facility])  Assessment & Plan  Lab Results   Component Value Date    HGBA1C 7 9 (H) 03/28/2020     Recent Labs     06/30/20  1103 06/30/20  1532 06/30/20  2102 07/01/20  0704   POCGLU 368* 331* 377* 341*     Blood Sugar Average: Last 72 hrs:  (P) [phone]     Continue Lantus 8 units HS, check blood glucose 4 times daily with corrective insulin    Essential hypertension  Assessment & Plan  BP stable, continue current management  VTE Pharmacologic Prophylaxis:   Pharmacologic: Heparin    Patient Centered Rounds: I have performed bedside rounds with nursing staff today    Discussions with Specialists or Other Care Team Provider:     Education and Discussions with Family / Patient:     Current Length of Stay: 5 day(s)    Current Patient Status: Inpatient   Certification Statement: The patient will continue to require additional inpatient hospital stay due to Acute CHF    Discharge Plan:  Probably 1-2 days    Code Status: Level 1 - Full Code      Subjective:   Much improved from admission day  No complaints  Denies chest pain, fever, chills, shortness of breath at rest     Objective:     Vitals:   Temp (24hrs), Av 3 °F (36 3 °C), Min:96 °F (35 6 °C), Max:97 8 °F (36 6 °C)    Temp:  [96 °F (35 6 °C)-97 8 °F (36 6 °C)] 96 6 °F (35 9 °C)  HR:  [61-73] 65  Resp:  [16-26] 20  BP: (135-178)/() 169/70  SpO2:  [93 %-100 %] 99 %  Body mass index is 25 39 kg/m²  Input and Output Summary (last 24 hours): Intake/Output Summary (Last 24 hours) at 2020 1055  Last data filed at 2020 0901  Gross per 24 hour   Intake 480 ml   Output 1780 ml   Net -1300 ml       Physical Exam:     General appearance: alert, appears stated age and cooperative  Head: Normocephalic, without obvious abnormality, atraumatic  Lungs: clear to auscultation bilaterally  Heart: regular rate and rhythm  Abdomen: soft, non-tender, positive bowel sounds   Back: negative  Extremities: extremities atraumatic, no cyanosis or edema  Neurologic: Alert and oriented X 3, normal strength and tone  Normal symmetric reflexes   Normal coordination and gait    Additional Data:     Labs:    Results from last 7 days   Lab Units 20  0548  20  0553   WBC Thousand/uL 5 24   < > 7 94   HEMOGLOBIN g/dL 8 8*   < > 8 4*   HEMATOCRIT % 28 6*   < > 27 4* PLATELETS Thousands/uL 182   < > 197   NEUTROS PCT %  --   --  66   LYMPHS PCT %  --   --  22   MONOS PCT %  --   --  12   EOS PCT %  --   --  0    < > = values in this interval not displayed  Results from last 7 days   Lab Units 07/01/20  0548  06/26/20  0844   SODIUM mmol/L 141   < > 143   POTASSIUM mmol/L 4 2   < > 5 6*   CHLORIDE mmol/L 100   < > 107   CO2 mmol/L 32   < > 30   BUN mg/dL 89*   < > 48*   CREATININE mg/dL 2 32*   < > 1 69*   ANION GAP mmol/L 9   < > 6   CALCIUM mg/dL 7 9*   < > 8 2*   ALBUMIN g/dL  --   --  2 9*   TOTAL BILIRUBIN mg/dL  --   --  0 30   ALK PHOS U/L  --   --  110   ALT U/L  --   --  26   AST U/L  --   --  30   GLUCOSE RANDOM mg/dL 348*   < > 129    < > = values in this interval not displayed  Results from last 7 days   Lab Units 06/26/20  0844   INR  1 14     Results from last 7 days   Lab Units 07/01/20  0704 06/30/20  2102 06/30/20  1532 06/30/20  1103 06/30/20  0651 06/29/20  2037 06/29/20  1800 06/29/20  1205 06/29/20  0903 06/29/20  0753 06/29/20  0722 06/29/20  0224   POC GLUCOSE mg/dl 341* 377* 331* 368* 159* 178* 164* 105 89 74 60* 319*         Results from last 7 days   Lab Units 06/29/20  1523 06/26/20  0844   LACTIC ACID mmol/L  --  0 9   PROCALCITONIN ng/ml 0 12  --            * I Have Reviewed All Lab Data Listed Above  * Additional Pertinent Lab Tests Reviewed: Lauren 66 Admission Reviewed    Imaging:    Imaging Reports Reviewed Today Include: images reviewed    Recent Cultures (last 7 days):     Results from last 7 days   Lab Units 06/26/20  0844   BLOOD CULTURE  No Growth After 4 Days  No Growth After 4 Days         Last 24 Hours Medication List:     Current Facility-Administered Medications:  amLODIPine 5 mg Oral Daily KRISTIN Loera   apixaban 2 5 mg Oral BID KRISTIN Loera   budesonide 0 5 mg Nebulization Q12H KRISTIN Loera   carvedilol 3 125 mg Oral BID [place], PA-C   chlorhexidine 15 mL Swish & Spit Q12H [address] Marychuy A Ramella, CRNP   dextromethorphan-guaiFENesin 10 mL Oral Q4H PRN Marychuy Brunoella, CRNP   docusate sodium 100 mg Oral BID Marychuy A Ramella, CRNP   ferrous sulfate 325 mg Oral Daily With Breakfast Marychuy Brunoella, CRNP   fluticasone 2 spray Nasal Daily Marychuy Brunoella, CRNP   furosemide 80 mg Intravenous BID (diuretic) Marychuy Brunoella, CRNP   guaiFENesin 600 mg Oral Q12H [address] Marychuywalter Brunoella, CRNP   insulin lispro 1-6 Units Subcutaneous TID AC Marychuy Brunoella, CRNP   ipratropium 0 5 mg Nebulization Q6H Marychuy Brunoella, CRNP   Labetalol HCl 5 mg Intravenous Q2H PRN Marychuy Brunoella, CRNP   levalbuterol 1 25 mg Nebulization Q6H Marychuy Brunoella, CRNP   loratadine 10 mg Oral Daily Marychuy Brunoella, CRNP   methylPREDNISolone sodium succinate 125 mg Intravenous Once Marychuy Brunoella, CRNP   ondansetron 4 mg Intravenous Q6H PRN Marychuy PHAM Ramella, CRNP   pantoprazole 40 mg Oral Daily Marychuy Brunoella, CRNP   pravastatin 80 mg Oral Daily With [place] A Ramella, CRNP   sucralfate 1,000 mg Oral HS Marychuy ANKIT Emile, CRNP        Today, Patient Was Seen By: Martha Coker MD    ** Please Note: Dictation voice to text software may have been used in the creation of this document   **

## 2020-07-01 NOTE — PROGRESS NOTES
Progress Note - Cardiology   Augusto Apodaca 82 y o  female MRN: [id number]  Unit/Bed#: -01 Encounter: [id number]    Assessment:  Acute on Chronic HFrEF- EF 35-40%, appearing hypervolemic today but improved  Acute respiratory failure with hypoxia- on oxygen via NC  COPD   Pacemaker- SSS sp PPM  CAD sp CABG  Afib- on coreg and anticoagulated on eliquis  DM  HTN    Plan:  - continue lasix to 80 IV BID, potentially transition to oral diuretic tomorrow  - monitor I and O   - daily weights, monitor Cr and electrolytes  - will continue to follow  -will need outpatient follow up with [facility] [place] [place] [facility] in [place]      Subjective/Objective     Subjective: Patient reports improved breathing  She states she is starting to feel better  She has no further cardiac complaints  She reports sleeping better at night as well  Objective: patient is on nasal cannula  Vitals: /70 (BP Location: Right arm)   Pulse 65   Temp (!) 96 6 °F (35 9 °C) (Axillary)   Resp 20   Ht 5' (1 524 m)   Wt 71 5 kg (157 lb 10 1 oz)   LMP  (LMP Unknown)   SpO2 99%   BMI 30 78 kg/m²   Vitals:    06/28/20 0600 06/29/20 0600   Weight: 69 8 kg (153 lb 14 1 oz) 71 5 kg (157 lb 10 1 oz)     Orthostatic Blood Pressures      Most Recent Value   Blood Pressure  169/70 filed at 07/01/2020 0705   Patient Position - Orthostatic VS  Lying filed at 07/01/2020 1891            Intake/Output Summary (Last 24 hours) at 7/1/2020 6509  Last data filed at 7/1/2020 0751  Gross per 24 hour   Intake 240 ml   Output 1800 ml   Net -1560 ml       Invasive Devices     Peripheral Intravenous Line            Peripheral IV 06/26/20 Left Antecubital 4 days          Drain            Urethral Catheter Temperature probe 1 day                Physical Exam   Constitutional: She appears well-developed and well-nourished  HENT:   Head: Atraumatic  Neck: Neck supple  Cardiovascular: Normal rate, regular rhythm and normal heart sounds     No murmur heard   Pulmonary/Chest: She is in no respiratory distress  She has rales  Abdominal: Soft  Musculoskeletal: She exhibits no edema  Neurological: She is alert  Skin: Skin is warm and dry  Capillary refill takes less than 2 seconds         Lab Results:   I have personally reviewed pertinent lab results  CBC with diff:   Results from last 7 days   Lab Units 07/01/20  0548   WBC Thousand/uL 5 24   RBC Million/uL 3 05*   HEMOGLOBIN g/dL 8 8*   HEMATOCRIT % 28 6*   MCV fL 94   MCH pg 28 9   MCHC g/dL 30 8*   RDW % 14 3   MPV fL 13 1*   PLATELETS Thousands/uL 182     CMP:   Results from last 7 days   Lab Units 07/01/20  0548  06/26/20  0844   SODIUM mmol/L 141   < > 143   POTASSIUM mmol/L 4 2   < > 5 6*   CHLORIDE mmol/L 100   < > 107   CO2 mmol/L 32   < > 30   BUN mg/dL 89*   < > 48*   CREATININE mg/dL 2 32*   < > 1 69*   CALCIUM mg/dL 7 9*   < > 8 2*   AST U/L  --   --  30   ALT U/L  --   --  26   ALK PHOS U/L  --   --  110   EGFR ml/min/1 73sq m 19   < > 28    < > = values in this interval not displayed  Troponin:   0   Lab Value Date/Time    TROPONINI 0 04 06/26/2020 0844    TROPONINI 0 08 (H) 06/12/2020 1440    TROPONINI 0 06 (H) 06/12/2020 1224    TROPONINI 0 05 (H) 06/12/2020 0733     BNP:   Results from last 7 days   Lab Units 07/01/20  0548   POTASSIUM mmol/L 4 2   CHLORIDE mmol/L 100   CO2 mmol/L 32   BUN mg/dL 89*   CREATININE mg/dL 2 32*   CALCIUM mg/dL 7 9*   EGFR ml/min/1 73sq m 19     Coags:   Results from last 7 days   Lab Units 06/26/20  0844   INR  1 14     TSH:   Results from last 7 days   Lab Units 06/26/20  0844   TSH 3RD GENERATON uIU/mL 3 971*        Imaging: I have personally reviewed pertinent reports         TTE 3/30/20:   Left ventricle is mildly dilated  Severely reduced left ventricular systolic     function  Basal to mid inferior and inferolateral akinesis  Mild concentric     left ventricular hypertrophy  Estimated left ventricular ejection fraction is     30%       Normal right ventricular size  Reduced right ventricular systolic function        Device wire present in right ventricle      Severely dilated left atrium      Diastolic dysfunction with elevated filling pressures (suggestion of L     wave on mitral inflow, supports elevated filling pressures)      Visually Estimated LV Ejection Fraction is: 30%     TTE 6/12/20:  LEFT VENTRICLE:  Akinesis of the basal to mid inferior and the inferolateral walls  The ventricle was dilated  Systolic function was moderately reduced  Ejection fraction was estimated in the range of 35 % to 40 %  There was assymetrical hypertrophy of the septum  RIGHT VENTRICLE:  The size was normal   Systolic function was normal   LEFT ATRIUM:  The atrium was moderately dilated  MITRAL VALVE:  There was moderate thickening  There was mild regurgitation  TRICUSPID VALVE:  There was mild regurgitation  IVC, HEPATIC VEINS:  The inferior vena cava was dilated  Respirophasic changes were blunted (less than 50% variation)  PERICARDIUM:  A trace pericardial effusion was identified  There was a large left pleural effusion     CXR 6/27/20:  Persistent small effusions and bibasilar atelectasis      CXR 6/29/20:  New pulmonary edema    Persistent small effusions and bibasilar atelectasis      CT CHEST 6/26/20:  1   Moderate right and small left pleural effusions  2   Scattered groundglass opacities, favor pulmonary edema   May also be infectious/inflammatory

## 2020-07-01 NOTE — CASE MANAGEMENT
Chart reviewed- not ready for dc   Remains on IV lasix 80 mg BID  DC plan is return to [facility], patient is Not a MA bed hold  Baseline uses 2 liters of 02  Will continue to follow to facilitate dc  ( updated [facility] via [employer])

## 2020-07-01 NOTE — RESPIRATORY THERAPY NOTE
RT Protocol Note  Porter Howardumble 82 y o  female MRN: [id number]  Unit/Bed#: -01 Encounter: [id number]    Assessment    Principal Problem:    Acute respiratory failure with hypoxia [facility])  Active Problems:    Essential hypertension    Acute on chronic systolic CHF (congestive heart failure) ([facility])    Other hyperlipidemia    COPD with emphysema ([facility])    CKD (chronic kidney disease) stage 4, GFR 15-29 ml/min ([facility])    Esophageal reflux    Type 2 diabetes mellitus with stage 3 chronic kidney disease, with long-term current use of insulin ([facility])    A-fib ([facility])    Anemia    NANCY (acute kidney injury) ([facility])      Home Pulmonary Medications:  Advair 250/50, Duoneb  Home Devices/Therapy: Home O2    Past Medical History:   Diagnosis Date    NANCY (acute kidney injury) ([facility])     Atrial fibrillation ([facility])     CHF (congestive heart failure) ([facility])     COPD (chronic obstructive pulmonary disease) ([facility])     Diabetes mellitus ([facility])     Trigger finger of thumb     last assessed: 13     Social History     Socioeconomic History    Marital status:       Spouse name: None    Number of children: None    Years of education: None    Highest education level: None   Occupational History    None   Social Needs    Financial resource strain: None    Food insecurity:     Worry: Never true     Inability: Never true    Transportation needs:     Medical: No     Non-medical: No   Tobacco Use    Smoking status: Former Smoker     Last attempt to quit: 2011     Years since quittin 0    Smokeless tobacco: Never Used    Tobacco comment: Former smoker per allscripts   Substance and Sexual Activity    Alcohol use: Not Currently    Drug use: No    Sexual activity: None   Lifestyle    Physical activity:     Days per week: None     Minutes per session: None    Stress: None   Relationships    Social connections:     Talks on phone: None     Gets together: None     Attends Qu[employer] service: None     Active member of club or organization: None     Attends meetings of clubs or organizations: None     Relationship status: None    Intimate partner violence:     Fear of current or ex partner: None     Emotionally abused: None     Physically abused: None     Forced sexual activity: None   Other Topics Concern    None   Social History Narrative    None       Subjective    Subjective Data: Patient slept through the night and remained stable on BiPAP    Objective    Physical Exam:   Assessment Type: Pre-treatment  General Appearance: Alert, Awake  Respiratory Pattern: Tachypneic  Chest Assessment: Chest expansion symmetrical  Bilateral Breath Sounds: Diminished, Clear  Cough: Non-productive    Vitals:  Blood pressure (!) 178/105, pulse 61, temperature (!) 96 °F (35 6 °C), temperature source Axillary, resp  rate 16, height 5' (1 524 m), weight 71 5 kg (157 lb 10 1 oz), SpO2 99 %      Results from last 7 days   Lab Units 06/29/20  0828 06/26/20  0913   PH ART  7 373 7 334*   PCO2 ART mm Hg 46 7* 50 0*   PO2 ART mm Hg 46 7* 121 5   HCO3 ART mmol/L 26 6 26 0   BASE EXC ART mmol/L 1 0 -0 2   O2 CONTENT ART mL/dL 11 3* 14 0*   O2 HGB, ARTERIAL % 80 7* 97 3*   ABG SOURCE  Radial, Right Radial, Left   GARDENIA TEST   --  Yes       Imaging and other studies: performed greater than three days prior    O2 Device: 4L     Plan    Respiratory Plan: Mild Distress pathway  Airway Clearance Plan: Incentive Spirometer     Resp Comments: SLEEPING SOUNDLY ON BIPAP

## 2020-07-02 LAB
BASOPHILS # BLD AUTO: 0.02 THOUSANDS/ΜL (ref 0–0.1)
BASOPHILS NFR BLD AUTO: 0 % (ref 0–1)
EOSINOPHIL # BLD AUTO: 0.1 THOUSAND/ΜL (ref 0–0.61)
EOSINOPHIL NFR BLD AUTO: 2 % (ref 0–6)
ERYTHROCYTE [DISTWIDTH] IN BLOOD BY AUTOMATED COUNT: 14.8 % (ref 11.6–15.1)
GLUCOSE SERPL-MCNC: 156 MG/DL (ref 65–140)
GLUCOSE SERPL-MCNC: 179 MG/DL (ref 65–140)
GLUCOSE SERPL-MCNC: 192 MG/DL (ref 65–140)
GLUCOSE SERPL-MCNC: 211 MG/DL (ref 65–140)
HCT VFR BLD AUTO: 24.7 % (ref 34.8–46.1)
HGB BLD-MCNC: 9 G/DL (ref 11.5–15.4)
IMM GRANULOCYTES # BLD AUTO: 0.02 THOUSAND/UL (ref 0–0.2)
IMM GRANULOCYTES NFR BLD AUTO: 0 % (ref 0–2)
LYMPHOCYTES # BLD AUTO: 1.91 THOUSANDS/ΜL (ref 0.6–4.47)
LYMPHOCYTES NFR BLD AUTO: 29 % (ref 14–44)
MAGNESIUM SERPL-MCNC: 2.3 MG/DL (ref 1.6–2.6)
MCH RBC QN AUTO: 35.9 PG (ref 26.8–34.3)
MCHC RBC AUTO-ENTMCNC: 36.4 G/DL (ref 31.4–37.4)
MCV RBC AUTO: 98 FL (ref 82–98)
MONOCYTES # BLD AUTO: 0.61 THOUSAND/ΜL (ref 0.17–1.22)
MONOCYTES NFR BLD AUTO: 9 % (ref 4–12)
NEUTROPHILS # BLD AUTO: 3.89 THOUSANDS/ΜL (ref 1.85–7.62)
NEUTS SEG NFR BLD AUTO: 60 % (ref 43–75)
NRBC BLD AUTO-RTO: 0 /100 WBCS
PLATELET # BLD AUTO: 173 THOUSANDS/UL (ref 149–390)
PMV BLD AUTO: 13.1 FL (ref 8.9–12.7)
RBC # BLD AUTO: 2.51 MILLION/UL (ref 3.81–5.12)
WBC # BLD AUTO: 6.55 THOUSAND/UL (ref 4.31–10.16)

## 2020-07-02 PROCEDURE — 83735 ASSAY OF MAGNESIUM: CPT | Performed by: INTERNAL MEDICINE

## 2020-07-02 PROCEDURE — 94640 AIRWAY INHALATION TREATMENT: CPT

## 2020-07-02 PROCEDURE — 94660 CPAP INITIATION&MGMT: CPT

## 2020-07-02 PROCEDURE — 94760 N-INVAS EAR/PLS OXIMETRY 1: CPT

## 2020-07-02 PROCEDURE — 85025 COMPLETE CBC W/AUTO DIFF WBC: CPT | Performed by: INTERNAL MEDICINE

## 2020-07-02 PROCEDURE — 99233 SBSQ HOSP IP/OBS HIGH 50: CPT | Performed by: INTERNAL MEDICINE

## 2020-07-02 PROCEDURE — 82948 REAGENT STRIP/BLOOD GLUCOSE: CPT

## 2020-07-02 RX ORDER — CARVEDILOL 3.12 MG/1
3.12 TABLET ORAL 2 TIMES DAILY WITH MEALS
Status: DISCONTINUED | OUTPATIENT
Start: 2020-07-02 | End: 2020-07-03 | Stop reason: HOSPADM

## 2020-07-02 RX ORDER — FUROSEMIDE 40 MG/1
40 TABLET ORAL DAILY
Status: DISCONTINUED | OUTPATIENT
Start: 2020-07-03 | End: 2020-07-03 | Stop reason: HOSPADM

## 2020-07-02 RX ADMIN — LEVALBUTEROL HYDROCHLORIDE 1.25 MG: 1.25 SOLUTION, CONCENTRATE RESPIRATORY (INHALATION) at 19:52

## 2020-07-02 RX ADMIN — INSULIN LISPRO 1 UNITS: 100 INJECTION, SOLUTION INTRAVENOUS; SUBCUTANEOUS at 11:58

## 2020-07-02 RX ADMIN — LEVALBUTEROL HYDROCHLORIDE 1.25 MG: 1.25 SOLUTION, CONCENTRATE RESPIRATORY (INHALATION) at 02:48

## 2020-07-02 RX ADMIN — INSULIN LISPRO 2 UNITS: 100 INJECTION, SOLUTION INTRAVENOUS; SUBCUTANEOUS at 16:42

## 2020-07-02 RX ADMIN — IPRATROPIUM BROMIDE 0.5 MG: 0.5 SOLUTION RESPIRATORY (INHALATION) at 02:48

## 2020-07-02 RX ADMIN — AMLODIPINE BESYLATE 5 MG: 5 TABLET ORAL at 09:28

## 2020-07-02 RX ADMIN — DOCUSATE SODIUM 100 MG: 100 CAPSULE, LIQUID FILLED ORAL at 09:28

## 2020-07-02 RX ADMIN — LEVALBUTEROL HYDROCHLORIDE 1.25 MG: 1.25 SOLUTION, CONCENTRATE RESPIRATORY (INHALATION) at 07:26

## 2020-07-02 RX ADMIN — APIXABAN 2.5 MG: 2.5 TABLET, FILM COATED ORAL at 18:38

## 2020-07-02 RX ADMIN — LEVALBUTEROL HYDROCHLORIDE 1.25 MG: 1.25 SOLUTION, CONCENTRATE RESPIRATORY (INHALATION) at 13:44

## 2020-07-02 RX ADMIN — DOCUSATE SODIUM 100 MG: 100 CAPSULE, LIQUID FILLED ORAL at 18:38

## 2020-07-02 RX ADMIN — GUAIFENESIN 600 MG: 600 TABLET, EXTENDED RELEASE ORAL at 09:28

## 2020-07-02 RX ADMIN — PRAVASTATIN SODIUM 80 MG: 80 TABLET ORAL at 15:24

## 2020-07-02 RX ADMIN — PANTOPRAZOLE SODIUM 40 MG: 40 TABLET, DELAYED RELEASE ORAL at 09:28

## 2020-07-02 RX ADMIN — BUDESONIDE 0.5 MG: 0.5 INHALANT RESPIRATORY (INHALATION) at 07:26

## 2020-07-02 RX ADMIN — APIXABAN 2.5 MG: 2.5 TABLET, FILM COATED ORAL at 09:28

## 2020-07-02 RX ADMIN — CHLORHEXIDINE GLUCONATE 0.12% ORAL RINSE 15 ML: 1.2 LIQUID ORAL at 09:36

## 2020-07-02 RX ADMIN — GUAIFENESIN 600 MG: 600 TABLET, EXTENDED RELEASE ORAL at 22:21

## 2020-07-02 RX ADMIN — LORATADINE 10 MG: 10 TABLET ORAL at 08:05

## 2020-07-02 RX ADMIN — IPRATROPIUM BROMIDE 0.5 MG: 0.5 SOLUTION RESPIRATORY (INHALATION) at 07:26

## 2020-07-02 RX ADMIN — IPRATROPIUM BROMIDE 0.5 MG: 0.5 SOLUTION RESPIRATORY (INHALATION) at 13:44

## 2020-07-02 RX ADMIN — IPRATROPIUM BROMIDE 0.5 MG: 0.5 SOLUTION RESPIRATORY (INHALATION) at 19:52

## 2020-07-02 RX ADMIN — FLUTICASONE PROPIONATE 2 SPRAY: 50 SPRAY, METERED NASAL at 09:28

## 2020-07-02 RX ADMIN — CARVEDILOL 3.12 MG: 3.12 TABLET, FILM COATED ORAL at 15:24

## 2020-07-02 RX ADMIN — CARVEDILOL 3.12 MG: 3.12 TABLET, FILM COATED ORAL at 08:05

## 2020-07-02 RX ADMIN — CHLORHEXIDINE GLUCONATE 0.12% ORAL RINSE 15 ML: 1.2 LIQUID ORAL at 22:22

## 2020-07-02 RX ADMIN — FUROSEMIDE 80 MG: 10 INJECTION, SOLUTION INTRAMUSCULAR; INTRAVENOUS at 09:00

## 2020-07-02 RX ADMIN — INSULIN LISPRO 1 UNITS: 100 INJECTION, SOLUTION INTRAVENOUS; SUBCUTANEOUS at 07:00

## 2020-07-02 RX ADMIN — FERROUS SULFATE TAB 325 MG (65 MG ELEMENTAL FE) 325 MG: 325 (65 FE) TAB at 09:35

## 2020-07-02 RX ADMIN — SUCRALFATE 1000 MG: 1 SUSPENSION ORAL at 22:22

## 2020-07-02 RX ADMIN — BUDESONIDE 0.5 MG: 0.5 INHALANT RESPIRATORY (INHALATION) at 19:52

## 2020-07-02 NOTE — ASSESSMENT & PLAN NOTE
Wt Readings from Last 3 Encounters:   07/02/20 63 1 kg (139 lb 1 8 oz)   06/22/20 68 9 kg (152 lb)   06/19/20 66 4 kg (146 lb 6 2 oz)     EF 35-40%  Continue oral diuretics today and will monitor, if stable will discharge tomorrow  Check renal function, serum electrolytes, weight and urine output daily

## 2020-07-02 NOTE — TRANSPORTATION MEDICAL NECESSITY
Section I - General Information    Name of Patient: Ania Ramirez                 : 1935    Medicare #: [id number]  Transport Date: 20 (PCS is valid for round trips on this date and for all repetitive trips in the 60-day range as noted below )  Origin: [address] [place] [address] Ave: [facility]  Is the pt's stay covered under Medicare Part A (PPS/DRG)   []     Closest appropriate facility? If no, why is transport to more distant facility required? Yes  If hospice pt, is this transport related to pt's terminal illness? NA       Section II - Medical Necessity Questionnaire  Ambulance transportation is medically necessary only if other means of transport are contraindicated or would be potentially harmful to the patient  To meet this requirement, the patient must either be "bed confined" or suffer from a condition such that transport by means other than ambulance is contraindicated by the patient's condition  The following questions must be answered by the medical professional signing below for this form to be valid:    1)  Describe the MEDICAL CONDITION (physical and/or mental) of this patient AT [address] [place] [address] that requires the patient to be transported in an ambulance and why transport by other means is contraindicated by the patient's condition: pt is on continuous O2, assist of 2 for transfers, pt O2 sats decrease with ambulation, ambulation limited by fatigue,     2) Is the patient "bed confined" as defined below? Yes  To be "be confined" the patient must satisfy all three of the following conditions: (1) unable to get up from bed without Assistance; AND (2) unable to ambulate; AND (3) unable to sit in a chair or wheelchair  3) Can this patient safely be transported by car or wheelchair van (i e , seated during transport without a medical attendant or monitoring)?    No    4) In addition to completing questions 1-3 above, please check any of the following conditions that apply*:   *Note: supporting documentation for any boxes checked must be maintained in the patient's medical records  If hosp-hosp transfer, describe services needed at 2nd facility not available at 1st facility? Moderate/severe pain on movement   Requires oxygen-unable to self administer  Unable to tolerate seated position for time needed to transport       Section III - Signature of Physician or Healthcare Professional  I certify that the above information is true and correct based on my evaluation of this patient, and represent that the patient requires transport by ambulance and that other forms of transport are contraindicated  I understand that this information will be used by the Centers for Medicare and Medicaid Services (CMS) to support the determination of medical necessity for ambulance services, and I represent that I have personal knowledge of the patient's condition at time of transport  []  If this box is checked, I also certify that the patient is physically or mentally incapable of signing the ambulance service's claim and that the institution with which I am affiliated has furnished care, services, or assistance to the patient  My signature below is made on behalf of the patient pursuant to 42 CFR §424 36(b)(4)  In accordance with 42 CFR §424 37, the specific reason(s) that the patient is physically or mentally incapable of signing the claim form is as follows: amado nuñez        Signature of Physician* or Healthcare Professional______________________________________________________________  Signature Date 07/02/20 (For scheduled repetitive transports, this form is not valid for transports performed more than 60 days after this date)    Printed Name & Credentials of Physician or Healthcare Professional (MD, DO, RN, etc )________________________________  *Form must be signed by patient's attending physician for scheduled, repetitive transports   For non-repetitive, unscheduled ambulance transports, if unable to obtain the signature of the attending physician, any of the following may sign (choose appropriate option below)  [] Physician Assistant []  Clinical Nurse Specialist [x]  Registered Nurse  []  Nurse Practitioner  [] Discharge Planner

## 2020-07-02 NOTE — ASSESSMENT & PLAN NOTE
Creatinine elevated to 2 3 from baseline around 1 4-1 6  Acute renal failure most likely secondary to over-diuresis  Plan to switch IV Lasix to oral tomorrow  Recheck renal function in the morning

## 2020-07-02 NOTE — PROGRESS NOTES
Progress Note - Cardiology   Sissy Peon 82 y o  female MRN: [id number]  Unit/Bed#: -01 Encounter: [id number]    Assessment:  Acute on Chronic HFrEF- EF 35-40%, appearing euvolemic  Acute respiratory failure with hypoxia- on oxygen via NC  COPD   Pacemaker- SSS sp PPM  CAD sp CABG  Afib- on coreg and anticoagulated on eliquis  DM  HTN    Plan:  - start lasix 40 mg PO daily today  - monitor I and O   - daily weights, monitor Cr and electrolytes  - will need outpatient follow up with [facility] in [place]  - will sign off      Subjective/Objective     Subjective: Patient reports improved breathing  She states she is feeling per her baseline  She has no further cardiac complaints  Objective: patient is now on nasal cannula  Vitals: /64   Pulse 57   Temp (!) 97 3 °F (36 3 °C)   Resp 18   Ht 5' (1 524 m)   Wt 63 1 kg (139 lb 1 8 oz)   LMP  (LMP Unknown)   SpO2 100%   BMI 27 17 kg/m²   Vitals:    07/02/20 0525 07/02/20 0558   Weight: 63 1 kg (139 lb 1 8 oz) 63 1 kg (139 lb 1 8 oz)     Orthostatic Blood Pressures      Most Recent Value   Blood Pressure  150/64 filed at 07/02/2020 9199   Patient Position - Orthostatic VS  Sitting filed at 07/01/2020 1903            Intake/Output Summary (Last 24 hours) at 7/2/2020 1032  Last data filed at 7/2/2020 9788  Gross per 24 hour   Intake 780 ml   Output 1800 ml   Net -1020 ml       Invasive Devices     Peripheral Intravenous Line            Peripheral IV 06/26/20 Left Antecubital 6 days          Drain            Urethral Catheter Temperature probe 3 days                Physical Exam   Constitutional: She appears well-developed and well-nourished  HENT:   Head: Atraumatic  Neck: Neck supple  Cardiovascular: Normal rate, regular rhythm and normal heart sounds  No murmur heard  Pulmonary/Chest: She is in no resp distress She has no rales  Abdominal: Soft  Musculoskeletal: She exhibits no edema  Neurological: She is alert     Skin: Skin is warm and dry  Capillary refill takes less than 2 seconds         Lab Results:   I have personally reviewed pertinent lab results  CBC with diff:   Results from last 7 days   Lab Units 07/02/20  0514   WBC Thousand/uL 6 55   RBC Million/uL 2 51*   HEMOGLOBIN g/dL 9 0*   HEMATOCRIT % 24 7*   MCV fL 98   MCH pg 35 9*   MCHC g/dL 36 4   RDW % 14 8   MPV fL 13 1*   PLATELETS Thousands/uL 173     CMP:   Results from last 7 days   Lab Units 07/01/20  1138  06/26/20  0844   SODIUM mmol/L 142   < > 143   POTASSIUM mmol/L 4 2   < > 5 6*   CHLORIDE mmol/L 99*   < > 107   CO2 mmol/L 33*   < > 30   BUN mg/dL 85*   < > 48*   CREATININE mg/dL 2 37*   < > 1 69*   CALCIUM mg/dL 8 2*   < > 8 2*   AST U/L  --   --  30   ALT U/L  --   --  26   ALK PHOS U/L  --   --  110   EGFR ml/min/1 73sq m 19   < > 28    < > = values in this interval not displayed  Troponin:   0   Lab Value Date/Time    TROPONINI 0 04 06/26/2020 0844    TROPONINI 0 08 (H) 06/12/2020 1440    TROPONINI 0 06 (H) 06/12/2020 1224    TROPONINI 0 05 (H) 06/12/2020 0733     BNP:   Results from last 7 days   Lab Units 07/01/20  1138   POTASSIUM mmol/L 4 2   CHLORIDE mmol/L 99*   CO2 mmol/L 33*   BUN mg/dL 85*   CREATININE mg/dL 2 37*   CALCIUM mg/dL 8 2*   EGFR ml/min/1 73sq m 19     Coags:   Results from last 7 days   Lab Units 06/26/20  0844   INR  1 14     TSH:   Results from last 7 days   Lab Units 06/26/20  0844   TSH 3RD GENERATON uIU/mL 3 971*        Imaging: I have personally reviewed pertinent reports         TTE 3/30/20:   Left ventricle is mildly dilated  Severely reduced left ventricular systolic     function  Basal to mid inferior and inferolateral akinesis  Mild concentric     left ventricular hypertrophy  Estimated left ventricular ejection fraction is     30%      Normal right ventricular size  Reduced right ventricular systolic function        Device wire present in right ventricle      Severely dilated left atrium      Diastolic dysfunction with elevated filling pressures (suggestion of L     wave on mitral inflow, supports elevated filling pressures)      Visually Estimated LV Ejection Fraction is: 30%     TTE 6/12/20:  LEFT VENTRICLE:  Akinesis of the basal to mid inferior and the inferolateral walls  The ventricle was dilated  Systolic function was moderately reduced  Ejection fraction was estimated in the range of 35 % to 40 %  There was assymetrical hypertrophy of the septum  RIGHT VENTRICLE:  The size was normal   Systolic function was normal   LEFT ATRIUM:  The atrium was moderately dilated  MITRAL VALVE:  There was moderate thickening  There was mild regurgitation  TRICUSPID VALVE:  There was mild regurgitation  IVC, HEPATIC VEINS:  The inferior vena cava was dilated  Respirophasic changes were blunted (less than 50% variation)  PERICARDIUM:  A trace pericardial effusion was identified  There was a large left pleural effusion     CXR 6/27/20:  Persistent small effusions and bibasilar atelectasis      CXR 6/29/20:  New pulmonary edema    Persistent small effusions and bibasilar atelectasis      CT CHEST 6/26/20:  1   Moderate right and small left pleural effusions  2   Scattered groundglass opacities, favor pulmonary edema   May also be infectious/inflammatory

## 2020-07-02 NOTE — PLAN OF CARE
Problem: Potential for Falls  Goal: Patient will remain free of falls  Description  INTERVENTIONS:  - Assess patient frequently for physical needs  -  Identify cognitive and physical deficits and behaviors that affect risk of falls  -  [place] fall precautions as indicated by assessment   - Educate patient/family on patient safety including physical limitations  - Instruct patient to call for assistance with activity based on assessment  - Modify environment to reduce risk of injury  - Consider OT/PT consult to assist with strengthening/mobility  Outcome: Progressing     Problem: NEUROSENSORY - ADULT  Goal: Achieves stable or improved neurological status  Description  INTERVENTIONS  - Monitor and report changes in neurological status  - Monitor vital signs such as temperature, blood pressure, glucose, and any other labs ordered   - Initiate measures to prevent increased intracranial pressure  - Monitor for seizure activity and implement precautions if appropriate      Outcome: Progressing  Goal: Achieves maximal functionality and self care  Description  INTERVENTIONS  - Monitor swallowing and airway patency with patient fatigue and changes in neurological status  - Encourage and assist patient to increase activity and self care     - Encourage visually impaired, hearing impaired and aphasic patients to use assistive/communication devices  Outcome: Progressing     Problem: CARDIOVASCULAR - ADULT  Goal: Maintains optimal cardiac output and hemodynamic stability  Description  INTERVENTIONS:  - Monitor I/O, vital signs and rhythm  - Monitor for S/S and trends of decreased cardiac output  - Administer and titrate ordered vasoactive medications to optimize hemodynamic stability  - Assess quality of pulses, skin color and temperature  - Assess for signs of decreased coronary artery perfusion  - Instruct patient to report change in severity of symptoms  Outcome: Progressing  Goal: Absence of cardiac dysrhythmias or at baseline rhythm  Description  INTERVENTIONS:  - Continuous cardiac monitoring, vital signs, obtain 12 lead EKG if ordered  - Administer antiarrhythmic and heart rate control medications as ordered  - Monitor electrolytes and administer replacement therapy as ordered  Outcome: Progressing     Problem: RESPIRATORY - ADULT  Goal: Achieves optimal ventilation and oxygenation  Description  INTERVENTIONS:  - Assess for changes in respiratory status  - Assess for changes in mentation and behavior  - Position to facilitate oxygenation and minimize respiratory effort  - Oxygen administered by appropriate delivery if ordered  - Initiate smoking cessation education as indicated  - Encourage broncho-pulmonary hygiene including cough, deep breathe, Incentive Spirometry  - Assess the need for suctioning and aspirate as needed  - Assess and instruct to report SOB or any respiratory difficulty  - Respiratory Therapy support as indicated  Outcome: Progressing     Problem: GASTROINTESTINAL - ADULT  Goal: Minimal or absence of nausea and/or vomiting  Description  INTERVENTIONS:  - Administer IV fluids if ordered to ensure adequate hydration  - Maintain NPO status until nausea and vomiting are resolved  - Nasogastric tube if ordered  - Administer ordered antiemetic medications as needed  - Provide nonpharmacologic comfort measures as appropriate  - Advance diet as tolerated, if ordered  - Consider nutrition services referral to assist patient with adequate nutrition and appropriate food choices  Outcome: Progressing  Goal: Maintains or returns to baseline bowel function  Description  INTERVENTIONS:  - Assess bowel function  - Encourage oral fluids to ensure adequate hydration  - Administer IV fluids if ordered to ensure adequate hydration  - Administer ordered medications as needed  - Encourage mobilization and activity  - Consider nutritional services referral to assist patient with adequate nutrition and appropriate food choices  Outcome: Progressing  Goal: Maintains adequate nutritional intake  Description  INTERVENTIONS:  - Monitor percentage of each meal consumed  - Identify factors contributing to decreased intake, treat as appropriate  - Assist with meals as needed  - Monitor I&O, weight, and lab values if indicated  - Obtain nutrition services referral as needed  Outcome: Progressing     Problem: GENITOURINARY - ADULT  Goal: Maintains or returns to baseline urinary function  Description  INTERVENTIONS:  - Assess urinary function  - Encourage oral fluids to ensure adequate hydration if ordered  - Administer IV fluids as ordered to ensure adequate hydration  - Administer ordered medications as needed  - Offer frequent toileting  - Follow urinary retention protocol if ordered  Outcome: Progressing  Goal: Absence of urinary retention  Description  INTERVENTIONS:  - Assess patients ability to void and empty bladder  - Monitor I/O  - Bladder scan as needed  - Discuss with physician/AP medications to alleviate retention as needed  - Discuss catheterization for long term situations as appropriate  Outcome: Progressing     Problem: METABOLIC, FLUID AND ELECTROLYTES - ADULT  Goal: Electrolytes maintained within normal limits  Description  INTERVENTIONS:  - Monitor labs and assess patient for signs and symptoms of electrolyte imbalances  - Administer electrolyte replacement as ordered  - Monitor response to electrolyte replacements, including repeat lab results as appropriate  - Instruct patient on fluid and nutrition as appropriate  Outcome: Progressing  Goal: Fluid balance maintained  Description  INTERVENTIONS:  - Monitor labs   - Monitor I/O and WT  - Instruct patient on fluid and nutrition as appropriate  - Assess for signs & symptoms of volume excess or deficit  Outcome: Progressing  Goal: Glucose maintained within target range  Description  INTERVENTIONS:  - Monitor Blood Glucose as ordered  - Assess for signs and symptoms of hyperglycemia and hypoglycemia  - Administer ordered medications to maintain glucose within target range  - Assess nutritional intake and initiate nutrition service referral as needed  Outcome: Progressing     Problem: SKIN/TISSUE INTEGRITY - ADULT  Goal: Skin integrity remains intact  Description  INTERVENTIONS  - Identify patients at risk for skin breakdown  - Assess and monitor skin integrity  - Assess and monitor nutrition and hydration status  - Monitor labs (i e  albumin)  - Assess for incontinence   - Turn and reposition patient  - Assist with mobility/ambulation  - Relieve pressure over bony prominences  - Avoid friction and shearing  - Provide appropriate hygiene as needed including keeping skin clean and dry  - Evaluate need for skin moisturizer/barrier cream  - Collaborate with interdisciplinary team (i e  Nutrition, Rehabilitation, etc )   - Patient/family teaching  Outcome: Progressing  Goal: Oral mucous membranes remain intact  Description  INTERVENTIONS  - Assess oral mucosa and hygiene practices  - Implement preventative oral hygiene regimen  - Implement oral medicated treatments as ordered  - Initiate Nutrition services referral as needed  Outcome: Progressing     Problem: HEMATOLOGIC - ADULT  Goal: Maintains hematologic stability  Description  INTERVENTIONS  - Assess for signs and symptoms of bleeding or hemorrhage  - Monitor labs  - Administer supportive blood products/factors as ordered and appropriate  Outcome: Progressing     Problem: Prexisting or High Potential for Compromised Skin Integrity  Goal: Skin integrity is maintained or improved  Description  INTERVENTIONS:  - Identify patients at risk for skin breakdown  - Assess and monitor skin integrity  - Assess and monitor nutrition and hydration status  - Monitor labs   - Assess for incontinence   - Turn and reposition patient  - Assist with mobility/ambulation  - Relieve pressure over bony prominences  - Avoid friction and shearing  - Provide appropriate hygiene as needed including keeping skin clean and dry  - Evaluate need for skin moisturizer/barrier cream  - Collaborate with interdisciplinary team   - Patient/family teaching  - Consider wound care consult   Outcome: Progressing     Problem: Nutrition/Hydration-ADULT  Goal: Nutrient/Hydration intake appropriate for improving, restoring or maintaining nutritional needs  Description  Monitor and assess patient's nutrition/hydration status for malnutrition  Collaborate with interdisciplinary team and initiate plan and interventions as ordered  Monitor patient's weight and dietary intake as ordered or per policy  Utilize nutrition screening tool and intervene as necessary  Determine patient's food preferences and provide high-protein, high-caloric foods as appropriate       INTERVENTIONS:  - Monitor oral intake, urinary output, labs, and treatment plans  - Assess nutrition and hydration status and recommend course of action  - Evaluate amount of meals eaten  - Assist patient with eating if necessary   - Allow adequate time for meals  - Recommend/ encourage appropriate diets, oral nutritional supplements, and vitamin/mineral supplements  - Order, calculate, and assess calorie counts as needed  - Recommend, monitor, and adjust tube feedings and TPN/PPN based on assessed needs  - Assess need for intravenous fluids  - Provide specific nutrition/hydration education as appropriate  - Include patient/family/caregiver in decisions related to nutrition  Outcome: Progressing

## 2020-07-02 NOTE — ASSESSMENT & PLAN NOTE
Presents with acute respiratory failure with hypoxia requiring BiPAP to maintain SaO2 greater than 90%  Acute respiratory failure most likely multifactorial including CHF exacerbation and COPD  Adequately diuresed and transitioned to oral diuretics    Currently on 4 L of oxygen and maintaining SaO2 greater than 90%

## 2020-07-02 NOTE — PROGRESS NOTES
Progress Note - Monet Cespedes 1935, 82 y o  female MRN: [id number]    Unit/Bed#: -01 Encounter: [id number]    Primary Care Provider: Ramin Olivo MD   Date and time admitted to hospital: 6/26/2020  8:14 AM      * Acute respiratory failure with hypoxia [facility])  Assessment & Plan  Presents with acute respiratory failure with hypoxia requiring BiPAP to maintain SaO2 greater than 90%  Acute respiratory failure most likely multifactorial including CHF exacerbation and COPD  Adequately diuresed and transitioned to oral diuretics  Currently on 4 L of oxygen and maintaining SaO2 greater than 90%    Acute on chronic systolic CHF (congestive heart failure) ([facility])  Assessment & Plan  Wt Readings from Last 3 Encounters:   07/02/20 63 1 kg (139 lb 1 8 oz)   06/22/20 68 9 kg (152 lb)   06/19/20 66 4 kg (146 lb 6 2 oz)     EF 35-40%  Continue oral diuretics today and will monitor, if stable will discharge tomorrow  Check renal function, serum electrolytes, weight and urine output daily    Acute renal failure (ARF) ([facility])resolved as of 6/29/2020  Assessment & Plan  Acute renal failure most likely secondary to over-diuresis  Creatinine stable around 2 0, patient has significant CHF and will continue with Lasix 40 mg twice daily and closely monitor renal function  May stop IV diuresis tomorrow if renal function worsens      CKD (chronic kidney disease) stage 4, GFR 15-29 ml/min ([facility])  Assessment & Plan  CKD 3, baseline creatinine 1 4-1 6    COPD with emphysema ([facility])  Assessment & Plan  Continue with bronchodilator    NANCY (acute kidney injury) ([facility] [address] )  Assessment & Plan  Creatinine elevated to 2 3 from baseline around 1 4-1 6  Acute renal failure most likely secondary to over-diuresis  Plan to switch IV Lasix to oral tomorrow  Recheck renal function in the morning    A-fib [facility])  Assessment & Plan  Continue with carvedilol and low-dose Eliquis    Essential hypertension  Assessment & Plan  BP stable, continue current management  VTE Pharmacologic Prophylaxis:   Pharmacologic: Heparin    Patient Centered Rounds: I have performed bedside rounds with nursing staff today    Discussions with Specialists or Other Care Team Provider:     Education and Discussions with Family / Patient:       Current Length of Stay: 6 day(s)    Current Patient Status: Inpatient   Certification Statement: The patient will continue to require additional inpatient hospital stay due to Acute CHF    Discharge Plan:  Probably tomorrow    Code Status: Level 1 - Full Code      Subjective:   No complaints  Patient adequately treated for acute CHF however develops acute renal failure  Will continue to monitor renal function  Objective:     Vitals:   Temp (24hrs), Av 6 °F (36 4 °C), Min:97 3 °F (36 3 °C), Max:97 8 °F (36 6 °C)    Temp:  [97 3 °F (36 3 °C)-97 8 °F (36 6 °C)] 97 8 °F (36 6 °C)  HR:  [57-69] 65  Resp:  [17-31] 18  BP: (122-150)/(48-65) 132/51  SpO2:  [94 %-100 %] 100 %  Body mass index is 27 17 kg/m²  Input and Output Summary (last 24 hours): Intake/Output Summary (Last 24 hours) at 2020 1731  Last data filed at 2020 1601  Gross per 24 hour   Intake 540 ml   Output 1950 ml   Net -1410 ml       Physical Exam:     General appearance: alert, appears stated age and cooperative  Head: Normocephalic, without obvious abnormality, atraumatic  Lungs: clear to auscultation bilaterally  Heart: regular rate and rhythm  Abdomen: soft, non-tender, positive bowel sounds   Back: negative  Extremities: extremities atraumatic, no cyanosis or edema  Neurologic: Alert and oriented X 3, normal strength and tone  Normal symmetric reflexes   Normal coordination and gait    Additional Data:     Labs:    Results from last 7 days   Lab Units 20  0514   WBC Thousand/uL 6 55   HEMOGLOBIN g/dL 9 0*   HEMATOCRIT % 24 7*   PLATELETS Thousands/uL 173   NEUTROS PCT % 60   LYMPHS PCT % 29   MONOS PCT % 9   EOS PCT % 2     Results from last 7 days Lab Units 07/01/20  1138  06/26/20  0844   SODIUM mmol/L 142   < > 143   POTASSIUM mmol/L 4 2   < > 5 6*   CHLORIDE mmol/L 99*   < > 107   CO2 mmol/L 33*   < > 30   BUN mg/dL 85*   < > 48*   CREATININE mg/dL 2 37*   < > 1 69*   ANION GAP mmol/L 10   < > 6   CALCIUM mg/dL 8 2*   < > 8 2*   ALBUMIN g/dL  --   --  2 9*   TOTAL BILIRUBIN mg/dL  --   --  0 30   ALK PHOS U/L  --   --  110   ALT U/L  --   --  26   AST U/L  --   --  30   GLUCOSE RANDOM mg/dL 265*   < > 129    < > = values in this interval not displayed  Results from last 7 days   Lab Units 06/26/20  0844   INR  1 14     Results from last 7 days   Lab Units 07/02/20  1605 07/02/20  1145 07/02/20  0733 07/01/20  2018 07/01/20  1608 07/01/20  1058 07/01/20  0704 06/30/20  2102 06/30/20  1532 06/30/20  1103 06/30/20  0651 06/29/20  2037   POC GLUCOSE mg/dl 192* 179* 156* 229* 241* 306* 341* 377* 331* 368* 159* 178*         Results from last 7 days   Lab Units 06/29/20  1523 06/26/20  0844   LACTIC ACID mmol/L  --  0 9   PROCALCITONIN ng/ml 0 12  --            * I Have Reviewed All Lab Data Listed Above  * Additional Pertinent Lab Tests Reviewed: Lauren 66 Admission Reviewed    Imaging:    Imaging Reports Reviewed Today Include: images reviewed    Recent Cultures (last 7 days):     Results from last 7 days   Lab Units 06/26/20  0844   BLOOD CULTURE  No Growth After 5 Days  No Growth After 5 Days         Last 24 Hours Medication List:     Current Facility-Administered Medications:  amLODIPine 5 mg Oral Daily Marychuy A Ramella, CRNP   apixaban 2 5 mg Oral BID Marychuy A Ramella, CRNP   budesonide 0 5 mg Nebulization Q12H Marychuy A Ramella, CRNP   carvedilol 3 125 mg Oral BID With Meals Jeanette S Darrel, CRNP   chlorhexidine 15 mL Swish & Spit Q12H [address] Marychuy A Ramella, CRNP   dextromethorphan-guaiFENesin 10 mL Oral Q4H PRN KRISTIN Loera   docusate sodium 100 mg Oral BID KRISTIN Loera   ferrous sulfate 325 mg Oral Daily With Breakfast Marychuy Baptiste, KRISTIN   fluticasone 2 spray Nasal Daily Marychuy Baptiste, KRISTIN   [START ON 7/3/2020] furosemide 40 mg Oral Daily Kassidy Zamora PA-C   guaiFENesin 600 mg Oral Q12H [address] Marychuy Baptiste, KRISTIN   insulin lispro 1-6 Units Subcutaneous TID AC Marychuy Baptiste, KRISTIN   ipratropium 0 5 mg Nebulization Q6H Marychuy Baptiste, KRISTIN   Labetalol HCl 5 mg Intravenous Q2H PRN Marychuy Baptiste, CRNP   levalbuterol 1 25 mg Nebulization Q6H Marychuy Baptiste, CRNP   loratadine 10 mg Oral Daily Marychuy Baptiste, KRISTIN   methylPREDNISolone sodium succinate 125 mg Intravenous Once Marychuy Baptiste, KRISTIN   ondansetron 4 mg Intravenous Q6H PRN Marychuy Baptiste, KRISTIN   pantoprazole 40 mg Oral Daily Marychuy Baptiste, KRISTIN   pravastatin 80 mg Oral Daily With [place]   sucralfate 1,000 mg Oral HS KRISTIN Loera        Today, Patient Was Seen By: Danae Salinas MD    ** Please Note: Dictation voice to text software may have been used in the creation of this document   **

## 2020-07-02 NOTE — SOCIAL WORK
Current LOS 6 days  BRENDA completed chart review  Pt transitioned to PO Lasix 40 mg daily today  Pt still requiring 4L O2, her baseline is 2L  CM will review case with attending and bedside nurse to see if pt will be weaned down to baseline O2 or if she can return to SNF on 4L  CM will continue to follow medical course and assist with d/c planning as needed  CM will update [facility] once a clear d/c date is known

## 2020-07-02 NOTE — PLAN OF CARE
Patient presents with 2-day history of abdominal pain in LLQ. Denies associated nausea, vomiting, diarrhea. Last BM was last night (normal). Problem: Potential for Falls  Goal: Patient will remain free of falls  Description  INTERVENTIONS:  - Assess patient frequently for physical needs  -  Identify cognitive and physical deficits and behaviors that affect risk of falls  -  [place] fall precautions as indicated by assessment   - Educate patient/family on patient safety including physical limitations  - Instruct patient to call for assistance with activity based on assessment  - Modify environment to reduce risk of injury/chiar alarm roller walker  - Consider OT/PT consult to assist with strengthening/mobility   Outcome: Progressing     Problem: NEUROSENSORY - ADULT  Goal: Achieves stable or improved neurological status  Description  INTERVENTIONS  - Monitor and report changes in neurological status  - Monitor vital signs such as temperature, blood pressure, glucose, and any other labs ordered   - Initiate measures to prevent increased intracranial pressure  - Monitor for seizure activity and implement precautions if appropriate      Outcome: Progressing  Goal: Achieves maximal functionality and self care  Description  INTERVENTIONS  - Monitor swallowing and airway patency with patient fatigue and changes in neurological status  - Encourage and assist patient to increase activity and self care     - Encourage visually impaired, hearing impaired and aphasic patients to use assistive/communication devices  Outcome: Progressing     Problem: CARDIOVASCULAR - ADULT  Goal: Maintains optimal cardiac output and hemodynamic stability  Description  INTERVENTIONS:  - Monitor I/O, vital signs and rhythm  - Monitor for S/S and trends of decreased cardiac output  - Administer and titrate ordered vasoactive medications to optimize hemodynamic stability  - Assess quality of pulses, skin color and temperature  - Assess for signs of decreased coronary artery perfusion  - Instruct patient to report change in severity of symptoms  Outcome: Progressing  Goal: Absence of cardiac dysrhythmias or at baseline rhythm  Description  INTERVENTIONS:  - Continuous cardiac monitoring, vital signs, obtain 12 lead EKG if ordered  - Administer antiarrhythmic and heart rate control medications as ordered  - Monitor electrolytes and administer replacement therapy as ordered  Outcome: Progressing     Problem: RESPIRATORY - ADULT  Goal: Achieves optimal ventilation and oxygenation  Description  INTERVENTIONS:  - Assess for changes in respiratory status  - Assess for changes in mentation and behavior  - Position to facilitate oxygenation and minimize respiratory effort  - Oxygen administered by appropriate delivery if ordered  - Initiate smoking cessation education as indicated  - Encourage broncho-pulmonary hygiene including cough, deep breathe, Incentive Spirometry  - Assess the need for suctioning and aspirate as needed  - Assess and instruct to report SOB or any respiratory difficulty  - Respiratory Therapy support as indicated  Outcome: Progressing     Problem: GASTROINTESTINAL - ADULT  Goal: Minimal or absence of nausea and/or vomiting  Description  INTERVENTIONS:  - Administer IV fluids if ordered to ensure adequate hydration  - Maintain NPO status until nausea and vomiting are resolved  - Nasogastric tube if ordered  - Administer ordered antiemetic medications as needed  - Provide nonpharmacologic comfort measures as appropriate  - Advance diet as tolerated, if ordered  - Consider nutrition services referral to assist patient with adequate nutrition and appropriate food choices  Outcome: Progressing  Goal: Maintains or returns to baseline bowel function  Description  INTERVENTIONS:  - Assess bowel function  - Encourage oral fluids to ensure adequate hydration  - Administer IV fluids if ordered to ensure adequate hydration  - Administer ordered medications as needed  - Encourage mobilization and activity  - Consider nutritional services referral to assist patient with adequate nutrition and appropriate food choices  Outcome: Progressing  Goal: Maintains adequate nutritional intake  Description  INTERVENTIONS:  - Monitor percentage of each meal consumed  - Identify factors contributing to decreased intake, treat as appropriate  - Assist with meals as needed  - Monitor I&O, weight, and lab values if indicated  - Obtain nutrition services referral as needed  Outcome: Progressing     Problem: GENITOURINARY - ADULT  Goal: Maintains or returns to baseline urinary function  Description  INTERVENTIONS:  - Assess urinary function  - Encourage oral fluids to ensure adequate hydration if ordered  - Administer IV fluids as ordered to ensure adequate hydration  - Administer ordered medications as needed  - Offer frequent toileting  - Follow urinary retention protocol if ordered  Outcome: Progressing  Goal: Absence of urinary retention  Description  INTERVENTIONS:  - Assess patients ability to void and empty bladder  - Monitor I/O  - Bladder scan as needed  - Discuss with physician/AP medications to alleviate retention as needed  - Discuss catheterization for long term situations as appropriate  Outcome: Progressing     Problem: SKIN/TISSUE INTEGRITY - ADULT  Goal: Skin integrity remains intact  Description  INTERVENTIONS  - Identify patients at risk for skin breakdown  - Assess and monitor skin integrity  - Assess and monitor nutrition and hydration status  - Monitor labs (i e  albumin)  - Assess for incontinence   - Turn and reposition patient  - Assist with mobility/ambulation  - Relieve pressure over bony prominences  - Avoid friction and shearing  - Provide appropriate hygiene as needed including keeping skin clean and dry  - Evaluate need for skin moisturizer/barrier cream  - Collaborate with interdisciplinary team (i e  Nutrition, Rehabilitation, etc )   - Patient/family teaching  Outcome: Progressing  Goal: Oral mucous membranes remain intact  Description  INTERVENTIONS  - Assess oral mucosa and hygiene practices  - Implement preventative oral hygiene regimen  - Implement oral medicated treatments as ordered  - Initiate Nutrition services referral as needed  Outcome: Progressing     Problem: HEMATOLOGIC - ADULT  Goal: Maintains hematologic stability  Description  INTERVENTIONS  - Assess for signs and symptoms of bleeding or hemorrhage  - Monitor labs  - Administer supportive blood products/factors as ordered and appropriate  Outcome: Progressing     Problem: Prexisting or High Potential for Compromised Skin Integrity  Goal: Skin integrity is maintained or improved  Description  INTERVENTIONS:  - Identify patients at risk for skin breakdown  - Assess and monitor skin integrity  - Assess and monitor nutrition and hydration status  - Monitor labs   - Assess for incontinence   - Turn and reposition patient  - Assist with mobility/ambulation  - Relieve pressure over bony prominences  - Avoid friction and shearing  - Provide appropriate hygiene as needed including keeping skin clean and dry  - Evaluate need for skin moisturizer/barrier cream  - Collaborate with interdisciplinary team   - Patient/family teaching  - Consider wound care consult   Outcome: Progressing

## 2020-07-03 VITALS
HEIGHT: 60 IN | TEMPERATURE: 97.2 F | WEIGHT: 141.09 LBS | SYSTOLIC BLOOD PRESSURE: 153 MMHG | DIASTOLIC BLOOD PRESSURE: 59 MMHG | HEART RATE: 68 BPM | BODY MASS INDEX: 27.7 KG/M2 | OXYGEN SATURATION: 99 % | RESPIRATION RATE: 21 BRPM

## 2020-07-03 LAB
ANION GAP SERPL CALCULATED.3IONS-SCNC: 8 MMOL/L (ref 4–13)
BASOPHILS # BLD AUTO: 0.02 THOUSANDS/ΜL (ref 0–0.1)
BASOPHILS NFR BLD AUTO: 0 % (ref 0–1)
BUN SERPL-MCNC: 85 MG/DL (ref 5–25)
CALCIUM SERPL-MCNC: 7.6 MG/DL (ref 8.3–10.1)
CHLORIDE SERPL-SCNC: 103 MMOL/L (ref 100–108)
CO2 SERPL-SCNC: 32 MMOL/L (ref 21–32)
CREAT SERPL-MCNC: 2.02 MG/DL (ref 0.6–1.3)
EOSINOPHIL # BLD AUTO: 0.11 THOUSAND/ΜL (ref 0–0.61)
EOSINOPHIL NFR BLD AUTO: 2 % (ref 0–6)
ERYTHROCYTE [DISTWIDTH] IN BLOOD BY AUTOMATED COUNT: 14.3 % (ref 11.6–15.1)
GFR SERPL CREATININE-BSD FRML MDRD: 22 ML/MIN/1.73SQ M
GLUCOSE SERPL-MCNC: 152 MG/DL (ref 65–140)
GLUCOSE SERPL-MCNC: 171 MG/DL (ref 65–140)
GLUCOSE SERPL-MCNC: 213 MG/DL (ref 65–140)
GLUCOSE SERPL-MCNC: 250 MG/DL (ref 65–140)
HCT VFR BLD AUTO: 26 % (ref 34.8–46.1)
HGB BLD-MCNC: 9.1 G/DL (ref 11.5–15.4)
IMM GRANULOCYTES # BLD AUTO: 0.02 THOUSAND/UL (ref 0–0.2)
IMM GRANULOCYTES NFR BLD AUTO: 0 % (ref 0–2)
LYMPHOCYTES # BLD AUTO: 1.5 THOUSANDS/ΜL (ref 0.6–4.47)
LYMPHOCYTES NFR BLD AUTO: 25 % (ref 14–44)
MCH RBC QN AUTO: 34 PG (ref 26.8–34.3)
MCHC RBC AUTO-ENTMCNC: 35 G/DL (ref 31.4–37.4)
MCV RBC AUTO: 97 FL (ref 82–98)
MONOCYTES # BLD AUTO: 0.67 THOUSAND/ΜL (ref 0.17–1.22)
MONOCYTES NFR BLD AUTO: 11 % (ref 4–12)
NEUTROPHILS # BLD AUTO: 3.81 THOUSANDS/ΜL (ref 1.85–7.62)
NEUTS SEG NFR BLD AUTO: 62 % (ref 43–75)
NRBC BLD AUTO-RTO: 0 /100 WBCS
PLATELET # BLD AUTO: 183 THOUSANDS/UL (ref 149–390)
PMV BLD AUTO: 12.9 FL (ref 8.9–12.7)
POTASSIUM SERPL-SCNC: 3.8 MMOL/L (ref 3.5–5.3)
RBC # BLD AUTO: 2.68 MILLION/UL (ref 3.81–5.12)
SARS-COV-2 RNA RESP QL NAA+PROBE: NEGATIVE
SODIUM SERPL-SCNC: 143 MMOL/L (ref 136–145)
WBC # BLD AUTO: 6.13 THOUSAND/UL (ref 4.31–10.16)

## 2020-07-03 PROCEDURE — 87635 SARS-COV-2 COVID-19 AMP PRB: CPT | Performed by: INTERNAL MEDICINE

## 2020-07-03 PROCEDURE — 94640 AIRWAY INHALATION TREATMENT: CPT

## 2020-07-03 PROCEDURE — 85025 COMPLETE CBC W/AUTO DIFF WBC: CPT | Performed by: INTERNAL MEDICINE

## 2020-07-03 PROCEDURE — 82948 REAGENT STRIP/BLOOD GLUCOSE: CPT

## 2020-07-03 PROCEDURE — 99239 HOSP IP/OBS DSCHRG MGMT >30: CPT | Performed by: INTERNAL MEDICINE

## 2020-07-03 PROCEDURE — 80048 BASIC METABOLIC PNL TOTAL CA: CPT | Performed by: INTERNAL MEDICINE

## 2020-07-03 PROCEDURE — 92526 ORAL FUNCTION THERAPY: CPT

## 2020-07-03 PROCEDURE — 94760 N-INVAS EAR/PLS OXIMETRY 1: CPT

## 2020-07-03 RX ORDER — GUAIFENESIN/DEXTROMETHORPHAN 100-10MG/5
10 SYRUP ORAL EVERY 4 HOURS PRN
Qty: 118 ML | Refills: 0
Start: 2020-07-03 | End: 2020-08-05 | Stop reason: HOSPADM

## 2020-07-03 RX ADMIN — INSULIN LISPRO 2 UNITS: 100 INJECTION, SOLUTION INTRAVENOUS; SUBCUTANEOUS at 12:04

## 2020-07-03 RX ADMIN — CARVEDILOL 3.12 MG: 3.12 TABLET, FILM COATED ORAL at 16:47

## 2020-07-03 RX ADMIN — IPRATROPIUM BROMIDE 0.5 MG: 0.5 SOLUTION RESPIRATORY (INHALATION) at 02:31

## 2020-07-03 RX ADMIN — GUAIFENESIN 600 MG: 600 TABLET, EXTENDED RELEASE ORAL at 08:01

## 2020-07-03 RX ADMIN — CARVEDILOL 3.12 MG: 3.12 TABLET, FILM COATED ORAL at 08:00

## 2020-07-03 RX ADMIN — LEVALBUTEROL HYDROCHLORIDE 1.25 MG: 1.25 SOLUTION, CONCENTRATE RESPIRATORY (INHALATION) at 07:17

## 2020-07-03 RX ADMIN — IPRATROPIUM BROMIDE 0.5 MG: 0.5 SOLUTION RESPIRATORY (INHALATION) at 07:18

## 2020-07-03 RX ADMIN — APIXABAN 2.5 MG: 2.5 TABLET, FILM COATED ORAL at 08:00

## 2020-07-03 RX ADMIN — CHLORHEXIDINE GLUCONATE 0.12% ORAL RINSE 15 ML: 1.2 LIQUID ORAL at 08:00

## 2020-07-03 RX ADMIN — FUROSEMIDE 40 MG: 40 TABLET ORAL at 08:00

## 2020-07-03 RX ADMIN — IPRATROPIUM BROMIDE 0.5 MG: 0.5 SOLUTION RESPIRATORY (INHALATION) at 13:09

## 2020-07-03 RX ADMIN — DOCUSATE SODIUM 100 MG: 100 CAPSULE, LIQUID FILLED ORAL at 08:00

## 2020-07-03 RX ADMIN — LEVALBUTEROL HYDROCHLORIDE 1.25 MG: 1.25 SOLUTION, CONCENTRATE RESPIRATORY (INHALATION) at 13:09

## 2020-07-03 RX ADMIN — LORATADINE 10 MG: 10 TABLET ORAL at 08:00

## 2020-07-03 RX ADMIN — PANTOPRAZOLE SODIUM 40 MG: 40 TABLET, DELAYED RELEASE ORAL at 08:00

## 2020-07-03 RX ADMIN — AMLODIPINE BESYLATE 5 MG: 5 TABLET ORAL at 08:00

## 2020-07-03 RX ADMIN — DOCUSATE SODIUM 100 MG: 100 CAPSULE, LIQUID FILLED ORAL at 16:46

## 2020-07-03 RX ADMIN — LEVALBUTEROL HYDROCHLORIDE 1.25 MG: 1.25 SOLUTION, CONCENTRATE RESPIRATORY (INHALATION) at 02:31

## 2020-07-03 RX ADMIN — APIXABAN 2.5 MG: 2.5 TABLET, FILM COATED ORAL at 16:47

## 2020-07-03 RX ADMIN — FLUTICASONE PROPIONATE 2 SPRAY: 50 SPRAY, METERED NASAL at 08:00

## 2020-07-03 RX ADMIN — PRAVASTATIN SODIUM 80 MG: 80 TABLET ORAL at 16:46

## 2020-07-03 RX ADMIN — INSULIN LISPRO 1 UNITS: 100 INJECTION, SOLUTION INTRAVENOUS; SUBCUTANEOUS at 08:00

## 2020-07-03 RX ADMIN — FERROUS SULFATE TAB 325 MG (65 MG ELEMENTAL FE) 325 MG: 325 (65 FE) TAB at 08:00

## 2020-07-03 RX ADMIN — INSULIN LISPRO 3 UNITS: 100 INJECTION, SOLUTION INTRAVENOUS; SUBCUTANEOUS at 16:46

## 2020-07-03 RX ADMIN — BUDESONIDE 0.5 MG: 0.5 INHALANT RESPIRATORY (INHALATION) at 07:18

## 2020-07-03 NOTE — PLAN OF CARE
Problem: Potential for Falls  Goal: Patient will remain free of falls  Description  INTERVENTIONS:  - Assess patient frequently for physical needs  -  Identify cognitive and physical deficits and behaviors that affect risk of falls  -  [place] fall precautions as indicated by assessment   - Educate patient/family on patient safety including physical limitations  - Instruct patient to call for assistance with activity based on assessment  - Modify environment to reduce risk of injury/chiar alarm roller walker  - Consider OT/PT consult to assist with strengthening/mobility   Outcome: Progressing     Problem: NEUROSENSORY - ADULT  Goal: Achieves stable or improved neurological status  Description  INTERVENTIONS  - Monitor and report changes in neurological status  - Monitor vital signs such as temperature, blood pressure, glucose, and any other labs ordered   - Initiate measures to prevent increased intracranial pressure  - Monitor for seizure activity and implement precautions if appropriate      Outcome: Progressing  Goal: Achieves maximal functionality and self care  Description  INTERVENTIONS  - Monitor swallowing and airway patency with patient fatigue and changes in neurological status  - Encourage and assist patient to increase activity and self care     - Encourage visually impaired, hearing impaired and aphasic patients to use assistive/communication devices  Outcome: Progressing     Problem: CARDIOVASCULAR - ADULT  Goal: Maintains optimal cardiac output and hemodynamic stability  Description  INTERVENTIONS:  - Monitor I/O, vital signs and rhythm  - Monitor for S/S and trends of decreased cardiac output  - Administer and titrate ordered vasoactive medications to optimize hemodynamic stability  - Assess quality of pulses, skin color and temperature  - Assess for signs of decreased coronary artery perfusion  - Instruct patient to report change in severity of symptoms  Outcome: Progressing  Goal: Absence of cardiac dysrhythmias or at baseline rhythm  Description  INTERVENTIONS:  - Continuous cardiac monitoring, vital signs, obtain 12 lead EKG if ordered  - Administer antiarrhythmic and heart rate control medications as ordered  - Monitor electrolytes and administer replacement therapy as ordered  Outcome: Progressing     Problem: RESPIRATORY - ADULT  Goal: Achieves optimal ventilation and oxygenation  Description  INTERVENTIONS:  - Assess for changes in respiratory status  - Assess for changes in mentation and behavior  - Position to facilitate oxygenation and minimize respiratory effort  - Oxygen administered by appropriate delivery if ordered  - Initiate smoking cessation education as indicated  - Encourage broncho-pulmonary hygiene including cough, deep breathe, Incentive Spirometry  - Assess the need for suctioning and aspirate as needed  - Assess and instruct to report SOB or any respiratory difficulty  - Respiratory Therapy support as indicated  Outcome: Progressing     Problem: GASTROINTESTINAL - ADULT  Goal: Minimal or absence of nausea and/or vomiting  Description  INTERVENTIONS:  - Administer IV fluids if ordered to ensure adequate hydration  - Maintain NPO status until nausea and vomiting are resolved  - Nasogastric tube if ordered  - Administer ordered antiemetic medications as needed  - Provide nonpharmacologic comfort measures as appropriate  - Advance diet as tolerated, if ordered  - Consider nutrition services referral to assist patient with adequate nutrition and appropriate food choices  Outcome: Progressing  Goal: Maintains or returns to baseline bowel function  Description  INTERVENTIONS:  - Assess bowel function  - Encourage oral fluids to ensure adequate hydration  - Administer IV fluids if ordered to ensure adequate hydration  - Administer ordered medications as needed  - Encourage mobilization and activity  - Consider nutritional services referral to assist patient with adequate nutrition and appropriate food choices  Outcome: Progressing  Goal: Maintains adequate nutritional intake  Description  INTERVENTIONS:  - Monitor percentage of each meal consumed  - Identify factors contributing to decreased intake, treat as appropriate  - Assist with meals as needed  - Monitor I&O, weight, and lab values if indicated  - Obtain nutrition services referral as needed  Outcome: Progressing     Problem: GENITOURINARY - ADULT  Goal: Maintains or returns to baseline urinary function  Description  INTERVENTIONS:  - Assess urinary function  - Encourage oral fluids to ensure adequate hydration if ordered  - Administer IV fluids as ordered to ensure adequate hydration  - Administer ordered medications as needed  - Offer frequent toileting  - Follow urinary retention protocol if ordered  Outcome: Progressing  Goal: Absence of urinary retention  Description  INTERVENTIONS:  - Assess patients ability to void and empty bladder  - Monitor I/O  - Bladder scan as needed  - Discuss with physician/AP medications to alleviate retention as needed  - Discuss catheterization for long term situations as appropriate  Outcome: Progressing     Problem: METABOLIC, FLUID AND ELECTROLYTES - ADULT  Goal: Electrolytes maintained within normal limits  Description  INTERVENTIONS:  - Monitor labs and assess patient for signs and symptoms of electrolyte imbalances  - Administer electrolyte replacement as ordered  - Monitor response to electrolyte replacements, including repeat lab results as appropriate  - Instruct patient on fluid and nutrition as appropriate  Outcome: Progressing  Goal: Fluid balance maintained  Description  INTERVENTIONS:  - Monitor labs   - Monitor I/O and WT  - Instruct patient on fluid and nutrition as appropriate  - Assess for signs & symptoms of volume excess or deficit  Outcome: Progressing  Goal: Glucose maintained within target range  Description  INTERVENTIONS:  - Monitor Blood Glucose as ordered  - Assess for signs and symptoms of hyperglycemia and hypoglycemia  - Administer ordered medications to maintain glucose within target range  - Assess nutritional intake and initiate nutrition service referral as needed  Outcome: Progressing     Problem: SKIN/TISSUE INTEGRITY - ADULT  Goal: Skin integrity remains intact  Description  INTERVENTIONS  - Identify patients at risk for skin breakdown  - Assess and monitor skin integrity  - Assess and monitor nutrition and hydration status  - Monitor labs (i e  albumin)  - Assess for incontinence   - Turn and reposition patient  - Assist with mobility/ambulation  - Relieve pressure over bony prominences  - Avoid friction and shearing  - Provide appropriate hygiene as needed including keeping skin clean and dry  - Evaluate need for skin moisturizer/barrier cream  - Collaborate with interdisciplinary team (i e  Nutrition, Rehabilitation, etc )   - Patient/family teaching  Outcome: Progressing  Goal: Oral mucous membranes remain intact  Description  INTERVENTIONS  - Assess oral mucosa and hygiene practices  - Implement preventative oral hygiene regimen  - Implement oral medicated treatments as ordered  - Initiate Nutrition services referral as needed  Outcome: Progressing     Problem: HEMATOLOGIC - ADULT  Goal: Maintains hematologic stability  Description  INTERVENTIONS  - Assess for signs and symptoms of bleeding or hemorrhage  - Monitor labs  - Administer supportive blood products/factors as ordered and appropriate  Outcome: Progressing     Problem: MUSCULOSKELETAL - ADULT  Goal: Maintain or return mobility to safest level of function  Description  INTERVENTIONS:  - Assess patient's ability to carry out ADLs; assess patient's baseline for ADL function and identify physical deficits which impact ability to perform ADLs (bathing, care of mouth/teeth, toileting, grooming, dressing, etc )  - Assess/evaluate cause of self-care deficits   - Assess range of motion  - Assess patient's mobility  - Assess patient's need for assistive devices and provide as appropriate  - Encourage maximum independence but intervene and supervise when necessary  - Involve family in performance of ADLs  - Assess for home care needs following discharge   - Consider OT consult to assist with ADL evaluation and planning for discharge  - Provide patient education as appropriate  Outcome: Progressing     Problem: Prexisting or High Potential for Compromised Skin Integrity  Goal: Skin integrity is maintained or improved  Description  INTERVENTIONS:  - Identify patients at risk for skin breakdown  - Assess and monitor skin integrity  - Assess and monitor nutrition and hydration status  - Monitor labs   - Assess for incontinence   - Turn and reposition patient  - Assist with mobility/ambulation  - Relieve pressure over bony prominences  - Avoid friction and shearing  - Provide appropriate hygiene as needed including keeping skin clean and dry  - Evaluate need for skin moisturizer/barrier cream  - Collaborate with interdisciplinary team   - Patient/family teaching  - Consider wound care consult   Outcome: Progressing     Problem: Nutrition/Hydration-ADULT  Goal: Nutrient/Hydration intake appropriate for improving, restoring or maintaining nutritional needs  Description  Monitor and assess patient's nutrition/hydration status for malnutrition  Collaborate with interdisciplinary team and initiate plan and interventions as ordered  Monitor patient's weight and dietary intake as ordered or per policy  Utilize nutrition screening tool and intervene as necessary  Determine patient's food preferences and provide high-protein, high-caloric foods as appropriate       INTERVENTIONS:  - Monitor oral intake, urinary output, labs, and treatment plans  - Assess nutrition and hydration status and recommend course of action  - Evaluate amount of meals eaten  - Assist patient with eating if necessary   - Allow adequate time for meals  - Recommend/ encourage appropriate diets, oral nutritional supplements, and vitamin/mineral supplements  - Order, calculate, and assess calorie counts as needed  - Recommend, monitor, and adjust tube feedings and TPN/PPN based on assessed needs  - Assess need for intravenous fluids  - Provide specific nutrition/hydration education as appropriate  - Include patient/family/caregiver in decisions related to nutrition  Outcome: Progressing

## 2020-07-03 NOTE — SPEECH THERAPY NOTE
Speech/Language Pathology Progress Note    Patient Name: Kayleen Pinto  MTBVS'W Date: 7/3/2020    Subjective:  Pt awake and alert, sitting up in chair at bedside  Objective:  Pt seen for diagnostic swallow therapy  Current diet is puree with thin liquids  Session today focused of assessing consistency of tolerance of thins  Pt tolerated multiple trails of thin via straw without s/s aspiration  She recalls "I choked on salad" when she was first admitted and did not have her dentures  Pt is agreeable to remain on puree until she returns to SNF and has her dentures, then she would like to be re evaluated for solid foods  Assessment: Thin liquids tolerated WFL  Puree for now due to lack of dentition  Plan/Recommendations:  Continue puree diet and thin liquids  D/c ST services  Recommend evaluation upon return to SNF when pt has her dentures

## 2020-07-03 NOTE — PROGRESS NOTES
Humalog ordered, noting continued elevated POC BS levels  If stay were to extend, recommend addition of long acting insulin  Continue diet as ordered

## 2020-07-03 NOTE — RESPIRATORY THERAPY NOTE
RT Protocol Note  Arndayron Courts 82 y o  female MRN: [id number]  Unit/Bed#: -01 Encounter: [id number]    Assessment    Principal Problem:    Acute respiratory failure with hypoxia ([facility] )  Active Problems:    Essential hypertension    Acute on chronic systolic CHF (congestive heart failure) ([facility])    Other hyperlipidemia    COPD with emphysema ([facility])    CKD (chronic kidney disease) stage 4, GFR 15-29 ml/min ([facility])    Esophageal reflux    Type 2 diabetes mellitus with stage 3 chronic kidney disease, with long-term current use of insulin ([facility])    A-fib ([facility])    Anemia    NANCY (acute kidney injury) ([facility] )      Home Pulmonary Medications:    Home Devices/Therapy: Home O2    Past Medical History:   Diagnosis Date    NANCY (acute kidney injury) ([place] [address] )     Atrial fibrillation ([place] [address] )     CHF (congestive heart failure) ([facility])     COPD (chronic obstructive pulmonary disease) ([facility] )     Diabetes mellitus ([facility] )     Trigger finger of thumb     last assessed: 13     Social History     Socioeconomic History    Marital status:       Spouse name: None    Number of children: None    Years of education: None    Highest education level: None   Occupational History    None   Social Needs    Financial resource strain: None    Food insecurity:     Worry: Never true     Inability: Never true    Transportation needs:     Medical: No     Non-medical: No   Tobacco Use    Smoking status: Former Smoker     Last attempt to quit: 2011     Years since quittin 0    Smokeless tobacco: Never Used    Tobacco comment: Former smoker per allscripts   Substance and Sexual Activity    Alcohol use: Not Currently    Drug use: No    Sexual activity: None   Lifestyle    Physical activity:     Days per week: None     Minutes per session: None    Stress: None   Relationships    Social connections:     Talks on phone: None     Gets together: None     Attends [employer] witness service: None     Active member of club or organization: None     Attends meetings of clubs or organizations: None     Relationship status: None    Intimate partner violence:     Fear of current or ex partner: None     Emotionally abused: None     Physically abused: None     Forced sexual activity: None   Other Topics Concern    None   Social History Narrative    None       Subjective    Subjective Data: No resp c/o  Pt states she is breathing better  Objective    Physical Exam:   Assessment Type: Pre-treatment  General Appearance: Awake, Alert  Respiratory Pattern: Normal  Chest Assessment: Chest expansion symmetrical  Bilateral Breath Sounds: Diminished  Cough: None  O2 Device: 4L nasal cannula    Vitals:  Blood pressure 130/58, pulse 62, temperature (!) 97 4 °F (36 3 °C), resp  rate 17, height 5' (1 524 m), weight 63 1 kg (139 lb 1 8 oz), SpO2 98 %      Results from last 7 days   Lab Units 06/29/20  0828 06/26/20  0913   PH ART  7 373 7 334*   PCO2 ART mm Hg 46 7* 50 0*   PO2 ART mm Hg 46 7* 121 5   HCO3 ART mmol/L 26 6 26 0   BASE EXC ART mmol/L 1 0 -0 2   O2 CONTENT ART mL/dL 11 3* 14 0*   O2 HGB, ARTERIAL % 80 7* 97 3*   ABG SOURCE  Radial, Right Radial, Left   GARDENIA TEST   --  Yes       Imaging and other studies: performed greater than three days prior    O2 Device: 4L nasal cannula     Plan    Respiratory Plan: Mild Distress pathway  Airway Clearance Plan: Incentive Spirometer     Resp Comments: sleeping soundly, administered through bipap

## 2020-07-03 NOTE — ASSESSMENT & PLAN NOTE
Creatinine around 2 0 from baseline around 1 4-1 6  Acute renal failure showing improvement after switching to oral diuretics  Continue to monitor renal function within 3-5 days post discharge and follow-up with primary care physician and Nephrology as an outpatient

## 2020-07-03 NOTE — NURSING NOTE
Peripheral IV removed  AVS and summary of care faxed to  center  Report given to receiving facility  All questions answered   Discharged via stretcher with transport team

## 2020-07-03 NOTE — ASSESSMENT & PLAN NOTE
Lab Results   Component Value Date    HGBA1C 7 9 (H) 03/28/2020       Recent Labs     07/02/20  2030 07/03/20  0721 07/03/20  1052 07/03/20  1603   POCGLU 211* 152* 213* 250*       Blood Sugar Average: Last 72 hrs:  (P) 247     Continue home dose Lantus

## 2020-07-03 NOTE — SOCIAL WORK
CM placed call to [facility] to arrange for transport back to [facility]  Awaiting return call    As per Jenn at [place], [place] [facility] will  pt at 1700 via [facility]LS to transport to [facility]        Attending MD, nurse and family notified, CM  Left a message to inform pt daughter, Geronimo Ureña, of d/c plan, all are in agreement    Covid results need to be faxed to [phone]    Nurse to nurse report - [phone]    CMN completed and placed in pts chart    BRENDA spoke with Samanta Mclean supervisor at [facility], who is in agreement with d/c plan and will accept pt back to the facility

## 2020-07-03 NOTE — ASSESSMENT & PLAN NOTE
Wt Readings from Last 3 Encounters:   07/03/20 64 kg (141 lb 1 5 oz)   06/22/20 68 9 kg (152 lb)   06/19/20 66 4 kg (146 lb 6 2 oz)     EF 35-40%  Patient stable on home dose oral diuretic, medically stable for discharge  Check renal function, serum electrolytes, weight and urine output daily

## 2020-07-03 NOTE — PLAN OF CARE
Problem: Potential for Falls  Goal: Patient will remain free of falls  Description  INTERVENTIONS:  - Assess patient frequently for physical needs  -  Identify cognitive and physical deficits and behaviors that affect risk of falls  -  [place] fall precautions as indicated by assessment   - Educate patient/family on patient safety including physical limitations  - Instruct patient to call for assistance with activity based on assessment  - Modify environment to reduce risk of injury/chiar alarm roller walker  - Consider OT/PT consult to assist with strengthening/mobility   Outcome: Progressing     Problem: NEUROSENSORY - ADULT  Goal: Achieves stable or improved neurological status  Description  INTERVENTIONS  - Monitor and report changes in neurological status  - Monitor vital signs such as temperature, blood pressure, glucose, and any other labs ordered   - Initiate measures to prevent increased intracranial pressure  - Monitor for seizure activity and implement precautions if appropriate      Outcome: Progressing  Goal: Achieves maximal functionality and self care  Description  INTERVENTIONS  - Monitor swallowing and airway patency with patient fatigue and changes in neurological status  - Encourage and assist patient to increase activity and self care     - Encourage visually impaired, hearing impaired and aphasic patients to use assistive/communication devices  Outcome: Progressing     Problem: CARDIOVASCULAR - ADULT  Goal: Maintains optimal cardiac output and hemodynamic stability  Description  INTERVENTIONS:  - Monitor I/O, vital signs and rhythm  - Monitor for S/S and trends of decreased cardiac output  - Administer and titrate ordered vasoactive medications to optimize hemodynamic stability  - Assess quality of pulses, skin color and temperature  - Assess for signs of decreased coronary artery perfusion  - Instruct patient to report change in severity of symptoms  Outcome: Progressing  Goal: Absence of cardiac dysrhythmias or at baseline rhythm  Description  INTERVENTIONS:  - Continuous cardiac monitoring, vital signs, obtain 12 lead EKG if ordered  - Administer antiarrhythmic and heart rate control medications as ordered  - Monitor electrolytes and administer replacement therapy as ordered  Outcome: Progressing     Problem: RESPIRATORY - ADULT  Goal: Achieves optimal ventilation and oxygenation  Description  INTERVENTIONS:  - Assess for changes in respiratory status  - Assess for changes in mentation and behavior  - Position to facilitate oxygenation and minimize respiratory effort  - Oxygen administered by appropriate delivery if ordered  - Initiate smoking cessation education as indicated  - Encourage broncho-pulmonary hygiene including cough, deep breathe, Incentive Spirometry  - Assess the need for suctioning and aspirate as needed  - Assess and instruct to report SOB or any respiratory difficulty  - Respiratory Therapy support as indicated  Outcome: Progressing     Problem: GASTROINTESTINAL - ADULT  Goal: Minimal or absence of nausea and/or vomiting  Description  INTERVENTIONS:  - Administer IV fluids if ordered to ensure adequate hydration  - Maintain NPO status until nausea and vomiting are resolved  - Nasogastric tube if ordered  - Administer ordered antiemetic medications as needed  - Provide nonpharmacologic comfort measures as appropriate  - Advance diet as tolerated, if ordered  - Consider nutrition services referral to assist patient with adequate nutrition and appropriate food choices  Outcome: Progressing  Goal: Maintains or returns to baseline bowel function  Description  INTERVENTIONS:  - Assess bowel function  - Encourage oral fluids to ensure adequate hydration  - Administer IV fluids if ordered to ensure adequate hydration  - Administer ordered medications as needed  - Encourage mobilization and activity  - Consider nutritional services referral to assist patient with adequate nutrition and appropriate food choices  Outcome: Progressing  Goal: Maintains adequate nutritional intake  Description  INTERVENTIONS:  - Monitor percentage of each meal consumed  - Identify factors contributing to decreased intake, treat as appropriate  - Assist with meals as needed  - Monitor I&O, weight, and lab values if indicated  - Obtain nutrition services referral as needed  Outcome: Progressing     Problem: GENITOURINARY - ADULT  Goal: Maintains or returns to baseline urinary function  Description  INTERVENTIONS:  - Assess urinary function  - Encourage oral fluids to ensure adequate hydration if ordered  - Administer IV fluids as ordered to ensure adequate hydration  - Administer ordered medications as needed  - Offer frequent toileting  - Follow urinary retention protocol if ordered  Outcome: Progressing  Goal: Absence of urinary retention  Description  INTERVENTIONS:  - Assess patients ability to void and empty bladder  - Monitor I/O  - Bladder scan as needed  - Discuss with physician/AP medications to alleviate retention as needed  - Discuss catheterization for long term situations as appropriate  Outcome: Progressing     Problem: METABOLIC, FLUID AND ELECTROLYTES - ADULT  Goal: Electrolytes maintained within normal limits  Description  INTERVENTIONS:  - Monitor labs and assess patient for signs and symptoms of electrolyte imbalances  - Administer electrolyte replacement as ordered  - Monitor response to electrolyte replacements, including repeat lab results as appropriate  - Instruct patient on fluid and nutrition as appropriate  Outcome: Progressing  Goal: Fluid balance maintained  Description  INTERVENTIONS:  - Monitor labs   - Monitor I/O and WT  - Instruct patient on fluid and nutrition as appropriate  - Assess for signs & symptoms of volume excess or deficit  Outcome: Progressing  Goal: Glucose maintained within target range  Description  INTERVENTIONS:  - Monitor Blood Glucose as ordered  - Assess for signs and symptoms of hyperglycemia and hypoglycemia  - Administer ordered medications to maintain glucose within target range  - Assess nutritional intake and initiate nutrition service referral as needed  Outcome: Progressing     Problem: SKIN/TISSUE INTEGRITY - ADULT  Goal: Skin integrity remains intact  Description  INTERVENTIONS  - Identify patients at risk for skin breakdown  - Assess and monitor skin integrity  - Assess and monitor nutrition and hydration status  - Monitor labs (i e  albumin)  - Assess for incontinence   - Turn and reposition patient  - Assist with mobility/ambulation  - Relieve pressure over bony prominences  - Avoid friction and shearing  - Provide appropriate hygiene as needed including keeping skin clean and dry  - Evaluate need for skin moisturizer/barrier cream  - Collaborate with interdisciplinary team (i e  Nutrition, Rehabilitation, etc )   - Patient/family teaching  Outcome: Progressing  Goal: Oral mucous membranes remain intact  Description  INTERVENTIONS  - Assess oral mucosa and hygiene practices  - Implement preventative oral hygiene regimen  - Implement oral medicated treatments as ordered  - Initiate Nutrition services referral as needed  Outcome: Progressing     Problem: HEMATOLOGIC - ADULT  Goal: Maintains hematologic stability  Description  INTERVENTIONS  - Assess for signs and symptoms of bleeding or hemorrhage  - Monitor labs  - Administer supportive blood products/factors as ordered and appropriate  Outcome: Progressing     Problem: MUSCULOSKELETAL - ADULT  Goal: Maintain or return mobility to safest level of function  Description  INTERVENTIONS:  - Assess patient's ability to carry out ADLs; assess patient's baseline for ADL function and identify physical deficits which impact ability to perform ADLs (bathing, care of mouth/teeth, toileting, grooming, dressing, etc )  - Assess/evaluate cause of self-care deficits   - Assess range of motion  - Assess patient's mobility  - Assess patient's need for assistive devices and provide as appropriate  - Encourage maximum independence but intervene and supervise when necessary  - Involve family in performance of ADLs  - Assess for home care needs following discharge   - Consider OT consult to assist with ADL evaluation and planning for discharge  - Provide patient education as appropriate  Outcome: Progressing     Problem: Prexisting or High Potential for Compromised Skin Integrity  Goal: Skin integrity is maintained or improved  Description  INTERVENTIONS:  - Identify patients at risk for skin breakdown  - Assess and monitor skin integrity  - Assess and monitor nutrition and hydration status  - Monitor labs   - Assess for incontinence   - Turn and reposition patient  - Assist with mobility/ambulation  - Relieve pressure over bony prominences  - Avoid friction and shearing  - Provide appropriate hygiene as needed including keeping skin clean and dry  - Evaluate need for skin moisturizer/barrier cream  - Collaborate with interdisciplinary team   - Patient/family teaching  - Consider wound care consult   Outcome: Progressing

## 2020-07-03 NOTE — DISCHARGE SUMMARY
Discharge- Lenoria Standing 1935, 82 y o  female MRN: [id number]    Unit/Bed#: -01 Encounter: [id number]    Primary Care Provider: Angelica Ybarra MD   Date and time admitted to hospital: 6/26/2020  8:14 AM    * Acute respiratory failure with hypoxia [facility])  Assessment & Plan  Presents with acute respiratory failure with hypoxia requiring BiPAP to maintain SaO2 greater than 90%  Acute respiratory failure most likely multifactorial including CHF exacerbation and COPD  Adequately diuresed and transitioned to oral diuretics    Currently on 2 L of oxygen and maintaining SaO2 greater than 90%    Acute on chronic systolic CHF (congestive heart failure) ([facility])  Assessment & Plan  Wt Readings from Last 3 Encounters:   07/03/20 64 kg (141 lb 1 5 oz)   06/22/20 68 9 kg (152 lb)   06/19/20 66 4 kg (146 lb 6 2 oz)     EF 35-40%  Patient stable on home dose oral diuretic, medically stable for discharge  Check renal function, serum electrolytes, weight and urine output daily    CKD (chronic kidney disease) stage 4, GFR 15-29 ml/min ([facility])  Assessment & Plan  CKD 3, baseline creatinine 1 4-1 6    COPD with emphysema ([facility])  Assessment & Plan  Continue with bronchodilator    NANCY (acute kidney injury) ([facility] [address] )  Assessment & Plan  Creatinine around 2 0 from baseline around 1 4-1 6  Acute renal failure showing improvement after switching to oral diuretics  Continue to monitor renal function within 3-5 days post discharge and follow-up with primary care physician and Nephrology as an outpatient    A-fib [facility])  Assessment & Plan  Continue with carvedilol and low-dose Eliquis    Type 2 diabetes mellitus with stage 3 chronic kidney disease, with long-term current use of insulin [facility])  Assessment & Plan  Lab Results   Component Value Date    HGBA1C 7 9 (H) 03/28/2020     Recent Labs     07/02/20  2030 07/03/20  0721 07/03/20  1052 07/03/20  1603   POCGLU 211* 152* 213* 250*     Blood Sugar Average: Last 72 hrs:  (P) 247     Continue Immunization VISIT      Patient: Neyda Soria Date of Service: 2019   : 1969 MRN: [id number]     VACCINATION     Chief Complaint   Patient presents with   • Flu Vaccine       No current outpatient medications on file.     No current facility-administered medications for this visit.        ALLERGIES:  No Known Allergies    Immunization History   Administered Date(s) Administered   • Influenza, injectable, quadrivalent, preservative-free 2019       There were no vitals taken for this visit.    Problem List Items Addressed This Visit     None      Visit Diagnoses     Need for immunization against influenza    -  Primary    Relevant Orders    INFLUENZA QUADRIVALENT SPLIT PRES FREE 0.5 ML VACC, IM (FLULAVAL,FLUARIX,FLUZONE) (Completed)          Follow up:  It is important to follow up with your PCP for your annual preventative wellness exam.      Patient Instructions provided as documented in the AVS.    The patient indicated understanding of the diagnosis and agreed with the plan of care.          Isabelle Denney, DNP, APRN, FNP-BC           home dose Lantus    Essential hypertension  Assessment & Plan  BP stable, continue current management  Discharging Physician / Practitioner: Akanksha Woo MD  PCP: Darleen Russ MD  Admission Date:   Admission Orders (From admission, onward)     Ordered        06/26/20 1108  Inpatient Admission (expected length of stay for this patient Order details is greater than two midnights)  Once                   Discharge Date: 07/03/20    Disposition:      Other: Rehab    For Discharges to [address]:   · Not Applicable to this Patient - Not Applicable to this Patient    Reason for Admission:  Acute CHF    Discharge Diagnoses:     Please see assessment and plan section above for further details regarding discharge diagnoses  Resolved Problems  Date Reviewed: 6/30/2020          Resolved    Acute renal failure (ARF) ([address] ) 6/29/2020     Resolved by  Maria Luisa Colindres PA-C          Consultations During Hospital Stay:  IP CONSULT TO CASE MANAGEMENT  IP CONSULT TO CARDIOLOGY     Procedures Performed:   * No surgery found *      Xr Chest Portable    Result Date: 6/29/2020  Narrative: CHEST INDICATION:   shortness of breath  COMPARISON:  Chest radiograph from 6/27/2020 and chest CT from 6/26/2020  EXAM PERFORMED/VIEWS:  XR CHEST PORTABLE FINDINGS: Cardiomegaly  CABG  Left subclavian pacemaker leads in right atrium and right ventricle  New pulmonary edema  Persistent small effusions and bibasilar atelectasis  Osseous structures appear within normal limits for patient age  Impression: New pulmonary edema  Persistent small effusions and bibasilar atelectasis  Workstation performed: [id number]     Xr Chest Portable    Result Date: 6/28/2020  Narrative: CHEST INDICATION:   shortness of breath  COMPARISON:  Chest radiograph from 6/26/2020 and chest CT from 6/26/2020  EXAM PERFORMED/VIEWS:  XR CHEST PORTABLE FINDINGS: Mild cardiomegaly  CABG    Left subclavian pacemaker leads in the right atrium and right ventricle  Small effusions with bibasilar atelectasis  No pneumothorax  Osseous structures appear within normal limits for patient age  Impression: Persistent small effusions and bibasilar atelectasis  Workstation performed: [id number]     X-ray Chest 1 View Portable    Result Date: 6/26/2020  Narrative: CHEST INDICATION:   chest pain  Patient has suspected COVID-19  COMPARISON:  6/13/2020; 4/18/2013 EXAM PERFORMED/VIEWS:  XR CHEST PORTABLE FINDINGS:  Bipolar pacemaker is unchanged in position  The cardiac silhouette is enlarged  The patient is status post median sternotomy with surgical clips present Mild pulmonary vascular congestion and interstitial prominence is similar to the prior study  There are increased effusions as well as possible adjacent left lower lobe consolidation  There is no pneumothorax  Osseous structures appear within normal limits for patient age  Impression: Mild pulmonary vascular congestion with increase pleural-parenchymal density at the left lung base as well as stable small effusion on the right  Findings may be related to edema although pneumonia is possible  In the setting of clinically suspected/proven COVID-19, this plain film appearance does not contain findings that raise concern for viral pneumonia such as COVID-19, but does not rule out this diagnosis  Workstation performed: [id number]     Xr Chest Portable    Result Date: 6/13/2020  Narrative: CHEST INDICATION:   shortness of breath  COMPARISON:  6/12/2020 EXAM PERFORMED/VIEWS:  XR CHEST PORTABLE FINDINGS:  Median sternotomy wires are present  Left chest wall pacemaker is noted  Cardiomediastinal silhouette is stable  Interval improvement in pulmonary vascular congestion is noted  Stable bilateral pleural effusions are present  Osseous structures appear within normal limits for patient age  Impression: Improving pulmonary vascular congestion  Stable bilateral pleural effusions   Workstation performed: [id number]     X-ray Chest 1 View Portable    Result Date: 6/12/2020  Narrative: CHEST INDICATION:   CHF  COMPARISON:  4/18/2013 EXAM PERFORMED/VIEWS:  XR CHEST PORTABLE FINDINGS:  Monitoring leads and clips project over the chest   Left-sided chest wall pacemaker is identified  Pacemaker leads are intact  Cardiomediastinal silhouette is within normal limits status post CABG  There is moderate vascular congestion  Small bilateral pleural effusions  Sternal wires are present  Visualized osseous structures appear otherwise unremarkable for the patient's age  Impression: Moderate congestive failure with small bilateral pleural effusions  Workstation performed: [id number]     Ct Chest Without Contrast    Result Date: 6/26/2020  Narrative: CT CHEST WITHOUT IV CONTRAST INDICATION:   Cough Shortness of breath  Vicky Nguyen note reviewed, found by nursing facility staff saturating in the 70's  History of COPD  Elevated BNP COMPARISON:  Chest x-ray from same day TECHNIQUE: CT examination of the chest was performed without intravenous contrast   Axial, sagittal, and coronal 2D reformatted images were created from the source data and submitted for interpretation  Radiation dose length product (DLP) for this visit:  309 mGy-cm   This examination, like all CT scans performed in the [facility], was performed utilizing techniques to minimize radiation dose exposure, including the use of iterative reconstruction and automated exposure control  FINDINGS: LUNGS:  There is collapse of dependent portions of the right lower lobe, and streaky opacities in the left lower lobe base  Scattered areas of groundglass opacities are seen throughout the lungs  PLEURA:  Moderate right and small left pleural effusions HEART/GREAT VESSELS:  Cardiomegaly MEDIASTINUM AND RUBY:  Unremarkable   CHEST WALL AND LOWER NECK:   Left pacer VISUALIZED STRUCTURES IN THE UPPER ABDOMEN:  Heavily atherosclerotic vasculature OSSEOUS STRUCTURES:  No acute fracture or destructive osseous lesion  Impression: 1  Moderate right and small left pleural effusions 2  Scattered groundglass opacities, favor pulmonary edema  May also be infectious/inflammatory The study was marked in EPIC for immediate notification  Workstation performed: [id number]     Nm Lung Perfusion Imaging (particulate)    Result Date: 6/26/2020  Narrative: LUNG PERFUSION SCAN INDICATION: R O PE COMPARISON:  Chest radiograph  6/26/2020 TECHNIQUE:  Multiplanar perfusion imaging was next performed following the intravenous administration of 4 37 mCi Tc-99m labeled MAA  No ventilation imaging was performed as per current Covid 19 protocol  FINDINGS:  Perfusion imaging demonstrates homogeneous distribution of the radiopharmaceutical throughout both lungs  There is no significant  segmental or subsegmental defect  Impression: The probability for pulmonary embolus is likely very low  Workstation performed: [id number]      Kidney And Bladder    Result Date: 6/15/2020  Narrative: RENAL ULTRASOUND INDICATION:   Acute renal failure  COMPARISON: 7/12/2018 TECHNIQUE:   Ultrasound of the retroperitoneum was performed with a curvilinear transducer utilizing volumetric sweeps and still imaging techniques  FINDINGS: KIDNEYS: Left kidney is asymmetrically smaller in caliber  Right kidney:  9 3 cm  Left kidney:  7 5 cm  Right kidney Normal echogenicity and contour  No suspicious masses detected  No hydronephrosis  6 mm calculus within the lower pole  No perinephric fluid collections  Left kidney Allergies echotexture with atrophy  No suspicious masses detected  There is mild fullness of the left collecting system versus mild hydronephrosis, suboptimally evaluated  No shadowing calculi  Linear echogenic regions in left kidney likely represent vascular calcifications in this patient with history of diabetes  No perinephric fluid collections  URETERS: Nonvisualized   BLADDER: Normally distended  There is debris within the bladder  Bilateral ureteral jets detected  Impression: Heterogeneous echotexture of the left kidney with atrophy  Right kidney is lower limits of normal in size  Mild fullness of the left renal collecting system versus mild hydronephrosis  Findings are similar to the prior examination from 2018  Workstation performed: [id number]     Vas Lower Limb Venous Duplex Study, Complete Bilateral    Result Date: 6/17/2020  Narrative:  [facility] REPORT CLINICAL: Indications: Patient presents with bilateral lower extremity pain x 10 days  Operative History: CABG LGSV harvested Pacemaker Risk Factors The patient has history of HTN, Diabetes, HLD, CKD, CAD, A-fib, COPD and CHF  FINDINGS:  Segment  Right            Left              Impression       Impression       CFV      Normal (Patent)  Normal (Patent)     CONCLUSION: Impression: RIGHT LOWER LIMB: No evidence of acute or chronic deep vein thrombosis  No evidence of superficial thrombophlebitis noted  Doppler evaluation shows a normal response to augmentation maneuvers  Popliteal, posterior tibial and anterior tibial arterial Doppler waveforms are biphasic  LEFT LOWER LIMB: No evidence of acute or chronic deep vein thrombosis  No evidence of superficial thrombophlebitis noted  Doppler evaluation shows a normal response to augmentation maneuvers  Popliteal, posterior tibial and anterior tibial arterial Doppler waveforms are triphasic  Technical findings were given to Fabiola Aguirre RN  SIGNATURE: Electronically Signed by: Marquis Magalie MD, RPVI on 2020-06-17 02:03:01 PM       Medication Adjustments and Discharge Medications:  · Summary of Medication Adjustments made as a result of this hospitalization:  None  · Medication Dosing Tapers - Please refer to Discharge Medication List for details on any medication dosing tapers (if applicable to patient)    · Discharge Medication List: See after visit summary for reconciled discharge medications  Wound Care Recommendations:  When applicable, please see wound care section of After Visit Summary  Diet Recommendations at Discharge:  Diet -        Diet Orders   (From admission, onward)             Start     Ordered    06/29/20 1429  Diet Dysphagia/Modified Consistency; Dysphagia 1-Pureed; Thin Liquid; Consistent Carbohydrate Diet Level 1 (4 carb servings/60 grams CHO/meal), Sodium 4 GM (TATYANA)  Diet effective now     Question Answer Comment   Diet Type Dysphagia/Modified Consistency    Dysphagia/Modified Consistency Dysphagia 1-Pureed    Liquid Modifier Thin Liquid    Other Restriction(s): Consistent Carbohydrate Diet Level 1 (4 carb servings/60 grams CHO/meal)    Other Restriction(s): Sodium 4 GM (TATYANA)    RD to adjust diet per protocol? Yes        06/29/20 1429                  Incidental Findings:   · None     Test Results Pending at Discharge (will require follow up): · None         Hospital Course:     Erik Gilliland is a 82 y o  female patient who originally presented to the hospital on 6/26/2020 due to acute respiratory failure with hypoxia secondary to CHF and COPD  Patient was admitted to ICU for acute respiratory failure on BiPAP which later downgraded to medical floor  Patient adequately diuresed and showed significant improvement  Patient was placed on her home dose diuretic and observed for 1 day  On day of discharge patient is comfortably breathing on baseline 2 L of oxygen  Patient was also in the beginning being treated for COPD exacerbation however stable in that regard  Patient can follow-up with her primary care physician within 3-5 days post discharge  All other home medications to continue      Condition at Discharge: stable     Discharge Day Visit / Exam:     Subjective:  No complaints  Vitals: Blood Pressure: 153/59 (07/03/20 1634)  Pulse: 68 (07/03/20 1634)  Temperature: (!) 97 2 °F (36 2 °C) (07/03/20 0724)  Temp Source: Axillary (07/02/20 2300)  Respirations: 21 (07/03/20 0724)  Height: 5' (152 4 cm) (06/29/20 1416)  Weight - Scale: 64 kg (141 lb 1 5 oz) (07/03/20 0600)  SpO2: 99 % (07/03/20 1634)  Exam:     General appearance: alert, appears stated age and cooperative  Head: Normocephalic, without obvious abnormality, atraumatic  Lungs: clear to auscultation bilaterally  Heart: regular rate and rhythm  Abdomen: soft, non-tender, positive bowel sounds   Back: negative  Extremities: extremities atraumatic, no cyanosis or edema  Neurologic: Alert and oriented X 3, normal strength and tone  Normal symmetric reflexes  Normal coordination and gait      Discharge instructions/Information to patient and family:   See after visit summary section titled Discharge Instructions for information provided to patient and family  Planned Readmission:  No      Discharge Statement:  I spent 35 minutes discharging the patient  This time was spent on the day of discharge  I had direct contact with the patient on the day of discharge  Greater than 50% of the total time was spent examining patient, answering all patient questions, arranging and discussing plan of care with patient as well as directly providing post-discharge instructions  Additional time then spent on discharge activities      ** Please Note: This note has been constructed using a voice recognition system **

## 2020-07-03 NOTE — RESPIRATORY THERAPY NOTE
RT Protocol Note  Daniel  82 y o  female MRN: [id number]  Unit/Bed#: -01 Encounter: [id number]    Assessment    Principal Problem:    Acute respiratory failure with hypoxia ([facility] )  Active Problems:    Essential hypertension    Acute on chronic systolic CHF (congestive heart failure) ([facility])    Other hyperlipidemia    COPD with emphysema ([facility])    CKD (chronic kidney disease) stage 4, GFR 15-29 ml/min ([facility])    Esophageal reflux    Type 2 diabetes mellitus with stage 3 chronic kidney disease, with long-term current use of insulin ([facility])    A-fib ([facility])    Anemia    NANCY (acute kidney injury) ([facility] [address] )      Home Pulmonary Medications:    Home Devices/Therapy: Home O2    Past Medical History:   Diagnosis Date    NANCY (acute kidney injury) ([facility] )     Atrial fibrillation ([facility] )     CHF (congestive heart failure) ([facility])     COPD (chronic obstructive pulmonary disease) ([facility] )     Diabetes mellitus ([facility] )     Trigger finger of thumb     last assessed: 13     Social History     Socioeconomic History    Marital status:       Spouse name: None    Number of children: None    Years of education: None    Highest education level: None   Occupational History    None   Social Needs    Financial resource strain: None    Food insecurity:     Worry: Never true     Inability: Never true    Transportation needs:     Medical: No     Non-medical: No   Tobacco Use    Smoking status: Former Smoker     Last attempt to quit: 2011     Years since quittin 0    Smokeless tobacco: Never Used    Tobacco comment: Former smoker per allscripts   Substance and Sexual Activity    Alcohol use: Not Currently    Drug use: No    Sexual activity: None   Lifestyle    Physical activity:     Days per week: None     Minutes per session: None    Stress: None   Relationships    Social connections:     Talks on phone: None     Gets together: None     Attends Zoroastrian service: None     Active member of club or organization: None     Attends meetings of clubs or organizations: None     Relationship status: None    Intimate partner violence:     Fear of current or ex partner: None     Emotionally abused: None     Physically abused: None     Forced sexual activity: None   Other Topics Concern    None   Social History Narrative    None       Subjective    Subjective Data: No resp c/o  Pt states she is breathing better  Objective    Physical Exam:        Vitals:  Blood pressure 155/60, pulse 69, temperature (!) 97 2 °F (36 2 °C), resp  rate 21, height 5' (1 524 m), weight 64 kg (141 lb 1 5 oz), SpO2 100 %  Results from last 7 days   Lab Units 06/29/20  0828 06/26/20  0913   PH ART  7 373 7 334*   PCO2 ART mm Hg 46 7* 50 0*   PO2 ART mm Hg 46 7* 121 5   HCO3 ART mmol/L 26 6 26 0   BASE EXC ART mmol/L 1 0 -0 2   O2 CONTENT ART mL/dL 11 3* 14 0*   O2 HGB, ARTERIAL % 80 7* 97 3*   ABG SOURCE  Radial, Right Radial, Left   GARDENIA TEST   --  Yes       Imaging and other studies: I have personally reviewed pertinent reports  O2 Device: 4L nasal cannula     Plan    Respiratory Plan: Mild Distress pathway  Airway Clearance Plan: Incentive Spirometer     Resp Comments: Pt taken off Bipap for the day   Placed on home O2 of 2L

## 2020-07-03 NOTE — ASSESSMENT & PLAN NOTE
Presents with acute respiratory failure with hypoxia requiring BiPAP to maintain SaO2 greater than 90%  Acute respiratory failure most likely multifactorial including CHF exacerbation and COPD  Adequately diuresed and transitioned to oral diuretics    Currently on 2 L of oxygen and maintaining SaO2 greater than 90%

## 2020-07-06 ENCOUNTER — TRANSITIONAL CARE MANAGEMENT (OUTPATIENT)
Dept: FAMILY MEDICINE CLINIC | Facility: CLINIC | Age: 85
End: 2020-07-06

## 2020-07-25 ENCOUNTER — HOSPITAL ENCOUNTER (INPATIENT)
Facility: HOSPITAL | Age: 85
LOS: 11 days | DRG: 291 | End: 2020-08-05
Attending: EMERGENCY MEDICINE | Admitting: STUDENT IN AN ORGANIZED HEALTH CARE EDUCATION/TRAINING PROGRAM
Payer: MEDICARE

## 2020-07-25 ENCOUNTER — APPOINTMENT (EMERGENCY)
Dept: RADIOLOGY | Facility: HOSPITAL | Age: 85
DRG: 291 | End: 2020-07-25
Payer: MEDICARE

## 2020-07-25 DIAGNOSIS — U07.1 COVID-19 VIRUS INFECTION: ICD-10-CM

## 2020-07-25 DIAGNOSIS — R09.02 HYPOXIA: Primary | ICD-10-CM

## 2020-07-25 DIAGNOSIS — Z79.4 TYPE 2 DIABETES MELLITUS WITH HYPERGLYCEMIA, WITH LONG-TERM CURRENT USE OF INSULIN (HCC): ICD-10-CM

## 2020-07-25 DIAGNOSIS — I50.23 ACUTE ON CHRONIC SYSTOLIC CHF (CONGESTIVE HEART FAILURE) (HCC): ICD-10-CM

## 2020-07-25 DIAGNOSIS — N17.9 AKI (ACUTE KIDNEY INJURY) (HCC): ICD-10-CM

## 2020-07-25 DIAGNOSIS — N39.0 UTI (URINARY TRACT INFECTION): ICD-10-CM

## 2020-07-25 DIAGNOSIS — I50.43 ACUTE ON CHRONIC COMBINED SYSTOLIC AND DIASTOLIC CHF (CONGESTIVE HEART FAILURE) (HCC): ICD-10-CM

## 2020-07-25 DIAGNOSIS — I20.8 ANGINA AT REST (HCC): ICD-10-CM

## 2020-07-25 DIAGNOSIS — E11.65 TYPE 2 DIABETES MELLITUS WITH HYPERGLYCEMIA, WITH LONG-TERM CURRENT USE OF INSULIN (HCC): ICD-10-CM

## 2020-07-25 LAB
ALBUMIN SERPL BCP-MCNC: 2.6 G/DL (ref 3.5–5)
ALP SERPL-CCNC: 113 U/L (ref 46–116)
ALT SERPL W P-5'-P-CCNC: 13 U/L (ref 12–78)
ANION GAP SERPL CALCULATED.3IONS-SCNC: 9 MMOL/L (ref 4–13)
APTT PPP: 27 SECONDS (ref 23–37)
AST SERPL W P-5'-P-CCNC: 13 U/L (ref 5–45)
BACTERIA UR QL AUTO: ABNORMAL /HPF
BASE EXCESS BLDA CALC-SCNC: 0.6 MMOL/L
BASOPHILS # BLD AUTO: 0.03 THOUSANDS/ΜL (ref 0–0.1)
BASOPHILS NFR BLD AUTO: 0 % (ref 0–1)
BILIRUB SERPL-MCNC: 0.13 MG/DL (ref 0.2–1)
BILIRUB UR QL STRIP: NEGATIVE
BUN SERPL-MCNC: 37 MG/DL (ref 5–25)
CALCIUM SERPL-MCNC: 8.6 MG/DL (ref 8.3–10.1)
CHLORIDE SERPL-SCNC: 104 MMOL/L (ref 100–108)
CLARITY UR: CLEAR
CO2 SERPL-SCNC: 26 MMOL/L (ref 21–32)
COLOR UR: YELLOW
CREAT SERPL-MCNC: 2.43 MG/DL (ref 0.6–1.3)
EOSINOPHIL # BLD AUTO: 0.11 THOUSAND/ΜL (ref 0–0.61)
EOSINOPHIL NFR BLD AUTO: 2 % (ref 0–6)
ERYTHROCYTE [DISTWIDTH] IN BLOOD BY AUTOMATED COUNT: 16.4 % (ref 11.6–15.1)
GFR SERPL CREATININE-BSD FRML MDRD: 18 ML/MIN/1.73SQ M
GLUCOSE SERPL-MCNC: 221 MG/DL (ref 65–140)
GLUCOSE UR STRIP-MCNC: NEGATIVE MG/DL
HCO3 BLDA-SCNC: 25.8 MMOL/L (ref 22–28)
HCT VFR BLD AUTO: 33 % (ref 34.8–46.1)
HGB BLD-MCNC: 9.6 G/DL (ref 11.5–15.4)
HGB UR QL STRIP.AUTO: NEGATIVE
HYALINE CASTS #/AREA URNS LPF: ABNORMAL /LPF
IMM GRANULOCYTES # BLD AUTO: 0.05 THOUSAND/UL (ref 0–0.2)
IMM GRANULOCYTES NFR BLD AUTO: 1 % (ref 0–2)
INR PPP: 1 (ref 0.84–1.19)
KETONES UR STRIP-MCNC: NEGATIVE MG/DL
LACTATE SERPL-SCNC: 1.4 MMOL/L (ref 0.5–2)
LEUKOCYTE ESTERASE UR QL STRIP: ABNORMAL
LYMPHOCYTES # BLD AUTO: 1.54 THOUSANDS/ΜL (ref 0.6–4.47)
LYMPHOCYTES NFR BLD AUTO: 21 % (ref 14–44)
MCH RBC QN AUTO: 28.7 PG (ref 26.8–34.3)
MCHC RBC AUTO-ENTMCNC: 29.1 G/DL (ref 31.4–37.4)
MCV RBC AUTO: 99 FL (ref 82–98)
MONOCYTES # BLD AUTO: 1.06 THOUSAND/ΜL (ref 0.17–1.22)
MONOCYTES NFR BLD AUTO: 14 % (ref 4–12)
NEUTROPHILS # BLD AUTO: 4.66 THOUSANDS/ΜL (ref 1.85–7.62)
NEUTS SEG NFR BLD AUTO: 62 % (ref 43–75)
NITRITE UR QL STRIP: NEGATIVE
NON-SQ EPI CELLS URNS QL MICRO: ABNORMAL /HPF
NRBC BLD AUTO-RTO: 0 /100 WBCS
NT-PROBNP SERPL-MCNC: ABNORMAL PG/ML
O2 CT BLDA-SCNC: 14 ML/DL (ref 16–23)
OXYHGB MFR BLDA: 97.8 % (ref 94–97)
PCO2 BLDA: 43.8 MM HG (ref 36–44)
PH BLDA: 7.39 [PH] (ref 7.35–7.45)
PH UR STRIP.AUTO: 5.5 [PH]
PLATELET # BLD AUTO: 176 THOUSANDS/UL (ref 149–390)
PMV BLD AUTO: 12.3 FL (ref 8.9–12.7)
PO2 BLDA: 101.4 MM HG (ref 75–129)
POTASSIUM SERPL-SCNC: 5.1 MMOL/L (ref 3.5–5.3)
PROT SERPL-MCNC: 6.7 G/DL (ref 6.4–8.2)
PROT UR STRIP-MCNC: NEGATIVE MG/DL
PROTHROMBIN TIME: 13.2 SECONDS (ref 11.6–14.5)
RBC # BLD AUTO: 3.35 MILLION/UL (ref 3.81–5.12)
RBC #/AREA URNS AUTO: ABNORMAL /HPF
SARS-COV-2 RNA RESP QL NAA+PROBE: NEGATIVE
SODIUM SERPL-SCNC: 139 MMOL/L (ref 136–145)
SP GR UR STRIP.AUTO: 1.02 (ref 1–1.03)
SPECIMEN SOURCE: ABNORMAL
TROPONIN I SERPL-MCNC: 0.05 NG/ML
UROBILINOGEN UR QL STRIP.AUTO: 0.2 E.U./DL
WBC # BLD AUTO: 7.45 THOUSAND/UL (ref 4.31–10.16)
WBC #/AREA URNS AUTO: ABNORMAL /HPF

## 2020-07-25 PROCEDURE — 84145 PROCALCITONIN (PCT): CPT | Performed by: EMERGENCY MEDICINE

## 2020-07-25 PROCEDURE — 87086 URINE CULTURE/COLONY COUNT: CPT | Performed by: EMERGENCY MEDICINE

## 2020-07-25 PROCEDURE — 96374 THER/PROPH/DIAG INJ IV PUSH: CPT

## 2020-07-25 PROCEDURE — 94640 AIRWAY INHALATION TREATMENT: CPT

## 2020-07-25 PROCEDURE — 83605 ASSAY OF LACTIC ACID: CPT | Performed by: EMERGENCY MEDICINE

## 2020-07-25 PROCEDURE — 93005 ELECTROCARDIOGRAM TRACING: CPT

## 2020-07-25 PROCEDURE — 85610 PROTHROMBIN TIME: CPT | Performed by: EMERGENCY MEDICINE

## 2020-07-25 PROCEDURE — 99223 1ST HOSP IP/OBS HIGH 75: CPT | Performed by: NURSE PRACTITIONER

## 2020-07-25 PROCEDURE — 83880 ASSAY OF NATRIURETIC PEPTIDE: CPT | Performed by: EMERGENCY MEDICINE

## 2020-07-25 PROCEDURE — 99285 EMERGENCY DEPT VISIT HI MDM: CPT

## 2020-07-25 PROCEDURE — 85730 THROMBOPLASTIN TIME PARTIAL: CPT | Performed by: EMERGENCY MEDICINE

## 2020-07-25 PROCEDURE — 82805 BLOOD GASES W/O2 SATURATION: CPT | Performed by: EMERGENCY MEDICINE

## 2020-07-25 PROCEDURE — 87186 SC STD MICRODIL/AGAR DIL: CPT | Performed by: EMERGENCY MEDICINE

## 2020-07-25 PROCEDURE — 87040 BLOOD CULTURE FOR BACTERIA: CPT | Performed by: EMERGENCY MEDICINE

## 2020-07-25 PROCEDURE — 85025 COMPLETE CBC W/AUTO DIFF WBC: CPT | Performed by: EMERGENCY MEDICINE

## 2020-07-25 PROCEDURE — 81001 URINALYSIS AUTO W/SCOPE: CPT | Performed by: EMERGENCY MEDICINE

## 2020-07-25 PROCEDURE — 36415 COLL VENOUS BLD VENIPUNCTURE: CPT | Performed by: EMERGENCY MEDICINE

## 2020-07-25 PROCEDURE — 71045 X-RAY EXAM CHEST 1 VIEW: CPT

## 2020-07-25 PROCEDURE — 87635 SARS-COV-2 COVID-19 AMP PRB: CPT | Performed by: EMERGENCY MEDICINE

## 2020-07-25 PROCEDURE — 99285 EMERGENCY DEPT VISIT HI MDM: CPT | Performed by: EMERGENCY MEDICINE

## 2020-07-25 PROCEDURE — 80053 COMPREHEN METABOLIC PANEL: CPT | Performed by: EMERGENCY MEDICINE

## 2020-07-25 PROCEDURE — 87077 CULTURE AEROBIC IDENTIFY: CPT | Performed by: EMERGENCY MEDICINE

## 2020-07-25 PROCEDURE — 84484 ASSAY OF TROPONIN QUANT: CPT | Performed by: EMERGENCY MEDICINE

## 2020-07-25 PROCEDURE — 94760 N-INVAS EAR/PLS OXIMETRY 1: CPT

## 2020-07-25 PROCEDURE — 94002 VENT MGMT INPAT INIT DAY: CPT

## 2020-07-25 RX ORDER — TORSEMIDE 20 MG/1
40 TABLET ORAL DAILY
COMMUNITY
End: 2020-08-05 | Stop reason: HOSPADM

## 2020-07-25 RX ORDER — CEFTRIAXONE 1 G/50ML
1000 INJECTION, SOLUTION INTRAVENOUS ONCE
Status: COMPLETED | OUTPATIENT
Start: 2020-07-25 | End: 2020-07-25

## 2020-07-25 RX ORDER — BISACODYL 10 MG
10 SUPPOSITORY, RECTAL RECTAL DAILY
COMMUNITY
End: 2020-08-05 | Stop reason: HOSPADM

## 2020-07-25 RX ORDER — ACETAMINOPHEN 325 MG/1
650 TABLET ORAL EVERY 4 HOURS PRN
COMMUNITY
End: 2020-08-05 | Stop reason: HOSPADM

## 2020-07-25 RX ORDER — FUROSEMIDE 10 MG/ML
40 INJECTION INTRAMUSCULAR; INTRAVENOUS ONCE
Status: COMPLETED | OUTPATIENT
Start: 2020-07-25 | End: 2020-07-25

## 2020-07-25 RX ORDER — DEXAMETHASONE SODIUM PHOSPHATE 10 MG/ML
10 INJECTION, SOLUTION INTRAMUSCULAR; INTRAVENOUS ONCE
Status: COMPLETED | OUTPATIENT
Start: 2020-07-25 | End: 2020-07-25

## 2020-07-25 RX ORDER — IPRATROPIUM BROMIDE AND ALBUTEROL SULFATE 2.5; .5 MG/3ML; MG/3ML
3 SOLUTION RESPIRATORY (INHALATION)
Status: DISCONTINUED | OUTPATIENT
Start: 2020-07-25 | End: 2020-07-26

## 2020-07-25 RX ORDER — POTASSIUM CHLORIDE 600 MG/1
10 TABLET, FILM COATED, EXTENDED RELEASE ORAL DAILY
COMMUNITY
End: 2020-08-05 | Stop reason: HOSPADM

## 2020-07-25 RX ADMIN — DEXAMETHASONE SODIUM PHOSPHATE 10 MG: 10 INJECTION, SOLUTION INTRAMUSCULAR; INTRAVENOUS at 20:14

## 2020-07-25 RX ADMIN — NITROGLYCERIN 1 INCH: 20 OINTMENT TOPICAL at 19:54

## 2020-07-25 RX ADMIN — IPRATROPIUM BROMIDE AND ALBUTEROL SULFATE 3 ML: 2.5; .5 SOLUTION RESPIRATORY (INHALATION) at 19:54

## 2020-07-25 RX ADMIN — CEFTRIAXONE 1000 MG: 1 INJECTION, SOLUTION INTRAVENOUS at 21:58

## 2020-07-25 RX ADMIN — FUROSEMIDE 40 MG: 10 INJECTION, SOLUTION INTRAMUSCULAR; INTRAVENOUS at 23:00

## 2020-07-25 NOTE — ED PROVIDER NOTES
History  Chief Complaint   Patient presents with    Shortness of Breath     pt  increased SOB, per nursing home staff pt  had a 9 lb weight gain over the past 2 days, increased LLE edema, crackles, increased work of breathing, moist productive cough of white sputum, given 40 mg  lasix IM and an extra 40 mg of torsemide PO today around 1815, per Dr Stephenie Burns family wanted pt  sent to ER for eval     63-year-old female with past medical history of COPD, CHF presents to ED with shortness of breath  Patient is a resident of nursing home, and has had 9 lb of weight gain, bilateral pedal edema, and dyspnea  Pre-hospital at nursing home, patient received Lasix and was sent in for an evaluation  Patient was placed on non-rebreather by pre-hospital personnel  Shortness of Breath   Severity:  Moderate  Onset quality:  Sudden  Timing:  Constant  Progression:  Worsening  Chronicity:  New  Context: not URI    Relieved by:  None tried  Worsened by:  Nothing  Ineffective treatments:  None tried  Associated symptoms: cough and wheezing    Associated symptoms: no abdominal pain, no chest pain and no fever        Prior to Admission Medications   Prescriptions Last Dose Informant Patient Reported? Taking?    ELIQUIS 2 5 MG 7/25/2020 at Unknown time  Yes Yes   Sig: Take 2 5 mg by mouth 2 (two) times a day   Ferrous Fumarate 324 (106 Fe) MG TABS 7/25/2020 at Unknown time Self Yes Yes   Sig: Take 1 tablet by mouth daily   Insulin Pen Needle (PEN NEEDLES) 32G X 4 MM MISC 7/25/2020 at Unknown time  No Yes   Sig: by Does not apply route 3 (three) times a day   ONETOUCH DELICA LANCETS 35A MISC 7/25/2020 at Unknown time  No Yes   Sig: Inject under the skin 4 (four) times a day   acetaminophen (TYLENOL) 325 mg tablet 7/25/2020 at Unknown time  Yes Yes   Sig: Take 650 mg by mouth every 4 (four) hours as needed for mild pain   albuterol (PROVENTIL HFA,VENTOLIN HFA) 90 mcg/act inhaler 7/25/2020 at Unknown time  Yes Yes   Sig: Inhale 2 puffs every 4 (four) hours as needed   amLODIPine (NORVASC) 5 mg tablet 2020 at Unknown time  No Yes   Sig: Take 1 tablet (5 mg total) by mouth daily   bisacodyl (DULCOLAX) 10 mg suppository Unknown at Unknown time  Yes No   Sig: Insert 10 mg into the rectum daily   carvedilol (COREG) 3 125 mg tablet 2020 at Unknown time  No Yes   Sig: Take 0 5 tablets (1 5625 mg total) by mouth 2 (two) times a day   Patient taking differently: Take 3 125 mg by mouth 2 (two) times a day    desloratadine (Clarinex) 5 MG tablet 2020 at Unknown time  No Yes   Sig: Take 1 tablet (5 mg total) by mouth daily as needed (Seasonal allergy)   dextromethorphan-guaiFENesin (ROBITUSSIN DM)  mg/5 mL syrup Past Week at Unknown time  No Yes   Sig: Take 10 mL by mouth every 4 (four) hours as needed for cough   diclofenac sodium (VOLTAREN) 1 % Unknown at Unknown time  Yes No   Sig: APPLY AS DIRECTED 4 TIMES A DAY AS NEEDED FOR PAIN:   docusate sodium (COLACE) 100 mg capsule 2020 at Unknown time Self Yes Yes   Sig: Take 100 mg by mouth 2 (two) times a day   fluticasone (FLONASE) 50 mcg/act nasal spray 2020 at Unknown time  No Yes   Si sprays into each nostril daily   fluticasone-salmeterol (ADVAIR, WIXELA) 250-50 mcg/dose inhaler 2020 at Unknown time  No Yes   Sig: Inhale 1 puff every 12 (twelve) hours Rinse mouth after use  furosemide (LASIX) 20 mg tablet Not Taking at Unknown time  No No   Sig: Take 2 tablets (40 mg total) by mouth daily   Patient not taking: Reported on 2020   glucose blood test strip 2020 at Unknown time  No Yes   Sig: Use as instructed   insulin glargine (Lantus SoloStar) 100 units/mL injection pen Not Taking at Unknown time  No No   Sig: Inject 10 Units under the skin daily at bedtime   Patient not taking: Reported on 2020   insulin regular (NOVOLIN R) 100 units/mL injection 2020 at Unknown time  No Yes   Sig: Follow sliding scale    200-250, 2 units  251-300, 4 units  301-350, 6 units  351-400, 8 units   ipratropium-albuterol (DUO-NEB) 0 5-2 5 mg/3 mL nebulizer solution 7/25/2020 at Unknown time Self Yes Yes   Sig: Inhale 1 each every 4 (four) hours as needed   lisinopril (ZESTRIL) 2 5 mg tablet 7/25/2020 at Unknown time  Yes Yes   Sig: Take 2 5 mg by mouth daily   magnesium hydroxide (MILK OF MAGNESIA) 400 mg/5 mL oral suspension Unknown at Unknown time  Yes No   Sig: Take 5 mL by mouth daily as needed for constipation   nitroglycerin (NITROSTAT) 0 4 mg SL tablet Unknown at Unknown time  Yes No   Sig: PLACE 1 TABLET UNDER THE TONGUE AS NEEDED FOR CHEST PAIN MAY REPE     (REFER TO PRESCRIPTION NOTES)     pantoprazole (PROTONIX) 40 mg tablet 7/25/2020 at Unknown time  No Yes   Sig: TAKE 1 TABLET DAILY   potassium chloride (KLOR-CON) 8 MEQ tablet 7/25/2020 at Unknown time  Yes Yes   Sig: Take 10 mEq by mouth daily   rosuvastatin (CRESTOR) 10 MG tablet 7/25/2020 at Unknown time  No Yes   Sig: Take 1 tablet (10 mg total) by mouth daily at bedtime   selenium sulfide (SELSUN) 2 5 % shampoo Unknown at Unknown time Self No No   Sig: Apply topically daily as needed for dandruff   sucralfate (CARAFATE) 1 g/10 mL suspension 7/25/2020 at Unknown time  Yes Yes   Sig: Take 1 g by mouth daily at bedtime   torsemide (DEMADEX) 20 mg tablet 7/25/2020 at Unknown time  Yes Yes   Sig: Take 40 mg by mouth daily      Facility-Administered Medications: None       Past Medical History:   Diagnosis Date    [facility] [address] [facility]     Atrial fibrillation [facility] [address] [facility]     CHF [facility]congestive heart failure) [facility] [address] [facility]     COPD (chronic obstructive pulmonary disease) [facility] [address] [facility]     Diabetes mellitus [facility] [address] [facility]     Trigger finger of thumb     last assessed: 07/18/13       Past Surgical History:   Procedure Laterality Date    APPENDECTOMY      CARDIAC PACEMAKER PLACEMENT      CARDIAC PACEMAKER PLACEMENT      CHOLECYSTECTOMY      COLONOSCOPY      Complete    CORONARY ANGIOPLASTY WITH STENT PLACEMENT      CORONARY ARTERY BYPASS GRAFT      CORONARY ARTERY BYPASS GRAFT      ESOPHAGOGASTRODUODENOSCOPY      Diagnostic    HERNIA REPAIR      TOTAL ABDOMINAL HYSTERECTOMY      with removal of both ovaries       Family History   Problem Relation Age of Onset    Lung disease Mother         black     Breast cancer Mother     Lung disease Father         black      I have reviewed and agree with the history as documented  E-Cigarette/Vaping    E-Cigarette Use Former User     Quit Date 11      E-Cigarette/Vaping Substances    Nicotine No     THC No     CBD No     Flavoring No      Social History     Tobacco Use    Smoking status: Former Smoker     Last attempt to quit: 2011     Years since quittin 1    Smokeless tobacco: Never Used    Tobacco comment: Former smoker per allscripts   Substance Use Topics    Alcohol use: Not Currently    Drug use: No       Review of Systems   Constitutional: Positive for unexpected weight change  Negative for fatigue and fever  Respiratory: Positive for cough, shortness of breath and wheezing  Negative for chest tightness  Cardiovascular: Positive for leg swelling  Negative for chest pain and palpitations  Gastrointestinal: Negative for abdominal pain  All other systems reviewed and are negative  Physical Exam  Physical Exam   Constitutional: She is oriented to person, place, and time  She appears well-developed and well-nourished  She appears distressed  HENT:   Head: Normocephalic and atraumatic  Mouth/Throat: Oropharynx is clear and moist    Eyes: Pupils are equal, round, and reactive to light  EOM are normal    Neck: Normal range of motion  Neck supple  Cardiovascular: Normal rate, regular rhythm, normal heart sounds and intact distal pulses  No murmur heard  Pulses:       Carotid pulses are on the right side with bruit, and on the left side with bruit  Pulmonary/Chest: Accessory muscle usage present  Tachypnea noted   She is in respiratory distress  She has wheezes in the right upper field and the left upper field  She has rales in the right lower field and the left lower field  Abdominal: Soft  Bowel sounds are normal  She exhibits no distension  There is no tenderness  Musculoskeletal: Normal range of motion  Right lower leg: She exhibits edema  She exhibits no tenderness  Left lower leg: She exhibits edema  She exhibits no tenderness  Neurological: She is alert and oriented to person, place, and time  Skin: Skin is warm and dry  Capillary refill takes less than 2 seconds  No rash noted  No erythema  Psychiatric: She has a normal mood and affect  Her behavior is normal    Nursing note and vitals reviewed        Vital Signs  ED Triage Vitals   Temperature Pulse Respirations Blood Pressure SpO2   07/25/20 1936 07/25/20 1936 07/25/20 1936 07/25/20 2006 07/25/20 1936   97 7 °F (36 5 °C) 70 22 142/84 100 %      Temp Source Heart Rate Source Patient Position - Orthostatic VS BP Location FiO2 (%)   07/25/20 1936 07/25/20 1936 07/25/20 1936 07/25/20 1936 --   Temporal Monitor Sitting Right arm       Pain Score       07/25/20 2357       No Pain           Vitals:    07/26/20 0800 07/26/20 1157 07/26/20 1650 07/26/20 1910   BP: 117/58 134/52 118/51 109/55   Pulse: 68 67 62 64   Patient Position - Orthostatic VS:  Lying Sitting Sitting         Visual Acuity  Visual Acuity      Most Recent Value   L Pupil Size (mm)  3   R Pupil Size (mm)  3   L Pupil Shape  Round   R Pupil Shape  Round          ED Medications  Medications   amLODIPine (NORVASC) tablet 5 mg (5 mg Oral Given 7/26/20 0848)   carvedilol (COREG) tablet 3 125 mg (3 125 mg Oral Given 7/26/20 1735)   docusate sodium (COLACE) capsule 100 mg (100 mg Oral Given 7/26/20 1736)   apixaban (ELIQUIS) tablet 2 5 mg (2 5 mg Oral Given 7/26/20 1735)   ferrous gluconate (FERGON) tablet 324 mg (324 mg Oral Given 7/26/20 0847)   fluticasone-vilanterol (BREO ELLIPTA) 100-25 mcg/inh inhaler 1 puff (1 puff Inhalation Given 7/26/20 0848)   lisinopril (ZESTRIL) tablet 2 5 mg (2 5 mg Oral Given 7/26/20 0847)   pravastatin (PRAVACHOL) tablet 80 mg (80 mg Oral Given 7/26/20 1736)   pantoprazole (PROTONIX) EC tablet 40 mg (40 mg Oral Given 7/26/20 0847)   furosemide (LASIX) injection 40 mg (40 mg Intravenous Given 7/26/20 1735)   insulin lispro (HumaLOG) 100 units/mL subcutaneous injection 1-6 Units (4 Units Subcutaneous Given 7/26/20 1736)   insulin lispro (HumaLOG) 100 units/mL subcutaneous injection 1-6 Units (3 Units Subcutaneous Given 7/26/20 0119)   acetaminophen (TYLENOL) tablet 650 mg (has no administration in time range)   cefTRIAXone (ROCEPHIN) IVPB (premix) 1,000 mg 50 mL (has no administration in time range)   ipratropium-albuterol (DUO-NEB) 0 5-2 5 mg/3 mL inhalation solution 3 mL (has no administration in time range)   levalbuterol (XOPENEX) inhalation solution 1 25 mg (1 25 mg Nebulization Given 7/26/20 1328)   ipratropium (ATROVENT) 0 02 % inhalation solution 0 5 mg (0 5 mg Nebulization Given 7/26/20 1328)   insulin glargine (LANTUS) subcutaneous injection 10 Units 0 1 mL (has no administration in time range)   dexamethasone (PF) (DECADRON) injection 10 mg (10 mg Intravenous Given 7/25/20 2014)   nitroglycerin (NITRO-BID) 2 % TD ointment 1 inch (1 inch Topical Given 7/25/20 1954)   cefTRIAXone (ROCEPHIN) IVPB (premix) 1,000 mg 50 mL (0 mg Intravenous Stopped 7/25/20 2226)   furosemide (LASIX) injection 40 mg (40 mg Intravenous Given 7/25/20 2300)       Diagnostic Studies  Results Reviewed     Procedure Component Value Units Date/Time    Procalcitonin [[id number]]  (Normal) Collected:  07/25/20 2121    Lab Status:  Final result Specimen:  Blood from Hand, Right Updated:  07/26/20 1625     Procalcitonin 0 06 ng/ml     Blood culture #1 [[id number]] Collected:  07/25/20 2023    Lab Status:  Preliminary result Specimen:  Blood from Arm, Right Updated:  07/26/20 1601     Blood Culture Received in Microbiology Lab  Culture in Progress  Blood culture #2 [[id number]] Collected:  07/25/20 1959    Lab Status:  Preliminary result Specimen:  Blood from Hand, Right Updated:  07/26/20 1601     Blood Culture Received in Microbiology Lab  Culture in Progress  Novel Coronavirus [facility] [[id number]]  (Normal) Collected:  07/25/20 2037    Lab Status:  Final result Specimen:  Nares from Nose Updated:  07/25/20 2143     SARS-CoV-2 Negative    Narrative: The specimen collection materials, transport medium, and/or testing methodology utilized in the production of these test results have been proven to be reliable in a limited validation with an abbreviated program under the Emergency Utilization Authorization provided by the FDA  Testing reported as "Presumptive positive" will be confirmed with secondary testing with a reference laboratory to ensure result accuracy  Clinical caution and judgement should be used with the interpretation of these results with consideration of the clinical impression and other laboratory testing  Testing reported as "Positive" or "Negative" has been proven to be accurate according to standard laboratory validation requirements  All testing is performed with control materials showing appropriate reactivity at standard intervals  Urine Microscopic [[id number]]  (Abnormal) Collected:  07/25/20 2115    Lab Status:  Final result Specimen:  Urine, Straight Cath Updated:  07/25/20 2137     RBC, UA 0-1 /hpf      WBC, UA 10-20 /hpf      Epithelial Cells Occasional /hpf      Bacteria, UA Innumerable /hpf      Hyaline Casts, UA 1-2 /lpf     Urine culture [[id number]] Collected:  07/25/20 2115    Lab Status:   In process Specimen:  Urine, Straight Cath Updated:  07/25/20 2137    UA w Reflex to Microscopic w Reflex to Culture [[id number]]  (Abnormal) Collected:  07/25/20 2115    Lab Status:  Final result Specimen:  Urine, Straight Cath Updated:  07/25/20 2126     Color, UA Yellow     Clarity, UA Clear     Specific [place], UA 1 020     pH, UA 5 5     Leukocytes, UA Small     Nitrite, UA Negative     Protein, UA Negative mg/dl      Glucose, UA Negative mg/dl      Ketones, UA Negative mg/dl      Urobilinogen, UA 0 2 E U /dl      Bilirubin, UA Negative     Blood, UA Negative    Lactic acid [[id number]]  (Normal) Collected:  07/25/20 2023    Lab Status:  Final result Specimen:  Blood from Arm, Right Updated:  07/25/20 2049     LACTIC ACID 1 4 mmol/L     Narrative:       Result may be elevated if tourniquet was used during collection      Protime-INR [[id number]]  (Normal) Collected:  07/25/20 2023    Lab Status:  Final result Specimen:  Blood from Arm, Right Updated:  07/25/20 2040     Protime 13 2 seconds      INR 1 00    APTT [[id number]]  (Normal) Collected:  07/25/20 2023    Lab Status:  Final result Specimen:  Blood from Arm, Right Updated:  07/25/20 2040     PTT 27 seconds     Comprehensive metabolic panel [[id number]]  (Abnormal) Collected:  07/25/20 1959    Lab Status:  Final result Specimen:  Blood from Hand, Right Updated:  07/25/20 2028     Sodium 139 mmol/L      Potassium 5 1 mmol/L      Chloride 104 mmol/L      CO2 26 mmol/L      ANION GAP 9 mmol/L      BUN 37 mg/dL      Creatinine 2 43 mg/dL      Glucose 221 mg/dL      Calcium 8 6 mg/dL      AST 13 U/L      ALT 13 U/L      Alkaline Phosphatase 113 U/L      Total Protein 6 7 g/dL      Albumin 2 6 g/dL      Total Bilirubin 0 13 mg/dL      eGFR 18 ml/min/1 73sq m     Narrative:       [place] guidelines for Chronic Kidney Disease (CKD):     Stage 1 with normal or high GFR (GFR > 90 mL/min/1 73 square meters)    Stage 2 Mild CKD (GFR = 60-89 mL/min/1 73 square meters)    Stage 3A Moderate CKD (GFR = 45-59 mL/min/1 73 square meters)    Stage 3B Moderate CKD (GFR = 30-44 mL/min/1 73 square meters)    Stage 4 Severe CKD (GFR = 15-29 mL/min/1 73 square meters)    Stage 5 End Stage CKD (GFR <15 mL/min/1 73 square meters)  Note: GFR calculation is accurate only with a steady state creatinine    NT-BNP PRO [[id number]]  (Abnormal) Collected:  07/25/20 1959    Lab Status:  Final result Specimen:  Blood from Hand, Right Updated:  07/25/20 2028     NT-proBNP 15,353 pg/mL     Troponin I [[id number]]  (Abnormal) Collected:  07/25/20 1959    Lab Status:  Final result Specimen:  Blood from Hand, Right Updated:  07/25/20 2027     Troponin I 0 05 ng/mL     CBC and differential [[id number]]  (Abnormal) Collected:  07/25/20 1959    Lab Status:  Final result Specimen:  Blood from Hand, Right Updated:  07/25/20 2007     WBC 7 45 Thousand/uL      RBC 3 35 Million/uL      Hemoglobin 9 6 g/dL      Hematocrit 33 0 %      MCV 99 fL      MCH 28 7 pg      MCHC 29 1 g/dL      RDW 16 4 %      MPV 12 3 fL      Platelets 721 Thousands/uL      nRBC 0 /100 WBCs      Neutrophils Relative 62 %      Immat GRANS % 1 %      Lymphocytes Relative 21 %      Monocytes Relative 14 %      Eosinophils Relative 2 %      Basophils Relative 0 %      Neutrophils Absolute 4 66 Thousands/µL      Immature Grans Absolute 0 05 Thousand/uL      Lymphocytes Absolute 1 54 Thousands/µL      Monocytes Absolute 1 06 Thousand/µL      Eosinophils Absolute 0 11 Thousand/µL      Basophils Absolute 0 03 Thousands/µL     Blood gas, Arterial [[id number]]  (Abnormal) Collected:  07/25/20 1959    Lab Status:  Final result Specimen:  Blood, Arterial from Radial, Left Updated:  07/25/20 2007     pH, Arterial 7 388     pCO2, Arterial 43 8 mm Hg      pO2, Arterial 101 4 mm Hg      HCO3, Arterial 25 8 mmol/L      Base Excess, Arterial 0 6 mmol/L      O2 Content, Arterial 14 0 mL/dL      O2 HGB,Arterial  97 8 %      SOURCE Radial, Left                 XR chest portable   Final Result by Soumya Quintanilla MD (07/26 4617)      Small-to-moderate-sized bilateral pleural effusions with associated compressive atelectasis        Pulmonary vascular congestion small amount of edema, though decreased from June  Workstation performed: [id number]                    Procedures  Procedures         ED Course  ED Course as of Jul 26 1927   Sat Jul 25, 2020 2134 Case discussed with the [facility] for the hospitalist team who will admit  Requested BiPAP to be discontinued, and admitted to [facility]  2151 Patient with leukocytes, wbc's and bacteria in urine, will start Rocephin on patient  US AUDIT      Most Recent Value   Initial Alcohol Screen: US AUDIT-C    1  How often do you have a drink containing alcohol?  0 Filed at: 07/25/2020 2031   2  How many drinks containing alcohol do you have on a typical day you are drinking? 0 Filed at: 07/25/2020 2031   3a  Male UNDER 65: How often do you have five or more drinks on one occasion? 0 Filed at: 07/25/2020 2031   3b  FEMALE Any Age, or MALE 65+: How often do you have 4 or more drinks on one occassion? 0 Filed at: 07/25/2020 2031   Audit-C Score  0 Filed at: 07/25/2020 2031                  CANDELARIA/DAST-10      Most Recent Value   How many times in the past year have you    Used an illegal drug or used a prescription medication for non-medical reasons?   Never Filed at: 07/25/2020 1941                                MDM  Number of Diagnoses or Management Options  Hypoxia:   Diagnosis management comments: Differential diagnosis not limited to his CHF exacerbation, COPD exacerbation, pneumonia, ACS, mi, less likely PE, pneumothorax       Amount and/or Complexity of Data Reviewed  Clinical lab tests: ordered and reviewed  Tests in the radiology section of CPT®: ordered and reviewed  Tests in the medicine section of CPT®: ordered and reviewed  Independent visualization of images, tracings, or specimens: yes    Patient Progress  Patient progress: improved        Disposition  Final diagnoses:   Hypoxia   UTI (urinary tract infection)     Time reflects when diagnosis was documented in both MDM as applicable and the Disposition within this note     Time User Action Codes Description Comment    7/25/2020  7:46 PM [place] Add [R09 02] Hypoxia     7/25/2020  9:51 PM Stacie [place] Add [N39 0] UTI (urinary tract infection)     7/26/2020 12:23 AM Jeanette Rojo Add [I50 23] Acute on chronic systolic CHF (congestive heart failure) ([facility] [address] )     7/26/2020 12:23 AM Jeanette Rojo Add [E11 65,  Z79 4] Type 2 diabetes mellitus with hyperglycemia, with long-term current use of insulin [facility])       ED Disposition     ED Disposition Condition Date/Time Comment    Admit Stable Sat Jul 25, 2020  7:47 PM Case was discussed with Darrel and the patient's admission status was agreed to be Admission Status: inpatient status to the service of Dr Ovidio Gomes          Follow-up Information    None         Current Discharge Medication List      CONTINUE these medications which have NOT CHANGED    Details   acetaminophen (TYLENOL) 325 mg tablet Take 650 mg by mouth every 4 (four) hours as needed for mild pain      albuterol (PROVENTIL HFA,VENTOLIN HFA) 90 mcg/act inhaler Inhale 2 puffs every 4 (four) hours as needed    Comments: Substitution to a formulary equivalent within the same pharmaceutical class is authorized        amLODIPine (NORVASC) 5 mg tablet Take 1 tablet (5 mg total) by mouth daily  Refills: 0    Associated Diagnoses: Essential hypertension      carvedilol (COREG) 3 125 mg tablet Take 0 5 tablets (1 5625 mg total) by mouth 2 (two) times a day  Qty: 45 tablet, Refills: 1    Associated Diagnoses: Essential hypertension      desloratadine (Clarinex) 5 MG tablet Take 1 tablet (5 mg total) by mouth daily as needed (Seasonal allergy)  Refills: 0    Associated Diagnoses: 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      dextromethorphan-guaiFENesin (ROBITUSSIN DM)  mg/5 mL syrup Take 10 mL by mouth every 4 (four) hours as needed for cough  Qty: 118 mL, Refills: 0    Associated Diagnoses: Cough      docusate sodium (COLACE) 100 mg capsule Take 100 mg by mouth 2 (two) times a day      ELIQUIS 2 5 MG Take 2 5 mg by mouth 2 (two) times a day      Ferrous Fumarate 324 (106 Fe) MG TABS Take 1 tablet by mouth daily      fluticasone (FLONASE) 50 mcg/act nasal spray 2 sprays into each nostril daily  Refills: 0    Associated Diagnoses: 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      fluticasone-salmeterol (ADVAIR, WIXELA) 250-50 mcg/dose inhaler Inhale 1 puff every 12 (twelve) hours Rinse mouth after use  Qty: 180 each, Refills: 2    Comments: Substitution to a formulary equivalent within the same pharmaceutical class is authorized  Associated Diagnoses: Chronic obstructive pulmonary disease, unspecified COPD type ([facility] [address] )      glucose blood test strip Use as instructed  Qty: 100 each, Refills: 5    Associated Diagnoses: Type 2 diabetes mellitus without complication, with long-term current use of insulin ([facility])      Insulin Pen Needle (PEN NEEDLES) 32G X 4 MM MISC by Does not apply route 3 (three) times a day  Qty: 100 each, Refills: 5    Associated Diagnoses: Type 2 diabetes mellitus with hyperglycemia, with long-term current use of insulin ([facility])      insulin regular (NOVOLIN R) 100 units/mL injection Follow sliding scale    200-250, 2 units  251-300, 4 units  301-350, 6 units  351-400, 8 units  Qty: 10 mL, Refills: 5    Associated Diagnoses: Type 2 diabetes mellitus with complication, unspecified whether long term insulin use      ipratropium-albuterol (DUO-NEB) 0 5-2 5 mg/3 mL nebulizer solution Inhale 1 each every 4 (four) hours as needed      lisinopril (ZESTRIL) 2 5 mg tablet Take 2 5 mg by mouth daily      ONETOUCH DELICA LANCETS 96N MISC Inject under the skin 4 (four) times a day  Qty: 200 each, Refills: 5    Associated Diagnoses: Type 2 diabetes mellitus without complication, with long-term current use of insulin ([facility])      pantoprazole (PROTONIX) 40 mg tablet TAKE 1 TABLET DAILY  Qty: 90 tablet, Refills: 3    Associated Diagnoses: Gastroesophageal reflux disease, esophagitis presence not specified      potassium chloride (KLOR-CON) 8 MEQ tablet Take 10 mEq by mouth daily      rosuvastatin (CRESTOR) 10 MG tablet Take 1 tablet (10 mg total) by mouth daily at bedtime  Qty: 90 tablet, Refills: 3    Associated Diagnoses: Hyperlipidemia, unspecified hyperlipidemia type      sucralfate (CARAFATE) 1 g/10 mL suspension Take 1 g by mouth daily at bedtime      torsemide (DEMADEX) 20 mg tablet Take 40 mg by mouth daily      bisacodyl (DULCOLAX) 10 mg suppository Insert 10 mg into the rectum daily      diclofenac sodium (VOLTAREN) 1 % APPLY AS DIRECTED 4 TIMES A DAY AS NEEDED FOR PAIN:  Refills: 0      furosemide (LASIX) 20 mg tablet Take 2 tablets (40 mg total) by mouth daily    Associated Diagnoses: Congestive heart failure, unspecified HF chronicity, unspecified heart failure type (HCC)      insulin glargine (Lantus SoloStar) 100 units/mL injection pen Inject 10 Units under the skin daily at bedtime  Qty: 45 mL, Refills: 1    Associated Diagnoses: Type 2 diabetes mellitus without complication, with long-term current use of insulin ([facility])      magnesium hydroxide (MILK OF MAGNESIA) 400 mg/5 mL oral suspension Take 5 mL by mouth daily as needed for constipation      nitroglycerin (NITROSTAT) 0 4 mg SL tablet PLACE 1 TABLET UNDER THE TONGUE AS NEEDED FOR CHEST PAIN MAY REPE     (REFER TO PRESCRIPTION NOTES)  selenium sulfide (SELSUN) 2 5 % shampoo Apply topically daily as needed for dandruff  Qty: 118 mL, Refills: 0    Associated Diagnoses: Seborrheic dermatitis           No discharge procedures on file      PDMP Review     None          ED Provider  Electronically Signed by           Wilbur Mariscal DO  07/25/20 1973       Shantell Cabrera [facility]  07/26/20 9685

## 2020-07-26 PROBLEM — D50.9 IRON DEFICIENCY ANEMIA: Status: ACTIVE | Noted: 2020-07-26

## 2020-07-26 PROBLEM — R77.8 ELEVATED TROPONIN LEVEL NOT DUE MYOCARDIAL INFARCTION: Status: ACTIVE | Noted: 2020-07-26

## 2020-07-26 PROBLEM — J96.21 ACUTE ON CHRONIC RESPIRATORY FAILURE WITH HYPOXIA (HCC): Status: ACTIVE | Noted: 2020-06-12

## 2020-07-26 LAB
ANION GAP SERPL CALCULATED.3IONS-SCNC: 7 MMOL/L (ref 4–13)
BASOPHILS # BLD AUTO: 0.01 THOUSANDS/ΜL (ref 0–0.1)
BASOPHILS NFR BLD AUTO: 0 % (ref 0–1)
BUN SERPL-MCNC: 39 MG/DL (ref 5–25)
CALCIUM SERPL-MCNC: 8.5 MG/DL (ref 8.3–10.1)
CHLORIDE SERPL-SCNC: 105 MMOL/L (ref 100–108)
CO2 SERPL-SCNC: 28 MMOL/L (ref 21–32)
CREAT SERPL-MCNC: 2.49 MG/DL (ref 0.6–1.3)
EOSINOPHIL # BLD AUTO: 0 THOUSAND/ΜL (ref 0–0.61)
EOSINOPHIL NFR BLD AUTO: 0 % (ref 0–6)
ERYTHROCYTE [DISTWIDTH] IN BLOOD BY AUTOMATED COUNT: 16.3 % (ref 11.6–15.1)
EST. AVERAGE GLUCOSE BLD GHB EST-MCNC: 166 MG/DL
GFR SERPL CREATININE-BSD FRML MDRD: 17 ML/MIN/1.73SQ M
GLUCOSE SERPL-MCNC: 259 MG/DL (ref 65–140)
GLUCOSE SERPL-MCNC: 272 MG/DL (ref 65–140)
GLUCOSE SERPL-MCNC: 298 MG/DL (ref 65–140)
GLUCOSE SERPL-MCNC: 298 MG/DL (ref 65–140)
GLUCOSE SERPL-MCNC: 353 MG/DL (ref 65–140)
GLUCOSE SERPL-MCNC: 361 MG/DL (ref 65–140)
HBA1C MFR BLD: 7.4 %
HCT VFR BLD AUTO: 27.8 % (ref 34.8–46.1)
HGB BLD-MCNC: 8.4 G/DL (ref 11.5–15.4)
IMM GRANULOCYTES # BLD AUTO: 0.04 THOUSAND/UL (ref 0–0.2)
IMM GRANULOCYTES NFR BLD AUTO: 1 % (ref 0–2)
LYMPHOCYTES # BLD AUTO: 0.75 THOUSANDS/ΜL (ref 0.6–4.47)
LYMPHOCYTES NFR BLD AUTO: 13 % (ref 14–44)
MAGNESIUM SERPL-MCNC: 2 MG/DL (ref 1.6–2.6)
MCH RBC QN AUTO: 28.8 PG (ref 26.8–34.3)
MCHC RBC AUTO-ENTMCNC: 30.2 G/DL (ref 31.4–37.4)
MCV RBC AUTO: 95 FL (ref 82–98)
MONOCYTES # BLD AUTO: 0.06 THOUSAND/ΜL (ref 0.17–1.22)
MONOCYTES NFR BLD AUTO: 1 % (ref 4–12)
NEUTROPHILS # BLD AUTO: 4.72 THOUSANDS/ΜL (ref 1.85–7.62)
NEUTS SEG NFR BLD AUTO: 85 % (ref 43–75)
NRBC BLD AUTO-RTO: 0 /100 WBCS
PLATELET # BLD AUTO: 137 THOUSANDS/UL (ref 149–390)
PMV BLD AUTO: 13.2 FL (ref 8.9–12.7)
POTASSIUM SERPL-SCNC: 4.9 MMOL/L (ref 3.5–5.3)
PROCALCITONIN SERPL-MCNC: 0.06 NG/ML
RBC # BLD AUTO: 2.92 MILLION/UL (ref 3.81–5.12)
SODIUM SERPL-SCNC: 140 MMOL/L (ref 136–145)
TROPONIN I SERPL-MCNC: 0.05 NG/ML
TROPONIN I SERPL-MCNC: 0.06 NG/ML
WBC # BLD AUTO: 5.58 THOUSAND/UL (ref 4.31–10.16)

## 2020-07-26 PROCEDURE — 82948 REAGENT STRIP/BLOOD GLUCOSE: CPT

## 2020-07-26 PROCEDURE — 80048 BASIC METABOLIC PNL TOTAL CA: CPT | Performed by: NURSE PRACTITIONER

## 2020-07-26 PROCEDURE — 83735 ASSAY OF MAGNESIUM: CPT | Performed by: NURSE PRACTITIONER

## 2020-07-26 PROCEDURE — 84484 ASSAY OF TROPONIN QUANT: CPT | Performed by: NURSE PRACTITIONER

## 2020-07-26 PROCEDURE — 94640 AIRWAY INHALATION TREATMENT: CPT

## 2020-07-26 PROCEDURE — 83036 HEMOGLOBIN GLYCOSYLATED A1C: CPT | Performed by: FAMILY MEDICINE

## 2020-07-26 PROCEDURE — 93005 ELECTROCARDIOGRAM TRACING: CPT

## 2020-07-26 PROCEDURE — 85025 COMPLETE CBC W/AUTO DIFF WBC: CPT | Performed by: NURSE PRACTITIONER

## 2020-07-26 PROCEDURE — 94760 N-INVAS EAR/PLS OXIMETRY 1: CPT

## 2020-07-26 PROCEDURE — 87081 CULTURE SCREEN ONLY: CPT | Performed by: NURSE PRACTITIONER

## 2020-07-26 PROCEDURE — 99233 SBSQ HOSP IP/OBS HIGH 50: CPT | Performed by: FAMILY MEDICINE

## 2020-07-26 RX ORDER — ALBUTEROL SULFATE 2.5 MG/3ML
2.5 SOLUTION RESPIRATORY (INHALATION) EVERY 4 HOURS PRN
Status: DISCONTINUED | OUTPATIENT
Start: 2020-07-26 | End: 2020-07-26

## 2020-07-26 RX ORDER — LEVALBUTEROL 1.25 MG/.5ML
1.25 SOLUTION, CONCENTRATE RESPIRATORY (INHALATION)
Status: DISCONTINUED | OUTPATIENT
Start: 2020-07-26 | End: 2020-08-01

## 2020-07-26 RX ORDER — ACETAMINOPHEN 325 MG/1
650 TABLET ORAL EVERY 6 HOURS PRN
Status: DISCONTINUED | OUTPATIENT
Start: 2020-07-26 | End: 2020-08-03

## 2020-07-26 RX ORDER — IPRATROPIUM BROMIDE AND ALBUTEROL SULFATE 2.5; .5 MG/3ML; MG/3ML
3 SOLUTION RESPIRATORY (INHALATION) EVERY 4 HOURS PRN
Status: DISCONTINUED | OUTPATIENT
Start: 2020-07-26 | End: 2020-07-27

## 2020-07-26 RX ORDER — FLUTICASONE FUROATE AND VILANTEROL 100; 25 UG/1; UG/1
1 POWDER RESPIRATORY (INHALATION)
Status: DISCONTINUED | OUTPATIENT
Start: 2020-07-26 | End: 2020-08-03

## 2020-07-26 RX ORDER — DOXYCYCLINE HYCLATE 50 MG/1
324 CAPSULE, GELATIN COATED ORAL
Status: DISCONTINUED | OUTPATIENT
Start: 2020-07-26 | End: 2020-08-03

## 2020-07-26 RX ORDER — DOCUSATE SODIUM 100 MG/1
100 CAPSULE, LIQUID FILLED ORAL 2 TIMES DAILY
Status: DISCONTINUED | OUTPATIENT
Start: 2020-07-26 | End: 2020-08-03

## 2020-07-26 RX ORDER — CEFTRIAXONE 1 G/50ML
1000 INJECTION, SOLUTION INTRAVENOUS EVERY 24 HOURS
Status: COMPLETED | OUTPATIENT
Start: 2020-07-26 | End: 2020-07-29

## 2020-07-26 RX ORDER — CARVEDILOL 3.12 MG/1
3.12 TABLET ORAL 2 TIMES DAILY
Status: DISCONTINUED | OUTPATIENT
Start: 2020-07-26 | End: 2020-07-29

## 2020-07-26 RX ORDER — INSULIN GLARGINE 100 [IU]/ML
8 INJECTION, SOLUTION SUBCUTANEOUS
Status: DISCONTINUED | OUTPATIENT
Start: 2020-07-26 | End: 2020-07-26

## 2020-07-26 RX ORDER — INSULIN GLARGINE 100 [IU]/ML
10 INJECTION, SOLUTION SUBCUTANEOUS
Status: DISCONTINUED | OUTPATIENT
Start: 2020-07-26 | End: 2020-07-27

## 2020-07-26 RX ORDER — FUROSEMIDE 10 MG/ML
40 INJECTION INTRAMUSCULAR; INTRAVENOUS 2 TIMES DAILY
Status: DISCONTINUED | OUTPATIENT
Start: 2020-07-26 | End: 2020-07-27

## 2020-07-26 RX ORDER — PANTOPRAZOLE SODIUM 40 MG/1
40 TABLET, DELAYED RELEASE ORAL DAILY
Status: DISCONTINUED | OUTPATIENT
Start: 2020-07-26 | End: 2020-07-27

## 2020-07-26 RX ORDER — AMLODIPINE BESYLATE 5 MG/1
5 TABLET ORAL DAILY
Status: DISCONTINUED | OUTPATIENT
Start: 2020-07-26 | End: 2020-07-30

## 2020-07-26 RX ORDER — LISINOPRIL 2.5 MG/1
2.5 TABLET ORAL DAILY
Status: DISCONTINUED | OUTPATIENT
Start: 2020-07-26 | End: 2020-07-29

## 2020-07-26 RX ORDER — PRAVASTATIN SODIUM 80 MG/1
80 TABLET ORAL
Status: DISCONTINUED | OUTPATIENT
Start: 2020-07-26 | End: 2020-08-03

## 2020-07-26 RX ADMIN — LEVALBUTEROL HYDROCHLORIDE 1.25 MG: 1.25 SOLUTION, CONCENTRATE RESPIRATORY (INHALATION) at 19:55

## 2020-07-26 RX ADMIN — DOCUSATE SODIUM 100 MG: 100 CAPSULE, LIQUID FILLED ORAL at 08:47

## 2020-07-26 RX ADMIN — LEVALBUTEROL HYDROCHLORIDE 1.25 MG: 1.25 SOLUTION, CONCENTRATE RESPIRATORY (INHALATION) at 13:28

## 2020-07-26 RX ADMIN — CEFTRIAXONE 1000 MG: 1 INJECTION, SOLUTION INTRAVENOUS at 22:46

## 2020-07-26 RX ADMIN — FERROUS GLUCONATE 324 MG: 324 TABLET ORAL at 08:47

## 2020-07-26 RX ADMIN — LEVALBUTEROL HYDROCHLORIDE 1.25 MG: 1.25 SOLUTION, CONCENTRATE RESPIRATORY (INHALATION) at 07:03

## 2020-07-26 RX ADMIN — PRAVASTATIN SODIUM 80 MG: 80 TABLET ORAL at 17:36

## 2020-07-26 RX ADMIN — INSULIN LISPRO 4 UNITS: 100 INJECTION, SOLUTION INTRAVENOUS; SUBCUTANEOUS at 08:45

## 2020-07-26 RX ADMIN — IPRATROPIUM BROMIDE 0.5 MG: 0.5 SOLUTION RESPIRATORY (INHALATION) at 07:03

## 2020-07-26 RX ADMIN — FLUTICASONE FUROATE AND VILANTEROL TRIFENATATE 1 PUFF: 100; 25 POWDER RESPIRATORY (INHALATION) at 08:48

## 2020-07-26 RX ADMIN — AMLODIPINE BESYLATE 5 MG: 5 TABLET ORAL at 08:48

## 2020-07-26 RX ADMIN — INSULIN LISPRO 4 UNITS: 100 INJECTION, SOLUTION INTRAVENOUS; SUBCUTANEOUS at 17:36

## 2020-07-26 RX ADMIN — IPRATROPIUM BROMIDE 0.5 MG: 0.5 SOLUTION RESPIRATORY (INHALATION) at 19:55

## 2020-07-26 RX ADMIN — DOCUSATE SODIUM 100 MG: 100 CAPSULE, LIQUID FILLED ORAL at 17:36

## 2020-07-26 RX ADMIN — INSULIN GLARGINE 10 UNITS: 100 INJECTION, SOLUTION SUBCUTANEOUS at 22:46

## 2020-07-26 RX ADMIN — INSULIN LISPRO 6 UNITS: 100 INJECTION, SOLUTION INTRAVENOUS; SUBCUTANEOUS at 11:48

## 2020-07-26 RX ADMIN — INSULIN LISPRO 6 UNITS: 100 INJECTION, SOLUTION INTRAVENOUS; SUBCUTANEOUS at 22:45

## 2020-07-26 RX ADMIN — CARVEDILOL 3.12 MG: 3.12 TABLET, FILM COATED ORAL at 17:35

## 2020-07-26 RX ADMIN — APIXABAN 2.5 MG: 2.5 TABLET, FILM COATED ORAL at 17:35

## 2020-07-26 RX ADMIN — FUROSEMIDE 40 MG: 10 INJECTION, SOLUTION INTRAMUSCULAR; INTRAVENOUS at 17:35

## 2020-07-26 RX ADMIN — APIXABAN 2.5 MG: 2.5 TABLET, FILM COATED ORAL at 08:47

## 2020-07-26 RX ADMIN — INSULIN LISPRO 3 UNITS: 100 INJECTION, SOLUTION INTRAVENOUS; SUBCUTANEOUS at 01:19

## 2020-07-26 RX ADMIN — IPRATROPIUM BROMIDE 0.5 MG: 0.5 SOLUTION RESPIRATORY (INHALATION) at 13:28

## 2020-07-26 RX ADMIN — FUROSEMIDE 40 MG: 10 INJECTION, SOLUTION INTRAMUSCULAR; INTRAVENOUS at 08:48

## 2020-07-26 RX ADMIN — PANTOPRAZOLE SODIUM 40 MG: 40 TABLET, DELAYED RELEASE ORAL at 08:47

## 2020-07-26 RX ADMIN — LISINOPRIL 2.5 MG: 2.5 TABLET ORAL at 08:47

## 2020-07-26 RX ADMIN — CARVEDILOL 3.12 MG: 3.12 TABLET, FILM COATED ORAL at 08:47

## 2020-07-26 NOTE — ASSESSMENT & PLAN NOTE
Wt Readings from Last 3 Encounters:   07/26/20 68 7 kg (151 lb 7 3 oz)   07/03/20 64 kg (141 lb 1 5 oz)   06/22/20 68 9 kg (152 lb)     · Present on admission  · Reports increased dyspnea, abdominal and lower extremity edema, 9 lb weight gain at skilled nursing facility  · Elevated BNP 29132, chest x-ray with diffuse vascular congestion  · In respiratory distress at time of admission placed on BiPAP but able to be removed after diuresis  · Maintained on 2 L nasal cannula chronically supplemental oxygen  · EF 35-40% June 2020  · Maintained on Demadex, Coreg and lisinopril  · Received additional Demadex prior to arrival and a skilled nursing facility without improvement    Admit to hospitalist service  Continue diuresis with furosemide 40 mg IV b i d    Closely monitor creatinine, patient developed NANCY with last admission after diuresis  Trend blood pressures  2 g sodium diet with 1500 mL fluid restriction  Strict intake and output, Vasquez catheter was placed by ER staff  Daily weights  Continue carvedilol and lisinopril

## 2020-07-26 NOTE — ASSESSMENT & PLAN NOTE
· Continue home regimen of lisinopril 5 mg, carvedilol 3 125 mg b i d , amlodipine 5 mg daily  · Trend blood pressures  · Avoid hypotension

## 2020-07-26 NOTE — ED NOTES
Provider requesting patient to be taken off bipap, RN called respiratory and they are on their way       Matilda Rosario RN  07/25/20 2955

## 2020-07-26 NOTE — ED NOTES
Provider at bedside attempting IV access, RN tried multiple times with no success       Blanquita Anthony RN  07/25/20 2041

## 2020-07-26 NOTE — ASSESSMENT & PLAN NOTE
· CAD s/p CABG      · Patient not on anti-platelet therapy, severe anemia receives iron infusions  · Continue statin  · Anticoagulated with Eliquis  · Denies chest pain  · Mild elevated troponin which could be secondary to CHF exacerbation  · Trend troponin   · Follows with [facility] in [place]

## 2020-07-26 NOTE — ASSESSMENT & PLAN NOTE
Creatinine appears baseline  Trend creatinine carefully while diuresing  Avoid hypotension  Urinalysis with significant pyuria, urine culture pending treat with Rocephin  Avoid unnecessary nephrotoxins

## 2020-07-26 NOTE — H&P
H&P- Fanny Aguirre 1935, 82 y o  female MRN: [id number]    Unit/Bed#: -01 Encounter: [id number]    Primary Care Provider: Andrew Meraz MD   Date and time admitted to hospital: 7/25/2020  7:31 PM        * Acute on chronic systolic CHF (congestive heart failure) ([facility] )  Assessment & Plan  Wt Readings from Last 3 Encounters:   07/26/20 68 7 kg (151 lb 7 3 oz)   07/03/20 64 kg (141 lb 1 5 oz)   06/22/20 68 9 kg (152 lb)     · Present on admission  · Reports increased dyspnea, abdominal and lower extremity edema, 9 lb weight gain at skilled nursing facility  · Elevated BNP 82204, chest x-ray with diffuse vascular congestion  · In respiratory distress at time of admission placed on BiPAP but able to be removed after diuresis  · Maintained on 2 L nasal cannula chronically supplemental oxygen  · EF 35-40% June 2020  · Maintained on Demadex, Coreg and lisinopril  · Received additional Demadex prior to arrival and a skilled nursing facility without improvement    Admit to hospitalist service  Continue diuresis with furosemide 40 mg IV b i d  Closely monitor creatinine, patient developed NANCY with last admission after diuresis  Trend blood pressures  2 g sodium diet with 1500 mL fluid restriction  Strict intake and output, Vasquez catheter was placed by ER staff  Daily weights  Continue carvedilol and lisinopril      Iron deficiency anemia  Assessment & Plan  · Hemoglobin appears stable  · Last infusion 07/20/2020  · Continue ferrous gluconate  · Trend hemoglobin    UTI (urinary tract infection)  Assessment & Plan  · White blood cells innumerable in straight catheterized urinary specimen  · Rocephin pending urine culture    Acute on chronic respiratory failure with hypoxia (HCC)  Assessment & Plan  · Present on admission  · Acute respiratory failure with hypoxia on BiPAP on admission    · Acute respiratory failure is most likely secondary to acute CHF exacerbation  · Continue to IV diuresis patient and downgrade oxygen need as tolerated, SaO2 > 90%  · History of continuous supplemental oxygen 2 L nasal cannula pre-admission    CAD (coronary artery disease), native coronary artery  Assessment & Plan  · CAD s/p CABG  · Patient not on anti-platelet therapy, severe anemia receives iron infusions  · Continue statin  · Anticoagulated with Eliquis  · Denies chest pain  · Mild elevated troponin which could be secondary to CHF exacerbation  · Trend troponin   · Follows with [facility] in [address]    A-fib [facility])  Assessment & Plan  · Paroxysmal atrial fibrillation with elevated CHADS2 vascular score  · Continue Coreg 3 125 mg b i d   · Continue Eliquis 2 5 mg b i d    · Rate controlled  · PPM    Type 2 diabetes mellitus with hyperglycemia ([facility])  Assessment & Plan  Lab Results   Component Value Date    HGBA1C 7 9 (H) 03/28/2020       Recent Labs     07/26/20  0105   POCGLU 259*       Blood Sugar Average: Last 72 hrs:  (P) 259   Check hemoglobin A1c  No longer taking Lantus at skilled nursing facility  Carbohydrate controlled diet  Sliding scale insulin coverage pre meal and HS    CKD (chronic kidney disease) stage 4, GFR 15-29 ml/min ([facility])  Assessment & Plan  Creatinine appears baseline  Trend creatinine carefully while diuresing  Avoid hypotension  Urinalysis with significant pyuria, urine culture pending treat with Rocephin  Avoid unnecessary nephrotoxins    COPD with emphysema ([facility])  Assessment & Plan  · No acute exacerbation  · Continue nebulized bronchodilators and inhaled corticosteroid  · Patient did receive dexamethasone 10 mg IV x1 in the ER, no indication for further systemic glucocorticoids  · Continue 2 L oxygen keep pulse oximetry greater than 90%    Essential hypertension  Assessment & Plan  · Continue home regimen of lisinopril 5 mg, carvedilol 3 125 mg b i d , amlodipine 5 mg daily  · Trend blood pressures  · Avoid hypotension    VTE Prophylaxis: Apixaban (Eliquis)  / sequential compression device Code Status:  Full code as discussed with patient at length  Patient with signed DNR paper from [facility] but wishes to receive CPR and be on a breathing machine if needed  POLST: POLST form is on file already (pre-hospital)    Anticipated Length of Stay:  Patient will be admitted on an Inpatient basis with an anticipated length of stay of  > 2 midnights  Justification for Hospital Stay:  CHF exacerbation, respiratory failure    Total Time for Visit, including Counseling / Coordination of Care: 45 minutes  Greater than 50% of this total time spent on direct patient counseling and coordination of care  Chief Complaint:   Respiratory failure    History of Present Illness:    Magalie Wang is a 82 y o  female who presents with longstanding history atrial fibrillation status post permanent pacemaker maintained on Eliquis and rate control, reduced ejection fraction congestive heart failure with multiple admissions for acute exacerbation presents with worsening dyspnea and cough along with 9 lb weight gain  In the emergency department she was found to be in significant respiratory distress, placed on BiPAP with improvement and ABG unremarkable  Improved with diuresis and admitted to hospitalist service for further management  Review of Systems:    Review of Systems   Constitutional: Positive for activity change, appetite change and fatigue  Negative for chills, diaphoresis, fever and unexpected weight change  HENT: Negative for congestion, sore throat and trouble swallowing  Eyes: Negative for visual disturbance  Respiratory: Positive for cough, chest tightness, shortness of breath and wheezing  Cardiovascular: Positive for leg swelling  Negative for chest pain and palpitations  Gastrointestinal: Positive for abdominal distention  Negative for abdominal pain, constipation, diarrhea, nausea and vomiting  Endocrine: Negative for polydipsia, polyphagia and polyuria     Genitourinary: Negative for decreased urine volume, difficulty urinating and dysuria  Musculoskeletal: Negative for arthralgias, gait problem and myalgias  Skin: Negative for rash  Allergic/Immunologic: Negative for immunocompromised state  Neurological: Positive for weakness  Negative for dizziness, seizures, syncope, numbness and headaches  Hematological: Does not bruise/bleed easily  Psychiatric/Behavioral: Negative for sleep disturbance  The patient is nervous/anxious  All other systems reviewed and are negative  Past Medical and Surgical History:     Past Medical History:   Diagnosis Date    NANCY (acute kidney injury) ([place] [address] )     Atrial fibrillation ([place] [address] )     [facility]     COPD (chronic obstructive pulmonary disease) ([place] [address] )     Diabetes mellitus ([place] [address] )     Trigger finger of thumb     last assessed: 07/18/13       Past Surgical History:   Procedure Laterality Date    APPENDECTOMY      CARDIAC PACEMAKER PLACEMENT      CARDIAC PACEMAKER PLACEMENT      CHOLECYSTECTOMY      COLONOSCOPY      Complete    CORONARY ANGIOPLASTY WITH STENT PLACEMENT      CORONARY ARTERY BYPASS GRAFT      CORONARY ARTERY BYPASS GRAFT      ESOPHAGOGASTRODUODENOSCOPY      Diagnostic    HERNIA REPAIR      TOTAL ABDOMINAL HYSTERECTOMY      with removal of both ovaries       Meds/Allergies:    Prior to Admission medications    Medication Sig Start Date End Date Taking?  Authorizing Provider   acetaminophen (TYLENOL) 325 mg tablet Take 650 mg by mouth every 4 (four) hours as needed for mild pain   Yes Historical Provider, MD   albuterol (PROVENTIL HFA,VENTOLIN HFA) 90 mcg/act inhaler Inhale 2 puffs every 4 (four) hours as needed 4/10/20 4/10/21 Yes Historical Provider, MD   amLODIPine (NORVASC) 5 mg tablet Take 1 tablet (5 mg total) by mouth daily 6/20/20  Yes Mercedez Kaiser MD   carvedilol (COREG) 3 125 mg tablet Take 0 5 tablets (1 5625 mg total) by mouth 2 (two) times a day  Patient taking differently: Take 3 125 mg by mouth 2 (two) times a day  11/21/19  Yes Angelica Ybarra MD   desloratadine (Clarinex) 5 MG tablet Take 1 tablet (5 mg total) by mouth daily as needed (Seasonal allergy) 6/19/20  Yes Mireille Peterson MD   dextromethorphan-guaiFENesin (ROBITUSSIN DM)  mg/5 mL syrup Take 10 mL by mouth every 4 (four) hours as needed for cough 7/3/20  Yes Som Palm MD   docusate sodium (COLACE) 100 mg capsule Take 100 mg by mouth 2 (two) times a day   Yes Historical Provider, MD   ELIQUIS 2 5 MG Take 2 5 mg by mouth 2 (two) times a day 3/6/20  Yes Historical Provider, MD   Ferrous Fumarate 324 (106 Fe) MG TABS Take 1 tablet by mouth daily 2/25/13  Yes Historical Provider, MD   fluticasone (FLONASE) 50 mcg/act nasal spray 2 sprays into each nostril daily 6/19/20  Yes Mireille Peterson MD   fluticasone-salmeterol (ADVAIR, Funmilayo Flores) 250-50 mcg/dose inhaler Inhale 1 puff every 12 (twelve) hours Rinse mouth after use  3/9/20  Yes Angelica Ybarra MD   glucose blood test strip Use as instructed 5/14/20  Yes Angelica Ybarra MD   Insulin Pen Needle (PEN NEEDLES) 32G X 4 MM MISC by Does not apply route 3 (three) times a day 10/25/19  Yes Angelica Ybarra MD   insulin regular (NOVOLIN R) 100 units/mL injection Follow sliding scale    200-250, 2 units  251-300, 4 units  301-350, 6 units  351-400, 8 units 2/26/19  Yes Angelica Ybarra MD   ipratropium-albuterol (DUO-NEB) 0 5-2 5 mg/3 mL nebulizer solution Inhale 1 each every 4 (four) hours as needed 4/15/13  Yes Historical Provider, MD   lisinopril (ZESTRIL) 2 5 mg tablet Take 2 5 mg by mouth daily   Yes Historical Provider, MD Ta Guess LANCETS 43Y MISC Inject under the skin 4 (four) times a day 10/21/19  Yes Skye Ogren, CRNP   pantoprazole (PROTONIX) 40 mg tablet TAKE 1 TABLET DAILY 3/23/20  Yes Angelica Ybarra MD   potassium chloride (KLOR-CON) 8 MEQ tablet Take 10 mEq by mouth daily   Yes Historical Provider, MD   rosuvastatin (CRESTOR) 10 MG tablet Take 1 tablet (10 mg total) by mouth daily at bedtime 10/29/19  Yes Shonda Lu MD   sucralfate (CARAFATE) 1 g/10 mL suspension Take 1 g by mouth daily at bedtime   Yes Historical Provider, MD   torsemide (DEMADEX) 20 mg tablet Take 40 mg by mouth daily   Yes Historical Provider, MD   bisacodyl (DULCOLAX) 10 mg suppository Insert 10 mg into the rectum daily    Historical Provider, MD   diclofenac sodium (VOLTAREN) 1 % APPLY AS DIRECTED 4 TIMES A DAY AS NEEDED FOR PAIN: 19   Historical Provider, MD   furosemide (LASIX) 20 mg tablet Take 2 tablets (40 mg total) by mouth daily  Patient not taking: Reported on 2020   Azam Marquez MD   insulin glargine (Lantus SoloStar) 100 units/mL injection pen Inject 10 Units under the skin daily at bedtime  Patient not taking: Reported on 2020 3/17/20   KRISTIN Seymour   magnesium hydroxide (MILK OF MAGNESIA) 400 mg/5 mL oral suspension Take 5 mL by mouth daily as needed for constipation    Historical Provider, MD   nitroglycerin (NITROSTAT) 0 4 mg SL tablet PLACE 1 TABLET UNDER THE TONGUE AS NEEDED FOR CHEST PAIN MAY      (REFER TO PRESCRIPTION NOTES)  3/5/20   Historical Provider, MD   selenium sulfide (SELSUN) 2 5 % shampoo Apply topically daily as needed for dandruff 18   Shonda Lu MD     I have reveiwed home medications using records provided by [facility]  Allergies: Allergies   Allergen Reactions    Other      PEANUTS-unknown reaction    Nuts Rash       Social History:     Marital Status:     Patient Pre-hospital Living Situation:  Skilled nursing facility  Patient Pre-hospital Level of Mobility:  Assisted  Patient Pre-hospital Diet Restrictions:  Diabetic/cardiac  Substance Use History:   Social History     Substance and Sexual Activity   Alcohol Use Not Currently     Social History     Tobacco Use   Smoking Status Former Smoker    Last attempt to quit: 2011    Years since quittin 1   Smokeless Tobacco Never Used   Tobacco Comment    Former smoker per allscripts     Social History     Substance and Sexual Activity   Drug Use No       Family History:    Family History   Problem Relation Age of Onset    Lung disease Mother         black     Breast cancer Mother     Lung disease Father         black        Physical Exam:     Vitals:   Blood Pressure: 119/59 (07/26/20 0500)  Pulse: 66 (07/26/20 0500)  Temperature: 98 7 °F (37 1 °C) (07/26/20 0500)  Temp Source: Axillary (07/26/20 0500)  Respirations: 18 (07/26/20 0500)  Height: 5' 4" (162 6 cm) (07/26/20 0000)  Weight - Scale: 68 7 kg (151 lb 7 3 oz) (07/26/20 0000)  SpO2: 95 % (07/26/20 0500)    Physical Exam   Constitutional: She is oriented to person, place, and time  She appears well-developed and well-nourished  No distress  HENT:   Head: Normocephalic and atraumatic  Mouth/Throat: Oropharynx is clear and moist    Eyes: Conjunctivae are normal    Neck: Normal range of motion  Neck supple  Cardiovascular: Normal rate, regular rhythm, normal heart sounds and intact distal pulses  Pulmonary/Chest: Effort normal  She has wheezes  She has rales  Abdominal: Soft  Bowel sounds are normal  There is no tenderness  Musculoskeletal: Normal range of motion  She exhibits edema  She exhibits no tenderness or deformity  Neurological: She is alert and oriented to person, place, and time  No cranial nerve deficit  Coordination normal    Skin: Skin is warm and dry  Capillary refill takes less than 2 seconds  No rash noted  Psychiatric: She has a normal mood and affect  Nursing note and vitals reviewed  Additional Data:     Lab Results: I have personally reviewed pertinent reports        Results from last 7 days   Lab Units 07/26/20 0348   WBC Thousand/uL 5 58   HEMOGLOBIN g/dL 8 4*   HEMATOCRIT % 27 8*   PLATELETS Thousands/uL 137*   NEUTROS PCT % 85*   LYMPHS PCT % 13*   MONOS PCT % 1*   EOS PCT % 0     Results from last 7 days   Lab Units 07/26/20 0348 07/25/20  1959   SODIUM mmol/L 140 139   POTASSIUM mmol/L 4 9 5 1   CHLORIDE mmol/L 105 104   CO2 mmol/L 28 26   BUN mg/dL 39* 37*   CREATININE mg/dL 2 49* 2 43*   ANION GAP mmol/L 7 9   CALCIUM mg/dL 8 5 8 6   ALBUMIN g/dL  --  2 6*   TOTAL BILIRUBIN mg/dL  --  0 13*   ALK PHOS U/L  --  113   ALT U/L  --  13   AST U/L  --  13   GLUCOSE RANDOM mg/dL 272* 221*     Results from last 7 days   Lab Units 07/25/20 2023   INR  1 00     Results from last 7 days   Lab Units 07/26/20  0105   POC GLUCOSE mg/dl 259*         Results from last 7 days   Lab Units 07/25/20 2023   LACTIC ACID mmol/L 1 4       Imaging: I have personally reviewed pertinent films in PACS    XR chest portable    (Results Pending)   Diffuse vascular congestion bilateral lung fields as viewed by me    EKG, Pathology, and Other Studies Reviewed on Admission:   · EKG:  Atrial paced, no ischemic changes    Allscripts / Epic Records Reviewed: Yes     ** Please Note: This note has been constructed using a voice recognition system   **

## 2020-07-26 NOTE — ASSESSMENT & PLAN NOTE
· No acute exacerbation  · Continue nebulized bronchodilators and inhaled corticosteroid  · Patient did receive dexamethasone 10 mg IV x1 in the ER, no indication for further systemic glucocorticoids  · Continue 2 L oxygen keep pulse oximetry greater than 90%

## 2020-07-26 NOTE — RESPIRATORY THERAPY NOTE
RT Protocol Note  Juliano Hendrickson 82 y o  female MRN: [id number]  Unit/Bed#: -01 Encounter: [id number]    Assessment    Principal Problem:    Acute on chronic systolic CHF (congestive heart failure) ([facility] )  Active Problems:    Essential hypertension    COPD with emphysema ([facility])    CKD (chronic kidney disease) stage 4, GFR 15-29 ml/min ([facility])    Type 2 diabetes mellitus with hyperglycemia ([facility])    A-fib ([facility])    CAD (coronary artery disease), native coronary artery    Acute on chronic respiratory failure with hypoxia ([facility])    UTI (urinary tract infection)    Anemia    Iron deficiency anemia      Home Pulmonary Medications:         Past Medical History:   Diagnosis Date    NANCY (acute kidney injury) ([place] [address] )     Atrial fibrillation ([place] [address] )     CHF (congestive heart failure) ([facility])     COPD (chronic obstructive pulmonary disease) ([place] [address] )     Diabetes mellitus ([facility] )     Trigger finger of thumb     last assessed: 13     Social History     Socioeconomic History    Marital status:       Spouse name: None    Number of children: None    Years of education: None    Highest education level: None   Occupational History    None   Social Needs    Financial resource strain: None    Food insecurity:     Worry: Never true     Inability: Never true    Transportation needs:     Medical: No     Non-medical: No   Tobacco Use    Smoking status: Former Smoker     Last attempt to quit: 2011     Years since quittin 1    Smokeless tobacco: Never Used    Tobacco comment: Former smoker per allscripts   Substance and Sexual Activity    Alcohol use: Not Currently    Drug use: No    Sexual activity: Not Currently   Lifestyle    Physical activity:     Days per week: None     Minutes per session: None    Stress: None   Relationships    Social connections:     Talks on phone: None     Gets together: None     Attends Scientologist service: None     Active member of club or organization: None     Attends meetings of clubs or organizations: None     Relationship status: None    Intimate partner violence:     Fear of current or ex partner: None     Emotionally abused: None     Physically abused: None     Forced sexual activity: None   Other Topics Concern    None   Social History Narrative    None       Subjective    Subjective Data: 2-3 L while at [facility]    Objective    Physical Exam:   Assessment Type: Assess only  General Appearance: Alert, Awake  Respiratory Pattern: Normal  Bilateral Breath Sounds: Diminished, Crackles  O2 Device: 3 L NC    Vitals:  Blood pressure 119/59, pulse 66, temperature 98 7 °F (37 1 °C), temperature source Axillary, resp  rate 18, height 5' 4" (1 626 m), weight 68 7 kg (151 lb 7 3 oz), SpO2 95 %  Results from last 7 days   Lab Units 07/25/20 1959   David 30 ART  7 388   PCO2 ART mm Hg 43 8   PO2 ART mm Hg 101 4   HCO3 ART mmol/L 25 8   BASE EXC ART mmol/L 0 6   O2 CONTENT ART mL/dL 14 0*   O2 HGB, ARTERIAL % 97 8*   ABG SOURCE  Radial, Left       Imaging and other studies: I have personally reviewed pertinent reports        O2 Device: 3 L NC     Plan    Respiratory Plan: Mild Distress pathway

## 2020-07-26 NOTE — ASSESSMENT & PLAN NOTE
· Paroxysmal atrial fibrillation with elevated CHADS2 vascular score  · Continue Coreg 3 125 mg b i d   · Continue Eliquis 2 5 mg b i d    · Rate controlled  · PPM

## 2020-07-26 NOTE — ED NOTES
Pt was asking RN to call daughter to give her an update about patient's condition and plan for treatment, this RN spoke to daughter, Sergio Wells, making her aware of the plan for the patient       Priya Paz RN  07/25/20 8721

## 2020-07-26 NOTE — ED NOTES
This RN called [facility] and spoke with [id number] [place] [address] her of patient's admission, condition, and treatment plan       Luis Zuniga RN  07/25/20 1538

## 2020-07-26 NOTE — ED NOTES
Pt removed from bipap at this time and placed on 3L nc  Will continue to monitor patient's vitals and breathing pattern       Gus Ellison RN  07/25/20 3136

## 2020-07-26 NOTE — ASSESSMENT & PLAN NOTE
· Hemoglobin appears stable  · Last infusion 07/20/2020  · Continue ferrous gluconate  · Trend hemoglobin

## 2020-07-26 NOTE — ASSESSMENT & PLAN NOTE
· Present on admission  · Acute respiratory failure with hypoxia on BiPAP on admission    · Acute respiratory failure is most likely secondary to acute CHF exacerbation  · Continue to IV diuresis patient and downgrade oxygen need as tolerated, SaO2 > 90%  · History of continuous supplemental oxygen 2 L nasal cannula pre-admission

## 2020-07-26 NOTE — PROGRESS NOTES
Progress Note - Mariia Sullivan 1935, 82 y o  female MRN: [id number]    Unit/Bed#: -01 Encounter: [id number]    Primary Care Provider: Gavin Vogt MD   Date and time admitted to hospital: 7/25/2020  7:31 PM        * Acute on chronic respiratory failure with hypoxia [facility])  Assessment & Plan  · Patient admitted for acute on chronic respiratory failure with hypoxia after gaining approximately 9 lbs over prior week and developing peripheral edema and shortness of breath  · Patient uses supplemental oxygen 2 L via nasal cannula continuously as outpatient due to COPD and CHF  Initially required continuous BiPAP at time of admission  · Initiated treatment with IV diuretic at time of admission and patient had rapid improvement in oxygen requirement despite relatively low urine output compared to expected  · Continue assertive diuresis and optimize outpatient plan of care  Elevated troponin level not due myocardial infarction  Assessment & Plan  Non-MI troponin elevation due to CHF  Slightly elevated troponin flat and stable on serial labs  Iron deficiency anemia  Assessment & Plan  · Hemoglobin appears stable  · Last infusion 07/20/2020  · Continue ferrous gluconate  · Trend hemoglobin    UTI (urinary tract infection)  Assessment & Plan  · White blood cells innumerable in straight catheterized urinary specimen  · Treating with ceftriaxone pending urine culture    CAD (coronary artery disease), native coronary artery  Assessment & Plan  · CAD s/p CABG      · Patient not on anti-platelet therapy, severe anemia receives iron infusions  · Continue statin  · Anticoagulated with Eliquis  · Denies chest pain  · Mild elevated troponin which could be secondary to CHF exacerbation  · Trend troponin   · Follows with [facility] in [place]    Paroxysmal atrial fibrillation [facility])  Assessment & Plan  · Paroxysmal atrial fibrillation with elevated CHADS2 vascular score  · Continue Coreg 3 125 mg b i d  · Continue Eliquis 2 5 mg b i d  · Rate controlled  · PPM    Type 2 diabetes mellitus with hyperglycemia [facility])  Assessment & Plan  Lab Results   Component Value Date    HGBA1C 7 4 (H) 07/26/2020       Recent Labs     07/26/20  0105 07/26/20  0844 07/26/20  1147 07/26/20  1648   POCGLU 259* 298* 361* 298*       Blood Sugar Average: Last 72 hrs:  (P) 304     Lantus recently discontinued at skilled nursing facility for unclear reasons  Based on repeatedly elevated blood glucose and elevated A1c; will restart lantus  Initiate insulin protocol with blood glucose AC and HS  Carbohydrate controlled diet        CKD (chronic kidney disease) stage 4, GFR 15-29 ml/min ([facility])  Assessment & Plan  Creatinine appears baseline  Trend creatinine carefully while diuresing  Avoid hypotension  Urinalysis with significant pyuria, urine culture pending treat with Rocephin  Avoid unnecessary nephrotoxins    COPD with emphysema ([facility])  Assessment & Plan  · No acute exacerbation  · Continue nebulized bronchodilators and inhaled corticosteroid  · Patient did receive dexamethasone 10 mg IV x1 in the ER, no indication for further systemic glucocorticoids  · Continue 2 L oxygen keep pulse oximetry greater than 90%    Acute on chronic systolic CHF (congestive heart failure) ([facility])  Assessment & Plan  Wt Readings from Last 3 Encounters:   07/26/20 68 7 kg (151 lb 7 3 oz)   07/03/20 64 kg (141 lb 1 5 oz)   06/22/20 68 9 kg (152 lb)     · Present on admission  · Reports increased dyspnea, abdominal and lower extremity edema, 9 lb weight gain at skilled nursing facility  · Elevated BNP 78823, chest x-ray with diffuse vascular congestion  · In respiratory distress at time of admission placed on BiPAP but able to be removed after diuresis  · Maintained on 2 L nasal cannula chronically supplemental oxygen  · EF 35-40% June 2020  · Maintained on Demadex, Coreg and lisinopril  · Received additional Demadex prior to arrival and a skilled nursing facility without improvement    Admit to hospitalist service  Continue diuresis with furosemide 40 mg IV b i d  Closely monitor creatinine, patient developed NANCY with last admission after diuresis  Trend blood pressures  2 g sodium diet with 1500 mL fluid restriction  Strict intake and output, Vasquez catheter was placed by ER staff  Daily weights  Continue carvedilol and lisinopril      Essential hypertension  Assessment & Plan  · Continue home regimen of lisinopril 5 mg, carvedilol 3 125 mg b i d , amlodipine 5 mg daily  · Trend blood pressures  · Avoid hypotension      VTE Pharmacologic Prophylaxis:   Pharmacologic: Apixaban (Eliquis)  Mechanical VTE Prophylaxis in Place: Yes    Patient Centered Rounds: I have performed bedside rounds with nursing staff today  Discussions with Specialists or Other Care Team Provider: None    Education and Discussions with Family / Patient:  Greater than 20 minutes spent with this patient discussing diagnosis, prognosis, and plan of care  Time Spent for Care: 30 minutes  More than 50% of total time spent on counseling and coordination of care as described above  Current Length of Stay: 1 day(s)    Current Patient Status: Inpatient   Certification Statement: The patient will continue to require additional inpatient hospital stay due to acute on chronic CHF    Discharge Plan: Anticipate discharge back to [facility] within 24-72 hours pending clinical course    Code Status: Level 1 - Full Code      Subjective:   Patient reports moderate improvement in lower extremity swelling overnight  Breathing much easier and back to baseline 2LNC continuously  No new concerns  Objective:     Vitals:   Temp (24hrs), Av 2 °F (36 8 °C), Min:97 7 °F (36 5 °C), Max:98 7 °F (37 1 °C)    Temp:  [97 7 °F (36 5 °C)-98 7 °F (37 1 °C)] 98 2 °F (36 8 °C)  HR:  [61-70] 67  Resp:  [16-24] 18  BP: (116-152)/(52-84) 134/52  SpO2:  [94 %-100 %] 96 %  Body mass index is 26 kg/m²       Input and Output Summary (last 24 hours): Intake/Output Summary (Last 24 hours) at 7/26/2020 1456  Last data filed at 7/26/2020 1200  Gross per 24 hour   Intake 50 ml   Output 1345 ml   Net -1295 ml       Physical Exam:     Physical Exam   Constitutional: She is oriented to person, place, and time  She appears well-developed and well-nourished  No distress  HENT:   Head: Normocephalic and atraumatic  Right Ear: External ear normal    Left Ear: External ear normal    Nose: Nose normal    Eyes: Pupils are equal, round, and reactive to light  Conjunctivae and EOM are normal  Right eye exhibits no discharge  Left eye exhibits no discharge  No scleral icterus  Neck: No JVD present  No tracheal deviation present  No thyromegaly present  Cardiovascular: Normal rate, regular rhythm and intact distal pulses  Exam reveals distant heart sounds  Exam reveals no gallop and no friction rub  Murmur heard  Systolic murmur is present with a grade of 2/6  Pulmonary/Chest: Effort normal  No stridor  No respiratory distress  She has wheezes  She has no rhonchi  She has no rales  She exhibits no tenderness  Slight diffuse end-expiratory wheeze noted   Abdominal: Soft  Bowel sounds are normal  She exhibits no distension and no mass  There is no tenderness  There is no rebound and no guarding  Musculoskeletal: She exhibits edema  She exhibits no tenderness or deformity  Symmetric 1+ bilateral lower extremity edema   Lymphadenopathy:     She has no cervical adenopathy  Neurological: She is alert and oriented to person, place, and time  Skin: Capillary refill takes less than 2 seconds  No rash noted  She is not diaphoretic  No erythema  No pallor  Psychiatric: She has a normal mood and affect  Her behavior is normal  Judgment and thought content normal    Nursing note and vitals reviewed        Additional Data:     Labs:    Results from last 7 days   Lab Units 07/26/20  0348   WBC Thousand/uL 5 58   HEMOGLOBIN g/dL 8 4*   HEMATOCRIT % 27 8*   PLATELETS Thousands/uL 137*   NEUTROS PCT % 85*   LYMPHS PCT % 13*   MONOS PCT % 1*   EOS PCT % 0     Results from last 7 days   Lab Units 07/26/20  0348 07/25/20  1959   SODIUM mmol/L 140 139   POTASSIUM mmol/L 4 9 5 1   CHLORIDE mmol/L 105 104   CO2 mmol/L 28 26   BUN mg/dL 39* 37*   CREATININE mg/dL 2 49* 2 43*   ANION GAP mmol/L 7 9   CALCIUM mg/dL 8 5 8 6   ALBUMIN g/dL  --  2 6*   TOTAL BILIRUBIN mg/dL  --  0 13*   ALK PHOS U/L  --  113   ALT U/L  --  13   AST U/L  --  13   GLUCOSE RANDOM mg/dL 272* 221*     Results from last 7 days   Lab Units 07/25/20 2023   INR  1 00     Results from last 7 days   Lab Units 07/26/20  1147 07/26/20  0844 07/26/20  0105   POC GLUCOSE mg/dl 361* 298* 259*         Results from last 7 days   Lab Units 07/25/20 2023   LACTIC ACID mmol/L 1 4           * I Have Reviewed All Lab Data Listed Above  * Additional Pertinent Lab Tests Reviewed:  Lauren 68 Admission Reviewed    Imaging:    Imaging Reports Reviewed Today Include: CXR  Imaging Personally Reviewed by Myself Includes:  CXR    Recent Cultures (last 7 days):           Last 24 Hours Medication List:     Current Facility-Administered Medications:  acetaminophen 650 mg Oral Q6H PRN Jeanette S Darrel, CRNP   amLODIPine 5 mg Oral Daily Jeanette S Darrel, CRNP   apixaban 2 5 mg Oral BID Jeanette S Darrel, CRNP   carvedilol 3 125 mg Oral BID Jeanette S Darrel, CRNP   cefTRIAXone 1,000 mg Intravenous Q24H Jeanette S Darrel, CRNP   docusate sodium 100 mg Oral BID Jeanette S Darrel, CRNP   ferrous gluconate 324 mg Oral Daily Before Breakfast Jeanette S Darrel, CRNP   fluticasone-vilanterol 1 puff Inhalation Daily Jeanette S Darrel, CRNP   furosemide 40 mg Intravenous BID Jeanette S Darrel, CRNP   insulin lispro 1-6 Units Subcutaneous TID AC Jeanette S Darrel, CRNP   insulin lispro 1-6 Units Subcutaneous HS Jeanette S Darrel, CRNP   ipratropium 0 5 mg Nebulization TID Porter Boyle MD   ipratropium-albuterol 3 mL Nebulization Q4H PRN Carina Redd MD   levalbuterol 1 25 mg Nebulization TID Carina Redd MD   lisinopril 2 5 mg Oral Daily Jeanette [place], KRISTIN   pantoprazole 40 mg Oral Daily Jeanette [place], KRISTIN   pravastatin 80 mg Oral Daily With Dinner Jeanette Rojo, KRISTIN        Today, Patient Was Seen By: Michelle Mcneil MD    ** Please Note: Dictation voice to text software may have been used in the creation of this document   **

## 2020-07-26 NOTE — ED NOTES
Respiratory at bedside putting pt on bipap and administering breathing tx       Crispin Chandra, NEW  07/25/20 2041

## 2020-07-26 NOTE — RESPIRATORY THERAPY NOTE
RT Protocol Note  Fanny Aguirre 82 y o  female MRN: [id number]  Unit/Bed#: -01 Encounter: [id number]    Assessment    Active Problems:    * No active hospital problems  *      Home Pulmonary Medications:  Prn Albuterol/Prn Duonebs/ Breo       Past Medical History:   Diagnosis Date    NANCY [facility]acute [facility]) ([facility] )     Atrial fibrillation (HCC)     [facility]     COPD [facility]chronic obstructive [facility] disease) [facility]     Diabetes mellitus ([facility] )     Trigger finger of thumb     last assessed: 13     Social History     Socioeconomic History    Marital status:       Spouse name: None    Number of children: None    Years of education: None    Highest education level: None   Occupational History    None   Social Needs    Financial resource strain: None    Food insecurity:     Worry: Never true     Inability: Never true    Transportation needs:     Medical: No     Non-medical: No   Tobacco Use    Smoking status: Former Smoker     Last attempt to quit: 2011     Years since quittin 1    Smokeless tobacco: Never Used    Tobacco comment: Former smoker per allscripts   Substance and Sexual Activity    Alcohol use: Not Currently    Drug use: No    Sexual activity: Not Currently   Lifestyle    Physical activity:     Days per week: None     Minutes per session: None    Stress: None   Relationships    Social connections:     Talks on phone: None     Gets together: None     Attends [employer] service: None     Active member of club or organization: None     Attends meetings of clubs or organizations: None     Relationship status: None    Intimate partner violence:     Fear of current or ex partner: None     Emotionally abused: None     Physically abused: None     Forced sexual activity: None   Other Topics Concern    None   Social History Narrative    None       Subjective    Subjective Data: 2-3 L while at [facility]    Objective    Physical Exam: Assessment Type: Assess only  General Appearance: Alert, Awake  Respiratory Pattern: Normal  Bilateral Breath Sounds: Diminished, Crackles  O2 Device: 3 L NC    Vitals:  Blood pressure 116/55, pulse 64, temperature 98 4 °F (36 9 °C), temperature source Oral, resp  rate 16, height 5' 4" (1 626 m), weight 73 7 kg (162 lb 7 7 oz), SpO2 95 %  Results from last 7 days   Lab Units 07/25/20 1959   [facility]  7 388   PCO2 ART mm Hg 43 8   PO2 ART mm Hg 101 4   HCO3 ART mmol/L 25 8   BASE EXC ART mmol/L 0 6   O2 CONTENT ART mL/dL 14 0*   O2 HGB, ARTERIAL % 97 8*   ABG SOURCE  Radial, Left       Imaging and other studies: I have personally reviewed pertinent reports  O2 Device: 3 L NC     Plan    Respiratory Plan: Mild Distress pathway        Pt admitted to hospital with CHF  Pt was brought in from  for increased wt gain Sob  Pt Placed on Bipap while in ED but taken off shortly after and placed on 3 L NC which pt stated she wears at the home  Pt is not working to breath at this time BS diminished with crackles in the bases  Pt takes Breo at home, ordered q6 duoneb tx while here will make them PRN since pt is not in any resp distress but will order TID xop/atro tx  Pt states she is feeling better  CXR done not read yet  Will cont to follow via respiratory protocol

## 2020-07-26 NOTE — PLAN OF CARE
Problem: Potential for Falls  Goal: Patient will remain free of falls  Description  INTERVENTIONS:  - Assess patient frequently for physical needs  -  Identify cognitive and physical deficits and behaviors that affect risk of falls    -  [place] fall precautions as indicated by assessment   - Educate patient/family on patient safety including physical limitations  - Instruct patient to call for assistance with activity based on assessment  - Modify environment to reduce risk of injury  - Consider OT/PT consult to assist with strengthening/mobility  Outcome: Progressing     Problem: Prexisting or High Potential for Compromised Skin Integrity  Goal: Skin integrity is maintained or improved  Description  INTERVENTIONS:  - Identify patients at risk for skin breakdown  - Assess and monitor skin integrity  - Assess and monitor nutrition and hydration status  - Monitor labs   - Assess for incontinence   - Turn and reposition patient  - Assist with mobility/ambulation  - Relieve pressure over bony prominences  - Avoid friction and shearing  - Provide appropriate hygiene as needed including keeping skin clean and dry  - Evaluate need for skin moisturizer/barrier cream  - Collaborate with interdisciplinary team   - Patient/family teaching  - Consider wound care consult   Outcome: Progressing     Problem: PAIN - ADULT  Goal: Verbalizes/displays adequate comfort level or baseline comfort level  Description  Interventions:  - Encourage patient to monitor pain and request assistance  - Assess pain using appropriate pain scale  - Administer analgesics based on type and severity of pain and evaluate response  - Implement non-pharmacological measures as appropriate and evaluate response  - Consider cultural and social influences on pain and pain management  - Notify physician/advanced practitioner if interventions unsuccessful or patient reports new pain  Outcome: Progressing     Problem: INFECTION - ADULT  Goal: Absence or prevention of progression during hospitalization  Description  INTERVENTIONS:  - Assess and monitor for signs and symptoms of infection  - Monitor lab/diagnostic results  - Monitor all insertion sites, i e  indwelling lines, tubes, and drains  - Monitor endotracheal if appropriate and nasal secretions for changes in amount and color  - [place] appropriate cooling/warming therapies per order  - Administer medications as ordered  - Instruct and encourage patient and family to use good hand hygiene technique  - Identify and instruct in appropriate isolation precautions for identified infection/condition  Outcome: Progressing  Goal: Absence of fever/infection during neutropenic period  Description  INTERVENTIONS:  - Monitor WBC    Outcome: Progressing     Problem: SAFETY ADULT  Goal: Patient will remain free of falls  Description  INTERVENTIONS:  - Assess patient frequently for physical needs  -  Identify cognitive and physical deficits and behaviors that affect risk of falls    -  [place] fall precautions as indicated by assessment   - Educate patient/family on patient safety including physical limitations  - Instruct patient to call for assistance with activity based on assessment  - Modify environment to reduce risk of injury  - Consider OT/PT consult to assist with strengthening/mobility  Outcome: Progressing  Goal: Maintain or return to baseline ADL function  Description  INTERVENTIONS:  -  Assess patient's ability to carry out ADLs; assess patient's baseline for ADL function and identify physical deficits which impact ability to perform ADLs (bathing, care of mouth/teeth, toileting, grooming, dressing, etc )  - Assess/evaluate cause of self-care deficits   - Assess range of motion  - Assess patient's mobility; develop plan if impaired  - Assess patient's need for assistive devices and provide as appropriate  - Encourage maximum independence but intervene and supervise when necessary  - Involve family in performance of ADLs  - Assess for home care needs following discharge   - Consider OT consult to assist with ADL evaluation and planning for discharge  - Provide patient education as appropriate  Outcome: Progressing  Goal: Maintain or return mobility status to optimal level  Description  INTERVENTIONS:  - Assess patient's baseline mobility status (ambulation, transfers, stairs, etc )    - Identify cognitive and physical deficits and behaviors that affect mobility  - Identify mobility aids required to assist with transfers and/or ambulation (gait belt, sit-to-stand, lift, walker, cane, etc )  - [place] fall precautions as indicated by assessment  - Record patient progress and toleration of activity level on Mobility SBAR; progress patient to next Phase/Stage  - Instruct patient to call for assistance with activity based on assessment  - Consider rehabilitation consult to assist with strengthening/weightbearing, etc   Outcome: Progressing     Problem: DISCHARGE PLANNING  Goal: Discharge to home or other facility with appropriate resources  Description  INTERVENTIONS:  - Identify barriers to discharge w/patient and caregiver  - Arrange for needed discharge resources and transportation as appropriate  - Identify discharge learning needs (meds, wound care, etc )  - Arrange for interpretive services to assist at discharge as needed  - Refer to Case Management Department for coordinating discharge planning if the patient needs post-hospital services based on physician/advanced practitioner order or complex needs related to functional status, cognitive ability, or social support system  Outcome: Progressing     Problem: Knowledge Deficit  Goal: Patient/family/caregiver demonstrates understanding of disease process, treatment plan, medications, and discharge instructions  Description  Complete learning assessment and assess knowledge base    Interventions:  - Provide teaching at level of understanding  - Provide teaching via preferred learning methods  Outcome: Progressing     Problem: RESPIRATORY - ADULT  Goal: Achieves optimal ventilation and oxygenation  Description  INTERVENTIONS:  - Assess for changes in respiratory status  - Assess for changes in mentation and behavior  - Position to facilitate oxygenation and minimize respiratory effort  - Oxygen administered by appropriate delivery if ordered  - Initiate smoking cessation education as indicated  - Encourage broncho-pulmonary hygiene including cough, deep breathe, Incentive Spirometry  - Assess the need for suctioning and aspirate as needed  - Assess and instruct to report SOB or any respiratory difficulty  - Respiratory Therapy support as indicated  Outcome: Progressing

## 2020-07-26 NOTE — PLAN OF CARE
Problem: Potential for Falls  Goal: Patient will remain free of falls  Description  INTERVENTIONS:  - Assess patient frequently for physical needs  -  Identify cognitive and physical deficits and behaviors that affect risk of falls    -  [place] fall precautions as indicated by assessment   - Educate patient/family on patient safety including physical limitations  - Instruct patient to call for assistance with activity based on assessment  - Modify environment to reduce risk of injury  - Consider OT/PT consult to assist with strengthening/mobility  Outcome: Progressing     Problem: Prexisting or High Potential for Compromised Skin Integrity  Goal: Skin integrity is maintained or improved  Description  INTERVENTIONS:  - Identify patients at risk for skin breakdown  - Assess and monitor skin integrity  - Assess and monitor nutrition and hydration status  - Monitor labs   - Assess for incontinence   - Turn and reposition patient  - Assist with mobility/ambulation  - Relieve pressure over bony prominences  - Avoid friction and shearing  - Provide appropriate hygiene as needed including keeping skin clean and dry  - Evaluate need for skin moisturizer/barrier cream  - Collaborate with interdisciplinary team   - Patient/family teaching  - Consider wound care consult   Outcome: Progressing     Problem: PAIN - ADULT  Goal: Verbalizes/displays adequate comfort level or baseline comfort level  Description  Interventions:  - Encourage patient to monitor pain and request assistance  - Assess pain using appropriate pain scale  - Administer analgesics based on type and severity of pain and evaluate response  - Implement non-pharmacological measures as appropriate and evaluate response  - Consider cultural and social influences on pain and pain management  - Notify physician/advanced practitioner if interventions unsuccessful or patient reports new pain  Outcome: Progressing     Problem: INFECTION - ADULT  Goal: Absence or prevention of progression during hospitalization  Description  INTERVENTIONS:  - Assess and monitor for signs and symptoms of infection  - Monitor lab/diagnostic results  - Monitor all insertion sites, i e  indwelling lines, tubes, and drains  - Monitor endotracheal if appropriate and nasal secretions for changes in amount and color  - [place] appropriate cooling/warming therapies per order  - Administer medications as ordered  - Instruct and encourage patient and family to use good hand hygiene technique  - Identify and instruct in appropriate isolation precautions for identified infection/condition  Outcome: Progressing  Goal: Absence of fever/infection during neutropenic period  Description  INTERVENTIONS:  - Monitor WBC    Outcome: Progressing     Problem: SAFETY ADULT  Goal: Patient will remain free of falls  Description  INTERVENTIONS:  - Assess patient frequently for physical needs  -  Identify cognitive and physical deficits and behaviors that affect risk of falls    -  [place] fall precautions as indicated by assessment   - Educate patient/family on patient safety including physical limitations  - Instruct patient to call for assistance with activity based on assessment  - Modify environment to reduce risk of injury  - Consider OT/PT consult to assist with strengthening/mobility  Outcome: Progressing  Goal: Maintain or return to baseline ADL function  Description  INTERVENTIONS:  -  Assess patient's ability to carry out ADLs; assess patient's baseline for ADL function and identify physical deficits which impact ability to perform ADLs (bathing, care of mouth/teeth, toileting, grooming, dressing, etc )  - Assess/evaluate cause of self-care deficits   - Assess range of motion  - Assess patient's mobility; develop plan if impaired  - Assess patient's need for assistive devices and provide as appropriate  - Encourage maximum independence but intervene and supervise when necessary  - Involve family in performance of ADLs  - Assess for home care needs following discharge   - Consider OT consult to assist with ADL evaluation and planning for discharge  - Provide patient education as appropriate  Outcome: Progressing  Goal: Maintain or return mobility status to optimal level  Description  INTERVENTIONS:  - Assess patient's baseline mobility status (ambulation, transfers, stairs, etc )    - Identify cognitive and physical deficits and behaviors that affect mobility  - Identify mobility aids required to assist with transfers and/or ambulation (gait belt, sit-to-stand, lift, walker, cane, etc )  - [place] fall precautions as indicated by assessment  - Record patient progress and toleration of activity level on Mobility SBAR; progress patient to next Phase/Stage  - Instruct patient to call for assistance with activity based on assessment  - Consider rehabilitation consult to assist with strengthening/weightbearing, etc   Outcome: Progressing     Problem: DISCHARGE PLANNING  Goal: Discharge to home or other facility with appropriate resources  Description  INTERVENTIONS:  - Identify barriers to discharge w/patient and caregiver  - Arrange for needed discharge resources and transportation as appropriate  - Identify discharge learning needs (meds, wound care, etc )  - Arrange for interpretive services to assist at discharge as needed  - Refer to Case Management Department for coordinating discharge planning if the patient needs post-hospital services based on physician/advanced practitioner order or complex needs related to functional status, cognitive ability, or social support system  Outcome: Progressing     Problem: Knowledge Deficit  Goal: Patient/family/caregiver demonstrates understanding of disease process, treatment plan, medications, and discharge instructions  Description  Complete learning assessment and assess knowledge base    Interventions:  - Provide teaching at level of understanding  - Provide teaching via preferred learning methods  Outcome: Progressing     Problem: RESPIRATORY - ADULT  Goal: Achieves optimal ventilation and oxygenation  Description  INTERVENTIONS:  - Assess for changes in respiratory status  - Assess for changes in mentation and behavior  - Position to facilitate oxygenation and minimize respiratory effort  - Oxygen administered by appropriate delivery if ordered  - Initiate smoking cessation education as indicated  - Encourage broncho-pulmonary hygiene including cough, deep breathe, Incentive Spirometry  - Assess the need for suctioning and aspirate as needed  - Assess and instruct to report SOB or any respiratory difficulty  - Respiratory Therapy support as indicated  Outcome: Progressing

## 2020-07-26 NOTE — ASSESSMENT & PLAN NOTE
· White blood cells innumerable in straight catheterized urinary specimen  · Rocephin pending urine culture

## 2020-07-26 NOTE — ED NOTES
Pt's IV infiltrated with IV antibiotic administration  Left AC is erythematous and edematous at this time  IV removed and elevated patient's arm and applied ice to affected area       Davina Man RN  07/25/20 2340

## 2020-07-27 ENCOUNTER — APPOINTMENT (INPATIENT)
Dept: RADIOLOGY | Facility: HOSPITAL | Age: 85
DRG: 291 | End: 2020-07-27
Payer: MEDICARE

## 2020-07-27 PROBLEM — I47.2 V-TACH (HCC): Status: ACTIVE | Noted: 2020-07-27

## 2020-07-27 PROBLEM — R94.31 PROLONGED Q-T INTERVAL ON ECG: Status: ACTIVE | Noted: 2020-07-27

## 2020-07-27 LAB
ALBUMIN SERPL BCP-MCNC: 2.5 G/DL (ref 3.5–5)
ALP SERPL-CCNC: 90 U/L (ref 46–116)
ALT SERPL W P-5'-P-CCNC: 11 U/L (ref 12–78)
ANION GAP SERPL CALCULATED.3IONS-SCNC: 7 MMOL/L (ref 4–13)
AST SERPL W P-5'-P-CCNC: 6 U/L (ref 5–45)
ATRIAL RATE: 61 BPM
ATRIAL RATE: 73 BPM
ATRIAL RATE: 90 BPM
BASOPHILS # BLD AUTO: 0.01 THOUSANDS/ΜL (ref 0–0.1)
BASOPHILS NFR BLD AUTO: 0 % (ref 0–1)
BILIRUB SERPL-MCNC: 0.12 MG/DL (ref 0.2–1)
BUN SERPL-MCNC: 61 MG/DL (ref 5–25)
CALCIUM SERPL-MCNC: 8.3 MG/DL (ref 8.3–10.1)
CHLORIDE SERPL-SCNC: 105 MMOL/L (ref 100–108)
CO2 SERPL-SCNC: 29 MMOL/L (ref 21–32)
CREAT SERPL-MCNC: 2.59 MG/DL (ref 0.6–1.3)
EOSINOPHIL # BLD AUTO: 0.01 THOUSAND/ΜL (ref 0–0.61)
EOSINOPHIL NFR BLD AUTO: 0 % (ref 0–6)
ERYTHROCYTE [DISTWIDTH] IN BLOOD BY AUTOMATED COUNT: 16.7 % (ref 11.6–15.1)
GFR SERPL CREATININE-BSD FRML MDRD: 17 ML/MIN/1.73SQ M
GLUCOSE SERPL-MCNC: 162 MG/DL (ref 65–140)
GLUCOSE SERPL-MCNC: 181 MG/DL (ref 65–140)
GLUCOSE SERPL-MCNC: 212 MG/DL (ref 65–140)
GLUCOSE SERPL-MCNC: 254 MG/DL (ref 65–140)
GLUCOSE SERPL-MCNC: 274 MG/DL (ref 65–140)
HCT VFR BLD AUTO: 26.2 % (ref 34.8–46.1)
HGB BLD-MCNC: 8.2 G/DL (ref 11.5–15.4)
IMM GRANULOCYTES # BLD AUTO: 0.02 THOUSAND/UL (ref 0–0.2)
IMM GRANULOCYTES NFR BLD AUTO: 0 % (ref 0–2)
LYMPHOCYTES # BLD AUTO: 1.38 THOUSANDS/ΜL (ref 0.6–4.47)
LYMPHOCYTES NFR BLD AUTO: 19 % (ref 14–44)
MAGNESIUM SERPL-MCNC: 2.1 MG/DL (ref 1.6–2.6)
MCH RBC QN AUTO: 29 PG (ref 26.8–34.3)
MCHC RBC AUTO-ENTMCNC: 31.3 G/DL (ref 31.4–37.4)
MCV RBC AUTO: 93 FL (ref 82–98)
MONOCYTES # BLD AUTO: 0.77 THOUSAND/ΜL (ref 0.17–1.22)
MONOCYTES NFR BLD AUTO: 11 % (ref 4–12)
MRSA NOSE QL CULT: NORMAL
NEUTROPHILS # BLD AUTO: 4.97 THOUSANDS/ΜL (ref 1.85–7.62)
NEUTS SEG NFR BLD AUTO: 70 % (ref 43–75)
NRBC BLD AUTO-RTO: 0 /100 WBCS
P AXIS: 58 DEGREES
PHOSPHATE SERPL-MCNC: 4.4 MG/DL (ref 2.3–4.1)
PLATELET # BLD AUTO: 166 THOUSANDS/UL (ref 149–390)
PMV BLD AUTO: 12.2 FL (ref 8.9–12.7)
POTASSIUM SERPL-SCNC: 4.5 MMOL/L (ref 3.5–5.3)
PR INTERVAL: 216 MS
PR INTERVAL: 224 MS
PROT SERPL-MCNC: 5.8 G/DL (ref 6.4–8.2)
QRS AXIS: 131 DEGREES
QRS AXIS: 196 DEGREES
QRS AXIS: 88 DEGREES
QRSD INTERVAL: 102 MS
QRSD INTERVAL: 88 MS
QRSD INTERVAL: 98 MS
QT INTERVAL: 414 MS
QT INTERVAL: 420 MS
QT INTERVAL: 470 MS
QTC INTERVAL: 456 MS
QTC INTERVAL: 473 MS
QTC INTERVAL: 513 MS
RBC # BLD AUTO: 2.83 MILLION/UL (ref 3.81–5.12)
SODIUM SERPL-SCNC: 141 MMOL/L (ref 136–145)
T WAVE AXIS: 138 DEGREES
T WAVE AXIS: 165 DEGREES
T WAVE AXIS: 99 DEGREES
TROPONIN I SERPL-MCNC: 0.04 NG/ML
TROPONIN I SERPL-MCNC: 0.06 NG/ML
TROPONIN I SERPL-MCNC: 0.08 NG/ML
VENTRICULAR RATE: 61 BPM
VENTRICULAR RATE: 73 BPM
VENTRICULAR RATE: 90 BPM
WBC # BLD AUTO: 7.16 THOUSAND/UL (ref 4.31–10.16)

## 2020-07-27 PROCEDURE — 97110 THERAPEUTIC EXERCISES: CPT

## 2020-07-27 PROCEDURE — 71045 X-RAY EXAM CHEST 1 VIEW: CPT

## 2020-07-27 PROCEDURE — 94760 N-INVAS EAR/PLS OXIMETRY 1: CPT

## 2020-07-27 PROCEDURE — 82948 REAGENT STRIP/BLOOD GLUCOSE: CPT

## 2020-07-27 PROCEDURE — 94640 AIRWAY INHALATION TREATMENT: CPT

## 2020-07-27 PROCEDURE — 80053 COMPREHEN METABOLIC PANEL: CPT | Performed by: FAMILY MEDICINE

## 2020-07-27 PROCEDURE — 84100 ASSAY OF PHOSPHORUS: CPT | Performed by: FAMILY MEDICINE

## 2020-07-27 PROCEDURE — 83735 ASSAY OF MAGNESIUM: CPT | Performed by: FAMILY MEDICINE

## 2020-07-27 PROCEDURE — 93010 ELECTROCARDIOGRAM REPORT: CPT | Performed by: INTERNAL MEDICINE

## 2020-07-27 PROCEDURE — 97163 PT EVAL HIGH COMPLEX 45 MIN: CPT

## 2020-07-27 PROCEDURE — 99232 SBSQ HOSP IP/OBS MODERATE 35: CPT | Performed by: PHYSICIAN ASSISTANT

## 2020-07-27 PROCEDURE — 84484 ASSAY OF TROPONIN QUANT: CPT | Performed by: NURSE PRACTITIONER

## 2020-07-27 PROCEDURE — 85025 COMPLETE CBC W/AUTO DIFF WBC: CPT | Performed by: FAMILY MEDICINE

## 2020-07-27 PROCEDURE — 93005 ELECTROCARDIOGRAM TRACING: CPT

## 2020-07-27 RX ORDER — SENNOSIDES 8.6 MG
2 TABLET ORAL
Status: DISCONTINUED | OUTPATIENT
Start: 2020-07-27 | End: 2020-08-03

## 2020-07-27 RX ORDER — INSULIN GLARGINE 100 [IU]/ML
15 INJECTION, SOLUTION SUBCUTANEOUS
Status: DISCONTINUED | OUTPATIENT
Start: 2020-07-27 | End: 2020-07-29

## 2020-07-27 RX ORDER — MAGNESIUM HYDROXIDE/ALUMINUM HYDROXICE/SIMETHICONE 120; 1200; 1200 MG/30ML; MG/30ML; MG/30ML
30 SUSPENSION ORAL EVERY 4 HOURS PRN
Status: DISCONTINUED | OUTPATIENT
Start: 2020-07-27 | End: 2020-07-30

## 2020-07-27 RX ORDER — FUROSEMIDE 10 MG/ML
80 INJECTION INTRAMUSCULAR; INTRAVENOUS ONCE
Status: COMPLETED | OUTPATIENT
Start: 2020-07-27 | End: 2020-07-27

## 2020-07-27 RX ORDER — PROMETHAZINE HYDROCHLORIDE 25 MG/ML
25 INJECTION, SOLUTION INTRAMUSCULAR; INTRAVENOUS EVERY 6 HOURS PRN
Status: DISCONTINUED | OUTPATIENT
Start: 2020-07-27 | End: 2020-08-03 | Stop reason: SDUPTHER

## 2020-07-27 RX ORDER — FUROSEMIDE 10 MG/ML
40 INJECTION INTRAMUSCULAR; INTRAVENOUS DAILY
Status: DISCONTINUED | OUTPATIENT
Start: 2020-07-28 | End: 2020-07-27

## 2020-07-27 RX ORDER — ACETYLCYSTEINE 200 MG/ML
3 SOLUTION ORAL; RESPIRATORY (INHALATION) EVERY 8 HOURS PRN
Status: DISCONTINUED | OUTPATIENT
Start: 2020-07-27 | End: 2020-08-03

## 2020-07-27 RX ORDER — PANTOPRAZOLE SODIUM 40 MG/1
80 TABLET, DELAYED RELEASE ORAL DAILY
Status: DISCONTINUED | OUTPATIENT
Start: 2020-07-28 | End: 2020-07-30

## 2020-07-27 RX ORDER — FUROSEMIDE 10 MG/ML
40 INJECTION INTRAMUSCULAR; INTRAVENOUS
Status: DISCONTINUED | OUTPATIENT
Start: 2020-07-27 | End: 2020-07-27

## 2020-07-27 RX ORDER — ALBUTEROL SULFATE 2.5 MG/3ML
2.5 SOLUTION RESPIRATORY (INHALATION) EVERY 4 HOURS PRN
Status: DISCONTINUED | OUTPATIENT
Start: 2020-07-27 | End: 2020-08-03

## 2020-07-27 RX ORDER — CALCIUM CARBONATE 200(500)MG
500 TABLET,CHEWABLE ORAL 3 TIMES DAILY PRN
Status: DISCONTINUED | OUTPATIENT
Start: 2020-07-27 | End: 2020-07-27

## 2020-07-27 RX ORDER — NITROGLYCERIN 0.4 MG/1
0.4 TABLET SUBLINGUAL
Status: DISCONTINUED | OUTPATIENT
Start: 2020-07-27 | End: 2020-08-03

## 2020-07-27 RX ORDER — FUROSEMIDE 10 MG/ML
80 INJECTION INTRAMUSCULAR; INTRAVENOUS
Status: DISCONTINUED | OUTPATIENT
Start: 2020-07-27 | End: 2020-07-29

## 2020-07-27 RX ADMIN — APIXABAN 2.5 MG: 2.5 TABLET, FILM COATED ORAL at 17:18

## 2020-07-27 RX ADMIN — INSULIN GLARGINE 15 UNITS: 100 INJECTION, SOLUTION SUBCUTANEOUS at 23:05

## 2020-07-27 RX ADMIN — FERROUS GLUCONATE 324 MG: 324 TABLET ORAL at 08:34

## 2020-07-27 RX ADMIN — FUROSEMIDE 80 MG: 10 INJECTION, SOLUTION INTRAMUSCULAR; INTRAVENOUS at 17:20

## 2020-07-27 RX ADMIN — DOCUSATE SODIUM 100 MG: 100 CAPSULE, LIQUID FILLED ORAL at 08:34

## 2020-07-27 RX ADMIN — LEVALBUTEROL HYDROCHLORIDE 1.25 MG: 1.25 SOLUTION, CONCENTRATE RESPIRATORY (INHALATION) at 19:55

## 2020-07-27 RX ADMIN — IPRATROPIUM BROMIDE 0.5 MG: 0.5 SOLUTION RESPIRATORY (INHALATION) at 19:55

## 2020-07-27 RX ADMIN — NITROGLYCERIN 0.4 MG: 0.4 TABLET SUBLINGUAL at 04:11

## 2020-07-27 RX ADMIN — LEVALBUTEROL HYDROCHLORIDE 1.25 MG: 1.25 SOLUTION, CONCENTRATE RESPIRATORY (INHALATION) at 13:31

## 2020-07-27 RX ADMIN — PRAVASTATIN SODIUM 80 MG: 80 TABLET ORAL at 17:18

## 2020-07-27 RX ADMIN — APIXABAN 2.5 MG: 2.5 TABLET, FILM COATED ORAL at 08:34

## 2020-07-27 RX ADMIN — FUROSEMIDE 80 MG: 10 INJECTION, SOLUTION INTRAMUSCULAR; INTRAVENOUS at 03:59

## 2020-07-27 RX ADMIN — IPRATROPIUM BROMIDE 0.5 MG: 0.5 SOLUTION RESPIRATORY (INHALATION) at 08:04

## 2020-07-27 RX ADMIN — INSULIN LISPRO 1 UNITS: 100 INJECTION, SOLUTION INTRAVENOUS; SUBCUTANEOUS at 23:05

## 2020-07-27 RX ADMIN — CARVEDILOL 3.12 MG: 3.12 TABLET, FILM COATED ORAL at 17:18

## 2020-07-27 RX ADMIN — PANTOPRAZOLE SODIUM 40 MG: 40 TABLET, DELAYED RELEASE ORAL at 08:34

## 2020-07-27 RX ADMIN — IPRATROPIUM BROMIDE AND ALBUTEROL SULFATE 3 ML: 2.5; .5 SOLUTION RESPIRATORY (INHALATION) at 03:35

## 2020-07-27 RX ADMIN — CALCIUM CARBONATE (ANTACID) CHEW TAB 500 MG 500 MG: 500 CHEW TAB at 03:29

## 2020-07-27 RX ADMIN — INSULIN LISPRO 1 UNITS: 100 INJECTION, SOLUTION INTRAVENOUS; SUBCUTANEOUS at 11:17

## 2020-07-27 RX ADMIN — CEFTRIAXONE 1000 MG: 1 INJECTION, SOLUTION INTRAVENOUS at 23:04

## 2020-07-27 RX ADMIN — ACETYLCYSTEINE 600 MG: 200 SOLUTION ORAL; RESPIRATORY (INHALATION) at 13:43

## 2020-07-27 RX ADMIN — INSULIN LISPRO 4 UNITS: 100 INJECTION, SOLUTION INTRAVENOUS; SUBCUTANEOUS at 09:01

## 2020-07-27 RX ADMIN — AMLODIPINE BESYLATE 5 MG: 5 TABLET ORAL at 08:34

## 2020-07-27 RX ADMIN — FLUTICASONE FUROATE AND VILANTEROL TRIFENATATE 1 PUFF: 100; 25 POWDER RESPIRATORY (INHALATION) at 09:01

## 2020-07-27 RX ADMIN — CARVEDILOL 3.12 MG: 3.12 TABLET, FILM COATED ORAL at 08:34

## 2020-07-27 RX ADMIN — INSULIN LISPRO 2 UNITS: 100 INJECTION, SOLUTION INTRAVENOUS; SUBCUTANEOUS at 17:19

## 2020-07-27 RX ADMIN — DOCUSATE SODIUM 100 MG: 100 CAPSULE, LIQUID FILLED ORAL at 17:18

## 2020-07-27 RX ADMIN — LISINOPRIL 2.5 MG: 2.5 TABLET ORAL at 08:34

## 2020-07-27 RX ADMIN — LEVALBUTEROL HYDROCHLORIDE 1.25 MG: 1.25 SOLUTION, CONCENTRATE RESPIRATORY (INHALATION) at 08:04

## 2020-07-27 RX ADMIN — IPRATROPIUM BROMIDE 0.5 MG: 0.5 SOLUTION RESPIRATORY (INHALATION) at 13:31

## 2020-07-27 NOTE — CONSULTS
Subjective:       Jas Robles is a 82 y o  female SNF resident with PMH significant for known CAD s/p CABG; HFrEF (EF 35-40%); ICM; atrial fibrillation on apixaban; T2DM; COPD on supplemental oxygen who presented to the [facility] ER on 7/25/2020 with 9# weight gain, BL pedal edema, and worsening dyspnea  She initially required continuous BiPAP at time of admission but has since been transitioned to supplemental oxygen via NC  Treatment with IV diuretics was initiated at time of admission and the patient had rapid improvement in her oxygen requirement despite a relatively modest negative UOP  Review of Systems  Constitution: (+) weight gain of approximately 9#  HENT: Negative for acute hearing loss or tinnitus     Cardiovascular: (+) BL leg swelling  Negative for chest pain, claudication, orthopnea, palpitations  Respiratory: History of COPD  (+) dyspnea, (+) cough    Gastrointestinal: (+) N/V  Negative for abdominal pain  Neurological: Negative for dizziness and lightheadedness    All other systems reviewed and are negative  Objective: Intake/Output Summary (Last 24 hours) at 7/27/2020 1547  Last data filed at 7/27/2020 9673  Gross per 24 hour   Intake 540 ml   Output 750 ml   Net -210 ml        Physical Exam  General: Elderly woman in no acute distress wearing supplemental oxygen  HEENT: Wears glasses; otherwise unremarkable  Neck: Supple and without lymphadenopathy  No thyromegaly  Mild JVD with (+) HJR  Lungs: Crackles auscultated to mid-lobe BL  Cardiac: Irregular rhythm  Normal S1 and S2  No S3, No S4   No rub and no murmur  Neurological: AAOx3,  strength equal, no facial droop  Abdomen: soft, nontender, rotund  Musculoskeletal: Unremarkable  Extremities: Mild LE edema  Skin: Senescent skin changes    Admission Weight: 73 7 kg  Yesterday's Weight: 68 7 kg  Today's Weight: 71 3 kg     Cardiographics and Imaging:    CXR 7/25/2020:  IMPRESSION:     Small-to-moderate-sized bilateral pleural effusions with associated compressive atelectasis      Pulmonary vascular congestion small amount of edema, though decreased from June  TTE 6/12/2020:  SUMMARY     LEFT VENTRICLE:  Akinesis of the basal to mid inferior and the inferolateral walls  The ventricle was dilated  Systolic function was moderately reduced  Ejection fraction was estimated in the range of 35 % to 40 %  There was assymetrical hypertrophy of the septum      RIGHT VENTRICLE:  The size was normal   Systolic function was normal      LEFT ATRIUM:  The atrium was moderately dilated      MITRAL VALVE:  There was moderate thickening  There was mild regurgitation      TRICUSPID VALVE:  There was mild regurgitation      IVC, HEPATIC VEINS:  The inferior vena cava was dilated  Respirophasic changes were blunted (less than 50% variation)      PERICARDIUM:  A trace pericardial effusion was identified  There was a large left pleural effusion      COMPARISONS:  Compared to prior study dated 3/28/2020 at [facility] the ejection fraction appears slightly higher on current study and the degree of MR appears less significant on current study  Lab Review   No results displayed because visit has over 200 results  No results displayed because visit has over 200 results  Assessment:      Acute decompensation of HFpEF    CAD s/p CABG  ICM (LVEF 35-40%)   Atrial fibrillation      Plan:     Ms Ruth Watson reports some improvement in her PTA S/S a/w acutely decompensated HF but continues to acknowledge SOB with abdominal bloating and N/V   UOP of < 2L is documented since admission, which is not congruent with daily weights  She has objective evidence of hypervolemia on physical examination  Recommend increase in IV furosemide to 80 mg BID to further promote diuresis  Although Ms Ruth Watson reportedly developed NANCY with aggressive diuresis during her last admission, her poor renal function may necessitate higher dosing of loop diuretics  Anemia of chronic disease will also attenuate any attempts at diuresis  Consider stopping ACEi therapy d/t CKD and thereafter initiating hydralazine and long-acting nitrate (Imdur)  Continue carvedilol 3 125 mg BID and apixaban 2 5 mg BID for the management of atrial fibrillation  Rate control appears to be adequate  Continue pravastatin 80 mg daily  Use of antiplatelet agents (ie, ASA) is reportedly contraindicated by anemia with a history of thrombocytopenia, though recent platelet counts appear to be acceptable

## 2020-07-27 NOTE — ASSESSMENT & PLAN NOTE
· Patient admitted for acute on chronic respiratory failure w/ hypoxia after gaining approximately 9 lbs over prior week and developing peripheral edema and shortness of breath  · Patient uses supplemental oxygen 2 L via nasal cannula continuously as outpatient due to COPD and CHF  Initially required continuous BiPAP at time of admission  · Initiated treatment with IV diuretic at time of admission and patient had rapid improvement in oxygen requirement despite relatively low urine output compared to expected  · Continue assertive diuresis and optimize outpatient plan of care  · Procalcitonin negative  Chest x-ray vascular congestion

## 2020-07-27 NOTE — ASSESSMENT & PLAN NOTE
Wt Readings from Last 3 Encounters:   07/27/20 71 3 kg (157 lb 3 oz)   07/03/20 64 kg (141 lb 1 5 oz)   06/22/20 68 9 kg (152 lb)     · Present on admission  · Reports increased dyspnea, abdominal and lower extremity edema, 9 lb weight gain at skilled nursing facility  · Elevated BNP 66288, chest x-ray with diffuse vascular congestion  · In respiratory distress at time of admission placed on BiPAP but able to be removed after diuresis  · Maintained on 2 L nasal cannula chronically supplemental oxygen  · EF 35-40% June 2020  · Maintained on Demadex, Coreg and lisinopril  · Received additional Demadex prior to arrival and a skilled nursing facility without improvement    Admit to hospitalist service  Continue diuresis with furosemide 40 mg IV b i d  Closely monitor creatinine, patient developed ANNCY with last admission after diuresis  Trend blood pressures  2 g sodium diet with 1500 mL fluid restriction  Strict intake and output, Vasquez catheter was placed by ER staff  Daily weights  Continue carvedilol and lisinopril  Telemetry monitor  Will appreciate Cardiology comanagement

## 2020-07-27 NOTE — ASSESSMENT & PLAN NOTE
· CAD s/p CABG      · Patient not on anti-platelet therapy, severe anemia receives iron infusions  · Continue statin  · Anticoagulated with Eliquis  · Denies chest pain  · Mild elevated troponin which could be secondary to CHF exacerbation  · Trend troponin   · Follows with [facility] in [place]

## 2020-07-27 NOTE — PLAN OF CARE
Problem: PHYSICAL THERAPY ADULT  Goal: Performs mobility at highest level of function for planned discharge setting  See evaluation for individualized goals  Description  Treatment/Interventions: Functional transfer training, LE strengthening/ROM, Therapeutic exercise, Endurance training, Patient/family training, Equipment eval/education, Bed mobility, Gait training, Compensatory technique education, Spoke to nursing  Equipment Recommended: (walker and wheelchair)       See flowsheet documentation for full assessment, interventions and recommendations  2/[id number]/[id number] by Rossi Ewing, PT  Outcome: Progressing  Note:   Prognosis: Good  Problem List: Decreased strength, Decreased endurance, Impaired balance, Decreased mobility, Impaired judgement, Decreased safety awareness, Impaired hearing   Pt requires minimal verbal instruction or tactile feedback to perform exercises with proper form and technique  She requires increased time and rest between exercises  PT Discharge Recommendation: Post-Acute Rehabilitation Services(return to SNF with therapy services)     PT - OK to Discharge: (when medically cleared)    See flowsheet documentation for full assessment

## 2020-07-27 NOTE — ASSESSMENT & PLAN NOTE
· White blood cells innumerable in straight catheterized urinary specimen  · Treating with ceftriaxone pending urine culture

## 2020-07-27 NOTE — ASSESSMENT & PLAN NOTE
Patient experiencing severe reflux symptoms 7/27  She is already on Protonix 40 mg daily  Will increase dose for a few days and provide other supportive care

## 2020-07-27 NOTE — ASSESSMENT & PLAN NOTE
Lab Results   Component Value Date    HGBA1C 7 4 (H) 07/26/2020       Recent Labs     07/26/20  1147 07/26/20  1648 07/26/20  2144 07/27/20  0733   POCGLU 361* 298* 353* 274*       Blood Sugar Average: Last 72 hrs:  (P) 615 [id number]     Lantus recently discontinued at skilled nursing facility for unclear reasons  Based on repeatedly elevated blood glucose and elevated A1c; will restart lantus  Initiate insulin protocol with blood glucose AC and HS  Carbohydrate controlled diet    Will uptitrate 10U qHS -> 15U qHs

## 2020-07-27 NOTE — PLAN OF CARE
Problem: Potential for Falls  Goal: Patient will remain free of falls  Description  INTERVENTIONS:  - Assess patient frequently for physical needs  -  Identify cognitive and physical deficits and behaviors that affect risk of falls    -  [place] fall precautions as indicated by assessment   - Educate patient/family on patient safety including physical limitations  - Instruct patient to call for assistance with activity based on assessment  - Modify environment to reduce risk of injury  - Consider OT/PT consult to assist with strengthening/mobility  Outcome: Progressing     Problem: Prexisting or High Potential for Compromised Skin Integrity  Goal: Skin integrity is maintained or improved  Description  INTERVENTIONS:  - Identify patients at risk for skin breakdown  - Assess and monitor skin integrity  - Assess and monitor nutrition and hydration status  - Monitor labs   - Assess for incontinence   - Turn and reposition patient  - Assist with mobility/ambulation  - Relieve pressure over bony prominences  - Avoid friction and shearing  - Provide appropriate hygiene as needed including keeping skin clean and dry  - Evaluate need for skin moisturizer/barrier cream  - Collaborate with interdisciplinary team   - Patient/family teaching  - Consider wound care consult   Outcome: Progressing     Problem: PAIN - ADULT  Goal: Verbalizes/displays adequate comfort level or baseline comfort level  Description  Interventions:  - Encourage patient to monitor pain and request assistance  - Assess pain using appropriate pain scale  - Administer analgesics based on type and severity of pain and evaluate response  - Implement non-pharmacological measures as appropriate and evaluate response  - Consider cultural and social influences on pain and pain management  - Notify physician/advanced practitioner if interventions unsuccessful or patient reports new pain  Outcome: Progressing     Problem: INFECTION - ADULT  Goal: Absence or prevention of progression during hospitalization  Description  INTERVENTIONS:  - Assess and monitor for signs and symptoms of infection  - Monitor lab/diagnostic results  - Monitor all insertion sites, i e  indwelling lines, tubes, and drains  - Monitor endotracheal if appropriate and nasal secretions for changes in amount and color  - [place] appropriate cooling/warming therapies per order  - Administer medications as ordered  - Instruct and encourage patient and family to use good hand hygiene technique  - Identify and instruct in appropriate isolation precautions for identified infection/condition  Outcome: Progressing  Goal: Absence of fever/infection during neutropenic period  Description  INTERVENTIONS:  - Monitor WBC    Outcome: Progressing     Problem: SAFETY ADULT  Goal: Patient will remain free of falls  Description  INTERVENTIONS:  - Assess patient frequently for physical needs  -  Identify cognitive and physical deficits and behaviors that affect risk of falls    -  [place] fall precautions as indicated by assessment   - Educate patient/family on patient safety including physical limitations  - Instruct patient to call for assistance with activity based on assessment  - Modify environment to reduce risk of injury  - Consider OT/PT consult to assist with strengthening/mobility  Outcome: Progressing  Goal: Maintain or return to baseline ADL function  Description  INTERVENTIONS:  -  Assess patient's ability to carry out ADLs; assess patient's baseline for ADL function and identify physical deficits which impact ability to perform ADLs (bathing, care of mouth/teeth, toileting, grooming, dressing, etc )  - Assess/evaluate cause of self-care deficits   - Assess range of motion  - Assess patient's mobility; develop plan if impaired  - Assess patient's need for assistive devices and provide as appropriate  - Encourage maximum independence but intervene and supervise when necessary  - Involve family in performance of ADLs  - Assess for home care needs following discharge   - Consider OT consult to assist with ADL evaluation and planning for discharge  - Provide patient education as appropriate  Outcome: Progressing  Goal: Maintain or return mobility status to optimal level  Description  INTERVENTIONS:  - Assess patient's baseline mobility status (ambulation, transfers, stairs, etc )    - Identify cognitive and physical deficits and behaviors that affect mobility  - Identify mobility aids required to assist with transfers and/or ambulation (gait belt, sit-to-stand, lift, walker, cane, etc )  - [place] fall precautions as indicated by assessment  - Record patient progress and toleration of activity level on Mobility SBAR; progress patient to next Phase/Stage  - Instruct patient to call for assistance with activity based on assessment  - Consider rehabilitation consult to assist with strengthening/weightbearing, etc   Outcome: Progressing     Problem: DISCHARGE PLANNING  Goal: Discharge to home or other facility with appropriate resources  Description  INTERVENTIONS:  - Identify barriers to discharge w/patient and caregiver  - Arrange for needed discharge resources and transportation as appropriate  - Identify discharge learning needs (meds, wound care, etc )  - Arrange for interpretive services to assist at discharge as needed  - Refer to Case Management Department for coordinating discharge planning if the patient needs post-hospital services based on physician/advanced practitioner order or complex needs related to functional status, cognitive ability, or social support system  Outcome: Progressing     Problem: Knowledge Deficit  Goal: Patient/family/caregiver demonstrates understanding of disease process, treatment plan, medications, and discharge instructions  Description  Complete learning assessment and assess knowledge base    Interventions:  - Provide teaching at level of understanding  - Provide teaching via preferred learning methods  Outcome: Progressing     Problem: RESPIRATORY - ADULT  Goal: Achieves optimal ventilation and oxygenation  Description  INTERVENTIONS:  - Assess for changes in respiratory status  - Assess for changes in mentation and behavior  - Position to facilitate oxygenation and minimize respiratory effort  - Oxygen administered by appropriate delivery if ordered  - Initiate smoking cessation education as indicated  - Encourage broncho-pulmonary hygiene including cough, deep breathe, Incentive Spirometry  - Assess the need for suctioning and aspirate as needed  - Assess and instruct to report SOB or any respiratory difficulty  - Respiratory Therapy support as indicated  Outcome: Progressing

## 2020-07-27 NOTE — ASSESSMENT & PLAN NOTE
EKG on 07/27 shows prolonged QTC  Caution with antiemetics  Patient is nauseous  Phenergan provided

## 2020-07-27 NOTE — CASE MANAGEMENT
Chart reviewed:  Admit with acute on chronic resp failure:  HF  - IV BID Lasix: Rocephin, nebs with Cardiology Consult    This is Debbie's, 3rd recent admission for HF/ SOB/COPD  Last admission was  6/26-7/3 with : HF/COPD Management  -Prior to that she was here 6/12- 6/19 with HF, UTI, CKD,  -normally she uses 2 liters of 02  Patients risk for unplanned readmission score is 29 _ Medium Risk      CM met with Sahra Oviedo and discussed that she resides at [facility]  She said she takes her medications and is on a pureed diet with sodium restriction  Pt  was initially admitted to [facility] in May  Prior to that she was living in [facility], using 2 liters of 02 and uses a walker  Pt has 5 children: No formal POA/ Advance Directive as of yet  They have started the paperwork, and it just needs to be finalized  Per Sahra Oviedo her daughter Summer Jean, makes her health care decisions  Per Patient plan is to return to [facility], referral placed to [facility]  CM called daughter, Summer Jean, unable to leave a message as the phone was busy, will continue to follow for additional needs  Transportation: Anticipate BLS as patient is on 2 liters of 02 at all times

## 2020-07-27 NOTE — ASSESSMENT & PLAN NOTE
7/27 at 4:30 a m , patient had 9 beats nonsustained V-tach  Setting of acute CHF exacerbation    Continue tele monitor

## 2020-07-27 NOTE — QUICK NOTE
Called to see patient for heartburn type pain radiating into chest with dyspnea and increased work of breathing, pulse oximetry 84% on 4 L    Lungs with rales bilateral bases, wheezing expiratory, rhonchi  Lasix 80 mg given x1    Chest x-ray reviewed unchanged  EKG SR with PAC, no ischemic changes  Add troponin to AM labs    NTG SL given for "heart burn" and chest pain with relief of symptoms    Plan:  Initiate Telemetry  Trend troponins  Consultation to Cardiology - pt may benefit from long acting nitrate, optimized medical therapy  Continue SL NTG as needed for Anginal symptoms  Bipap PRN

## 2020-07-27 NOTE — RESPIRATORY THERAPY NOTE
Resp Care        07/27/20 0335   Inhalation Therapy Tx   $ Inhalation Therapy Performed Yes   $ Pulse Oximetry Spot Check Charge Completed   Pre-Treatment Pulse 76   Pre-Treatment Respirations 18   Duration 10   Breath Sounds Pre-Treatment Bilateral Coarse;Crackles   Breath Sounds Post-Treatment Bilateral Coarse   Post-Treatment Pulse 71   Post-Treatment Respsirations 18   Delivery Source Air;UDN   Position High Ruggiero's   Treatment Tolerance Tolerated well   Resp Comments RN called RT pt was wheezing  pt coughing stated her throat burns  Pt SpO2 89% on 2 L bumped her up to 3 L NC, Bs coarse rhonchi with crackles at the base  RN made aware

## 2020-07-27 NOTE — ASSESSMENT & PLAN NOTE
· CAD s/p CABG      · Patient not on anti-platelet therapy, severe anemia receives iron infusions  · Continue statin  · Anticoagulated with Eliquis  · Denies chest pain  · Mild elevated troponin which could be secondary to CHF exacerbation  · Trend troponin   · Follows with [facility] in [address] [facility] [address]

## 2020-07-27 NOTE — PROGRESS NOTES
Progress Note - Magalie Wang 1935, 82 y o  female MRN: [id number]  Unit/Bed#: -01 Encounter: [id number]  Primary Care Provider: Se Greenfield MD   Date and time admitted to hospital: 7/25/2020  7:31 PM    * Acute on chronic respiratory failure with hypoxia [facility])  Assessment & Plan  · Patient admitted for acute on chronic respiratory failure w/ hypoxia after gaining approximately 9 lbs over prior week and developing peripheral edema and shortness of breath  · Patient uses supplemental oxygen 2 L via nasal cannula continuously as outpatient due to COPD and CHF  Initially required continuous BiPAP at time of admission  · Initiated treatment with IV diuretic at time of admission and patient had rapid improvement in oxygen requirement despite relatively low urine output compared to expected  · Continue assertive diuresis and optimize outpatient plan of care  · Procalcitonin negative  Chest x-ray vascular congestion  Acute on chronic systolic CHF (congestive heart failure) (HCC)  Assessment & Plan  Wt Readings from Last 3 Encounters:   07/27/20 71 3 kg (157 lb 3 oz)   07/03/20 64 kg (141 lb 1 5 oz)   06/22/20 68 9 kg (152 lb)     · Present on admission  · Reports increased dyspnea, abdominal and lower extremity edema, 9 lb weight gain at skilled nursing facility  · Elevated BNP 12076, chest x-ray with diffuse vascular congestion  · In respiratory distress at time of admission placed on BiPAP but able to be removed after diuresis  · Maintained on 2 L nasal cannula chronically supplemental oxygen  · EF 35-40% June 2020  · Maintained on Demadex, Coreg and lisinopril  · Received additional Demadex prior to arrival and a skilled nursing facility without improvement    Admit to hospitalist service  Continue diuresis with furosemide 40 mg IV b i d    Closely monitor creatinine, patient developed NANCY with last admission after diuresis  Trend blood pressures  2 g sodium diet with 1500 mL fluid restriction  Strict intake and output, Vasquez catheter was placed by ER staff  Daily weights  Continue carvedilol and lisinopril  Telemetry monitor  Will appreciate Cardiology comanagement  V-tach [facility])  Assessment & Plan  7/27 at 4:30 a m , patient had 9 beats nonsustained V-tach  Setting of acute CHF exacerbation  Continue tele monitor    Prolonged Q-T interval on ECG  Assessment & Plan  EKG on 07/27 shows prolonged QTC  Caution with antiemetics  Patient is nauseous  Phenergan provided  Elevated troponin level not due myocardial infarction  Assessment & Plan  Non-MI troponin elevation due to CHF  Slightly elevated troponin flat and stable on serial labs  Iron deficiency anemia  Assessment & Plan  · Hemoglobin appears stable  · Last infusion 07/20/2020  · Continue ferrous gluconate  · Trend hemoglobin    UTI (urinary tract infection)  Assessment & Plan  · White blood cells innumerable in straight catheterized urinary specimen  · Treating with ceftriaxone pending urine culture    CAD (coronary artery disease), native coronary artery  Assessment & Plan  · CAD s/p CABG  · Patient not on anti-platelet therapy, severe anemia receives iron infusions  · Continue statin  · Anticoagulated with Eliquis  · Denies chest pain  · Mild elevated troponin which could be secondary to CHF exacerbation  · Trend troponin   · Follows with [facility] in [place]    Paroxysmal atrial fibrillation [facility])  Assessment & Plan  · Paroxysmal atrial fibrillation with elevated CHADS2 vascular score  · Continue Coreg 3 125 mg b i d   · Continue Eliquis 2 5 mg b i d    · Rate controlled  · PPM    Type 2 diabetes mellitus with hyperglycemia [facility])  Assessment & Plan  Lab Results   Component Value Date    HGBA1C 7 4 (H) 07/26/2020       Recent Labs     07/26/20  1147 07/26/20  1648 07/26/20  2144 07/27/20  0733   POCGLU 361* 298* 353* 274*       Blood Sugar Average: Last 72 hrs:  (P) 389 [id number]     Lantus recently discontinued at skilled nursing facility for unclear reasons  Based on repeatedly elevated blood glucose and elevated A1c; will restart lantus  Initiate insulin protocol with blood glucose AC and HS  Carbohydrate controlled diet  Will uptitrate 10U qHS -> 15U qHs      Esophageal reflux  Assessment & Plan  Patient experiencing severe reflux symptoms 7/27  She is already on Protonix 40 mg daily  Will increase dose for a few days and provide other supportive care  CKD (chronic kidney disease) stage 4, GFR 15-29 ml/min ([facility])  Assessment & Plan  Creatinine appears baseline  Trend creatinine carefully while diuresing  Avoid hypotension  Urinalysis with significant pyuria, urine culture pending treat with Rocephin  Avoid unnecessary nephrotoxins    COPD with emphysema ([facility])  Assessment & Plan  · No acute exacerbation  · Continue nebulized bronchodilators and inhaled corticosteroid  · Patient did receive dexamethasone 10 mg IV x1 in the ER, no indication for further systemic glucocorticoids  · Continue 2 L oxygen keep pulse oximetry greater than 90%    Essential hypertension  Assessment & Plan  · Continue home regimen of lisinopril 5 mg, carvedilol 3 125 mg b i d , amlodipine 5 mg daily  · Trend blood pressures  · Avoid hypotension    VTE Pharmacologic Prophylaxis:   Pharmacologic: Apixaban (Eliquis)  Mechanical VTE Prophylaxis in Place: Yes    Patient Centered Rounds: I have performed bedside rounds with nursing staff today  Discussions with Specialists or Other Care Team Provider:  Discussed with case management, nursing    Education and Discussions with Family / Patient: discussed with daughter Aric Treviño over the phone  Full update given  All questions answered  Time Spent for Care: 30 minutes  More than 50% of total time spent on counseling and coordination of care as described above      Current Length of Stay: 2 day(s)    Current Patient Status: Inpatient   Certification Statement: The patient will continue to require additional inpatient hospital stay due to Acute CHF exacerbation with respiratory failure    Discharge Plan:  Pending, not medically ready    Code Status: Level 1 - Full Code      Subjective:   Patient with cough today  Coughing up phlegm  She feels nauseous  Feels as if she has an upset stomach  No abdominal pain  Had a rough night due to vomiting  Objective:     Vitals:   Temp (24hrs), Av °F (36 7 °C), Min:97 6 °F (36 4 °C), Max:98 2 °F (36 8 °C)    Temp:  [97 6 °F (36 4 °C)-98 2 °F (36 8 °C)] 97 6 °F (36 4 °C)  HR:  [62-94] 94  Resp:  [18-29] 24  BP: (109-134)/(51-77) 129/62  SpO2:  [90 %-96 %] 94 %  Body mass index is 26 98 kg/m²  Input and Output Summary (last 24 hours): Intake/Output Summary (Last 24 hours) at 2020 1107  Last data filed at 2020 9692  Gross per 24 hour   Intake 540 ml   Output 825 ml   Net -285 ml       Physical Exam:     Physical Exam   Constitutional: She is oriented to person, place, and time  She appears well-developed and well-nourished  No distress  Pleasant, fully conversational   HENT:   Head: Normocephalic and atraumatic  Eyes: Right eye exhibits no discharge  Left eye exhibits no discharge  No scleral icterus  Cardiovascular: Normal rate and regular rhythm  Exam reveals no gallop and no friction rub  No murmur heard  Pulmonary/Chest: Effort normal  No respiratory distress  She has no wheezes  She has rales  Nasal cannula O2 supplementation in place  No increased work of breathing  Abdominal: Soft  Bowel sounds are normal  She exhibits no distension  There is no tenderness  Overweight abdomen   Neurological: She is alert and oriented to person, place, and time  Skin: Skin is warm and dry  Nursing note and vitals reviewed        Additional Data:     Labs:    Results from last 7 days   Lab Units 20  0513   WBC Thousand/uL 7 16   HEMOGLOBIN g/dL 8 2*   HEMATOCRIT % 26 2*   PLATELETS Thousands/uL 166   NEUTROS PCT % 70   LYMPHS PCT % 19   MONOS PCT % 11   EOS PCT % 0     Results from last 7 days   Lab Units 07/27/20  0513   SODIUM mmol/L 141   POTASSIUM mmol/L 4 5   CHLORIDE mmol/L 105   CO2 mmol/L 29   BUN mg/dL 61*   CREATININE mg/dL 2 59*   ANION GAP mmol/L 7   CALCIUM mg/dL 8 3   ALBUMIN g/dL 2 5*   TOTAL BILIRUBIN mg/dL 0 12*   ALK PHOS U/L 90   ALT U/L 11*   AST U/L 6   GLUCOSE RANDOM mg/dL 254*     Results from last 7 days   Lab Units 07/25/20 2023   INR  1 00     Results from last 7 days   Lab Units 07/27/20  0733 07/26/20  2144 07/26/20  1648 07/26/20  1147 07/26/20  0844 07/26/20  0105   POC GLUCOSE mg/dl 274* 353* 298* 361* 298* 259*     Results from last 7 days   Lab Units 07/26/20  0348   HEMOGLOBIN A1C % 7 4*     Results from last 7 days   Lab Units 07/25/20 2121 07/25/20 2023   LACTIC ACID mmol/L  --  1 4   PROCALCITONIN ng/ml 0 06  --            * I Have Reviewed All Lab Data Listed Above  * Additional Pertinent Lab Tests Reviewed: All Labs Within Last 24 Hours Reviewed    Imaging:    Imaging Reports Reviewed Today Include:  Chest x-ray report  Imaging Personally Reviewed by Myself Includes:  None    Recent Cultures (last 7 days):     Results from last 7 days   Lab Units 07/25/20 2023 07/25/20 1959   BLOOD CULTURE  Received in Microbiology Lab  Culture in Progress  Received in Microbiology Lab  Culture in Progress         Last 24 Hours Medication List:     Current Facility-Administered Medications:  acetaminophen 650 mg Oral Q6H PRN Jeanette S Darrel, CRNP    albuterol 2 5 mg Nebulization Q4H PRN Jeanette S Darrel, CRNP    aluminum-magnesium hydroxide-simethicone 30 mL Oral Q4H PRN Daiana Nix PA-C    amLODIPine 5 mg Oral Daily Jeanette S Darrel, CRNP    apixaban 2 5 mg Oral BID Jeanette S Darrel, CRNP    carvedilol 3 125 mg Oral BID Jeanette S Darrel, CRNP    cefTRIAXone 1,000 mg Intravenous Q24H Jeanette S Darrel, CRNP Last Rate: 1,000 mg (07/26/20 2246)   docusate sodium 100 mg Oral BID KRISTIN Lala    ferrous gluconate 324 mg Oral Daily Before Breakfast Jeanette S Darrel, CRNP    fluticasone-vilanterol 1 puff Inhalation Daily Jeanette S Darrel, CRNP    furosemide 40 mg Intravenous BID (diuretic) Jeanette S Darrel, CRNP    insulin glargine 15 Units Subcutaneous HS Daiaan Nix PA-C    insulin lispro 1-6 Units Subcutaneous TID AC Jeanette S Darrel, CRNP    insulin lispro 1-6 Units Subcutaneous HS Jeanette S Darrel, CRNP    ipratropium 0 5 mg Nebulization TID Horacio Bolton MD    levalbuterol 1 25 mg Nebulization TID Horacio Bolton MD    lisinopril 2 5 mg Oral Daily Jeanette S Darrel, CRNP    nitroglycerin 0 4 mg Sublingual Q5 Min PRN Jeanette S Darrel, CRNP    [START ON 7/28/2020] pantoprazole 80 mg Oral Daily Daiana Nix PA-C    phenol 1 spray Mouth/Throat Q2H PRN Daiana Nix PA-C    pravastatin 80 mg Oral Daily With Marii Albarran S Darrel, CRNP    promethazine 25 mg Intramuscular Q6H PRN Daiana Nix PA-C    senna 2 tablet Oral HS Lillian Alvarado PA-C         Today, Patient Was Seen By: Lillian Alvarado PA-C    ** Please Note: Dictation voice to text software may have been used in the creation of this document   **

## 2020-07-27 NOTE — ASSESSMENT & PLAN NOTE
Lab Results   Component Value Date    HGBA1C 7 4 (H) 07/26/2020       Recent Labs     07/26/20  0105 07/26/20  0844 07/26/20  1147 07/26/20  1648   POCGLU 259* 298* 361* 298*       Blood Sugar Average: Last 72 hrs:  (P) 304     Lantus recently discontinued at skilled nursing facility for unclear reasons  Based on repeatedly elevated blood glucose and elevated A1c; will restart lantus  Initiate insulin protocol with blood glucose AC and HS  Carbohydrate controlled diet

## 2020-07-27 NOTE — ASSESSMENT & PLAN NOTE
Wt Readings from Last 3 Encounters:   07/26/20 68 7 kg (151 lb 7 3 oz)   07/03/20 64 kg (141 lb 1 5 oz)   06/22/20 68 9 kg (152 lb)     · Present on admission  · Reports increased dyspnea, abdominal and lower extremity edema, 9 lb weight gain at skilled nursing facility  · Elevated BNP 57721, chest x-ray with diffuse vascular congestion  · In respiratory distress at time of admission placed on BiPAP but able to be removed after diuresis  · Maintained on 2 L nasal cannula chronically supplemental oxygen  · EF 35-40% June 2020  · Maintained on Demadex, Coreg and lisinopril  · Received additional Demadex prior to arrival and a skilled nursing facility without improvement    Admit to hospitalist service  Continue diuresis with furosemide 40 mg IV b i d    Closely monitor creatinine, patient developed NANCY with last admission after diuresis  Trend blood pressures  2 g sodium diet with 1500 mL fluid restriction  Strict intake and output, Vasquez catheter was placed by ER staff  Daily weights  Continue carvedilol and lisinopril

## 2020-07-27 NOTE — ASSESSMENT & PLAN NOTE
· Patient admitted for acute on chronic respiratory failure with hypoxia after gaining approximately 9 lbs over prior week and developing peripheral edema and shortness of breath  · Patient uses supplemental oxygen 2 L via nasal cannula continuously as outpatient due to COPD and CHF  Initially required continuous BiPAP at time of admission  · Initiated treatment with IV diuretic at time of admission and patient had rapid improvement in oxygen requirement despite relatively low urine output compared to expected  · Continue assertive diuresis and optimize outpatient plan of care

## 2020-07-27 NOTE — PLAN OF CARE
Problem: Potential for Falls  Goal: Patient will remain free of falls  Description  INTERVENTIONS:  - Assess patient frequently for physical needs  -  Identify cognitive and physical deficits and behaviors that affect risk of falls    -  [place] fall precautions as indicated by assessment   - Educate patient/family on patient safety including physical limitations  - Instruct patient to call for assistance with activity based on assessment  - Modify environment to reduce risk of injury  - Consider OT/PT consult to assist with strengthening/mobility  Outcome: Progressing     Problem: Prexisting or High Potential for Compromised Skin Integrity  Goal: Skin integrity is maintained or improved  Description  INTERVENTIONS:  - Identify patients at risk for skin breakdown  - Assess and monitor skin integrity  - Assess and monitor nutrition and hydration status  - Monitor labs   - Assess for incontinence   - Turn and reposition patient  - Assist with mobility/ambulation  - Relieve pressure over bony prominences  - Avoid friction and shearing  - Provide appropriate hygiene as needed including keeping skin clean and dry  - Evaluate need for skin moisturizer/barrier cream  - Collaborate with interdisciplinary team   - Patient/family teaching  - Consider wound care consult   Outcome: Progressing     Problem: PAIN - ADULT  Goal: Verbalizes/displays adequate comfort level or baseline comfort level  Description  Interventions:  - Encourage patient to monitor pain and request assistance  - Assess pain using appropriate pain scale  - Administer analgesics based on type and severity of pain and evaluate response  - Implement non-pharmacological measures as appropriate and evaluate response  - Consider cultural and social influences on pain and pain management  - Notify physician/advanced practitioner if interventions unsuccessful or patient reports new pain  Outcome: Progressing     Problem: INFECTION - ADULT  Goal: Absence or prevention of progression during hospitalization  Description  INTERVENTIONS:  - Assess and monitor for signs and symptoms of infection  - Monitor lab/diagnostic results  - Monitor all insertion sites, i e  indwelling lines, tubes, and drains  - Monitor endotracheal if appropriate and nasal secretions for changes in amount and color  - [place] appropriate cooling/warming therapies per order  - Administer medications as ordered  - Instruct and encourage patient and family to use good hand hygiene technique  - Identify and instruct in appropriate isolation precautions for identified infection/condition  Outcome: Progressing  Goal: Absence of fever/infection during neutropenic period  Description  INTERVENTIONS:  - Monitor WBC    Outcome: Progressing     Problem: SAFETY ADULT  Goal: Patient will remain free of falls  Description  INTERVENTIONS:  - Assess patient frequently for physical needs  -  Identify cognitive and physical deficits and behaviors that affect risk of falls    -  [place] fall precautions as indicated by assessment   - Educate patient/family on patient safety including physical limitations  - Instruct patient to call for assistance with activity based on assessment  - Modify environment to reduce risk of injury  - Consider OT/PT consult to assist with strengthening/mobility  Outcome: Progressing  Goal: Maintain or return to baseline ADL function  Description  INTERVENTIONS:  -  Assess patient's ability to carry out ADLs; assess patient's baseline for ADL function and identify physical deficits which impact ability to perform ADLs (bathing, care of mouth/teeth, toileting, grooming, dressing, etc )  - Assess/evaluate cause of self-care deficits   - Assess range of motion  - Assess patient's mobility; develop plan if impaired  - Assess patient's need for assistive devices and provide as appropriate  - Encourage maximum independence but intervene and supervise when necessary  - Involve family in performance of ADLs  - Assess for home care needs following discharge   - Consider OT consult to assist with ADL evaluation and planning for discharge  - Provide patient education as appropriate  Outcome: Progressing  Goal: Maintain or return mobility status to optimal level  Description  INTERVENTIONS:  - Assess patient's baseline mobility status (ambulation, transfers, stairs, etc )    - Identify cognitive and physical deficits and behaviors that affect mobility  - Identify mobility aids required to assist with transfers and/or ambulation (gait belt, sit-to-stand, lift, walker, cane, etc )  - [place] fall precautions as indicated by assessment  - Record patient progress and toleration of activity level on Mobility SBAR; progress patient to next Phase/Stage  - Instruct patient to call for assistance with activity based on assessment  - Consider rehabilitation consult to assist with strengthening/weightbearing, etc   Outcome: Progressing     Problem: DISCHARGE PLANNING  Goal: Discharge to home or other facility with appropriate resources  Description  INTERVENTIONS:  - Identify barriers to discharge w/patient and caregiver  - Arrange for needed discharge resources and transportation as appropriate  - Identify discharge learning needs (meds, wound care, etc )  - Arrange for interpretive services to assist at discharge as needed  - Refer to Case Management Department for coordinating discharge planning if the patient needs post-hospital services based on physician/advanced practitioner order or complex needs related to functional status, cognitive ability, or social support system  Outcome: Progressing     Problem: Knowledge Deficit  Goal: Patient/family/caregiver demonstrates understanding of disease process, treatment plan, medications, and discharge instructions  Description  Complete learning assessment and assess knowledge base    Interventions:  - Provide teaching at level of understanding  - Provide teaching via preferred learning methods  Outcome: Progressing     Problem: RESPIRATORY - ADULT  Goal: Achieves optimal ventilation and oxygenation  Description  INTERVENTIONS:  - Assess for changes in respiratory status  - Assess for changes in mentation and behavior  - Position to facilitate oxygenation and minimize respiratory effort  - Oxygen administered by appropriate delivery if ordered  - Initiate smoking cessation education as indicated  - Encourage broncho-pulmonary hygiene including cough, deep breathe, Incentive Spirometry  - Assess the need for suctioning and aspirate as needed  - Assess and instruct to report SOB or any respiratory difficulty  - Respiratory Therapy support as indicated  Outcome: Progressing

## 2020-07-27 NOTE — PHYSICAL THERAPY NOTE
PHYSICAL THERAPY EVALUATION  NAME:  Eran Posada  DATE: 07/27/20    AGE:   82 y o  Mrn:   [id number]  ADMIT DX:  UTI (urinary tract infection) [N39 0]  Hypoxia [R09 02]    Past Medical History:   Diagnosis Date    NANCY (acute kidney injury) ([facility] )     Atrial fibrillation ([place] [address] )     CH[facility] (congestive heart failure) ([facility])     COPD (chronic obstructive pulmonary disease) ([place] [address] )     Diabetes mellitus ([place] [address] )     Elevated troponin level not due myocardial infarction 7/26/2020    Paroxysmal atrial fibrillation ([place] [address] ) 2/13/2012    Trigger finger of thumb     last assessed: 07/18/13     Length Of Stay: 2  Performed at least 2 patient identifiers during session: Name and Birthday  PHYSICAL THERAPY EVALUATION :      07/27/20 1423   Note Type   Note type Eval/Treat   Pain Assessment   Pain Assessment Tool 0-10   Pain Score No Pain   Home Living   Type of Home SNF   Home Equipment Alicja Kill; Wheelchair-manual   Additional Comments Pt has been residing in SNF since May 2020  Prior Function   Level of [place] Needs assistance with ADLs and functional mobility; Needs assistance with IADLs   Lives With Facility staff   Receives Help From Personal care attendant   ADL Assistance Needs assistance  (reports being indepednent for dressing)   IADLs Needs assistance   Falls in the last 6 months 0   Comments Pt reports she is receiving therapy at [facility]  Reports she ambulates in her room by herself with her walker and takes herself into and out of the bathroom and completes hygiene without assistance "unless I make a mess " Reports has assistance for increased distances  Reports "I wear my socks and slip my shoes on  Reports being independent for ADLs  Restrictions/Precautions   Other Precautions Chair Alarm; Bed Alarm;Telemetry; Fall Risk;O2;Hard of hearing   General   Additional Pertinent History Pt with small to moderate B pleural effusions      Cognition   Orientation Level Oriented X4   Following Commands Follows one step commands without difficulty   RLE Assessment   RLE Assessment WFL   Strength RLE   RLE Overall Strength 3+/5   LLE Assessment   LLE Assessment WFL  (except hip flex to just > 90 degrees, dec knee ext -10 degre)   Strength LLE   LLE Overall Strength 3+/5  (except hip flex 2+/5 and knee ext 3-/5)   Coordination   Movements are Fluid and Coordinated 1   Light Touch   RLE Light Touch Grossly intact   LLE Light Touch Grossly intact   Bed Mobility   Supine to Sit 4  Minimal assistance   Additional items Assist x 1; Increased time required;Verbal cues  (trunk management)   Additional Comments HOB elevated 40 degrees  increased time to complete with rail with patient pulling on therapist despite cues for hand placement   Transfers   Sit to Stand   (stand by assistance)   Additional items Increased time required;Verbal cues   Stand to Sit   (stand by assistance)   Additional items Increased time required;Verbal cues   Stand pivot 4  Minimal assistance   Additional items Increased time required;Verbal cues   Additional Comments sit<>stand with stand by assistance with min cues for hand placement for safety with RWspt with RW with minAx1 with min manual management of RW and min cues for turning completely prior to sitting  Ambulation/Elevation   Gait pattern Excessively slow; Short stride   Gait Assistance   (minimal-->steadying asssistance)   Additional items Assist x 1;Verbal cues   Assistive Device Rolling walker   Distance 22'x1 with RW with min-->steadying assistance with decreasded step length, foot clearance and min cues for increased step length and to stay wihtin LILIAN of RW  min manual management of RW initally due to increased forward flexion with patient pushing RW anteriorly  Balance   Static Sitting Good   Dynamic Sitting Fair   Static Standing Fair -   Dynamic Standing Poor +   Ambulatory Poor +   Endurance Deficit   Endurance Deficit Yes   Endurance Deficit Description increased fatigue   pt on 1 lpm maintained at 93-96% thorughout  min MONTILLA with ambulation  Activity Tolerance   Activity Tolerance Patient limited by fatigue   Nurse Made Aware RNMaria Isabel   Assessment   Prognosis Good   Problem List Decreased strength;Decreased endurance; Impaired balance;Decreased mobility; Impaired judgement;Decreased safety awareness; Impaired hearing   Goals   Patient Goals "move better"   [facility] Expiration Date 08/06/20   PT Treatment Day 1   Plan   Treatment/Interventions Functional transfer training;LE strengthening/ROM; Therapeutic exercise; Endurance training;Patient/family training;Equipment eval/education; Bed mobility;Gait training; Compensatory technique education;Spoke to nursing   PT Frequency Other (Comment)  (3-5x/week)   Recommendation   PT Discharge Recommendation Post-Acute Rehabilitation Services  (return to SNF with therapy services)   Equipment Recommended   (walker and wheelchair)   PT - OK to Discharge   (when medically cleared)   Additional Comments [facility] 6 clicks 61/44     (Please find full objective findings from PT assessment regarding body systems outlined above)  Assessment: Pt is a 82 y o  female seen for PT evaluation s/p admission to [address] [place] [address] on 7/25/2020 with Acute on chronic respiratory failure with hypoxia ([facility] )  Order placed for PT services    Upon evaluation: Pt is presenting with impaired functional mobility due to decreased strength, decreased endurance, impaired balance, gait deviations, decreased safety awareness, impaired judgment, impaired hearing and fall risk requiring minimal assistance for bed mobility, minimal assistance for transfers and minimal to steadying assistance for ambulation with RW  Pt's clinical presentation is currently unstable/unpredictable given the functional mobility deficits above, especially weakness, decreased endurance, gait deviations, decreased activity tolerance, decreased functional mobility tolerance, decreased safety awareness, impaired judgement and SOB upon exertion, coupled with fall risks as indicated by AM-PAC 6-Clicks: 00/68 as well as impaired balance, polypharmacy, impaired judgement and decreased safety awareness and combined with medical complications of abnormal renal lab values, abnormal H&H, abnormal blood sugars, multiple readmissions, need for input for mobility technique/safety and elevated troponin due to CHF  Pt's PMHx and comorbidities that may affect physical performance and progress include: CHF, COPD, CKD, A fib, DM, HTN, CAD and MONY  Personal factors affecting pt at time of IE include: inability to perform ADLs, inability to navigate level surfaces without external assistance and inability to ambulate household distances  Pt will benefit from continued skilled PT services to address deficits as defined above and to maximize level of functional mobility to facilitate return toward PLOF and improved QOL  From PT/mobility standpoint, recommendation at time of d/c would be return to SNF with therapy services pending progress in order to reduce fall risk and maximize pt's functional independence and consistency with mobility in order to facilitate return to PLOF  Recommend ther ex next 1-2 sessions  Goals: Pt will: Perform bed mobility tasks with modified I to reposition in bed and prepare for transfers  Pt will perform transfers with modified I to increase Indep in home environment, decrease burden of care and decrease risk for falls and prepare for ambulation  Pt will ambulate with RW for >/= 76' with  Supervision  to increase Indep in home environment, decrease burden of care, decrease risk for falls, improve activity tolerance and improve gait quality and to access home environment  Pt will increase B LE strength >/= 1/2 MMT grade to facilitate functional mobility       Heather Bryant, PT,DPT         PHYSICAL THERAPY TREATMENT NOTE  Time In:1443  Time Out:1456  Total Time: 15'      S:  "I didn't sleep well yesterday "  O: Exercises    Side/Reps/sets   Heelslides    Glute Sets    Hip Abduction    Hip Adduction  isometric 2x10   Hip flexion 2x10 AROM   Ankle Pumps 2x10 AROM   Quad sets    Long Arc Quads 2x10 AROM   Hamstring Stretch    Heel Cord Stretch    Education      A:  Pt requires minimal verbal instruction or tactile feedback to perform exercises with proper form and technique  She requires increased time and rest between exercises     P:  Recommend addition of therapeutic exercises to current functional mobility program      Sheri Cunha, PT, DPT

## 2020-07-27 NOTE — PLAN OF CARE
Problem: PHYSICAL THERAPY ADULT  Goal: Performs mobility at highest level of function for planned discharge setting  See evaluation for individualized goals  Description  Treatment/Interventions: Functional transfer training, LE strengthening/ROM, Therapeutic exercise, Endurance training, Patient/family training, Equipment eval/education, Bed mobility, Gait training, Compensatory technique education, Spoke to nursing  Equipment Recommended: (walker and wheelchair)       See flowsheet documentation for full assessment, interventions and recommendations  Note:   Prognosis: Good  Problem List: Decreased strength, Decreased endurance, Impaired balance, Decreased mobility, Impaired judgement, Decreased safety awareness, Impaired hearing  Assessment: Pt is a 82 y o  female seen for PT evaluation s/p admission to [address] [place] [address] on 7/25/2020 with Acute on chronic respiratory failure with hypoxia ([facility] )  Order placed for PT services    Upon evaluation: Pt is presenting with impaired functional mobility due to decreased strength, decreased endurance, impaired balance, gait deviations, decreased safety awareness, impaired judgment, impaired hearing and fall risk requiring minimal assistance for bed mobility, minimal assistance for transfers and minimal to steadying assistance for ambulation with RW  Pt's clinical presentation is currently unstable/unpredictable given the functional mobility deficits above, especially weakness, decreased endurance, gait deviations, decreased activity tolerance, decreased functional mobility tolerance, decreased safety awareness, impaired judgement and SOB upon exertion, coupled with fall risks as indicated by AM-PAC 6-Clicks: 93/61 as well as impaired balance, polypharmacy, impaired judgement and decreased safety awareness and combined with medical complications of abnormal renal lab values, abnormal H&H, abnormal blood sugars, multiple readmissions, need for input for mobility technique/safety and elevated troponin due to CHF  Pt's PMHx and comorbidities that may affect physical performance and progress include: CHF, COPD, CKD, A fib, DM, HTN, CAD and MONY  Personal factors affecting pt at time of IE include: inability to perform ADLs, inability to navigate level surfaces without external assistance and inability to ambulate household distances  Pt will benefit from continued skilled PT services to address deficits as defined above and to maximize level of functional mobility to facilitate return toward PLOF and improved QOL  From PT/mobility standpoint, recommendation at time of d/c would be return to SNF with therapy services pending progress in order to reduce fall risk and maximize pt's functional independence and consistency with mobility in order to facilitate return to PLOF  Recommend ther ex next 1-2 sessions  PT Discharge Recommendation: Post-Acute Rehabilitation Services(return to SNF with therapy services)     PT - OK to Discharge: (when medically cleared)    See flowsheet documentation for full assessment

## 2020-07-27 NOTE — CONSULTS
Consult received for CHF ed  Pt is resident of SNF, pt reports she follows low sodium and consistent CHO diet there  Diet ed not indicated at this time  Reports good appetite, will monitor  Will adjust diet to CCD 1 to match estimated energy needs with 1500 ml fluid restriction, 2 gm Na, pureed with thin liquids given multiple diet restrictions  Will supplement as appropriate  Pt with noted wounds, may benefit from wound care assessment  Will follow up

## 2020-07-28 LAB
ANION GAP SERPL CALCULATED.3IONS-SCNC: 5 MMOL/L (ref 4–13)
BACTERIA UR CULT: ABNORMAL
BUN SERPL-MCNC: 63 MG/DL (ref 5–25)
CALCIUM SERPL-MCNC: 8.4 MG/DL (ref 8.3–10.1)
CHLORIDE SERPL-SCNC: 106 MMOL/L (ref 100–108)
CO2 SERPL-SCNC: 33 MMOL/L (ref 21–32)
CREAT SERPL-MCNC: 2.49 MG/DL (ref 0.6–1.3)
ERYTHROCYTE [DISTWIDTH] IN BLOOD BY AUTOMATED COUNT: 16.9 % (ref 11.6–15.1)
GFR SERPL CREATININE-BSD FRML MDRD: 17 ML/MIN/1.73SQ M
GLUCOSE SERPL-MCNC: 115 MG/DL (ref 65–140)
GLUCOSE SERPL-MCNC: 141 MG/DL (ref 65–140)
GLUCOSE SERPL-MCNC: 187 MG/DL (ref 65–140)
GLUCOSE SERPL-MCNC: 69 MG/DL (ref 65–140)
GLUCOSE SERPL-MCNC: 70 MG/DL (ref 65–140)
HCT VFR BLD AUTO: 29.3 % (ref 34.8–46.1)
HGB BLD-MCNC: 8.8 G/DL (ref 11.5–15.4)
MCH RBC QN AUTO: 28.4 PG (ref 26.8–34.3)
MCHC RBC AUTO-ENTMCNC: 30 G/DL (ref 31.4–37.4)
MCV RBC AUTO: 95 FL (ref 82–98)
PLATELET # BLD AUTO: 179 THOUSANDS/UL (ref 149–390)
PMV BLD AUTO: 12.5 FL (ref 8.9–12.7)
POTASSIUM SERPL-SCNC: 4.3 MMOL/L (ref 3.5–5.3)
RBC # BLD AUTO: 3.1 MILLION/UL (ref 3.81–5.12)
SODIUM SERPL-SCNC: 144 MMOL/L (ref 136–145)
WBC # BLD AUTO: 6.46 THOUSAND/UL (ref 4.31–10.16)

## 2020-07-28 PROCEDURE — 94640 AIRWAY INHALATION TREATMENT: CPT

## 2020-07-28 PROCEDURE — 85027 COMPLETE CBC AUTOMATED: CPT | Performed by: PHYSICIAN ASSISTANT

## 2020-07-28 PROCEDURE — 94760 N-INVAS EAR/PLS OXIMETRY 1: CPT

## 2020-07-28 PROCEDURE — 82948 REAGENT STRIP/BLOOD GLUCOSE: CPT

## 2020-07-28 PROCEDURE — 99233 SBSQ HOSP IP/OBS HIGH 50: CPT | Performed by: INTERNAL MEDICINE

## 2020-07-28 PROCEDURE — 80048 BASIC METABOLIC PNL TOTAL CA: CPT | Performed by: PHYSICIAN ASSISTANT

## 2020-07-28 RX ADMIN — LEVALBUTEROL HYDROCHLORIDE 1.25 MG: 1.25 SOLUTION, CONCENTRATE RESPIRATORY (INHALATION) at 21:40

## 2020-07-28 RX ADMIN — ALBUTEROL SULFATE 2.5 MG: 2.5 SOLUTION RESPIRATORY (INHALATION) at 03:43

## 2020-07-28 RX ADMIN — LEVALBUTEROL HYDROCHLORIDE 1.25 MG: 1.25 SOLUTION, CONCENTRATE RESPIRATORY (INHALATION) at 07:47

## 2020-07-28 RX ADMIN — FERROUS GLUCONATE 324 MG: 324 TABLET ORAL at 09:08

## 2020-07-28 RX ADMIN — PANTOPRAZOLE SODIUM 80 MG: 40 TABLET, DELAYED RELEASE ORAL at 09:07

## 2020-07-28 RX ADMIN — DOCUSATE SODIUM 100 MG: 100 CAPSULE, LIQUID FILLED ORAL at 17:01

## 2020-07-28 RX ADMIN — ACETAMINOPHEN 650 MG: 325 TABLET ORAL at 23:24

## 2020-07-28 RX ADMIN — DOCUSATE SODIUM 100 MG: 100 CAPSULE, LIQUID FILLED ORAL at 09:07

## 2020-07-28 RX ADMIN — IPRATROPIUM BROMIDE 0.5 MG: 0.5 SOLUTION RESPIRATORY (INHALATION) at 07:47

## 2020-07-28 RX ADMIN — FLUTICASONE FUROATE AND VILANTEROL TRIFENATATE 1 PUFF: 100; 25 POWDER RESPIRATORY (INHALATION) at 09:57

## 2020-07-28 RX ADMIN — APIXABAN 2.5 MG: 2.5 TABLET, FILM COATED ORAL at 09:07

## 2020-07-28 RX ADMIN — CARVEDILOL 3.12 MG: 3.12 TABLET, FILM COATED ORAL at 17:01

## 2020-07-28 RX ADMIN — PRAVASTATIN SODIUM 80 MG: 80 TABLET ORAL at 16:56

## 2020-07-28 RX ADMIN — INSULIN LISPRO 1 UNITS: 100 INJECTION, SOLUTION INTRAVENOUS; SUBCUTANEOUS at 22:05

## 2020-07-28 RX ADMIN — AMLODIPINE BESYLATE 5 MG: 5 TABLET ORAL at 09:08

## 2020-07-28 RX ADMIN — IPRATROPIUM BROMIDE 0.5 MG: 0.5 SOLUTION RESPIRATORY (INHALATION) at 21:40

## 2020-07-28 RX ADMIN — CARVEDILOL 3.12 MG: 3.12 TABLET, FILM COATED ORAL at 09:07

## 2020-07-28 RX ADMIN — APIXABAN 2.5 MG: 2.5 TABLET, FILM COATED ORAL at 17:01

## 2020-07-28 RX ADMIN — INSULIN GLARGINE 15 UNITS: 100 INJECTION, SOLUTION SUBCUTANEOUS at 22:04

## 2020-07-28 RX ADMIN — IPRATROPIUM BROMIDE 0.5 MG: 0.5 SOLUTION RESPIRATORY (INHALATION) at 13:50

## 2020-07-28 RX ADMIN — LEVALBUTEROL HYDROCHLORIDE 1.25 MG: 1.25 SOLUTION, CONCENTRATE RESPIRATORY (INHALATION) at 13:50

## 2020-07-28 RX ADMIN — CEFTRIAXONE 1000 MG: 1 INJECTION, SOLUTION INTRAVENOUS at 22:04

## 2020-07-28 RX ADMIN — FUROSEMIDE 80 MG: 10 INJECTION, SOLUTION INTRAMUSCULAR; INTRAVENOUS at 09:07

## 2020-07-28 RX ADMIN — LISINOPRIL 2.5 MG: 2.5 TABLET ORAL at 09:08

## 2020-07-28 RX ADMIN — FUROSEMIDE 80 MG: 10 INJECTION, SOLUTION INTRAMUSCULAR; INTRAVENOUS at 16:56

## 2020-07-28 NOTE — ASSESSMENT & PLAN NOTE
· Hemoglobin appears stable and improving spontaneously  · Last infusion 07/20/2020  · Continue ferrous gluconate  · Trend hemoglobin p r n Gerhardt Sep

## 2020-07-28 NOTE — RESPIRATORY THERAPY NOTE
RT Protocol Note  Daniel  82 y o  female MRN: [id number]  Unit/Bed#: -01 Encounter: [id number]    Assessment    Principal Problem:    Acute on chronic respiratory failure with hypoxia [facility])  Active Problems:    Essential hypertension    Acute on chronic systolic CHF (congestive heart failure) ([facility])    COPD with emphysema ([facility])    CKD (chronic kidney disease) stage 4, GFR 15-29 ml/min ([facility])    Esophageal reflux    Type 2 diabetes mellitus with hyperglycemia ([facility])    Paroxysmal atrial fibrillation ([facility])    CAD (coronary artery disease), native coronary artery    UTI (urinary tract infection)    Iron deficiency anemia    Elevated troponin level not due myocardial infarction    Prolonged Q-T interval on ECG    V-tach [facility])      Home Pulmonary Medications:  Advair 250/50, Duoneb       Past Medical History:   Diagnosis Date    NANCY [facility]) ([facility] )     Atrial fibrillation ([facility] )     CHF (congestive heart failure) ([facility])     COPD (chronic obstructive pulmonary disease) ([facility])     Diabetes mellitus ([facility] )     Elevated troponin level not due myocardial infarction 2020    Paroxysmal atrial fibrillation ([facility] ) 2012    Trigger finger of thumb     last assessed: 13     Social History     Socioeconomic History    Marital status:       Spouse name: None    Number of children: None    Years of education: None    Highest education level: None   Occupational History    None   Social Needs    Financial resource strain: None    Food insecurity:     Worry: Never true     Inability: Never true    Transportation needs:     Medical: No     Non-medical: No   Tobacco Use    Smoking status: Former Smoker     Last attempt to quit: 2011     Years since quittin 1    Smokeless tobacco: Never Used    Tobacco comment: Former smoker per allscripts   Substance and Sexual Activity    Alcohol use: Not Currently    Drug use: No    Sexual activity: Not Currently   Lifestyle    Physical activity:     Days per week: None     Minutes per session: None    Stress: None   Relationships    Social connections:     Talks on phone: None     Gets together: None     Attends Nondenominational service: None     Active member of club or organization: None     Attends meetings of clubs or organizations: None     Relationship status: None    Intimate partner violence:     Fear of current or ex partner: None     Emotionally abused: None     Physically abused: None     Forced sexual activity: None   Other Topics Concern    None   Social History Narrative    None       Subjective    Subjective Data: refusing bipap tonight    Objective    Physical Exam:   Assessment Type: Pre-treatment  General Appearance: Alert, Awake  Respiratory Pattern: Normal  Chest Assessment: Chest expansion symmetrical  Bilateral Breath Sounds: Diminished  O2 Device: 1L    Vitals:  Blood pressure 120/94, pulse 68, temperature 97 5 °F (36 4 °C), temperature source Axillary, resp  rate (!) 26, height 5' 4" (1 626 m), weight 71 3 kg (157 lb 3 oz), SpO2 98 %  Results from last 7 days   Lab Units 07/25/20 1959   PH ART  7 388   PCO2 ART mm Hg 43 8   PO2 ART mm Hg 101 4   HCO3 ART mmol/L 25 8   BASE EXC ART mmol/L 0 6   O2 CONTENT ART mL/dL 14 0*   O2 HGB, ARTERIAL % 97 8*   ABG SOURCE  Radial, Left       Imaging and other studies: Interstitial opacities have slightly improved as has pulmonary vascular congestion  There is no pneumothorax  Effusions with bibasilar probable atelectasis is also stable       O2 Device: 1L     Plan    Respiratory Plan: Mild Distress pathway        Resp Comments: no dyspnea on 1L

## 2020-07-28 NOTE — ASSESSMENT & PLAN NOTE
Lab Results   Component Value Date    HGBA1C 7 4 (H) 07/26/2020       Recent Labs     07/27/20  1558 07/27/20  2137 07/28/20  0718 07/28/20  1121   POCGLU 212* 162* 70 115       Blood Sugar Average: Last 72 hrs:  (P) 040 [id number]     Lantus recently discontinued at skilled nursing facility for unclear reasons  Based on repeatedly elevated blood glucose and elevated A1c; we started lantus again   Initiate insulin protocol with blood glucose AC and HS  Carbohydrate controlled diet  With low blood sugar early in the morning, I will decrease the dose of of patient's Lantus at night (presently at 15 units q h s )  I will decrease the Lantus to 12 units q h s     Adjust treatment accordingly

## 2020-07-28 NOTE — ASSESSMENT & PLAN NOTE
7/27 at 4:30 a m , patient had 9 beats nonsustained V-tach  Setting of acute CHF exacerbation    Continue tele monitor  Cardiologist on board

## 2020-07-28 NOTE — ASSESSMENT & PLAN NOTE
Wt Readings from Last 3 Encounters:   07/28/20 65 7 kg (144 lb 13 5 oz)   07/03/20 64 kg (141 lb 1 5 oz)   06/22/20 68 9 kg (152 lb)     · Improving  Patient told me she is not that short of breath anymore  · Present on admission  · Reports increased dyspnea, abdominal and lower extremity edema, 9 lb weight gain at skilled nursing facility  · Elevated BNP 22260, chest x-ray with diffuse vascular congestion  · In respiratory distress at time of admission placed on BiPAP but able to be removed after diuresis  · Maintained on 2 L nasal cannula chronically supplemental oxygen  · EF 35-40% June 2020  · Maintained on Demadex, Coreg and lisinopril  · Received additional Demadex prior to arrival and a skilled nursing facility without improvement    As per Cardiology, Continue diuresis with furosemide 80 mg IV b i d  Closely monitor creatinine, patient developed NANCY with last admission after diuresis  Trend blood pressures  2 g sodium diet with 1500 mL fluid restriction  Strict intake and output, Vasquez catheter was placed by ER staff  Daily weights  Continue carvedilol and lisinopril  Telemetry monitor  [address] Cardiology comanagement

## 2020-07-28 NOTE — ASSESSMENT & PLAN NOTE
EKG on 07/27 shows prolonged QTC  Caution with antiemetics  Patient was nauseous  Phenergan provided

## 2020-07-28 NOTE — PROGRESS NOTES
Progress Note - Coleen Barrientos 1935, 82 y o  female MRN: [id number]    Unit/Bed#: -01 Encounter: [id number]    Primary Care Provider: Kimberly Gordon MD   Date and time admitted to hospital: 7/25/2020  7:31 PM        * Acute on chronic respiratory failure with hypoxia [facility])  Assessment & Plan  · Patient admitted for acute on chronic respiratory failure w/ hypoxia after gaining approximately 9 lbs over prior week and developing peripheral edema and shortness of breath  · Patient uses supplemental oxygen 2 L via nasal cannula continuously as outpatient due to COPD and CHF  Initially required continuous BiPAP at time of admission  · Initiated treatment with IV diuretic at time of admission and patient had rapid improvement in oxygen requirement despite relatively low urine output compared to expected  · Continue assertive diuresis and optimize outpatient plan of care  · Procalcitonin negative  Chest x-ray vascular congestion  · Continue management of congestive heart failure  Acute on chronic systolic CHF (congestive heart failure) (HCC)  Assessment & Plan  Wt Readings from Last 3 Encounters:   07/28/20 65 7 kg (144 lb 13 5 oz)   07/03/20 64 kg (141 lb 1 5 oz)   06/22/20 68 9 kg (152 lb)     · Improving  Patient told me she is not that short of breath anymore  · Present on admission  · Reports increased dyspnea, abdominal and lower extremity edema, 9 lb weight gain at skilled nursing facility  · Elevated BNP 35771, chest x-ray with diffuse vascular congestion  · In respiratory distress at time of admission placed on BiPAP but able to be removed after diuresis  · Maintained on 2 L nasal cannula chronically supplemental oxygen  · EF 35-40% June 2020  · Maintained on Demadex, Coreg and lisinopril  · Received additional Demadex prior to arrival and a skilled nursing facility without improvement    As per Cardiology, Continue diuresis with furosemide 80 mg IV b i d    Closely monitor creatinine, patient developed NANCY with last admission after diuresis  Trend blood pressures  2 g sodium diet with 1500 mL fluid restriction  Strict intake and output, Vasquez catheter was placed by ER staff  Daily weights  Continue carvedilol and lisinopril  Telemetry monitor  [address] Cardiology comanagement  V-tach [facility])  Assessment & Plan  7/27 at 4:30 a m , patient had 9 beats nonsustained V-tach  Setting of acute CHF exacerbation  Continue tele monitor  Cardiologist on board    Prolonged Q-T interval on ECG  Assessment & Plan  EKG on 07/27 shows prolonged QTC  Caution with antiemetics  Patient was nauseous  Phenergan provided  Elevated troponin level not due myocardial infarction  Assessment & Plan  Non-MI troponin elevation due to CHF  Slightly elevated troponin flat and stable on serial labs  Iron deficiency anemia  Assessment & Plan  · Hemoglobin appears stable and improving spontaneously  · Last infusion 07/20/2020  · Continue ferrous gluconate  · Trend hemoglobin p r n  UTI (urinary tract infection)  Assessment & Plan  · White blood cells innumerable in straight catheterized urinary specimen  · Urine culture:  Enterobacter  Sensitive to Rocephin  · Urinalysis with significant pyuria, urine culture revealed Enterobacter, sensitive to Rocephin; continue treatment with Rocephin for 3 to 5 days  CAD (coronary artery disease), native coronary artery  Assessment & Plan  · CAD s/p CABG  · Patient not on anti-platelet therapy, severe anemia receives iron infusions  · Continue statin  · Anticoagulated with Eliquis  · Denies chest pain  · Mild elevated troponin which could be secondary to CHF exacerbation  · Mild troponin elevation were flat    · Cardiologist on board  · Follows with [facility] in [place]    Paroxysmal atrial fibrillation [facility])  Assessment & Plan  · Paroxysmal atrial fibrillation with elevated CHADS2 vascular score  · Continue Coreg 3 125 mg b i d  · Continue Eliquis 2 5 mg b i d  · Rate controlled  · PPM    Type 2 diabetes mellitus with hyperglycemia [facility])  Assessment & Plan  Lab Results   Component Value Date    HGBA1C 7 4 (H) 07/26/2020       Recent Labs     07/27/20  1558 07/27/20  2137 07/28/20  0718 07/28/20  1121   POCGLU 212* 162* 70 115       Blood Sugar Average: Last 72 hrs:  (P) 234 [id number]     Lantus recently discontinued at [facility] nursing facility for unclear reasons  Based on repeatedly elevated blood glucose and elevated A1c; we started lantus again   Initiate insulin protocol with blood glucose AC and HS  Carbohydrate controlled diet  With low blood sugar early in the morning, I will decrease the dose of of patient's Lantus at night (presently at 15 units q h s )  I will decrease the Lantus to 12 units q h s     Adjust treatment accordingly  Esophageal reflux  Assessment & Plan  Patient experiencing severe reflux symptoms 7/27  She is already on Protonix 40 mg daily  Will increase dose for a few days and provide other supportive care  CKD (chronic kidney disease) stage 4, GFR 15-29 ml/min ([facility])  Assessment & Plan  Creatinine appears baseline  Trend creatinine carefully while diuresing  Avoid hypotension  Avoid unnecessary nephrotoxins  Monitor      COPD with emphysema ([facility] [address] )  Assessment & Plan  · No acute exacerbation  · Continue nebulized bronchodilators and inhaled corticosteroid  · Patient did receive dexamethasone 10 mg IV x1 in the ER, no indication for further systemic glucocorticoids  · Continue 2 L oxygen keep pulse oximetry greater than 90%    Essential hypertension  Assessment & Plan  · Continue home regimen of lisinopril 5 mg, carvedilol 3 125 mg b i d , amlodipine 5 mg daily  · Trend blood pressures  · Avoid hypotension      VTE Pharmacologic Prophylaxis:   Pharmacologic: Apixaban (Eliquis)  Mechanical VTE Prophylaxis in Place: Yes    Patient Centered Rounds: I have performed bedside rounds with nursing staff today  Discussions with Specialists or Other Care Team Provider:  Case management  Education and Discussions with Family / Patient:  I spoke to the patient and patient's daughter at length and discussed with them our findings, management and plans  I answered all questions and concerns  They are okay with the management and plans  Patient's daughter requests for a call from the cardiologist   Thus I texted the cardiologist and the physician assistant  Patient's daughter also told me, that her mother did not eat well yesterday  Time Spent for Care: Approximately 40 minutes  More than 50% of total time spent on counseling and coordination of care as described above  Current Length of Stay: 3 day(s)    Current Patient Status: Inpatient   Certification Statement: The patient will continue to require additional inpatient hospital stay due to Above findings and plans  Discharge Plan:  None yet  Code Status: Level 1 - Full Code      Subjective:   Patient is doing fine and feels a lot better  Patient denies any significant shortness of breath anymore  Patient claims that her lower extremity swelling had subsided a lot  Patient told me that yesterday, she had some nausea and vomiting as well as some cough, but none anymore today  Patient denies any pains  Objective:     Vitals:   Temp (24hrs), Av 9 °F (36 6 °C), Min:97 3 °F (36 3 °C), Max:98 6 °F (37 °C)    Temp:  [97 3 °F (36 3 °C)-98 6 °F (37 °C)] 98 1 °F (36 7 °C)  HR:  [65-92] 71  Resp:  [16-30] 26  BP: (109-130)/(53-94) 115/56  SpO2:  [90 %-98 %] 97 %  Body mass index is 24 86 kg/m²  Input and Output Summary (last 24 hours): Intake/Output Summary (Last 24 hours) at 2020 1323  Last data filed at 2020 0901  Gross per 24 hour   Intake 620 ml   Output 2450 ml   Net -1830 ml       Physical Exam:     Physical Exam   Constitutional: No distress  HENT:   Head: Normocephalic and atraumatic     Eyes: Right eye exhibits no discharge  Left eye exhibits no discharge  No scleral icterus  Neck: No JVD present  No tracheal deviation present  No thyromegaly present  Cardiovascular: Normal rate  Exam reveals no gallop and no friction rub  No murmur heard  Irregularly irregular heart rhythm  Pulmonary/Chest: Effort normal  No stridor  No respiratory distress  She has no wheezes  She has no rales  Decreased breath sounds at the bases  Abdominal: Soft  Bowel sounds are normal  She exhibits no distension and no mass  There is no tenderness  There is no rebound and no guarding  Musculoskeletal: She exhibits edema  She exhibits no tenderness  Trace to +1 bilateral lower extremity edema  Neurological: She is alert  Skin: Skin is warm  No rash noted  She is not diaphoretic  No erythema  No pallor  Psychiatric: She has a normal mood and affect  Her behavior is normal  Thought content normal    Patient is pleasant  Vitals reviewed  Additional Data:     Labs:    Results from last 7 days   Lab Units 07/28/20  0452 07/27/20  0513   WBC Thousand/uL 6 46 7 16   HEMOGLOBIN g/dL 8 8* 8 2*   HEMATOCRIT % 29 3* 26 2*   PLATELETS Thousands/uL 179 166   NEUTROS PCT %  --  70   LYMPHS PCT %  --  19   MONOS PCT %  --  11   EOS PCT %  --  0     Results from last 7 days   Lab Units 07/28/20  0452 07/27/20  0513   POTASSIUM mmol/L 4 3 4 5   CHLORIDE mmol/L 106 105   CO2 mmol/L 33* 29   BUN mg/dL 63* 61*   CREATININE mg/dL 2 49* 2 59*   CALCIUM mg/dL 8 4 8 3   ALK PHOS U/L  --  90   ALT U/L  --  11*   AST U/L  --  6     Results from last 7 days   Lab Units 07/25/20 2023   INR  1 00       * I Have Reviewed All Lab Data Listed Above  * Additional Pertinent Lab Tests Reviewed: Lauren 68 Admission Reviewed    Imaging:    Imaging Reports Reviewed Today Include:  Diagnostic imaging studies that were done on this admission  Imaging Personally Reviewed by Myself Includes:  None      Recent Cultures (last 7 days):     Results from last 7 days   Lab Units 07/25/20 2115 07/25/20 2023 07/25/20 1959   BLOOD CULTURE   --  No Growth at 24 hrs  No Growth at 24 hrs     URINE CULTURE  >100,000 cfu/ml Enterobacter cloacae complex*  --   --        Last 24 Hours Medication List:     Current Facility-Administered Medications:  acetaminophen 650 mg Oral Q6H PRN Jeanette S Darrel, CRNP    acetylcysteine 3 mL Nebulization Q8H PRN Daiana Nix PA-C    albuterol 2 5 mg Nebulization Q4H PRN Jeanette S Darrel, CRNP    aluminum-magnesium hydroxide-simethicone 30 mL Oral Q4H PRN Daiana Nix PA-C    amLODIPine 5 mg Oral Daily Jeanette S Darrel, CRNP    apixaban 2 5 mg Oral BID Jeanette S Adrrel, CRNP    carvedilol 3 125 mg Oral BID Jeanette S Darrel, CRNP    cefTRIAXone 1,000 mg Intravenous Q24H Jeanette S Darrel, CRNP Last Rate: Stopped (07/27/20 1234)   docusate sodium 100 mg Oral BID Jeanette S Darrel, CRNP    ferrous gluconate 324 mg Oral Daily Before Breakfast Jeanette S Darrel, CRNP    fluticasone-vilanterol 1 puff Inhalation Daily Jeanette S Darrel, CRNP    furosemide 80 mg Intravenous BID (diuretic) Neeraj Hdez, CRNP    insulin glargine 15 Units Subcutaneous HS Daiana Nix PA-C    insulin lispro 1-6 Units Subcutaneous TID AC Jeanette S Darrel, CRNP    insulin lispro 1-6 Units Subcutaneous HS Jeanette S Darrel, CRNP    ipratropium 0 5 mg Nebulization TID Jona Chandler MD    levalbuterol 1 25 mg Nebulization TID Jona Chandler MD    lisinopril 2 5 mg Oral Daily Jeanette S Darrel, CRNP    nitroglycerin 0 4 mg Sublingual Q5 Min PRN Jeanette S Darrel, CRNP    pantoprazole 80 mg Oral Daily Daiana Nix PA-C    phenol 1 spray Mouth/Throat Q2H PRN Daiana Nix PA-C    pravastatin 80 mg Oral Daily With [employer] S Darrel, CRNP    promethazine 25 mg Intramuscular Q6H PRN Daiana Nix PA-C    senna 2 tablet Oral HS Tiffanie Merchant PA-C         Today, Patient Was Seen By: Mirza Leonard MD    ** Please Note: Dragon 360 Dictation voice to text software may have been used in the creation of this document   **

## 2020-07-28 NOTE — ASSESSMENT & PLAN NOTE
Creatinine appears baseline  Trend creatinine carefully while diuresing  Avoid hypotension  Avoid unnecessary nephrotoxins  Monitor

## 2020-07-28 NOTE — PLAN OF CARE
Problem: Potential for Falls  Goal: Patient will remain free of falls  Description  INTERVENTIONS:  - Assess patient frequently for physical needs  -  Identify cognitive and physical deficits and behaviors that affect risk of falls    -  [place] fall precautions as indicated by assessment   - Educate patient/family on patient safety including physical limitations  - Instruct patient to call for assistance with activity based on assessment  - Modify environment to reduce risk of injury  - Consider OT/PT consult to assist with strengthening/mobility  Outcome: Progressing

## 2020-07-28 NOTE — PROGRESS NOTES
Progress Note - Cardiology   Chivo Abraham 82 y o  female MRN: [id number]  Unit/Bed#: -01 Encounter: [id number]    Assessment:  Acute on chronic HFrEF- EF 35-40%  CAD sp CABG  ICM  Afib  CKD- stable kidney function with diuresis    Plan:  - Continue lasix 80 IV BID  - monitor I and O, electrolytes, kidney function  - Continue anticoagulation with eliquis  - contemplate holding ace and starting hydralazine/imdur for reduced EF, will continue for now  - not on asa given hx of thrombocytopenia    Subjective/Objective     Subjective: pt reports her breathing is improving  She has had no cp over night  She overall states she is starting to feel better  Objective: pt sitting comfortably in hospital chair  She is no acute distress    Vitals: /53 (BP Location: Right arm)   Pulse 91   Temp 98 1 °F (36 7 °C) (Oral)   Resp 16   Ht 5' 4" (1 626 m)   Wt 65 7 kg (144 lb 13 5 oz)   LMP  (LMP Unknown)   SpO2 95%   BMI 24 86 kg/m²   Vitals:    07/27/20 0500 07/28/20 0900   Weight: 71 3 kg (157 lb 3 oz) 65 7 kg (144 lb 13 5 oz)     Orthostatic Blood Pressures      Most Recent Value   Blood Pressure  112/53 filed at 07/28/2020 0700   Patient Position - Orthostatic VS  Lying filed at 07/28/2020 0700            Intake/Output Summary (Last 24 hours) at 7/28/2020 1012  Last data filed at 7/28/2020 0901  Gross per 24 hour   Intake 620 ml   Output 2450 ml   Net -1830 ml       Invasive Devices     Peripheral Intravenous Line            Peripheral IV 07/25/20 Right Wrist 2 days          Drain            Urethral Catheter Latex; Temperature probe 16 Fr  2 days                  Physical Exam:   General: Elderly woman in no acute distress wearing supplemental oxygen  Neck: Supple and without lymphadenopathy  No thyromegaly  Mild JVD with (+) HJR  Lungs: Crackles auscultated to mid-lobe BL  Cardiac: Irregular rhythm  Normal S1 and S2  No S3, No S4   No rub and no murmur  Neurological: AAOx3,  strength equal, no facial droop  Abdomen: soft, nontender, rotund  Musculoskeletal: Unremarkable  Extremities: Mild LE edema  Skin: Senescent skin changes    Lab Results:   I have personally reviewed pertinent lab results  CBC with diff:   Results from last 7 days   Lab Units 07/28/20  0452   WBC Thousand/uL 6 46   RBC Million/uL 3 10*   HEMOGLOBIN g/dL 8 8*   HEMATOCRIT % 29 3*   MCV fL 95   MCH pg 28 4   MCHC g/dL 30 0*   RDW % 16 9*   MPV fL 12 5   PLATELETS Thousands/uL 179     CMP:   Results from last 7 days   Lab Units 07/28/20  0452 07/27/20  0513   SODIUM mmol/L 144 141   POTASSIUM mmol/L 4 3 4 5   CHLORIDE mmol/L 106 105   CO2 mmol/L 33* 29   BUN mg/dL 63* 61*   CREATININE mg/dL 2 49* 2 59*   CALCIUM mg/dL 8 4 8 3   AST U/L  --  6   ALT U/L  --  11*   ALK PHOS U/L  --  90   EGFR ml/min/1 73sq m 17 17     Troponin:   0   Lab Value Date/Time    TROPONINI 0 08 (H) 07/27/2020 1012    TROPONINI 0 06 (H) 07/27/2020 0827    TROPONINI 0 04 07/27/2020 0513    TROPONINI 0 05 (H) 07/26/2020 0348    TROPONINI 0 06 (H) 07/26/2020 0101    TROPONINI 0 05 (H) 07/25/2020 1959    TROPONINI 0 04 06/26/2020 0844    TROPONINI 0 08 (H) 06/12/2020 1440    TROPONINI 0 06 (H) 06/12/2020 1224    TROPONINI 0 05 (H) 06/12/2020 0733     BNP:   Results from last 7 days   Lab Units 07/28/20  0452   POTASSIUM mmol/L 4 3   CHLORIDE mmol/L 106   CO2 mmol/L 33*   BUN mg/dL 63*   CREATININE mg/dL 2 49*   CALCIUM mg/dL 8 4   EGFR ml/min/1 73sq m 17     Coags:   Results from last 7 days   Lab Units 07/25/20  2023   PTT seconds 27   INR  1 00     TSH:     Imaging: I have personally reviewed pertinent reports  TTE 6/12/2020:  SUMMARY     LEFT VENTRICLE:  Akinesis of the basal to mid inferior and the inferolateral walls  The ventricle was dilated  Systolic function was moderately reduced  Ejection fraction was estimated in the range of 35 % to 40 %    There was assymetrical hypertrophy of the septum      RIGHT VENTRICLE:  The size was normal   Systolic function was normal      LEFT ATRIUM:  The atrium was moderately dilated      MITRAL VALVE:  There was moderate thickening  There was mild regurgitation      TRICUSPID VALVE:  There was mild regurgitation      IVC, HEPATIC VEINS:  The inferior vena cava was dilated  Respirophasic changes were blunted (less than 50% variation)      PERICARDIUM:  A trace pericardial effusion was identified    There was a large left pleural effusion      COMPARISONS:  Compared to prior study dated 3/28/2020 at [facility] the ejection fraction appears slightly higher on current study and the degree of MR appears less significant on current study

## 2020-07-28 NOTE — PLAN OF CARE
Problem: Potential for Falls  Goal: Patient will remain free of falls  Description  INTERVENTIONS:  - Assess patient frequently for physical needs  -  Identify cognitive and physical deficits and behaviors that affect risk of falls    -  [place] fall precautions as indicated by assessment   - Educate patient/family on patient safety including physical limitations  - Instruct patient to call for assistance with activity based on assessment  - Modify environment to reduce risk of injury  - Consider OT/PT consult to assist with strengthening/mobility  Outcome: Progressing     Problem: Prexisting or High Potential for Compromised Skin Integrity  Goal: Skin integrity is maintained or improved  Description  INTERVENTIONS:  - Identify patients at risk for skin breakdown  - Assess and monitor skin integrity  - Assess and monitor nutrition and hydration status  - Monitor labs   - Assess for incontinence   - Turn and reposition patient  - Assist with mobility/ambulation  - Relieve pressure over bony prominences  - Avoid friction and shearing  - Provide appropriate hygiene as needed including keeping skin clean and dry  - Evaluate need for skin moisturizer/barrier cream  - Collaborate with interdisciplinary team   - Patient/family teaching  - Consider wound care consult   Outcome: Progressing     Problem: PAIN - ADULT  Goal: Verbalizes/displays adequate comfort level or baseline comfort level  Description  Interventions:  - Encourage patient to monitor pain and request assistance  - Assess pain using appropriate pain scale  - Administer analgesics based on type and severity of pain and evaluate response  - Implement non-pharmacological measures as appropriate and evaluate response  - Consider cultural and social influences on pain and pain management  - Notify physician/advanced practitioner if interventions unsuccessful or patient reports new pain  Outcome: Progressing     Problem: INFECTION - ADULT  Goal: Absence or prevention of progression during hospitalization  Description  INTERVENTIONS:  - Assess and monitor for signs and symptoms of infection  - Monitor lab/diagnostic results  - Monitor all insertion sites, i e  indwelling lines, tubes, and drains  - Monitor endotracheal if appropriate and nasal secretions for changes in amount and color  - [place] appropriate cooling/warming therapies per order  - Administer medications as ordered  - Instruct and encourage patient and family to use good hand hygiene technique  - Identify and instruct in appropriate isolation precautions for identified infection/condition  Outcome: Progressing  Goal: Absence of fever/infection during neutropenic period  Description  INTERVENTIONS:  - Monitor WBC    Outcome: Progressing     Problem: SAFETY ADULT  Goal: Patient will remain free of falls  Description  INTERVENTIONS:  - Assess patient frequently for physical needs  -  Identify cognitive and physical deficits and behaviors that affect risk of falls    -  [place] fall precautions as indicated by assessment   - Educate patient/family on patient safety including physical limitations  - Instruct patient to call for assistance with activity based on assessment  - Modify environment to reduce risk of injury  - Consider OT/PT consult to assist with strengthening/mobility  Outcome: Progressing  Goal: Maintain or return to baseline ADL function  Description  INTERVENTIONS:  -  Assess patient's ability to carry out ADLs; assess patient's baseline for ADL function and identify physical deficits which impact ability to perform ADLs (bathing, care of mouth/teeth, toileting, grooming, dressing, etc )  - Assess/evaluate cause of self-care deficits   - Assess range of motion  - Assess patient's mobility; develop plan if impaired  - Assess patient's need for assistive devices and provide as appropriate  - Encourage maximum independence but intervene and supervise when necessary  - Involve family in performance of ADLs  - Assess for home care needs following discharge   - Consider OT consult to assist with ADL evaluation and planning for discharge  - Provide patient education as appropriate  Outcome: Progressing  Goal: Maintain or return mobility status to optimal level  Description  INTERVENTIONS:  - Assess patient's baseline mobility status (ambulation, transfers, stairs, etc )    - Identify cognitive and physical deficits and behaviors that affect mobility  - Identify mobility aids required to assist with transfers and/or ambulation (gait belt, sit-to-stand, lift, walker, cane, etc )  - [place] fall precautions as indicated by assessment  - Record patient progress and toleration of activity level on Mobility SBAR; progress patient to next Phase/Stage  - Instruct patient to call for assistance with activity based on assessment  - Consider rehabilitation consult to assist with strengthening/weightbearing, etc   Outcome: Progressing     Problem: DISCHARGE PLANNING  Goal: Discharge to home or other facility with appropriate resources  Description  INTERVENTIONS:  - Identify barriers to discharge w/patient and caregiver  - Arrange for needed discharge resources and transportation as appropriate  - Identify discharge learning needs (meds, wound care, etc )  - Arrange for interpretive services to assist at discharge as needed  - Refer to Case Management Department for coordinating discharge planning if the patient needs post-hospital services based on physician/advanced practitioner order or complex needs related to functional status, cognitive ability, or social support system  Outcome: Progressing     Problem: Knowledge Deficit  Goal: Patient/family/caregiver demonstrates understanding of disease process, treatment plan, medications, and discharge instructions  Description  Complete learning assessment and assess knowledge base    Interventions:  - Provide teaching at level of understanding  - Provide teaching via preferred learning methods  Outcome: Progressing     Problem: RESPIRATORY - ADULT  Goal: Achieves optimal ventilation and oxygenation  Description  INTERVENTIONS:  - Assess for changes in respiratory status  - Assess for changes in mentation and behavior  - Position to facilitate oxygenation and minimize respiratory effort  - Oxygen administered by appropriate delivery if ordered  - Initiate smoking cessation education as indicated  - Encourage broncho-pulmonary hygiene including cough, deep breathe, Incentive Spirometry  - Assess the need for suctioning and aspirate as needed  - Assess and instruct to report SOB or any respiratory difficulty  - Respiratory Therapy support as indicated  Outcome: Progressing     Problem: Nutrition/Hydration-ADULT  Goal: Nutrient/Hydration intake appropriate for improving, restoring or maintaining nutritional needs  Description  Monitor and assess patient's nutrition/hydration status for malnutrition  Collaborate with interdisciplinary team and initiate plan and interventions as ordered  Monitor patient's weight and dietary intake as ordered or per policy  Utilize nutrition screening tool and intervene as necessary  Determine patient's food preferences and provide high-protein, high-caloric foods as appropriate       INTERVENTIONS:  - Monitor oral intake, urinary output, labs, and treatment plans  - Assess nutrition and hydration status and recommend course of action  - Evaluate amount of meals eaten  - Assist patient with eating if necessary   - Allow adequate time for meals  - Recommend/ encourage appropriate diets, oral nutritional supplements, and vitamin/mineral supplements  - Order, calculate, and assess calorie counts as needed  - Recommend, monitor, and adjust tube feedings and TPN/PPN based on assessed needs  - Assess need for intravenous fluids  - Provide specific nutrition/hydration education as appropriate  - Include patient/family/caregiver in decisions related to nutrition  Outcome: Progressing

## 2020-07-28 NOTE — ASSESSMENT & PLAN NOTE
· CAD s/p CABG  · Patient not on anti-platelet therapy, severe anemia receives iron infusions  · Continue statin  · Anticoagulated with Eliquis  · Denies chest pain  · Mild elevated troponin which could be secondary to CHF exacerbation  · Mild troponin elevation were flat    · Cardiologist on board  · Follows with [facility] in [facility]

## 2020-07-28 NOTE — ASSESSMENT & PLAN NOTE
· White blood cells innumerable in straight catheterized urinary specimen  · Urine culture:  Enterobacter  Sensitive to Rocephin  · Urinalysis with significant pyuria, urine culture revealed Enterobacter, sensitive to Rocephin; continue treatment with Rocephin for 3 to 5 days

## 2020-07-28 NOTE — ASSESSMENT & PLAN NOTE
· Patient admitted for acute on chronic respiratory failure w/ hypoxia after gaining approximately 9 lbs over prior week and developing peripheral edema and shortness of breath  · Patient uses supplemental oxygen 2 L via nasal cannula continuously as outpatient due to COPD and CHF  Initially required continuous BiPAP at time of admission  · Initiated treatment with IV diuretic at time of admission and patient had rapid improvement in oxygen requirement despite relatively low urine output compared to expected  · Continue assertive diuresis and optimize outpatient plan of care  · Procalcitonin negative  Chest x-ray vascular congestion  · Continue management of congestive heart failure

## 2020-07-29 LAB
ANION GAP SERPL CALCULATED.3IONS-SCNC: 8 MMOL/L (ref 4–13)
ATRIAL RATE: 73 BPM
BUN SERPL-MCNC: 68 MG/DL (ref 5–25)
CALCIUM SERPL-MCNC: 8.1 MG/DL (ref 8.3–10.1)
CHLORIDE SERPL-SCNC: 105 MMOL/L (ref 100–108)
CO2 SERPL-SCNC: 32 MMOL/L (ref 21–32)
CREAT SERPL-MCNC: 2.85 MG/DL (ref 0.6–1.3)
GFR SERPL CREATININE-BSD FRML MDRD: 15 ML/MIN/1.73SQ M
GLUCOSE SERPL-MCNC: 129 MG/DL (ref 65–140)
GLUCOSE SERPL-MCNC: 191 MG/DL (ref 65–140)
GLUCOSE SERPL-MCNC: 331 MG/DL (ref 65–140)
GLUCOSE SERPL-MCNC: 70 MG/DL (ref 65–140)
GLUCOSE SERPL-MCNC: 78 MG/DL (ref 65–140)
P AXIS: 91 DEGREES
POTASSIUM SERPL-SCNC: 4.1 MMOL/L (ref 3.5–5.3)
PR INTERVAL: 216 MS
QRS AXIS: 187 DEGREES
QRSD INTERVAL: 100 MS
QT INTERVAL: 430 MS
QTC INTERVAL: 473 MS
SODIUM SERPL-SCNC: 145 MMOL/L (ref 136–145)
T WAVE AXIS: 143 DEGREES
VENTRICULAR RATE: 73 BPM

## 2020-07-29 PROCEDURE — 97116 GAIT TRAINING THERAPY: CPT

## 2020-07-29 PROCEDURE — 93005 ELECTROCARDIOGRAM TRACING: CPT

## 2020-07-29 PROCEDURE — 80048 BASIC METABOLIC PNL TOTAL CA: CPT | Performed by: FAMILY MEDICINE

## 2020-07-29 PROCEDURE — 82948 REAGENT STRIP/BLOOD GLUCOSE: CPT

## 2020-07-29 PROCEDURE — 97530 THERAPEUTIC ACTIVITIES: CPT

## 2020-07-29 PROCEDURE — 94760 N-INVAS EAR/PLS OXIMETRY 1: CPT

## 2020-07-29 PROCEDURE — 97167 OT EVAL HIGH COMPLEX 60 MIN: CPT

## 2020-07-29 PROCEDURE — 92610 EVALUATE SWALLOWING FUNCTION: CPT

## 2020-07-29 PROCEDURE — 97535 SELF CARE MNGMENT TRAINING: CPT

## 2020-07-29 PROCEDURE — 99232 SBSQ HOSP IP/OBS MODERATE 35: CPT | Performed by: INTERNAL MEDICINE

## 2020-07-29 PROCEDURE — 94640 AIRWAY INHALATION TREATMENT: CPT

## 2020-07-29 RX ORDER — CARVEDILOL 6.25 MG/1
6.25 TABLET ORAL 2 TIMES DAILY
Status: DISCONTINUED | OUTPATIENT
Start: 2020-07-29 | End: 2020-08-03

## 2020-07-29 RX ORDER — INSULIN GLARGINE 100 [IU]/ML
12 INJECTION, SOLUTION SUBCUTANEOUS
Status: DISCONTINUED | OUTPATIENT
Start: 2020-07-29 | End: 2020-07-29

## 2020-07-29 RX ORDER — TORSEMIDE 20 MG/1
20 TABLET ORAL
Status: DISCONTINUED | OUTPATIENT
Start: 2020-07-29 | End: 2020-08-03

## 2020-07-29 RX ORDER — TORSEMIDE 20 MG/1
40 TABLET ORAL
Status: DISCONTINUED | OUTPATIENT
Start: 2020-07-29 | End: 2020-08-01

## 2020-07-29 RX ORDER — METHYLPREDNISOLONE SODIUM SUCCINATE 40 MG/ML
40 INJECTION, POWDER, LYOPHILIZED, FOR SOLUTION INTRAMUSCULAR; INTRAVENOUS EVERY 8 HOURS SCHEDULED
Status: DISCONTINUED | OUTPATIENT
Start: 2020-07-29 | End: 2020-07-31

## 2020-07-29 RX ORDER — INSULIN GLARGINE 100 [IU]/ML
10 INJECTION, SOLUTION SUBCUTANEOUS
Status: DISCONTINUED | OUTPATIENT
Start: 2020-07-29 | End: 2020-07-30

## 2020-07-29 RX ADMIN — PANTOPRAZOLE SODIUM 80 MG: 40 TABLET, DELAYED RELEASE ORAL at 08:10

## 2020-07-29 RX ADMIN — INSULIN LISPRO 1 UNITS: 100 INJECTION, SOLUTION INTRAVENOUS; SUBCUTANEOUS at 17:16

## 2020-07-29 RX ADMIN — METHYLPREDNISOLONE SODIUM SUCCINATE 40 MG: 40 INJECTION, POWDER, FOR SOLUTION INTRAMUSCULAR; INTRAVENOUS at 21:33

## 2020-07-29 RX ADMIN — INSULIN LISPRO 5 UNITS: 100 INJECTION, SOLUTION INTRAVENOUS; SUBCUTANEOUS at 21:32

## 2020-07-29 RX ADMIN — ALUMINUM HYDROXIDE, MAGNESIUM HYDROXIDE, AND SIMETHICONE 30 ML: 200; 200; 20 SUSPENSION ORAL at 03:09

## 2020-07-29 RX ADMIN — IPRATROPIUM BROMIDE 0.5 MG: 0.5 SOLUTION RESPIRATORY (INHALATION) at 20:45

## 2020-07-29 RX ADMIN — PRAVASTATIN SODIUM 80 MG: 80 TABLET ORAL at 15:47

## 2020-07-29 RX ADMIN — LEVALBUTEROL HYDROCHLORIDE 1.25 MG: 1.25 SOLUTION, CONCENTRATE RESPIRATORY (INHALATION) at 13:00

## 2020-07-29 RX ADMIN — METHYLPREDNISOLONE SODIUM SUCCINATE 40 MG: 40 INJECTION, POWDER, FOR SOLUTION INTRAMUSCULAR; INTRAVENOUS at 14:53

## 2020-07-29 RX ADMIN — FERROUS GLUCONATE 324 MG: 324 TABLET ORAL at 08:10

## 2020-07-29 RX ADMIN — IPRATROPIUM BROMIDE 0.5 MG: 0.5 SOLUTION RESPIRATORY (INHALATION) at 08:22

## 2020-07-29 RX ADMIN — TORSEMIDE 20 MG: 20 TABLET ORAL at 14:53

## 2020-07-29 RX ADMIN — CARVEDILOL 6.25 MG: 6.25 TABLET, FILM COATED ORAL at 17:19

## 2020-07-29 RX ADMIN — FUROSEMIDE 80 MG: 10 INJECTION, SOLUTION INTRAMUSCULAR; INTRAVENOUS at 08:11

## 2020-07-29 RX ADMIN — INSULIN GLARGINE 10 UNITS: 100 INJECTION, SOLUTION SUBCUTANEOUS at 21:33

## 2020-07-29 RX ADMIN — APIXABAN 2.5 MG: 2.5 TABLET, FILM COATED ORAL at 17:19

## 2020-07-29 RX ADMIN — DOCUSATE SODIUM 100 MG: 100 CAPSULE, LIQUID FILLED ORAL at 17:19

## 2020-07-29 RX ADMIN — FLUTICASONE FUROATE AND VILANTEROL TRIFENATATE 1 PUFF: 100; 25 POWDER RESPIRATORY (INHALATION) at 08:11

## 2020-07-29 RX ADMIN — AMLODIPINE BESYLATE 5 MG: 5 TABLET ORAL at 08:10

## 2020-07-29 RX ADMIN — LISINOPRIL 2.5 MG: 2.5 TABLET ORAL at 08:09

## 2020-07-29 RX ADMIN — LEVALBUTEROL HYDROCHLORIDE 1.25 MG: 1.25 SOLUTION, CONCENTRATE RESPIRATORY (INHALATION) at 20:45

## 2020-07-29 RX ADMIN — NITROGLYCERIN 0.4 MG: 0.4 TABLET SUBLINGUAL at 04:17

## 2020-07-29 RX ADMIN — APIXABAN 2.5 MG: 2.5 TABLET, FILM COATED ORAL at 08:10

## 2020-07-29 RX ADMIN — TORSEMIDE 40 MG: 20 TABLET ORAL at 10:36

## 2020-07-29 RX ADMIN — IPRATROPIUM BROMIDE 0.5 MG: 0.5 SOLUTION RESPIRATORY (INHALATION) at 13:00

## 2020-07-29 RX ADMIN — CEFTRIAXONE 1000 MG: 1 INJECTION, SOLUTION INTRAVENOUS at 21:41

## 2020-07-29 RX ADMIN — CARVEDILOL 3.12 MG: 3.12 TABLET, FILM COATED ORAL at 08:10

## 2020-07-29 RX ADMIN — PROMETHAZINE HYDROCHLORIDE 25 MG: 25 INJECTION INTRAMUSCULAR; INTRAVENOUS at 15:05

## 2020-07-29 RX ADMIN — LEVALBUTEROL HYDROCHLORIDE 1.25 MG: 1.25 SOLUTION, CONCENTRATE RESPIRATORY (INHALATION) at 08:22

## 2020-07-29 NOTE — ASSESSMENT & PLAN NOTE
· Blood pressure currently acceptable systolics in the 764J  · Continue home regimen of lisinopril 5 mg, carvedilol 3 125 mg BID, amlodipine 5 mg daily  · Hold lisinopril given NANCY  · Monitor kidney function closely  · Monitor with routine vitals

## 2020-07-29 NOTE — PLAN OF CARE
Problem: PHYSICAL THERAPY ADULT  Goal: Performs mobility at highest level of function for planned discharge setting  See evaluation for individualized goals  Description  Treatment/Interventions: Functional transfer training, LE strengthening/ROM, Therapeutic exercise, Endurance training, Patient/family training, Equipment eval/education, Bed mobility, Gait training, Compensatory technique education, Spoke to nursing  Equipment Recommended: (walker and wheelchair)       See flowsheet documentation for full assessment, interventions and recommendations  Outcome: Progressing  Note:   Prognosis: Good  Problem List: Decreased strength, Decreased endurance, Impaired balance, Decreased mobility, Impaired judgement, Decreased safety awareness  Assessment: Pt tolerated session fairly  She requires increased rest between activities due to increased [place]  She Ambulation distance limited by decreased endurance with increased [place]  She was able to complete spt and ambulation with slight decreased assistance this date despite fatigue and decreased endurance, but continues to require verbal cues for safety and improved mobility  She is limited by decreased strength, balance, endurance  Progress toward goals is limited by decreased pulmonary tolerance  She will continue to benefit from PT services to maximize LOF  Continue to recommend return to SNF with PT services upon D/C  PT Discharge Recommendation: Post-Acute Rehabilitation Services     PT - OK to Discharge: Yes(when medically cleared to SNF)    See flowsheet documentation for full assessment

## 2020-07-29 NOTE — PROGRESS NOTES
Progress Note - Monet Cespedes 1935, 82 y o  female MRN: [id number]    Unit/Bed#: -01 Encounter: [id number]    Primary Care Provider: Ramin Olivo MD   Date and time admitted to hospital: 7/25/2020  7:31 PM    * Acute on chronic respiratory failure with hypoxia [facility])  Assessment & Plan  Background: Patient admitted for acute on chronic respiratory failure w/ hypoxia after gaining approximately 9 lbs over prior week and developing peripheral edema and shortness of breath  · Patient uses supplemental oxygen 2 L via nasal cannula continuously as outpatient due to COPD and CHF  Initially required continuous BiPAP at time of admission  · Initiated treatment with IV diuretic at time of admission and patient had rapid improvement in oxygen requirement despite relatively low urine output compared to expected  · Continue aggressive diuresis and optimize outpatient plan of care  · Procalcitonin negative  Chest x-ray vascular congestion  · Continue management of congestive heart failure  · See rest of plan as noted below      Acute on chronic systolic CHF (congestive heart failure) (HCC)  Assessment & Plan  Wt Readings from Last 3 Encounters:   07/29/20 68 5 kg (151 lb 0 2 oz)   07/03/20 64 kg (141 lb 1 5 oz)   06/22/20 68 9 kg (152 lb)     Background: Reported on admission increased dyspnea, abdominal and lower extremity edema, 9 lb weight gain at skilled nursing facility  Reports increased dyspnea, abdominal and lower extremity edema, 9 lb weight gain at skilled  nursing facility   Received additional Demadex prior to arrival and a skilled nursing facility without improvement  · Improving  · Elevated BNP 17177, chest x-ray with diffuse vascular congestion  · Maintained on 2 L nasal cannula chronically supplemental oxygen; currently requiring 4 L nasal cannula  · EF 35-40% June 2020  · Maintained on Demadex, Coreg and lisinopril; Continue carvedilol and lisinopril  · Cardiology consulted; input as noted below  · Initially received aggressive IV diuresis 80 mg BID however since been transition to morning and evening oral Demadex doses per cardiology team  · Monitor kidney function and electrolytes status post aggressive IV diuresis  · Blood pressure tolerating appropriately  · 2 g sodium diet with 1500 mL fluid restriction  · Strict intake and output, Vasquez catheter was placed by ER staff  · Daily weights  · Telemetry monitor      CKD (chronic kidney disease) stage 4, GFR 15-29 ml/min ([facility])  Assessment & Plan  · With acute kidney injury as evidence by creatinine of 2 85  · Baseline creatinine appears to be approximately 1 9-2 1  · Likely secondary to aggressive IV diuresis  · Avoid nephrotoxins, hypotension, and NSAIDs if possible  · Nephrology consulted; input appreciated regards to elevated creatinine  · Monitor with morning labs    Paroxysmal atrial fibrillation (HCC)  Assessment & Plan  · Paroxysmal atrial fibrillation with elevated CHADS2 vascular score  · Continue Coreg and Eliquis for anticoagulation and rate control  · PPM  · Continue outpatient cardiology follow-up    Essential hypertension  Assessment & Plan  · Blood pressure currently acceptable systolics in the 057E  · Continue home regimen of lisinopril 5 mg, carvedilol 3 125 mg BID, amlodipine 5 mg daily  · Hold lisinopril given NANCY  · Monitor kidney function closely  · Monitor with routine vitals    COPD with emphysema ([facility] [address] )  Assessment & Plan  · No acute exacerbation  · Continue nebulized bronchodilators and inhaled corticosteroid  · Patient did receive dexamethasone 10 mg IV x1 in the ER, no indication for further systemic glucocorticoids  · Continue 2 L oxygen keep pulse oximetry greater than 90%  · On 7/29 patient was noted to have increased wheezing auscultated on the anterior primarily on the right lung field    · Initiate IV steroids    Elevated troponin level not due myocardial infarction  Assessment & Plan  · Non-MI troponin elevation due to CHF  · Slightly elevated troponin flat and stable on serial labs  · Cardiology following; input appreciated      V-tach [facility])  Assessment & Plan  · 7/27 at 4:30 a m , patient had 9 beats nonsustained V-tach  · In the setting of acute CHF exacerbation  · Continue tele monitor  · Cardiologist following; input appreciated    Prolonged Q-T interval on ECG  Assessment & Plan  · EKG on 07/27 shows prolonged QTC  · Caution with antiemetics  · Patient did have nausea; phenergan ordered   · Repeat EKG on 07/29 reviewed and     Type 2 diabetes mellitus with hyperglycemia [facility])  Assessment & Plan  Lab Results   Component Value Date    HGBA1C 7 4 (H) 07/26/2020       Recent Labs     07/28/20  1121 07/28/20  1558 07/28/20  2103 07/29/20  0747   POCGLU 115 141* 187* 70       Blood Sugar Average: Last 72 hrs:  (P) 212 [id number]     · Lantus recently discontinued at skilled nursing facility for unclear reasons  · Based on repeatedly elevated blood glucose and elevated A1c; we started lantus again   · Initiate insulin protocol with blood glucose AC and HS  · Carbohydrate controlled diet  · With low blood sugar early in the morning, decreased the dose of of patient's Lantus at night (presently at 15 units QHS) to 12 units QHS  · Given morning sugar of 70 will decrease dose again to 10 units on 07/29  · Monitor with POCT BG and titrate regimen as indicated    Esophageal reflux  Assessment & Plan  · Patient experiencing severe reflux symptoms 7/27  · Managed home on Protonix 40 mg daily   · Increased dose for a few days  · Supportive care    UTI (urinary tract infection)  Assessment & Plan  · White blood cells innumerable in straight catheterized urinary specimen  · Urine culture:  Enterobacter  Sensitive to Rocephin  · Urinalysis with significant pyuria, urine culture revealed Enterobacter, sensitive to Rocephin; continue treatment with Rocephin for 3 to 5 days    · 7/29 is day 5; will discontinue for       CAD (coronary artery disease), native coronary artery  Assessment & Plan  · Asymptomatic however does have known history of CAD s/p CABG   · Patient not on anti-platelet therapy, severe anemia receives iron infusions  · Follows outpatient with [facility] in [place]  · Continue statin and Eliquis  · Mild flat elevated troponin suspect secondary to CHF exacerbation  · Cardiology following; input appreciated    Iron deficiency anemia  Assessment & Plan  · Hemoglobin appears stable approximately 8  · Last infusion 2020  · Continue ferrous gluconate  · Trend hemoglobin on an as-needed basis    VTE Pharmacologic Prophylaxis:   Pharmacologic: Apixaban (Eliquis)  Mechanical VTE Prophylaxis in Place: Yes    Patient Centered Rounds: I have performed bedside rounds with nursing staff today  Discussions with Specialists or Other Care Team Provider:  Nursing staff, case management, and attending physician    Education and Discussions with Family / Patient:  Education provided the bedside regarding plan of care as noted above  Time Spent for Care: 30 minutes  More than 50% of total time spent on counseling and coordination of care as described above  Current Length of Stay: 4 day(s)    Current Patient Status: Inpatient   Certification Statement: The patient will continue to require additional inpatient hospital stay due to Completion of IV antibiotics as well as monitoring of kidney function given aggressive IV diuresis with new acute kidney injury  Discharge Plan:  Pending response to oral diuretics and patient's kidney function  Likely additional 48-72 hours    Code Status: Level 1 - Full Code      Subjective:   Patient overall reports that she still having significant burning in her throat as well as mucus  Will continue her Protonix  Patient does report however that her breathing is about the same as yesterday      Objective:     Vitals:   Temp (24hrs), Av 5 °F (36 9 °C), Min:97 8 °F (36 6 °C), Max:99 3 °F (37 4 °C)    Temp:  [97 8 °F (36 6 °C)-99 3 °F (37 4 °C)] 99 2 °F (37 3 °C)  HR:  [65-95] 73  Resp:  [20-28] 24  BP: (128-141)/(56-82) 137/56  SpO2:  [92 %-99 %] 92 %  Body mass index is 25 92 kg/m²  Input and Output Summary (last 24 hours): Intake/Output Summary (Last 24 hours) at 7/29/2020 1112  Last data filed at 7/29/2020 1001  Gross per 24 hour   Intake 1340 ml   Output 1550 ml   Net -210 ml       Physical Exam:     Physical Exam   Constitutional: She is oriented to person, place, and time  She appears well-nourished  She is cooperative  No distress  Nasal cannula in place  Cardiovascular: Normal rate, regular rhythm, normal heart sounds and intact distal pulses  Pulses:       Radial pulses are 2+ on the right side, and 2+ on the left side  Dorsalis pedis pulses are 2+ on the right side, and 2+ on the left side  Posterior tibial pulses are 2+ on the right side, and 2+ on the left side  Trace peripheral edema noted  Pulmonary/Chest: She has wheezes  Abdominal: Soft  Bowel sounds are normal  She exhibits no distension  There is no tenderness  Neurological: She is alert and oriented to person, place, and time  Skin: Skin is warm and dry  Capillary refill takes less than 2 seconds  She is not diaphoretic  Psychiatric: She has a normal mood and affect  Her speech is normal and behavior is normal  Judgment normal    Vitals reviewed        Additional Data:     Labs:    Results from last 7 days   Lab Units 07/28/20  0452 07/27/20  0513   WBC Thousand/uL 6 46 7 16   HEMOGLOBIN g/dL 8 8* 8 2*   HEMATOCRIT % 29 3* 26 2*   PLATELETS Thousands/uL 179 166   NEUTROS PCT %  --  70   LYMPHS PCT %  --  19   MONOS PCT %  --  11   EOS PCT %  --  0     Results from last 7 days   Lab Units 07/29/20  0357  07/27/20  0513   SODIUM mmol/L 145   < > 141   POTASSIUM mmol/L 4 1   < > 4 5   CHLORIDE mmol/L 105   < > 105   CO2 mmol/L 32   < > 29   BUN mg/dL 68*   < > 61* CREATININE mg/dL 2 85*   < > 2 59*   ANION GAP mmol/L 8   < > 7   CALCIUM mg/dL 8 1*   < > 8 3   ALBUMIN g/dL  --   --  2 5*   TOTAL BILIRUBIN mg/dL  --   --  0 12*   ALK PHOS U/L  --   --  90   ALT U/L  --   --  11*   AST U/L  --   --  6   GLUCOSE RANDOM mg/dL 78   < > 254*    < > = values in this interval not displayed  Results from last 7 days   Lab Units 07/25/20 2023   INR  1 00     Results from last 7 days   Lab Units 07/29/20  0747 07/28/20  2103 07/28/20  1558 07/28/20  1121 07/28/20  0718 07/27/20  2137 07/27/20  1558 07/27/20  1104 07/27/20  0733 07/26/20  2144 07/26/20  1648 07/26/20  1147   POC GLUCOSE mg/dl 70 187* 141* 115 70 162* 212* 181* 274* 353* 298* 361*     Results from last 7 days   Lab Units 07/26/20  0348   HEMOGLOBIN A1C % 7 4*     Results from last 7 days   Lab Units 07/25/20 2121 07/25/20 2023   LACTIC ACID mmol/L  --  1 4   PROCALCITONIN ng/ml 0 06  --            * I Have Reviewed All Lab Data Listed Above  * Additional Pertinent Lab Tests Reviewed: [facility]    Imaging:    Imaging Reports Reviewed Today Include:  Chest x-ray x2  Imaging Personally Reviewed by Myself Includes:  None    Recent Cultures (last 7 days):     Results from last 7 days   Lab Units 07/25/20 2115 07/25/20 2023 07/25/20 1959   BLOOD CULTURE   --  No Growth at 48 hrs  No Growth at 48 hrs     URINE CULTURE  >100,000 cfu/ml Enterobacter cloacae complex*  --   --        Last 24 Hours Medication List:     Current Facility-Administered Medications:  acetaminophen 650 mg Oral Q6H PRN Jeanette S Darrel, CRNP    acetylcysteine 3 mL Nebulization Q8H PRN Daiana Nix PA-C    albuterol 2 5 mg Nebulization Q4H PRN Jeanette S Darrel, JOHNATHONNP    aluminum-magnesium hydroxide-simethicone 30 mL Oral Q4H PRN Daiana Nix PA-C    amLODIPine 5 mg Oral Daily KRISTIN Lala    apixaban 2 5 mg Oral BID KRISTIN Lala    carvedilol 6 25 mg Oral BID [place] cefTRIAXone 1,000 mg Intravenous Q24H KRISTIN Warren Last Rate: 1,000 mg (07/28/20 2204)   docusate sodium 100 mg Oral BID Jeanette S Darrel, CRROSEMARIE    ferrous gluconate 324 mg Oral Daily Before Breakfast Jeanette S Darrel, CRNP    fluticasone-vilanterol 1 puff Inhalation Daily Jeanette S Darrel, CRNP    insulin glargine 10 Units Subcutaneous HS [place] KRISTIN Campos    insulin lispro 1-6 Units Subcutaneous TID AC Jeanette S Darrel, CRNP    insulin lispro 1-6 Units Subcutaneous HS Jeanette S Darrel, CRNP    ipratropium 0 5 mg Nebulization TID Sobeida Lomax MD    levalbuterol 1 25 mg Nebulization TID Sobeida Lomax MD    nitroglycerin 0 4 mg Sublingual Q5 Min PRN Jeanette S Darrel, CRNP    pantoprazole 80 mg Oral Daily Daiana Nix PA-C    phenol 1 spray Mouth/Throat Q2H PRN Daiana Nix PA-C    pravastatin 80 mg Oral Daily With Marii Albarran S Darrel, KRISTIN    promethazine 25 mg Intramuscular Q6H PRN Daiana Nix PA-C    senna 2 tablet Oral HS Daiana Nix PA-C    torsemide 20 mg Oral After [employer]IM    torsemide 40 mg Oral After Breakfast [place], PA-C         Today, Patient Was Seen By: KRISTIN Valente    ** Please Note: Dictation voice to text software may have been used in the creation of this document   **

## 2020-07-29 NOTE — PLAN OF CARE
Problem: OCCUPATIONAL THERAPY ADULT  Goal: Performs self-care activities at highest level of function for planned discharge setting  See evaluation for individualized goals  Description  Treatment Interventions: ADL retraining, Functional transfer training, UE strengthening/ROM, Endurance training, Patient/family training, Equipment evaluation/education, Neuromuscular reeducation, Compensatory technique education, Continued evaluation, Energy conservation, Activityengagement          See flowsheet documentation for full assessment, interventions and recommendations  Note:   Limitation: Decreased ADL status, Decreased UE strength, Decreased Safe judgement during ADL, Decreased endurance, Decreased self-care trans, Decreased high-level ADLs  Prognosis: Good  Assessment: Pt is an 83 y/o F admitted to [address] [place] [address] 7/25/2020 d/t experiencing worsening dyspnea and cough along with 9lb weight gain  Dx: acute on chronic respiratory failure  Pt with PMHx impacting performance during functional tasks including: NANCY, a-fib, CHF, COPD, DM  Pt reports currently living at a local SNF  Pt completing functional transfers and short distance ambulation with use of RW @ Mod I  Pt reports completing UB ADLs and toileting tasks @ Mod I with use of RW  Pt has assistance PRN for LB dressing for socks and shoes  On evaluation, Pt A&Ox4  Pt completing side rolling in bed @ Mod A d/t experiencing a incontinent BM and requiring Total A for hygiene and pericare  Pt tolerating well  Pt completing supine>sit @ Min A with HOB elevated and use of bedrails  Pt completing UB dressing @ S  LB dressing @ Min-Mod A  STS transfers with use of RW @ SBA and Steadying Assist for SPT to recliner  BUE ROM and MS WFL  Pt scoring 40/100 on Barthel Index   Pt presents with decrease activity tolerance, decrease standing balance, decrease sitting balance, decrease performance during ADL tasks, decrease safety awareness , decrease UB MS, decrease generalized strength, decrease activity engagement and decrease performance during functional transfers  Pt would benefit from continued acute OT services to address deficits as well as post acute rehab services upon d/c from [facility] [place] [facility]     Recommendation: (return to SNF with therapy services)  OT Discharge Recommendation: [address]

## 2020-07-29 NOTE — ASSESSMENT & PLAN NOTE
Wt Readings from Last 3 Encounters:   07/29/20 68 5 kg (151 lb 0 2 oz)   07/03/20 64 kg (141 lb 1 5 oz)   06/22/20 68 9 kg (152 lb)     Background: Reported on admission increased dyspnea, abdominal and lower extremity edema, 9 lb weight gain at skilled nursing facility  Reports increased dyspnea, abdominal and lower extremity edema, 9 lb weight gain at skilled  nursing facility   Received additional Demadex prior to arrival and a skilled nursing facility without improvement  · Improving  · Elevated BNP 91614, chest x-ray with diffuse vascular congestion  · Maintained on 2 L nasal cannula chronically supplemental oxygen; currently requiring 4 L nasal cannula  · EF 35-40% June 2020  · Maintained on Demadex, Coreg and lisinopril; Continue carvedilol and lisinopril  · Cardiology consulted; input as noted below  · Initially received aggressive IV diuresis 80 mg BID however since been transition to morning and evening oral Demadex doses per cardiology team  · Monitor kidney function and electrolytes status post aggressive IV diuresis  · Blood pressure tolerating appropriately  · 2 g sodium diet with 1500 mL fluid restriction  · Strict intake and output, Vasquez catheter was placed by ER staff  · Daily weights  · Telemetry monitor

## 2020-07-29 NOTE — ASSESSMENT & PLAN NOTE
· White blood cells innumerable in straight catheterized urinary specimen  · Urine culture:  Enterobacter  Sensitive to Rocephin  · Urinalysis with significant pyuria, urine culture revealed Enterobacter, sensitive to Rocephin; continue treatment with Rocephin for 3 to 5 days    · 7/29 is day 5; will discontinue for 7/30

## 2020-07-29 NOTE — ASSESSMENT & PLAN NOTE
· Patient experiencing severe reflux symptoms 7/27  · Managed home on Protonix 40 mg daily   · Increased dose for a few days  · Supportive care

## 2020-07-29 NOTE — ASSESSMENT & PLAN NOTE
· No acute exacerbation  · Continue nebulized bronchodilators and inhaled corticosteroid  · Patient did receive dexamethasone 10 mg IV x1 in the ER, no indication for further systemic glucocorticoids  · Continue 2 L oxygen keep pulse oximetry greater than 90%  · On 7/29 patient was noted to have increased wheezing auscultated on the anterior primarily on the right lung field    · Initiate IV steroids

## 2020-07-29 NOTE — OCCUPATIONAL THERAPY NOTE
Occupational Therapy Evaluation     Patient Name: Chivo Abraham  UMBQB'F Date: 7/29/2020  Problem List  Principal Problem:    Acute on chronic respiratory failure with hypoxia [facility])  Active Problems:    Essential hypertension    Acute on chronic systolic CHF (congestive heart failure) ([facility])    COPD with emphysema ([facility])    CKD (chronic kidney disease) stage 4, GFR 15-29 ml/min ([facility])    Esophageal reflux    Type 2 diabetes mellitus with hyperglycemia ([facility])    Paroxysmal atrial fibrillation ([facility])    CAD (coronary artery disease), native coronary artery    UTI (urinary tract infection)    Iron deficiency anemia    Elevated troponin level not due myocardial infarction    Prolonged Q-T interval on ECG    V-tach [facility])    Past Medical History  Past Medical History:   Diagnosis Date    NANCY (acute kidney injury) ([facility] )     Atrial fibrillation ([facility] )     CHF (congestive heart failure) ([facility])     [facility]     Diabetes mellitus ([facility] )     Elevated troponin level not due myocardial infarction 7/26/2020    Paroxysmal atrial fibrillation ([facility] ) 2/13/2012    Trigger finger of thumb     last assessed: 07/18/13     Past Surgical History  Past Surgical History:   Procedure Laterality Date    APPENDECTOMY      CARDIAC PACEMAKER PLACEMENT      CARDIAC PACEMAKER PLACEMENT      CHOLECYSTECTOMY      COLONOSCOPY      Complete    CORONARY ANGIOPLASTY WITH STENT PLACEMENT      CORONARY ARTERY BYPASS GRAFT      CORONARY ARTERY BYPASS GRAFT      ESOPHAGOGASTRODUODENOSCOPY      Diagnostic    HERNIA REPAIR      TOTAL ABDOMINAL HYSTERECTOMY      with removal of both ovaries           07/29/20 0836   Note Type   Note type Eval/Treat   Restrictions/Precautions   Other Precautions Chair Alarm; Bed Alarm; Fall Risk   Pain Assessment   Pain Assessment Tool 0-10   Pain Score No Pain   Home Living   Type of Home SNF   Home Equipment Walker; Wheelchair-manual   Additional Comments Pt has been a resident of SNF since 2020   Prior Function   Level of [place] Needs assistance with ADLs and functional mobility   Lives With Facility staff   Receives Help From Personal care attendant   ADL Assistance Needs assistance   IADLs Needs assistance   Falls in the last 6 months 0   Comments Pt reports she is receiving therapy at [facility]  Reports she ambulates in her room by herself with her walker and takes herself into and out of the bathroom and completes hygiene without assistance "unless I make a mess " Reports has assistance for increased distances  Pt reports completing UB ADLs @ Mod I  Pt has assistance for socks/shoes although completes donning underwear and pants @ Mod I with increased difficulty at times  ADL   Where Assessed Edge of bed   UB Dressing Assistance 5  Supervision/Setup   UB Dressing Deficit Setup   LB Dressing Assistance 3  Moderate Assistance   LB Dressing Deficit Steadying; Requires assistive device for steadying;Verbal cueing;Supervision/safety; Increased time to complete; Don/doff R sock; Don/doff L sock; Thread RLE into pants; Thread LLE into pants;Pull up over hips   Additional Comments Pt completing UB ADLs while seated at EOB @ S after set-up  LB ADLs @ Mod A for donning socks  Min A for donning pants around feet and CM around waist     Bed Mobility   Rolling R 3  Moderate assistance   Additional items Assist x 1; Increased time required;Verbal cues   Rolling L 3  Moderate assistance   Additional items Assist x 1; Increased time required;Verbal cues   Supine to Sit 4  Minimal assistance   Additional items Assist x 1; Increased time required;LE management  (trunk management)   Additional Comments Pt completing side rolling in bed to L and R @ Mod A in order to complete thorough posterior pericare hygiene  Pt then completing supine>sit @ Min A with HOB slightly elevated and use of bedrails      Transfers   Sit to Stand   (SBA)   Additional items Increased time required;Verbal cues   Stand to Sit (SBA)   Additional items Increased time required;Verbal cues   Stand pivot   (Steadying Assist)   Additional items Increased time required;Verbal cues   Additional Comments Pt utilizing RW for UB support during functional transfers  vc'ing for hand placement, safety, and technique  Balance   Static Sitting Good   Dynamic Sitting Fair   Static Standing Fair   Dynamic Standing Fair -   Activity Tolerance   Activity Tolerance Patient limited by fatigue   Medical Staff Made Aware Spoke with PT, Carla Solano   Nurse Made Aware Spoke with RN, Kat Villareal   RUE Assessment   RUE Assessment WFL   LUE Assessment   LUE Assessment WFL   Hand Function   Gross Motor Coordination Functional   Fine Motor Coordination Functional   Cognition   Arousal/Participation Alert; Responsive; Cooperative   Attention Attends with cues to redirect   Orientation Level Oriented X4   Memory Within functional limits   Following Commands Follows one step commands without difficulty   Assessment   Limitation Decreased ADL status; Decreased UE strength;Decreased Safe judgement during ADL;Decreased endurance;Decreased self-care trans;Decreased high-level ADLs   Prognosis Good   Assessment Pt is an 83 y/o F admitted to [address] [place] [address] 7/25/2020 d/t experiencing worsening dyspnea and cough along with 9lb weight gain  Dx: acute on chronic respiratory failure  Pt with PMHx impacting performance during functional tasks including: NANCY, a-fib, CHF, COPD, DM  Pt reports currently living at a local SNF  Pt completing functional transfers and short distance ambulation with use of RW @ Mod I  Pt reports completing UB ADLs and toileting tasks @ Mod I with use of RW  Pt has assistance PRN for LB dressing for socks and shoes  On evaluation, Pt A&Ox4  Pt completing side rolling in bed @ Mod A d/t experiencing a incontinent BM and requiring Total A for hygiene and pericare  Pt tolerating well  Pt completing supine>sit @ Min A with HOB elevated and use of bedrails   Pt completing UB dressing @ S  LB dressing @ Min-Mod A  STS transfers with use of RW @ SBA and Steadying Assist for SPT to recliner  BUE ROM and MS WFL  Pt scoring 40/100 on Barthel Index  Pt presents with decrease activity tolerance, decrease standing balance, decrease sitting balance, decrease performance during ADL tasks, decrease safety awareness , decrease UB MS, decrease generalized strength, decrease activity engagement and decrease performance during functional transfers  Pt would benefit from continued acute OT services to address deficits as well as post acute rehab services upon d/c from [address] [place] [address]  Plan   Treatment Interventions ADL retraining;Functional transfer training;UE strengthening/ROM; Endurance training;Patient/family training;Equipment evaluation/education; Neuromuscular reeducation; Compensatory technique education;Continued evaluation; Energy conservation; Activityengagement   Goal Expiration Date 08/08/20   OT Treatment Day 1   OT Frequency 3-5x/wk   Additional Treatment Session   Start Time 9226   End Time 0904   Treatment Assessment Pt seen for treatment session #1 this date  Pt alert and agreeable to participate at this time  Pt completing STS from recliner>RW @ SBA  Pt requiring Min A to reece brief around feet and Steadying Assist to pull around waist while standing at RW  Increased time required  Pt completing SPT with RW @ Steadying Assist  Pt with good participation although increased fatigue noted  Upon end of session  Pt seated OOB in recliner chair with call bell in reach, chair alarm intact and all needs met at this time      Additional Treatment Day 1   Recommendation   Recommendation   (return to SNF with therapy services)   OT Discharge Recommendation Post-Acute Rehabilitation Services   Barthel Index   Feeding 10   Bathing 0   Grooming Score 5   Dressing Score 5   Bladder Score 0   Bowels Score 5   Toilet Use Score 5   Transfers (Bed/Chair) Score 10   Mobility (Level Surface) Score 0   Stairs Score 0   Barthel Index Score 40     Pt goals to be met by 8/8/2020    1  Pt will demonstrate ability to complete supine<>sit @ Mod I in order to increase safety and independence during ADL tasks  2  Pt will demonstrate ability to complete LB dressing @ S in order to increase safety and independence during meaningful tasks  3  Pt will demonstrate ability to complete toileting tasks including CM and pericare @ S in order to increase safety and independence during meaningful tasks  4  Pt will demonstrate ability to complete EOB, chair, toilet/commode transfers @ S in order to increase performance and participation during functional tasks  5  Pt will demonstrate ability to stand for 4-5 minutes while maintaining G balance with use of RW for UB support PRN  6  Pt will demonstrate ability to tolerate 30-35 minute OT session with no vc'ing for deep breathing or use of energy conservation techniques in order to increase activity tolerance during functional tasks  7  Pt will demonstrate Good carryover of use of energy conservation/compensatory strategies during ADLs and functional tasks in order to increase safety and reduce risk for falls  8  Pt will demonstrate Good attention and participation in continued evaluation of functional ambulation house hold distances in order to assist with safe d/c planning  9  Pt will attend to continued cognitive assessments 100% of the time in order to provide most appropriate d/c recommendations  10  Pt will follow 100% simple 2-step commands and be A&O x4 consistently with environmental cues to increase participation in functional activities  11  Pt will identify 3 areas of interest/hobbies and 1 intervention on how to incorporate into daily life in order to increase interaction with environment and peers as well as increase participation in meaningful tasks     12  Pt will demonstrate 100% carryover of BUE HEP in order to increase BUE MS and increase performance during functional tasks upon d/c home      Jg Napier, OTR/L

## 2020-07-29 NOTE — ASSESSMENT & PLAN NOTE
· Paroxysmal atrial fibrillation with elevated CHADS2 vascular score  · Continue Coreg and Eliquis for anticoagulation and rate control  · PPM  · Continue outpatient cardiology follow-up

## 2020-07-29 NOTE — RESPIRATORY THERAPY NOTE
RT Protocol Note  Oval Cecile 82 y o  female MRN: [id number]  Unit/Bed#: -01 Encounter: [id number]    Assessment    Principal Problem:    Acute on chronic respiratory failure with hypoxia [facility])  Active Problems:    Essential hypertension    Acute on chronic systolic CHF (congestive heart failure) ([facility])    COPD with emphysema ([facility])    CKD (chronic kidney disease) stage 4, GFR 15-29 ml/min ([facility])    Esophageal reflux    Type 2 diabetes mellitus with hyperglycemia ([facility])    Paroxysmal atrial fibrillation ([facility])    CAD (coronary artery disease), native coronary artery    UTI (urinary tract infection)    Iron deficiency anemia    Elevated troponin level not due myocardial infarction    Prolonged Q-T interval on ECG    V-tach [facility])      Home Pulmonary Medications:  advair 250/50, duoneb       Past Medical History:   Diagnosis Date    [facility]     [facility]     CHF (congestive heart failure) ([facility])     COPD [facility]chronic obstructive [facility]     Diabetes mellitus ([facility] )     Elevated troponin level not due myocardial infarction 2020    Paroxysmal atrial fibrillation ([facility] ) 2012    Trigger finger of thumb     last assessed: 13     Social History     Socioeconomic History    Marital status:       Spouse name: None    Number of children: None    Years of education: None    Highest education level: None   Occupational History    None   Social Needs    Financial resource strain: None    Food insecurity:     Worry: Never true     Inability: Never true    Transportation needs:     Medical: No     Non-medical: No   Tobacco Use    Smoking status: Former Smoker     Last attempt to quit: 2011     Years since quittin 1    Smokeless tobacco: Never Used    Tobacco comment: Former smoker per allscripts   Substance and Sexual Activity    Alcohol use: Not Currently    Drug use: No    Sexual activity: Not Currently   Lifestyle    Physical activity:     Days per week: None     Minutes per session: None    Stress: None   Relationships    Social connections:     Talks on phone: None     Gets together: None     Attends [employer] service: None     Active member of club or organization: None     Attends meetings of clubs or organizations: None     Relationship status: None    Intimate partner violence:     Fear of current or ex partner: None     Emotionally abused: None     Physically abused: None     Forced sexual activity: None   Other Topics Concern    None   Social History Narrative    None       Subjective    Subjective Data: refusing bipap tonight    Objective    Physical Exam:   Assessment Type: Pre-treatment  General Appearance: Alert, Awake  Respiratory Pattern: Normal  Chest Assessment: Chest expansion symmetrical  Bilateral Breath Sounds: Diminished  O2 Device: 1l    Vitals:  Blood pressure 141/82, pulse 74, temperature 99 3 °F (37 4 °C), temperature source Oral, resp  rate (!) 24, height 5' 4" (1 626 m), weight 65 7 kg (144 lb 13 5 oz), SpO2 94 %  Results from last 7 days   Lab Units 07/25/20 1959   PH ART  7 388   PCO2 ART mm Hg 43 8   PO2 ART mm Hg 101 4   HCO3 ART mmol/L 25 8   BASE EXC ART mmol/L 0 6   O2 CONTENT ART mL/dL 14 0*   O2 HGB, ARTERIAL % 97 8*   ABG SOURCE  Radial, Left       Imaging and other studies: Interstitial opacities have slightly improved as has pulmonary vascular congestion  There is no pneumothorax  Effusions with bibasilar probable atelectasis is also stable        O2 Device: 1l     Plan    Respiratory Plan: Mild Distress pathway        Resp Comments: no dyspnea on 1L

## 2020-07-29 NOTE — ASSESSMENT & PLAN NOTE
Background: Patient admitted for acute on chronic respiratory failure w/ hypoxia after gaining approximately 9 lbs over prior week and developing peripheral edema and shortness of breath  · Patient uses supplemental oxygen 2 L via nasal cannula continuously as outpatient due to COPD and CHF  Initially required continuous BiPAP at time of admission  · Initiated treatment with IV diuretic at time of admission and patient had rapid improvement in oxygen requirement despite relatively low urine output compared to expected  · Continue aggressive diuresis and optimize outpatient plan of care  · Procalcitonin negative  Chest x-ray vascular congestion  · Continue management of congestive heart failure    · See rest of plan as noted below

## 2020-07-29 NOTE — ASSESSMENT & PLAN NOTE
· With acute kidney injury as evidence by creatinine of 2 85  · Baseline creatinine appears to be approximately 1 9-2 1  · Likely secondary to aggressive IV diuresis  · Avoid nephrotoxins, hypotension, and NSAIDs if possible  · Nephrology consulted; input appreciated regards to elevated creatinine  · Monitor with morning labs

## 2020-07-29 NOTE — ASSESSMENT & PLAN NOTE
· EKG on 07/27 shows prolonged QTC  · Caution with antiemetics      · Patient did have nausea; phenergan ordered   · Repeat EKG on 07/29 reviewed and

## 2020-07-29 NOTE — ASSESSMENT & PLAN NOTE
· 7/27 at 4:30 a m , patient had 9 beats nonsustained V-tach  · In the setting of acute CHF exacerbation    · Continue tele monitor  · Cardiologist following; input appreciated

## 2020-07-29 NOTE — ASSESSMENT & PLAN NOTE
· Asymptomatic however does have known history of CAD s/p CABG   · Patient not on anti-platelet therapy, severe anemia receives iron infusions  · Follows outpatient with [facility] in [address]  · Continue statin and Eliquis  · Mild flat elevated troponin suspect secondary to CHF exacerbation  · Cardiology following; input appreciated

## 2020-07-29 NOTE — PLAN OF CARE
Problem: Potential for Falls  Goal: Patient will remain free of falls  Description  INTERVENTIONS:  - Assess patient frequently for physical needs  -  Identify cognitive and physical deficits and behaviors that affect risk of falls    -  [place] fall precautions as indicated by assessment   - Educate patient/family on patient safety including physical limitations  - Instruct patient to call for assistance with activity based on assessment  - Modify environment to reduce risk of injury  - Consider OT/PT consult to assist with strengthening/mobility  Outcome: Progressing     Problem: Prexisting or High Potential for Compromised Skin Integrity  Goal: Skin integrity is maintained or improved  Description  INTERVENTIONS:  - Identify patients at risk for skin breakdown  - Assess and monitor skin integrity  - Assess and monitor nutrition and hydration status  - Monitor labs   - Assess for incontinence   - Turn and reposition patient  - Assist with mobility/ambulation  - Relieve pressure over bony prominences  - Avoid friction and shearing  - Provide appropriate hygiene as needed including keeping skin clean and dry  - Evaluate need for skin moisturizer/barrier cream  - Collaborate with interdisciplinary team   - Patient/family teaching  - Consider wound care consult   Outcome: Progressing     Problem: PAIN - ADULT  Goal: Verbalizes/displays adequate comfort level or baseline comfort level  Description  Interventions:  - Encourage patient to monitor pain and request assistance  - Assess pain using appropriate pain scale  - Administer analgesics based on type and severity of pain and evaluate response  - Implement non-pharmacological measures as appropriate and evaluate response  - Consider cultural and social influences on pain and pain management  - Notify physician/advanced practitioner if interventions unsuccessful or patient reports new pain  Outcome: Progressing     Problem: INFECTION - ADULT  Goal: Absence or prevention of progression during hospitalization  Description  INTERVENTIONS:  - Assess and monitor for signs and symptoms of infection  - Monitor lab/diagnostic results  - Monitor all insertion sites, i e  indwelling lines, tubes, and drains  - Monitor endotracheal if appropriate and nasal secretions for changes in amount and color  - [place] appropriate cooling/warming therapies per order  - Administer medications as ordered  - Instruct and encourage patient and family to use good hand hygiene technique  - Identify and instruct in appropriate isolation precautions for identified infection/condition  Outcome: Progressing  Goal: Absence of fever/infection during neutropenic period  Description  INTERVENTIONS:  - Monitor WBC    Outcome: Progressing     Problem: SAFETY ADULT  Goal: Patient will remain free of falls  Description  INTERVENTIONS:  - Assess patient frequently for physical needs  -  Identify cognitive and physical deficits and behaviors that affect risk of falls    -  [place] fall precautions as indicated by assessment   - Educate patient/family on patient safety including physical limitations  - Instruct patient to call for assistance with activity based on assessment  - Modify environment to reduce risk of injury  - Consider OT/PT consult to assist with strengthening/mobility  Outcome: Progressing  Goal: Maintain or return to baseline ADL function  Description  INTERVENTIONS:  -  Assess patient's ability to carry out ADLs; assess patient's baseline for ADL function and identify physical deficits which impact ability to perform ADLs (bathing, care of mouth/teeth, toileting, grooming, dressing, etc )  - Assess/evaluate cause of self-care deficits   - Assess range of motion  - Assess patient's mobility; develop plan if impaired  - Assess patient's need for assistive devices and provide as appropriate  - Encourage maximum independence but intervene and supervise when necessary  - Involve family in performance of ADLs  - Assess for home care needs following discharge   - Consider OT consult to assist with ADL evaluation and planning for discharge  - Provide patient education as appropriate  Outcome: Progressing  Goal: Maintain or return mobility status to optimal level  Description  INTERVENTIONS:  - Assess patient's baseline mobility status (ambulation, transfers, stairs, etc )    - Identify cognitive and physical deficits and behaviors that affect mobility  - Identify mobility aids required to assist with transfers and/or ambulation (gait belt, sit-to-stand, lift, walker, cane, etc )  - [place] fall precautions as indicated by assessment  - Record patient progress and toleration of activity level on Mobility SBAR; progress patient to next Phase/Stage  - Instruct patient to call for assistance with activity based on assessment  - Consider rehabilitation consult to assist with strengthening/weightbearing, etc   Outcome: Progressing     Problem: DISCHARGE PLANNING  Goal: Discharge to home or other facility with appropriate resources  Description  INTERVENTIONS:  - Identify barriers to discharge w/patient and caregiver  - Arrange for needed discharge resources and transportation as appropriate  - Identify discharge learning needs (meds, wound care, etc )  - Arrange for interpretive services to assist at discharge as needed  - Refer to Case Management Department for coordinating discharge planning if the patient needs post-hospital services based on physician/advanced practitioner order or complex needs related to functional status, cognitive ability, or social support system  Outcome: Progressing     Problem: Knowledge Deficit  Goal: Patient/family/caregiver demonstrates understanding of disease process, treatment plan, medications, and discharge instructions  Description  Complete learning assessment and assess knowledge base    Interventions:  - Provide teaching at level of understanding  - Provide teaching via preferred learning methods  Outcome: Progressing     Problem: RESPIRATORY - ADULT  Goal: Achieves optimal ventilation and oxygenation  Description  INTERVENTIONS:  - Assess for changes in respiratory status  - Assess for changes in mentation and behavior  - Position to facilitate oxygenation and minimize respiratory effort  - Oxygen administered by appropriate delivery if ordered  - Initiate smoking cessation education as indicated  - Encourage broncho-pulmonary hygiene including cough, deep breathe, Incentive Spirometry  - Assess the need for suctioning and aspirate as needed  - Assess and instruct to report SOB or any respiratory difficulty  - Respiratory Therapy support as indicated  Outcome: Progressing     Problem: Nutrition/Hydration-ADULT  Goal: Nutrient/Hydration intake appropriate for improving, restoring or maintaining nutritional needs  Description  Monitor and assess patient's nutrition/hydration status for malnutrition  Collaborate with interdisciplinary team and initiate plan and interventions as ordered  Monitor patient's weight and dietary intake as ordered or per policy  Utilize nutrition screening tool and intervene as necessary  Determine patient's food preferences and provide high-protein, high-caloric foods as appropriate       INTERVENTIONS:  - Monitor oral intake, urinary output, labs, and treatment plans  - Assess nutrition and hydration status and recommend course of action  - Evaluate amount of meals eaten  - Assist patient with eating if necessary   - Allow adequate time for meals  - Recommend/ encourage appropriate diets, oral nutritional supplements, and vitamin/mineral supplements  - Order, calculate, and assess calorie counts as needed  - Recommend, monitor, and adjust tube feedings and TPN/PPN based on assessed needs  - Assess need for intravenous fluids  - Provide specific nutrition/hydration education as appropriate  - Include patient/family/caregiver in decisions related to nutrition  Outcome: Progressing

## 2020-07-29 NOTE — PHYSICAL THERAPY NOTE
PHYSICAL THERAPY TREATMENT NOTE  NAME:  Rachel Puri  DATE: 07/29/20    Length Of Stay: 4  Performed at least 2 patient identifiers during session: Name and Birthday    TREATMENT:      07/29/20 0837   Pain Assessment   Pain Assessment Tool 0-10   Pain Score No Pain   Restrictions/Precautions   Other Precautions Chair Alarm; Bed Alarm; Fall Risk;O2;Telemetry;Multiple lines   General   Chart Reviewed Yes   Response to Previous Treatment Patient with no complaints from previous session  Cognition   Orientation Level Oriented X4   Subjective   Subjective "I'm throwing up "   Bed Mobility   Rolling R 3  Moderate assistance   Additional items Assist x 1; Increased time required;Verbal cues   Rolling L 3  Moderate assistance   Additional items Assist x 1; Increased time required;Verbal cues   Supine to Sit 4  Minimal assistance   Additional items Assist x 1; Increased time required;LE management  (trunk management)   Additional Comments HOB elevated 30 degrees  rolling right and left with modAx1 with verbal and manual cues for technique and hand placement  SpO2 at 92% on 4 lpm  desaturated to 83% with rolling with HOB lowered to 15 degrees  increased to baseline with HOB adjusted  supine to sit EOB with miNAx1 with increased time with min cues for sequence, technique and hand placement  Transfers   Sit to Stand   (stand by assistance)   Additional items Increased time required;Verbal cues   Stand to Sit   (stand by assistance)   Additional items Increased time required;Verbal cues   Stand pivot   (steadying assistance)   Additional items Increased time required;Verbal cues   Additional Comments use of cj RW for transfers  sit<>stand with SBA with increased time with pt pulling from RW at times and not reaching for surface at times  spt with RW with steadying assistance with min cues to turn completely prior to sitting  min MONTILLA with mobility  SpO2 90-93%      Ambulation/Elevation   Gait pattern Decreased foot clearance; Excessively slow; Short stride   Gait Assistance   (steadying assistance)   Additional items Assist x 1;Verbal cues   Assistive Device Rolling walker   Distance 16' with RW with steadying assistance slow mis, decreased step length and foot clearance  increased MONTILLA with SpO2 desaturated to 85% briefly with min cues for pursed lip breathing increaeed to 92% immediately  Balance   Static Sitting Good   Dynamic Sitting Fair   Static Standing Fair   Dynamic Standing Fair -   Ambulatory Fair -   Endurance Deficit   Endurance Deficit Yes   Endurance Deficit Description mod MONTILLA with ambulation  brief desaturation to 85% with ambulation, then increased to 92% immediately  Activity Tolerance   Activity Tolerance Patient limited by fatigue   Nurse Made Aware RN, Ariella   Assessment   Prognosis Good   Problem List Decreased strength;Decreased endurance; Impaired balance;Decreased mobility; Impaired judgement;Decreased safety awareness   Goals   PT Treatment Day 2   Plan   Treatment/Interventions Functional transfer training;LE strengthening/ROM; Endurance training; Therapeutic exercise;Patient/family training;Equipment eval/education; Bed mobility;Gait training; Compensatory technique education;Spoke to nursing;OT   Progress Slow progress, decreased activity tolerance   PT Frequency Other (Comment)  (3-5x/week)   Recommendation   PT Discharge Recommendation Post-Acute Rehabilitation Services   Equipment Recommended   (walker and wheelchair)   PT - OK to Discharge Yes  (when medically cleared to SNF)     Pt tolerated session fairly  She requires increased rest between activities due to increased MONTILLA  She Ambulation distance limited by decreased endurance with increased MONTILLA  She was able to complete spt and ambulation with slight decreased assistance this date despite fatigue and decreased endurance, but continues to require verbal cues for safety and improved mobility   She is limited by decreased strength, balance, endurance  Progress toward goals is limited by decreased pulmonary tolerance  She will continue to benefit from PT services to maximize LOF  Continue to recommend return to SNF with PT services upon D/C       Sindy Manzano, PT,DPT

## 2020-07-29 NOTE — ASSESSMENT & PLAN NOTE
· Non-MI troponin elevation due to CHF  · Slightly elevated troponin flat and stable on serial labs  · Cardiology following; input appreciated

## 2020-07-29 NOTE — ASSESSMENT & PLAN NOTE
Lab Results   Component Value Date    HGBA1C 7 4 (H) 07/26/2020       Recent Labs     07/28/20  1121 07/28/20  1558 07/28/20  2103 07/29/20  0747   POCGLU 115 141* 187* 70       Blood Sugar Average: Last 72 hrs:  (P) 212 [id number]     · Lantus recently discontinued at skilled nursing facility for unclear reasons  · Based on repeatedly elevated blood glucose and elevated A1c; we started lantus again   · Initiate insulin protocol with blood glucose AC and HS  · Carbohydrate controlled diet    · With low blood sugar early in the morning, decreased the dose of of patient's Lantus at night (presently at 15 units QHS) to 12 units QHS  · Given morning sugar of 70 will decrease dose again to 10 units on 07/29  · Monitor with POCT BG and titrate regimen as indicated

## 2020-07-29 NOTE — SPEECH THERAPY NOTE
Speech-Language Pathology Bedside Swallow Evaluation    Patient Name: Bobo Howard    JZSGP'Y Date: 7/29/2020     Problem List  Principal Problem:    Acute on chronic respiratory failure with hypoxia [facility])  Active Problems:    Essential hypertension    Acute on chronic systolic CHF (congestive heart failure) ([facility])    COPD with emphysema ([facility])    CKD (chronic kidney disease) stage 4, GFR 15-29 ml/min ([facility])    Esophageal reflux    Type 2 diabetes mellitus with hyperglycemia ([facility])    Paroxysmal atrial fibrillation ([facility])    CAD (coronary artery disease), native coronary artery    UTI (urinary tract infection)    Iron deficiency anemia    Elevated troponin level not due myocardial infarction    Prolonged Q-T interval on ECG    V-tach [facility])      Past Medical History  Past Medical History:   Diagnosis Date    NANCY [facility] ([facility] )     [facility]     CHF (congestive heart failure) ([facility])     COPD [facility]chronic obstructive [facility]     Diabetes mellitus ([facility] )     Elevated troponin level not due myocardial infarction 7/26/2020    Paroxysmal atrial fibrillation ([facility] ) 2/13/2012    Trigger finger of thumb     last assessed: 07/18/13       Past Surgical History  Past Surgical History:   Procedure Laterality Date    APPENDECTOMY      CARDIAC PACEMAKER PLACEMENT      CARDIAC PACEMAKER PLACEMENT      CHOLECYSTECTOMY      COLONOSCOPY      Complete    CORONARY ANGIOPLASTY WITH STENT PLACEMENT      CORONARY ARTERY BYPASS GRAFT      CORONARY ARTERY BYPASS GRAFT      ESOPHAGOGASTRODUODENOSCOPY      Diagnostic    HERNIA REPAIR      TOTAL ABDOMINAL HYSTERECTOMY      with removal of both ovaries       Summary   Pt presented with s/s suggestive of mild oropharyngeal dysphagia  Pt is edentulous, usually wears her dentures when she eats but they are currently still at the SNF  For this reason pt was assessed with liquids and purees only   Mild labial weakness noted, with functional A-P transfer of purees without significant oral residual  Mild swallow selay noted however thin liquids were tolerated without s/s aspiration  RN reportes pt has been experiencing nausea and vomiting, and pt reports globus sensation  Question GERD exacerbation  Recommend GI f/u  No additional ST services needed at this time  Risk/s for Aspiration: may be at increased risk for bottom up aspiration    Recommended Diet: puree/level 1 diet and thin liquids   Recommended Form of Meds: whole with liquid   Aspiration precautions and swallowing strategies: upright posture, only feed when fully alert, slow rate of feeding and small bites/sips  Other Recommendations/Considerations: GI f/u recommended      Current Medical Status per H&P  Rachel Puri is a 82 y o  female who presents with longstanding history atrial fibrillation status post permanent pacemaker maintained on Eliquis and rate control, reduced ejection fraction congestive heart failure with multiple admissions for acute exacerbation presents with worsening dyspnea and cough along with 9 lb weight gain  In the emergency department she was found to be in significant respiratory distress, placed on BiPAP with improvement and ABG unremarkable  Improved with diuresis and admitted to hospitalist service for further management  Current Precautions:  Fall, Aspiration     Special Studies:  CXR 7/27/2020 IMPRESSION:  Pleural-parenchymal densities at the bases are similar to prior study  There is mild improved pulmonary vascular congestion      Social/Education/Vocational Hx:  Pt lives in SNF/ECF    Swallow Information   Current Risks for Dysphagia & Aspiration: known history of dysphagia  Current Diet: puree/level 1 diet and thin liquids   Baseline Diet: mechanically altered/level 2 diet and thin liquids per daughter      Baseline Assessment   Behavior/Cognition: alert  Speech/Language Status: able to participate in basic conversation and able to follow commands  Patient Positioning: upright in bed  Pain Status/Interventions/Response to Interventions:  No report of or nonverbal indications of pain  Swallow Mechanism Exam  Facial: symmetrical  Labial: decreased strength  Lingual: WFL  Velum: symmetrical  Mandible: adequate ROM  Dentition: edentulous  Vocal quality:weak     Consistencies Assessed and Performance   Consistencies Administered: thin liquids and puree      Oral Stage: WFL for puree and thin  Mastication was adequate with the materials administered today  Bolus formation and transfer were functional with no significant oral residue noted  No overt s/s reduced oral control  Mild labial weakness noted  Pharyngeal Stage: mild  Swallow Mechanics: Swallowing initiation appeared mildly delayed  Laryngeal rise was palpated and judged to be within functional limits  No coughing, throat clearing, change in vocal quality or respiratory status noted today  Esophageal Concerns: globus sensation    Summary and Recommendations (see above)    Results Reviewed with: patient, RN and family     Treatment Recommended: No additional ST f/u needed at this time

## 2020-07-29 NOTE — PROGRESS NOTES
Progress Note - Cardiology   Kayla Atkins 82 y o  female MRN: [id number]  Unit/Bed#: -01 Encounter: [id number]    Assessment:  Acute on chronic HFrEF- EF 35-40%, appearing euvolemic  CAD sp CABG  ICM  Afib  CKD- stable kidney function with diuresis  VT on telemetry- short 7-8 beat runs related to vomiting    Plan:  - transition to oral diuresis, will start torsemide 40 mg in AM and 20 mg after lunch  - increase coreg to 6 25 mg BID  - fu as an outpatient with [facility] cardiology  - agree with speech assessment   - continue anticoagulation with eliquis  - tele check tomorrow    Subjective/Objective     Subjective: patient reports vomiting last night  She reports it caused her to have some trouble swallowing  She reports no real chest pain during the episode  She has no palpitations  She thinks her breathing is stable if not better  Objective: reports some wheezing from the nurses  Episodes of VT on telemetry related to episodes of trouble swallowing  Vitals: /56 (BP Location: Right arm)   Pulse 73   Temp 99 2 °F (37 3 °C) (Oral)   Resp (!) 24   Ht 5' 4" (1 626 m)   Wt 68 5 kg (151 lb 0 2 oz)   LMP  (LMP Unknown)   SpO2 92%   BMI 25 92 kg/m²   Vitals:    07/28/20 0900 07/29/20 0600   Weight: 65 7 kg (144 lb 13 5 oz) 68 5 kg (151 lb 0 2 oz)     Orthostatic Blood Pressures      Most Recent Value   Blood Pressure  137/56 filed at 07/29/2020 0700   Patient Position - Orthostatic VS  Lying filed at 07/29/2020 0700            Intake/Output Summary (Last 24 hours) at 7/29/2020 0949  Last data filed at 7/29/2020 0801  Gross per 24 hour   Intake 1100 ml   Output 1550 ml   Net -450 ml       Invasive Devices     Peripheral Intravenous Line            Peripheral IV 07/25/20 Right Wrist 3 days          Drain            Urethral Catheter Latex; Temperature probe 16 Fr  3 days                  Physical Exam:    Cinthia Dale in no acute distress wearing supplemental oxygen  Neck: Supple and without lymphadenopathy  No thyromegaly  Lungs: faint wheezing noted  Cardiac: Irregular rhythm  Normal S1 and S2  No S3, No S4  No rub and no murmur  Neurological: AAOx3,  strength equal, no facial droop  Abdomen: soft, nontender, rotund  Musculoskeletal: Unremarkable  Extremities: Mild LE edema  Skin: Senescent skin changes      Lab Results:   I have personally reviewed pertinent lab results  CBC with diff:   Results from last 7 days   Lab Units 07/28/20  0452   WBC Thousand/uL 6 46   RBC Million/uL 3 10*   HEMOGLOBIN g/dL 8 8*   HEMATOCRIT % 29 3*   MCV fL 95   MCH pg 28 4   MCHC g/dL 30 0*   RDW % 16 9*   MPV fL 12 5   PLATELETS Thousands/uL 179     CMP:   Results from last 7 days   Lab Units 07/29/20  0357  07/27/20  0513   SODIUM mmol/L 145   < > 141   POTASSIUM mmol/L 4 1   < > 4 5   CHLORIDE mmol/L 105   < > 105   CO2 mmol/L 32   < > 29   BUN mg/dL 68*   < > 61*   CREATININE mg/dL 2 85*   < > 2 59*   CALCIUM mg/dL 8 1*   < > 8 3   AST U/L  --   --  6   ALT U/L  --   --  11*   ALK PHOS U/L  --   --  90   EGFR ml/min/1 73sq m 15   < > 17    < > = values in this interval not displayed  Troponin:   0   Lab Value Date/Time    TROPONINI 0 08 (H) 07/27/2020 1012    TROPONINI 0 06 (H) 07/27/2020 0827    TROPONINI 0 04 07/27/2020 0513    TROPONINI 0 05 (H) 07/26/2020 0348    TROPONINI 0 06 (H) 07/26/2020 0101    TROPONINI 0 05 (H) 07/25/2020 1959    TROPONINI 0 04 06/26/2020 0844    TROPONINI 0 08 (H) 06/12/2020 1440    TROPONINI 0 06 (H) 06/12/2020 1224    TROPONINI 0 05 (H) 06/12/2020 0733     BNP:   Results from last 7 days   Lab Units 07/29/20  0357   POTASSIUM mmol/L 4 1   CHLORIDE mmol/L 105   CO2 mmol/L 32   BUN mg/dL 68*   CREATININE mg/dL 2 85*   CALCIUM mg/dL 8 1*   EGFR ml/min/1 73sq m 15     Coags:   Results from last 7 days   Lab Units 07/25/20 2023   PTT seconds 27   INR  1 00     TSH:     Imaging: I have personally reviewed pertinent reports         TTE 6/12/2020:  SUMMARY     LEFT VENTRICLE:  Akinesis of the basal to mid inferior and the inferolateral walls  The ventricle was dilated  Systolic function was moderately reduced  Ejection fraction was estimated in the range of 35 % to 40 %  There was assymetrical hypertrophy of the septum      RIGHT VENTRICLE:  The size was normal   Systolic function was normal      LEFT ATRIUM:  The atrium was moderately dilated      MITRAL VALVE:  There was moderate thickening  There was mild regurgitation      TRICUSPID VALVE:  There was mild regurgitation      IVC, HEPATIC VEINS:  The inferior vena cava was dilated  Respirophasic changes were blunted (less than 50% variation)      PERICARDIUM:  A trace pericardial effusion was identified    There was a large left pleural effusion      COMPARISONS:  Compared to prior study dated 3/28/2020 at [facility] the ejection fraction appears slightly higher on current study and the degree of MR appears less significant on current study         EK-8 beat runs of VT on telemetry

## 2020-07-29 NOTE — ASSESSMENT & PLAN NOTE
· Hemoglobin appears stable approximately 8  · Last infusion 07/20/2020  · Continue ferrous gluconate  · Trend hemoglobin on an as-needed basis

## 2020-07-30 ENCOUNTER — APPOINTMENT (INPATIENT)
Dept: RADIOLOGY | Facility: HOSPITAL | Age: 85
DRG: 291 | End: 2020-07-30
Payer: MEDICARE

## 2020-07-30 PROBLEM — N17.9 ACUTE RENAL FAILURE (ARF) (HCC): Status: ACTIVE | Noted: 2020-07-30

## 2020-07-30 LAB
ANION GAP SERPL CALCULATED.3IONS-SCNC: 6 MMOL/L (ref 4–13)
BASOPHILS # BLD AUTO: 0 THOUSANDS/ΜL (ref 0–0.1)
BASOPHILS # BLD MANUAL: 0 THOUSAND/UL (ref 0–0.1)
BASOPHILS NFR BLD AUTO: 0 % (ref 0–1)
BASOPHILS NFR MAR MANUAL: 0 % (ref 0–1)
BUN SERPL-MCNC: 74 MG/DL (ref 5–25)
CALCIUM SERPL-MCNC: 7.8 MG/DL (ref 8.3–10.1)
CHLORIDE SERPL-SCNC: 103 MMOL/L (ref 100–108)
CO2 SERPL-SCNC: 32 MMOL/L (ref 21–32)
CREAT SERPL-MCNC: 2.83 MG/DL (ref 0.6–1.3)
CREAT UR-MCNC: 74.8 MG/DL
EOSINOPHIL # BLD AUTO: 0 THOUSAND/ΜL (ref 0–0.61)
EOSINOPHIL # BLD MANUAL: 0 THOUSAND/UL (ref 0–0.4)
EOSINOPHIL NFR BLD AUTO: 0 % (ref 0–6)
EOSINOPHIL NFR BLD MANUAL: 0 % (ref 0–6)
ERYTHROCYTE [DISTWIDTH] IN BLOOD BY AUTOMATED COUNT: 16.2 % (ref 11.6–15.1)
ERYTHROCYTE [DISTWIDTH] IN BLOOD BY AUTOMATED COUNT: 16.3 % (ref 11.6–15.1)
GFR SERPL CREATININE-BSD FRML MDRD: 15 ML/MIN/1.73SQ M
GLUCOSE SERPL-MCNC: 250 MG/DL (ref 65–140)
GLUCOSE SERPL-MCNC: 269 MG/DL (ref 65–140)
GLUCOSE SERPL-MCNC: 278 MG/DL (ref 65–140)
GLUCOSE SERPL-MCNC: 317 MG/DL (ref 65–140)
GLUCOSE SERPL-MCNC: 437 MG/DL (ref 65–140)
HCT VFR BLD AUTO: 29 % (ref 34.8–46.1)
HCT VFR BLD AUTO: 29.9 % (ref 34.8–46.1)
HGB BLD-MCNC: 8.8 G/DL (ref 11.5–15.4)
HGB BLD-MCNC: 9.3 G/DL (ref 11.5–15.4)
IMM GRANULOCYTES # BLD AUTO: 0.01 THOUSAND/UL (ref 0–0.2)
IMM GRANULOCYTES NFR BLD AUTO: 1 % (ref 0–2)
LG PLATELETS BLD QL SMEAR: PRESENT
LYMPHOCYTES # BLD AUTO: 0.73 THOUSAND/UL (ref 0.6–4.47)
LYMPHOCYTES # BLD AUTO: 0.8 THOUSANDS/ΜL (ref 0.6–4.47)
LYMPHOCYTES # BLD AUTO: 40 % (ref 14–44)
LYMPHOCYTES NFR BLD AUTO: 43 % (ref 14–44)
MAGNESIUM SERPL-MCNC: 2.3 MG/DL (ref 1.6–2.6)
MCH RBC QN AUTO: 28.5 PG (ref 26.8–34.3)
MCH RBC QN AUTO: 28.7 PG (ref 26.8–34.3)
MCHC RBC AUTO-ENTMCNC: 30.3 G/DL (ref 31.4–37.4)
MCHC RBC AUTO-ENTMCNC: 31.1 G/DL (ref 31.4–37.4)
MCV RBC AUTO: 92 FL (ref 82–98)
MCV RBC AUTO: 94 FL (ref 82–98)
MONOCYTES # BLD AUTO: 0.05 THOUSAND/ΜL (ref 0.17–1.22)
MONOCYTES # BLD AUTO: 0.11 THOUSAND/UL (ref 0–1.22)
MONOCYTES NFR BLD AUTO: 3 % (ref 4–12)
MONOCYTES NFR BLD: 6 % (ref 4–12)
NEUTROPHILS # BLD AUTO: 1 THOUSANDS/ΜL (ref 1.85–7.62)
NEUTROPHILS # BLD MANUAL: 0.99 THOUSAND/UL (ref 1.85–7.62)
NEUTS BAND NFR BLD MANUAL: 2 % (ref 0–8)
NEUTS SEG NFR BLD AUTO: 52 % (ref 43–75)
NEUTS SEG NFR BLD AUTO: 53 % (ref 43–75)
NRBC BLD AUTO-RTO: 0 /100 WBCS
NRBC BLD AUTO-RTO: 0 /100 WBCS
PLATELET # BLD AUTO: 152 THOUSANDS/UL (ref 149–390)
PLATELET # BLD AUTO: 159 THOUSANDS/UL (ref 149–390)
PLATELET BLD QL SMEAR: ADEQUATE
PMV BLD AUTO: 12.4 FL (ref 8.9–12.7)
PMV BLD AUTO: 12.7 FL (ref 8.9–12.7)
POLYCHROMASIA BLD QL SMEAR: PRESENT
POTASSIUM SERPL-SCNC: 4.6 MMOL/L (ref 3.5–5.3)
RBC # BLD AUTO: 3.09 MILLION/UL (ref 3.81–5.12)
RBC # BLD AUTO: 3.24 MILLION/UL (ref 3.81–5.12)
RBC MORPH BLD: PRESENT
SODIUM SERPL-SCNC: 141 MMOL/L (ref 136–145)
TOTAL CELLS COUNTED SPEC: 100
UUN 24H UR-MCNC: 654 MG/DL
WBC # BLD AUTO: 1.83 THOUSAND/UL (ref 4.31–10.16)
WBC # BLD AUTO: 1.86 THOUSAND/UL (ref 4.31–10.16)

## 2020-07-30 PROCEDURE — 99233 SBSQ HOSP IP/OBS HIGH 50: CPT | Performed by: INTERNAL MEDICINE

## 2020-07-30 PROCEDURE — 94640 AIRWAY INHALATION TREATMENT: CPT

## 2020-07-30 PROCEDURE — 84540 ASSAY OF URINE/UREA-N: CPT | Performed by: NURSE PRACTITIONER

## 2020-07-30 PROCEDURE — 97116 GAIT TRAINING THERAPY: CPT

## 2020-07-30 PROCEDURE — 80048 BASIC METABOLIC PNL TOTAL CA: CPT | Performed by: NURSE PRACTITIONER

## 2020-07-30 PROCEDURE — 94760 N-INVAS EAR/PLS OXIMETRY 1: CPT

## 2020-07-30 PROCEDURE — 94664 DEMO&/EVAL PT USE INHALER: CPT

## 2020-07-30 PROCEDURE — 83735 ASSAY OF MAGNESIUM: CPT | Performed by: NURSE PRACTITIONER

## 2020-07-30 PROCEDURE — 85007 BL SMEAR W/DIFF WBC COUNT: CPT | Performed by: NURSE PRACTITIONER

## 2020-07-30 PROCEDURE — 85027 COMPLETE CBC AUTOMATED: CPT | Performed by: NURSE PRACTITIONER

## 2020-07-30 PROCEDURE — 82570 ASSAY OF URINE CREATININE: CPT | Performed by: NURSE PRACTITIONER

## 2020-07-30 PROCEDURE — 87205 SMEAR GRAM STAIN: CPT | Performed by: NURSE PRACTITIONER

## 2020-07-30 PROCEDURE — 99223 1ST HOSP IP/OBS HIGH 75: CPT | Performed by: NURSE PRACTITIONER

## 2020-07-30 PROCEDURE — 85025 COMPLETE CBC W/AUTO DIFF WBC: CPT | Performed by: NURSE PRACTITIONER

## 2020-07-30 PROCEDURE — 82948 REAGENT STRIP/BLOOD GLUCOSE: CPT

## 2020-07-30 RX ORDER — INSULIN GLARGINE 100 [IU]/ML
18 INJECTION, SOLUTION SUBCUTANEOUS
Status: DISCONTINUED | OUTPATIENT
Start: 2020-07-30 | End: 2020-07-30

## 2020-07-30 RX ORDER — INSULIN GLARGINE 100 [IU]/ML
12 INJECTION, SOLUTION SUBCUTANEOUS
Status: DISCONTINUED | OUTPATIENT
Start: 2020-07-30 | End: 2020-07-30

## 2020-07-30 RX ORDER — PANTOPRAZOLE SODIUM 40 MG/1
40 TABLET, DELAYED RELEASE ORAL
Status: DISCONTINUED | OUTPATIENT
Start: 2020-07-31 | End: 2020-07-31

## 2020-07-30 RX ORDER — AMLODIPINE BESYLATE 5 MG/1
5 TABLET ORAL DAILY
Status: DISCONTINUED | OUTPATIENT
Start: 2020-07-31 | End: 2020-08-03

## 2020-07-30 RX ORDER — INSULIN GLARGINE 100 [IU]/ML
21 INJECTION, SOLUTION SUBCUTANEOUS
Status: DISCONTINUED | OUTPATIENT
Start: 2020-07-30 | End: 2020-07-31

## 2020-07-30 RX ADMIN — METHYLPREDNISOLONE SODIUM SUCCINATE 40 MG: 40 INJECTION, POWDER, FOR SOLUTION INTRAMUSCULAR; INTRAVENOUS at 22:03

## 2020-07-30 RX ADMIN — INSULIN LISPRO 3 UNITS: 100 INJECTION, SOLUTION INTRAVENOUS; SUBCUTANEOUS at 07:17

## 2020-07-30 RX ADMIN — IPRATROPIUM BROMIDE 0.5 MG: 0.5 SOLUTION RESPIRATORY (INHALATION) at 20:27

## 2020-07-30 RX ADMIN — IPRATROPIUM BROMIDE 0.5 MG: 0.5 SOLUTION RESPIRATORY (INHALATION) at 13:28

## 2020-07-30 RX ADMIN — INSULIN GLARGINE 21 UNITS: 100 INJECTION, SOLUTION SUBCUTANEOUS at 22:05

## 2020-07-30 RX ADMIN — METHYLPREDNISOLONE SODIUM SUCCINATE 40 MG: 40 INJECTION, POWDER, FOR SOLUTION INTRAMUSCULAR; INTRAVENOUS at 06:07

## 2020-07-30 RX ADMIN — LEVALBUTEROL HYDROCHLORIDE 1.25 MG: 1.25 SOLUTION, CONCENTRATE RESPIRATORY (INHALATION) at 20:27

## 2020-07-30 RX ADMIN — INSULIN LISPRO 5 UNITS: 100 INJECTION, SOLUTION INTRAVENOUS; SUBCUTANEOUS at 11:33

## 2020-07-30 RX ADMIN — FLUTICASONE FUROATE AND VILANTEROL TRIFENATATE 1 PUFF: 100; 25 POWDER RESPIRATORY (INHALATION) at 08:32

## 2020-07-30 RX ADMIN — AMLODIPINE BESYLATE 5 MG: 5 TABLET ORAL at 08:31

## 2020-07-30 RX ADMIN — APIXABAN 2.5 MG: 2.5 TABLET, FILM COATED ORAL at 17:00

## 2020-07-30 RX ADMIN — TORSEMIDE 40 MG: 20 TABLET ORAL at 08:31

## 2020-07-30 RX ADMIN — PANTOPRAZOLE SODIUM 80 MG: 40 TABLET, DELAYED RELEASE ORAL at 08:32

## 2020-07-30 RX ADMIN — FERROUS GLUCONATE 324 MG: 324 TABLET ORAL at 08:31

## 2020-07-30 RX ADMIN — INSULIN LISPRO 6 UNITS: 100 INJECTION, SOLUTION INTRAVENOUS; SUBCUTANEOUS at 22:05

## 2020-07-30 RX ADMIN — APIXABAN 2.5 MG: 2.5 TABLET, FILM COATED ORAL at 08:32

## 2020-07-30 RX ADMIN — METHYLPREDNISOLONE SODIUM SUCCINATE 40 MG: 40 INJECTION, POWDER, FOR SOLUTION INTRAMUSCULAR; INTRAVENOUS at 13:58

## 2020-07-30 RX ADMIN — CARVEDILOL 6.25 MG: 6.25 TABLET, FILM COATED ORAL at 08:32

## 2020-07-30 RX ADMIN — ACETYLCYSTEINE: 200 SOLUTION ORAL; RESPIRATORY (INHALATION) at 07:56

## 2020-07-30 RX ADMIN — INSULIN LISPRO 4 UNITS: 100 INJECTION, SOLUTION INTRAVENOUS; SUBCUTANEOUS at 16:58

## 2020-07-30 RX ADMIN — TORSEMIDE 20 MG: 20 TABLET ORAL at 13:58

## 2020-07-30 RX ADMIN — IPRATROPIUM BROMIDE 0.5 MG: 0.5 SOLUTION RESPIRATORY (INHALATION) at 07:43

## 2020-07-30 RX ADMIN — LEVALBUTEROL HYDROCHLORIDE 1.25 MG: 1.25 SOLUTION, CONCENTRATE RESPIRATORY (INHALATION) at 13:28

## 2020-07-30 RX ADMIN — CARVEDILOL 6.25 MG: 6.25 TABLET, FILM COATED ORAL at 17:00

## 2020-07-30 RX ADMIN — SENNOSIDES 17.2 MG: 8.6 TABLET ORAL at 22:04

## 2020-07-30 RX ADMIN — LEVALBUTEROL HYDROCHLORIDE 1.25 MG: 1.25 SOLUTION, CONCENTRATE RESPIRATORY (INHALATION) at 07:43

## 2020-07-30 RX ADMIN — INSULIN LISPRO 5 UNITS: 100 INJECTION, SOLUTION INTRAVENOUS; SUBCUTANEOUS at 16:59

## 2020-07-30 RX ADMIN — PRAVASTATIN SODIUM 80 MG: 80 TABLET ORAL at 16:59

## 2020-07-30 NOTE — ASSESSMENT & PLAN NOTE
· Blood pressure currently acceptable systolics in the 803U  · Continue home regimen of lisinopril 5 mg, carvedilol 3 125 mg BID, amlodipine 5 mg daily  · Hold lisinopril given NANCY  · Monitor kidney function closely  · Monitor with routine vitals

## 2020-07-30 NOTE — ASSESSMENT & PLAN NOTE
Lab Results   Component Value Date    HGBA1C 7 4 (H) 07/26/2020       Recent Labs     07/29/20  1130 07/29/20  1546 07/29/20  2116 07/30/20  0716   POCGLU 129 191* 331* 250*       Blood Sugar Average: Last 72 hrs:  (P) 177 [id number]     · Increase Lantus to 12 units and Humalog 5 units t i d

## 2020-07-30 NOTE — ASSESSMENT & PLAN NOTE
Background: Patient admitted for acute on chronic respiratory failure w/ hypoxia after gaining approximately 9 lbs over prior week and developing peripheral edema and shortness of breath  · Patient uses supplemental oxygen 2 L via nasal cannula continuously as outpatient due to COPD and CHF  Initially required continuous BiPAP at time of admission    · Adequately diuresed  · Patient still wheezing, on IV steroid  · Requiring more oxygen than baseline  · Progressively improving

## 2020-07-30 NOTE — RESPIRATORY THERAPY NOTE
RT Protocol Note  Chivo Abraham 82 y o  female MRN: [id number]  Unit/Bed#: -01 Encounter: [id number]    Assessment    Principal Problem:    Acute on chronic respiratory failure with hypoxia [facility])  Active Problems:    Essential hypertension    Acute on chronic systolic CHF (congestive heart failure) ([facility])    COPD with emphysema ([facility])    CKD (chronic kidney disease) stage 4, GFR 15-29 ml/min ([facility])    Esophageal reflux    Type 2 diabetes mellitus with hyperglycemia ([facility])    Paroxysmal atrial fibrillation ([facility])    CAD (coronary artery disease), native coronary artery    UTI (urinary tract infection)    Iron deficiency anemia    Elevated troponin level not due myocardial infarction    Prolonged Q-T interval on ECG    V-tach [facility])      Home Pulmonary Medications:         Past Medical History:   Diagnosis Date    NANCY (acute kidney injury) ([facility] )     Atrial fibrillation ([facility] )     CHF (congestive heart failure) ([facility])     COPD (chronic obstructive pulmonary disease) ([facility] )     Diabetes mellitus ([facility] )     Elevated troponin level not due myocardial infarction 2020    Paroxysmal atrial fibrillation ([facility] ) 2012    Trigger finger of thumb     last assessed: 13     Social History     Socioeconomic History    Marital status:       Spouse name: None    Number of children: None    Years of education: None    Highest education level: None   Occupational History    None   Social Needs    Financial resource strain: None    Food insecurity:     Worry: Never true     Inability: Never true    Transportation needs:     Medical: No     Non-medical: No   Tobacco Use    Smoking status: Former Smoker     Last attempt to quit: 2011     Years since quittin 1    Smokeless tobacco: Never Used    Tobacco comment: Former smoker per allscripts   Substance and Sexual Activity    Alcohol use: Not Currently    Drug use: No    Sexual activity: Not Currently   Lifestyle    Physical activity: Days per week: None     Minutes per session: None    Stress: None   Relationships    Social connections:     Talks on phone: None     Gets together: None     Attends Nondenominational service: None     Active member of club or organization: None     Attends meetings of clubs or organizations: None     Relationship status: None    Intimate partner violence:     Fear of current or ex partner: None     Emotionally abused: None     Physically abused: None     Forced sexual activity: None   Other Topics Concern    None   Social History Narrative    None       Subjective    Subjective Data: has not used bipap since admission     Objective    Physical Exam:   Assessment Type: During-treatment  General Appearance: Awake, Alert  Respiratory Pattern: Normal  O2 Device: 2L NC    Vitals:  Blood pressure 128/61, pulse 59, temperature 98 3 °F (36 8 °C), temperature source Oral, resp  rate (!) 39, height 5' 4" (1 626 m), weight 68 5 kg (151 lb 0 2 oz), SpO2 95 %  Results from last 7 days   Lab Units 07/25/20 1959   David 30 ART  7 388   PCO2 ART mm Hg 43 8   PO2 ART mm Hg 101 4   HCO3 ART mmol/L 25 8   BASE EXC ART mmol/L 0 6   O2 CONTENT ART mL/dL 14 0*   O2 HGB, ARTERIAL % 97 8*   ABG SOURCE  Radial, Left       Imaging and other studies: I have personally reviewed pertinent reports        O2 Device: 2L NC     Plan    Respiratory Plan: Mild Distress pathway        Resp Comments: Pt given prn mucomyst

## 2020-07-30 NOTE — PROGRESS NOTES
Progress Note - Cardiology   Hafsa Garcia 82 y o  female MRN: [id number]  Unit/Bed#: -01 Encounter: [id number]    Assessment:  Acute on chronic HFrEF- EF 35-40%, appearing euvolemic  CAD sp CABG  ICM  Afib  CKD- stable kidney function with diuresis  VT on telemetry- short 7-8 beat runs related to vomiting, last night improved    Plan:  - continue medications as prescribed, no change in coreg dosing at this time  - Fu with [facility] cardiology as an outpatient  - Continue anticoagulation with eliquis    Subjective/Objective     Subjective: pt reports uneventful night  She had no further nausea  She reports feeling back to her baseline  She had no chest pain over night  She has no lightheadedness or dizziness  Objective: transitioned to oral torsemide yesterday and up titrated coreg  Telemetry showed 3-7 beat runs of NSVT  Asymptomatic  Vitals: /61 (BP Location: Right arm)   Pulse 59   Temp 98 3 °F (36 8 °C) (Oral)   Resp (!) 39   Ht 5' 4" (1 626 m)   Wt 68 5 kg (151 lb 0 2 oz)   LMP  (LMP Unknown)   SpO2 95%   BMI 25 92 kg/m²   Vitals:    07/28/20 0900 07/29/20 0600   Weight: 65 7 kg (144 lb 13 5 oz) 68 5 kg (151 lb 0 2 oz)     Orthostatic Blood Pressures      Most Recent Value   Blood Pressure  128/61 filed at 07/30/2020 0700   Patient Position - Orthostatic VS  Lying filed at 07/30/2020 0700            Intake/Output Summary (Last 24 hours) at 7/30/2020 0826  Last data filed at 7/30/2020 0601  Gross per 24 hour   Intake 1065 ml   Output 1530 ml   Net -465 ml       Invasive Devices     Peripheral Intravenous Line            Peripheral IV 07/29/20 Dorsal (posterior); Left Hand less than 1 day          Drain            Urethral Catheter Latex; Temperature probe 16 Fr  4 days                  Physical Exam:    Rasheed Mendoza in no acute distress wearing supplemental oxygen  Neck: Supple and without lymphadenopathy   -JVD  Lungs:+ rhonchi and wheezing  Cardiac: Irregular rhythm  Normal S1 and S2  No S3, No S4  No rub and no murmur  Neurological: AAOx3,  strength equal, no facial droop  Abdomen: soft, nontender, rotund  Musculoskeletal: Unremarkable  Extremities: no edema  Skin: Senescent skin changes    Lab Results:   I have personally reviewed pertinent lab results  CBC with diff:   Results from last 7 days   Lab Units 07/30/20  0734   WBC Thousand/uL 1 83*   RBC Million/uL 3 24*   HEMOGLOBIN g/dL 9 3*   HEMATOCRIT % 29 9*   MCV fL 92   MCH pg 28 7   MCHC g/dL 31 1*   RDW % 16 2*   MPV fL 12 7   PLATELETS Thousands/uL 159     CMP:   Results from last 7 days   Lab Units 07/30/20  0614  07/27/20  0513   SODIUM mmol/L 141   < > 141   POTASSIUM mmol/L 4 6   < > 4 5   CHLORIDE mmol/L 103   < > 105   CO2 mmol/L 32   < > 29   BUN mg/dL 74*   < > 61*   CREATININE mg/dL 2 83*   < > 2 59*   CALCIUM mg/dL 7 8*   < > 8 3   AST U/L  --   --  6   ALT U/L  --   --  11*   ALK PHOS U/L  --   --  90   EGFR ml/min/1 73sq m 15   < > 17    < > = values in this interval not displayed  Troponin:   0   Lab Value Date/Time    TROPONINI 0 08 (H) 07/27/2020 1012    TROPONINI 0 06 (H) 07/27/2020 0827    TROPONINI 0 04 07/27/2020 0513    TROPONINI 0 05 (H) 07/26/2020 0348    TROPONINI 0 06 (H) 07/26/2020 0101    TROPONINI 0 05 (H) 07/25/2020 1959    TROPONINI 0 04 06/26/2020 0844    TROPONINI 0 08 (H) 06/12/2020 1440    TROPONINI 0 06 (H) 06/12/2020 1224    TROPONINI 0 05 (H) 06/12/2020 0733     BNP:   Results from last 7 days   Lab Units 07/30/20  0614   POTASSIUM mmol/L 4 6   CHLORIDE mmol/L 103   CO2 mmol/L 32   BUN mg/dL 74*   CREATININE mg/dL 2 83*   CALCIUM mg/dL 7 8*   EGFR ml/min/1 73sq m 15     Coags:   Results from last 7 days   Lab Units 07/25/20  2023   PTT seconds 27   INR  1 00     TSH:     Imaging: I have personally reviewed pertinent reports  TTE 6/12/2020:  SUMMARY     LEFT VENTRICLE:  Akinesis of the basal to mid inferior and the inferolateral walls  The ventricle was dilated    Systolic function was moderately reduced  Ejection fraction was estimated in the range of 35 % to 40 %  There was assymetrical hypertrophy of the septum      RIGHT VENTRICLE:  The size was normal   Systolic function was normal      LEFT ATRIUM:  The atrium was moderately dilated      MITRAL VALVE:  There was moderate thickening  There was mild regurgitation      TRICUSPID VALVE:  There was mild regurgitation      IVC, HEPATIC VEINS:  The inferior vena cava was dilated  Respirophasic changes were blunted (less than 50% variation)      PERICARDIUM:  A trace pericardial effusion was identified    There was a large left pleural effusion      COMPARISONS:  Compared to prior study dated 3/28/2020 at [facility] the ejection fraction appears slightly higher on current study and the degree of MR appears less significant on current study      EKG: tele showed 3-7 beat run of VT

## 2020-07-30 NOTE — PHYSICAL THERAPY NOTE
PHYSICAL THERAPY TREATMENT NOTE  NAME:  Hafsa Garcia  DATE: 07/30/20    Length Of Stay: 5  Performed at least 2 patient identifiers during session: Name and Birthday       07/30/20 9792   Pain Assessment   Pain Assessment Tool Pain Assessment not indicated - pt denies pain   Restrictions/Precautions   Other Precautions Multiple lines;Telemetry;O2;Fall Risk; Chair Alarm; Bed Alarm   General   Chart Reviewed Yes   Response to Previous Treatment Patient with no complaints from previous session  Cognition   Arousal/Participation Alert; Responsive   Attention Attends with cues to redirect   Orientation Level Oriented X4   Following Commands Follows one step commands without difficulty   Subjective   Subjective "I can go for a walk"   Bed Mobility   Rolling R   (standby assistance)   Additional items Bedrails; Increased time required   Supine to Sit   (standby assistance)   Additional items HOB elevated; Bedrails   Sit to Supine   (standby assistance)   Additional items Bedrails   Additional Comments Pt performed bed mobility with standby assitance without cuing   Transfers   Sit to Stand   (standby assistance)   Additional items Verbal cues; Increased time required   Stand to Sit   (standby assistance)   Additional items Increased time required;Verbal cues   Stand pivot   (standby assistance)   Additional items Increased time required;Verbal cues   Additional Comments Pt used junionr RW for transfers and ambulation  Min VC for hand placement during transfers  Ambulation/Elevation   Gait pattern Narrow LILIAN; Foward flexed; Short stride; Excessively slow   Gait Assistance   (standby assistance)   Additional items Verbal cues   Assistive Device Rolling walker   Distance 32' with RW with min verbal cues for negotiation of narrow spaces with RW  Pt mantained SpO2 of >/= 94% throughout session while receiving O2 via NC at 3 L/min   No MONTILLA noted   Balance   Static Sitting Good   Dynamic Sitting Fair   Static Standing Fair Dynamic Standing Fair -   Ambulatory Fair -   Endurance Deficit   Endurance Deficit Yes   Endurance Deficit Description Pt self limiting due to fatigue   Activity Tolerance   Activity Tolerance Patient limited by fatigue   Nurse Made Aware RNBalbina   Assessment   Prognosis Good   Problem List Decreased strength;Decreased endurance; Impaired balance;Decreased mobility; Impaired judgement;Decreased safety awareness   Goals   PT Treatment Day 3   Plan   Treatment/Interventions Functional transfer training;LE strengthening/ROM; Therapeutic exercise; Endurance training;Bed mobility;Gait training; Compensatory technique education;Spoke to nursing   Progress Slow progress, decreased activity tolerance   PT Frequency   (3-5x/wk)   Recommendation   PT Discharge Recommendation Post-Acute Rehabilitation Services   Equipment Recommended Wheelchair;Walker   PT - OK to Discharge Yes       Pt seen for PT treatment session this date with interventions consisting of gait training w/ emphasis on improving pt's ability to ambulate level surfaces x 32' with SBA provided by therapist with RW  Pt maintained 94% or greater SpO2 throughout session while receiving O2 at 3 L/min via nasal canula  Pt agreeable to PT treatment session upon arrival, pt found supine in bed w/ HOB elevated, in no apparent distress  In comparison to previous session, pt with improvements in functional mobility tolerance as demonstrated by pt's ability to ambulate increased distance  Radha Virgen Post session: pt returned BTB, bed alarm engaged and all needs in reach  Continue to recommend STR at time of d/c in order to maximize pt's functional independence and safety w/ mobility  Pt continues to be functioning below baseline level, and remains limited 2* factors listed above and including decreased strength, limited endurance, impaired balance, decreased activity tolerance, inability to ambulate household distances, and decreased independence with functional mobility   PT will continue to see pt while here in order to address the deficits listed above and provide interventions consistent w/ POC in effort to achieve STGs  Continue to progress gait, therex, and balance training as appropriate focusing on increasing activity tolerance, decrease risk of falls and progress toward pt's PLOF      Romi Martinez, PT,DPT

## 2020-07-30 NOTE — ASSESSMENT & PLAN NOTE
Acute renal failure most likely secondary to IV over-diuresis  Continue oral diuretic, continue to monitor renal function daily

## 2020-07-30 NOTE — ASSESSMENT & PLAN NOTE
Wt Readings from Last 3 Encounters:   07/30/20 67 6 kg (149 lb 0 5 oz)   07/03/20 64 kg (141 lb 1 5 oz)   06/22/20 68 9 kg (152 lb)     · HFrEF, 35-40%  · Adequately diuresed, switch to oral torsemide 40 mg in a m and 20 mg in p m    · Being treated for COPD exacerbation as well  · Note that patient has frequent admissions for CHF exacerbation

## 2020-07-30 NOTE — PLAN OF CARE
Problem: Potential for Falls  Goal: Patient will remain free of falls  Description  INTERVENTIONS:  - Assess patient frequently for physical needs  -  Identify cognitive and physical deficits and behaviors that affect risk of falls    -  [place] fall precautions as indicated by assessment   - Educate patient/family on patient safety including physical limitations  - Instruct patient to call for assistance with activity based on assessment  - Modify environment to reduce risk of injury  - Consider OT/PT consult to assist with strengthening/mobility  Outcome: Progressing     Problem: Prexisting or High Potential for Compromised Skin Integrity  Goal: Skin integrity is maintained or improved  Description  INTERVENTIONS:  - Identify patients at risk for skin breakdown  - Assess and monitor skin integrity  - Assess and monitor nutrition and hydration status  - Monitor labs   - Assess for incontinence   - Turn and reposition patient  - Assist with mobility/ambulation  - Relieve pressure over bony prominences  - Avoid friction and shearing  - Provide appropriate hygiene as needed including keeping skin clean and dry  - Evaluate need for skin moisturizer/barrier cream  - Collaborate with interdisciplinary team   - Patient/family teaching  - Consider wound care consult   Outcome: Progressing     Problem: PAIN - ADULT  Goal: Verbalizes/displays adequate comfort level or baseline comfort level  Description  Interventions:  - Encourage patient to monitor pain and request assistance  - Assess pain using appropriate pain scale  - Administer analgesics based on type and severity of pain and evaluate response  - Implement non-pharmacological measures as appropriate and evaluate response  - Consider cultural and social influences on pain and pain management  - Notify physician/advanced practitioner if interventions unsuccessful or patient reports new pain  Outcome: Progressing     Problem: INFECTION - ADULT  Goal: Absence or prevention of progression during hospitalization  Description  INTERVENTIONS:  - Assess and monitor for signs and symptoms of infection  - Monitor lab/diagnostic results  - Monitor all insertion sites, i e  indwelling lines, tubes, and drains  - Monitor endotracheal if appropriate and nasal secretions for changes in amount and color  - [place] appropriate cooling/warming therapies per order  - Administer medications as ordered  - Instruct and encourage patient and family to use good hand hygiene technique  - Identify and instruct in appropriate isolation precautions for identified infection/condition  Outcome: Progressing  Goal: Absence of fever/infection during neutropenic period  Description  INTERVENTIONS:  - Monitor WBC    Outcome: Progressing     Problem: SAFETY ADULT  Goal: Patient will remain free of falls  Description  INTERVENTIONS:  - Assess patient frequently for physical needs  -  Identify cognitive and physical deficits and behaviors that affect risk of falls    -  [place] fall precautions as indicated by assessment   - Educate patient/family on patient safety including physical limitations  - Instruct patient to call for assistance with activity based on assessment  - Modify environment to reduce risk of injury  - Consider OT/PT consult to assist with strengthening/mobility  Outcome: Progressing  Goal: Maintain or return to baseline ADL function  Description  INTERVENTIONS:  -  Assess patient's ability to carry out ADLs; assess patient's baseline for ADL function and identify physical deficits which impact ability to perform ADLs (bathing, care of mouth/teeth, toileting, grooming, dressing, etc )  - Assess/evaluate cause of self-care deficits   - Assess range of motion  - Assess patient's mobility; develop plan if impaired  - Assess patient's need for assistive devices and provide as appropriate  - Encourage maximum independence but intervene and supervise when necessary  - Involve family in performance of ADLs  - Assess for home care needs following discharge   - Consider OT consult to assist with ADL evaluation and planning for discharge  - Provide patient education as appropriate  Outcome: Progressing  Goal: Maintain or return mobility status to optimal level  Description  INTERVENTIONS:  - Assess patient's baseline mobility status (ambulation, transfers, stairs, etc )    - Identify cognitive and physical deficits and behaviors that affect mobility  - Identify mobility aids required to assist with transfers and/or ambulation (gait belt, sit-to-stand, lift, walker, cane, etc )  - [place] fall precautions as indicated by assessment  - Record patient progress and toleration of activity level on Mobility SBAR; progress patient to next Phase/Stage  - Instruct patient to call for assistance with activity based on assessment  - Consider rehabilitation consult to assist with strengthening/weightbearing, etc   Outcome: Progressing     Problem: DISCHARGE PLANNING  Goal: Discharge to home or other facility with appropriate resources  Description  INTERVENTIONS:  - Identify barriers to discharge w/patient and caregiver  - Arrange for needed discharge resources and transportation as appropriate  - Identify discharge learning needs (meds, wound care, etc )  - Arrange for interpretive services to assist at discharge as needed  - Refer to Case Management Department for coordinating discharge planning if the patient needs post-hospital services based on physician/advanced practitioner order or complex needs related to functional status, cognitive ability, or social support system  Outcome: Progressing     Problem: Knowledge Deficit  Goal: Patient/family/caregiver demonstrates understanding of disease process, treatment plan, medications, and discharge instructions  Description  Complete learning assessment and assess knowledge base    Interventions:  - Provide teaching at level of understanding  - Provide teaching via preferred learning methods  Outcome: Progressing     Problem: RESPIRATORY - ADULT  Goal: Achieves optimal ventilation and oxygenation  Description  INTERVENTIONS:  - Assess for changes in respiratory status  - Assess for changes in mentation and behavior  - Position to facilitate oxygenation and minimize respiratory effort  - Oxygen administered by appropriate delivery if ordered  - Initiate smoking cessation education as indicated  - Encourage broncho-pulmonary hygiene including cough, deep breathe, Incentive Spirometry  - Assess the need for suctioning and aspirate as needed  - Assess and instruct to report SOB or any respiratory difficulty  - Respiratory Therapy support as indicated  Outcome: Progressing     Problem: Nutrition/Hydration-ADULT  Goal: Nutrient/Hydration intake appropriate for improving, restoring or maintaining nutritional needs  Description  Monitor and assess patient's nutrition/hydration status for malnutrition  Collaborate with interdisciplinary team and initiate plan and interventions as ordered  Monitor patient's weight and dietary intake as ordered or per policy  Utilize nutrition screening tool and intervene as necessary  Determine patient's food preferences and provide high-protein, high-caloric foods as appropriate       INTERVENTIONS:  - Monitor oral intake, urinary output, labs, and treatment plans  - Assess nutrition and hydration status and recommend course of action  - Evaluate amount of meals eaten  - Assist patient with eating if necessary   - Allow adequate time for meals  - Recommend/ encourage appropriate diets, oral nutritional supplements, and vitamin/mineral supplements  - Order, calculate, and assess calorie counts as needed  - Recommend, monitor, and adjust tube feedings and TPN/PPN based on assessed needs  - Assess need for intravenous fluids  - Provide specific nutrition/hydration education as appropriate  - Include patient/family/caregiver in decisions related to nutrition  Outcome: Progressing

## 2020-07-30 NOTE — PLAN OF CARE
Problem: PHYSICAL THERAPY ADULT  Goal: Performs mobility at highest level of function for planned discharge setting  See evaluation for individualized goals  Description  Treatment/Interventions: Functional transfer training, LE strengthening/ROM, Therapeutic exercise, Endurance training, Patient/family training, Equipment eval/education, Bed mobility, Gait training, Compensatory technique education, Spoke to nursing  Equipment Recommended: (walker and wheelchair)       See flowsheet documentation for full assessment, interventions and recommendations  Outcome: Progressing  Note:   Prognosis: Good  Problem List: Decreased strength, Decreased endurance, Impaired balance, Decreased mobility, Impaired judgement, Decreased safety awareness  Assessment: Pt seen for PT treatment session this date with interventions consisting of gait training w/ emphasis on improving pt's ability to ambulate level surfaces x 32' with SBA provided by therapist with RW  Pt agreeable to PT treatment session upon arrival, pt found supine in bed w/ HOB elevated, in no apparent distress  In comparison to previous session, pt with improvements in functional mobility tolerance as demonstrated by pt's ability to ambulate increased distance  Monia Le Post session: pt returned BTB, bed alarm engaged and all needs in reach  Continue to recommend STR at time of d/c in order to maximize pt's functional independence and safety w/ mobility  Pt continues to be functioning below baseline level, and remains limited 2* factors listed above and including decreased strength, limited endurance, impaired balance, decreased activity tolerance, inability to ambulate household distances, and decreased independence with functional mobility  PT will continue to see pt while here in order to address the deficits listed above and provide interventions consistent w/ POC in effort to achieve STGs   Continue to progress gait, therex, and balance training as appropriate focusing on increasing activity tolerance, decrease risk of falls and progress toward pt's PLOF  PT Discharge Recommendation: Post-Acute Rehabilitation Services     PT - OK to Discharge: Yes    See flowsheet documentation for full assessment

## 2020-07-30 NOTE — CONSULTS
CONSULTATION-NEPHROLOGY   Porter Travis 82 y o  female MRN: [id number]  Unit/Bed#: -01 Encounter: [id number]        Assessment and Plan:    1  Acute kidney injury:  Present on admission  · Admitted with a creatinine of 2 43 mg/dL and hyaline casts on UA indicative of acute tubular necrosis  Creatinine today 2 83 mg/dL  · She is receiving IV diuresis now transition oral therapy with torsemide per Cardiology  · For record review, she has not received any contrast   She was receiving lisinopril last dose yesterday  Her Protonix dose was increased from 40 mg to 80 mg on 07/27  · She is not oliguric with an indwelling urinary catheter  · Continue to hold lisinopril  · Decreased Protonix down to 40 mg  Will check urine eosinophils to rule out acute interstitial nephritis  · Recheck urine chemistries  · Treat nausea and vomiting  2  Stage 4 chronic kidney disease with baseline creatinine around 1 6-2 0 mg/dL  · We did have telephone visit last month but was unable to examine due to not being face-to-face  · Etiology likely diabetic nephropathy and hypertensive nephrosclerosis  · She does have left kidney atrophy on ultrasound and indwelling urinary catheter  · Avoid nephrotoxins  3  Acute on chronic systolic heart failure  · Was receiving aggressive IV diuresis per Cardiology and transition to oral therapy  · She appears euvolemic to slightly volume overloaded  She does admit to some vomiting yesterday but this has since improved  · Continue diuretics per Cardiology  4  GERD  · Protonix increased to 80 mg daily  Will decrease back down to 40 mg daily and check urine eosinophils  5  Diabetic nephropathy, likely  · Need good blood sugar control  6  Anemia of chronic kidney disease and iron deficiency  · She is receiving oral iron therapy  FOBT positive during hospitalization in June  She is planning on getting scoped in the outpatient setting per report during office visit  7   Leukopenia    HPI:    Jagdeep Art Fermin Griggs is a 82 y o  female with an active problem list significant for CKD 4, chronic combined heart failure, anemia of chronic kidney disease, hypertension, left kidney atrophy, CAD status post CABG, atrial fibrillation, permanent pacemaker, diabetes mellitus type 2, COPD on chronic oxygen therapy, who has had multiple recent admissions to this hospital- 6/12-6/19 for acute on chronic heart failure, 6/26-7/3 for acute on chronic heart failure, and most recently admitted to this hospital once again on 07/25 from nursing home for dyspnea, abdominal bloating and lower extremity edema with 9 lb weight gain noted  Upon arrival her BNP was greater than 15,000 and her chest x-ray shows diffuse vascular congestion  She had acute kidney injury on presentation  She was treated with IV Lasix now transition oral torsemide per Cardiology  Renal consultation was today requested for acute kidney injury  Upon discussion with the patient, she relays HPI as above  She states that she has been having diarrhea and vomiting yesterday and today  She states she is chronically oxygen dependent  She denies any shortness of breath that is out of her norm, dizziness, chest pain, no constipation  An indwelling urinary catheter is present  She has not had vomiting in a few hours  From renal standpoint, patient has CKD 4  Baseline creatinine appears to be around 1 6-2 0 mg/dL  She presented with a creatinine of 2 43 mg/dL and there were hyaline casts on urinalysis consistent with acute tubular necrosis  She has an indwelling urinary catheter  She had a renal ultrasound done during admission in June which showed mild hydronephrosis and left kidney atrophy  She has not received any contrast   There have been some short runs of NSVT per Cardiology note associated with vomiting  Lisinopril is on hold, last dose yesterday  She is receiving high-dose Protonix- 80 mg daily and this was increased on 7/28       The medical record, including Care Everywhere and media tabs were reviewed  Reason for Consult:  Acute kidney injury    Review of Systems:  A complete 10-point review of systems was performed  Aside from what was mentioned in the HPI, it is otherwise negative        Historical Information   Past Medical History:   Diagnosis Date    NANCY [facility]) [facility] [address] )     Atrial fibrillation (HCC)     CHF (congestive heart failure) (HCC)     COPD (chronic obstructive pulmonary disease) (HCC)     Diabetes mellitus (HCC)     Elevated troponin level not due myocardial infarction 2020    [facility]    Trigger finger of thumb     last assessed: 13     Past Surgical History:   Procedure Laterality Date    APPENDECTOMY      CARDIAC PACEMAKER PLACEMENT      CARDIAC PACEMAKER PLACEMENT      CHOLECYSTECTOMY      COLONOSCOPY      Complete    CORONARY ANGIOPLASTY WITH STENT PLACEMENT      CORONARY ARTERY BYPASS GRAFT      CORONARY ARTERY BYPASS GRAFT      ESOPHAGOGASTRODUODENOSCOPY      Diagnostic    HERNIA REPAIR      TOTAL ABDOMINAL HYSTERECTOMY      with removal of both ovaries     Social History   Social History     Substance and Sexual Activity   Alcohol Use Not Currently     Social History     Substance and Sexual Activity   Drug Use No     Social History     Tobacco Use   Smoking Status Former Smoker    Last attempt to quit: 2011    Years since quittin 1   Smokeless Tobacco Never Used   Tobacco Comment    Former smoker per allscripts       Family History:   Family History   Problem Relation Age of Onset    Lung disease Mother         black     Breast cancer Mother     Lung disease Father         black        Medications:  Pertinent medications were reviewed    Current Facility-Administered Medications:  acetaminophen 650 mg Oral Q6H PRN KRISTIN Lala   acetylcysteine 3 mL Nebulization Q8H PRN Daiana Nix PA-C   albuterol 2 5 mg Nebulization Q4H PRN Jeanette S Darrel, CRNP   aluminum-magnesium hydroxide-simethicone 30 mL Oral Q4H PRN Daiana Nix PA-C   amLODIPine 5 mg Oral Daily Jeanette S Darrel, CRNP   apixaban 2 5 mg Oral BID Jeanette S Darrel, CRNP   carvedilol 6 25 mg Oral BID DemetrisMarlin Crystal Valdez PA-C   docusate sodium 100 mg Oral BID Jeanette S Darrel, CRNP   ferrous gluconate 324 mg Oral Daily Before Breakfast Jeanette S Darrel, CRNP   fluticasone-vilanterol 1 puff Inhalation Daily Jeanette S Darrel, CRNP   insulin glargine 12 Units Subcutaneous HS Ethlyn Mask, MD   insulin lispro 1-6 Units Subcutaneous TID AC Jeanette S Darrel, CRNP   insulin lispro 1-6 Units Subcutaneous HS Jeanette S Darrel, CRNP   insulin lispro 5 Units Subcutaneous TID With Meals Ethlypam Mask, MD   ipratropium 0 5 mg Nebulization TID Horacio Bolton MD   levalbuterol 1 25 mg Nebulization TID Horacio Bolton MD   methylPREDNISolone sodium succinate 40 mg Intravenous Q8H [address] [place], KRISTIN   nitroglycerin 0 4 mg Sublingual Q5 Min PRN Jeanette S Darrel, CRNP   pantoprazole 80 mg Oral Daily Daiana Nix PA-C   phenol 1 spray Mouth/Throat Q2H PRN Daiana Nix PA-C   pravastatin 80 mg Oral Daily With [employer] S Darrel, CRNP   promethazine 25 mg Intramuscular Q6H PRN Daiana Nix PA-C   senna 2 tablet Oral HS Daiana Nix PA-C   torsemide 20 mg Oral After [employer]   torsemide 40 mg Oral After Breakfast [place], PA-C         Allergies   Allergen Reactions    Other      PEANUTS-unknown reaction    Nuts Rash         Vitals:   /58 (BP Location: Right arm)   Pulse 61   Temp 98 3 °F (36 8 °C) (Oral)   Resp 18   Ht 5' 4" (1 626 m)   Wt 67 6 kg (149 lb 0 5 oz)   LMP  (LMP Unknown)   SpO2 99%   BMI 25 58 kg/m²   Body mass index is 25 58 kg/m²    SpO2: 99 %,   SpO2 Activity: At Rest,   O2 Device: Nasal cannula      Intake/Output Summary (Last 24 hours) at 7/30/2020 1331  Last data filed at 7/30/2020 0801  Gross per 24 hour Intake 705 ml   Output 1330 ml   Net -625 ml     Invasive Devices     Peripheral Intravenous Line            Peripheral IV 07/29/20 Dorsal (posterior); Left Hand 1 day          Drain            Urethral Catheter Latex; Temperature probe 16 Fr  4 days                Physical Exam:  General: conscious, cooperative, in no acute distress appears stated age on nasal cannula  Eyes: conjunctivae pink, anicteric sclerae  ENT: lips and mucous membranes moist  Neck: supple, no JVD, no masses  Chest:  Diminished breath sounds bilateral, no crackles, ronchus or wheezings  CVS: S1 & S2, normal rate, regular rhythm  Abdomen: soft, non-tender, non-distended, normoactive bowel sounds  Extremities:  Trace edema of both legs  Skin: no rash  Neuro: awake, alert, oriented      Diagnostic Data:  Lab: I have personally reviewed pertinent lab results  ,   CBC:  Results from last 7 days   Lab Units 07/30/20  0734   WBC Thousand/uL 1 83*   HEMOGLOBIN g/dL 9 3*   HEMATOCRIT % 29 9*   PLATELETS Thousands/uL 159      CMP: Lab Results   Component Value Date    SODIUM 141 07/30/2020    K 4 6 07/30/2020     07/30/2020    CO2 32 07/30/2020    BUN 74 (H) 07/30/2020    CREATININE 2 83 (H) 07/30/2020    CALCIUM 7 8 (L) 07/30/2020    EGFR 15 07/30/2020   ,   PT/INR: No results found for: PT, INR,   Magnesium: No components found for: MAG,  Phosphorous: No results found for: PHOS    Microbiology:  @LABRCNTIP,(urinecx:7)@        KRISTIN Dacosta    Portions of the record may have been created with voice recognition software  Occasional wrong word or "sound a like" substitutions may have occurred due to the inherent limitations of voice recognition software  Read the chart carefully and recognize, using context, where substitutions have occurred

## 2020-07-30 NOTE — PROGRESS NOTES
Progress Note - Ashli Porter 1935, 82 y o  female MRN: [id number]    Unit/Bed#: -01 Encounter: [id number]    Primary Care Provider: Elham Red MD   Date and time admitted to hospital: 7/25/2020  7:31 PM    * Acute on chronic respiratory failure with hypoxia [facility])  Assessment & Plan  Background: Patient admitted for acute on chronic respiratory failure w/ hypoxia after gaining approximately 9 lbs over prior week and developing peripheral edema and shortness of breath  · Patient uses supplemental oxygen 2 L via nasal cannula continuously as outpatient due to COPD and CHF  Initially required continuous BiPAP at time of admission  · Adequately diuresed  · Patient still wheezing, on IV steroid  · Requiring more oxygen than baseline  · Progressively improving    Acute on chronic systolic CHF (congestive heart failure) ([facility])  Assessment & Plan  Wt Readings from Last 3 Encounters:   07/30/20 67 6 kg (149 lb 0 5 oz)   07/03/20 64 kg (141 lb 1 5 oz)   06/22/20 68 9 kg (152 lb)     · HFrEF, 35-40%  · Adequately diuresed, switch to oral torsemide 40 mg in a m and 20 mg in p m  · Being treated for COPD exacerbation as well  · Note that patient has frequent admissions for CHF exacerbation    Dysphagia  Appreciate swallow study  Patient complaining of feeling of food stacking at the lower neck region  Check barium swallow     CKD (chronic kidney disease) stage 4, GFR 15-29 ml/min ([facility])  Assessment & Plan  · Baseline creatinine 1 9-2 1    COPD with emphysema ([facility])  Assessment & Plan  · Continue bronchodilator and steroid  · Will start tapering steroid tomorrow    Acute renal failure (ARF) ([facility])  Assessment & Plan  Acute renal failure most likely secondary to IV over-diuresis  Continue oral diuretic, continue to monitor renal function daily    V-tach [facility])  Assessment & Plan  · 7/27 at 4:30 a m , patient had 9 beats nonsustained V-tach  · In the setting of acute CHF exacerbation    · Continue tele monitor  · Cardiologist following; input appreciated    Prolonged Q-T interval on ECG  Assessment & Plan  · EKG on 07/27 shows prolonged QTC  · Caution with antiemetics      · Patient did have nausea; phenergan ordered   · Repeat EKG on 07/29 reviewed and     Elevated troponin level not due myocardial infarction  Assessment & Plan  · Non-MI troponin elevation due to CHF  · Slightly elevated troponin flat and stable on serial labs  · Cardiology following; input appreciated    Iron deficiency anemia  Assessment & Plan  · Hemoglobin appears stable approximately 8  · Last infusion 07/20/2020  · Continue ferrous gluconate  · Trend hemoglobin on an as-needed basis    UTI (urinary tract infection)  Assessment & Plan  · UTI and treated with IV ceftriaxone for 5 days    CAD (coronary artery disease), native coronary artery  Assessment & Plan  · Asymptomatic however does have known history of CAD s/p CABG   · Patient not on anti-platelet therapy, severe anemia receives iron infusions  · Follows outpatient with [facility] in [place]  · Continue statin and Eliquis  · Mild flat elevated troponin suspect secondary to CHF exacerbation  · Cardiology following; input appreciated    Paroxysmal atrial fibrillation ([address] )  Assessment & Plan  · Paroxysmal atrial fibrillation with elevated CHADS2 vascular score  · Continue Coreg and Eliquis for anticoagulation and rate control  · PPM  · Continue outpatient cardiology follow-up    Type 2 diabetes mellitus with hyperglycemia [facility])  Assessment & Plan  Lab Results   Component Value Date    HGBA1C 7 4 (H) 07/26/2020     Recent Labs     07/29/20  1130 07/29/20  1546 07/29/20  2116 07/30/20  0716   POCGLU 129 191* 331* 250*     Blood Sugar Average: Last 72 hrs:  (P) 177 [id number]     · Increase Lantus to 12 units and Humalog 5 units t i d     Essential hypertension  Assessment & Plan  · Blood pressure currently acceptable systolics in the 682Z  · Continue home regimen of lisinopril 5 mg, carvedilol 3 125 mg BID, amlodipine 5 mg daily  · Hold lisinopril given NANCY  · Monitor kidney function closely  · Monitor with routine vitals    VTE Pharmacologic Prophylaxis:   Pharmacologic: Heparin    Patient Centered Rounds: I have performed bedside rounds with nursing staff today    Discussions with Specialists or Other Care Team Provider:     Education and Discussions with Family / Patient:     Current Length of Stay: 5 day(s)    Current Patient Status: Inpatient   Certification Statement: The patient will continue to require additional inpatient hospital stay due to Acute respiratory failure secondary to CHF, COPD    Discharge Plan:  Probably tomorrow    Code Status: Level 1 - Full Code      Subjective:   Patient much improved from admission  Denies chest pain, shortness of breath, nausea, vomiting, abdominal pain, urinary or bowel habit change  Objective:     Vitals:   Temp (24hrs), Av 5 °F (36 9 °C), Min:97 3 °F (36 3 °C), Max:99 9 °F (37 7 °C)    Temp:  [97 3 °F (36 3 °C)-99 9 °F (37 7 °C)] 98 3 °F (36 8 °C)  HR:  [59-65] 59  Resp:  [21-39] 39  BP: (126-141)/(60-67) 128/61  SpO2:  [92 %-97 %] 95 %  Body mass index is 25 58 kg/m²  Input and Output Summary (last 24 hours): Intake/Output Summary (Last 24 hours) at 2020 1107  Last data filed at 2020 0801  Gross per 24 hour   Intake 945 ml   Output 1330 ml   Net -385 ml       Physical Exam:     General appearance: alert, appears stated age and cooperative  Head: Normocephalic, without obvious abnormality, atraumatic  Lungs: clear to auscultation bilaterally  Heart: regular rate and rhythm  Abdomen: soft, non-tender, positive bowel sounds   Back: negative  Extremities: extremities atraumatic, no cyanosis or edema  Neurologic: Alert and oriented X 3, normal strength and tone  Normal symmetric reflexes   Normal coordination and gait    Additional Data:     Labs:    Results from last 7 days   Lab Units 20  5589 07/30/20  0614   WBC Thousand/uL 1 83* 1 86*   HEMOGLOBIN g/dL 9 3* 8 8*   HEMATOCRIT % 29 9* 29 0*   PLATELETS Thousands/uL 159 152   BANDS PCT % 2  --    NEUTROS PCT %  --  53   LYMPHS PCT %  --  43   LYMPHO PCT % 40  --    MONOS PCT %  --  3*   MONO PCT % 6  --    EOS PCT % 0 0     Results from last 7 days   Lab Units 07/30/20  0614  07/27/20  0513   SODIUM mmol/L 141   < > 141   POTASSIUM mmol/L 4 6   < > 4 5   CHLORIDE mmol/L 103   < > 105   CO2 mmol/L 32   < > 29   BUN mg/dL 74*   < > 61*   CREATININE mg/dL 2 83*   < > 2 59*   ANION GAP mmol/L 6   < > 7   CALCIUM mg/dL 7 8*   < > 8 3   ALBUMIN g/dL  --   --  2 5*   TOTAL BILIRUBIN mg/dL  --   --  0 12*   ALK PHOS U/L  --   --  90   ALT U/L  --   --  11*   AST U/L  --   --  6   GLUCOSE RANDOM mg/dL 269*   < > 254*    < > = values in this interval not displayed  Results from last 7 days   Lab Units 07/25/20  2023   INR  1 00     Results from last 7 days   Lab Units 07/30/20  0716 07/29/20  2116 07/29/20  1546 07/29/20  1130 07/29/20  0747 07/28/20  2103 07/28/20  1558 07/28/20  1121 07/28/20  0718 07/27/20  2137 07/27/20  1558 07/27/20  1104   POC GLUCOSE mg/dl 250* 331* 191* 129 70 187* 141* 115 70 162* 212* 181*     Results from last 7 days   Lab Units 07/26/20  0348   HEMOGLOBIN A1C % 7 4*     Results from last 7 days   Lab Units 07/25/20 2121 07/25/20 2023   LACTIC ACID mmol/L  --  1 4   PROCALCITONIN ng/ml 0 06  --            * I Have Reviewed All Lab Data Listed Above  * Additional Pertinent Lab Tests Reviewed: [address] Admission Reviewed    Imaging:    Imaging Reports Reviewed Today Include: images reviewed    Recent Cultures (last 7 days):     Results from last 7 days   Lab Units 07/25/20 2115 07/25/20 2023 07/25/20 1959   BLOOD CULTURE   --  No Growth at 72 hrs  No Growth at 72 hrs     URINE CULTURE  >100,000 cfu/ml Enterobacter cloacae complex*  --   --        Last 24 Hours Medication List:     Current Facility-Administered Medications:  acetaminophen 650 mg Oral Q6H PRN Jeanette S Darrel, CRNP   acetylcysteine 3 mL Nebulization Q8H PRN Daiana Nix PA-C   albuterol 2 5 mg Nebulization Q4H PRN Jeanette S Darrel, CRNP   aluminum-magnesium hydroxide-simethicone 30 mL Oral Q4H PRN Daiana Nix PA-C   amLODIPine 5 mg Oral Daily Jeanette S Darrel, CRNP   apixaban 2 5 mg Oral BID Jeanette S Darrel, CRNP   carvedilol 6 25 mg Oral BID Starla Valdez PA-C   docusate sodium 100 mg Oral BID Jeanette S Darrel, CRNP   ferrous gluconate 324 mg Oral Daily Before Breakfast Jeanette S Darrel, CRNP   fluticasone-vilanterol 1 puff Inhalation Daily Jeanette S Darrel, CRNP   insulin glargine 12 Units Subcutaneous HS Ger Pino MD   insulin lispro 1-6 Units Subcutaneous TID AC Jeanette S Darrel, CRNP   insulin lispro 1-6 Units Subcutaneous HS Jeanette S Darrel, CRNP   insulin lispro 5 Units Subcutaneous TID With Meals Ger Pino MD   ipratropium 0 5 mg Nebulization TID Taya Yates MD   levalbuterol 1 25 mg Nebulization TID Taya Yates MD   methylPREDNISolone sodium succinate 40 mg Intravenous Q8H [address] [facility], CRNP   nitroglycerin 0 4 mg Sublingual Q5 Min PRN Jeanette S Darrel, CRNP   pantoprazole 80 mg Oral Daily Daiana Nix PA-C   phenol 1 spray Mouth/Throat Q2H PRN Daiana Nix PA-C   pravastatin 80 mg Oral Daily With [employer] S Darrel, CRNP   promethazine 25 mg Intramuscular Q6H PRN Daiana Nix PA-C   senna 2 tablet Oral HS Daiana Nix PA-C   torsemide 20 mg Oral After [employer]IM   torsemide 40 mg Oral After Breakfast [place], PA-C        Today, Patient Was Seen By: Ger Pino MD    ** Please Note: Dictation voice to text software may have been used in the creation of this document   **

## 2020-07-30 NOTE — PLAN OF CARE
Pt slept through shift without problems  Pt remained on home O2 of 2L NC  Attempting to turn and reposition pt on pillows but she moves herself off pillows  Pt to be changed to oral torsemide today  Pt's urine output dropped off through out the shift  Pt continues to have fowler with question of discontinuing fowler today  Labs to be drawn today  Pt started on IV steriods for COPD exacerbation  Nephrologist still to see pt  Pt had no nausea or vomiting this shift  Question if iron supplement is bothering her stomach  Will hold and address with doc on rounds today  Pt's tele order is   Will also address on rounds today  Pt toleraint protocols and slowing moving towards expected outcomes  Problem: Potential for Falls  Goal: Patient will remain free of falls  Description  INTERVENTIONS:  - Assess patient frequently for physical needs  -  Identify cognitive and physical deficits and behaviors that affect risk of falls    -  [place] fall precautions as indicated by assessment   - Educate patient/family on patient safety including physical limitations  - Instruct patient to call for assistance with activity based on assessment  - Modify environment to reduce risk of injury  - Consider OT/PT consult to assist with strengthening/mobility  Outcome: Progressing     Problem: Prexisting or High Potential for Compromised Skin Integrity  Goal: Skin integrity is maintained or improved  Description  INTERVENTIONS:  - Identify patients at risk for skin breakdown  - Assess and monitor skin integrity  - Assess and monitor nutrition and hydration status  - Monitor labs   - Assess for incontinence   - Turn and reposition patient  - Assist with mobility/ambulation  - Relieve pressure over bony prominences  - Avoid friction and shearing  - Provide appropriate hygiene as needed including keeping skin clean and dry  - Evaluate need for skin moisturizer/barrier cream  - Collaborate with interdisciplinary team   - Patient/family teaching  - Consider wound care consult   Outcome: Progressing     Problem: PAIN - ADULT  Goal: Verbalizes/displays adequate comfort level or baseline comfort level  Description  Interventions:  - Encourage patient to monitor pain and request assistance  - Assess pain using appropriate pain scale  - Administer analgesics based on type and severity of pain and evaluate response  - Implement non-pharmacological measures as appropriate and evaluate response  - Consider cultural and social influences on pain and pain management  - Notify physician/advanced practitioner if interventions unsuccessful or patient reports new pain  Outcome: Progressing     Problem: INFECTION - ADULT  Goal: Absence or prevention of progression during hospitalization  Description  INTERVENTIONS:  - Assess and monitor for signs and symptoms of infection  - Monitor lab/diagnostic results  - Monitor all insertion sites, i e  indwelling lines, tubes, and drains  - Monitor endotracheal if appropriate and nasal secretions for changes in amount and color  - [place] appropriate cooling/warming therapies per order  - Administer medications as ordered  - Instruct and encourage patient and family to use good hand hygiene technique  - Identify and instruct in appropriate isolation precautions for identified infection/condition  Outcome: Progressing  Goal: Absence of fever/infection during neutropenic period  Description  INTERVENTIONS:  - Monitor WBC    Outcome: Progressing     Problem: SAFETY ADULT  Goal: Patient will remain free of falls  Description  INTERVENTIONS:  - Assess patient frequently for physical needs  -  Identify cognitive and physical deficits and behaviors that affect risk of falls    -  [place] fall precautions as indicated by assessment   - Educate patient/family on patient safety including physical limitations  - Instruct patient to call for assistance with activity based on assessment  - Modify environment to reduce risk of injury  - Consider OT/PT consult to assist with strengthening/mobility  Outcome: Progressing  Goal: Maintain or return to baseline ADL function  Description  INTERVENTIONS:  -  Assess patient's ability to carry out ADLs; assess patient's baseline for ADL function and identify physical deficits which impact ability to perform ADLs (bathing, care of mouth/teeth, toileting, grooming, dressing, etc )  - Assess/evaluate cause of self-care deficits   - Assess range of motion  - Assess patient's mobility; develop plan if impaired  - Assess patient's need for assistive devices and provide as appropriate  - Encourage maximum independence but intervene and supervise when necessary  - Involve family in performance of ADLs  - Assess for home care needs following discharge   - Consider OT consult to assist with ADL evaluation and planning for discharge  - Provide patient education as appropriate  Outcome: Progressing  Goal: Maintain or return mobility status to optimal level  Description  INTERVENTIONS:  - Assess patient's baseline mobility status (ambulation, transfers, stairs, etc )    - Identify cognitive and physical deficits and behaviors that affect mobility  - Identify mobility aids required to assist with transfers and/or ambulation (gait belt, sit-to-stand, lift, walker, cane, etc )  - [place] fall precautions as indicated by assessment  - Record patient progress and toleration of activity level on Mobility SBAR; progress patient to next Phase/Stage  - Instruct patient to call for assistance with activity based on assessment  - Consider rehabilitation consult to assist with strengthening/weightbearing, etc   Outcome: Progressing     Problem: DISCHARGE PLANNING  Goal: Discharge to home or other facility with appropriate resources  Description  INTERVENTIONS:  - Identify barriers to discharge w/patient and caregiver  - Arrange for needed discharge resources and transportation as appropriate  - Identify discharge learning needs (meds, wound care, etc )  - Arrange for interpretive services to assist at discharge as needed  - Refer to Case Management Department for coordinating discharge planning if the patient needs post-hospital services based on physician/advanced practitioner order or complex needs related to functional status, cognitive ability, or social support system  Outcome: Progressing     Problem: Knowledge Deficit  Goal: Patient/family/caregiver demonstrates understanding of disease process, treatment plan, medications, and discharge instructions  Description  Complete learning assessment and assess knowledge base  Interventions:  - Provide teaching at level of understanding  - Provide teaching via preferred learning methods  Outcome: Progressing     Problem: RESPIRATORY - ADULT  Goal: Achieves optimal ventilation and oxygenation  Description  INTERVENTIONS:  - Assess for changes in respiratory status  - Assess for changes in mentation and behavior  - Position to facilitate oxygenation and minimize respiratory effort  - Oxygen administered by appropriate delivery if ordered  - Initiate smoking cessation education as indicated  - Encourage broncho-pulmonary hygiene including cough, deep breathe, Incentive Spirometry  - Assess the need for suctioning and aspirate as needed  - Assess and instruct to report SOB or any respiratory difficulty  - Respiratory Therapy support as indicated  Outcome: Progressing     Problem: Nutrition/Hydration-ADULT  Goal: Nutrient/Hydration intake appropriate for improving, restoring or maintaining nutritional needs  Description  Monitor and assess patient's nutrition/hydration status for malnutrition  Collaborate with interdisciplinary team and initiate plan and interventions as ordered  Monitor patient's weight and dietary intake as ordered or per policy  Utilize nutrition screening tool and intervene as necessary   Determine patient's food preferences and provide high-protein, high-caloric foods as appropriate       INTERVENTIONS:  - Monitor oral intake, urinary output, labs, and treatment plans  - Assess nutrition and hydration status and recommend course of action  - Evaluate amount of meals eaten  - Assist patient with eating if necessary   - Allow adequate time for meals  - Recommend/ encourage appropriate diets, oral nutritional supplements, and vitamin/mineral supplements  - Order, calculate, and assess calorie counts as needed  - Recommend, monitor, and adjust tube feedings and TPN/PPN based on assessed needs  - Assess need for intravenous fluids  - Provide specific nutrition/hydration education as appropriate  - Include patient/family/caregiver in decisions related to nutrition  Outcome: Progressing

## 2020-07-30 NOTE — ASSESSMENT & PLAN NOTE
· Asymptomatic however does have known history of CAD s/p CABG   · Patient not on anti-platelet therapy, severe anemia receives iron infusions  · Follows outpatient with [facility] in [place]  · Continue statin and Eliquis  · Mild flat elevated troponin suspect secondary to CHF exacerbation  · Cardiology following; input appreciated

## 2020-07-31 ENCOUNTER — APPOINTMENT (INPATIENT)
Dept: RADIOLOGY | Facility: HOSPITAL | Age: 85
DRG: 291 | End: 2020-07-31
Payer: MEDICARE

## 2020-07-31 PROBLEM — R13.10 DYSPHAGIA: Status: ACTIVE | Noted: 2020-07-31

## 2020-07-31 LAB
ANION GAP SERPL CALCULATED.3IONS-SCNC: 8 MMOL/L (ref 4–13)
BACTERIA BLD CULT: NORMAL
BACTERIA BLD CULT: NORMAL
BASOPHILS # BLD AUTO: 0 THOUSANDS/ΜL (ref 0–0.1)
BASOPHILS NFR BLD AUTO: 0 % (ref 0–1)
BUN SERPL-MCNC: 93 MG/DL (ref 5–25)
CALCIUM SERPL-MCNC: 7.9 MG/DL (ref 8.3–10.1)
CHLORIDE SERPL-SCNC: 105 MMOL/L (ref 100–108)
CO2 SERPL-SCNC: 33 MMOL/L (ref 21–32)
CREAT SERPL-MCNC: 2.75 MG/DL (ref 0.6–1.3)
EOSINOPHIL # BLD AUTO: 0 THOUSAND/ΜL (ref 0–0.61)
EOSINOPHIL NFR BLD AUTO: 0 % (ref 0–6)
EOSINOPHIL NFR URNS MANUAL: 1 %
ERYTHROCYTE [DISTWIDTH] IN BLOOD BY AUTOMATED COUNT: 16.2 % (ref 11.6–15.1)
GFR SERPL CREATININE-BSD FRML MDRD: 15 ML/MIN/1.73SQ M
GLUCOSE SERPL-MCNC: 133 MG/DL (ref 65–140)
GLUCOSE SERPL-MCNC: 143 MG/DL (ref 65–140)
GLUCOSE SERPL-MCNC: 228 MG/DL (ref 65–140)
GLUCOSE SERPL-MCNC: 322 MG/DL (ref 65–140)
GLUCOSE SERPL-MCNC: 386 MG/DL (ref 65–140)
GLUCOSE SERPL-MCNC: 423 MG/DL (ref 65–140)
HCT VFR BLD AUTO: 30.4 % (ref 34.8–46.1)
HGB BLD-MCNC: 9.6 G/DL (ref 11.5–15.4)
IMM GRANULOCYTES # BLD AUTO: 0.01 THOUSAND/UL (ref 0–0.2)
IMM GRANULOCYTES NFR BLD AUTO: 0 % (ref 0–2)
LYMPHOCYTES # BLD AUTO: 0.83 THOUSANDS/ΜL (ref 0.6–4.47)
LYMPHOCYTES NFR BLD AUTO: 26 % (ref 14–44)
MAGNESIUM SERPL-MCNC: 2.5 MG/DL (ref 1.6–2.6)
MCH RBC QN AUTO: 30.2 PG (ref 26.8–34.3)
MCHC RBC AUTO-ENTMCNC: 31.6 G/DL (ref 31.4–37.4)
MCV RBC AUTO: 96 FL (ref 82–98)
MONOCYTES # BLD AUTO: 0.28 THOUSAND/ΜL (ref 0.17–1.22)
MONOCYTES NFR BLD AUTO: 9 % (ref 4–12)
NEUTROPHILS # BLD AUTO: 2.13 THOUSANDS/ΜL (ref 1.85–7.62)
NEUTS SEG NFR BLD AUTO: 65 % (ref 43–75)
NRBC BLD AUTO-RTO: 0 /100 WBCS
PHOSPHATE SERPL-MCNC: 5.3 MG/DL (ref 2.3–4.1)
PLATELET # BLD AUTO: 159 THOUSANDS/UL (ref 149–390)
PMV BLD AUTO: 14 FL (ref 8.9–12.7)
POTASSIUM SERPL-SCNC: 4.3 MMOL/L (ref 3.5–5.3)
RBC # BLD AUTO: 3.18 MILLION/UL (ref 3.81–5.12)
SODIUM SERPL-SCNC: 146 MMOL/L (ref 136–145)
WBC # BLD AUTO: 3.25 THOUSAND/UL (ref 4.31–10.16)

## 2020-07-31 PROCEDURE — 74220 X-RAY XM ESOPHAGUS 1CNTRST: CPT

## 2020-07-31 PROCEDURE — 99233 SBSQ HOSP IP/OBS HIGH 50: CPT | Performed by: INTERNAL MEDICINE

## 2020-07-31 PROCEDURE — 99232 SBSQ HOSP IP/OBS MODERATE 35: CPT | Performed by: NURSE PRACTITIONER

## 2020-07-31 PROCEDURE — 97535 SELF CARE MNGMENT TRAINING: CPT

## 2020-07-31 PROCEDURE — 83735 ASSAY OF MAGNESIUM: CPT | Performed by: INTERNAL MEDICINE

## 2020-07-31 PROCEDURE — 84100 ASSAY OF PHOSPHORUS: CPT | Performed by: NURSE PRACTITIONER

## 2020-07-31 PROCEDURE — 97110 THERAPEUTIC EXERCISES: CPT | Performed by: PHYSICAL THERAPIST

## 2020-07-31 PROCEDURE — 94760 N-INVAS EAR/PLS OXIMETRY 1: CPT

## 2020-07-31 PROCEDURE — 85025 COMPLETE CBC W/AUTO DIFF WBC: CPT | Performed by: INTERNAL MEDICINE

## 2020-07-31 PROCEDURE — 82948 REAGENT STRIP/BLOOD GLUCOSE: CPT

## 2020-07-31 PROCEDURE — 80048 BASIC METABOLIC PNL TOTAL CA: CPT | Performed by: INTERNAL MEDICINE

## 2020-07-31 PROCEDURE — 97116 GAIT TRAINING THERAPY: CPT | Performed by: PHYSICAL THERAPIST

## 2020-07-31 PROCEDURE — 94640 AIRWAY INHALATION TREATMENT: CPT

## 2020-07-31 RX ORDER — INSULIN GLARGINE 100 [IU]/ML
15 INJECTION, SOLUTION SUBCUTANEOUS
Status: DISCONTINUED | OUTPATIENT
Start: 2020-07-31 | End: 2020-08-01

## 2020-07-31 RX ORDER — METHYLPREDNISOLONE SODIUM SUCCINATE 40 MG/ML
40 INJECTION, POWDER, LYOPHILIZED, FOR SOLUTION INTRAMUSCULAR; INTRAVENOUS DAILY
Status: DISCONTINUED | OUTPATIENT
Start: 2020-08-01 | End: 2020-08-02

## 2020-07-31 RX ORDER — METHYLPREDNISOLONE SODIUM SUCCINATE 40 MG/ML
40 INJECTION, POWDER, LYOPHILIZED, FOR SOLUTION INTRAMUSCULAR; INTRAVENOUS EVERY 8 HOURS SCHEDULED
Status: DISCONTINUED | OUTPATIENT
Start: 2020-08-01 | End: 2020-07-31

## 2020-07-31 RX ORDER — DEXTROSE MONOHYDRATE 50 MG/ML
50 INJECTION, SOLUTION INTRAVENOUS CONTINUOUS
Status: DISPENSED | OUTPATIENT
Start: 2020-07-31 | End: 2020-07-31

## 2020-07-31 RX ORDER — PANTOPRAZOLE SODIUM 20 MG/1
20 TABLET, DELAYED RELEASE ORAL
Status: DISCONTINUED | OUTPATIENT
Start: 2020-08-01 | End: 2020-08-03

## 2020-07-31 RX ADMIN — INSULIN LISPRO 8 UNITS: 100 INJECTION, SOLUTION INTRAVENOUS; SUBCUTANEOUS at 02:22

## 2020-07-31 RX ADMIN — APIXABAN 2.5 MG: 2.5 TABLET, FILM COATED ORAL at 18:09

## 2020-07-31 RX ADMIN — FLUTICASONE FUROATE AND VILANTEROL TRIFENATATE 1 PUFF: 100; 25 POWDER RESPIRATORY (INHALATION) at 08:55

## 2020-07-31 RX ADMIN — PANTOPRAZOLE SODIUM 40 MG: 40 TABLET, DELAYED RELEASE ORAL at 06:28

## 2020-07-31 RX ADMIN — CARVEDILOL 6.25 MG: 6.25 TABLET, FILM COATED ORAL at 08:55

## 2020-07-31 RX ADMIN — METHYLPREDNISOLONE SODIUM SUCCINATE 40 MG: 40 INJECTION, POWDER, FOR SOLUTION INTRAMUSCULAR; INTRAVENOUS at 06:28

## 2020-07-31 RX ADMIN — IPRATROPIUM BROMIDE 0.5 MG: 0.5 SOLUTION RESPIRATORY (INHALATION) at 08:12

## 2020-07-31 RX ADMIN — INSULIN GLARGINE 15 UNITS: 100 INJECTION, SOLUTION SUBCUTANEOUS at 21:31

## 2020-07-31 RX ADMIN — LEVALBUTEROL HYDROCHLORIDE 1.25 MG: 1.25 SOLUTION, CONCENTRATE RESPIRATORY (INHALATION) at 08:12

## 2020-07-31 RX ADMIN — DEXTROSE 50 ML/HR: 5 SOLUTION INTRAVENOUS at 09:00

## 2020-07-31 RX ADMIN — AMLODIPINE BESYLATE 5 MG: 5 TABLET ORAL at 08:55

## 2020-07-31 RX ADMIN — INSULIN LISPRO 6 UNITS: 100 INJECTION, SOLUTION INTRAVENOUS; SUBCUTANEOUS at 16:10

## 2020-07-31 RX ADMIN — IPRATROPIUM BROMIDE 0.5 MG: 0.5 SOLUTION RESPIRATORY (INHALATION) at 13:42

## 2020-07-31 RX ADMIN — PRAVASTATIN SODIUM 80 MG: 80 TABLET ORAL at 16:08

## 2020-07-31 RX ADMIN — LEVALBUTEROL HYDROCHLORIDE 1.25 MG: 1.25 SOLUTION, CONCENTRATE RESPIRATORY (INHALATION) at 20:21

## 2020-07-31 RX ADMIN — INSULIN LISPRO 5 UNITS: 100 INJECTION, SOLUTION INTRAVENOUS; SUBCUTANEOUS at 21:31

## 2020-07-31 RX ADMIN — CARVEDILOL 6.25 MG: 6.25 TABLET, FILM COATED ORAL at 18:09

## 2020-07-31 RX ADMIN — FERROUS GLUCONATE 324 MG: 324 TABLET ORAL at 06:28

## 2020-07-31 RX ADMIN — APIXABAN 2.5 MG: 2.5 TABLET, FILM COATED ORAL at 08:55

## 2020-07-31 RX ADMIN — DOCUSATE SODIUM 100 MG: 100 CAPSULE, LIQUID FILLED ORAL at 08:55

## 2020-07-31 RX ADMIN — LEVALBUTEROL HYDROCHLORIDE 1.25 MG: 1.25 SOLUTION, CONCENTRATE RESPIRATORY (INHALATION) at 13:42

## 2020-07-31 RX ADMIN — IPRATROPIUM BROMIDE 0.5 MG: 0.5 SOLUTION RESPIRATORY (INHALATION) at 20:21

## 2020-07-31 RX ADMIN — TORSEMIDE 40 MG: 20 TABLET ORAL at 08:55

## 2020-07-31 RX ADMIN — TORSEMIDE 20 MG: 20 TABLET ORAL at 14:30

## 2020-07-31 NOTE — PHYSICAL THERAPY NOTE
Physical Therapy Progress Note     07/31/20 0898   Pain Assessment   Pain Assessment Tool 0-10   Pain Score No Pain   Restrictions/Precautions   Other Precautions Chair Alarm;Cognitive;Multiple lines;Telemetry;O2;Fall Risk   General   Chart Reviewed Yes   Response to Previous Treatment Patient with no complaints from previous session  Family/Caregiver Present No   Cognition   Overall Cognitive Status WFL   Orientation Level Oriented X4   Subjective   Subjective no complaint   Transfers   Sit to Stand 5  Supervision   Additional items Armrests; Verbal cues  (multiple lines)   Stand to Sit 5  Supervision   Additional items Verbal cues  (lines)   Ambulation/Elevation   Gait pattern Narrow LILIAN; Excessively slow; Short stride   Gait Assistance   (contact guarding due to lines)   Additional items Verbal cues   Assistive Device Rolling walker   Distance 10' forward and back x3   Balance   Static Sitting Good   Dynamic Sitting Fair   Static Standing Fair   Dynamic Standing Fair -   Ambulatory Fair -   Endurance Deficit   Endurance Deficit Yes   Endurance Deficit Description pt just eating, spO2 during meal 89%  amb forward and back 10' x3, spO2 drops to 82%, rebounds quickly  O2 on at 3L   Activity Tolerance   Activity Tolerance Patient tolerated treatment well;Treatment limited secondary to medical complications (Comment)   Nurse Made Aware yes   Exercises   TKR Sitting;20 reps;AROM; Bilateral  (rest between sets due to desaturatoin with activity)   Assessment   Prognosis Good   Problem List Decreased strength;Decreased endurance; Impaired balance;Decreased mobility; Impaired hearing; Impaired vision   Assessment pt seen for ther ex and gait training, just finished OT sessoin recently and lunch just prior to sessoin  pt agreeabe to PT  pt performed ther ex ble 20 reps with rest break between sets  pt does desat to about 87% with seated ther ex, rebounding quickly   pt amb forward and back x3 using rw for 10' followed by rest  desats to 82%, recovers quickly with deep breathing  pt not reporting distress  pt can return to snf  Barriers to Discharge None   Goals   Patient Goals be able to walk farther   STG Expiration Date 08/06/20   PT Treatment Day 4   Plan   Treatment/Interventions Functional transfer training;LE strengthening/ROM; Therapeutic exercise; Endurance training;Patient/family training;Equipment eval/education; Bed mobility;Gait training; Compensatory technique education;Spoke to nursing   Progress Slow progress, medical status limitations   PT Frequency   (3-5x/week)   Recommendation   PT Discharge Recommendation Post-Acute Rehabilitation Services; Return to previous environment with social support  (return to snf)   Equipment Recommended Wheelchair;Walker  (has )   PT - OK to Discharge Yes     Miladis Butler, PT

## 2020-07-31 NOTE — PROGRESS NOTES
NEPHROLOGY PROGRESS NOTE   Marlena Chin 82 y o  female MRN: [id number]  Unit/Bed#: -01 Encounter: [id number]    Assessment/Plan:    82year old female with CKD 4, chronic combined heart failure and multiple admissions to this facility readmitted 7/25 for acute heart failure status post IV diuresis now transition torsemide therapy per Cardiology  Renal consultation was requested for acute kidney injury  Protonix dose decreased yesterday  She was NPO overnight for video barium swallow and is now hypernatremic  Have started on D5W at 50 mL an hour for a total 500 mL  1  Acute kidney injury:  Present on admission  · Creatinine on admission 2 43 mg/dL -> 2 85 mg/dL (peak) -> 2 75 mg/dL today  Hyaline casts noted on UA during admission  Urine chemistries repeated yesterday and suggest prerenal disease  Did decrease Protonix yesterday from 80 mg to 40 mg  Urine eosinophils 1 %  · Continues to be nonoliguric via indwelling urinary catheter  · Will further decrease Protonix dose  · Will treat hypernatremia with 500 mL of D5W  · There is weight gain noted however she was NPO after midnight and appears on the dry side  Consider decreasing diuretic dose  · Continue to avoid nephrotoxins  2  Stage 4 chronic kidney disease with baseline creatinine around 1 6-2 0 mg/dL  3  Acute on chronic systolic heart failure  · Transition from IV diuretic to torsemide therapy, 40 mg in a m  and 20 mg in p m  Slava Mccurdy She examines on the drier side with hypernatremia and also was NPO overnight despite weight gain noted on medical record which is likely inaccurate  Also tells me she vomited today and 2 days ago  May need to consider decreasing her diuretic regimen  · Continue salt avoidance and strict I&O, daily weight  4  Hypernatremia  · Will provide 500 mL D5W over 10 hours  [place]iden fluid restriction from 1 2 L to 1 8 L per day  5  Alkalosis  · Possibly contraction alkalosis  BMP in a m   Recommendations for decreasing diuretic as above  6  Diabetic nephropathy, likely  7  Atrial fibrillation      ROS  Seen and examined lying in bed  States she had a swallow test today  Vomited per patient report today  No other physical complaints  A complete 10 point review of systems have been performed and are otherwise negative         Historical Information   Past Medical History:   Diagnosis Date    NANCY [facility]) ([facility] [address] )     Atrial fibrillation (HCC)     CHF (congestive heart failure) (HCC)     COPD (chronic obstructive pulmonary disease) (HCC)     Diabetes mellitus (HCC)     Elevated troponin level not due myocardial infarction 2020    [facility]    Trigger finger of thumb     last assessed: 13     Past Surgical History:   Procedure Laterality Date    APPENDECTOMY      CARDIAC PACEMAKER PLACEMENT      CARDIAC PACEMAKER PLACEMENT      CHOLECYSTECTOMY      COLONOSCOPY      Complete    CORONARY ANGIOPLASTY WITH STENT PLACEMENT      CORONARY ARTERY BYPASS GRAFT      CORONARY ARTERY BYPASS GRAFT      ESOPHAGOGASTRODUODENOSCOPY      Diagnostic    HERNIA REPAIR      TOTAL ABDOMINAL HYSTERECTOMY      with removal of both ovaries     Social History   Social History     Substance and Sexual Activity   Alcohol Use Not Currently     Social History     Substance and Sexual Activity   Drug Use No     Social History     Tobacco Use   Smoking Status Former Smoker    Last attempt to quit: 2011    Years since quittin 1   Smokeless Tobacco Never Used   Tobacco Comment    Former smoker per allscripts       Family History:   Family History   Problem Relation Age of Onset    Lung disease Mother         black     Breast cancer Mother     Lung disease Father         black        Medications:  Pertinent medications were reviewed    Current Facility-Administered Medications:  acetaminophen 650 mg Oral Q6H PRN Jeanette S KRISTIN Rojo    acetylcysteine 3 mL Nebulization Q8H PRN Daiana Nix PA-C    albuterol 2 5 mg Nebulization Q4H PRN Jeanette S Darrel, CRNP    amLODIPine 5 mg Oral Daily Brooke Dub, CRNP    apixaban 2 5 mg Oral BID Jeanette S Darrel, CRNP    carvedilol 6 25 mg Oral BID Starla aVldez PA-C    dextrose 50 mL/hr Intravenous Continuous Brooke Dub, CRNP Last Rate: 50 mL/hr (07/31/20 0900)   docusate sodium 100 mg Oral BID Jeanette S Darrel, CRNP    ferrous gluconate 324 mg Oral Daily Before Breakfast Jeanette S Darrel, CRNP    fluticasone-vilanterol 1 puff Inhalation Daily Jeanette S Darrel, CRNP    insulin glargine 15 Units Subcutaneous HS Jeanette S Darrel, CRNP    insulin lispro 1-6 Units Subcutaneous TID AC Jeanette S Darrel, CRNP    insulin lispro 1-6 Units Subcutaneous HS Jeanette S Darrel, CRNP    insulin lispro 8 Units Subcutaneous TID With Meals Jeanette S Darrel, CRNP    ipratropium 0 5 mg Nebulization TID Radha Cadena MD    levalbuterol 1 25 mg Nebulization TID Radha Cadena MD    [facility] ON 8/1/2020] methylPREDNISolone sodium succinate 40 mg Intravenous Daily Rigoberto Austin MD    nitroglycerin 0 4 mg Sublingual Q5 Min PRN Jeanette S Darrel, CRNP    pantoprazole 40 mg Oral Early Morning Brooke Dub, CRNP    phenol 1 spray Mouth/Throat Q2H PRN Daiana Nix PA-C    pravastatin 80 mg Oral Daily With [employer] S Darrel, CRNP    promethazine 25 mg Intramuscular Q6H PRN Daiana Nix PA-C    senna 2 tablet Oral HS Daiana Nix PA-C    torsemide 20 mg Oral After [employer]    torsemide 40 mg Oral After Breakfast [place], PA-C          Allergies   Allergen Reactions    Other      PEANUTS-unknown reaction    Nuts Rash         Vitals:   /65 (BP Location: Left arm)   Pulse 69   Temp (!) 97 4 °F (36 3 °C) (Axillary)   Resp (!) 44   Ht 5' 4" (1 626 m)   Wt 69 2 kg (152 lb 8 9 oz)   LMP  (LMP Unknown)   SpO2 94%   BMI 26 19 kg/m²   Body mass index is 26 19 kg/m²    SpO2: 94 %,   SpO2 Activity: At Rest,   O2 Device: Nasal cannula      Intake/Output Summary (Last 24 hours) at 7/31/2020 1434  Last data filed at 7/31/2020 1101  Gross per 24 hour   Intake 222 ml   Output 1350 ml   Net -1128 ml     Invasive Devices     Peripheral Intravenous Line            Peripheral IV 07/29/20 Dorsal (posterior); Left Hand 2 days          Drain            Urethral Catheter Latex; Temperature probe 16 Fr  5 days                Physical Exam  General: conscious, cooperative, in no acute distress appears stated age  Eyes: conjunctivae pink, anicteric sclerae  ENT: lips and mucous membranes dry  Neck: supple, no JVD, no masses  Chest:  Diminished breath sounds bilateral, no crackles, ronchus or wheezings  CVS:  On nasal cannulaS1 & S2, normal rate, regular rhythm  Abdomen: soft, non-tender, non-distended, normoactive bowel sounds  Extremities: no edema of both legs  Skin: no rash  Neuro: awake, alert, oriented      Diagnostic Data:  Lab: I have personally reviewed pertinent lab results  ,   CBC:  Results from last 7 days   Lab Units 07/31/20  0536   WBC Thousand/uL 3 25*   HEMOGLOBIN g/dL 9 6*   HEMATOCRIT % 30 4*   PLATELETS Thousands/uL 159      CMP: Lab Results   Component Value Date    SODIUM 146 (H) 07/31/2020    K 4 3 07/31/2020     07/31/2020    CO2 33 (H) 07/31/2020    BUN 93 (H) 07/31/2020    CREATININE 2 75 (H) 07/31/2020    CALCIUM 7 9 (L) 07/31/2020    EGFR 15 07/31/2020   ,   PT/INR: No results found for: PT, INR,   Magnesium: No components found for: MAG,  Phosphorous:   Lab Results   Component Value Date    PHOS 5 3 (H) 07/31/2020       Microbiology:  @LABRCNTIP,(urinecx:7)@        [facility], [place]    Portions of the record may have been created with voice recognition software  Occasional wrong word or "sound a like" substitutions may have occurred due to the inherent limitations of voice recognition software  Read the chart carefully and recognize, using context, where substitutions have occurred

## 2020-07-31 NOTE — PLAN OF CARE
Problem: Potential for Falls  Goal: Patient will remain free of falls  Description  INTERVENTIONS:  - Assess patient frequently for physical needs  -  Identify cognitive and physical deficits and behaviors that affect risk of falls    -  [place] fall precautions as indicated by assessment   - Educate patient/family on patient safety including physical limitations  - Instruct patient to call for assistance with activity based on assessment  - Modify environment to reduce risk of injury  - Consider OT/PT consult to assist with strengthening/mobility  Outcome: Progressing     Problem: Prexisting or High Potential for Compromised Skin Integrity  Goal: Skin integrity is maintained or improved  Description  INTERVENTIONS:  - Identify patients at risk for skin breakdown  - Assess and monitor skin integrity  - Assess and monitor nutrition and hydration status  - Monitor labs   - Assess for incontinence   - Turn and reposition patient  - Assist with mobility/ambulation  - Relieve pressure over bony prominences  - Avoid friction and shearing  - Provide appropriate hygiene as needed including keeping skin clean and dry  - Evaluate need for skin moisturizer/barrier cream  - Collaborate with interdisciplinary team   - Patient/family teaching  - Consider wound care consult   Outcome: Progressing     Problem: PAIN - ADULT  Goal: Verbalizes/displays adequate comfort level or baseline comfort level  Description  Interventions:  - Encourage patient to monitor pain and request assistance  - Assess pain using appropriate pain scale  - Administer analgesics based on type and severity of pain and evaluate response  - Implement non-pharmacological measures as appropriate and evaluate response  - Consider cultural and social influences on pain and pain management  - Notify physician/advanced practitioner if interventions unsuccessful or patient reports new pain  Outcome: Progressing     Problem: INFECTION - ADULT  Goal: Absence or prevention of progression during hospitalization  Description  INTERVENTIONS:  - Assess and monitor for signs and symptoms of infection  - Monitor lab/diagnostic results  - Monitor all insertion sites, i e  indwelling lines, tubes, and drains  - Monitor endotracheal if appropriate and nasal secretions for changes in amount and color  - [place] appropriate cooling/warming therapies per order  - Administer medications as ordered  - Instruct and encourage patient and family to use good hand hygiene technique  - Identify and instruct in appropriate isolation precautions for identified infection/condition  Outcome: Progressing  Goal: Absence of fever/infection during neutropenic period  Description  INTERVENTIONS:  - Monitor WBC    Outcome: Progressing     Problem: SAFETY ADULT  Goal: Patient will remain free of falls  Description  INTERVENTIONS:  - Assess patient frequently for physical needs  -  Identify cognitive and physical deficits and behaviors that affect risk of falls    -  [place] fall precautions as indicated by assessment   - Educate patient/family on patient safety including physical limitations  - Instruct patient to call for assistance with activity based on assessment  - Modify environment to reduce risk of injury  - Consider OT/PT consult to assist with strengthening/mobility  Outcome: Progressing  Goal: Maintain or return to baseline ADL function  Description  INTERVENTIONS:  -  Assess patient's ability to carry out ADLs; assess patient's baseline for ADL function and identify physical deficits which impact ability to perform ADLs (bathing, care of mouth/teeth, toileting, grooming, dressing, etc )  - Assess/evaluate cause of self-care deficits   - Assess range of motion  - Assess patient's mobility; develop plan if impaired  - Assess patient's need for assistive devices and provide as appropriate  - Encourage maximum independence but intervene and supervise when necessary  - Involve family in performance of ADLs  - Assess for home care needs following discharge   - Consider OT consult to assist with ADL evaluation and planning for discharge  - Provide patient education as appropriate  Outcome: Progressing  Goal: Maintain or return mobility status to optimal level  Description  INTERVENTIONS:  - Assess patient's baseline mobility status (ambulation, transfers, stairs, etc )    - Identify cognitive and physical deficits and behaviors that affect mobility  - Identify mobility aids required to assist with transfers and/or ambulation (gait belt, sit-to-stand, lift, walker, cane, etc )  - [place] fall precautions as indicated by assessment  - Record patient progress and toleration of activity level on Mobility SBAR; progress patient to next Phase/Stage  - Instruct patient to call for assistance with activity based on assessment  - Consider rehabilitation consult to assist with strengthening/weightbearing, etc   Outcome: Progressing     Problem: DISCHARGE PLANNING  Goal: Discharge to home or other facility with appropriate resources  Description  INTERVENTIONS:  - Identify barriers to discharge w/patient and caregiver  - Arrange for needed discharge resources and transportation as appropriate  - Identify discharge learning needs (meds, wound care, etc )  - Arrange for interpretive services to assist at discharge as needed  - Refer to Case Management Department for coordinating discharge planning if the patient needs post-hospital services based on physician/advanced practitioner order or complex needs related to functional status, cognitive ability, or social support system  Outcome: Progressing     Problem: Knowledge Deficit  Goal: Patient/family/caregiver demonstrates understanding of disease process, treatment plan, medications, and discharge instructions  Description  Complete learning assessment and assess knowledge base    Interventions:  - Provide teaching at level of understanding  - Provide teaching via preferred learning methods  Outcome: Progressing     Problem: RESPIRATORY - ADULT  Goal: Achieves optimal ventilation and oxygenation  Description  INTERVENTIONS:  - Assess for changes in respiratory status  - Assess for changes in mentation and behavior  - Position to facilitate oxygenation and minimize respiratory effort  - Oxygen administered by appropriate delivery if ordered  - Initiate smoking cessation education as indicated  - Encourage broncho-pulmonary hygiene including cough, deep breathe, Incentive Spirometry  - Assess the need for suctioning and aspirate as needed  - Assess and instruct to report SOB or any respiratory difficulty  - Respiratory Therapy support as indicated  Outcome: Progressing     Problem: Nutrition/Hydration-ADULT  Goal: Nutrient/Hydration intake appropriate for improving, restoring or maintaining nutritional needs  Description  Monitor and assess patient's nutrition/hydration status for malnutrition  Collaborate with interdisciplinary team and initiate plan and interventions as ordered  Monitor patient's weight and dietary intake as ordered or per policy  Utilize nutrition screening tool and intervene as necessary  Determine patient's food preferences and provide high-protein, high-caloric foods as appropriate       INTERVENTIONS:  - Monitor oral intake, urinary output, labs, and treatment plans  - Assess nutrition and hydration status and recommend course of action  - Evaluate amount of meals eaten  - Assist patient with eating if necessary   - Allow adequate time for meals  - Recommend/ encourage appropriate diets, oral nutritional supplements, and vitamin/mineral supplements  - Order, calculate, and assess calorie counts as needed  - Recommend, monitor, and adjust tube feedings and TPN/PPN based on assessed needs  - Assess need for intravenous fluids  - Provide specific nutrition/hydration education as appropriate  - Include patient/family/caregiver in decisions related to nutrition  Outcome: Progressing

## 2020-07-31 NOTE — ASSESSMENT & PLAN NOTE
· Blood pressure currently acceptable systolics in the 908M  · Continue home regimen of lisinopril 5 mg, carvedilol 3 125 mg BID, amlodipine 5 mg daily  · Hold lisinopril given NANCY  · Monitor kidney function closely  · Monitor with routine vitals

## 2020-07-31 NOTE — PLAN OF CARE
Problem: PHYSICAL THERAPY ADULT  Goal: Performs mobility at highest level of function for planned discharge setting  See evaluation for individualized goals  Description  Treatment/Interventions: Functional transfer training, LE strengthening/ROM, Therapeutic exercise, Endurance training, Patient/family training, Equipment eval/education, Bed mobility, Gait training, Compensatory technique education, Spoke to nursing  Equipment Recommended: (walker and wheelchair)       See flowsheet documentation for full assessment, interventions and recommendations  Outcome: Progressing  Note:   Prognosis: Good  Problem List: Decreased strength, Decreased endurance, Impaired balance, Decreased mobility, Impaired hearing, Impaired vision  Assessment: pt seen for ther ex and gait training, just finished OT sessoin recently and lunch just prior to sessoin  pt agreeabe to PT  pt performed ther ex ble 20 reps with rest break between sets  pt does desat to about 87% with seated ther ex, rebounding quickly  pt amb forward and back x3 using rw for 10' followed by rest  desats to 82%, recovers quickly with deep breathing  pt not reporting distress  pt can return to snf  Barriers to Discharge: None     PT Discharge Recommendation: Post-Acute Rehabilitation Services, Return to previous environment with social support(return to snf)     PT - OK to Discharge: Yes    See flowsheet documentation for full assessment

## 2020-07-31 NOTE — ASSESSMENT & PLAN NOTE
Lab Results   Component Value Date    HGBA1C 7 4 (H) 07/26/2020       Recent Labs     07/30/20  1657 07/30/20  2046 07/31/20  0158 07/31/20  0821   POCGLU 278* 437* 322* 133       Blood Sugar Average: Last 72 hrs:  (P) 212 [id number]     · Increase Lantus to 12 units and Humalog 5 units t i d

## 2020-07-31 NOTE — OCCUPATIONAL THERAPY NOTE
OccupationalTherapy Progress Note     Patient Name: Ania MGL'X Date: 7/31/2020  Problem List  Principal Problem:    Acute on chronic respiratory failure with hypoxia [facility])  Active Problems:    Essential hypertension    Acute on chronic systolic CHF (congestive heart failure) ([facility])    COPD with emphysema (H[facility])    CKD (chronic kidney disease) stage 4, GFR 15-29 ml/min ([facility])    Esophageal reflux    Type 2 diabetes mellitus with hyperglycemia ([facility])    Paroxysmal atrial fibrillation ([facility])    CAD (coronary artery disease), native coronary artery    UTI (urinary tract infection)    Iron deficiency anemia    Elevated troponin level not due myocardial infarction    Prolonged Q-T interval on ECG    V-tach [facility])    Acute renal failure (ARF) ([facility] [address] )    Dysphagia            07/31/20 1053   Restrictions/Precautions   Other Precautions Chair Alarm; Bed Alarm;Multiple lines;Telemetry; Fall Risk;O2   Pain Assessment   Pain Assessment Tool 0-10   Pain Score No Pain   ADL   Grooming Assistance   (SBA - standing at sinkside)   Grooming Deficit Verbal cueing;Supervision/safety; Increased time to complete;Wash/dry hands; Teeth care   Grooming Comments Pt standing at sinkside while completing hand hygine and oral care  Pt lightly brushing gums and rinsing mouth  Pt requring vc'ing for hand placement and safety   Toileting Assistance    (SBA)   Toileting Deficit Verbal cueing;Supervison/safety; Increased time to complete;Grab bar use;Clothing management up;Clothing management down;Perineal hygiene   Toileting Comments Pt completing toileting tasks with use of RW and grab bar for UB support PRN  Pt completing CM and pericare @ SBA   vc'ing for hand placement during toilet transfer   Functional Standing Tolerance   Time 3 minutes    Activity static standing at sinkside   Comments use of RW and sinkside for UB support PRN   Bed Mobility   Additional Comments Pt seated OOB at start and end of session   Transfers   Sit to Stand   (SBA) Additional items Verbal cues   Stand to Sit   (SBA)   Additional items Verbal cues   Stand pivot   (SBA)   Additional items Verbal cues   Additional Comments Pt completing functional transfers with use of RW @ SBA  Pt requiring vc'ing for hand placement and safety   Functional Mobility   Functional Mobility   (SBA)   Additional Comments Pt ambulating 20ft with use of RW  Additional items Rolling walker   Cognition   Overall Cognitive Status WFL   Arousal/Participation Alert; Responsive; Cooperative   Attention Within functional limits   Orientation Level Oriented X4   Memory Within functional limits   Following Commands Follows all commands and directions without difficulty   Activity Tolerance   Activity Tolerance Patient tolerated treatment well   Medical Staff Made Aware Spoke with RNMary Beth   Assessment   Assessment Pt seen for treatment session #2 this date  Pt alert and agreeable to participate at this time  Pt with increased performance this date compared to previous treatment session  Pt ambulating short distance with use of RW and completing functional transfers @ SBA  Pt continues to make progress towards goals, limited by decrease activity tolerance, decrease standing balance, decrease performance during ADL tasks, decrease safety awareness , decrease UB MS, decrease generalized strength, decrease activity engagement and decrease performance during functional transfers  Pt would benefit from continued acute OT services to address deficits as well as return to SNF with therapy services upon d/c from [address] [place] [address]  Upon end of session, Pt seated OOB in recliner chair with call bell in reach, chair alarm intact and all needs met at this time  Plan   Treatment Interventions ADL retraining;Functional transfer training;UE strengthening/ROM; Endurance training;Patient/family training;Equipment evaluation/education; Neuromuscular reeducation; Compensatory technique education;Continued evaluation; Energy conservation; Activityengagement   Goal Expiration Date 08/08/20   OT Treatment Day 2   OT Frequency 3-5x/wk   Recommendation   Recommendation   (return to SNF with therapy services)   OT Discharge Recommendation   (return to SNF with therapy services)     Pt goals to be met by 8/8/2020     1  Pt will demonstrate ability to complete supine<>sit @ Mod I in order to increase safety and independence during ADL tasks  2  Pt will demonstrate ability to complete LB dressing @ S in order to increase safety and independence during meaningful tasks  3  Pt will demonstrate ability to complete toileting tasks including CM and pericare @ S in order to increase safety and independence during meaningful tasks  4  Pt will demonstrate ability to complete EOB, chair, toilet/commode transfers @ S in order to increase performance and participation during functional tasks  5  Pt will demonstrate ability to stand for 4-5 minutes while maintaining G balance with use of RW for UB support PRN  6  Pt will demonstrate ability to tolerate 30-35 minute OT session with no vc'ing for deep breathing or use of energy conservation techniques in order to increase activity tolerance during functional tasks  7  Pt will demonstrate Good carryover of use of energy conservation/compensatory strategies during ADLs and functional tasks in order to increase safety and reduce risk for falls  8  Pt will demonstrate Good attention and participation in continued evaluation of functional ambulation house hold distances in order to assist with safe d/c planning  9  Pt will attend to continued cognitive assessments 100% of the time in order to provide most appropriate d/c recommendations  10  Pt will follow 100% simple 2-step commands and be A&O x4 consistently with environmental cues to increase participation in functional activities     11  Pt will identify 3 areas of interest/hobbies and 1 intervention on how to incorporate into daily life in order to increase interaction with environment and peers as well as increase participation in meaningful tasks     12  Pt will demonstrate 100% carryover of BUE HEP in order to increase BUE MS and increase performance during functional tasks upon d/c home      BE [place]/BECKY

## 2020-07-31 NOTE — ASSESSMENT & PLAN NOTE
Wt Readings from Last 3 Encounters:   07/31/20 69 2 kg (152 lb 8 9 oz)   07/03/20 64 kg (141 lb 1 5 oz)   06/22/20 68 9 kg (152 lb)     · HFrEF, 35-40%  · Adequately diuresed, switch to oral torsemide 40 mg in a m and 20 mg in p m    · Being treated for COPD exacerbation as well  · Note that patient has frequent admissions for CHF exacerbation

## 2020-07-31 NOTE — ASSESSMENT & PLAN NOTE
Acute renal failure most likely secondary to IV over-diuresis  Continue oral diuretic, continue to monitor renal function daily  Probably is a new baseline around 2 7

## 2020-07-31 NOTE — ASSESSMENT & PLAN NOTE
· Asymptomatic however does have known history of CAD s/p CABG   · Patient not on anti-platelet therapy, severe anemia receives iron infusions  · Follows outpatient with [facility] in [place]  · Continue statin and Eliquis  · Mild flat elevated troponin suspect secondary to CHF exacerbation  · Cardiology following; input appreciated

## 2020-07-31 NOTE — PLAN OF CARE
Problem: OCCUPATIONAL THERAPY ADULT  Goal: Performs self-care activities at highest level of function for planned discharge setting  See evaluation for individualized goals  Description  Treatment Interventions: ADL retraining, Functional transfer training, UE strengthening/ROM, Endurance training, Patient/family training, Equipment evaluation/education, Neuromuscular reeducation, Compensatory technique education, Continued evaluation, Energy conservation, Activityengagement          See flowsheet documentation for full assessment, interventions and recommendations  Outcome: Progressing  Note:   Limitation: Decreased ADL status, Decreased UE strength, Decreased Safe judgement during ADL, Decreased endurance, Decreased self-care trans, Decreased high-level ADLs  Prognosis: Good  Assessment: Pt seen for treatment session #2 this date  Pt alert and agreeable to participate at this time  Pt with increased performance this date compared to previous treatment session  Pt ambulating short distance with use of RW and completing functional transfers @ SBA  Pt continues to make progress towards goals, limited by decrease activity tolerance, decrease standing balance, decrease performance during ADL tasks, decrease safety awareness , decrease UB MS, decrease generalized strength, decrease activity engagement and decrease performance during functional transfers  Pt would benefit from continued acute OT services to address deficits as well as return to SNF with therapy services upon d/c from [address] [place] [address]  Upon end of session, Pt seated OOB in recliner chair with call bell in reach, chair alarm intact and all needs met at this time     Recommendation: (return to SNF with therapy services)  OT Discharge Recommendation: (return to SNF with therapy services)

## 2020-07-31 NOTE — ASSESSMENT & PLAN NOTE
Background: Patient admitted for acute on chronic respiratory failure w/ hypoxia after gaining approximately 9 lbs over prior week and developing peripheral edema and shortness of breath  · Patient uses supplemental oxygen 2 L via nasal cannula continuously as outpatient due to COPD and CHF  Initially required continuous BiPAP at time of admission    · Adequately diuresed  · Yves IV steroid  · Baseline oxygen need  · Markedly improved

## 2020-07-31 NOTE — CASE MANAGEMENT
Spoke to Kayli Ken today and she is feeling better  Plan is Barium Swallow  Renal continues to follow for NANCY- Creatinine is 2 75 mg/dL   Hypernatremic and is receiving D5  On continuous 02 at baseline  CM reviewed the DC IMM with Debbie WHITNEY plan is return to [employer] related to 2 liters of 02  Will continue to follow

## 2020-07-31 NOTE — NURSING NOTE
Per Dr Song Langston patient okay to not have IV if unable to obtain one  Able to obtain IV at this time

## 2020-07-31 NOTE — PROGRESS NOTES
Progress Note - Josey Barrera 1935, 82 y o  female MRN: [id number]    Unit/Bed#: -01 Encounter: [id number]    Primary Care Provider: Vanessa Quiroz MD   Date and time admitted to hospital: 7/25/2020  7:31 PM     * Acute on chronic respiratory failure with hypoxia [facility])  Assessment & Plan  Background: Patient admitted for acute on chronic respiratory failure w/ hypoxia after gaining approximately 9 lbs over prior week and developing peripheral edema and shortness of breath  · Patient uses supplemental oxygen 2 L via nasal cannula continuously as outpatient due to COPD and CHF  Initially required continuous BiPAP at time of admission  · Adequately diuresed  · Yves IV steroid  · Baseline oxygen need  · Markedly improved    Acute on chronic systolic CHF (congestive heart failure) (HCC)  Assessment & Plan  Wt Readings from Last 3 Encounters:   07/31/20 69 2 kg (152 lb 8 9 oz)   07/03/20 64 kg (141 lb 1 5 oz)   06/22/20 68 9 kg (152 lb)     · HFrEF, 35-40%  · Adequately diuresed, switch to oral torsemide 40 mg in a m and 20 mg in p m    · Being treated for COPD exacerbation as well  · Note that patient has frequent admissions for CHF exacerbation      CKD (chronic kidney disease) stage 4, GFR 15-29 ml/min ([facility])  Assessment & Plan  · Baseline creatinine 1 9-2 1    COPD with emphysema ([facility])  Assessment & Plan  · Continue bronchodilator and steroid  · Will start tapering steroid tomorrow    Dysphagia  Assessment & Plan  Dysphagia to solid foods  Follow barium swallow study    Acute renal failure (ARF) ([facility])  Assessment & Plan  Acute renal failure most likely secondary to IV over-diuresis  Continue oral diuretic, continue to monitor renal function daily  Probably is a new baseline around 2 7    Iron deficiency anemia  Assessment & Plan  · Hemoglobin appears stable approximately 8  · Last infusion 07/20/2020  · Continue ferrous gluconate  · Trend hemoglobin on an as-needed basis    UTI (urinary tract infection)  Assessment & Plan  · UTI and treated with IV ceftriaxone for 5 days    CAD (coronary artery disease), native coronary artery  Assessment & Plan  · Asymptomatic however does have known history of CAD s/p CABG   · Patient not on anti-platelet therapy, severe anemia receives iron infusions  · Follows outpatient with [facility] in [place]  · Continue statin and Eliquis  · Mild flat elevated troponin suspect secondary to CHF exacerbation  · Cardiology following; input appreciated    Paroxysmal atrial fibrillation ([address] )  Assessment & Plan  · Paroxysmal atrial fibrillation with elevated CHADS2 vascular score  · Continue Coreg and Eliquis for anticoagulation and rate control  · PPM  · Continue outpatient cardiology follow-up    Type 2 diabetes mellitus with hyperglycemia [facility])  Assessment & Plan  Lab Results   Component Value Date    HGBA1C 7 4 (H) 07/26/2020       Recent Labs     07/30/20  1657 07/30/20  2046 07/31/20  0158 07/31/20  0821   POCGLU 278* 437* 322* 133     Blood Sugar Average: Last 72 hrs:  (P) 212 [id number]     · Increase Lantus to 12 units and Humalog 5 units t i d     Essential hypertension  Assessment & Plan  · Blood pressure currently acceptable systolics in the 529K  · Continue home regimen of lisinopril 5 mg, carvedilol 3 125 mg BID, amlodipine 5 mg daily  · Hold lisinopril given NANCY  · Monitor kidney function closely  · Monitor with routine vitals    VTE Pharmacologic Prophylaxis:   Pharmacologic: Heparin    Patient Centered Rounds: I have performed bedside rounds with nursing staff today    Discussions with Specialists or Other Care Team Provider:     Education and Discussions with Family / Patient:     Current Length of Stay: 6 day(s)    Current Patient Status: Inpatient   Certification Statement: The patient will continue to require additional inpatient hospital stay due to Acute CHF, COPD    Discharge Plan:  Probably tomorrow    Code Status: Level 1 - Full Code      Subjective:   Patient feeling much better today  Barium swallow study this a m  Objective:     Vitals:   Temp (24hrs), Av 9 °F (36 6 °C), Min:97 4 °F (36 3 °C), Max:98 3 °F (36 8 °C)    Temp:  [97 4 °F (36 3 °C)-98 3 °F (36 8 °C)] 97 4 °F (36 3 °C)  HR:  [59-63] 59  Resp:  [18-45] 24  BP: (130-164)/(56-64) 164/62  SpO2:  [96 %-99 %] 97 %  Body mass index is 26 19 kg/m²  Input and Output Summary (last 24 hours): Intake/Output Summary (Last 24 hours) at 2020 0951  Last data filed at 2020 0500  Gross per 24 hour   Intake 222 ml   Output 1000 ml   Net -778 ml       Physical Exam:     General appearance: alert, appears stated age and cooperative  Head: Normocephalic, without obvious abnormality, atraumatic  Lungs: clear to auscultation bilaterally  Heart: regular rate and rhythm  Abdomen: soft, non-tender, positive bowel sounds   Back: negative  Extremities: extremities atraumatic, no cyanosis or edema  Neurologic: Alert and oriented X 3, normal strength and tone  Normal symmetric reflexes   Normal coordination and gait    Additional Data:     Labs:    Results from last 7 days   Lab Units 20  0536 20  0734   WBC Thousand/uL 3 25* 1 83*   HEMOGLOBIN g/dL 9 6* 9 3*   HEMATOCRIT % 30 4* 29 9*   PLATELETS Thousands/uL 159 159   BANDS PCT %  --  2   NEUTROS PCT % 65  --    LYMPHS PCT % 26  --    LYMPHO PCT %  --  40   MONOS PCT % 9  --    MONO PCT %  --  6   EOS PCT % 0 0     Results from last 7 days   Lab Units 20  0536  20  0513   SODIUM mmol/L 146*   < > 141   POTASSIUM mmol/L 4 3   < > 4 5   CHLORIDE mmol/L 105   < > 105   CO2 mmol/L 33*   < > 29   BUN mg/dL 93*   < > 61*   CREATININE mg/dL 2 75*   < > 2 59*   ANION GAP mmol/L 8   < > 7   CALCIUM mg/dL 7 9*   < > 8 3   ALBUMIN g/dL  --   --  2 5*   TOTAL BILIRUBIN mg/dL  --   --  0 12*   ALK PHOS U/L  --   --  90   ALT U/L  --   --  11*   AST U/L  --   --  6   GLUCOSE RANDOM mg/dL 228*   < > 254*    < > = values in this interval not displayed  Results from last 7 days   Lab Units 07/25/20 2023   INR  1 00     Results from last 7 days   Lab Units 07/31/20  0821 07/31/20  0158 07/30/20  2046 07/30/20  1657 07/30/20  1119 07/30/20  0716 07/29/20  2116 07/29/20  1546 07/29/20  1130 07/29/20  0747 07/28/20  2103 07/28/20  1558   POC GLUCOSE mg/dl 133 322* 437* 278* 317* 250* 331* 191* 129 70 187* 141*     Results from last 7 days   Lab Units 07/26/20  0348   HEMOGLOBIN A1C % 7 4*     Results from last 7 days   Lab Units 07/25/20 2121 07/25/20 2023   LACTIC ACID mmol/L  --  1 4   PROCALCITONIN ng/ml 0 06  --            * I Have Reviewed All Lab Data Listed Above  * Additional Pertinent Lab Tests Reviewed: Lauren 66 Admission Reviewed    Imaging:    Imaging Reports Reviewed Today Include: images reviewed    Recent Cultures (last 7 days):     Results from last 7 days   Lab Units 07/25/20 2115 07/25/20 2023 07/25/20 1959   BLOOD CULTURE   --  No Growth After 4 Days  No Growth After 4 Days     URINE CULTURE  >100,000 cfu/ml Enterobacter cloacae complex*  --   --        Last 24 Hours Medication List:     Current Facility-Administered Medications:  acetaminophen 650 mg Oral Q6H PRN Jeanette S Darrel, CRNP   acetylcysteine 3 mL Nebulization Q8H PRN Daiana Nix PA-C   albuterol 2 5 mg Nebulization Q4H PRN Jeanette S Darrel, CRNP   amLODIPine 5 mg Oral Daily Colen Ket, CRNP   apixaban 2 5 mg Oral BID Jeanette S Darrel, CRNP   carvedilol 6 25 mg Oral BID Starla Valdez PA-C   dextrose 50 mL/hr Intravenous Continuous Colen Ket, CRNP   docusate sodium 100 mg Oral BID Jeanette S Darrel, CRNP   ferrous gluconate 324 mg Oral Daily Before Breakfast Jeanette S Darrel, CRNP   fluticasone-vilanterol 1 puff Inhalation Daily Jeanette S Darrel, CRNP   insulin glargine 15 Units Subcutaneous HS Jeanette S Darrel, CRNP   insulin lispro 1-6 Units Subcutaneous TID KRISTIN Orozco   insulin lispro 1-6 Units Subcutaneous HS Jeanette S Darrel, CRNP   insulin lispro 8 Units Subcutaneous TID With Meals Jeanette S Darrel, CRNP   ipratropium 0 5 mg Nebulization TID Hans Diaz MD   levalbuterol 1 25 mg Nebulization TID MD Franklyn Goellazaro Dodson ON 8/1/2020] methylPREDNISolone sodium succinate 40 mg Intravenous Daily Letitia Mcgee MD   nitroglycerin 0 4 mg Sublingual Q5 Min PRN Jeanette S Darrel, CRNP   pantoprazole 40 mg Oral Early Morning Patricia Sine, KRISTIN   phenol 1 spray Mouth/Throat Q2H PRN Daiana Nix PA-C   pravastatin 80 mg Oral Daily With Marii Albarran S Darrel, CRNP   promethazine 25 mg Intramuscular Q6H PRN Daiana Nix PA-C   senna 2 tablet Oral HS Daiana Nix PA-C   torsemide 20 mg Oral After [employer]   torsemide 40 mg Oral After Breakfast [place], PA-C        Today, Patient Was Seen By: Letitia Mcgee MD    ** Please Note: Dictation voice to text software may have been used in the creation of this document   **

## 2020-08-01 ENCOUNTER — APPOINTMENT (INPATIENT)
Dept: RADIOLOGY | Facility: HOSPITAL | Age: 85
DRG: 291 | End: 2020-08-01
Payer: MEDICARE

## 2020-08-01 LAB
ANION GAP SERPL CALCULATED.3IONS-SCNC: 4 MMOL/L (ref 4–13)
BUN SERPL-MCNC: 98 MG/DL (ref 5–25)
CALCIUM SERPL-MCNC: 7.5 MG/DL (ref 8.3–10.1)
CHLORIDE SERPL-SCNC: 105 MMOL/L (ref 100–108)
CO2 SERPL-SCNC: 35 MMOL/L (ref 21–32)
CREAT SERPL-MCNC: 2.66 MG/DL (ref 0.6–1.3)
GFR SERPL CREATININE-BSD FRML MDRD: 16 ML/MIN/1.73SQ M
GLUCOSE SERPL-MCNC: 108 MG/DL (ref 65–140)
GLUCOSE SERPL-MCNC: 112 MG/DL (ref 65–140)
GLUCOSE SERPL-MCNC: 182 MG/DL (ref 65–140)
GLUCOSE SERPL-MCNC: 208 MG/DL (ref 65–140)
GLUCOSE SERPL-MCNC: 59 MG/DL (ref 65–140)
GLUCOSE SERPL-MCNC: 96 MG/DL (ref 65–140)
POTASSIUM SERPL-SCNC: 4.8 MMOL/L (ref 3.5–5.3)
SODIUM SERPL-SCNC: 144 MMOL/L (ref 136–145)

## 2020-08-01 PROCEDURE — 99233 SBSQ HOSP IP/OBS HIGH 50: CPT | Performed by: INTERNAL MEDICINE

## 2020-08-01 PROCEDURE — 71045 X-RAY EXAM CHEST 1 VIEW: CPT

## 2020-08-01 PROCEDURE — 80048 BASIC METABOLIC PNL TOTAL CA: CPT | Performed by: INTERNAL MEDICINE

## 2020-08-01 PROCEDURE — 94760 N-INVAS EAR/PLS OXIMETRY 1: CPT

## 2020-08-01 PROCEDURE — 82948 REAGENT STRIP/BLOOD GLUCOSE: CPT

## 2020-08-01 PROCEDURE — 94640 AIRWAY INHALATION TREATMENT: CPT

## 2020-08-01 RX ORDER — LEVALBUTEROL 1.25 MG/.5ML
1.25 SOLUTION, CONCENTRATE RESPIRATORY (INHALATION)
Status: DISCONTINUED | OUTPATIENT
Start: 2020-08-01 | End: 2020-08-03

## 2020-08-01 RX ORDER — LEVALBUTEROL 1.25 MG/.5ML
1.25 SOLUTION, CONCENTRATE RESPIRATORY (INHALATION) EVERY 6 HOURS
Status: DISCONTINUED | OUTPATIENT
Start: 2020-08-01 | End: 2020-08-01

## 2020-08-01 RX ORDER — INSULIN GLARGINE 100 [IU]/ML
10 INJECTION, SOLUTION SUBCUTANEOUS
Status: DISCONTINUED | OUTPATIENT
Start: 2020-08-01 | End: 2020-08-03

## 2020-08-01 RX ORDER — TORSEMIDE 20 MG/1
20 TABLET ORAL
Status: DISCONTINUED | OUTPATIENT
Start: 2020-08-02 | End: 2020-08-03

## 2020-08-01 RX ORDER — ONDANSETRON 2 MG/ML
4 INJECTION INTRAMUSCULAR; INTRAVENOUS EVERY 6 HOURS PRN
Status: DISCONTINUED | OUTPATIENT
Start: 2020-08-01 | End: 2020-08-05 | Stop reason: HOSPADM

## 2020-08-01 RX ORDER — BENZONATATE 100 MG/1
100 CAPSULE ORAL 3 TIMES DAILY PRN
Status: DISCONTINUED | OUTPATIENT
Start: 2020-08-01 | End: 2020-08-03

## 2020-08-01 RX ADMIN — CARVEDILOL 6.25 MG: 6.25 TABLET, FILM COATED ORAL at 17:00

## 2020-08-01 RX ADMIN — DOCUSATE SODIUM 100 MG: 100 CAPSULE, LIQUID FILLED ORAL at 17:00

## 2020-08-01 RX ADMIN — METHYLPREDNISOLONE SODIUM SUCCINATE 40 MG: 40 INJECTION, POWDER, FOR SOLUTION INTRAMUSCULAR; INTRAVENOUS at 08:25

## 2020-08-01 RX ADMIN — IPRATROPIUM BROMIDE 0.5 MG: 0.5 SOLUTION RESPIRATORY (INHALATION) at 20:39

## 2020-08-01 RX ADMIN — IPRATROPIUM BROMIDE 0.5 MG: 0.5 SOLUTION RESPIRATORY (INHALATION) at 13:11

## 2020-08-01 RX ADMIN — FLUTICASONE FUROATE AND VILANTEROL TRIFENATATE 1 PUFF: 100; 25 POWDER RESPIRATORY (INHALATION) at 08:25

## 2020-08-01 RX ADMIN — ACETAMINOPHEN 650 MG: 325 TABLET ORAL at 10:16

## 2020-08-01 RX ADMIN — CARVEDILOL 6.25 MG: 6.25 TABLET, FILM COATED ORAL at 08:25

## 2020-08-01 RX ADMIN — BENZONATATE 100 MG: 100 CAPSULE ORAL at 14:40

## 2020-08-01 RX ADMIN — PROMETHAZINE HYDROCHLORIDE 25 MG: 25 INJECTION INTRAMUSCULAR; INTRAVENOUS at 08:39

## 2020-08-01 RX ADMIN — FERROUS GLUCONATE 324 MG: 324 TABLET ORAL at 08:29

## 2020-08-01 RX ADMIN — IPRATROPIUM BROMIDE 0.5 MG: 0.5 SOLUTION RESPIRATORY (INHALATION) at 08:18

## 2020-08-01 RX ADMIN — INSULIN LISPRO 2 UNITS: 100 INJECTION, SOLUTION INTRAVENOUS; SUBCUTANEOUS at 21:36

## 2020-08-01 RX ADMIN — APIXABAN 2.5 MG: 2.5 TABLET, FILM COATED ORAL at 17:00

## 2020-08-01 RX ADMIN — TORSEMIDE 40 MG: 20 TABLET ORAL at 08:25

## 2020-08-01 RX ADMIN — APIXABAN 2.5 MG: 2.5 TABLET, FILM COATED ORAL at 08:25

## 2020-08-01 RX ADMIN — LEVALBUTEROL HYDROCHLORIDE 1.25 MG: 1.25 SOLUTION, CONCENTRATE RESPIRATORY (INHALATION) at 20:39

## 2020-08-01 RX ADMIN — TORSEMIDE 20 MG: 20 TABLET ORAL at 14:39

## 2020-08-01 RX ADMIN — LEVALBUTEROL HYDROCHLORIDE 1.25 MG: 1.25 SOLUTION, CONCENTRATE RESPIRATORY (INHALATION) at 08:18

## 2020-08-01 RX ADMIN — PANTOPRAZOLE SODIUM 20 MG: 20 TABLET, DELAYED RELEASE ORAL at 05:32

## 2020-08-01 RX ADMIN — PRAVASTATIN SODIUM 80 MG: 80 TABLET ORAL at 16:59

## 2020-08-01 RX ADMIN — AMLODIPINE BESYLATE 5 MG: 5 TABLET ORAL at 08:25

## 2020-08-01 RX ADMIN — INSULIN GLARGINE 10 UNITS: 100 INJECTION, SOLUTION SUBCUTANEOUS at 21:37

## 2020-08-01 RX ADMIN — LEVALBUTEROL HYDROCHLORIDE 1.25 MG: 1.25 SOLUTION, CONCENTRATE RESPIRATORY (INHALATION) at 13:11

## 2020-08-01 RX ADMIN — SENNOSIDES 17.2 MG: 8.6 TABLET ORAL at 21:36

## 2020-08-01 NOTE — ASSESSMENT & PLAN NOTE
Lab Results   Component Value Date    HGBA1C 7 4 (H) 07/26/2020       Recent Labs     07/31/20  2126 08/01/20  0633 08/01/20  0821 08/01/20  1157   POCGLU 386* 59* 182* 96       Blood Sugar Average: Last 72 hrs:  (P) 234 1875     · Increase Lantus to 12 units and Humalog 5 units t i d

## 2020-08-01 NOTE — PROGRESS NOTES
Progress Note - Nephrology   aKyla Atkins 82 y o  female MRN: [id number]  Unit/Bed#: -01 Encounter: [id number]    A/P:  1  Acute kidney injury present on admission  Creatinine on admission was 2 43 which has trended upward  Labs are pending today  Nonoliguric: 925/1700 (-5742 since admission) - has a fowler catheter  She is receiving oral torsemide bid  Await labs from today  2  Chronic kidney disease stage 4  Baseline creatinine 1 6-2 0 mg/dl  3  Acute on chronic systolic heart failure  She was diuresed with IV diuretic therapy  And has diuresed 5 7 L  She has switched to oral torsemide  She has clear lungs and her chest x-ray looks improved on my reading today  Will decrease torsemide to 20 mg p o  B i d   4  Cough  Will start Tessalon Perles due to distress and vomiting     5  Hypernatremia and alkalosis  Improved yesterday        Follow up reason for today's visit:     Acute on chronic respiratory failure with hypoxia [facility])    Patient Active Problem List   Diagnosis    Closed fracture of pelvis with routine healing    Essential hypertension    Acute on chronic systolic CHF (congestive heart failure) (HCC)    Other hyperlipidemia    Bruising    Thrombocytopenia [facility]    Atrophy of left kidney    Renal calculi    COPD with emphysema ([facility]    Carotid bruit    CKD (chronic kidney disease) stage 4, GFR 15-29 ml/min ([facility])    Esophageal reflux    Hiatal hernia    Intermittent claudication ([facility])    Type 2 diabetes mellitus with hyperglycemia ([facility] )    BMI 26 0-26 9,adult    Peripheral circulatory disorder associated with type 2 diabetes mellitus ([facility])    Type 2 diabetes mellitus with stage 3 chronic kidney disease, with long-term current use of insulin ([facility])    Paroxysmal atrial fibrillation ([address] )    CAD (coronary artery disease), native coronary artery    Generalized OA    Atypical angina ([address] )    Hx of CABG    Pacemaker    HFrEF (heart failure with reduced ejection fraction) ([facility] )  Acute on chronic combined systolic and diastolic CHF (congestive heart failure) (HCC)    Acute on chronic respiratory failure with hypoxia (HCC)    COPD (chronic obstructive pulmonary disease) ([facility])    UTI (urinary tract infection)    Anemia    NANCY (acute kidney injury) ([facility])    Iron deficiency anemia    Elevated troponin level not due myocardial infarction    Prolonged Q-T interval on ECG    V-tach (HCC)    Acute renal failure (ARF) ([facility])    Dysphagia         Subjective:   No chest pain  Says breathing is better  Complains of a cough with productive white sputum that made her nauseated  She vomited her breakfast after coughing  Min abdominal pain  Efren fowler    Objective:     Vitals: Blood pressure 113/77, pulse 76, temperature 98 6 °F (37 °C), temperature source Oral, resp  rate 20, height 5' 4" (1 626 m), weight 69 2 kg (152 lb 8 9 oz), SpO2 93 %  ,Body mass index is 26 19 kg/m²  Weight (last 2 days)     Date/Time   Weight    07/31/20 0541   69 2 (152 56)    07/30/20 1014   67 6 (149 03)                Intake/Output Summary (Last 24 hours) at 8/1/2020 1406  Last data filed at 8/1/2020 1358  Gross per 24 hour   Intake 925 ml   Output 1750 ml   Net -825 ml     I/O last 3 completed shifts: In: 925 8 [P O :480; I V :445 8]  Out: 2250 [Urine:2250]    Urethral Catheter Latex; Temperature probe 16 Fr   (Active)   Amt returned on insertion(mL) 420 mL 7/25/2020  9:18 PM   Reasons to continue Urinary Catheter  Accurate I&O assessment in critically ill patients (48 hr  max) 8/1/2020  8:01 AM   Goal for Removal Remove after 48 hrs of I/O monitoring 8/1/2020  8:01 AM   Site Assessment Clean;Skin intact 8/1/2020  8:01 AM   Collection Container Standard drainage bag 8/1/2020  8:01 AM   Securement Method Securing device (Describe) 8/1/2020  8:01 AM   Output (mL) 250 mL 8/1/2020  1:58 PM       Physical Exam: /77 (BP Location: Left arm)   Pulse 76   Temp 98 6 °F (37 °C) (Oral)   Resp 20   Ht 5' 4" (1 626 m)   Wt 69 2 kg (152 lb 8 9 oz)   LMP  (LMP Unknown)   SpO2 93%   BMI 26 19 kg/m²     General Appearance:    Alert, cooperative, has distress due to cough, appears stated age   Head:    Normocephalic, without obvious abnormality, atraumatic   Eyes:    Conjunctiva/corneas clear   Ears:    Normal external ears   Nose:   Nares normal, septum midline, mucosa normal, no drainage    or sinus tenderness   Throat:   Lips, mucosa, and tongue normal; teeth and gums normal   Neck:   Supple, symmetrical, trachea midline, no adenopathy;        thyroid:  No enlargement/tenderness/nodules; no carotid    bruit or JVD   Back:     Symmetric, no curvature, ROM normal, no CVA tenderness   Lungs:      Bronchial BS clear with cough Clear to auscultation bilaterally, respirations unlabored   Chest wall:    No tenderness or deformity   Heart:    Regular rate and rhythm, S1 and S2 normal, no murmur, rub   or gallop   Abdomen:     Soft, non-tender, bowel sounds active   Extremities:   Extremities normal, atraumatic, no cyanosis or edema   Skin:   Skin color, texture, turgor normal, no rashes or lesions   Lymph nodes:   Cervical normal   Neurologic:   CNII-XII intact            Lab, Imaging and other studies: I have personally reviewed pertinent labs  CBC: No results found for: WBC, HGB, HCT, MCV, PLT, ADJUSTEDWBC, MCH, MCHC, RDW, MPV, NRBC  CMP: No results found for: NA, K, CL, CO2, ANIONGAP, BUN, CREATININE, GLUCOSE, CALCIUM, AST, ALT, ALKPHOS, PROT, BILITOT, EGFR        Results from last 7 days   Lab Units 07/31/20  0536 07/30/20  0614 07/29/20  0357  07/27/20  0513  07/25/20 1959   POTASSIUM mmol/L 4 3 4 6 4 1   < > 4 5   < > 5 1   CHLORIDE mmol/L 105 103 105   < > 105   < > 104   CO2 mmol/L 33* 32 32   < > 29   < > 26   BUN mg/dL 93* 74* 68*   < > 61*   < > 37*   CREATININE mg/dL 2 75* 2 83* 2 85*   < > 2 59*   < > 2 43*   CALCIUM mg/dL 7 9* 7 8* 8 1*   < > 8 3   < > 8 6   ALK PHOS U/L  --   --   --   --  90  --  113   ALT U/L  -- --   --   --  11*  --  13   AST U/L  --   --   --   --  6  --  13    < > = values in this interval not displayed  Phosphorus: No results found for: PHOS  Magnesium: No results found for: MG  Urinalysis: No results found for: Maramec Dukes, SPECGRAV, PHUR, LEUKOCYTESUR, NITRITE, PROTEINUA, GLUCOSEU, KETONESU, BILIRUBINUR, BLOODU  Ionized Calcium: No results found for: CAION  Coagulation: No results found for: PT, INR, APTT  Troponin: No results found for: TROPONINI  ABG: No results found for: PHART, KTT4AKA, PO2ART, NVK2SGE, K1FSILYI, BEART, SOURCE  Radiology review:     IMAGING  Procedure: Fl Barium Swallow    Result Date: 7/31/2020  Narrative: BARIUM SWALLOW-ESOPHAGRAM INDICATION:   dysphagia, feeling of stuck at the lower neck  COMPARISON:  None IMAGES:  34, including cine clips  FLUOROSCOPY TIME:   1 3 MINUTES  TECHNIQUE: Limited barium esophagram was attempted following the per oral administration of thin barium, with the patient lying in LPO position  The patient was unable to stand for proper barium swallow examination  FINDINGS: The examination is markedly limited as the patient was unable to drink enough volumes of thin barium for adequate assessment of the esophagus  There was marked delay in oropharyngeal phase of swallowing  Within limitations of the study, no gross mass is seen in the esophagus  The  images demonstrate left chest wall pacemaker, mediastinal clips and median sternotomy  Degenerative changes are noted in the spine  The barium swallow     Impression: 1  Markedly limited barium esophagram as the patient was unable to drink enough volumes of thin barium for adequate assessment of the esophagus  Patient was unable to stand and the study was performed while lying in LPO position  2   Within limitations of the study, no gross mass is seen in the esophagus  There was delay in oropharyngeal phase of swallowing   Workstation performed: [id number]       Current Facility-Administered Medications   Medication Dose Route Frequency    acetaminophen (TYLENOL) tablet 650 mg  650 mg Oral Q6H PRN    acetylcysteine (MUCOMYST) 200 mg/mL inhalation solution 600 mg  3 mL Nebulization Q8H PRN    albuterol inhalation solution 2 5 mg  2 5 mg Nebulization Q4H PRN    amLODIPine (NORVASC) tablet 5 mg  5 mg Oral Daily    apixaban (ELIQUIS) tablet 2 5 mg  2 5 mg Oral BID    carvedilol (COREG) tablet 6 25 mg  6 25 mg Oral BID    docusate sodium (COLACE) capsule 100 mg  100 mg Oral BID    ferrous gluconate (FERGON) tablet 324 mg  324 mg Oral Daily Before Breakfast    fluticasone-vilanterol (BREO ELLIPTA) 100-25 mcg/inh inhaler 1 puff  1 puff Inhalation Daily    insulin glargine (LANTUS) subcutaneous injection 10 Units 0 1 mL  10 Units Subcutaneous HS    insulin lispro (HumaLOG) 100 units/mL subcutaneous injection 1-6 Units  1-6 Units Subcutaneous TID AC    insulin lispro (HumaLOG) 100 units/mL subcutaneous injection 1-6 Units  1-6 Units Subcutaneous HS    ipratropium (ATROVENT) 0 02 % inhalation solution 0 5 mg  0 5 mg Nebulization Q6H    levalbuterol (XOPENEX) inhalation solution 1 25 mg  1 25 mg Nebulization Q6H    methylPREDNISolone sodium succinate (Solu-MEDROL) injection 40 mg  40 mg Intravenous Daily    nitroglycerin (NITROSTAT) SL tablet 0 4 mg  0 4 mg Sublingual Q5 Min PRN    ondansetron (ZOFRAN) injection 4 mg  4 mg Intravenous Q6H PRN    pantoprazole (PROTONIX) EC tablet 20 mg  20 mg Oral Early Morning    phenol (CHLORASEPTIC) 1 4 % mucosal liquid 1 spray  1 spray Mouth/Throat Q2H PRN    pravastatin (PRAVACHOL) tablet 80 mg  80 mg Oral Daily With Dinner    promethazine (PHENERGAN) injection 25 mg  25 mg Intramuscular Q6H PRN    senna (SENOKOT) tablet 17 2 mg  2 tablet Oral HS    torsemide (DEMADEX) tablet 20 mg  20 mg Oral After Lunch    torsemide (DEMADEX) tablet 40 mg  40 mg Oral After Breakfast     Medications Discontinued During This Encounter   Medication Reason    albuterol inhalation solution 2 5 mg     ipratropium-albuterol (DUO-NEB) 0 5-2 5 mg/3 mL inhalation solution 3 mL     insulin glargine (LANTUS) subcutaneous injection 8 Units 0 08 mL     calcium carbonate (TUMS) chewable tablet 500 mg     furosemide (LASIX) injection 40 mg     furosemide (LASIX) injection 40 mg     ipratropium-albuterol (DUO-NEB) 0 5-2 5 mg/3 mL inhalation solution 3 mL     pantoprazole (PROTONIX) EC tablet 40 mg     insulin glargine (LANTUS) subcutaneous injection 10 Units 0 1 mL     furosemide (LASIX) injection 40 mg     insulin glargine (LANTUS) subcutaneous injection 15 Units 0 15 mL     furosemide (LASIX) injection 80 mg     carvedilol (COREG) tablet 3 125 mg     lisinopril (ZESTRIL) tablet 2 5 mg     insulin glargine (LANTUS) subcutaneous injection 12 Units 0 12 mL     insulin glargine (LANTUS) subcutaneous injection 10 Units 0 1 mL     amLODIPine (NORVASC) tablet 5 mg     aluminum-magnesium hydroxide-simethicone (MYLANTA) 200-200-20 mg/5 mL oral suspension 30 mL     pantoprazole (PROTONIX) EC tablet 80 mg     insulin glargine (LANTUS) subcutaneous injection 12 Units 0 12 mL     insulin lispro (HumaLOG) 100 units/mL subcutaneous injection 5 Units     insulin glargine (LANTUS) subcutaneous injection 18 Units 0 18 mL     insulin glargine (LANTUS) subcutaneous injection 21 Units 0 21 mL     methylPREDNISolone sodium succinate (Solu-MEDROL) injection 40 mg     methylPREDNISolone sodium succinate (Solu-MEDROL) injection 40 mg     pantoprazole (PROTONIX) EC tablet 40 mg     ipratropium (ATROVENT) 0 02 % inhalation solution 0 5 mg     levalbuterol (XOPENEX) inhalation solution 1 25 mg     ipratropium (ATROVENT) 0 02 % inhalation solution 0 5 mg     levalbuterol (XOPENEX) inhalation solution 1 25 mg     insulin lispro (HumaLOG) 100 units/mL subcutaneous injection 8 Units     insulin glargine (LANTUS) subcutaneous injection 15 Units 0 15 mL        Isa Matter Maru Herrera MD      This progress note was produced in part using a dictation device which may document imprecise wording from author's original intent

## 2020-08-01 NOTE — SOCIAL WORK
Current LOS 7 days  CM completed chart review  Pt case reviewed with attending Dr Adán Engel  Pt being treated for Respiratory Failure  & CHF  Pt has been transitioned from IV to PO diuretic yesterday  Tapering steroids  D/C plan remains the same  D/C back to [facility] once medically stable and once they are able to accept readmissions  CM will continue to follow medical course and assist with d/c planning as needed

## 2020-08-01 NOTE — ASSESSMENT & PLAN NOTE
Wt Readings from Last 3 Encounters:   07/31/20 69 2 kg (152 lb 8 9 oz)   07/03/20 64 kg (141 lb 1 5 oz)   06/22/20 68 9 kg (152 lb)     · HFrEF, 35-40%  · Adequately diuresed, switch to oral torsemide 40 mg in a m and 20 mg in p m    · Being treated for COPD exacerbation as well  · Note that patient has frequent admissions for CHF exacerbation  · Repeat chest x-ray today stable

## 2020-08-01 NOTE — PROGRESS NOTES
Progress Note - Monet Cespedes 1935, 82 y o  female MRN: [id number]    Unit/Bed#: -01 Encounter: [id number]    Primary Care Provider: Ramin Olivo MD   Date and time admitted to hospital: 7/25/2020  7:31 PM    * Acute on chronic respiratory failure with hypoxia [facility])  Assessment & Plan  Background: Patient admitted for acute on chronic respiratory failure w/ hypoxia after gaining approximately 9 lbs over prior week and developing peripheral edema and shortness of breath  · Patient uses supplemental oxygen 2 L via nasal cannula continuously as outpatient due to COPD and CHF  Initially required continuous BiPAP at time of admission  · Adequately diuresed  · Yves IV steroid  · Baseline oxygen need  · Markedly improved    Acute on chronic systolic CHF (congestive heart failure) ([facility])  Assessment & Plan  Wt Readings from Last 3 Encounters:   07/31/20 69 2 kg (152 lb 8 9 oz)   07/03/20 64 kg (141 lb 1 5 oz)   06/22/20 68 9 kg (152 lb)     · HFrEF, 35-40%  · Adequately diuresed, switch to oral torsemide 40 mg in a m and 20 mg in p m    · Being treated for COPD exacerbation as well  · Note that patient has frequent admissions for CHF exacerbation  · Repeat chest x-ray today stable    CKD (chronic kidney disease) stage 4, GFR 15-29 ml/min ([facility])  Assessment & Plan  · Baseline creatinine 1 9-2 1    COPD with emphysema ([facility])  Assessment & Plan  · Continue bronchodilator and steroid  · Will start tapering steroid tomorrow    Dysphagia  Assessment & Plan  Dysphagia to solid foods  Barium swallow, suboptimal study but no obvious obstruction    Acute renal failure (ARF) ([facility])  Assessment & Plan  Acute renal failure most likely secondary to IV over-diuresis  Continue oral diuretic, continue to monitor renal function daily  Probably is a new baseline around 2 7    Elevated troponin level not due myocardial infarction  Assessment & Plan  · Non-MI troponin elevation due to CHF  · Slightly elevated troponin flat and stable on serial labs  · Cardiology following; input appreciated    Iron deficiency anemia  Assessment & Plan  · Hemoglobin appears stable approximately 8  · Last infusion 07/20/2020  · Continue ferrous gluconate  · Trend hemoglobin on an as-needed basis    UTI (urinary tract infection)  Assessment & Plan  · UTI and treated with IV ceftriaxone for 5 days    CAD (coronary artery disease), native coronary artery  Assessment & Plan  · Asymptomatic however does have known history of CAD s/p CABG   · Patient not on anti-platelet therapy, severe anemia receives iron infusions  · Follows outpatient with [facility] in [place]  · Continue statin and Eliquis  · Mild flat elevated troponin suspect secondary to CHF exacerbation  · Cardiology following; input appreciated    Type 2 diabetes mellitus with hyperglycemia [facility])  Assessment & Plan  Lab Results   Component Value Date    HGBA1C 7 4 (H) 07/26/2020     Recent Labs     07/31/20  2126 08/01/20  0633 08/01/20  0821 08/01/20  1157   POCGLU 386* 59* 182* 96     Blood Sugar Average: Last 72 hrs:  (P) 234 1875     · Increase Lantus to 12 units and Humalog 5 units t i d     Esophageal reflux  Assessment & Plan  · Patient experiencing severe reflux symptoms 7/27  · Managed home on Protonix 40 mg daily   · Increased dose for a few days  · Supportive care  · Continue PPI  · If symptom continues may consider EGD on Monday    Essential hypertension  Assessment & Plan  · Blood pressure currently acceptable systolics in the 265D  · Continue home regimen of lisinopril 5 mg, carvedilol 3 125 mg BID, amlodipine 5 mg daily  · Hold lisinopril given NANCY  · Monitor kidney function closely  · Monitor with routine vitals    VTE Pharmacologic Prophylaxis:   Pharmacologic: Heparin    Patient Centered Rounds: I have performed bedside rounds with nursing staff today    Discussions with Specialists or Other Care Team Provider:     Education and Discussions with Family / Patient: Current Length of Stay: 7 day(s)    Current Patient Status: Inpatient   Certification Statement: The patient will continue to require additional inpatient hospital stay due to Shortness of breath    Discharge Plan:  Probably tomorrow    Code Status: Level 1 - Full Code      Subjective:   Patient complaining of persistent nausea and infrequent vomiting  Objective:     Vitals:   Temp (24hrs), Av 1 °F (36 7 °C), Min:97 6 °F (36 4 °C), Max:98 6 °F (37 °C)    Temp:  [97 6 °F (36 4 °C)-98 6 °F (37 °C)] 98 6 °F (37 °C)  HR:  [64-83] 76  Resp:  [20-34] 20  BP: (113-148)/(62-77) 113/77  SpO2:  [91 %-96 %] 93 %  Body mass index is 26 19 kg/m²  Input and Output Summary (last 24 hours): Intake/Output Summary (Last 24 hours) at 2020 1333  Last data filed at 2020 0801  Gross per 24 hour   Intake 685 ml   Output 1500 ml   Net -815 ml       Physical Exam:     General appearance: alert, appears stated age and cooperative  Head: Normocephalic, without obvious abnormality, atraumatic  Lungs: clear to auscultation bilaterally  Heart: regular rate and rhythm  Abdomen: soft, non-tender, positive bowel sounds   Back: negative  Extremities: extremities atraumatic, no cyanosis or edema  Neurologic: Alert and oriented X 3, normal strength and tone  Normal symmetric reflexes   Normal coordination and gait    Additional Data:     Labs:    Results from last 7 days   Lab Units 20  0536 20  0734   WBC Thousand/uL 3 25* 1 83*   HEMOGLOBIN g/dL 9 6* 9 3*   HEMATOCRIT % 30 4* 29 9*   PLATELETS Thousands/uL 159 159   BANDS PCT %  --  2   NEUTROS PCT % 65  --    LYMPHS PCT % 26  --    LYMPHO PCT %  --  40   MONOS PCT % 9  --    MONO PCT %  --  6   EOS PCT % 0 0     Results from last 7 days   Lab Units 20  0536  20  0513   SODIUM mmol/L 146*   < > 141   POTASSIUM mmol/L 4 3   < > 4 5   CHLORIDE mmol/L 105   < > 105   CO2 mmol/L 33*   < > 29   BUN mg/dL 93*   < > 61*   CREATININE mg/dL 2 75*   < > 2 59*   ANION GAP mmol/L 8   < > 7   CALCIUM mg/dL 7 9*   < > 8 3   ALBUMIN g/dL  --   --  2 5*   TOTAL BILIRUBIN mg/dL  --   --  0 12*   ALK PHOS U/L  --   --  90   ALT U/L  --   --  11*   AST U/L  --   --  6   GLUCOSE RANDOM mg/dL 228*   < > 254*    < > = values in this interval not displayed  Results from last 7 days   Lab Units 07/25/20 2023   INR  1 00     Results from last 7 days   Lab Units 08/01/20  1157 08/01/20  0821 08/01/20  7574 07/31/20  2126 07/31/20  1609 07/31/20  1103 07/31/20  0821 07/31/20  0158 07/30/20  2046 07/30/20  1657 07/30/20  1119 07/30/20  0716   POC GLUCOSE mg/dl 96 182* 59* 386* 423* 143* 133 322* 437* 278* 317* 250*     Results from last 7 days   Lab Units 07/26/20  0348   HEMOGLOBIN A1C % 7 4*     Results from last 7 days   Lab Units 07/25/20 2121 07/25/20 2023   LACTIC ACID mmol/L  --  1 4   PROCALCITONIN ng/ml 0 06  --            * I Have Reviewed All Lab Data Listed Above  * Additional Pertinent Lab Tests Reviewed: Lauren 66 Admission Reviewed    Imaging:    Imaging Reports Reviewed Today Include: images reviewed    Recent Cultures (last 7 days):     Results from last 7 days   Lab Units 07/25/20 2115 07/25/20 2023 07/25/20 1959   BLOOD CULTURE   --  No Growth After 5 Days  No Growth After 5 Days     URINE CULTURE  >100,000 cfu/ml Enterobacter cloacae complex*  --   --        Last 24 Hours Medication List:     Current Facility-Administered Medications:  acetaminophen 650 mg Oral Q6H PRN Jeanette S Darrel, CRROSEMARIE   acetylcysteine 3 mL Nebulization Q8H PRN Daiana Nix PA-C   albuterol 2 5 mg Nebulization Q4H PRN Jeanette S Darrel, KRISTIN   amLODIPine 5 mg Oral Daily KRISTIN Young   apixaban 2 5 mg Oral BID Jeanette S Darrel, KRISTIN   carvedilol 6 25 mg Oral BID EDMUND ChavezC   docusate sodium 100 mg Oral BID KRISTIN Lala   ferrous gluconate 324 mg Oral Daily Before Breakfast KRISTIN Lala   fluticasone-vilanterol 1 puff Inhalation Daily Jeanette S Darrel, CRNP   insulin glargine 10 Units Subcutaneous HS Ger Pino MD   insulin lispro 1-6 Units Subcutaneous TID AC Jeanette S Darrel, CRNP   insulin lispro 1-6 Units Subcutaneous HS Jeanette S Darrel, CRNP   ipratropium 0 5 mg Nebulization Q6H Ger Pino MD   levalbuterol 1 25 mg Nebulization Q6H Ger Pino MD   methylPREDNISolone sodium succinate 40 mg Intravenous Daily Ger Pino MD   nitroglycerin 0 4 mg Sublingual Q5 Min PRN Jeanette S Darrel, CRNP   ondansetron 4 mg Intravenous Q6H PRN Ger Pino MD   pantoprazole 20 mg Oral Early Morning Clyda Monk, KRISTIN   phenol 1 spray Mouth/Throat Q2H PRN Daiana Nix PA-C   pravastatin 80 mg Oral Daily With Marii Albarran S Darrel, CRNP   promethazine 25 mg Intramuscular Q6H PRN Ger Pino MD   senna 2 tablet Oral HS Daiana Nix PA-C   torsemide 20 mg Oral After [employer]IM   torsemide 40 mg Oral After Breakfast Pola Nuñez PA-C        Today, Patient Was Seen By: Ger Pino MD    ** Please Note: Dictation voice to text software may have been used in the creation of this document   **

## 2020-08-01 NOTE — ASSESSMENT & PLAN NOTE
· Asymptomatic however does have known history of CAD s/p CABG   · Patient not on anti-platelet therapy, severe anemia receives iron infusions  · Follows outpatient with [facility] in [place]  · Continue statin and Eliquis  · Mild flat elevated troponin suspect secondary to CHF exacerbation  · Cardiology following; input appreciated

## 2020-08-01 NOTE — ASSESSMENT & PLAN NOTE
· Patient experiencing severe reflux symptoms 7/27  · Managed home on Protonix 40 mg daily   · Increased dose for a few days  · Supportive care  · Continue PPI  · If symptom continues may consider EGD on Monday

## 2020-08-01 NOTE — ASSESSMENT & PLAN NOTE
· Blood pressure currently acceptable systolics in the 007Q  · Continue home regimen of lisinopril 5 mg, carvedilol 3 125 mg BID, amlodipine 5 mg daily  · Hold lisinopril given NANCY  · Monitor kidney function closely  · Monitor with routine vitals

## 2020-08-02 LAB
ANION GAP SERPL CALCULATED.3IONS-SCNC: 9 MMOL/L (ref 4–13)
BUN SERPL-MCNC: 98 MG/DL (ref 5–25)
CALCIUM SERPL-MCNC: 8.1 MG/DL (ref 8.3–10.1)
CHLORIDE SERPL-SCNC: 106 MMOL/L (ref 100–108)
CO2 SERPL-SCNC: 31 MMOL/L (ref 21–32)
CREAT SERPL-MCNC: 2.65 MG/DL (ref 0.6–1.3)
GFR SERPL CREATININE-BSD FRML MDRD: 16 ML/MIN/1.73SQ M
GLUCOSE SERPL-MCNC: 124 MG/DL (ref 65–140)
GLUCOSE SERPL-MCNC: 149 MG/DL (ref 65–140)
GLUCOSE SERPL-MCNC: 178 MG/DL (ref 65–140)
GLUCOSE SERPL-MCNC: 197 MG/DL (ref 65–140)
GLUCOSE SERPL-MCNC: 201 MG/DL (ref 65–140)
GLUCOSE SERPL-MCNC: 330 MG/DL (ref 65–140)
POTASSIUM SERPL-SCNC: 4.5 MMOL/L (ref 3.5–5.3)
PROCALCITONIN SERPL-MCNC: 0.1 NG/ML
SODIUM SERPL-SCNC: 146 MMOL/L (ref 136–145)

## 2020-08-02 PROCEDURE — 82948 REAGENT STRIP/BLOOD GLUCOSE: CPT

## 2020-08-02 PROCEDURE — 94640 AIRWAY INHALATION TREATMENT: CPT

## 2020-08-02 PROCEDURE — 84145 PROCALCITONIN (PCT): CPT | Performed by: INTERNAL MEDICINE

## 2020-08-02 PROCEDURE — 80048 BASIC METABOLIC PNL TOTAL CA: CPT | Performed by: INTERNAL MEDICINE

## 2020-08-02 PROCEDURE — 87493 C DIFF AMPLIFIED PROBE: CPT | Performed by: INTERNAL MEDICINE

## 2020-08-02 PROCEDURE — 94760 N-INVAS EAR/PLS OXIMETRY 1: CPT

## 2020-08-02 PROCEDURE — 99233 SBSQ HOSP IP/OBS HIGH 50: CPT | Performed by: INTERNAL MEDICINE

## 2020-08-02 RX ORDER — FUROSEMIDE 10 MG/ML
40 INJECTION INTRAMUSCULAR; INTRAVENOUS ONCE
Status: COMPLETED | OUTPATIENT
Start: 2020-08-02 | End: 2020-08-02

## 2020-08-02 RX ORDER — PREDNISONE 20 MG/1
40 TABLET ORAL DAILY
Status: DISCONTINUED | OUTPATIENT
Start: 2020-08-03 | End: 2020-08-03

## 2020-08-02 RX ORDER — CEFTRIAXONE 1 G/50ML
1000 INJECTION, SOLUTION INTRAVENOUS EVERY 24 HOURS
Status: DISCONTINUED | OUTPATIENT
Start: 2020-08-02 | End: 2020-08-03

## 2020-08-02 RX ADMIN — AMLODIPINE BESYLATE 5 MG: 5 TABLET ORAL at 08:00

## 2020-08-02 RX ADMIN — TORSEMIDE 20 MG: 20 TABLET ORAL at 13:37

## 2020-08-02 RX ADMIN — INSULIN LISPRO 5 UNITS: 100 INJECTION, SOLUTION INTRAVENOUS; SUBCUTANEOUS at 22:41

## 2020-08-02 RX ADMIN — IPRATROPIUM BROMIDE 0.5 MG: 0.5 SOLUTION RESPIRATORY (INHALATION) at 07:22

## 2020-08-02 RX ADMIN — FERROUS GLUCONATE 324 MG: 324 TABLET ORAL at 06:15

## 2020-08-02 RX ADMIN — IPRATROPIUM BROMIDE 0.5 MG: 0.5 SOLUTION RESPIRATORY (INHALATION) at 02:10

## 2020-08-02 RX ADMIN — ACETAMINOPHEN 650 MG: 325 TABLET ORAL at 07:50

## 2020-08-02 RX ADMIN — BENZONATATE 100 MG: 100 CAPSULE ORAL at 07:56

## 2020-08-02 RX ADMIN — LEVALBUTEROL HYDROCHLORIDE 1.25 MG: 1.25 SOLUTION, CONCENTRATE RESPIRATORY (INHALATION) at 02:10

## 2020-08-02 RX ADMIN — IPRATROPIUM BROMIDE 0.5 MG: 0.5 SOLUTION RESPIRATORY (INHALATION) at 20:01

## 2020-08-02 RX ADMIN — SENNOSIDES 17.2 MG: 8.6 TABLET ORAL at 22:41

## 2020-08-02 RX ADMIN — INSULIN LISPRO 1 UNITS: 100 INJECTION, SOLUTION INTRAVENOUS; SUBCUTANEOUS at 12:04

## 2020-08-02 RX ADMIN — METRONIDAZOLE 500 MG: 500 INJECTION, SOLUTION INTRAVENOUS at 19:51

## 2020-08-02 RX ADMIN — TORSEMIDE 20 MG: 20 TABLET ORAL at 08:00

## 2020-08-02 RX ADMIN — APIXABAN 2.5 MG: 2.5 TABLET, FILM COATED ORAL at 17:00

## 2020-08-02 RX ADMIN — LEVALBUTEROL HYDROCHLORIDE 1.25 MG: 1.25 SOLUTION, CONCENTRATE RESPIRATORY (INHALATION) at 14:00

## 2020-08-02 RX ADMIN — CEFTRIAXONE 1000 MG: 1 INJECTION, SOLUTION INTRAVENOUS at 08:58

## 2020-08-02 RX ADMIN — FLUTICASONE FUROATE AND VILANTEROL TRIFENATATE 1 PUFF: 100; 25 POWDER RESPIRATORY (INHALATION) at 08:01

## 2020-08-02 RX ADMIN — METRONIDAZOLE 500 MG: 500 INJECTION, SOLUTION INTRAVENOUS at 10:20

## 2020-08-02 RX ADMIN — FUROSEMIDE 40 MG: 10 INJECTION, SOLUTION INTRAMUSCULAR; INTRAVENOUS at 19:51

## 2020-08-02 RX ADMIN — LEVALBUTEROL HYDROCHLORIDE 1.25 MG: 1.25 SOLUTION, CONCENTRATE RESPIRATORY (INHALATION) at 20:01

## 2020-08-02 RX ADMIN — METHYLPREDNISOLONE SODIUM SUCCINATE 40 MG: 40 INJECTION, POWDER, FOR SOLUTION INTRAMUSCULAR; INTRAVENOUS at 08:00

## 2020-08-02 RX ADMIN — INSULIN LISPRO 2 UNITS: 100 INJECTION, SOLUTION INTRAVENOUS; SUBCUTANEOUS at 16:57

## 2020-08-02 RX ADMIN — APIXABAN 2.5 MG: 2.5 TABLET, FILM COATED ORAL at 08:00

## 2020-08-02 RX ADMIN — ONDANSETRON 4 MG: 2 INJECTION INTRAMUSCULAR; INTRAVENOUS at 05:37

## 2020-08-02 RX ADMIN — INSULIN GLARGINE 10 UNITS: 100 INJECTION, SOLUTION SUBCUTANEOUS at 22:41

## 2020-08-02 RX ADMIN — IPRATROPIUM BROMIDE 0.5 MG: 0.5 SOLUTION RESPIRATORY (INHALATION) at 14:00

## 2020-08-02 RX ADMIN — PRAVASTATIN SODIUM 80 MG: 80 TABLET ORAL at 16:57

## 2020-08-02 RX ADMIN — CARVEDILOL 6.25 MG: 6.25 TABLET, FILM COATED ORAL at 08:00

## 2020-08-02 RX ADMIN — PANTOPRAZOLE SODIUM 20 MG: 20 TABLET, DELAYED RELEASE ORAL at 06:15

## 2020-08-02 RX ADMIN — LEVALBUTEROL HYDROCHLORIDE 1.25 MG: 1.25 SOLUTION, CONCENTRATE RESPIRATORY (INHALATION) at 07:22

## 2020-08-02 NOTE — ASSESSMENT & PLAN NOTE
Dysphagia to solid foods  Barium swallow, suboptimal study but no obvious obstruction  Patient continue to have cough with each meal  Most likely micro aspirations  Febrile this morning, continue to monitor vital signs closely  Continue broad spectrum abx coverage

## 2020-08-02 NOTE — PROGRESS NOTES
MD placed order for CBC to be drawn  Patient is a difficult IV stick  MD stated to see if lab is able to add on lab to labs drawn this AM  Lab is unable to add on CBC  Per MD do not draw CBC now   Ok to draw CBC in AM

## 2020-08-02 NOTE — PLAN OF CARE
Problem: Potential for Falls  Goal: Patient will remain free of falls  Description  INTERVENTIONS:  - Assess patient frequently for physical needs  -  Identify cognitive and physical deficits and behaviors that affect risk of falls    -  [place] fall precautions as indicated by assessment   - Educate patient/family on patient safety including physical limitations  - Instruct patient to call for assistance with activity based on assessment  - Modify environment to reduce risk of injury  - Consider OT/PT consult to assist with strengthening/mobility  Outcome: Progressing     Problem: Prexisting or High Potential for Compromised Skin Integrity  Goal: Skin integrity is maintained or improved  Description  INTERVENTIONS:  - Identify patients at risk for skin breakdown  - Assess and monitor skin integrity  - Assess and monitor nutrition and hydration status  - Monitor labs   - Assess for incontinence   - Turn and reposition patient  - Assist with mobility/ambulation  - Relieve pressure over bony prominences  - Avoid friction and shearing  - Provide appropriate hygiene as needed including keeping skin clean and dry  - Evaluate need for skin moisturizer/barrier cream  - Collaborate with interdisciplinary team   - Patient/family teaching  - Consider wound care consult   Outcome: Progressing     Problem: PAIN - ADULT  Goal: Verbalizes/displays adequate comfort level or baseline comfort level  Description  Interventions:  - Encourage patient to monitor pain and request assistance  - Assess pain using appropriate pain scale  - Administer analgesics based on type and severity of pain and evaluate response  - Implement non-pharmacological measures as appropriate and evaluate response  - Consider cultural and social influences on pain and pain management  - Notify physician/advanced practitioner if interventions unsuccessful or patient reports new pain  Outcome: Progressing     Problem: INFECTION - ADULT  Goal: Absence or prevention of progression during hospitalization  Description  INTERVENTIONS:  - Assess and monitor for signs and symptoms of infection  - Monitor lab/diagnostic results  - Monitor all insertion sites, i e  indwelling lines, tubes, and drains  - Monitor endotracheal if appropriate and nasal secretions for changes in amount and color  - [place] appropriate cooling/warming therapies per order  - Administer medications as ordered  - Instruct and encourage patient and family to use good hand hygiene technique  - Identify and instruct in appropriate isolation precautions for identified infection/condition  Outcome: Progressing  Goal: Absence of fever/infection during neutropenic period  Description  INTERVENTIONS:  - Monitor WBC    Outcome: Progressing     Problem: SAFETY ADULT  Goal: Patient will remain free of falls  Description  INTERVENTIONS:  - Assess patient frequently for physical needs  -  Identify cognitive and physical deficits and behaviors that affect risk of falls    -  [place] fall precautions as indicated by assessment   - Educate patient/family on patient safety including physical limitations  - Instruct patient to call for assistance with activity based on assessment  - Modify environment to reduce risk of injury  - Consider OT/PT consult to assist with strengthening/mobility  Outcome: Progressing  Goal: Maintain or return to baseline ADL function  Description  INTERVENTIONS:  -  Assess patient's ability to carry out ADLs; assess patient's baseline for ADL function and identify physical deficits which impact ability to perform ADLs (bathing, care of mouth/teeth, toileting, grooming, dressing, etc )  - Assess/evaluate cause of self-care deficits   - Assess range of motion  - Assess patient's mobility; develop plan if impaired  - Assess patient's need for assistive devices and provide as appropriate  - Encourage maximum independence but intervene and supervise when necessary  - Involve family in performance of ADLs  - Assess for home care needs following discharge   - Consider OT consult to assist with ADL evaluation and planning for discharge  - Provide patient education as appropriate  Outcome: Progressing  Goal: Maintain or return mobility status to optimal level  Description  INTERVENTIONS:  - Assess patient's baseline mobility status (ambulation, transfers, stairs, etc )    - Identify cognitive and physical deficits and behaviors that affect mobility  - Identify mobility aids required to assist with transfers and/or ambulation (gait belt, sit-to-stand, lift, walker, cane, etc )  - [place] fall precautions as indicated by assessment  - Record patient progress and toleration of activity level on Mobility SBAR; progress patient to next Phase/Stage  - Instruct patient to call for assistance with activity based on assessment  - Consider rehabilitation consult to assist with strengthening/weightbearing, etc   Outcome: Progressing     Problem: DISCHARGE PLANNING  Goal: Discharge to home or other facility with appropriate resources  Description  INTERVENTIONS:  - Identify barriers to discharge w/patient and caregiver  - Arrange for needed discharge resources and transportation as appropriate  - Identify discharge learning needs (meds, wound care, etc )  - Arrange for interpretive services to assist at discharge as needed  - Refer to Case Management Department for coordinating discharge planning if the patient needs post-hospital services based on physician/advanced practitioner order or complex needs related to functional status, cognitive ability, or social support system  Outcome: Progressing     Problem: Knowledge Deficit  Goal: Patient/family/caregiver demonstrates understanding of disease process, treatment plan, medications, and discharge instructions  Description  Complete learning assessment and assess knowledge base    Interventions:  - Provide teaching at level of understanding  - Provide teaching via preferred learning methods  Outcome: Progressing     Problem: RESPIRATORY - ADULT  Goal: Achieves optimal ventilation and oxygenation  Description  INTERVENTIONS:  - Assess for changes in respiratory status  - Assess for changes in mentation and behavior  - Position to facilitate oxygenation and minimize respiratory effort  - Oxygen administered by appropriate delivery if ordered  - Initiate smoking cessation education as indicated  - Encourage broncho-pulmonary hygiene including cough, deep breathe, Incentive Spirometry  - Assess the need for suctioning and aspirate as needed  - Assess and instruct to report SOB or any respiratory difficulty  - Respiratory Therapy support as indicated  Outcome: Progressing     Problem: Nutrition/Hydration-ADULT  Goal: Nutrient/Hydration intake appropriate for improving, restoring or maintaining nutritional needs  Description  Monitor and assess patient's nutrition/hydration status for malnutrition  Collaborate with interdisciplinary team and initiate plan and interventions as ordered  Monitor patient's weight and dietary intake as ordered or per policy  Utilize nutrition screening tool and intervene as necessary  Determine patient's food preferences and provide high-protein, high-caloric foods as appropriate       INTERVENTIONS:  - Monitor oral intake, urinary output, labs, and treatment plans  - Assess nutrition and hydration status and recommend course of action  - Evaluate amount of meals eaten  - Assist patient with eating if necessary   - Allow adequate time for meals  - Recommend/ encourage appropriate diets, oral nutritional supplements, and vitamin/mineral supplements  - Order, calculate, and assess calorie counts as needed  - Recommend, monitor, and adjust tube feedings and TPN/PPN based on assessed needs  - Assess need for intravenous fluids  - Provide specific nutrition/hydration education as appropriate  - Include patient/family/caregiver in decisions related to nutrition  Outcome: Progressing

## 2020-08-02 NOTE — ASSESSMENT & PLAN NOTE
· Asymptomatic however does have known history of CAD s/p CABG   · Patient not on anti-platelet therapy, severe anemia receives iron infusions  · Follows outpatient with [facility] in [place]  · Continue statin and Eliquis  · Mild flat elevated troponin suspect secondary to CHF exacerbation  · Cardiology following; input appreciated

## 2020-08-02 NOTE — PROGRESS NOTES
Progress Note - Alona Goodrich 1935, 82 y o  female MRN: [id number]    Unit/Bed#: -01 Encounter: [id number]    Primary Care Provider: Lauren Barragan MD   Date and time admitted to hospital: 7/25/2020  7:31 PM     * Acute on chronic respiratory failure with hypoxia [facility])  Assessment & Plan  Background: Patient admitted for acute on chronic respiratory failure w/ hypoxia after gaining approximately 9 lbs over prior week and developing peripheral edema and shortness of breath  · Patient uses supplemental oxygen 2 L via nasal cannula continuously as outpatient due to COPD and CHF  Initially required continuous BiPAP at time of admission    · Adequately diuresed  · Yves IV steroid  · Baseline oxygen need  · Markedly improved    Acute on chronic systolic CHF (congestive heart failure) (HCC)  Assessment & Plan  Wt Readings from Last 3 Encounters:   08/02/20 72 kg (158 lb 11 7 oz)   07/03/20 64 kg (141 lb 1 5 oz)   06/22/20 68 9 kg (152 lb)     · HFrEF, 35-40%  · Adequately diuresed, switch to oral torsemide 20 mg twice daily  · Being treated for COPD exacerbation as well  · Note that patient has frequent admissions for CHF exacerbation  · Repeat chest x-ray yesterday showed improved in aeration    CKD (chronic kidney disease) stage 4, GFR 15-29 ml/min ([facility])  Assessment & Plan  · Baseline creatinine 1 9-2 1    COPD with emphysema ([facility])  Assessment & Plan  · Continue bronchodilator and steroid  · Will start tapering steroid tomorrow    Dysphagia  Assessment & Plan  Dysphagia to solid foods  Barium swallow, suboptimal study but no obvious obstruction  Patient continue to have cough with each meal  Most likely micro aspirations  Febrile this morning, continue to monitor vital signs closely  Continue broad spectrum abx coverage    UTI (urinary tract infection)  Assessment & Plan  UTI on ceftriaxone  Remove Vasquez  Continue to monitor  May probably switch to oral antibiotics in 1-2 days    Acute renal failure (ARF) [facility])  Assessment & Plan  Acute renal failure most likely secondary to IV over-diuresis  Continue oral diuretic, continue to monitor renal function daily  Probably is a new baseline around 2 7    CAD (coronary artery disease), native coronary artery  Assessment & Plan  · Asymptomatic however does have known history of CAD s/p CABG   · Patient not on anti-platelet therapy, severe anemia receives iron infusions  · Follows outpatient with [facility] in [place]  · Continue statin and Eliquis  · Mild flat elevated troponin suspect secondary to CHF exacerbation  · Cardiology following; input appreciated    Paroxysmal atrial fibrillation ([address] )  Assessment & Plan  · Paroxysmal atrial fibrillation with elevated CHADS2 vascular score  · Continue Coreg and Eliquis for anticoagulation and rate control  · PPM  · Continue outpatient cardiology follow-up    Type 2 diabetes mellitus with hyperglycemia [facility])  Assessment & Plan  Lab Results   Component Value Date    HGBA1C 7 4 (H) 07/26/2020     Recent Labs     08/01/20  1622 08/01/20  2042 08/02/20  0338 08/02/20  0708   POCGLU 112 208* 197* 124     Blood Sugar Average: Last 72 hrs:  (P) 229 1875   · Increase Lantus to 12 units and Humalog 5 units t i d     Esophageal reflux  Assessment & Plan  · Patient experiencing severe reflux symptoms 7/27  · Managed home on Protonix 40 mg daily   · Increased dose for a few days  · Supportive care  · Continue PPI  · If symptom continues may consider EGD on Monday    Essential hypertension  Assessment & Plan  · Blood pressure currently acceptable systolics in the 329U  · Continue home regimen of lisinopril 5 mg, carvedilol 3 125 mg BID, amlodipine 5 mg daily  · Hold lisinopril given NANCY  · Monitor kidney function closely  · Monitor with routine vitals    VTE Pharmacologic Prophylaxis:   Pharmacologic: Heparin    Patient Centered Rounds: I have performed bedside rounds with nursing staff today    Discussions with Specialists or Other Care Team Provider:     Education and Discussions with Family / Patient:     Current Length of Stay: 8 day(s)    Current Patient Status: Inpatient   Certification Statement: The patient will continue to require additional inpatient hospital stay due to Acute renal failure, dysphagia, UTI,     Discharge Plan:  Probably 1-2 days    Code Status: Level 1 - Full Code      Subjective:   Patient still continues to complain cough, 1 episode of vomiting this morning  One episode of fever  More shortness of breath than usual, oxygen need around baseline  Objective:     Vitals:   Temp (24hrs), Av 6 °F (37 °C), Min:97 7 °F (36 5 °C), Max:101 2 °F (38 4 °C)    Temp:  [97 7 °F (36 5 °C)-101 2 °F (38 4 °C)] 97 7 °F (36 5 °C)  HR:  [61-82] 62  Resp:  [16-24] 20  BP: (111-141)/(44-62) 111/44  SpO2:  [90 %-99 %] 93 %  Body mass index is 27 25 kg/m²  Input and Output Summary (last 24 hours): Intake/Output Summary (Last 24 hours) at 2020 1114  Last data filed at 2020 0501  Gross per 24 hour   Intake 480 ml   Output 1625 ml   Net -1145 ml       Physical Exam:     General appearance: alert, appears stated age and cooperative  Head: Normocephalic, without obvious abnormality, atraumatic  Lungs: clear to auscultation bilaterally  Heart: regular rate and rhythm  Abdomen: soft, non-tender, positive bowel sounds   Back: negative  Extremities: extremities atraumatic, no cyanosis or edema  Neurologic: Alert and oriented X 3, normal strength and tone  Normal symmetric reflexes   Normal coordination and gait    Additional Data:     Labs:    Results from last 7 days   Lab Units 20  0536 20  0734   WBC Thousand/uL 3 25* 1 83*   HEMOGLOBIN g/dL 9 6* 9 3*   HEMATOCRIT % 30 4* 29 9*   PLATELETS Thousands/uL 159 159   BANDS PCT %  --  2   NEUTROS PCT % 65  --    LYMPHS PCT % 26  --    LYMPHO PCT %  --  40   MONOS PCT % 9  --    MONO PCT %  --  6   EOS PCT % 0 0     Results from last 7 days   Lab Units 08/02/20  0626  07/27/20  0513   SODIUM mmol/L 146*   < > 141   POTASSIUM mmol/L 4 5   < > 4 5   CHLORIDE mmol/L 106   < > 105   CO2 mmol/L 31   < > 29   BUN mg/dL 98*   < > 61*   CREATININE mg/dL 2 65*   < > 2 59*   ANION GAP mmol/L 9   < > 7   CALCIUM mg/dL 8 1*   < > 8 3   ALBUMIN g/dL  --   --  2 5*   TOTAL BILIRUBIN mg/dL  --   --  0 12*   ALK PHOS U/L  --   --  90   ALT U/L  --   --  11*   AST U/L  --   --  6   GLUCOSE RANDOM mg/dL 149*   < > 254*    < > = values in this interval not displayed  Results from last 7 days   Lab Units 08/02/20  0708 08/02/20  0338 08/01/20  2042 08/01/20  1622 08/01/20  1157 08/01/20  0821 08/01/20  6640 07/31/20  2126 07/31/20  1609 07/31/20  1103 07/31/20  0821 07/31/20  0158   POC GLUCOSE mg/dl 124 197* 208* 112 96 182* 59* 386* 423* 143* 133 322*                   * I Have Reviewed All Lab Data Listed Above  * Additional Pertinent Lab Tests Reviewed:  Lauren 66 Admission Reviewed    Imaging:    Imaging Reports Reviewed Today Include: images reviewed    Recent Cultures (last 7 days):           Last 24 Hours Medication List:   acetaminophen, 650 mg, Oral, Q6H PRN, Jeanette S Darrel, CRNP  acetylcysteine, 3 mL, Nebulization, Q8H PRN, Daiana Nix PA-C  albuterol, 2 5 mg, Nebulization, Q4H PRN, Jeanette S Darrel, CRNP  amLODIPine, 5 mg, Oral, Daily, KRISTIN Low  apixaban, 2 5 mg, Oral, BID, Jeanette S Darrel, CRNP  benzonatate, 100 mg, Oral, TID PRN, Alona Carpio MD  carvedilol, 6 25 mg, Oral, BID, Starla Valdez PA-C  cefTRIAXone, 1,000 mg, Intravenous, Q24H, Iris Desir MD, Last Rate: 1,000 mg (08/02/20 0858)  docusate sodium, 100 mg, Oral, BID, KRISTIN Lala  ferrous gluconate, 324 mg, Oral, Daily Before Breakfast, KRISTIN Lala  fluticasone-vilanterol, 1 puff, Inhalation, Daily, KRISTIN Lala  insulin glargine, 10 Units, Subcutaneous, HS, Iris Desir MD  insulin lispro, 1-6 Units, Subcutaneous, TID AC, Jeanette Rojo, CRNP  insulin lispro, 1-6 Units, Subcutaneous, HS, Jeanette Osbornex, CRNP  ipratropium, 0 5 mg, Nebulization, Q6H, Little Carrel, MD  levalbuterol, 1 25 mg, Nebulization, Q6H, Little Carrel, MD  metroNIDAZOLE, 500 mg, Intravenous, Q8H, Little Carrel, MD, Last Rate: 500 mg (08/02/20 1020)  nitroglycerin, 0 4 mg, Sublingual, Q5 Min PRN, Jeanette Osbornex, CRNP  ondansetron, 4 mg, Intravenous, Q6H PRN, Little Carrel, MD  pantoprazole, 20 mg, Oral, Early Morning, KRISTIN Hawk  phenol, 1 spray, Mouth/Throat, Q2H PRN, Daiana Nix PA-C  pravastatin, 80 mg, Oral, Daily With Dinner, Jeanette Osbornex, CRNP  [START ON 8/3/2020] predniSONE, 40 mg, Oral, Daily, Little Carrel, MD  promethazine, 25 mg, Intramuscular, Q6H PRN, Little Carrel, MD  senna, 2 tablet, Oral, HS, Daiana Nix PA-C  torsemide, 20 mg, Oral, After Lunch, [place] JIM Valdez  torsemide, 20 mg, Oral, After Breakfast, Clifton Coelho MD         Today, Patient Was Seen By: Little Carrel, MD    ** Please Note: Dictation voice to text software may have been used in the creation of this document   **

## 2020-08-02 NOTE — ASSESSMENT & PLAN NOTE
· Blood pressure currently acceptable systolics in the 823A  · Continue home regimen of lisinopril 5 mg, carvedilol 3 125 mg BID, amlodipine 5 mg daily  · Hold lisinopril given NANCY  · Monitor kidney function closely  · Monitor with routine vitals

## 2020-08-02 NOTE — ASSESSMENT & PLAN NOTE
Wt Readings from Last 3 Encounters:   08/02/20 72 kg (158 lb 11 7 oz)   07/03/20 64 kg (141 lb 1 5 oz)   06/22/20 68 9 kg (152 lb)     · HFrEF, 35-40%  · Adequately diuresed, switch to oral torsemide 20 mg twice daily  · Being treated for COPD exacerbation as well  · Note that patient has frequent admissions for CHF exacerbation  · Repeat chest x-ray yesterday showed improved in aeration

## 2020-08-02 NOTE — ASSESSMENT & PLAN NOTE
Lab Results   Component Value Date    HGBA1C 7 4 (H) 07/26/2020       Recent Labs     08/01/20  1622 08/01/20  2042 08/02/20  0338 08/02/20  0708   POCGLU 112 208* 197* 124       Blood Sugar Average: Last 72 hrs:  (P) 229 1875   · Increase Lantus to 12 units and Humalog 5 units t i d

## 2020-08-02 NOTE — ASSESSMENT & PLAN NOTE
UTI on ceftriaxone  Remove Vasquez  Continue to monitor  May probably switch to oral antibiotics in 1-2 days

## 2020-08-03 ENCOUNTER — APPOINTMENT (INPATIENT)
Dept: CT IMAGING | Facility: HOSPITAL | Age: 85
DRG: 291 | End: 2020-08-03
Payer: MEDICARE

## 2020-08-03 PROBLEM — J12.82 PNEUMONIA DUE TO COVID-19 VIRUS: Status: ACTIVE | Noted: 2020-08-03

## 2020-08-03 PROBLEM — I47.2 V-TACH (HCC): Status: RESOLVED | Noted: 2020-07-27 | Resolved: 2020-08-03

## 2020-08-03 PROBLEM — J18.9 PNEUMONIA: Status: ACTIVE | Noted: 2020-08-03

## 2020-08-03 PROBLEM — N39.0 UTI (URINARY TRACT INFECTION): Status: RESOLVED | Noted: 2020-06-16 | Resolved: 2020-08-03

## 2020-08-03 PROBLEM — R77.8 ELEVATED TROPONIN LEVEL NOT DUE MYOCARDIAL INFARCTION: Status: RESOLVED | Noted: 2020-07-26 | Resolved: 2020-08-03

## 2020-08-03 PROBLEM — U07.1 PNEUMONIA DUE TO COVID-19 VIRUS: Status: ACTIVE | Noted: 2020-08-03

## 2020-08-03 PROBLEM — J69.0 ASPIRATION PNEUMONIA (HCC): Status: ACTIVE | Noted: 2020-08-03

## 2020-08-03 PROBLEM — A41.9 SEPSIS (HCC): Status: ACTIVE | Noted: 2020-08-03

## 2020-08-03 PROBLEM — R94.31 PROLONGED Q-T INTERVAL ON ECG: Status: RESOLVED | Noted: 2020-07-27 | Resolved: 2020-08-03

## 2020-08-03 LAB
ALBUMIN SERPL BCP-MCNC: 2.2 G/DL (ref 3.5–5)
ALP SERPL-CCNC: 72 U/L (ref 46–116)
ALT SERPL W P-5'-P-CCNC: 13 U/L (ref 12–78)
ANION GAP SERPL CALCULATED.3IONS-SCNC: 7 MMOL/L (ref 4–13)
AST SERPL W P-5'-P-CCNC: 15 U/L (ref 5–45)
BASOPHILS # BLD AUTO: 0 THOUSANDS/ΜL (ref 0–0.1)
BASOPHILS NFR BLD AUTO: 0 % (ref 0–1)
BILIRUB SERPL-MCNC: 0.2 MG/DL (ref 0.2–1)
BUN SERPL-MCNC: 95 MG/DL (ref 5–25)
C DIFF TOX GENS STL QL NAA+PROBE: NEGATIVE
CALCIUM SERPL-MCNC: 7.5 MG/DL (ref 8.3–10.1)
CHLORIDE SERPL-SCNC: 106 MMOL/L (ref 100–108)
CO2 SERPL-SCNC: 32 MMOL/L (ref 21–32)
CREAT SERPL-MCNC: 2.88 MG/DL (ref 0.6–1.3)
EOSINOPHIL # BLD AUTO: 0 THOUSAND/ΜL (ref 0–0.61)
EOSINOPHIL NFR BLD AUTO: 0 % (ref 0–6)
ERYTHROCYTE [DISTWIDTH] IN BLOOD BY AUTOMATED COUNT: 17 % (ref 11.6–15.1)
GFR SERPL CREATININE-BSD FRML MDRD: 15 ML/MIN/1.73SQ M
GLUCOSE SERPL-MCNC: 135 MG/DL (ref 65–140)
GLUCOSE SERPL-MCNC: 158 MG/DL (ref 65–140)
GLUCOSE SERPL-MCNC: 169 MG/DL (ref 65–140)
GLUCOSE SERPL-MCNC: 79 MG/DL (ref 65–140)
HCT VFR BLD AUTO: 28.4 % (ref 34.8–46.1)
HGB BLD-MCNC: 9 G/DL (ref 11.5–15.4)
IMM GRANULOCYTES # BLD AUTO: 0.1 THOUSAND/UL (ref 0–0.2)
IMM GRANULOCYTES NFR BLD AUTO: 1 % (ref 0–2)
LYMPHOCYTES # BLD AUTO: 1.09 THOUSANDS/ΜL (ref 0.6–4.47)
LYMPHOCYTES NFR BLD AUTO: 8 % (ref 14–44)
MCH RBC QN AUTO: 30.1 PG (ref 26.8–34.3)
MCHC RBC AUTO-ENTMCNC: 31.7 G/DL (ref 31.4–37.4)
MCV RBC AUTO: 95 FL (ref 82–98)
MONOCYTES # BLD AUTO: 0.49 THOUSAND/ΜL (ref 0.17–1.22)
MONOCYTES NFR BLD AUTO: 4 % (ref 4–12)
NEUTROPHILS # BLD AUTO: 12.45 THOUSANDS/ΜL (ref 1.85–7.62)
NEUTS SEG NFR BLD AUTO: 87 % (ref 43–75)
NRBC BLD AUTO-RTO: 0 /100 WBCS
PLATELET # BLD AUTO: 126 THOUSANDS/UL (ref 149–390)
PMV BLD AUTO: 13.8 FL (ref 8.9–12.7)
POTASSIUM SERPL-SCNC: 4.1 MMOL/L (ref 3.5–5.3)
PROCALCITONIN SERPL-MCNC: 3.74 NG/ML
PROT SERPL-MCNC: 6 G/DL (ref 6.4–8.2)
RBC # BLD AUTO: 2.99 MILLION/UL (ref 3.81–5.12)
SARS-COV-2 RNA RESP QL NAA+PROBE: POSITIVE
SARS-COV-2 RNA RESP QL NAA+PROBE: POSITIVE
SODIUM SERPL-SCNC: 145 MMOL/L (ref 136–145)
WBC # BLD AUTO: 14.13 THOUSAND/UL (ref 4.31–10.16)

## 2020-08-03 PROCEDURE — NC001 PR NO CHARGE: Performed by: INTERNAL MEDICINE

## 2020-08-03 PROCEDURE — 71250 CT THORAX DX C-: CPT

## 2020-08-03 PROCEDURE — 80053 COMPREHEN METABOLIC PANEL: CPT | Performed by: INTERNAL MEDICINE

## 2020-08-03 PROCEDURE — 99233 SBSQ HOSP IP/OBS HIGH 50: CPT | Performed by: INTERNAL MEDICINE

## 2020-08-03 PROCEDURE — 84145 PROCALCITONIN (PCT): CPT | Performed by: INTERNAL MEDICINE

## 2020-08-03 PROCEDURE — 87635 SARS-COV-2 COVID-19 AMP PRB: CPT | Performed by: INTERNAL MEDICINE

## 2020-08-03 PROCEDURE — 94760 N-INVAS EAR/PLS OXIMETRY 1: CPT

## 2020-08-03 PROCEDURE — G1004 CDSM NDSC: HCPCS

## 2020-08-03 PROCEDURE — 82948 REAGENT STRIP/BLOOD GLUCOSE: CPT

## 2020-08-03 PROCEDURE — 94640 AIRWAY INHALATION TREATMENT: CPT

## 2020-08-03 PROCEDURE — 85025 COMPLETE CBC W/AUTO DIFF WBC: CPT | Performed by: INTERNAL MEDICINE

## 2020-08-03 RX ORDER — ACETAMINOPHEN 650 MG/1
650 SUPPOSITORY RECTAL EVERY 4 HOURS PRN
Status: DISCONTINUED | OUTPATIENT
Start: 2020-08-03 | End: 2020-08-05 | Stop reason: HOSPADM

## 2020-08-03 RX ORDER — ACETAMINOPHEN 160 MG/5ML
650 SUSPENSION, ORAL (FINAL DOSE FORM) ORAL ONCE
Status: DISCONTINUED | OUTPATIENT
Start: 2020-08-03 | End: 2020-08-03

## 2020-08-03 RX ORDER — LORAZEPAM 2 MG/ML
0.5 INJECTION INTRAMUSCULAR EVERY 2 HOUR PRN
Status: DISCONTINUED | OUTPATIENT
Start: 2020-08-03 | End: 2020-08-05 | Stop reason: HOSPADM

## 2020-08-03 RX ORDER — LABETALOL 20 MG/4 ML (5 MG/ML) INTRAVENOUS SYRINGE
10 EVERY 6 HOURS PRN
Status: DISCONTINUED | OUTPATIENT
Start: 2020-08-03 | End: 2020-08-03

## 2020-08-03 RX ORDER — CEFEPIME HYDROCHLORIDE 1 G/50ML
1000 INJECTION, SOLUTION INTRAVENOUS EVERY 12 HOURS
Status: DISCONTINUED | OUTPATIENT
Start: 2020-08-03 | End: 2020-08-03

## 2020-08-03 RX ORDER — CHLORHEXIDINE GLUCONATE 0.12 MG/ML
15 RINSE ORAL EVERY 12 HOURS SCHEDULED
Status: DISCONTINUED | OUTPATIENT
Start: 2020-08-03 | End: 2020-08-03

## 2020-08-03 RX ORDER — FUROSEMIDE 10 MG/ML
80 INJECTION INTRAMUSCULAR; INTRAVENOUS
Status: DISCONTINUED | OUTPATIENT
Start: 2020-08-03 | End: 2020-08-04

## 2020-08-03 RX ORDER — MELATONIN
2000 DAILY
Status: DISCONTINUED | OUTPATIENT
Start: 2020-08-03 | End: 2020-08-03

## 2020-08-03 RX ORDER — DOXYCYCLINE HYCLATE 100 MG/1
100 CAPSULE ORAL EVERY 12 HOURS
Status: DISCONTINUED | OUTPATIENT
Start: 2020-08-03 | End: 2020-08-03

## 2020-08-03 RX ORDER — HEPARIN SODIUM 10000 [USP'U]/100ML
3-30 INJECTION, SOLUTION INTRAVENOUS
Status: DISCONTINUED | OUTPATIENT
Start: 2020-08-03 | End: 2020-08-03

## 2020-08-03 RX ORDER — METHYLPREDNISOLONE SODIUM SUCCINATE 40 MG/ML
40 INJECTION, POWDER, LYOPHILIZED, FOR SOLUTION INTRAMUSCULAR; INTRAVENOUS EVERY 12 HOURS SCHEDULED
Status: DISCONTINUED | OUTPATIENT
Start: 2020-08-03 | End: 2020-08-03

## 2020-08-03 RX ORDER — ATORVASTATIN CALCIUM 40 MG/1
40 TABLET, FILM COATED ORAL
Status: DISCONTINUED | OUTPATIENT
Start: 2020-08-03 | End: 2020-08-03

## 2020-08-03 RX ORDER — PROMETHAZINE HYDROCHLORIDE 25 MG/ML
25 INJECTION, SOLUTION INTRAMUSCULAR; INTRAVENOUS EVERY 6 HOURS PRN
Status: DISCONTINUED | OUTPATIENT
Start: 2020-08-03 | End: 2020-08-03

## 2020-08-03 RX ORDER — ZINC SULFATE 50(220)MG
220 CAPSULE ORAL DAILY
Status: DISCONTINUED | OUTPATIENT
Start: 2020-08-03 | End: 2020-08-03

## 2020-08-03 RX ORDER — HYDROMORPHONE HCL/PF 1 MG/ML
0.2 SYRINGE (ML) INJECTION
Status: DISCONTINUED | OUTPATIENT
Start: 2020-08-03 | End: 2020-08-04

## 2020-08-03 RX ORDER — ASCORBIC ACID 500 MG
1000 TABLET ORAL 2 TIMES DAILY
Status: DISCONTINUED | OUTPATIENT
Start: 2020-08-03 | End: 2020-08-03

## 2020-08-03 RX ADMIN — AMLODIPINE BESYLATE 5 MG: 5 TABLET ORAL at 09:42

## 2020-08-03 RX ADMIN — PANTOPRAZOLE SODIUM 20 MG: 20 TABLET, DELAYED RELEASE ORAL at 05:23

## 2020-08-03 RX ADMIN — BENZONATATE 100 MG: 100 CAPSULE ORAL at 02:37

## 2020-08-03 RX ADMIN — IPRATROPIUM BROMIDE 0.5 MG: 0.5 SOLUTION RESPIRATORY (INHALATION) at 01:23

## 2020-08-03 RX ADMIN — IPRATROPIUM BROMIDE 0.5 MG: 0.5 SOLUTION RESPIRATORY (INHALATION) at 07:20

## 2020-08-03 RX ADMIN — FLUTICASONE FUROATE AND VILANTEROL TRIFENATATE 1 PUFF: 100; 25 POWDER RESPIRATORY (INHALATION) at 09:47

## 2020-08-03 RX ADMIN — ACETAMINOPHEN 650 MG: 325 TABLET ORAL at 03:19

## 2020-08-03 RX ADMIN — LEVALBUTEROL HYDROCHLORIDE 1.25 MG: 1.25 SOLUTION, CONCENTRATE RESPIRATORY (INHALATION) at 07:20

## 2020-08-03 RX ADMIN — TORSEMIDE 20 MG: 20 TABLET ORAL at 09:15

## 2020-08-03 RX ADMIN — CEFTRIAXONE 1000 MG: 1 INJECTION, SOLUTION INTRAVENOUS at 09:43

## 2020-08-03 RX ADMIN — FUROSEMIDE 80 MG: 10 INJECTION, SOLUTION INTRAMUSCULAR; INTRAVENOUS at 16:36

## 2020-08-03 RX ADMIN — PREDNISONE 40 MG: 20 TABLET ORAL at 09:42

## 2020-08-03 RX ADMIN — PROMETHAZINE HYDROCHLORIDE 25 MG: 25 INJECTION INTRAMUSCULAR; INTRAVENOUS at 09:14

## 2020-08-03 RX ADMIN — METRONIDAZOLE 500 MG: 500 INJECTION, SOLUTION INTRAVENOUS at 11:02

## 2020-08-03 RX ADMIN — HYDROMORPHONE HYDROCHLORIDE 0.2 MG: 1 INJECTION, SOLUTION INTRAMUSCULAR; INTRAVENOUS; SUBCUTANEOUS at 16:36

## 2020-08-03 RX ADMIN — ONDANSETRON 4 MG: 2 INJECTION INTRAMUSCULAR; INTRAVENOUS at 09:00

## 2020-08-03 RX ADMIN — METRONIDAZOLE 500 MG: 500 INJECTION, SOLUTION INTRAVENOUS at 03:40

## 2020-08-03 RX ADMIN — LEVALBUTEROL HYDROCHLORIDE 1.25 MG: 1.25 SOLUTION, CONCENTRATE RESPIRATORY (INHALATION) at 01:23

## 2020-08-03 RX ADMIN — FERROUS GLUCONATE 324 MG: 324 TABLET ORAL at 05:23

## 2020-08-03 RX ADMIN — INSULIN LISPRO 1 UNITS: 100 INJECTION, SOLUTION INTRAVENOUS; SUBCUTANEOUS at 11:02

## 2020-08-03 RX ADMIN — CARVEDILOL 6.25 MG: 6.25 TABLET, FILM COATED ORAL at 09:43

## 2020-08-03 NOTE — PROGRESS NOTES
Per ST recommendations on 7/29, recommending pureed with thin liquids, reflected in diet order  Pt was provided thickened water possibly during nighttime shift  Vomiting with possible aspiration  Dysphagia diet per ST recommendations  If adequate diuresed for CHF, may be beneficial to d/c fluid restriction given vomiting  Will not supplement at this time, pending ST evaluation  Will add CCD 2 given elevated POC BS levels, insulin dosing per MD orders

## 2020-08-03 NOTE — QUICK NOTE
Patient's condition worsens for the last 2 days with worsening shortness of breath associated with intermittent fever since yesterday, chest CT done and showed ground-glass opacity suspected for atypical pneumonia vs viral pneumonia  COVID checked and positive  Note that patient was COVID negative on 7/25  Oxygen delivery upgraded to mid then high-flow however patient continues to be hypoxic  Patient currently on BiPAP and transferred to step-down 2  ICU consulted and accepted patient  Discussed with ID and patient is not candidate for Ramdesivir due to poor renal function, ID agreed for plasma transfusion  Blood group type, inflammatory markers pending

## 2020-08-03 NOTE — PLAN OF CARE
Problem: Nutrition/Hydration-ADULT  Goal: Nutrient/Hydration intake appropriate for improving, restoring or maintaining nutritional needs  Description: Monitor and assess patient's nutrition/hydration status for malnutrition  Collaborate with interdisciplinary team and initiate plan and interventions as ordered  Monitor patient's weight and dietary intake as ordered or per policy  Utilize nutrition screening tool and intervene as necessary  Determine patient's food preferences and provide high-protein, high-caloric foods as appropriate       INTERVENTIONS:  - Monitor oral intake, urinary output, labs, and treatment plans  - Assess nutrition and hydration status and recommend course of action  - Evaluate amount of meals eaten  - Assist patient with eating if necessary   - Allow adequate time for meals  - Recommend/ encourage appropriate diets, oral nutritional supplements, and vitamin/mineral supplements  - Order, calculate, and assess calorie counts as needed  - Recommend, monitor, and adjust tube feedings and TPN/PPN based on assessed needs  - Assess need for intravenous fluids  - Provide specific nutrition/hydration education as appropriate  - Include patient/family/caregiver in decisions related to nutrition  Outcome: Not Progressing

## 2020-08-03 NOTE — ASSESSMENT & PLAN NOTE
· Asymptomatic however does have known history of CAD s/p CABG   · Patient not on anti-platelet therapy, severe anemia receives iron infusions  · Follows outpatient with [facility] in [place]  · Continue statin and Eliquis  · Mild flat elevated troponin suspect secondary to CHF exacerbation  · Cardiology following; input appreciated

## 2020-08-03 NOTE — ACP (ADVANCE CARE PLANNING)
Spoke with pt's daughter, Geronimo Ureña, and updated on current events including increased oxygenation requirements, aspiration, this morning a CT scan revealing b/l GGO and follow up COVID swab positive  Geronimo Sabrinaольга became tearful on the phone stating "This isn't good is it?"  I reviewed pt's chronic diseases - heart disease/failure, COPD, and kidney failure and due to these conditions her likelihood of surviving a cardiac arrest and intubation being COVID positive was not good  Baironshira Ureña agreed that DNR/DNI was in the patients best interest, she would not want her to suffer stating "my mother would never want all of that "  Dr Theodore Prasad was put on the phone to discuss consent for convalescent plasma and central line placement  Geronimo Ureña stated to him that she didn't think she wanted to do anything but make her comfortable  She wants to discuss with family before pursuing with any additional treatments or diagnostics, including IV access and blood work  Geronimo Ureña called back and family agreed they would not want pt to suffer and would like to make her comfortable  I instructed her that with her mom being COVID positive we are only allowing 2 visitors at a time, they must agree to wear full PPE and quarantine for a full 14 days after  Greonimo Ureña said she would be in to visit later today and would like to keep her on bipap until that time      Total ACP time 23 mins

## 2020-08-03 NOTE — ASSESSMENT & PLAN NOTE
Pneumonia most likely aspiration pneumonia vs viral pneumonia  Continue broad-spectrum antibiotics as above  Continuous pulse ox, keep SaO2 greater than 90 %

## 2020-08-03 NOTE — PHYSICAL THERAPY NOTE
PHYSICAL THERAPY CANCELLATION NOTE          Patient Name: Augusto Apodaca  [id number] Date: 8/3/2020     Chart reviewed  Pt with change in medical status this AM resulting in transfer to  step down for medical management associated with impaired respiratory status requiring increased supplemental O2  Pt now on BiPAP and now with + covid-19 testing  Spoke with RN, Jose Valdes, plan to re-test Covid-19 this date  As per nursing, patient not medically appropriate for PT services at this time associated with respiratory status  Will continue to follow and see patient as medically appropriate at a later time       Nisha Manriquez, PT,DPT

## 2020-08-03 NOTE — ASSESSMENT & PLAN NOTE
Lab Results   Component Value Date    HGBA1C 7 4 (H) 07/26/2020       Recent Labs     08/02/20  2047 08/03/20  0304 08/03/20  0725 08/03/20  1037   POCGLU 330* 169* 79 158*       Blood Sugar Average: Last 72 hrs:  (P) 378 [id number]   · Increase Lantus to 12 units and Humalog 5 units t i d

## 2020-08-03 NOTE — ASSESSMENT & PLAN NOTE
Sepsis as evidenced by fever, tachypnea, leukocytosis, pneumonia  Most likely aspiration pneumonia in the setting of cough and associated vomiting  CT chest:  Ground-glass opacity suggestive of a typical pneumonia, viral pneumonia  Check COVID, continue ceftriaxone and metronidazole, add azithromycin

## 2020-08-03 NOTE — PLAN OF CARE
Problem: Potential for Falls  Goal: Patient will remain free of falls  Description: INTERVENTIONS:  - Assess patient frequently for physical needs  -  Identify cognitive and physical deficits and behaviors that affect risk of falls    -  [place] fall precautions as indicated by assessment   - Educate patient/family on patient safety including physical limitations  - Instruct patient to call for assistance with activity based on assessment  - Modify environment to reduce risk of injury  - Consider OT/PT consult to assist with strengthening/mobility  Outcome: Progressing     Problem: Prexisting or High Potential for Compromised Skin Integrity  Goal: Skin integrity is maintained or improved  Description: INTERVENTIONS:  - Identify patients at risk for skin breakdown  - Assess and monitor skin integrity  - Assess and monitor nutrition and hydration status  - Monitor labs   - Assess for incontinence   - Turn and reposition patient  - Assist with mobility/ambulation  - Relieve pressure over bony prominences  - Avoid friction and shearing  - Provide appropriate hygiene as needed including keeping skin clean and dry  - Evaluate need for skin moisturizer/barrier cream  - Collaborate with interdisciplinary team   - Patient/family teaching  - Consider wound care consult   Outcome: Progressing     Problem: PAIN - ADULT  Goal: Verbalizes/displays adequate comfort level or baseline comfort level  Description: Interventions:  - Encourage patient to monitor pain and request assistance  - Assess pain using appropriate pain scale  - Administer analgesics based on type and severity of pain and evaluate response  - Implement non-pharmacological measures as appropriate and evaluate response  - Consider cultural and social influences on pain and pain management  - Notify physician/advanced practitioner if interventions unsuccessful or patient reports new pain  Outcome: Progressing     Problem: INFECTION - ADULT  Goal: Absence or prevention of progression during hospitalization  Description: INTERVENTIONS:  - Assess and monitor for signs and symptoms of infection  - Monitor lab/diagnostic results  - Monitor all insertion sites, i e  indwelling lines, tubes, and drains  - Monitor endotracheal if appropriate and nasal secretions for changes in amount and color  - [place] appropriate cooling/warming therapies per order  - Administer medications as ordered  - Instruct and encourage patient and family to use good hand hygiene technique  - Identify and instruct in appropriate isolation precautions for identified infection/condition  Outcome: Progressing  Goal: Absence of fever/infection during neutropenic period  Description: INTERVENTIONS:  - Monitor WBC    Outcome: Progressing     Problem: SAFETY ADULT  Goal: Patient will remain free of falls  Description: INTERVENTIONS:  - Assess patient frequently for physical needs  -  Identify cognitive and physical deficits and behaviors that affect risk of falls    -  [place] fall precautions as indicated by assessment   - Educate patient/family on patient safety including physical limitations  - Instruct patient to call for assistance with activity based on assessment  - Modify environment to reduce risk of injury  - Consider OT/PT consult to assist with strengthening/mobility  Outcome: Progressing  Goal: Maintain or return to baseline ADL function  Description: INTERVENTIONS:  -  Assess patient's ability to carry out ADLs; assess patient's baseline for ADL function and identify physical deficits which impact ability to perform ADLs (bathing, care of mouth/teeth, toileting, grooming, dressing, etc )  - Assess/evaluate cause of self-care deficits   - Assess range of motion  - Assess patient's mobility; develop plan if impaired  - Assess patient's need for assistive devices and provide as appropriate  - Encourage maximum independence but intervene and supervise when necessary  - Involve family in performance of ADLs  - Assess for home care needs following discharge   - Consider OT consult to assist with ADL evaluation and planning for discharge  - Provide patient education as appropriate  Outcome: Progressing  Goal: Maintain or return mobility status to optimal level  Description: INTERVENTIONS:  - Assess patient's baseline mobility status (ambulation, transfers, stairs, etc )    - Identify cognitive and physical deficits and behaviors that affect mobility  - Identify mobility aids required to assist with transfers and/or ambulation (gait belt, sit-to-stand, lift, walker, cane, etc )  - [place] fall precautions as indicated by assessment  - Record patient progress and toleration of activity level on Mobility SBAR; progress patient to next Phase/Stage  - Instruct patient to call for assistance with activity based on assessment  - Consider rehabilitation consult to assist with strengthening/weightbearing, etc   Outcome: Progressing     Problem: DISCHARGE PLANNING  Goal: Discharge to home or other facility with appropriate resources  Description: INTERVENTIONS:  - Identify barriers to discharge w/patient and caregiver  - Arrange for needed discharge resources and transportation as appropriate  - Identify discharge learning needs (meds, wound care, etc )  - Arrange for interpretive services to assist at discharge as needed  - Refer to Case Management Department for coordinating discharge planning if the patient needs post-hospital services based on physician/advanced practitioner order or complex needs related to functional status, cognitive ability, or social support system  Outcome: Progressing     Problem: Knowledge Deficit  Goal: Patient/family/caregiver demonstrates understanding of disease process, treatment plan, medications, and discharge instructions  Description: Complete learning assessment and assess knowledge base    Interventions:  - Provide teaching at level of understanding  - Provide teaching via preferred learning methods  Outcome: Progressing     Problem: RESPIRATORY - ADULT  Goal: Achieves optimal ventilation and oxygenation  Description: INTERVENTIONS:  - Assess for changes in respiratory status  - Assess for changes in mentation and behavior  - Position to facilitate oxygenation and minimize respiratory effort  - Oxygen administered by appropriate delivery if ordered  - Initiate smoking cessation education as indicated  - Encourage broncho-pulmonary hygiene including cough, deep breathe, Incentive Spirometry  - Assess the need for suctioning and aspirate as needed  - Assess and instruct to report SOB or any respiratory difficulty  - Respiratory Therapy support as indicated  Outcome: Progressing     Problem: Nutrition/Hydration-ADULT  Goal: Nutrient/Hydration intake appropriate for improving, restoring or maintaining nutritional needs  Description: Monitor and assess patient's nutrition/hydration status for malnutrition  Collaborate with interdisciplinary team and initiate plan and interventions as ordered  Monitor patient's weight and dietary intake as ordered or per policy  Utilize nutrition screening tool and intervene as necessary  Determine patient's food preferences and provide high-protein, high-caloric foods as appropriate       INTERVENTIONS:  - Monitor oral intake, urinary output, labs, and treatment plans  - Assess nutrition and hydration status and recommend course of action  - Evaluate amount of meals eaten  - Assist patient with eating if necessary   - Allow adequate time for meals  - Recommend/ encourage appropriate diets, oral nutritional supplements, and vitamin/mineral supplements  - Order, calculate, and assess calorie counts as needed  - Recommend, monitor, and adjust tube feedings and TPN/PPN based on assessed needs  - Assess need for intravenous fluids  - Provide specific nutrition/hydration education as appropriate  - Include patient/family/caregiver in decisions related to nutrition  Outcome: Progressing

## 2020-08-03 NOTE — NURSING NOTE
This morning, patient was found to be sating at 82% on 5L NC  Placed patient at 6L with no recovery  Patient was tachypneic and dyspneic at rest  Called respiratory and she placed the nonrebreather on the patient for a couple of minutes so the patient could recover  Patient was then placed back on 6L  Within a couple of minutes, patient was satting back in the mid to low 80s  Respiratory came and placed the patient on 15L mid-flow and Dr Debora Mcdaniels ordered a CT scan  Patient transported to CT  Patient was still SOB and was unable to wear mask  Reason explained to CT techs at the time of transfer  Patient in CT was still stating at 84% on 15L NC  Once patient came back to the unit, she was placed back on midflow at 15L midflow and she was sating at 84%  Patient then transferred to ICU for L2 stepdown

## 2020-08-03 NOTE — ASSESSMENT & PLAN NOTE
· Blood pressure currently acceptable systolics in the 481M  · Continue home regimen of lisinopril 5 mg, carvedilol 3 125 mg BID, amlodipine 5 mg daily  · Hold lisinopril given NANCY  · Monitor kidney function closely  · Monitor with routine vitals

## 2020-08-03 NOTE — NURSING NOTE
Dr Dean Comment made aware that patient has had new onset of decreased urinary output and is 91-94% on 6 LPM of oxygen  Patient states, "I feel a little SOB " Respiratory made aware and came bedside to administer nebulizers  Received NO for IV lasix x 1 dose  Will continue to monitor throughout shift

## 2020-08-03 NOTE — CASE MANAGEMENT
Aware patient to higher care- L2 step down in the ICU for management of respiratory status, she is dyspneic, with decrease 02 sats  - IV azithromycin, ceftriaxone  ^ 02 on 15 liters  Will continue to follow medical management  DC plan is [facility]

## 2020-08-03 NOTE — ASSESSMENT & PLAN NOTE
Patient admitted for acute respiratory failure with hypoxia most likely secondary to COPD, CHF  Adequately diuresed and patient improved however since yesterday patient started to have worsening cough, fever  Patient oxygen requirement has increased, currently on mid flow, keep patient in step-down 2  Upgrade oxygen need as necessary, keep SaO2 greater than 90%

## 2020-08-03 NOTE — PROGRESS NOTES
Progress Note - Fanny Aguirre 1935, 82 y o  female MRN: [id number]    Unit/Bed#: -01 Encounter: [id number]    Primary Care Provider: Andrew Meraz MD   Date and time admitted to hospital: 7/25/2020  7:31 PM    * Sepsis [facility])  Assessment & Plan  Sepsis as evidenced by fever, tachypnea, leukocytosis, pneumonia  Most likely aspiration pneumonia in the setting of cough and associated vomiting  CT chest:  Ground-glass opacity suggestive of atypical pneumonia vs viral pneumonia  Check COVID, continue ceftriaxone and metronidazole, add azithromycin    Pneumonia  Assessment & Plan  Pneumonia most likely aspiration pneumonia vs viral pneumonia  Continue broad-spectrum antibiotics as above  Continuous pulse ox, keep SaO2 greater than 90 %    Acute on chronic respiratory failure with hypoxia [facility])  Assessment & Plan  Patient admitted for acute respiratory failure with hypoxia most likely secondary to COPD, CHF  Adequately diuresed and patient improved however since yesterday patient started to have worsening cough, fever  Patient oxygen requirement has increased, currently on mid flow, keep patient in step-down 2  Upgrade oxygen need as necessary, keep SaO2 greater than 90%    Acute on chronic systolic CHF (congestive heart failure) ([facility])  Assessment & Plan  Wt Readings from Last 3 Encounters:   08/03/20 72 4 kg (159 lb 9 8 oz)   07/03/20 64 kg (141 lb 1 5 oz)   06/22/20 68 9 kg (152 lb)     · HFrEF, 35-40%  · Adequately diuresed, switch to oral torsemide 20 mg twice daily  · Being treated for COPD exacerbation as well  · Note that patient has frequent admissions for CHF exacerbation  · Repeat chest x-ray yesterday showed improved in aeration    COPD with emphysema ([facility] [address] )  Assessment & Plan  · Continue bronchodilator and steroid  · On Yves steroid dose; 2nd day on 40 mg daily    CKD (chronic kidney disease) stage 4, GFR 15-29 ml/min ([facility])  Assessment & Plan  · Baseline creatinine 1 9-2 1    UTI (urinary tract infection)  Assessment & Plan  Continue antibiotics as above    Dysphagia  Assessment & Plan  Dysphagia to solid foods  Barium swallow done; suboptimal study but no obvious obstruction  Patient continue to have cough with each meal  Most likely micro aspirations  Diet as per speech and swallow recommendation  At a significant risk for aspiration pneumonia  Currently patient on broad-spectrum antibiotics for pneumonia likely 2/2 aspiration    V-tach [facility])  Assessment & Plan  · 7/27 at 4:30 a m , patient had 9 beats nonsustained V-tach  · In the setting of acute CHF exacerbation    · Continue tele monitor  · Cardiologist following; input appreciated    CAD (coronary artery disease), native coronary artery  Assessment & Plan  · Asymptomatic however does have known history of CAD s/p CABG   · Patient not on anti-platelet therapy, severe anemia receives iron infusions  · Follows outpatient with [facility] in [address] [place] [address] [address]  · Continue statin and Eliquis  · Mild flat elevated troponin suspect secondary to CHF exacerbation  · Cardiology following; input appreciated    Type 2 diabetes mellitus with hyperglycemia [facility])  Assessment & Plan  Lab Results   Component Value Date    HGBA1C 7 4 (H) 07/26/2020       Recent Labs     08/02/20  2047 08/03/20  0304 08/03/20  0725 08/03/20  1037   POCGLU 330* 169* 79 158*     Blood Sugar Average: Last 72 hrs:  (P) 862 [id number]   · Increase Lantus to 12 units and Humalog 5 units t i d     Essential hypertension  Assessment & Plan  · Blood pressure currently acceptable systolics in the 089H  · Continue home regimen of carvedilol 3 125 mg BID, amlodipine 5 mg daily  · Hold lisinopril given NANCY  · Monitor kidney function closely  · Monitor with routine vitals    VTE Pharmacologic Prophylaxis:   Pharmacologic: Heparin    Patient Centered Rounds: I have performed bedside rounds with nursing staff today    Discussions with Specialists or Other Care Team Provider:     Education and Discussions with Family / Patient:       Current Length of Stay: 9 day(s)    Current Patient Status: Inpatient   Certification Statement: The patient will continue to require additional inpatient hospital stay due to Sepsis, pneumonia    Discharge Plan:  Patient probably need 3-4 days of IV antibiotics and need to stay in the hospital    Code Status: Level 1 - Full Code      Subjective: Worsening cough, fever, shortness of breath  Objective:     Vitals:   Temp (24hrs), Av 8 °F (37 1 °C), Min:97 8 °F (36 6 °C), Max:100 7 °F (38 2 °C)    Temp:  [97 8 °F (36 6 °C)-100 7 °F (38 2 °C)] 97 8 °F (36 6 °C)  HR:  [57-82] 77  Resp:  [17-20] 17  BP: ()/(38-96) 127/96  SpO2:  [90 %-97 %] 91 %  Body mass index is 27 4 kg/m²  Input and Output Summary (last 24 hours): Intake/Output Summary (Last 24 hours) at 8/3/2020 1121  Last data filed at 8/3/2020 0807  Gross per 24 hour   Intake 598 ml   Output 700 ml   Net -102 ml       Physical Exam:     General appearance: alert, appears stated age and cooperative  Head: Normocephalic, without obvious abnormality, atraumatic  Lungs: clear to auscultation bilaterally  Heart: regular rate and rhythm  Abdomen: soft, non-tender, positive bowel sounds   Back: negative  Extremities: extremities atraumatic, no cyanosis or edema  Neurologic: Alert and oriented X 3, normal strength and tone  Normal symmetric reflexes  Normal coordination and gait    Additional Data:     Labs:    Results from last 7 days   Lab Units 20  0450  20  0734   WBC Thousand/uL 14 13*   < > 1 83*   HEMOGLOBIN g/dL 9 0*   < > 9 3*   HEMATOCRIT % 28 4*   < > 29 9*   PLATELETS Thousands/uL 126*   < > 159   BANDS PCT %  --   --  2   NEUTROS PCT % 87*   < >  --    LYMPHS PCT % 8*   < >  --    LYMPHO PCT %  --   --  40   MONOS PCT % 4   < >  --    MONO PCT %  --   --  6   EOS PCT % 0   < > 0    < > = values in this interval not displayed       Results from last 7 days   Lab Units 08/03/20  0502   SODIUM mmol/L 145   POTASSIUM mmol/L 4 1   CHLORIDE mmol/L 106   CO2 mmol/L 32   BUN mg/dL 95*   CREATININE mg/dL 2 88*   ANION GAP mmol/L 7   CALCIUM mg/dL 7 5*   ALBUMIN g/dL 2 2*   TOTAL BILIRUBIN mg/dL 0 20   ALK PHOS U/L 72   ALT U/L 13   AST U/L 15   GLUCOSE RANDOM mg/dL 135         Results from last 7 days   Lab Units 08/03/20  1037 08/03/20  0725 08/03/20  0304 08/02/20  2047 08/02/20  1527 08/02/20  1057 08/02/20  0708 08/02/20  0338 08/01/20  2042 08/01/20  1622 08/01/20  1157 08/01/20  0821   POC GLUCOSE mg/dl 158* 79 169* 330* 201* 178* 124 197* 208* 112 96 182*         Results from last 7 days   Lab Units 08/03/20  0450 08/02/20  0852   PROCALCITONIN ng/ml 3 74* 0 10           * I Have Reviewed All Lab Data Listed Above  * Additional Pertinent Lab Tests Reviewed:  Lauren 66 Admission Reviewed    Imaging:    Imaging Reports Reviewed Today Include: images reviewed    Recent Cultures (last 7 days):     Results from last 7 days   Lab Units 08/02/20  1010   C DIFF TOXIN B  Negative       Last 24 Hours Medication List:   acetaminophen, 650 mg, Oral, Q6H PRN, Jeanette S Darrel, CRNP  acetylcysteine, 3 mL, Nebulization, Q8H PRN, Daiana Nix PA-C  albuterol, 2 5 mg, Nebulization, Q4H PRN, Jeanette S Darrel, CRNP  amLODIPine, 5 mg, Oral, Daily, KRISTIN Smith  apixaban, 2 5 mg, Oral, BID, Jeanette S Darrel, CRNP  azithromycin, 500 mg, Intravenous, Q24H, Luisana Davis MD  benzonatate, 100 mg, Oral, TID PRN, Adam Simpson MD  carvedilol, 6 25 mg, Oral, BID, Starla Valdez PA-C  cefTRIAXone, 1,000 mg, Intravenous, Q24H, Luisana Davis MD, Last Rate: 1,000 mg (08/03/20 0943)  docusate sodium, 100 mg, Oral, BID, KRISTIN Lala  ferrous gluconate, 324 mg, Oral, Daily Before Breakfast, KRISTIN Lala  fluticasone-vilanterol, 1 puff, Inhalation, Daily, KRISTIN Lala  insulin glargine, 10 Units, Subcutaneous, HS, Luisana Davis MD  insulin lispro, 1-6 Units, Subcutaneous, TID AC, Jeanette Osbornex, CRNP  insulin lispro, 1-6 Units, Subcutaneous, HS, Jeanette S Darrel, CRNP  ipratropium, 0 5 mg, Nebulization, Q6H, Renu Griffin MD  levalbuterol, 1 25 mg, Nebulization, Q6H, Renu Griffin MD  metroNIDAZOLE, 500 mg, Intravenous, Q8H, Renu Griffin MD, Last Rate: 500 mg (08/03/20 1102)  nitroglycerin, 0 4 mg, Sublingual, Q5 Min PRN, Jeanette Osbornex, CRNP  ondansetron, 4 mg, Intravenous, Q6H PRN, Renu Griffin MD  pantoprazole, 20 mg, Oral, Early Morning, KRISTIN Hawk  phenol, 1 spray, Mouth/Throat, Q2H PRN, Daiana Nix PA-C  pravastatin, 80 mg, Oral, Daily With Dinner, Jeanette Rojo, KRISTIN  predniSONE, 40 mg, Oral, Daily, Renu Griffin MD  promethazine, 25 mg, Intravenous, Q6H PRN, Renu Griffin MD  senna, 2 tablet, Oral, HS, Daiana Nix PA-C  torsemide, 20 mg, Oral, After Lunch, [place] JIM Valdez  torsemide, 20 mg, Oral, After Breakfast, Suzanne Zarate MD         Today, Patient Was Seen By: Renu Griffin MD    ** Please Note: Dictation voice to text software may have been used in the creation of this document   **

## 2020-08-03 NOTE — ASSESSMENT & PLAN NOTE
· COVID positive 8/3  · CT - b/l peripheral GGO, more prominent on the right  · Pt family opting for comfort measures

## 2020-08-03 NOTE — CONSULTS
REQUIRED DOCUMENTATION:     1  This service was provided via Telemedicine  2  Provider located at home  3  TeleMed provider: Ginger Pierre DO   4  Identify all parties in room with patient during tele consult: [place]  5  After connecting through [place], patient was identified by name and date of birth  This was a Telemedicine visit and that the exam was being conducted confidentially over secure lines  My office door was closed  No one else was in the room  The assistant stayed in the room in order to assist with the history and to conduct the exam   I have reviewed the patient's medical record in [address] [place] [address] Rd  TeleConsultation - Infectious Disease   Marcial Ice 82 y o  female MRN: [id number]  Unit/Bed#: -01 Encounter: [id number]    IMPRESSION:   · Sepsis with manifestations of fever, tachypnea, leukocytosis, NANCY, acute hypoxic respiratory failure requiring noninvasive ventilation with BiPAP  · COVID-19 infection with hypoxia and increased oxygen requirements noted  · Abnormal CT chest noted with ground-glass opacities  · NANCY:  With rising creatinine in the setting of sepsis  RECOMMENDATIONS:   Supportive care with oxygen supplementation   Type and screen stat as patient is a candidate for plasma convalescent transfusion  I have notified the research team    Consent for plasma convalescent transfusion will have to be obtained by the critical care service   Monitor CRP, ferritin daily   D-dimer every 24-72 hours   Place patient in prone position, which may be challenging while pt is on Bipap   Patient is not a candidate for remdesevir as her GFR is less than 30   Continue empiric cefepime IV and metrondiazole IV for possible secondary bacterial pneumonia as procalcitonin level is rising  Of note, procalcitonin level may be falsely elevated in the setting of NANCY   Discontinue doxycycline po   IV methylprednisolone as per Critical Care service     Anticoagulation deferred to primary and critical care services   Monitor clinical response, temperature curve, WBC count   Update, RN reports that family has decided to change the patient's code status to DNR/DNI and is considering comfort care measures  Extensive review of the medical records in epic including review of the notes, radiographs, and laboratory results  My recommendations were discussed with Dr Marley Johns from the primary service and Dr Karolyn Kaiser from the critical care service  Thank you for allowing me to participate in the care of this patient  The ID service will follow  HISTORY OF PRESENT ILLNESS:  Reason for Consult: COVID-19 infection, hypoxia  Source: EMR, RN  Unable to obtain hx from pt as she is currently on Bipap  HPI: Erik Gilliland is a 82y o  year old woman with past medical history of atrial fibrillation maintained on Eliquis, COPD, CHF who was admitted on July 25th with worsening dyspnea, cough and 9 lb weight gain  Initially in the emergency room she was found to be in significant respiratory distress placed on BiPAP and diuresed  She was admitted to the hospitalist service for further management  During her hospitalization she was initially treated for acute CHF exacerbation, COPD exacerbation  Interestingly, her initial COVID-19 test was negative  Over the past 2 days, she has developed fever and worsening hypoxia with increased oxygen requirements  CT chest demonstrated ground-glass opacity  Covid-19 PCR was repeated and subsequently returned positive  She is now requiring BiPAP and has been transferred to ICU service  Infectious disease service was consulted for further management  REVIEW OF SYSTEMS:  Unable to obtain as patient is currently on BiPAP- otherwise as per HPI      PAST MEDICAL HISTORY:  Past Medical History:   Diagnosis Date    NANCY (acute kidney injury) ([facility] [address] )     Atrial fibrillation ([facility] [address] )     CHF (congestive heart failure) (HCC)     COPD (chronic obstructive pulmonary disease) (HCC)     Diabetes mellitus ([facility] [address] )     Elevated troponin level not due myocardial infarction 2020    [facility] 2012    Trigger finger of thumb     last assessed: 13     Past Surgical History:   Procedure Laterality Date    APPENDECTOMY      CARDIAC PACEMAKER PLACEMENT      CARDIAC PACEMAKER PLACEMENT      CHOLECYSTECTOMY      COLONOSCOPY      Complete    CORONARY ANGIOPLASTY WITH STENT PLACEMENT      CORONARY ARTERY BYPASS GRAFT      CORONARY ARTERY BYPASS GRAFT      ESOPHAGOGASTRODUODENOSCOPY      Diagnostic    HERNIA REPAIR      TOTAL ABDOMINAL HYSTERECTOMY      with removal of both ovaries     FAMILY HISTORY: unable to obtain    SOCIAL HISTORY:  Social History   Social History     Substance and Sexual Activity   Alcohol Use Not Currently     Social History     Substance and Sexual Activity   Drug Use No     Social History     Tobacco Use   Smoking Status Former Smoker    Last attempt to quit: 2011    Years since quittin 1   Smokeless Tobacco Never Used   Tobacco Comment    Former smoker per allscripts     ALLERGIES:  Allergies   Allergen Reactions    Other      PEANUTS-unknown reaction    Nuts Rash     MEDICATIONS:  All current active medications have been reviewed    Scheduled Meds:acetaminophen, 650 mg, Rectal, Q4H PRN, Marychuy Brunoella, JOHNATHONNP  acetaminophen, 650 mg, Oral, Q6H PRN, Marychuy Brunoella, CRNP  amLODIPine, 5 mg, Oral, Daily, Marychuy PHAM Ramella, CRNP  ascorbic acid, 1,000 mg, Oral, BID, Marychuy Brunoella, CRNP  atorvastatin, 40 mg, Oral, Daily With Dinner, Peg Gravel A Ramella, CRNP  benzonatate, 100 mg, Oral, TID PRN, Peg Gravel A Ramella, CRNP  carvedilol, 6 25 mg, Oral, BID, Marychuy Brunoella, JOHNATHONNP  cefepime, 1,000 mg, Intravenous, Q12H, Marychuy Brunoella, JOHNATHONNP  chlorhexidine, 15 mL, Swish & Spit, Q12H [address], Marychuy Brunoella, CRNP  cholecalciferol, 2,000 Units, Oral, Daily, Marychuy Baptiste, CRNP  docusate sodium, 100 mg, Oral, BID, Marychuy Brunoella, JOHNATHONNP  doxycycline hyclate, 100 mg, Oral, Q12H, JOHNATHON LoeraNP  ferrous gluconate, 324 mg, Oral, Daily Before Breakfast, Marychuy Brunoella, JOHNATHONNP  fluticasone-vilanterol, 1 puff, Inhalation, Daily, Marychuy Brunoella, JOHNATHONNP  insulin glargine, 10 Units, Subcutaneous, HS, Marychuy Brunoella, CRNP  insulin lispro, 1-6 Units, Subcutaneous, TID AC, Marychuy Brunoella, CRNP  insulin lispro, 1-6 Units, Subcutaneous, HS, Marychuy Brunoella, JOHNATHONNP  methylPREDNISolone sodium succinate, 40 mg, Intravenous, Q12H KALYAN, JOHNATHON LoeraNP  metroNIDAZOLE, 500 mg, Intravenous, Q8H, Marychuy Brunoella, CRNP, Last Rate: 500 mg (20 1102)  ondansetron, 4 mg, Intravenous, Q6H PRN, JOHNATHON LoeraNP  pantoprazole, 20 mg, Oral, Early Morning, JOHNATHON LoeraNP  senna, 2 tablet, Oral, HS, Marychuy Baptiste, KRISTIN  zinc sulfate, 220 mg, Oral, Daily, JOHNATHON LoeraNP    Continuous Infusions:   PRN Meds:   acetaminophen    acetaminophen    benzonatate    ondansetron    PHYSICAL EXAM:  Temp:  [97 8 °F (36 6 °C)-102 °F (38 9 °C)] 102 °F (38 9 °C)  HR:  [57-82] 65  Resp:  [17-40] 33  BP: ()/(38-96) 137/63  SpO2:  [86 %-97 %] 95 %  Temp (24hrs), Av 3 °F (37 4 °C), Min:97 8 °F (36 6 °C), Max:102 °F (38 9 °C)  Current: Temperature: (!) 102 °F (38 9 °C)    Intake/Output Summary (Last 24 hours) at 8/3/2020 1421  Last data filed at 8/3/2020 1148  Gross per 24 hour   Intake 458 ml   Output 700 ml   Net -242 ml     Limited exam due to telehealth visit which was mostly done by the patient's nurse    General Appearance:  Elderly woman, resting in bed, appears ill, currently on Bipap   Head:  Normocephalic, without obvious abnormality, atraumatic   Eyes:  EOMI   Nose: Not examined as pt has Bipap mask   Throat: Bipap mask intact, lip and tongue dry per RN   Lungs:   Crackles diffuse, pt remains tachypneic on Bipap with FIO2 60% Per RN   Heart:  S1, S2, irregular rate/rhythm  Pt has pacemaker per RN   Abdomen:   Soft, non-distended, mild tenderness to RLQ, normoactive bowel sounds per RN   : No indwelling Vasquez catheter present   Extremities: +1 distal leg edema bilateral   Skin: No rash per RN   Neurologic: Alert and oriented to person, surroundings, time, follows commands, DONG x 4 per RN     LABS, IMAGING, & OTHER STUDIES:  Lab Results:  I have personally reviewed pertinent labs  Results from last 7 days   Lab Units 08/03/20  0450 07/31/20  0536 07/30/20  0734   WBC Thousand/uL 14 13* 3 25* 1 83*   HEMOGLOBIN g/dL 9 0* 9 6* 9 3*   PLATELETS Thousands/uL 126* 159 159     Results from last 7 days   Lab Units 08/03/20  0502   POTASSIUM mmol/L 4 1   CHLORIDE mmol/L 106   CO2 mmol/L 32   BUN mg/dL 95*   CREATININE mg/dL 2 88*   EGFR ml/min/1 73sq m 15   CALCIUM mg/dL 7 5*   AST U/L 15   ALT U/L 13   ALK PHOS U/L 72     Results from last 7 days   Lab Units 08/02/20  1010   C DIFF TOXIN B  Negative     Results from last 7 days   Lab Units 08/03/20  0450 08/02/20  0852   PROCALCITONIN ng/ml 3 74* 0 10       7/29 ECG: QTc 473    Imaging Studies:   8/3 CT chest wo contrast: Patchy peripheral ground glass opacity, more extensive on the right  I have personally reviewed pertinent imaging study reports and images in PACS  VIRTUAL VISIT DISCLAIMER  The online visit is based solely on information provided by the EMR, staff  The pt is unable to provide history at the time of this visit  In the absence of a face-to-face physical evaluation by the physician, the diagnosis provided is both limited and provisional in terms of accuracy and completeness  This is not intended to replace a full medical face-to-face evaluation by the physician

## 2020-08-03 NOTE — ASSESSMENT & PLAN NOTE
Dysphagia to solid foods  Barium swallow done; suboptimal study but no obvious obstruction  Patient continue to have cough with each meal  Most likely micro aspirations  Diet as per speech and swallow recommendation  At a significant risk for aspiration pneumonia  Currently patient on broad-spectrum antibiotics for pneumonia likely 2/2 aspiration

## 2020-08-03 NOTE — PROGRESS NOTES
Progress Note - Nephrology   Marlena Chin 82 y o  female MRN: [id number]  Unit/Bed#: -01 Encounter: [id number]    A/P:  1  Acute kidney injury present on admission  Was admitted with a creatinine of 2 43 which has worsened  This morning her creatinine is 2 8 mg/dL  Nonoliguric:  480/700 (-6979 since admission)  Check urine electrolytes to evaluate volume status  2  Vomiting  May have aspiration pneumonia  She has a high oxygen requirement in spite of diuresis  Check a non contrast CT of the chest  Give antiemetics/antitussives  3 HFrEF 35-40%  Diuresed as noted above  Required Lasix 40mg IV last night  CXR did show improved aeration but oxygen requirement increased  4  Iron deficiency anemia   Iron saturation 6%  Avoid Venofer due to need for antibiotic therapy  5   Acute cystitis with hematuria  Culture grew > 100,000 Enterobacter cloacae sensitive to ceftriaxone          Follow up reason for today's visit: Acute kidney injury    Acute on chronic respiratory failure with hypoxia [facility])    Patient Active Problem List   Diagnosis    Closed fracture of pelvis with routine healing    Essential hypertension    Acute on chronic systolic CHF (congestive heart failure) ([facility])    Other hyperlipidemia    Bruising    Thrombocytopenia ([facility])    Atrophy of left kidney    Renal calculi    COPD with emphysema ([facility] [address] [facility]    Carotid bruit    CKD (chronic kidney disease) stage 4, GFR 15-29 ml/min ([facility])    Esophageal reflux    Hiatal hernia    Intermittent claudication ([facility])    Type 2 diabetes mellitus with hyperglycemia ([address] )    BMI 26 0-26 9,adult    Peripheral circulatory disorder associated with type 2 diabetes mellitus ([facility])    Type 2 diabetes mellitus with stage 3 chronic kidney disease, with long-term current use of insulin ([facility])    Paroxysmal atrial fibrillation ([address] )    CAD (coronary artery disease), native coronary artery    Generalized OA    Atypical angina ([address] )    Hx of CABG    Pacemaker    HFrEF (heart failure with reduced ejection fraction) ([facility])    Acute on chronic combined systolic and diastolic CHF (congestive heart failure) ([facility])    Acute on chronic respiratory failure with hypoxia (H[facility])    COPD (chronic obstructive pulmonary disease) ([facility])    UTI (urinary tract infection)    Anemia    NANCY (acute kidney injury) ([facility])    Iron deficiency anemia    Elevated troponin level not due myocardial infarction    Prolonged Q-T interval on ECG    V-tach (HCC)    Acute renal failure (ARF) ([facility])    Dysphagia         Subjective:   Nauseated, vomiting up thick tan secretions, coughing and has respiratory distress    Objective:     Vitals: Blood pressure 127/96, pulse 77, temperature 97 8 °F (36 6 °C), temperature source Axillary, resp  rate 17, height 5' 4", weight 72 4 kg (159 lb 9 8 oz), SpO2 91 %  ,Body mass index is 27 4 kg/m²  Weight (last 2 days)     Date/Time   Weight    08/03/20 0503   72 4 (159 61)    08/02/20 0550   72 (158 73)                Intake/Output Summary (Last 24 hours) at 8/3/2020 0902  Last data filed at 8/3/2020 0807  Gross per 24 hour   Intake 598 ml   Output 700 ml   Net -102 ml     I/O last 3 completed shifts:   In: 480 [P O :480]  Out: 2075 [Urine:2075]         Physical Exam: /96   Pulse 77   Temp 97 8 °F (36 6 °C) (Axillary)   Resp 17   Ht 5' 4"   Wt 72 4 kg (159 lb 9 8 oz)   LMP  (LMP Unknown)   SpO2 91%   BMI 27 40 kg/m²     General Appearance:    Alert, in respiratory distress due to vomiting and cough appears stated age   Head:    Normocephalic, without obvious abnormality, atraumatic   Eyes:    Conjunctiva/corneas clear   Ears:    Normal external ears   Nose:   Nares normal, septum midline, mucosa normal, no drainage    or sinus tenderness   Throat:   Lips, mucosa, and tongue normal; teeth and gums normal   Neck:   Supple, symmetrical, trachea midline, no adenopathy;        thyroid:  No enlargement/tenderness/nodules; no carotid    bruit or JVD   Back: Symmetric, no curvature, ROM normal, no CVA tenderness   Lungs:     Bilateral exp rhonchi and dec BS  Congested cough    Chest wall:    No tenderness or deformity   Heart:    Regular rate and rhythm, S1 and S2 normal, no murmur, rub   or gallop   Abdomen:     Soft, non-tender, bowel sounds active   Extremities:   Extremities normal, atraumatic, no cyanosis or edema   Skin:   Skin color, texture, turgor normal, no rashes or lesions   Lymph nodes:   Cervical normal   Neurologic:   CNII-XII conversant and moving arms and legs            Lab, Imaging and other studies: I have personally reviewed pertinent labs  CBC:   Lab Results   Component Value Date    WBC 14 13 (H) 08/03/2020    HGB 9 0 (L) 08/03/2020    HCT 28 4 (L) 08/03/2020    MCV 95 08/03/2020     (L) 08/03/2020    MCH 30 1 08/03/2020    MCHC 31 7 08/03/2020    RDW 17 0 (H) 08/03/2020    MPV 13 8 (H) 08/03/2020    NRBC 0 08/03/2020     CMP:   Lab Results   Component Value Date    K 4 1 08/03/2020     08/03/2020    CO2 32 08/03/2020    BUN 95 (H) 08/03/2020    CREATININE 2 88 (H) 08/03/2020    CALCIUM 7 5 (L) 08/03/2020    AST 15 08/03/2020    ALT 13 08/03/2020    ALKPHOS 72 08/03/2020    EGFR 15 08/03/2020           Results from last 7 days   Lab Units 08/03/20  0502 08/02/20  0626 08/01/20  1435   POTASSIUM mmol/L 4 1 4 5 4 8   CHLORIDE mmol/L 106 106 105   CO2 mmol/L 32 31 35*   BUN mg/dL 95* 98* 98*   CREATININE mg/dL 2 88* 2 65* 2 66*   CALCIUM mg/dL 7 5* 8 1* 7 5*   ALK PHOS U/L 72  --   --    ALT U/L 13  --   --    AST U/L 15  --   --          Phosphorus: No results found for: PHOS  Magnesium: No results found for: MG  Urinalysis: No results found for: COLORU, CLARITYU, SPECGRAV, PHUR, LEUKOCYTESUR, NITRITE, PROTEINUA, GLUCOSEU, KETONESU, BILIRUBINUR, BLOODU  Ionized Calcium: No results found for: CAION  Coagulation: No results found for: PT, INR, APTT  Troponin: No results found for: TROPONINI  ABG: No results found for: PHART, HAI9COT, NICOLA Hart, SOURCE  Radiology review:     IMAGING  Procedure: Xr Chest Portable    Result Date: 8/1/2020  Narrative: CHEST INDICATION:   Shortness of breath  COMPARISON:  7/27/2020 EXAM PERFORMED/VIEWS:  XR CHEST PORTABLE FINDINGS:  Left transvenous pacemaker leads are unchanged in position  Cardiomediastinal silhouette appears unremarkable  There is improved aeration within the right lower lobe, likely improving pleural effusion  No pneumothorax  Osseous structures appear within normal limits for patient age  Impression: Improved aeration within the right lower lobe, likely improving effusion  Workstation performed: [id number]     Procedure: Fl Barium Swallow    Result Date: 7/31/2020  Narrative: BARIUM SWALLOW-ESOPHAGRAM INDICATION:   dysphagia, feeling of stuck at the lower neck  COMPARISON:  None IMAGES:  34, including cine clips  FLUOROSCOPY TIME:   1 3 MINUTES  TECHNIQUE: Limited barium esophagram was attempted following the per oral administration of thin barium, with the patient lying in LPO position  The patient was unable to stand for proper barium swallow examination  FINDINGS: The examination is markedly limited as the patient was unable to drink enough volumes of thin barium for adequate assessment of the esophagus  There was marked delay in oropharyngeal phase of swallowing  Within limitations of the study, no gross mass is seen in the esophagus  The  images demonstrate left chest wall pacemaker, mediastinal clips and median sternotomy  Degenerative changes are noted in the spine  The barium swallow     Impression: 1  Markedly limited barium esophagram as the patient was unable to drink enough volumes of thin barium for adequate assessment of the esophagus  Patient was unable to stand and the study was performed while lying in LPO position  2   Within limitations of the study, no gross mass is seen in the esophagus    There was delay in oropharyngeal phase of swallowing   Workstation performed: [id number]       Current Facility-Administered Medications   Medication Dose Route Frequency    acetaminophen (TYLENOL) tablet 650 mg  650 mg Oral Q6H PRN    acetylcysteine (MUCOMYST) 200 mg/mL inhalation solution 600 mg  3 mL Nebulization Q8H PRN    albuterol inhalation solution 2 5 mg  2 5 mg Nebulization Q4H PRN    amLODIPine (NORVASC) tablet 5 mg  5 mg Oral Daily    apixaban (ELIQUIS) tablet 2 5 mg  2 5 mg Oral BID    benzonatate (TESSALON PERLES) capsule 100 mg  100 mg Oral TID PRN    carvedilol (COREG) tablet 6 25 mg  6 25 mg Oral BID    cefTRIAXone (ROCEPHIN) IVPB (premix) 1,000 mg 50 mL  1,000 mg Intravenous Q24H    docusate sodium (COLACE) capsule 100 mg  100 mg Oral BID    ferrous gluconate (FERGON) tablet 324 mg  324 mg Oral Daily Before Breakfast    fluticasone-vilanterol (BREO ELLIPTA) 100-25 mcg/inh inhaler 1 puff  1 puff Inhalation Daily    insulin glargine (LANTUS) subcutaneous injection 10 Units 0 1 mL  10 Units Subcutaneous HS    insulin lispro (HumaLOG) 100 units/mL subcutaneous injection 1-6 Units  1-6 Units Subcutaneous TID AC    insulin lispro (HumaLOG) 100 units/mL subcutaneous injection 1-6 Units  1-6 Units Subcutaneous HS    ipratropium (ATROVENT) 0 02 % inhalation solution 0 5 mg  0 5 mg Nebulization Q6H    levalbuterol (XOPENEX) inhalation solution 1 25 mg  1 25 mg Nebulization Q6H    metroNIDAZOLE (FLAGYL) IVPB (premix) 500 mg 100 mL  500 mg Intravenous Q8H    nitroglycerin (NITROSTAT) SL tablet 0 4 mg  0 4 mg Sublingual Q5 Min PRN    ondansetron (ZOFRAN) injection 4 mg  4 mg Intravenous Q6H PRN    pantoprazole (PROTONIX) EC tablet 20 mg  20 mg Oral Early Morning    phenol (CHLORASEPTIC) 1 4 % mucosal liquid 1 spray  1 spray Mouth/Throat Q2H PRN    pravastatin (PRAVACHOL) tablet 80 mg  80 mg Oral Daily With Dinner    predniSONE tablet 40 mg  40 mg Oral Daily    promethazine (PHENERGAN) injection 25 mg  25 mg Intramuscular Q6H PRN  senna (SENOKOT) tablet 17 2 mg  2 tablet Oral HS    torsemide (DEMADEX) tablet 20 mg  20 mg Oral After Lunch    torsemide (DEMADEX) tablet 20 mg  20 mg Oral After Breakfast     Medications Discontinued During This Encounter   Medication Reason    albuterol inhalation solution 2 5 mg     ipratropium-albuterol (DUO-NEB) 0 5-2 5 mg/3 mL inhalation solution 3 mL     insulin glargine (LANTUS) subcutaneous injection 8 Units 0 08 mL     calcium carbonate (TUMS) chewable tablet 500 mg     furosemide (LASIX) injection 40 mg     furosemide (LASIX) injection 40 mg     ipratropium-albuterol (DUO-NEB) 0 5-2 5 mg/3 mL inhalation solution 3 mL     pantoprazole (PROTONIX) EC tablet 40 mg     insulin glargine (LANTUS) subcutaneous injection 10 Units 0 1 mL     furosemide (LASIX) injection 40 mg     insulin glargine (LANTUS) subcutaneous injection 15 Units 0 15 mL     furosemide (LASIX) injection 80 mg     carvedilol (COREG) tablet 3 125 mg     lisinopril (ZESTRIL) tablet 2 5 mg     insulin glargine (LANTUS) subcutaneous injection 12 Units 0 12 mL     insulin glargine (LANTUS) subcutaneous injection 10 Units 0 1 mL     amLODIPine (NORVASC) tablet 5 mg     aluminum-magnesium hydroxide-simethicone (MYLANTA) 200-200-20 mg/5 mL oral suspension 30 mL     pantoprazole (PROTONIX) EC tablet 80 mg     insulin glargine (LANTUS) subcutaneous injection 12 Units 0 12 mL     insulin lispro (HumaLOG) 100 units/mL subcutaneous injection 5 Units     insulin glargine (LANTUS) subcutaneous injection 18 Units 0 18 mL     insulin glargine (LANTUS) subcutaneous injection 21 Units 0 21 mL     methylPREDNISolone sodium succinate (Solu-MEDROL) injection 40 mg     methylPREDNISolone sodium succinate (Solu-MEDROL) injection 40 mg     pantoprazole (PROTONIX) EC tablet 40 mg     ipratropium (ATROVENT) 0 02 % inhalation solution 0 5 mg     levalbuterol (XOPENEX) inhalation solution 1 25 mg     ipratropium (ATROVENT) 0 02 % inhalation solution 0 5 mg     levalbuterol (XOPENEX) inhalation solution 1 25 mg     insulin lispro (HumaLOG) 100 units/mL subcutaneous injection 8 Units     insulin glargine (LANTUS) subcutaneous injection 15 Units 0 15 mL     torsemide (DEMADEX) tablet 40 mg     methylPREDNISolone sodium succinate (Solu-MEDROL) injection 40 mg     acetaminophen (TYLENOL) oral suspension 650 mg        Joao Subramanian MD      This progress note was produced in part using a dictation device which may document imprecise wording from author's original intent

## 2020-08-03 NOTE — ASSESSMENT & PLAN NOTE
· Secondary to COVID, aspiration, and bacterial pneumonia all superimposed on COPD  · 8/3 Increasing O2 requirements overnight and into this am, placed on Midflow without improvement  · Pt now requiring Bipap 16/8 60%  · Pt's family now opting for comfort measure only, will keep on Bipap until family arrive    · Now on 6L midflow

## 2020-08-03 NOTE — PROGRESS NOTES
Daily Progress Note - Critical Care   Josey Barrera 82 y o  female MRN: [id number]  Unit/Bed#: -01 Encounter: [id number]        ----------------------------------------------------------------------------------------  HPI:  Pt with history of CHF, COPD on home O2, Afib on Eliquis, admitted 7/25 to the medicine service for acute CHF exacerbation requiring Bipap and IV diuretics  Pt stabilized and was doing well on nasal cannula, completed 5 day course of ABX for UTI and possible pneumonia   Over the weekend nursing staff was concerned for aspiration as patient was coughing with food  ABX were restarted and speech eval was completed with no overt signs of aspiration but recommended GI follow up     24hr events: Overnight and into this morning pt had increasing O2 requirements, was placed on non-rebreather, and then midflow 15L with continued hypoxia and respiratory distress, SpO2 86% and RR 40's  Pt was transferred to the ICU as a SD2 patient and placed on Bipap  CT chest was completed and revealed b/l patchy GGO concerning for COVID  Pt became febrile 102, COVID swab was performed and resulted positive x 2  Pt was transferred to the ICU service for severe COVID    After discussion with family (see ACP note), decision was made to pursue comfort measures only as patient's comorbid conditions would make her survival with COVID very low      ---------------------------------------------------------------------------------------  SUBJECTIVE  "I feel better"  - in regards to breathing while on Bipap    Review of Systems  Review of systems was unable to be performed secondary to acute respiratory distress  ---------------------------------------------------------------------------------------  Assessment and Plan:    Comfort Care  o Plan: prn Dilaudid and Ativan,   o Tylenol suppository prn  o Transition off Bipap once family arrives  o Pleasure feeds  o Family agrees to limited visitors, full PPE, and 14 day quarantine after visitation      Neuro:    Diagnosis: No active issues  o Plan: prn Dilaudid and Ativan for comfort      CV:    Diagnosis: acute on chronic CHF, CAD, Afib on Eliquis, HTN  o Plan: Pt now comfort measures  o IV Lasix for WOB secondary to covid and fluid overload      Pulm:   Diagnosis: Acute on chronic respiratory failure secondary to COPD, CHF, aspiration pneumonia, and COVID-19 pneumonia  o Plan: Transition off Bipap once family arrives  o Lasix for decrease in WOB      GI:    Diagnosis: Dysphagia, GERD  o Plan: Pleasure feeds      :    Diagnosis: ARF on CKD3  o Plan: IV lasix  o Offer fowler for comfort measures      F/E/N:    Plan: pleasure feeds      Heme/Onc:    Diagnosis: Iron deficiency anemia  o Plan: comfort measures, no interventions      Endo:    Diagnosis: Insulin dependent DM2  o Plan: d/c insulin and accuchecks      ID:    Diagnosis: Sepsis pneumonia secondary to aspiration and COVID  o Plan: ID consulted for COVID diagnosis - pt now comfort care  o D/c ABX as they will not promote comfort/symptom mgmt   Diagnosis: Sepsis secondary to UTI  o Plan: resolved      MSK/Skin:    Diagnosis: No active issues  o Plan: Turn and repo for comfort      Disposition: Transfer to Hospice  Code Status: Level 4 - Comfort Care  ---------------------------------------------------------------------------------------  ICU CORE MEASURES    Prophylaxis   VTE Pharmacologic Prophylaxis: discontinued, pt is now comfort measures only  VTE Mechanical Prophylaxis: sequential compression device  Stress Ulcer Prophylaxis: Prophylaxis Not Indicated     ABCDE Protocol (if indicated)  Plan to perform spontaneous awakening trial today? Not applicable  Plan to perform spontaneous breathing trial today? Not applicable  Obvious barriers to extubation?  Not applicable  CAM-ICU: Negative    Invasive Devices Review  Invasive Devices     Peripheral Intravenous Line            Peripheral IV 07/31/20 [facility] (posterior); Right Hand 2 days          Drain            External Urinary Catheter 1 day              Can any invasive devices be discontinued today? Not applicable  ---------------------------------------------------------------------------------------  OBJECTIVE    Vitals   Vitals:    20 1200 20 1230 20 1300 20 1330   BP: 129/58 154/66 137/63 138/61   BP Location: Left arm Left arm Left arm Left arm   Pulse: 67 63 65 63   Resp: (!) 32 (!) 28 (!) 33 (!) 30   Temp:    99 7 °F (37 6 °C)   TempSrc:    Axillary   SpO2: 95% 97% 95% 97%   Weight:       Height:         Temp (24hrs), Av 4 °F (37 4 °C), Min:97 8 °F (36 6 °C), Max:102 °F (38 9 °C)  Current: Temperature: 99 7 °F (37 6 °C)      Respiratory:  SpO2: SpO2: 97 %, SpO2 Device: O2 Device: (bipap)  Nasal Cannula O2 Flow Rate (L/min): 15 L/min    Invasive/non-invasive ventilation settings   Respiratory    Lab Data (Last 4 hours)    None         O2/Vent Data (Last 4 hours)    None                Physical Exam  Constitutional:       Appearance: She is well-developed and well-nourished  Cardiovascular:      Rate and Rhythm: Rhythm irregular  Pulses: Intact distal pulses  Pulses are palpable  Heart sounds: S1 normal and S2 normal  No murmur  No friction rub  No gallop  Pulmonary:      Effort: Tachypnea present  No respiratory distress  Breath sounds: Decreased breath sounds, rhonchi and rales present  No wheezing  Abdominal:      General: Bowel sounds are normal  There is no distension  Palpations: Abdomen is soft  Tenderness: There is abdominal tenderness in the right upper quadrant and right lower quadrant  Musculoskeletal:         General: Edema present  Skin:     General: Skin is warm and dry  Capillary Refill: Capillary refill takes less than 2 seconds  Neurological:      Mental Status: She is alert and oriented to person, place, and time           Laboratory and Diagnostics:  Results from last 7 days   Lab Units 08/03/20  0450 07/31/20  0536 07/30/20  0734 07/30/20  0614 07/28/20  0452   WBC Thousand/uL 14 13* 3 25* 1 83* 1 86* 6 46   HEMOGLOBIN g/dL 9 0* 9 6* 9 3* 8 8* 8 8*   HEMATOCRIT % 28 4* 30 4* 29 9* 29 0* 29 3*   PLATELETS Thousands/uL 126* 159 159 152 179   NEUTROS PCT % 87* 65  --  53  --    BANDS PCT %  --   --  2  --   --    MONOS PCT % 4 9  --  3*  --    MONO PCT %  --   --  6  --   --      Results from last 7 days   Lab Units 08/03/20  0502 08/02/20  0626 08/01/20  1435 07/31/20  0536 07/30/20  0614 07/29/20  0357 07/28/20  0452   SODIUM mmol/L 145 146* 144 146* 141 145 144   POTASSIUM mmol/L 4 1 4 5 4 8 4 3 4 6 4 1 4 3   CHLORIDE mmol/L 106 106 105 105 103 105 106   CO2 mmol/L 32 31 35* 33* 32 32 33*   ANION GAP mmol/L 7 9 4 8 6 8 5   BUN mg/dL 95* 98* 98* 93* 74* 68* 63*   CREATININE mg/dL 2 88* 2 65* 2 66* 2 75* 2 83* 2 85* 2 49*   CALCIUM mg/dL 7 5* 8 1* 7 5* 7 9* 7 8* 8 1* 8 4   GLUCOSE RANDOM mg/dL 135 149* 108 228* 269* 78 69   ALT U/L 13  --   --   --   --   --   --    AST U/L 15  --   --   --   --   --   --    ALK PHOS U/L 72  --   --   --   --   --   --    ALBUMIN g/dL 2 2*  --   --   --   --   --   --    TOTAL BILIRUBIN mg/dL 0 20  --   --   --   --   --   --      Results from last 7 days   Lab Units 07/31/20  0536 07/30/20  0614   MAGNESIUM mg/dL 2 5 2 3   PHOSPHORUS mg/dL 5 3*  --                    ABG:    VBG:    Results from last 7 days   Lab Units 08/03/20  0450 08/02/20  0852   PROCALCITONIN ng/ml 3 74* 0 10       Micro  Results from last 7 days   Lab Units 08/02/20  1010   C DIFF TOXIN B  Negative       EKG: A fib rate 60 - 70  Imaging: CT chest I have personally reviewed pertinent reports  and I have personally reviewed pertinent films in PACS    Intake and Output  I/O       08/01 0701 - 08/02 0700 08/02 0701 - 08/03 0700 08/03 0701 - 08/04 0700    P  O  680 480 118    I V  (mL/kg)       IV Piggyback   100    Total Intake(mL/kg) 680 (9 4) 480 (6 6) 218 (3)    Urine (mL/kg/hr) 1875 (1 1) 700 (0 4)     Stool       Total Output 1875 700     Net -1195 -220 +218           Unmeasured Urine Occurrence  4 x     Unmeasured Stool Occurrence   2 x        UOP: inaccurate I/O     Height and Weights   Height: 5' 4"  IBW: 54 7 kg  Body mass index is 27 4 kg/m²  Weight (last 2 days)     Date/Time   Weight    08/03/20 0902   72 4 (159 61)    08/03/20 0503   72 4 (159 61)    08/02/20 0550   72 (158 73)                Nutrition    Pleasure feeds      Active Medications  Scheduled Meds:acetaminophen, 650 mg, Rectal, Q4H PRN, Marychuy A Ramella, CRNP  furosemide, 80 mg, Intravenous, BID (diuretic), Marychuy A Ramella, CRNP  HYDROmorphone, 0 2 mg, Intravenous, Q1H PRN, Marychuy A Ramella, CRNP  LORazepam, 0 5 mg, Intravenous, Q2H PRN, Marychuy A Ramella, CRNP  ondansetron, 4 mg, Intravenous, Q6H PRN, Marychuy A Ramella, CRNP      Continuous Infusions:     PRN Meds:   acetaminophen, 650 mg, Q4H PRN  HYDROmorphone, 0 2 mg, Q1H PRN  LORazepam, 0 5 mg, Q2H PRN  ondansetron, 4 mg, Q6H PRN        Allergies   Allergies   Allergen Reactions    Other      PEANUTS-unknown reaction    Nuts Rash     ---------------------------------------------------------------------------------------  Advance Directive and Living Will: Yes    Power of :    POLST: Yes  ---------------------------------------------------------------------------------------  Care Time Delivered:   No Critical Care time spent     [employer], CRNP      Portions of the record may have been created with voice recognition software  Occasional wrong word or "sound a like" substitutions may have occurred due to the inherent limitations of voice recognition software    Read the chart carefully and recognize, using context, where substitutions have occurred

## 2020-08-03 NOTE — ASSESSMENT & PLAN NOTE
Wt Readings from Last 3 Encounters:   08/03/20 72 4 kg (159 lb 9 8 oz)   07/03/20 64 kg (141 lb 1 5 oz)   06/22/20 68 9 kg (152 lb)     · HFrEF, 35-40%  · Adequately diuresed, switch to oral torsemide 20 mg twice daily  · Being treated for COPD exacerbation as well  · Note that patient has frequent admissions for CHF exacerbation  · Repeat chest x-ray yesterday showed improved in aeration

## 2020-08-03 NOTE — RESPIRATORY THERAPY NOTE
RT Protocol Note  Teresita Ro 82 y o  female MRN: [id number]  Unit/Bed#: -01 Encounter: [id number]    Assessment    Principal Problem:    Acute on chronic respiratory failure with hypoxia ([facility] )  Active Problems:    Essential hypertension    Acute on chronic systolic CHF (congestive heart failure) ([facility])    COPD with emphysema ([facility])    CKD (chronic kidney disease) stage 4, GFR 15-29 ml/min ([facility])    Esophageal reflux    Type 2 diabetes mellitus with hyperglycemia ([facility])    Paroxysmal atrial fibrillation ([facility])    CAD (coronary artery disease), native coronary artery    UTI (urinary tract infection)    Iron deficiency anemia    Elevated troponin level not due myocardial infarction    Prolonged Q-T interval on ECG    V-tach ([facility])    Acute renal failure (ARF) ([facility])    Dysphagia      Home Pulmonary Medications:         Past Medical History:   Diagnosis Date    NANCY (acute kidney injury) ([facility] )     Atrial fibrillation ([facility] )     CHF (congestive heart failure) ([facility])     COPD (chronic obstructive pulmonary disease) ([facility] )     Diabetes mellitus ([facility] )     Elevated troponin level not due myocardial infarction 2020    Paroxysmal atrial fibrillation ([facility] ) 2012    Trigger finger of thumb     last assessed: 13     Social History     Socioeconomic History    Marital status:       Spouse name: None    Number of children: None    Years of education: None    Highest education level: None   Occupational History    None   Social Needs    Financial resource strain: None    Food insecurity     Worry: Never true     Inability: Never true    Transportation needs     Medical: No     Non-medical: No   Tobacco Use    Smoking status: Former Smoker     Last attempt to quit: 2011     Years since quittin 1    Smokeless tobacco: Never Used    Tobacco comment: Former smoker per allscripts   Substance and Sexual Activity    Alcohol use: Not Currently    Drug use: No    Sexual activity: Not Currently   Lifestyle    Physical activity     Days per week: None     Minutes per session: None    Stress: None   Relationships    Social connections     Talks on phone: None     Gets together: None     Attends [employer] service: None     Active member of club or organization: None     Attends meetings of clubs or organizations: None     Relationship status: None    Intimate partner violence     Fear of current or ex partner: None     Emotionally abused: None     Physically abused: None     Forced sexual activity: None   Other Topics Concern    None   Social History Narrative    None       Subjective    Subjective Data: has not used bipap since admission     Objective    Physical Exam:        Vitals:  Blood pressure 127/96, pulse 77, temperature 97 8 °F (36 6 °C), temperature source Axillary, resp  rate 17, height 5' 4", weight 72 4 kg (159 lb 9 8 oz), SpO2 91 %  Imaging and other studies: I have personally reviewed pertinent reports  O2 Device: 4LPM NC     Plan    Respiratory Plan: Mild Distress pathway        Resp Comments: Pt with harsh/ NP cough  BS coarse crackles  No wheeze

## 2020-08-04 PROCEDURE — 99233 SBSQ HOSP IP/OBS HIGH 50: CPT | Performed by: NURSE PRACTITIONER

## 2020-08-04 PROCEDURE — 94760 N-INVAS EAR/PLS OXIMETRY 1: CPT

## 2020-08-04 RX ORDER — GLYCOPYRROLATE 0.2 MG/ML
0.1 INJECTION INTRAMUSCULAR; INTRAVENOUS
Status: DISCONTINUED | OUTPATIENT
Start: 2020-08-04 | End: 2020-08-05 | Stop reason: HOSPADM

## 2020-08-04 RX ORDER — OXYCODONE HCL 5 MG/5 ML
5 SOLUTION, ORAL ORAL EVERY 4 HOURS PRN
Status: DISCONTINUED | OUTPATIENT
Start: 2020-08-04 | End: 2020-08-05 | Stop reason: HOSPADM

## 2020-08-04 RX ORDER — ONDANSETRON 4 MG/1
4 TABLET, ORALLY DISINTEGRATING ORAL EVERY 6 HOURS PRN
Status: DISCONTINUED | OUTPATIENT
Start: 2020-08-04 | End: 2020-08-05

## 2020-08-04 RX ORDER — LORAZEPAM 2 MG/ML
1 INJECTION INTRAMUSCULAR EVERY 4 HOURS PRN
Status: DISCONTINUED | OUTPATIENT
Start: 2020-08-04 | End: 2020-08-04

## 2020-08-04 RX ORDER — FUROSEMIDE 10 MG/ML
40 SOLUTION ORAL ONCE
Status: COMPLETED | OUTPATIENT
Start: 2020-08-04 | End: 2020-08-04

## 2020-08-04 RX ADMIN — OXYCODONE HYDROCHLORIDE 5 MG: 5 SOLUTION ORAL at 11:19

## 2020-08-04 RX ADMIN — MORPHINE SULFATE 2 MG: 2 INJECTION, SOLUTION INTRAMUSCULAR; INTRAVENOUS at 16:12

## 2020-08-04 RX ADMIN — ONDANSETRON 4 MG: 4 TABLET, ORALLY DISINTEGRATING ORAL at 15:37

## 2020-08-04 RX ADMIN — ACETAMINOPHEN 650 MG: 650 SUPPOSITORY RECTAL at 15:37

## 2020-08-04 RX ADMIN — ONDANSETRON 4 MG: 4 TABLET, ORALLY DISINTEGRATING ORAL at 00:31

## 2020-08-04 RX ADMIN — ONDANSETRON 4 MG: 4 TABLET, ORALLY DISINTEGRATING ORAL at 08:18

## 2020-08-04 RX ADMIN — FUROSEMIDE 40 MG: 10 SOLUTION ORAL at 11:30

## 2020-08-04 RX ADMIN — OXYCODONE HYDROCHLORIDE 5 MG: 5 SOLUTION ORAL at 15:21

## 2020-08-04 NOTE — ASSESSMENT & PLAN NOTE
Lab Results   Component Value Date    HGBA1C 7 4 (H) 07/26/2020       Recent Labs     08/02/20  2047 08/03/20  0304 08/03/20  0725 08/03/20  1037   POCGLU 330* 169* 79 158*       Blood Sugar Average: Last 72 hrs:  (P) 161     · Medications stopped  · Pt transitioned to comfort care on 8/3

## 2020-08-04 NOTE — QUICK NOTE
Renal quick note:  Patient is now level 4 comfort care measures only  Renal will sign off  Thank you for the consult  -ANKIT Willis

## 2020-08-04 NOTE — PROGRESS NOTES
Progress Note - Bobo Howard 1935, 82 y o  female MRN: [id number]    Unit/Bed#: -01 Encounter: [id number]    Primary Care Provider: Kelly Harman MD   Date and time admitted to hospital: 7/25/2020  7:31 PM        Acute on chronic respiratory failure with hypoxia [facility])  Assessment & Plan  · Secondary to COVID, aspiration, and bacterial pneumonia all superimposed on COPD  · 8/3 Increasing O2 requirements overnight and into this am, placed on Midflow without improvement  · Pt now requiring Bipap 16/8 60%  · Pt's family now opting for comfort measure only, will keep on Bipap until family arrive  · Now on 6L midflow    * Pneumonia due to COVID-19 virus  Assessment & Plan  · COVID positive 8/3  · CT - b/l peripheral GGO, more prominent on the right  · Pt family opting for comfort measures    COPD with emphysema ([facility] )  Assessment & Plan  · Continue O2 support  · Medications stopped- pt transitioned to comfort care on 8/3    Acute on chronic systolic CHF (congestive heart failure) ([facility])  Assessment & Plan  Wt Readings from Last 3 Encounters:   08/03/20 72 4 kg (159 lb 9 8 oz)   07/03/20 64 kg (141 lb 1 5 oz)   06/22/20 68 9 kg (152 lb)         · Last EF from 6/2020 35-40%  · Pt transitioned to comfort care   Home meds stopped    Paroxysmal atrial fibrillation ([facility] )  Assessment & Plan  · Home meds held  · Pt transitioned to comfort care    CAD (coronary artery disease), native coronary artery  Assessment & Plan  · Medications stopped  · Pt transitioned to comfort care on 8/3    Essential hypertension  Assessment & Plan  · Medications stopped  · Pt transitioned to comfort care on 8/3    Esophageal reflux  Assessment & Plan  · Medications stopped  · Pt transitioned to comfort care on 8/3    Dysphagia  Assessment & Plan  · Comfort care  · Pleasure feeds as able    Type 2 diabetes mellitus with hyperglycemia [facility])  Assessment & Plan  Lab Results   Component Value Date    HGBA1C 7 4 (H) 07/26/2020 Recent Labs     20  0304 20  0725 20  1037   POCGLU 330* 169* 79 158*       Blood Sugar Average: Last 72 hrs:  (P) 161     · Medications stopped  · Pt transitioned to comfort care on 8/3    Iron deficiency anemia  Assessment & Plan  · No further lab draws  · Pt transitioned to comfort care on 8/3        ----------------------------------------------------------------------------------------  HPI/24hr events: Pt transferred to critical care yesterday due to increasing O2 requirements, Was up to 15L on midflow  CT chest showed GGO, febrole to 102 and covid retest was positive  Discussed w family and decision was made to pursue comfort measures  No acute events overnight  Had 1-2 episodes of nausea/ vomiting  Sats remain mid to upper 80's on 6L O2  Disposition: Transfer to in hospice vs  SLIM service  Code Status: Level 4 - Comfort Care  ---------------------------------------------------------------------------------------  SUBJECTIVE    Review of Systems   Respiratory: Positive for cough  Gastrointestinal: Positive for nausea  All other systems reviewed and are negative      Review of systems was reviewed and negative unless stated above in HPI/24-hour events   ---------------------------------------------------------------------------------------  OBJECTIVE    Vitals   Vitals:    20 2145 20/20 0000 20 0101   BP:  133/65     BP Location:       Pulse:  84     Resp:  (!) 29     Temp:   99 1 °F (37 3 °C)    TempSrc:   Oral    SpO2: (!) 86%  (!) 89% (!) 88%   Weight:       Height:         Temp (24hrs), Av 5 °F (37 5 °C), Min:97 8 °F (36 6 °C), Max:102 °F (38 9 °C)  Current: Temperature: 99 1 °F (37 3 °C)          Respiratory:  SpO2: SpO2: (!) 88 %  , SpO2 Activity: SpO2 Activity: At Rest, SpO2 Device: O2 Device: Nasal cannula  Nasal Cannula O2 Flow Rate (L/min): 6 L/min    Invasive/non-invasive ventilation settings   Respiratory Lab Data (Last 4 hours)    None         O2/Vent Data (Last 4 hours)    None                Physical Exam  Constitutional:       General: She is not in acute distress  Appearance: She is not diaphoretic  HENT:      Head: Normocephalic and atraumatic  Comments: Lips red and dryEyes:      Pupils: Pupils are equal, round, and reactive to light  Neck:      Trachea: No tracheal deviation  Cardiovascular:      Rate and Rhythm: Tachycardia present  Pulses: Pulses are palpable  Heart sounds: Normal heart sounds  Pulmonary:      Breath sounds: No wheezing  Comments: Decreased, coarse, some intermittent tachypnea  Abdominal:      General: There is no distension  Palpations: Abdomen is soft  Genitourinary:     Comments: Vasquez- draining clear yellow  Musculoskeletal:      Comments: DONG spontaneously   Skin:     General: Skin is warm and dry  Capillary Refill: Capillary refill takes less than 2 seconds  Neurological:      Mental Status: She is alert and oriented to person, place, and time           Laboratory and Diagnostics:  Results from last 7 days   Lab Units 08/03/20  0450 07/31/20  0536 07/30/20  0734 07/30/20  0614   WBC Thousand/uL 14 13* 3 25* 1 83* 1 86*   HEMOGLOBIN g/dL 9 0* 9 6* 9 3* 8 8*   HEMATOCRIT % 28 4* 30 4* 29 9* 29 0*   PLATELETS Thousands/uL 126* 159 159 152   NEUTROS PCT % 87* 65  --  53   BANDS PCT %  --   --  2  --    MONOS PCT % 4 9  --  3*   MONO PCT %  --   --  6  --      Results from last 7 days   Lab Units 08/03/20  0502 08/02/20  0626 08/01/20  1435 07/31/20  0536 07/30/20  0614 07/29/20  0357   SODIUM mmol/L 145 146* 144 146* 141 145   POTASSIUM mmol/L 4 1 4 5 4 8 4 3 4 6 4 1   CHLORIDE mmol/L 106 106 105 105 103 105   CO2 mmol/L 32 31 35* 33* 32 32   ANION GAP mmol/L 7 9 4 8 6 8   BUN mg/dL 95* 98* 98* 93* 74* 68*   CREATININE mg/dL 2 88* 2 65* 2 66* 2 75* 2 83* 2 85*   CALCIUM mg/dL 7 5* 8 1* 7 5* 7 9* 7 8* 8 1*   GLUCOSE RANDOM mg/dL 135 149* 108 228* 269* 78   ALT U/L 13  --   --   --   --   --    AST U/L 15  --   --   --   --   --    ALK PHOS U/L 72  --   --   --   --   --    ALBUMIN g/dL 2 2*  --   --   --   --   --    TOTAL BILIRUBIN mg/dL 0 20  --   --   --   --   --      Results from last 7 days   Lab Units 07/31/20  0536 07/30/20  0614   MAGNESIUM mg/dL 2 5 2 3   PHOSPHORUS mg/dL 5 3*  --                    ABG:    VBG:    Results from last 7 days   Lab Units 08/03/20  0450 08/02/20  0852   PROCALCITONIN ng/ml 3 74* 0 10       Micro  Results from last 7 days   Lab Units 08/02/20  1010   C DIFF TOXIN B  Negative       EKG: off tele  Imaging: no new imaging    Intake and Output  I/O       08/02 0701 - 08/03 0700 08/03 0701 - 08/04 0700    P  O  480 118    IV Piggyback  100    Total Intake(mL/kg) 480 (6 6) 218 (3)    Urine (mL/kg/hr) 700 (0 4) 900 (0 5)    Stool  0    Total Output 700 900    Net -220 -682          Unmeasured Urine Occurrence 4 x     Unmeasured Stool Occurrence  2 x          Height and Weights   Height: 5' 4"  IBW: 54 7 kg  Body mass index is 27 4 kg/m²    Weight (last 2 days)     Date/Time   Weight    08/03/20 0902   72 4 (159 61)    08/03/20 0503   72 4 (159 61)    08/02/20 0550   72 (158 73)                Nutrition        Active Medications  Scheduled Meds:acetaminophen, 650 mg, Rectal, Q4H PRN, Marychuy A Ramella, CRNP  furosemide, 80 mg, Intravenous, BID (diuretic), Marychuy A Ramella, CRNP  HYDROmorphone, 0 2 mg, Intravenous, Q1H PRN, Marychuy A Ramella, CRNP  LORazepam, 0 5 mg, Intravenous, Q2H PRN, Marychuy A Ramella, CRNP  ondansetron, 4 mg, Intravenous, Q6H PRN, Marychuy A Ramella, CRNP  ondansetron, 4 mg, Oral, Q6H PRN, Osmany Basque, CRNP      Continuous Infusions:     PRN Meds:   acetaminophen, 650 mg, Q4H PRN  HYDROmorphone, 0 2 mg, Q1H PRN  LORazepam, 0 5 mg, Q2H PRN  ondansetron, 4 mg, Q6H PRN  ondansetron, 4 mg, Q6H PRN        Invasive Devices Review  Invasive Devices     Drain            Urethral Catheter Straight-tip 16 Fr  less than 1 day                ---------------------------------------------------------------------------------------  Care Time Delivered:   No Critical Care time spent       KRISTIN Ferreira      Portions of the record may have been created with voice recognition software  Occasional wrong word or "sound a like" substitutions may have occurred due to the inherent limitations of voice recognition software    Read the chart carefully and recognize, using context, where substitutions have occurred

## 2020-08-04 NOTE — CASE MANAGEMENT
CM was consulted for Hospice Referral:  Pt is on [address]  CM called daughter Gale Anne to discuss dc planning  Daughter is interested in hospice care, we discuss this is not done in the hospital and reviewed challenges related to + Covid  Options of Hospice Review:  1- Home with Home Hospice  2  Return to [facility] on [facility]  - reviewed unsure if they can accept related to + Covid  3  Reviewed possible [facility]  They do take + COVID  Anand Pepper is in agreement for me to make a referral to [facility], to see if patient is appropriate for this  CM spoke to [facility] [place] Liaison who evaluated and MD at the [facility] will re evaluate tomorrow  If patient is not appropriate for the [facility] investigated other options:  1  [facility] currently will not accept back if patient is + Covid, however they will reconsider if the testing done on any other residents  comes back + as they are waiting for results to come back  Discussed with [facility] if any of there Sister Facilities are accepting + COVID  CM heard back and None are at this time  CM reviewed the possibility of sending patient to another SNF as she is MA pending, [facility] does not see a problem with this--- CM contacted [facility], [facility] and found out that [facility] is accepting + COVIDs -- this may be an option  2  CM called [facility]  - they have [facility] in [place], they are not accepting + COVID patients  They do have a Covid Team that can go out to facilities  Will continue to work on this case for dc planning: Hospice to be determine location

## 2020-08-04 NOTE — PLAN OF CARE
Problem: Potential for Falls  Goal: Patient will remain free of falls  Description: INTERVENTIONS:  - Assess patient frequently for physical needs  -  Identify cognitive and physical deficits and behaviors that affect risk of falls  -  [place] fall precautions as indicated by assessment   - Educate patient/family on patient safety including physical limitations  - Instruct patient to call for assistance with activity based on assessment  - Modify environment to reduce risk of injury  - Consider OT/PT consult to assist with strengthening/mobility  Outcome: Completed     Problem: INFECTION - ADULT  Goal: Absence of fever/infection during neutropenic period  Description: INTERVENTIONS:  - Monitor WBC    Outcome: Completed     Problem: SAFETY ADULT  Goal: Patient will remain free of falls  Description: INTERVENTIONS:  - Assess patient frequently for physical needs  -  Identify cognitive and physical deficits and behaviors that affect risk of falls    -  [place] fall precautions as indicated by assessment   - Educate patient/family on patient safety including physical limitations  - Instruct patient to call for assistance with activity based on assessment  - Modify environment to reduce risk of injury  - Consider OT/PT consult to assist with strengthening/mobility  Outcome: Completed  Goal: Maintain or return to baseline ADL function  Description: INTERVENTIONS:  -  Assess patient's ability to carry out ADLs; assess patient's baseline for ADL function and identify physical deficits which impact ability to perform ADLs (bathing, care of mouth/teeth, toileting, grooming, dressing, etc )  - Assess/evaluate cause of self-care deficits   - Assess range of motion  - Assess patient's mobility; develop plan if impaired  - Assess patient's need for assistive devices and provide as appropriate  - Encourage maximum independence but intervene and supervise when necessary  - Involve family in performance of ADLs  - Assess for home care needs following discharge   - Consider OT consult to assist with ADL evaluation and planning for discharge  - Provide patient education as appropriate  Outcome: Completed  Goal: Maintain or return mobility status to optimal level  Description: INTERVENTIONS:  - Assess patient's baseline mobility status (ambulation, transfers, stairs, etc )    - Identify cognitive and physical deficits and behaviors that affect mobility  - Identify mobility aids required to assist with transfers and/or ambulation (gait belt, sit-to-stand, lift, walker, cane, etc )  - [place] fall precautions as indicated by assessment  - Record patient progress and toleration of activity level on Mobility SBAR; progress patient to next Phase/Stage  - Instruct patient to call for assistance with activity based on assessment  - Consider rehabilitation consult to assist with strengthening/weightbearing, etc   Outcome: Completed     Problem: DISCHARGE PLANNING  Goal: Discharge to home or other facility with appropriate resources  Description: INTERVENTIONS:  - Identify barriers to discharge w/patient and caregiver  - Arrange for needed discharge resources and transportation as appropriate  - Identify discharge learning needs (meds, wound care, etc )  - Arrange for interpretive services to assist at discharge as needed  - Refer to Case Management Department for coordinating discharge planning if the patient needs post-hospital services based on physician/advanced practitioner order or complex needs related to functional status, cognitive ability, or social support system  Outcome: Completed     Problem: RESPIRATORY - ADULT  Goal: Achieves optimal ventilation and oxygenation  Description: INTERVENTIONS:  - Assess for changes in respiratory status  - Assess for changes in mentation and behavior  - Position to facilitate oxygenation and minimize respiratory effort  - Oxygen administered by appropriate delivery if ordered  - Initiate smoking cessation education as indicated  - Encourage broncho-pulmonary hygiene including cough, deep breathe, Incentive Spirometry  - Assess the need for suctioning and aspirate as needed  - Assess and instruct to report SOB or any respiratory difficulty  - Respiratory Therapy support as indicated  Outcome: Completed     Problem: Nutrition/Hydration-ADULT  Goal: Nutrient/Hydration intake appropriate for improving, restoring or maintaining nutritional needs  Description: Monitor and assess patient's nutrition/hydration status for malnutrition  Collaborate with interdisciplinary team and initiate plan and interventions as ordered  Monitor patient's weight and dietary intake as ordered or per policy  Utilize nutrition screening tool and intervene as necessary  Determine patient's food preferences and provide high-protein, high-caloric foods as appropriate       INTERVENTIONS:  - Monitor oral intake, urinary output, labs, and treatment plans  - Assess nutrition and hydration status and recommend course of action  - Evaluate amount of meals eaten  - Assist patient with eating if necessary   - Allow adequate time for meals  - Recommend/ encourage appropriate diets, oral nutritional supplements, and vitamin/mineral supplements  - Order, calculate, and assess calorie counts as needed  - Recommend, monitor, and adjust tube feedings and TPN/PPN based on assessed needs  - Assess need for intravenous fluids  - Provide specific nutrition/hydration education as appropriate  - Include patient/family/caregiver in decisions related to nutrition  Outcome: Completed

## 2020-08-04 NOTE — PLAN OF CARE
Problem: Prexisting or High Potential for Compromised Skin Integrity  Goal: Skin integrity is maintained or improved  Description: INTERVENTIONS:  - Identify patients at risk for skin breakdown  - Assess and monitor skin integrity  - Assess and monitor nutrition and hydration status  - Monitor labs   - Assess for incontinence   - Turn and reposition patient  - Assist with mobility/ambulation  - Relieve pressure over bony prominences  - Avoid friction and shearing  - Provide appropriate hygiene as needed including keeping skin clean and dry  - Evaluate need for skin moisturizer/barrier cream  - Collaborate with interdisciplinary team   - Patient/family teaching  - Consider wound care consult   Outcome: Progressing     Problem: PAIN - ADULT  Goal: Verbalizes/displays adequate comfort level or baseline comfort level  Description: Interventions:  - Encourage patient to monitor pain and request assistance  - Assess pain using appropriate pain scale  - Administer analgesics based on type and severity of pain and evaluate response  - Implement non-pharmacological measures as appropriate and evaluate response  - Consider cultural and social influences on pain and pain management  - Notify physician/advanced practitioner if interventions unsuccessful or patient reports new pain  Outcome: Progressing     Problem: INFECTION - ADULT  Goal: Absence or prevention of progression during hospitalization  Description: INTERVENTIONS:  - Assess and monitor for signs and symptoms of infection  - Monitor lab/diagnostic results  - Monitor all insertion sites, i e  indwelling lines, tubes, and drains  - Monitor endotracheal if appropriate and nasal secretions for changes in amount and color  - [place] appropriate cooling/warming therapies per order  - Administer medications as ordered  - Instruct and encourage patient and family to use good hand hygiene technique  - Identify and instruct in appropriate isolation precautions for identified infection/condition  Outcome: Progressing     Problem: Knowledge Deficit  Goal: Patient/family/caregiver demonstrates understanding of disease process, treatment plan, medications, and discharge instructions  Description: Complete learning assessment and assess knowledge base    Interventions:  - Provide teaching at level of understanding  - Provide teaching via preferred learning methods  Outcome: Progressing

## 2020-08-04 NOTE — HOSPICE NOTE
Hospice referral received and patient evaluated  Spoke with Dr Zina Eaton who asked that we re-evaluate patient for the in patient unit in the morning  CM updated

## 2020-08-04 NOTE — ASSESSMENT & PLAN NOTE
Wt Readings from Last 3 Encounters:   08/03/20 72 4 kg (159 lb 9 8 oz)   07/03/20 64 kg (141 lb 1 5 oz)   06/22/20 68 9 kg (152 lb)         · Last EF from 6/2020 35-40%  · Pt transitioned to comfort care   Home meds stopped

## 2020-08-05 VITALS
SYSTOLIC BLOOD PRESSURE: 147 MMHG | RESPIRATION RATE: 26 BRPM | HEART RATE: 82 BPM | DIASTOLIC BLOOD PRESSURE: 60 MMHG | OXYGEN SATURATION: 72 % | BODY MASS INDEX: 27.25 KG/M2 | HEIGHT: 64 IN | TEMPERATURE: 100.2 F | WEIGHT: 159.61 LBS

## 2020-08-05 PROCEDURE — 99238 HOSP IP/OBS DSCHRG MGMT 30/<: CPT | Performed by: PHYSICIAN ASSISTANT

## 2020-08-05 RX ORDER — ONDANSETRON 4 MG/1
4 TABLET, ORALLY DISINTEGRATING ORAL EVERY 6 HOURS PRN
Status: DISCONTINUED | OUTPATIENT
Start: 2020-08-05 | End: 2020-08-05 | Stop reason: HOSPADM

## 2020-08-05 RX ADMIN — MORPHINE SULFATE 2 MG: 2 INJECTION, SOLUTION INTRAMUSCULAR; INTRAVENOUS at 00:44

## 2020-08-05 RX ADMIN — MORPHINE SULFATE 2 MG: 2 INJECTION, SOLUTION INTRAMUSCULAR; INTRAVENOUS at 12:27

## 2020-08-05 RX ADMIN — MORPHINE SULFATE 2 MG: 2 INJECTION, SOLUTION INTRAMUSCULAR; INTRAVENOUS at 16:20

## 2020-08-05 RX ADMIN — ONDANSETRON 4 MG: 2 INJECTION INTRAMUSCULAR; INTRAVENOUS at 16:20

## 2020-08-05 RX ADMIN — ONDANSETRON 4 MG: 2 INJECTION INTRAMUSCULAR; INTRAVENOUS at 00:23

## 2020-08-05 RX ADMIN — MORPHINE SULFATE 2 MG: 2 INJECTION, SOLUTION INTRAMUSCULAR; INTRAVENOUS at 08:44

## 2020-08-05 RX ADMIN — ONDANSETRON 4 MG: 2 INJECTION INTRAMUSCULAR; INTRAVENOUS at 08:40

## 2020-08-05 NOTE — PLAN OF CARE
Problem: Prexisting or High Potential for Compromised Skin Integrity  Goal: Skin integrity is maintained or improved  Description: INTERVENTIONS:  - Identify patients at risk for skin breakdown  - Assess and monitor skin integrity  - Assess and monitor nutrition and hydration status  - Monitor labs   - Assess for incontinence   - Turn and reposition patient  - Assist with mobility/ambulation  - Relieve pressure over bony prominences  - Avoid friction and shearing  - Provide appropriate hygiene as needed including keeping skin clean and dry  - Evaluate need for skin moisturizer/barrier cream  - Collaborate with interdisciplinary team   - Patient/family teaching  - Consider wound care consult   Outcome: Progressing     Problem: PAIN - ADULT  Goal: Verbalizes/displays adequate comfort level or baseline comfort level  Description: Interventions:  - Encourage patient to monitor pain and request assistance  - Assess pain using appropriate pain scale  - Administer analgesics based on type and severity of pain and evaluate response  - Implement non-pharmacological measures as appropriate and evaluate response  - Consider cultural and social influences on pain and pain management  - Notify physician/advanced practitioner if interventions unsuccessful or patient reports new pain  Outcome: Progressing     Problem: INFECTION - ADULT  Goal: Absence or prevention of progression during hospitalization  Description: INTERVENTIONS:  - Assess and monitor for signs and symptoms of infection  - Monitor lab/diagnostic results  - Monitor all insertion sites, i e  indwelling lines, tubes, and drains  - Monitor endotracheal if appropriate and nasal secretions for changes in amount and color  - [place] appropriate cooling/warming therapies per order  - Administer medications as ordered  - Instruct and encourage patient and family to use good hand hygiene technique  - Identify and instruct in appropriate isolation precautions for identified infection/condition  Outcome: Progressing     Problem: Knowledge Deficit  Goal: Patient/family/caregiver demonstrates understanding of disease process, treatment plan, medications, and discharge instructions  Description: Complete learning assessment and assess knowledge base    Interventions:  - Provide teaching at level of understanding  - Provide teaching via preferred learning methods  Outcome: Progressing

## 2020-08-05 NOTE — HOSPICE NOTE
[id number]  Met with daughter Sergio Wells to sign consents, they were faxed to [place] [phone]  Gave number to IPU to bedside RN to call report

## 2020-08-05 NOTE — CASE MANAGEMENT
Spoke to Hospice Liaison and Nursing: Plan is decrease 02 delivery and assess if she is comfortable on that setting  Liaison to be on site at 1100 to talk with daughter  Anticipate acceptance to [facility]  Will continue to follow

## 2020-08-05 NOTE — CASE MANAGEMENT
Patient was re evaluated by Hospice for the [facility]  Pt is appropriate and patient can be discharged around 3 PM   CM spoke with CC team/ Hospice Liaison  Pt will be transported on 8 liters  CM called Ed at [facility] and requested a 3 PM transport, await confirmation  Medical Necessity completed and placed Out of Hospital DNR sheet on the chart  DC plan pending transportation confirmation to the [facility]  Transportation Confirmed for 4 PM with [place]: CM left a message with Hospice Liaison of time of transport  CM called and spoke to patient's daughter: Reviewed the DC IMM verbally, aware of the 4 hrs of consideration, per Marquise Lay, no need to appeal as she is going to Hospice  Daughter is aware of transport time

## 2020-08-05 NOTE — ASSESSMENT & PLAN NOTE
· Secondary to COVID, aspiration, and bacterial pneumonia all superimposed on COPD  · 8/3 Increasing O2 requirements overnight and into this am, placed on Midflow without improvement  · Patient made comfort care 8/3

## 2020-08-05 NOTE — DISCHARGE SUMMARY
Discharge- Juliano Hendrickson 1935, 82 y o  female MRN: [id number]    Unit/Bed#: -01 Encounter: [id number]    Primary Care Provider: Shonda Lu MD   Date and time admitted to hospital: 7/25/2020  7:31 PM    Acute on chronic respiratory failure with hypoxia [facility])  Assessment & Plan  · Secondary to COVID, aspiration, and bacterial pneumonia all superimposed on COPD  · 8/3 Increasing O2 requirements overnight and into this am, placed on Midflow without improvement  · Patient made comfort care 8/3  * Pneumonia due to COVID-19 virus  Assessment & Plan  · COVID positive 8/3  · CT - b/l peripheral GGO, more prominent on the right  · Pt family opting for comfort measures on 8/3  · Plan discharge to hospice on 8/5  Aspiration pneumonia ([facility] )  Assessment & Plan  · No antibiotics due to comfort care  COPD with emphysema ([facility] )  Assessment & Plan  · Continue O2 support  · Medications stopped- pt transitioned to comfort care on 8/3    Acute on chronic systolic CHF (congestive heart failure) (HCC)  Assessment & Plan  Wt Readings from Last 3 Encounters:   08/03/20 72 4 kg (159 lb 9 8 oz)   07/03/20 64 kg (141 lb 1 5 oz)   06/22/20 68 9 kg (152 lb)         · Last EF from 6/2020 35-40%  · Pt transitioned to comfort care   Home meds stopped    Paroxysmal atrial fibrillation ([facility] )  Assessment & Plan  · Home meds held  · Pt transitioned to comfort care    CAD (coronary artery disease), native coronary artery  Assessment & Plan  · Medications stopped  · Pt transitioned to comfort care on 8/3    Essential hypertension  Assessment & Plan  · Medications stopped  · Pt transitioned to comfort care on 8/3    Esophageal reflux  Assessment & Plan  · Medications stopped  · Pt transitioned to comfort care on 8/3    Dysphagia  Assessment & Plan  · Comfort care  · Pleasure feeds as able    Type 2 diabetes mellitus with hyperglycemia [facility])  Assessment & Plan  Lab Results   Component Value Date    HGBA1C 7 4 (H) 07/26/2020 Recent Labs     08/02/20 2047 08/03/20  0304 08/03/20  0725 08/03/20  1037   POCGLU 330* 169* 79 158*       Blood Sugar Average: Last 72 hrs:  (P) 179 5     · Medications stopped  · Pt transitioned to comfort care on 8/3    Iron deficiency anemia  Assessment & Plan  · No further lab draws  · Pt transitioned to comfort care on 8/3    Resolved Problems  Date Reviewed: 8/5/2020          Resolved    UTI (urinary tract infection) 8/3/2020     Resolved by  [employer], CRNP    Elevated troponin level not due myocardial infarction 8/3/2020     Resolved by  [employer], CRNP    Prolonged Q-T interval on ECG 8/3/2020     Resolved by  [employer], CRNP    V-tach [facility]) 8/3/2020     Resolved by  [employer], [address]          Admission Date:   Admission Orders (From admission, onward)     Ordered        07/25/20 2133  Inpatient Admission  Once                     Admitting Diagnosis: UTI (urinary tract infection) [N39 0]  Hypoxia [R09 02]    HPI: Kamila Tipton is a 82 y o  female who was admitted on 7/25/2020 to the hospitalist service who presented with longstanding history atrial fibrillation status post permanent pacemaker maintained on Eliquis and rate control, reduced ejection fraction congestive heart failure with multiple admissions for acute exacerbation presents with worsening dyspnea and cough along with 9 lb weight gain  In the emergency department she was found to be in significant respiratory distress, placed on BiPAP with improvement and ABG unremarkable  Hypoxia improved with diuresis  On admission, the patient was COVID negative  Due to worsening condition and increased respiratory distress she was retested for COVID and was positive on 08/03/2020  High flow oxygen was applied and Critical Care was consulted  At that time, she was upgraded to Step Down 2  On 08/03, the Critical Care team spoke to the patient's whom consulted with other family members    The decision was made to make her mother comfort care  Inpatient hospice was consulted on 08/04 but she did not meet criteria at that time  She was evaluated again on 8/5 and was accepted to the [facility]  Family met with hospice team and was in agreement for transport  Procedures Performed: No orders of the defined types were placed in this encounter  Summary of Hospital Course: Please see HPI above  Significant Findings, Care, Treatment and Services Provided: Please see HPI above  Complications: COVID positive with severe comorbid conditions  Condition at Discharge: poor        Vitals:    08/05/20 0400   BP: 132/62   Pulse: 94   Resp: (!) 30   Temp: 98 4 °F (36 9 °C)   SpO2: (!) 79%       Discharge instructions/Information to patient and family:   See after visit summary for information provided to patient and family  Provisions for Follow-Up Care:  See after visit summary for information related to follow-up care and any pertinent home health orders  PCP: Silvano Melgar MD    Disposition: Inpatient hospice    Planned Readmission: No    Discharge Statement   I spent 20 minutes discharging the patient  This time was spent on the day of discharge  I had direct contact with the patient on the day of discharge  Additional documentation is required if more than 30 minutes were spent on discharge  Discharge Medications:  See after visit summary for reconciled discharge medications provided to patient and family

## 2020-08-05 NOTE — TRANSPORTATION MEDICAL NECESSITY
Section I - General Information    Name of Patient: Marquita Ocampo                 : 1935    Medicare #: [id number]  Transport Date: 20 (PCS is valid for round trips on this date and for all repetitive trips in the 60-day range as noted below )  Origin: [address] [place] [address] West: [facility]    Is the pt's stay covered under Medicare Part A (PPS/DRG)   [x]      Closest appropriate facility? If no, why is transport to more distant facility required? Yes  If hospice pt, is this transport related to pt's terminal illness? No       Section II - Medical Necessity Questionnaire  Ambulance transportation is medically necessary only if other means of transport are contraindicated or would be potentially harmful to the patient  To meet this requirement, the patient must either be "bed confined" or suffer from a condition such that transport by means other than ambulance is contraindicated by the patient's condition  The following questions must be answered by the medical professional signing below for this form to be valid:    1)  Describe the MEDICAL CONDITION (physical and/or mental) of this patient AT [address] [place] [address] that requires the patient to be transported in an ambulance and why transport by other means is contraindicated by the patient's condition: Covid - PNA with respiratory failure      2) Is the patient "bed confined" as defined below? Yes  To be "be confined" the patient must satisfy all three of the following conditions: (1) unable to get up from bed without Assistance; AND (2) unable to ambulate; AND (3) unable to sit in a chair or wheelchair  3) Can this patient safely be transported by car or wheelchair van (i e , seated during transport without a medical attendant or monitoring)?    No    4) In addition to completing questions 1-3 above, please check any of the following conditions that apply*:   Medical attendant required   Requires oxygen-unable to self administer on 8 liters of 02  Special handling/isolation/infection control precautions required     *Note: supporting documentation for any boxes checked must be maintained in the patient's medical records  If hosp-hosp transfer, describe services needed at 2nd facility not available at 1st facility? No    Section III - Signature of Physician or Healthcare Professional  I certify that the above information is true and correct based on my evaluation of this patient, and represent that the patient requires transport by ambulance and that other forms of transport are contraindicated  I understand that this information will be used by the Centers for Medicare and Medicaid Services (CMS) to support the determination of medical necessity for ambulance services, and I represent that I have personal knowledge of the patient's condition at time of transport  []  If this box is checked, I also certify that the patient is physically or mentally incapable of signing the ambulance service's claim and that the institution with which I am affiliated has furnished care, services, or assistance to the patient  My signature below is made on behalf of the patient pursuant to 42 CFR §424 36(b)(4)  In accordance with 42 CFR §424 37, the specific reason(s) that the patient is physically or mentally incapable of signing the claim form is as follows:     Signature of Physician* or Healthcare Professional______________________________________________________________  Signature Date 08/05/20 (For scheduled repetitive transports, this form is not valid for transports performed more than 60 days after this date)    Printed Name & Credentials of Physician or Healthcare Professional (MD, DO, RN, etc )__Kenyetta Canales RN BSN_____________________________  *Form must be signed by patient's attending physician for scheduled, repetitive transports   For non-repetitive, unscheduled ambulance transports, if unable to obtain the signature of the attending physician, any of the following may sign (choose appropriate option below)  [] Physician Assistant []  Clinical Nurse Specialist []  Registered Nurse  []  Nurse Practitioner  [x] Discharge Planner

## 2020-08-05 NOTE — PLAN OF CARE
Problem: Prexisting or High Potential for Compromised Skin Integrity  Goal: Skin integrity is maintained or improved  Description: INTERVENTIONS:  - Identify patients at risk for skin breakdown  - Assess and monitor skin integrity  - Assess and monitor nutrition and hydration status  - Monitor labs   - Assess for incontinence   - Turn and reposition patient  - Assist with mobility/ambulation  - Relieve pressure over bony prominences  - Avoid friction and shearing  - Provide appropriate hygiene as needed including keeping skin clean and dry  - Evaluate need for skin moisturizer/barrier cream  - Collaborate with interdisciplinary team   - Patient/family teaching  - Consider wound care consult   Outcome: Progressing     Problem: PAIN - ADULT  Goal: Verbalizes/displays adequate comfort level or baseline comfort level  Description: Interventions:  - Encourage patient to monitor pain and request assistance  - Assess pain using appropriate pain scale  - Administer analgesics based on type and severity of pain and evaluate response  - Implement non-pharmacological measures as appropriate and evaluate response  - Consider cultural and social influences on pain and pain management  - Notify physician/advanced practitioner if interventions unsuccessful or patient reports new pain  Outcome: Progressing     Problem: INFECTION - ADULT  Goal: Absence or prevention of progression during hospitalization  Description: INTERVENTIONS:  - Assess and monitor for signs and symptoms of infection  - Monitor lab/diagnostic results  - Monitor all insertion sites, i e  indwelling lines, tubes, and drains  - Monitor endotracheal if appropriate and nasal secretions for changes in amount and color  - [place] appropriate cooling/warming therapies per order  - Administer medications as ordered  - Instruct and encourage patient and family to use good hand hygiene technique  - Identify and instruct in appropriate isolation precautions for identified infection/condition  Outcome: Progressing     Problem: Knowledge Deficit  Goal: Patient/family/caregiver demonstrates understanding of disease process, treatment plan, medications, and discharge instructions  Description: Complete learning assessment and assess knowledge base    Interventions:  - Provide teaching at level of understanding  - Provide teaching via preferred learning methods  Outcome: Progressing

## 2020-08-05 NOTE — ASSESSMENT & PLAN NOTE
Lab Results   Component Value Date    HGBA1C 7 4 (H) 07/26/2020       Recent Labs     08/02/20  2047 08/03/20  0304 08/03/20  0725 08/03/20  1037   POCGLU 330* 169* 79 158*       Blood Sugar Average: Last 72 hrs:  (P) 179 5     · Medications stopped  · Pt transitioned to comfort care on 8/3

## 2020-08-05 NOTE — ASSESSMENT & PLAN NOTE
· COVID positive 8/3  · CT - b/l peripheral GGO, more prominent on the right  · Pt family opting for comfort measures on 8/3  · Plan discharge to hospice on 8/5

## 2022-01-31 NOTE — ASSESSMENT & PLAN NOTE
· Continue bronchodilator and steroid  · On Yves steroid dose; 2nd day on 40 mg daily Implemented All Fall Risk Interventions:  [place] to call system. Call bell, personal items and telephone within reach. Instruct patient to call for assistance. Room bathroom lighting operational. Non-slip footwear when patient is off stretcher. Physically safe environment: no spills, clutter or unnecessary equipment. Stretcher in lowest position, wheels locked, appropriate side rails in place. Provide visual cue, wrist band, yellow gown, etc. Monitor gait and stability. Monitor for mental status changes and reorient to person, place, and time. Review medications for side effects contributing to fall risk. Reinforce activity limits and safety measures with patient and family.